# Patient Record
Sex: MALE | Race: WHITE | Employment: OTHER | URBAN - METROPOLITAN AREA
[De-identification: names, ages, dates, MRNs, and addresses within clinical notes are randomized per-mention and may not be internally consistent; named-entity substitution may affect disease eponyms.]

---

## 2017-01-01 ENCOUNTER — HOSPITAL ENCOUNTER (OUTPATIENT)
Dept: OTHER | Age: 67
Discharge: HOME | End: 2017-01-31
Attending: INTERNAL MEDICINE | Admitting: INTERNAL MEDICINE

## 2017-01-05 ENCOUNTER — ANTI-COAG VISIT (OUTPATIENT)
Dept: PHARMACY | Age: 67
End: 2017-01-05

## 2017-01-05 DIAGNOSIS — I48.20 CHRONIC ATRIAL FIBRILLATION (HCC): Primary | Chronic | ICD-10-CM

## 2017-01-05 DIAGNOSIS — I48.91 ATRIAL FIBRILLATION WITH RVR (HCC): Chronic | ICD-10-CM

## 2017-01-05 LAB
INR BLD: 1.5
PROTIME: 18 SECONDS

## 2017-01-12 ENCOUNTER — TELEPHONE (OUTPATIENT)
Dept: CARDIOLOGY CLINIC | Age: 67
End: 2017-01-12

## 2017-01-18 RX ORDER — PRAVASTATIN SODIUM 20 MG
TABLET ORAL
Qty: 30 TABLET | Refills: 5 | Status: SHIPPED | OUTPATIENT
Start: 2017-01-18 | End: 2017-07-22 | Stop reason: SDUPTHER

## 2017-01-23 ENCOUNTER — ANTI-COAG VISIT (OUTPATIENT)
Dept: PHARMACY | Age: 67
End: 2017-01-23

## 2017-01-23 DIAGNOSIS — I48.20 CHRONIC ATRIAL FIBRILLATION (HCC): ICD-10-CM

## 2017-01-23 LAB — INR BLD: 1.9

## 2017-02-13 ENCOUNTER — ANTI-COAG VISIT (OUTPATIENT)
Dept: PHARMACY | Age: 67
End: 2017-02-13

## 2017-02-13 DIAGNOSIS — I48.20 CHRONIC ATRIAL FIBRILLATION (HCC): ICD-10-CM

## 2017-02-13 LAB
INR BLD: 2.1
PROTIME: 25.8 SECONDS

## 2017-02-27 ENCOUNTER — TELEPHONE (OUTPATIENT)
Dept: CARDIOLOGY CLINIC | Age: 67
End: 2017-02-27

## 2017-03-02 RX ORDER — DILTIAZEM HYDROCHLORIDE 120 MG/1
CAPSULE, COATED, EXTENDED RELEASE ORAL
Qty: 90 CAPSULE | Refills: 3 | Status: SHIPPED | OUTPATIENT
Start: 2017-03-02 | End: 2017-06-13

## 2017-03-13 ENCOUNTER — ANTI-COAG VISIT (OUTPATIENT)
Dept: PHARMACY | Age: 67
End: 2017-03-13

## 2017-03-13 DIAGNOSIS — I48.20 CHRONIC ATRIAL FIBRILLATION (HCC): ICD-10-CM

## 2017-03-13 LAB
INR BLD: 2.2
PROTIME: 26.8 SECONDS

## 2017-04-01 ENCOUNTER — HOSPITAL ENCOUNTER (OUTPATIENT)
Dept: OTHER | Age: 67
Discharge: HOME | End: 2017-04-30
Attending: INTERNAL MEDICINE | Admitting: INTERNAL MEDICINE

## 2017-04-10 ENCOUNTER — ANTI-COAG VISIT (OUTPATIENT)
Dept: PHARMACY | Age: 67
End: 2017-04-10

## 2017-04-10 DIAGNOSIS — I48.20 CHRONIC ATRIAL FIBRILLATION (HCC): ICD-10-CM

## 2017-04-10 LAB
INR BLD: 2.3
PROTIME: 27.2 SECONDS

## 2017-05-01 ENCOUNTER — HOSPITAL ENCOUNTER (OUTPATIENT)
Dept: OTHER | Age: 67
Discharge: HOME | End: 2017-05-31
Attending: INTERNAL MEDICINE | Admitting: INTERNAL MEDICINE

## 2017-05-10 ENCOUNTER — ANTI-COAG VISIT (OUTPATIENT)
Dept: PHARMACY | Age: 67
End: 2017-05-10

## 2017-05-10 DIAGNOSIS — I48.20 CHRONIC ATRIAL FIBRILLATION (HCC): ICD-10-CM

## 2017-05-10 LAB
INR BLD: 3.2
PROTIME: 38.9 SECONDS

## 2017-05-15 ENCOUNTER — TELEPHONE (OUTPATIENT)
Dept: CARDIOLOGY CLINIC | Age: 67
End: 2017-05-15

## 2017-05-16 ENCOUNTER — TELEPHONE (OUTPATIENT)
Dept: PHARMACY | Age: 67
End: 2017-05-16

## 2017-05-17 RX ORDER — MECLIZINE HCL 12.5 MG/1
12.5 TABLET ORAL 3 TIMES DAILY PRN
Qty: 14 TABLET | Refills: 1 | OUTPATIENT
Start: 2017-05-17 | End: 2018-05-17

## 2017-06-09 ENCOUNTER — ANTI-COAG VISIT (OUTPATIENT)
Dept: PHARMACY | Age: 67
End: 2017-06-09

## 2017-06-09 DIAGNOSIS — I48.20 CHRONIC ATRIAL FIBRILLATION (HCC): ICD-10-CM

## 2017-06-09 LAB
INR BLD: 3.4
PROTIME: 40.6 SECONDS

## 2017-06-13 ENCOUNTER — TELEPHONE (OUTPATIENT)
Dept: CARDIOLOGY CLINIC | Age: 67
End: 2017-06-13

## 2017-06-13 ENCOUNTER — OFFICE VISIT (OUTPATIENT)
Dept: CARDIOLOGY CLINIC | Age: 67
End: 2017-06-13

## 2017-06-13 ENCOUNTER — NURSE ONLY (OUTPATIENT)
Dept: CARDIOLOGY CLINIC | Age: 67
End: 2017-06-13

## 2017-06-13 ENCOUNTER — HOSPITAL ENCOUNTER (OUTPATIENT)
Dept: OTHER | Age: 67
Discharge: HOME | End: 2017-06-13
Attending: NURSE PRACTITIONER | Admitting: NURSE PRACTITIONER

## 2017-06-13 VITALS
HEART RATE: 70 BPM | SYSTOLIC BLOOD PRESSURE: 80 MMHG | BODY MASS INDEX: 22.12 KG/M2 | DIASTOLIC BLOOD PRESSURE: 60 MMHG | OXYGEN SATURATION: 96 % | HEIGHT: 71 IN | WEIGHT: 158 LBS

## 2017-06-13 DIAGNOSIS — I48.91 ATRIAL FIBRILLATION WITH RVR (HCC): Chronic | ICD-10-CM

## 2017-06-13 DIAGNOSIS — I10 ESSENTIAL HYPERTENSION: Chronic | ICD-10-CM

## 2017-06-13 DIAGNOSIS — I48.91 ATRIAL FIBRILLATION, UNSPECIFIED TYPE (HCC): Primary | ICD-10-CM

## 2017-06-13 DIAGNOSIS — R06.02 SOB (SHORTNESS OF BREATH): ICD-10-CM

## 2017-06-13 DIAGNOSIS — I48.91 ATRIAL FIBRILLATION, UNSPECIFIED TYPE (HCC): ICD-10-CM

## 2017-06-13 LAB
A/G RATIO: 1.5 (ref 1.1–2.2)
ALBUMIN SERPL-MCNC: 4.5 G/DL (ref 3.4–5)
ALP BLD-CCNC: 45 U/L (ref 40–129)
ALT SERPL-CCNC: 17 U/L (ref 10–40)
ANION GAP SERPL CALCULATED.3IONS-SCNC: 16 MMOL/L (ref 3–16)
AST SERPL-CCNC: 20 U/L (ref 15–37)
BILIRUB SERPL-MCNC: 1.1 MG/DL (ref 0–1)
BUN BLDV-MCNC: 12 MG/DL (ref 7–20)
CALCIUM SERPL-MCNC: 9.2 MG/DL (ref 8.3–10.6)
CHLORIDE BLD-SCNC: 102 MMOL/L (ref 99–110)
CO2: 23 MMOL/L (ref 21–32)
CREAT SERPL-MCNC: 0.7 MG/DL (ref 0.8–1.3)
D DIMER: <200 NG/ML DDU (ref 0–229)
GFR AFRICAN AMERICAN: >60
GFR NON-AFRICAN AMERICAN: >60
GLOBULIN: 3 G/DL
GLUCOSE BLD-MCNC: 90 MG/DL (ref 70–99)
HCT VFR BLD CALC: 44.6 % (ref 40.5–52.5)
HEMOGLOBIN: 14.6 G/DL (ref 13.5–17.5)
MAGNESIUM: 2.3 MG/DL (ref 1.8–2.4)
MCH RBC QN AUTO: 31.1 PG (ref 26–34)
MCHC RBC AUTO-ENTMCNC: 32.7 G/DL (ref 31–36)
MCV RBC AUTO: 95.3 FL (ref 80–100)
PDW BLD-RTO: 13.4 % (ref 12.4–15.4)
PLATELET # BLD: 251 K/UL (ref 135–450)
PMV BLD AUTO: 9.3 FL (ref 5–10.5)
POTASSIUM SERPL-SCNC: 4.5 MMOL/L (ref 3.5–5.1)
RBC # BLD: 4.68 M/UL (ref 4.2–5.9)
SODIUM BLD-SCNC: 141 MMOL/L (ref 136–145)
TOTAL PROTEIN: 7.5 G/DL (ref 6.4–8.2)
TSH SERPL DL<=0.05 MIU/L-ACNC: 2.31 UIU/ML (ref 0.27–4.2)
VITAMIN D 25-HYDROXY: 35.5 NG/ML
WBC # BLD: 6.4 K/UL (ref 4–11)

## 2017-06-13 PROCEDURE — 93000 ELECTROCARDIOGRAM COMPLETE: CPT | Performed by: NURSE PRACTITIONER

## 2017-06-13 PROCEDURE — G8427 DOCREV CUR MEDS BY ELIG CLIN: HCPCS | Performed by: NURSE PRACTITIONER

## 2017-06-13 PROCEDURE — 4040F PNEUMOC VAC/ADMIN/RCVD: CPT | Performed by: NURSE PRACTITIONER

## 2017-06-13 PROCEDURE — 1123F ACP DISCUSS/DSCN MKR DOCD: CPT | Performed by: NURSE PRACTITIONER

## 2017-06-13 PROCEDURE — 99214 OFFICE O/P EST MOD 30 MIN: CPT | Performed by: NURSE PRACTITIONER

## 2017-06-13 PROCEDURE — 1036F TOBACCO NON-USER: CPT | Performed by: NURSE PRACTITIONER

## 2017-06-13 PROCEDURE — G8420 CALC BMI NORM PARAMETERS: HCPCS | Performed by: NURSE PRACTITIONER

## 2017-06-13 PROCEDURE — G8598 ASA/ANTIPLAT THER USED: HCPCS | Performed by: NURSE PRACTITIONER

## 2017-06-13 PROCEDURE — 3017F COLORECTAL CA SCREEN DOC REV: CPT | Performed by: NURSE PRACTITIONER

## 2017-06-19 ENCOUNTER — OFFICE VISIT (OUTPATIENT)
Dept: SLEEP MEDICINE | Age: 67
End: 2017-06-19

## 2017-06-19 VITALS
SYSTOLIC BLOOD PRESSURE: 110 MMHG | DIASTOLIC BLOOD PRESSURE: 60 MMHG | HEIGHT: 71 IN | BODY MASS INDEX: 22.26 KG/M2 | HEART RATE: 75 BPM | WEIGHT: 159 LBS | OXYGEN SATURATION: 98 %

## 2017-06-19 DIAGNOSIS — I48.91 ATRIAL FIBRILLATION WITH RVR (HCC): Chronic | ICD-10-CM

## 2017-06-19 DIAGNOSIS — I10 ESSENTIAL HYPERTENSION: Chronic | ICD-10-CM

## 2017-06-19 DIAGNOSIS — G47.33 OBSTRUCTIVE SLEEP APNEA SYNDROME: Primary | Chronic | ICD-10-CM

## 2017-06-19 DIAGNOSIS — F51.04 INSOMNIA, PSYCHOPHYSIOLOGICAL: Chronic | ICD-10-CM

## 2017-06-19 DIAGNOSIS — I25.9 CHRONIC ISCHEMIC HEART DISEASE: Chronic | ICD-10-CM

## 2017-06-19 PROCEDURE — 1123F ACP DISCUSS/DSCN MKR DOCD: CPT | Performed by: NURSE PRACTITIONER

## 2017-06-19 PROCEDURE — G8420 CALC BMI NORM PARAMETERS: HCPCS | Performed by: NURSE PRACTITIONER

## 2017-06-19 PROCEDURE — G8427 DOCREV CUR MEDS BY ELIG CLIN: HCPCS | Performed by: NURSE PRACTITIONER

## 2017-06-19 PROCEDURE — 1036F TOBACCO NON-USER: CPT | Performed by: NURSE PRACTITIONER

## 2017-06-19 PROCEDURE — G8598 ASA/ANTIPLAT THER USED: HCPCS | Performed by: NURSE PRACTITIONER

## 2017-06-19 PROCEDURE — 99214 OFFICE O/P EST MOD 30 MIN: CPT | Performed by: NURSE PRACTITIONER

## 2017-06-19 PROCEDURE — 4040F PNEUMOC VAC/ADMIN/RCVD: CPT | Performed by: NURSE PRACTITIONER

## 2017-06-19 PROCEDURE — 3017F COLORECTAL CA SCREEN DOC REV: CPT | Performed by: NURSE PRACTITIONER

## 2017-06-19 RX ORDER — MECLIZINE HYDROCHLORIDE 25 MG/1
25 TABLET ORAL 3 TIMES DAILY PRN
COMMUNITY
End: 2019-11-08

## 2017-06-19 ASSESSMENT — ENCOUNTER SYMPTOMS
ABDOMINAL PAIN: 0
SINUS PRESSURE: 0
ABDOMINAL DISTENTION: 0
COUGH: 0
APNEA: 0
SHORTNESS OF BREATH: 0
RHINORRHEA: 0

## 2017-06-19 ASSESSMENT — SLEEP AND FATIGUE QUESTIONNAIRES
HOW LIKELY ARE YOU TO NOD OFF OR FALL ASLEEP WHILE SITTING AND TALKING TO SOMEONE: 0
ESS TOTAL SCORE: 3
HOW LIKELY ARE YOU TO NOD OFF OR FALL ASLEEP WHILE LYING DOWN TO REST IN THE AFTERNOON WHEN CIRCUMSTANCES PERMIT: 1
HOW LIKELY ARE YOU TO NOD OFF OR FALL ASLEEP WHEN YOU ARE A PASSENGER IN A CAR FOR AN HOUR WITHOUT A BREAK: 0
HOW LIKELY ARE YOU TO NOD OFF OR FALL ASLEEP WHILE SITTING AND READING: 1
HOW LIKELY ARE YOU TO NOD OFF OR FALL ASLEEP IN A CAR, WHILE STOPPED FOR A FEW MINUTES IN TRAFFIC: 0
HOW LIKELY ARE YOU TO NOD OFF OR FALL ASLEEP WHILE SITTING INACTIVE IN A PUBLIC PLACE: 0
HOW LIKELY ARE YOU TO NOD OFF OR FALL ASLEEP WHILE SITTING QUIETLY AFTER LUNCH WITHOUT ALCOHOL: 0
HOW LIKELY ARE YOU TO NOD OFF OR FALL ASLEEP WHILE WATCHING TV: 1

## 2017-06-22 ENCOUNTER — HOSPITAL ENCOUNTER (OUTPATIENT)
Dept: NON INVASIVE DIAGNOSTICS | Age: 67
Discharge: HOME | End: 2017-06-22
Attending: NURSE PRACTITIONER | Admitting: NURSE PRACTITIONER

## 2017-06-22 DIAGNOSIS — I48.91 ATRIAL FIBRILLATION (HCC): ICD-10-CM

## 2017-06-30 ENCOUNTER — ANTI-COAG VISIT (OUTPATIENT)
Dept: PHARMACY | Age: 67
End: 2017-06-30

## 2017-06-30 DIAGNOSIS — I48.20 CHRONIC ATRIAL FIBRILLATION (HCC): ICD-10-CM

## 2017-06-30 LAB — INR BLD: 4.1

## 2017-07-07 ENCOUNTER — TELEPHONE (OUTPATIENT)
Dept: CARDIOLOGY CLINIC | Age: 67
End: 2017-07-07

## 2017-07-17 ENCOUNTER — ANTI-COAG VISIT (OUTPATIENT)
Dept: PHARMACY | Age: 67
End: 2017-07-17

## 2017-07-17 DIAGNOSIS — I48.20 CHRONIC ATRIAL FIBRILLATION (HCC): ICD-10-CM

## 2017-07-17 LAB
INR BLD: 2.2
PROTIME: 26.7 SECONDS

## 2017-07-24 RX ORDER — PRAVASTATIN SODIUM 20 MG
TABLET ORAL
Qty: 30 TABLET | Refills: 5 | Status: SHIPPED | OUTPATIENT
Start: 2017-07-24 | End: 2018-01-13 | Stop reason: SDUPTHER

## 2017-07-25 PROCEDURE — 93228 REMOTE 30 DAY ECG REV/REPORT: CPT | Performed by: INTERNAL MEDICINE

## 2017-07-27 ENCOUNTER — TELEPHONE (OUTPATIENT)
Dept: CARDIOLOGY CLINIC | Age: 67
End: 2017-07-27

## 2017-07-31 ENCOUNTER — OFFICE VISIT (OUTPATIENT)
Dept: CARDIOLOGY CLINIC | Age: 67
End: 2017-07-31

## 2017-07-31 VITALS
DIASTOLIC BLOOD PRESSURE: 70 MMHG | SYSTOLIC BLOOD PRESSURE: 90 MMHG | HEART RATE: 71 BPM | HEIGHT: 71 IN | BODY MASS INDEX: 21.84 KG/M2 | WEIGHT: 156 LBS

## 2017-07-31 DIAGNOSIS — G47.33 OBSTRUCTIVE APNEA: ICD-10-CM

## 2017-07-31 DIAGNOSIS — I10 ESSENTIAL HYPERTENSION: Chronic | ICD-10-CM

## 2017-07-31 DIAGNOSIS — I48.0 PAROXYSMAL ATRIAL FIBRILLATION (HCC): Primary | ICD-10-CM

## 2017-07-31 PROCEDURE — 3017F COLORECTAL CA SCREEN DOC REV: CPT | Performed by: NURSE PRACTITIONER

## 2017-07-31 PROCEDURE — 1036F TOBACCO NON-USER: CPT | Performed by: NURSE PRACTITIONER

## 2017-07-31 PROCEDURE — 99214 OFFICE O/P EST MOD 30 MIN: CPT | Performed by: NURSE PRACTITIONER

## 2017-07-31 PROCEDURE — G8598 ASA/ANTIPLAT THER USED: HCPCS | Performed by: NURSE PRACTITIONER

## 2017-07-31 PROCEDURE — G8420 CALC BMI NORM PARAMETERS: HCPCS | Performed by: NURSE PRACTITIONER

## 2017-07-31 PROCEDURE — 93000 ELECTROCARDIOGRAM COMPLETE: CPT | Performed by: NURSE PRACTITIONER

## 2017-07-31 PROCEDURE — 1123F ACP DISCUSS/DSCN MKR DOCD: CPT | Performed by: NURSE PRACTITIONER

## 2017-07-31 PROCEDURE — G8427 DOCREV CUR MEDS BY ELIG CLIN: HCPCS | Performed by: NURSE PRACTITIONER

## 2017-07-31 PROCEDURE — 4040F PNEUMOC VAC/ADMIN/RCVD: CPT | Performed by: NURSE PRACTITIONER

## 2017-08-14 ENCOUNTER — ANTI-COAG VISIT (OUTPATIENT)
Dept: PHARMACY | Age: 67
End: 2017-08-14

## 2017-08-14 DIAGNOSIS — I48.20 CHRONIC ATRIAL FIBRILLATION (HCC): ICD-10-CM

## 2017-08-14 LAB — INR BLD: 2.6

## 2017-09-13 ENCOUNTER — ANTI-COAG VISIT (OUTPATIENT)
Dept: PHARMACY | Age: 67
End: 2017-09-13

## 2017-09-13 LAB
INR BLD: 2.3
PROTIME: 27.9 SECONDS

## 2017-09-19 ENCOUNTER — TELEPHONE (OUTPATIENT)
Dept: CARDIOLOGY CLINIC | Age: 67
End: 2017-09-19

## 2017-09-25 ENCOUNTER — OFFICE VISIT (OUTPATIENT)
Dept: CARDIOLOGY CLINIC | Age: 67
End: 2017-09-25

## 2017-09-25 VITALS
HEIGHT: 71 IN | BODY MASS INDEX: 21.85 KG/M2 | SYSTOLIC BLOOD PRESSURE: 110 MMHG | HEART RATE: 78 BPM | OXYGEN SATURATION: 95 % | WEIGHT: 156.12 LBS | DIASTOLIC BLOOD PRESSURE: 72 MMHG

## 2017-09-25 DIAGNOSIS — I35.1 AORTIC VALVE REGURGITATION, UNSPECIFIED ETIOLOGY: Chronic | ICD-10-CM

## 2017-09-25 DIAGNOSIS — G47.33 OBSTRUCTIVE SLEEP APNEA SYNDROME: Chronic | ICD-10-CM

## 2017-09-25 DIAGNOSIS — I48.91 ATRIAL FIBRILLATION, UNSPECIFIED TYPE (HCC): Primary | Chronic | ICD-10-CM

## 2017-09-25 DIAGNOSIS — G45.9 TRANSIENT CEREBRAL ISCHEMIA, UNSPECIFIED TYPE: Chronic | ICD-10-CM

## 2017-09-25 PROCEDURE — G8427 DOCREV CUR MEDS BY ELIG CLIN: HCPCS | Performed by: INTERNAL MEDICINE

## 2017-09-25 PROCEDURE — 3017F COLORECTAL CA SCREEN DOC REV: CPT | Performed by: INTERNAL MEDICINE

## 2017-09-25 PROCEDURE — 4040F PNEUMOC VAC/ADMIN/RCVD: CPT | Performed by: INTERNAL MEDICINE

## 2017-09-25 PROCEDURE — 93000 ELECTROCARDIOGRAM COMPLETE: CPT | Performed by: INTERNAL MEDICINE

## 2017-09-25 PROCEDURE — 99214 OFFICE O/P EST MOD 30 MIN: CPT | Performed by: INTERNAL MEDICINE

## 2017-09-25 PROCEDURE — G8420 CALC BMI NORM PARAMETERS: HCPCS | Performed by: INTERNAL MEDICINE

## 2017-09-25 PROCEDURE — 1036F TOBACCO NON-USER: CPT | Performed by: INTERNAL MEDICINE

## 2017-09-25 PROCEDURE — 1123F ACP DISCUSS/DSCN MKR DOCD: CPT | Performed by: INTERNAL MEDICINE

## 2017-09-25 PROCEDURE — G8598 ASA/ANTIPLAT THER USED: HCPCS | Performed by: INTERNAL MEDICINE

## 2017-10-09 ENCOUNTER — ANTI-COAG VISIT (OUTPATIENT)
Dept: PHARMACY | Age: 67
End: 2017-10-09

## 2017-10-09 DIAGNOSIS — I48.91 ATRIAL FIBRILLATION, UNSPECIFIED TYPE (HCC): ICD-10-CM

## 2017-10-09 LAB
INR BLD: 2.8
PROTIME: 33.3 SECONDS

## 2017-10-16 ENCOUNTER — TELEPHONE (OUTPATIENT)
Dept: ENT CLINIC | Age: 67
End: 2017-10-16

## 2017-10-18 ENCOUNTER — HOSPITAL ENCOUNTER (OUTPATIENT)
Dept: OTHER | Age: 67
Discharge: HOME | End: 2017-10-18
Attending: INTERNAL MEDICINE | Admitting: INTERNAL MEDICINE

## 2017-10-20 LAB — DHEAS (DHEA SULFATE): 42.7 UG/DL (ref 42–290)

## 2017-10-23 ENCOUNTER — TELEPHONE (OUTPATIENT)
Dept: CARDIOLOGY CLINIC | Age: 67
End: 2017-10-23

## 2017-10-23 ENCOUNTER — TELEPHONE (OUTPATIENT)
Dept: PHARMACY | Age: 67
End: 2017-10-23

## 2017-11-01 ENCOUNTER — TELEPHONE (OUTPATIENT)
Dept: CARDIOLOGY CLINIC | Age: 67
End: 2017-11-01

## 2017-11-01 ENCOUNTER — HOSPITAL ENCOUNTER (OUTPATIENT)
Dept: OTHER | Age: 67
Discharge: HOME | End: 2017-11-30
Attending: INTERNAL MEDICINE | Admitting: INTERNAL MEDICINE

## 2017-11-01 NOTE — TELEPHONE ENCOUNTER
- Follow up 1 year.     - The patient is counseled to follow a low salt diet to assure blood pressure remains controlled for cardiovascular risk factor modification.   - The patient is counseled to avoid excess caffeine, and energy drinks as this may exacerbated ectopy and arrhythmia.    - The patient is counseled to get regular exercise 3-5 times per week to control cardiovascular risk factors.      Meli Henao MD, MPH  [facility] [facility]   Office: [phone]     Please advise thanks

## 2017-11-01 NOTE — TELEPHONE ENCOUNTER
Called to adv that he recently found out that he has cancer and he his on treatment. Pt is having a feeding tube put in on 11/8/17. Pt adv that he had appt sched for last week and they cancel and resched for next week. Pt adv he has not been on blood thinners since then, he want to know if he needs to go back on until three day prior or ok to stay off.  Please call to adv thank you

## 2017-11-03 ENCOUNTER — OFFICE VISIT (OUTPATIENT)
Dept: ENT CLINIC | Age: 67
End: 2017-11-03

## 2017-11-03 VITALS
SYSTOLIC BLOOD PRESSURE: 93 MMHG | WEIGHT: 157.2 LBS | HEART RATE: 71 BPM | TEMPERATURE: 98 F | DIASTOLIC BLOOD PRESSURE: 59 MMHG | BODY MASS INDEX: 21.92 KG/M2

## 2017-11-03 PROCEDURE — G8427 DOCREV CUR MEDS BY ELIG CLIN: HCPCS | Performed by: OTOLARYNGOLOGY

## 2017-11-03 PROCEDURE — G8598 ASA/ANTIPLAT THER USED: HCPCS | Performed by: OTOLARYNGOLOGY

## 2017-11-03 PROCEDURE — G8420 CALC BMI NORM PARAMETERS: HCPCS | Performed by: OTOLARYNGOLOGY

## 2017-11-03 PROCEDURE — 3017F COLORECTAL CA SCREEN DOC REV: CPT | Performed by: OTOLARYNGOLOGY

## 2017-11-03 PROCEDURE — 1036F TOBACCO NON-USER: CPT | Performed by: OTOLARYNGOLOGY

## 2017-11-03 PROCEDURE — 99203 OFFICE O/P NEW LOW 30 MIN: CPT | Performed by: OTOLARYNGOLOGY

## 2017-11-03 PROCEDURE — 1123F ACP DISCUSS/DSCN MKR DOCD: CPT | Performed by: OTOLARYNGOLOGY

## 2017-11-03 PROCEDURE — 4040F PNEUMOC VAC/ADMIN/RCVD: CPT | Performed by: OTOLARYNGOLOGY

## 2017-11-03 PROCEDURE — G8484 FLU IMMUNIZE NO ADMIN: HCPCS | Performed by: OTOLARYNGOLOGY

## 2017-11-03 RX ORDER — DIAZEPAM 5 MG/1
5 TABLET ORAL EVERY 6 HOURS PRN
COMMUNITY
End: 2020-02-18

## 2017-11-04 NOTE — PROGRESS NOTES
cholesteatoma. The umbo and light reflex appear normal.        EYES:   The conjunctivae and lids appear normal. There is full extraocular movement                 JAWS/DENTITION:  There is full ROM of the  TM joints without crepitus either audible or palpable. SALIVARY GLANDS:  The parotid and submandibular glands are normal in appearance. There are no palpable stones or masses. Clear secretions emanate from both Cynthia's and Gordon's ducts. There is no periglandular adenopathy. NASAL:  The external nose including the dorsum, columella, alae, and nasion is unremarkable. The turbinates appear normal. The middle and inferior meatuses appeared clear. No polyps, ulceration, or mass are visualized either naris. The nasal septum is minimally deflected to the right, and not obstructive    NASOPHARYNX:  The mirror exam of the nasopharynx shows no mucosal disease or obstruction. ORAL/PHARYNGEAL:   No abnormal dryness or discrete mucosal lesions are seen at the lips, oral cavity, or oropharynx. There is full tongue mobility, and the fungiform and vallate papillae appear normal. The floor of the mouth is unremarkable. . The palatoglossal and palatopharyngeal folds, uvula, and soft palate do not obstruct the oropharynx. HYPOPHARYNX/LARYNX:  Indirect laryngeal mirror exam shows a normal base of tongue, vallecula, and epiglottis. The aryepiglottic folds appeared normal. No pooling of saliva in the piriform sinuses. The post cricoid space is clear. Normal appearing plica ventricularis and arytenoids. Both cords are mobile on adduction and abduction. NECK:  The neck is supple with full range of motion. The bony and cartilaginous landmarks are normal to palpation. There is a firm mildly tender 3 x 3 cm upper jugular chain node at the angle of the jaw on the right. No other satellite adenopathy can be palpated The thyroid gland is normal to palpation, and the trachea is midline.     CHEST/PULMONARY: Effort normal, no

## 2017-11-06 ENCOUNTER — ANTI-COAG VISIT (OUTPATIENT)
Dept: PHARMACY | Age: 67
End: 2017-11-06

## 2017-11-06 DIAGNOSIS — I48.91 ATRIAL FIBRILLATION, UNSPECIFIED TYPE (HCC): ICD-10-CM

## 2017-11-06 LAB
INR BLD: 1
PROTIME: 12.2 SECONDS

## 2017-11-16 ENCOUNTER — ANTI-COAG VISIT (OUTPATIENT)
Dept: PHARMACY | Age: 67
End: 2017-11-16

## 2017-11-16 DIAGNOSIS — I48.91 ATRIAL FIBRILLATION, UNSPECIFIED TYPE (HCC): ICD-10-CM

## 2017-11-16 LAB
INR BLD: 2.4
PROTIME: 28.7 SECONDS

## 2017-11-16 NOTE — PROGRESS NOTES
Mr. Diego Trejo is a 74 y.o. y/o male with history of a fib who presents today for anticoagulation monitoring and adjustment. Mitral valve replaced with porcine valve 1/2015  Patient was in the hospital on 6/6/15 for a TIA  Patient Reported Findings:  Yes     No  [x]   []       Patient verifies current dosing regimen as listed took 15 mg night after procedure and following night then returned to weekly dose  []   [x]       S/S bleeding/bruising/swelling/SOB  []   [x]       Blood in urine or stool  [x]   []       Procedures scheduled in the future at this time- Is having a GI tube placed for feeding tube on Wed 11/8 at [facility]. Has chemo and radiation for throat cancer, was told to expect to lose voice next week d/t radiation  []   [x]       Missed Dose  []   [x]       Extra Dose  []   [x]       Change in medications - states takes big green capsule to help with chemo rash   [x]   []       Change in health/diet/appetite---diagnosed with cancer since past appointment. 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 Has chemo every tues and radiation every morning   []   [x]       Change in alcohol use  []   [x]       Change in activity  []   [x]       Hospital admission  []   [x]       Emergency department visit   []   [x]       Other complaints -    Clinical Outcomes:  Yes     No  []   [x]       Major bleeding event  []   [x]       Thromboembolic event    Duration of warfarin Therapy: indefinitely  INR Range:  2.0-3.0 (2.5-3.0 preferred)     INR 2.4 today   Return to weekly dose of 10 mg Sun, Tues, Thurs and 12.5 mg all other days  Recheck INR 2 weeks, 11/30.      Referring cardiologist is Dr Tamanna Gonzalez  INR (no units)   Date Value   11/16/2017 2.4   11/06/2017 1   10/09/2017 2.8   09/13/2017 2.3

## 2017-12-01 ENCOUNTER — HOSPITAL ENCOUNTER (OUTPATIENT)
Dept: OTHER | Age: 67
Discharge: HOME | End: 2017-12-31
Attending: INTERNAL MEDICINE | Admitting: INTERNAL MEDICINE

## 2017-12-01 ENCOUNTER — OFFICE VISIT (OUTPATIENT)
Dept: ENT CLINIC | Age: 67
End: 2017-12-01

## 2017-12-01 VITALS
DIASTOLIC BLOOD PRESSURE: 54 MMHG | HEIGHT: 70 IN | WEIGHT: 147 LBS | HEART RATE: 80 BPM | BODY MASS INDEX: 21.05 KG/M2 | SYSTOLIC BLOOD PRESSURE: 100 MMHG

## 2017-12-01 DIAGNOSIS — K12.30 MUCOSITIS ORAL: Primary | ICD-10-CM

## 2017-12-01 PROCEDURE — 1123F ACP DISCUSS/DSCN MKR DOCD: CPT | Performed by: OTOLARYNGOLOGY

## 2017-12-01 PROCEDURE — 99213 OFFICE O/P EST LOW 20 MIN: CPT | Performed by: OTOLARYNGOLOGY

## 2017-12-01 PROCEDURE — G8598 ASA/ANTIPLAT THER USED: HCPCS | Performed by: OTOLARYNGOLOGY

## 2017-12-01 PROCEDURE — 1036F TOBACCO NON-USER: CPT | Performed by: OTOLARYNGOLOGY

## 2017-12-01 PROCEDURE — G8484 FLU IMMUNIZE NO ADMIN: HCPCS | Performed by: OTOLARYNGOLOGY

## 2017-12-01 PROCEDURE — 3017F COLORECTAL CA SCREEN DOC REV: CPT | Performed by: OTOLARYNGOLOGY

## 2017-12-01 PROCEDURE — G8420 CALC BMI NORM PARAMETERS: HCPCS | Performed by: OTOLARYNGOLOGY

## 2017-12-01 PROCEDURE — 4040F PNEUMOC VAC/ADMIN/RCVD: CPT | Performed by: OTOLARYNGOLOGY

## 2017-12-01 PROCEDURE — 96372 THER/PROPH/DIAG INJ SC/IM: CPT | Performed by: OTOLARYNGOLOGY

## 2017-12-01 PROCEDURE — G8427 DOCREV CUR MEDS BY ELIG CLIN: HCPCS | Performed by: OTOLARYNGOLOGY

## 2017-12-01 RX ORDER — METHYLPREDNISOLONE ACETATE 40 MG/ML
40 INJECTION, SUSPENSION INTRA-ARTICULAR; INTRALESIONAL; INTRAMUSCULAR; SOFT TISSUE ONCE
Status: COMPLETED | OUTPATIENT
Start: 2017-12-01 | End: 2017-12-01

## 2017-12-01 RX ADMIN — METHYLPREDNISOLONE ACETATE 40 MG: 40 INJECTION, SUSPENSION INTRA-ARTICULAR; INTRALESIONAL; INTRAMUSCULAR; SOFT TISSUE at 16:12

## 2017-12-01 NOTE — PROGRESS NOTES
The patient is a 15 treatments into his chemoradiation. He has noticed blood in his saliva. He has lost 20 pounds    He will now begin alimenting through his gastrostomy tube  He has been using Magic mouthwash and topical lidocaine for symptomatic relief. There have been no respiratory infections. He was admonished as to keeping his dentures out during the radiation given that they could heat up. He has not been able to shave due to his radiation dermatitis although has mostly lost his beard on the right    There is a very marked radiation mucositis with mucosal edema particularly dependent at the uvula and palatal folds  Indirect laryngoscopy shows the base of tongue is similarly inflamed but there is no obstruction in the hypopharynx    There is still a palpable. cervical node measuring 3 cm but it is mobile and nontender    He will continue his current regimen for his radiation mucositis  1 mL of Depo-Medrol IM will hopefully address some of the other mucosal edema issues    Return after radiations completed

## 2017-12-04 ENCOUNTER — TELEPHONE (OUTPATIENT)
Dept: SLEEP MEDICINE | Age: 67
End: 2017-12-04

## 2017-12-07 ENCOUNTER — TELEPHONE (OUTPATIENT)
Dept: PHARMACY | Age: 67
End: 2017-12-07

## 2017-12-11 NOTE — TELEPHONE ENCOUNTER
Pt's wife called to cancel CC on 12/14 d/t pt having a feeding tube and taking things specifically for that . Asked if pt is still taking warfarin she said yes, advised that he come into [facility] appt on 12/14 since he is still taking warfarin and was due for PT/INR check on 11/30 . They decided to keep appt .

## 2017-12-14 ENCOUNTER — ANTI-COAG VISIT (OUTPATIENT)
Dept: PHARMACY | Age: 67
End: 2017-12-14

## 2017-12-14 ENCOUNTER — TELEPHONE (OUTPATIENT)
Dept: CARDIOLOGY CLINIC | Age: 67
End: 2017-12-14

## 2017-12-14 DIAGNOSIS — I48.91 ATRIAL FIBRILLATION, UNSPECIFIED TYPE (HCC): ICD-10-CM

## 2017-12-14 PROBLEM — R04.2 HEMOPTYSIS: Status: ACTIVE | Noted: 2017-12-14

## 2017-12-14 PROBLEM — Z53.1 REFUSAL OF BLOOD TRANSFUSIONS AS PATIENT IS JEHOVAH'S WITNESS: Status: ACTIVE | Noted: 2017-12-14

## 2017-12-14 PROBLEM — C76.0 SQUAMOUS CELL CARCINOMA OF HEAD AND NECK (HCC): Status: ACTIVE | Noted: 2017-12-14

## 2017-12-14 PROBLEM — K59.00 CONSTIPATION: Status: ACTIVE | Noted: 2017-12-14

## 2017-12-14 PROBLEM — D68.9 COAGULOPATHY (HCC): Status: ACTIVE | Noted: 2017-12-14

## 2017-12-14 PROBLEM — K94.21 BLEEDING FROM GASTROSTOMY TUBE SITE (HCC): Status: ACTIVE | Noted: 2017-12-14

## 2017-12-14 PROBLEM — K92.1 HEMATOCHEZIA: Status: ACTIVE | Noted: 2017-12-14

## 2017-12-14 PROBLEM — E43 SEVERE PROTEIN-CALORIE MALNUTRITION (HCC): Status: ACTIVE | Noted: 2017-12-14

## 2017-12-14 PROBLEM — R13.10 DYSPHAGIA: Status: ACTIVE | Noted: 2017-12-14

## 2017-12-14 PROBLEM — G89.3 CHRONIC PAIN DUE TO NEOPLASM: Status: ACTIVE | Noted: 2017-12-14

## 2017-12-14 PROBLEM — C44.42 SQUAMOUS CELL CARCINOMA OF HEAD AND NECK: Status: ACTIVE | Noted: 2017-12-14

## 2017-12-14 LAB
INR BLD: 10.15 (ref 0.85–1.15)
PROTHROMBIN TIME: 114.7 SEC (ref 9.6–13)

## 2017-12-14 NOTE — PROGRESS NOTES
Mr. Shirley Banks is a 74 y.o. y/o male with history of a fib who presents today for anticoagulation monitoring and adjustment. Mitral valve replaced with porcine valve 1/2015  Patient was in the hospital on 6/6/15 for a TIA  Patient Reported Findings:  Yes     No  [x]   []       Patient verifies current dosing regimen as listed   [x]   []       S/S bleeding/bruising/swelling/SOB States that his skin is very thin from the radiation. Fingers all have little cuts that bleed. Encouraged patient to please call clinic next time there is bleeding as we need to check INR to ensure that not supratherapeutic. Patient has large chemo rash on face and neck  Was in ER last night with G-tube issues. Skin is damaged around insertion of tube. [x]   []       Blood in urine or stool- states that he is extremely constipated, but when passed stool there was dark red blood in it. Has not gone to bathroom in multiple days. [x]   []       Procedures scheduled in the future at this time- Had GI tube placed for feeding tube on 11/8 at [facility]. Has chemo and radiation for throat cancer  []   [x]       Missed Dose  []   [x]       Extra Dose  []   [x]       Change in medications - states takes big green capsule to help with chemo rash   [x]   []       Change in health/diet/appetite---diagnosed with cancer since past appointment. 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 Has chemo every tues and radiation every morning   Documented from last MD note that lost ~20 lbs d/t not eating. Likely inc INR d/t interaction with chemo/radiation, not eating, and feeling ill.    []   [x]       Change in alcohol use  []   [x]       Change in activity  []   [x]       Hospital admission  [x]   []       Emergency department visit - Was in ER last night with G-tube issues. Skin is damaged around insertion of tube. No INR was completed.   [x]   []       Other complaints - Patient hasn't been in to the clinic for ~1 month, as patient did not come when scheduled 2 weeks ago. Was unable to reach and then when did reach patient stated was scheduled for 12/14 no sooner, even though missed appt. Clinical Outcomes:  Yes     No  []   [x]       Major bleeding event  []   [x]       Thromboembolic event    Duration of warfarin Therapy: indefinitely  INR Range:  2.0-3.0 (2.5-3.0 preferred)     INR 10.1 today   Patient accompanied by ex-wife  INR >8, had to get lab draw. Patient's ex-wife states that oncologist was not concerned about bleeding in mouth. Patient states that coughs up blood sometimes. Patient's ex-wife states that the oncologist check him every week and was not concerned. Concern currently if for internal bleeding since symptoms and largely supratherapeutic. Patient does not want to go to the ER. Explained that I don't believe bleeding in mouth is solely d/t radiation but I am not qualified to state if is or isn't. Advised patient to call oncologist now and speak with them about clinic's concern. After speaking with oncologist, they agree with clinic. Sent patient to the ER to be assessed  Advised patient and ex-wife to please call clinic when leaves to get appt ASAP back into the clinic.     Referring cardiologist is Dr Jose Ashley  INR (no units)   Date Value   11/16/2017 2.4   11/06/2017 1   10/09/2017 2.8   09/13/2017 2.3

## 2017-12-14 NOTE — TELEPHONE ENCOUNTER
Received result that pt INR 10.15 at [address] [place] [address]. Per RN documentation at clinic pt was advised to go to ER. Pt is in ER being admitted.  Will notify RMM of results

## 2017-12-18 ENCOUNTER — TELEPHONE (OUTPATIENT)
Dept: CARDIOLOGY CLINIC | Age: 67
End: 2017-12-18

## 2017-12-18 NOTE — TELEPHONE ENCOUNTER
LMOM for pt contact to get more information. What are the concerns? Pt had an INR 10.14 was suggested to hold for a while. Unknown concerns.

## 2017-12-18 NOTE — TELEPHONE ENCOUNTER
Pt was in [facility] and they took pt off his Coumadin and ASA 81 mg. Pt is a little concerned and would like to talk to RMM or his nurse about this.  Pls call to advise Thank you

## 2017-12-18 NOTE — TELEPHONE ENCOUNTER
Explained to patient that I spoke with Ekaterina Barry and it is okay for pt to be off of anticoagulation for the recommended period of time per his discharge instructions and that we will schedule him for a follow up appointment in the next four weeks. Pt denies any further questions at this time.

## 2017-12-18 NOTE — TELEPHONE ENCOUNTER
I spoke with pt ex and she stated that he was taken off of the coumadin and  told to make an appointment with [facility] in one month. His last INR was 1.4. Pt was having radiation treatments for cancer at the base of his tongue, so he is on a feeding tube. He had gone for a radiation treatment and when he got home he started bleeding. He was advised to got to ER. INR was 10.14 was told to hold for 4 weeks f/u with [facility]. They have concerns about holding for that long.  Last INR 1.32

## 2017-12-21 ENCOUNTER — TELEPHONE (OUTPATIENT)
Dept: CARDIOLOGY CLINIC | Age: 67
End: 2017-12-21

## 2017-12-21 NOTE — TELEPHONE ENCOUNTER
Wife of pt called and recently came home and was totally disoriented. Lasted about 15 min. Pt is okay now. Please call to adv.   Thank you

## 2017-12-21 NOTE — TELEPHONE ENCOUNTER
Plan:   -Recent diagnosis of metastatic squamous cell carcinoma              Getting radiation Tx        -supratherapeutic INR and GI bleed: off coumadin. Got Vit K  - Atrial fibrillation:               - S/p MAZE and also DELGADO ligation               - On coumadin due to TIA after he had DELGADO ligation              -had significant bleeding On coumadin               -  Remains in sinus rhythm. He is Jehova's witness     Based on his risk factors as below, his risk of major bleeding is mostly higher than risk of stroke while on anticoagulation. Given that this happened after radiation started, I recommend to hold coumadin for 4 weeks(duration of RT and sometime to heal) and resume it after that.     Will need f/u appointment with Dr. Juan Carlos Hurd in 4 weeks.      (the scores are for patients without mechanical valve or valvular atrial fibrillation)[id number] score = 4; annual stroke/embolism risk on no therapy approx 6.5%. HAS-BLED score = 3; annual major bleeding risk on OAC approx 5.6%.      Annual risk of stroke/embolism on ASA 80-325mg daily: 5.1%, warfarin INR2-3: 2.1%, dabigatran 110mg bid: 2.1%, dabigatran 150mg bid: 1.4%, rivaroxaban 20mg daily: 2.1%, apixaban 5mg bid: 1.7%, edoxaban 30mg daily: 2.1%, edoxaban 60mg daily: 2.1%.      Annual risk of major bleeding on ASA 80-325mg daily: 1.1%, warfarin INR2-3: 5.6% , dabigatran 110mg bid: 4.5% , dabigatran 150mg bid: 5.6% , rivaroxaban 20mg daily: 5.6% , apixaban 5mg bid: 3.9% , edoxaban 30mg daily: 2.6% , edoxaban 60mg daily: 4.5% .    - Aortic insufficiency:               - S/p porcine AVR               - 5/2017 stress echo was normal.                  -  TIA               - He had TIA despite having DELGADO ligation and also was on ASA.                - Remains on coumadin, monitored by [facility] anticoagulation clinic              - Quit smoking and ETOH              - he has daily bleeding and this time with significant bleed.     - CHRISTOPHER              - on CPAP      - HTN              -BP is well controlled.  Continue current meds.

## 2018-01-01 ENCOUNTER — HOSPITAL ENCOUNTER (OUTPATIENT)
Dept: OTHER | Age: 68
Discharge: HOME | End: 2018-01-31
Attending: INTERNAL MEDICINE | Admitting: INTERNAL MEDICINE

## 2018-01-04 ENCOUNTER — HOSPITAL ENCOUNTER (OUTPATIENT)
Dept: SPEECH THERAPY | Age: 68
Discharge: HOME | End: 2018-01-31
Attending: PHYSICIAN ASSISTANT | Admitting: PHYSICIAN ASSISTANT

## 2018-01-04 ASSESSMENT — PAIN - FUNCTIONAL ASSESSMENT: PAIN_FUNCTIONAL_ASSESSMENT: 0-10

## 2018-01-04 ASSESSMENT — PAIN DESCRIPTION - LOCATION: LOCATION: THROAT

## 2018-01-04 ASSESSMENT — PAIN SCALES - GENERAL: PAINLEVEL_OUTOF10: 8

## 2018-01-04 NOTE — PROGRESS NOTES
nuts), or require additional time (due to dysphasia)    []  Level 7 The individual's ability to eat independently is not limited by swallow function. Swallowing would be safe and efficient for all consistencies. Compensatory strategies are effectively used when needed       Diet levels/restrictions are defined on next page. Please use levels as a guide in scoring this FCM                                        Swallowing: Dietary Levels/Restrictions  · Maximum Restrictions: Diet is two or more levels below a regular diet status and liquid consistency  · Moderate Restrictions: Diet is two or more levels below a regular diet status in either solid or liquid consistency (but not both), OR diet is one level below in both solid and liquid consistency   · Minimum Restrictions: Diet is one level below a regular diet status in solid or liquid consistency    Solids  · Regular: No restrictions  · Reduced One Level: Meats are cooked until soft, with not tough or stringy foods. Might include meats like meat loaf, baked fish, and soft chicken. Vegetables are cooked soft  · Reduced Two Levels: Meats are chopped or ground. Vegetables are one consistency (e.g., souffle, baked potato) or are mashed with a fork  · Reduced Three Levels: Meats and vegetables are pureed    Liquids  · Regular: Thin liquids;  No restrictions  · Reduced One Level: Nectar, syrup; mildly thick    · Reduced Two Levels: Honey; moderately thick  · Reduced Three Levels: Pudding; extra thick

## 2018-01-08 ENCOUNTER — OFFICE VISIT (OUTPATIENT)
Dept: CARDIOLOGY CLINIC | Age: 68
End: 2018-01-08

## 2018-01-08 VITALS
WEIGHT: 145.8 LBS | SYSTOLIC BLOOD PRESSURE: 96 MMHG | HEIGHT: 71 IN | DIASTOLIC BLOOD PRESSURE: 60 MMHG | HEART RATE: 70 BPM | BODY MASS INDEX: 20.41 KG/M2

## 2018-01-08 DIAGNOSIS — I48.0 PAROXYSMAL ATRIAL FIBRILLATION (HCC): Primary | Chronic | ICD-10-CM

## 2018-01-08 DIAGNOSIS — C76.0 SQUAMOUS CELL CARCINOMA OF HEAD AND NECK (HCC): ICD-10-CM

## 2018-01-08 DIAGNOSIS — G47.33 OSA (OBSTRUCTIVE SLEEP APNEA): ICD-10-CM

## 2018-01-08 DIAGNOSIS — Z95.2 S/P AVR: ICD-10-CM

## 2018-01-08 PROCEDURE — G8427 DOCREV CUR MEDS BY ELIG CLIN: HCPCS | Performed by: INTERNAL MEDICINE

## 2018-01-08 PROCEDURE — G8484 FLU IMMUNIZE NO ADMIN: HCPCS | Performed by: INTERNAL MEDICINE

## 2018-01-08 PROCEDURE — 93000 ELECTROCARDIOGRAM COMPLETE: CPT | Performed by: INTERNAL MEDICINE

## 2018-01-08 PROCEDURE — 4040F PNEUMOC VAC/ADMIN/RCVD: CPT | Performed by: INTERNAL MEDICINE

## 2018-01-08 PROCEDURE — 1111F DSCHRG MED/CURRENT MED MERGE: CPT | Performed by: INTERNAL MEDICINE

## 2018-01-08 PROCEDURE — 1036F TOBACCO NON-USER: CPT | Performed by: INTERNAL MEDICINE

## 2018-01-08 PROCEDURE — 99214 OFFICE O/P EST MOD 30 MIN: CPT | Performed by: INTERNAL MEDICINE

## 2018-01-08 PROCEDURE — G8420 CALC BMI NORM PARAMETERS: HCPCS | Performed by: INTERNAL MEDICINE

## 2018-01-08 PROCEDURE — G8599 NO ASA/ANTIPLAT THER USE RNG: HCPCS | Performed by: INTERNAL MEDICINE

## 2018-01-08 PROCEDURE — 1123F ACP DISCUSS/DSCN MKR DOCD: CPT | Performed by: INTERNAL MEDICINE

## 2018-01-08 PROCEDURE — 3017F COLORECTAL CA SCREEN DOC REV: CPT | Performed by: INTERNAL MEDICINE

## 2018-01-08 NOTE — PROGRESS NOTES
REPLACEMENT  1/28/15    Dr. Lita Garcia 25mm Mosaic Ultra porcine valve; right and left modified maze procedure with ligation of DELGADO with Atriclip    CARDIAC CATHETERIZATION      HERNIA REPAIR      KNEE SURGERY      Arthroscopy    LUNG SURGERY      collapsed lung due to broken ribs    MANDIBLE FRACTURE SURGERY      NECK SURGERY      Fusion    TONSILLECTOMY AND ADENOIDECTOMY         Allergies   Allergen Reactions    Naproxen Swelling     Lips    Vicodin [Hydrocodone-Acetaminophen] Itching       Medication:  Current Outpatient Prescriptions   Medication Sig Dispense Refill    polyethylene glycol (MIRALAX) powder 17 g by Per G Tube route 2 times daily 1020 g 0    Magic Mouthwash (MIRACLE MOUTHWASH) Swish and spit 5 mLs 4 times daily as needed for Irritation      doxycycline hyclate (VIBRA-TABS) 100 MG tablet Take 100 mg by mouth      diazepam (VALIUM) 5 MG tablet Take 5 mg by mouth every 6 hours as needed for Anxiety      pravastatin (PRAVACHOL) 20 MG tablet TAKE 1 TABLET BY MOUTH EVERY DAY 30 tablet 5    meclizine (ANTIVERT) 25 MG tablet Take 25 mg by mouth 3 times daily as needed      diltiazem (CARDIZEM CD) 180 MG ER capsule Take 1 capsule by mouth every evening (Patient taking differently: Take 120 mg by mouth every evening ) 90 capsule 1    oxyCODONE-acetaminophen (PERCOCET) 5-325 MG per tablet Take 1 tablet by mouth every 4 hours as needed 20 tablet 0    docusate sodium (COLACE) 100 MG capsule Take 100 mg by mouth 2 times daily.  clindamycin (CLINDAGEL) 1 % gel Apply topically      cyclobenzaprine (FLEXERIL) 10 MG tablet   0     No current facility-administered medications for this visit. Social History:  Reviewed. reports that he quit smoking about 2 years ago. His smoking use included Cigarettes. He started smoking about 43 years ago. He has a 40.00 pack-year smoking history. He has never used smokeless tobacco. He reports that he drinks alcohol. He reports that he does not use drugs. 1/8/2018   NSR 71bpm QTc 391    Stress Echo: 5/2017   Echo (rest): Normal (LVEF >50%)   Echo (stress): Hyperkinetic (LVEF >70%)     Echo   Baseline resting echocardiogram shows normal global LV systolic function   with an ejection fraction of 60% and uniform myocardial segmental wall   motion. Following stress there was uniform augmentation of all myocardial   segments with appropriate hyperdynamic LV systolic response to stress. Difficult post exercise imaging. ECG   Normal (Negative) response to exercise. Symptoms   No symptoms with exercise. Echo Exercise Stress 6/22/17  Conclusions      Summary   Normal stress echocardiogram study. [facility]- 6/2017   Brief AT, doubt afib    ECHO: 6/8/2015  Summary   -Normal left ventricle size, wall thickness and systolic function with an   estimated ejection fraction of 60%. -No regional wall motion abnormalities   are seen. -Moderate mitral regurgitation.   -A bubble study was performed and fails to show evidence of shunting.   -Normally functioning porcine bioprosthetic valve in aortic position. Maximum gradient of 22mmhg and a mean gradient of 14mmhg. No aortic   insufficiency. -Moderate tricuspid regurgitation with RVSP estimated at 37 mmHg.   Assessment:   Patient Active Problem List    Diagnosis Date Noted    Supratherapeutic INR      Priority: High    S/P AVR      Priority: High    Bleeding     Coagulopathy ([address] 12/14/2017    Squamous cell carcinoma of head and neck ([address] 12/14/2017    Dysphagia 12/14/2017    Hematochezia 12/14/2017    Hemoptysis 12/14/2017    Severe protein-calorie malnutrition ([address] 12/14/2017    Chronic pain due to neoplasm 12/14/2017    Constipation 12/14/2017    Refusal of blood transfusions as patient is [employer] 12/14/2017    Bleeding from gastrostomy tube site ([address] 12/14/2017    Mucositis oral 12/01/2017    SOB (shortness of breath) 06/13/2017    Obstructive sleep apnea syndrome     Obstructive apnea

## 2018-01-15 NOTE — TELEPHONE ENCOUNTER
Last ov 1/8/17  Pending appt 7/10/18  Last refill 7/24/17 #[id number]      Assessment:         Patient Active Problem List     Diagnosis Date Noted    Supratherapeutic INR         Priority: High    S/P AVR         Priority: High    Bleeding      Coagulopathy ([address] 12/14/2017    Squamous cell carcinoma of head and neck [facility] 12/14/2017    Dysphagia 12/14/2017    Hematochezia 12/14/2017    Hemoptysis 12/14/2017    Severe protein-calorie malnutrition ([address] 12/14/2017    Chronic pain due to neoplasm 12/14/2017    Constipation 12/14/2017    Refusal of blood transfusions as patient is [employer] 12/14/2017    Bleeding from gastrostomy tube site ([facility] 12/14/2017    Mucositis oral 12/01/2017    SOB (shortness of breath) 06/13/2017    Obstructive sleep apnea syndrome      Obstructive apnea      Transient ischemic attack (TIA)      Chronic ischemic heart disease      Essential hypertension      Slurred speech 06/06/2015    Aortic insufficiency      A-fib ([facility]) 01/24/2015    Chest pain      Injury of ankle, left 12/09/2013    Medial malleolar fracture 12/09/2013         Plan:  - Recent diagnosis of metastatic squamous cell carcinoma              Patient's has had 3 episodes of what appears to be altered mental status, upper extremity numbness, memory loss according to his wife. Refer to neurology for follow up of AMS and BUE numbness              Possibly needs MRI brain              Pt states he finished his last round of chemotherapy and radiation last week. follows with      - Paroxysmal Atrial fibrillation:               S/p MAZE and also DELGADO ligation               coumadin discontinued in hospital.               Patient is status post left atrial appendage ligation. Therefore the risk of stroke is lower. Discussed in detail with him and his wife. He remains off anticoagulation therapy at this time.                He has history of questionable TIA however his

## 2018-01-16 RX ORDER — PRAVASTATIN SODIUM 20 MG
TABLET ORAL
Qty: 30 TABLET | Refills: 5 | Status: SHIPPED | OUTPATIENT
Start: 2018-01-16 | End: 2018-01-17 | Stop reason: CLARIF

## 2018-01-17 ENCOUNTER — OFFICE VISIT (OUTPATIENT)
Dept: NEUROLOGY | Age: 68
End: 2018-01-17

## 2018-01-17 VITALS
DIASTOLIC BLOOD PRESSURE: 63 MMHG | SYSTOLIC BLOOD PRESSURE: 86 MMHG | HEIGHT: 71 IN | WEIGHT: 143 LBS | BODY MASS INDEX: 20.02 KG/M2 | HEART RATE: 72 BPM

## 2018-01-17 DIAGNOSIS — M79.621 PAIN IN BOTH UPPER ARMS: ICD-10-CM

## 2018-01-17 DIAGNOSIS — M79.604 BILATERAL LEG PAIN: ICD-10-CM

## 2018-01-17 DIAGNOSIS — M79.622 PAIN IN BOTH UPPER ARMS: ICD-10-CM

## 2018-01-17 DIAGNOSIS — C76.0 SQUAMOUS CELL CARCINOMA OF HEAD AND NECK (HCC): ICD-10-CM

## 2018-01-17 DIAGNOSIS — I48.0 PAROXYSMAL ATRIAL FIBRILLATION (HCC): Chronic | ICD-10-CM

## 2018-01-17 DIAGNOSIS — R41.3 TRANSIENT AMNESIA: Primary | ICD-10-CM

## 2018-01-17 DIAGNOSIS — M79.605 BILATERAL LEG PAIN: ICD-10-CM

## 2018-01-17 PROCEDURE — 3017F COLORECTAL CA SCREEN DOC REV: CPT | Performed by: PSYCHIATRY & NEUROLOGY

## 2018-01-17 PROCEDURE — G8599 NO ASA/ANTIPLAT THER USE RNG: HCPCS | Performed by: PSYCHIATRY & NEUROLOGY

## 2018-01-17 PROCEDURE — G8420 CALC BMI NORM PARAMETERS: HCPCS | Performed by: PSYCHIATRY & NEUROLOGY

## 2018-01-17 PROCEDURE — 99215 OFFICE O/P EST HI 40 MIN: CPT | Performed by: PSYCHIATRY & NEUROLOGY

## 2018-01-17 PROCEDURE — 1036F TOBACCO NON-USER: CPT | Performed by: PSYCHIATRY & NEUROLOGY

## 2018-01-17 PROCEDURE — 4040F PNEUMOC VAC/ADMIN/RCVD: CPT | Performed by: PSYCHIATRY & NEUROLOGY

## 2018-01-17 PROCEDURE — 1123F ACP DISCUSS/DSCN MKR DOCD: CPT | Performed by: PSYCHIATRY & NEUROLOGY

## 2018-01-17 PROCEDURE — G8427 DOCREV CUR MEDS BY ELIG CLIN: HCPCS | Performed by: PSYCHIATRY & NEUROLOGY

## 2018-01-17 PROCEDURE — G8484 FLU IMMUNIZE NO ADMIN: HCPCS | Performed by: PSYCHIATRY & NEUROLOGY

## 2018-01-17 NOTE — PROGRESS NOTES
[x]  Exertional chest pressure/discomfort           [x] Palpitations    []  Syncope     [] Denies all of the above    Gastrointestinal:   [x]  Abdominal pain   []  Constipation    []  Diarrhea    []   Dysphagia                      [] Denies all of the above    Genitourinary:      [x]  Frequency   []  Hematuria     [x]  Urinary incontinence           [] Denies all of the above     Hematologic/lymphatic:  []  Bleeding    []  Easy bruising   []  Anemia  [x] Denies all of the above     Musculoskeletal:   [] Back pain       [x]  Myalgias    [x]  Neck pain           [] Denies all of the above    Neurological: As noted in HPI    Behavioral/Psych:   [x] Anxiety    []  Depression     []  Mood swings     [] Denies all of the above     Endocrine:   []  Temperature intolerance     [] Fatigue      [x] Denies all of the above     Allergic/Immunologic:   [] Hay fever    [x] Denies all of the above     Past Medical History:   Diagnosis Date    Aortic valve insufficiency     Arthritis     Atrial fibrillation ([facility]     Cancer ([facility] 09/2017    Obstructive apnea      Family History   Problem Relation Age of Onset    Heart Disease Other     High Blood Pressure Neg Hx     High Cholesterol Neg Hx      Social History     Social History    Marital status:      Spouse name: N/A    Number of children: 1    Years of education: N/A     Occupational History    [employer]      Social History Main Topics    Smoking status: Former Smoker     Packs/day: 1.00     Years: 40.00     Types: Cigarettes     Start date: 1/1/1975     Quit date: 1/22/2015    Smokeless tobacco: Never Used      Comment: Maintain cessation    Alcohol use No      Comment: NA beer    Drug use: No    Sexual activity: Not Asked     Other Topics Concern    None     Social History Narrative    None       PHYSICAL EXAMINATION:  BP 86/63   Pulse 72   Ht 5' 10.5\" (1.791 m)   Wt 143 lb (64.9 kg)   BMI 20.23 kg/m²   Appearance: Well appearing, well

## 2018-01-17 NOTE — LETTER
[facility] Neurology  [address] [address]  Phone: [phone]  Fax: [phone]    Brooklyn Live MD        January 17, 2018       Patient: Luisito Varner   MR Number: [id number]   YOB: 1950   Date of Visit: 1/17/2018       Dear Dr. Jayesh Farooq: Thank you for the request for consultation for Gabo García to me for the evaluation of Transient amnesia and limb pain. Below are the relevant portions of my assessment and plan of care. NEUROLOGY CONSULTATION     Chief Complaint   Patient presents with    Neurologic Problem     Patient is here today to establish care. Patient has head and neck cancer. Patient is having memory impairment, and pain in his extremities. Patient says that this all started after his radiation treatments. Patient is here today with his daughter and ex wife. HISTORY OF PRESENT ILLNESS :    Gabo García is a 74 y.o. male who is referred by Dr. Jayesh Farooq   History was obtained from the patient and his family. Patient states that she had 3 episodes in which he temporarily loses his memory  The first episode happened about 3 weeks ago  The second episode happened a week later the third episode after a few days  The symptoms lasted about 15-30 minutes. During that time patient would not over what happened  He seemed slightly confused  He is fine after about 30 minutes  Patient also complains of bilateral arm and leg pain  He has had this for about 6 months but seems to be worse recently  The patient comes on abruptly without any warning and last for about 45 minutes  No clear aggravating or relieving factors to symptoms  Patient has had squamous cell cancer office tongue and is receiving radiation therapy  Patient has known atrial fibrillation. He was on Coumadin but his INR was very high in December and it was stopped. Patient is on low-dose aspirin now.

## 2018-01-22 ENCOUNTER — TELEPHONE (OUTPATIENT)
Dept: CARDIOLOGY CLINIC | Age: 68
End: 2018-01-22

## 2018-01-22 NOTE — TELEPHONE ENCOUNTER
Please call [facility] and let them know that RMM took him off his pravastatin . Wants to know how he is to take his diltiazem ? The pharmacy keeps sending it to him like he is taking it twice a day but he only takes it once at night .  Please call [facility] on [address] in [place]

## 2018-01-22 NOTE — TELEPHONE ENCOUNTER
1/8/18 OV:    Assessment:         Patient Active Problem List     Diagnosis Date Noted    Supratherapeutic INR         Priority: High    S/P AVR         Priority: High    Bleeding      Coagulopathy ([facility] 12/14/2017    Squamous cell carcinoma of head and neck ([facility] 12/14/2017    Dysphagia 12/14/2017    Hematochezia 12/14/2017    Hemoptysis 12/14/2017    Severe protein-calorie malnutrition ([address] 12/14/2017    Chronic pain due to neoplasm 12/14/2017    Constipation 12/14/2017    Refusal of blood transfusions as patient is Taoism 12/14/2017    Bleeding from gastrostomy tube site ([facility] 12/14/2017    Mucositis oral 12/01/2017    SOB (shortness of breath) 06/13/2017    Obstructive sleep apnea syndrome      Obstructive apnea      Transient ischemic attack (TIA)      Chronic ischemic heart disease      Essential hypertension      Slurred speech 06/06/2015    Aortic insufficiency      A-fib (HCC) 01/24/2015    Chest pain      Injury of ankle, left 12/09/2013    Medial malleolar fracture 12/09/2013         Plan:  - Recent diagnosis of metastatic squamous cell carcinoma              Patient's has had 3 episodes of what appears to be altered mental status, upper extremity numbness, memory loss according to his wife. Refer to neurology for follow up of AMS and BUE numbness              Possibly needs MRI brain              Pt states he finished his last round of chemotherapy and radiation last week.               follows with

## 2018-01-24 DIAGNOSIS — G45.9 TRANSIENT CEREBRAL ISCHEMIA, UNSPECIFIED TYPE: Primary | Chronic | ICD-10-CM

## 2018-01-26 ENCOUNTER — HOSPITAL ENCOUNTER (OUTPATIENT)
Dept: MRI IMAGING | Age: 68
Discharge: HOME | End: 2018-01-26
Attending: FAMILY MEDICINE | Admitting: PSYCHIATRY & NEUROLOGY

## 2018-01-26 DIAGNOSIS — C76.0 SQUAMOUS CELL CARCINOMA OF HEAD AND NECK (HCC): ICD-10-CM

## 2018-01-26 DIAGNOSIS — R41.3 OTHER AMNESIA: ICD-10-CM

## 2018-01-26 DIAGNOSIS — R41.3 TRANSIENT AMNESIA: ICD-10-CM

## 2018-01-26 LAB
ANION GAP SERPL CALCULATED.3IONS-SCNC: 8 MMOL/L (ref 3–16)
BUN BLDV-MCNC: 8 MG/DL (ref 7–20)
CALCIUM SERPL-MCNC: 9.3 MG/DL (ref 8.3–10.6)
CHLORIDE BLD-SCNC: 100 MMOL/L (ref 99–110)
CO2: 29 MMOL/L (ref 21–32)
CREAT SERPL-MCNC: <0.5 MG/DL (ref 0.8–1.3)
GFR AFRICAN AMERICAN: >60
GFR NON-AFRICAN AMERICAN: >60
GLUCOSE BLD-MCNC: 105 MG/DL (ref 70–99)
POTASSIUM SERPL-SCNC: 3.9 MMOL/L (ref 3.5–5.1)
SODIUM BLD-SCNC: 137 MMOL/L (ref 136–145)

## 2018-01-26 RX ORDER — SODIUM CHLORIDE 0.9 % (FLUSH) 0.9 %
10 SYRINGE (ML) INJECTION ONCE
Status: COMPLETED | OUTPATIENT
Start: 2018-01-26 | End: 2018-01-26

## 2018-01-26 RX ADMIN — Medication 10 ML: at 10:44

## 2018-01-31 ENCOUNTER — PROCEDURE VISIT (OUTPATIENT)
Dept: NEUROLOGY | Age: 68
End: 2018-01-31

## 2018-01-31 ENCOUNTER — OFFICE VISIT (OUTPATIENT)
Dept: NEUROLOGY | Age: 68
End: 2018-01-31

## 2018-01-31 VITALS
BODY MASS INDEX: 20.72 KG/M2 | DIASTOLIC BLOOD PRESSURE: 66 MMHG | SYSTOLIC BLOOD PRESSURE: 92 MMHG | HEART RATE: 81 BPM | HEIGHT: 71 IN | WEIGHT: 148 LBS

## 2018-01-31 DIAGNOSIS — G45.4 TRANSIENT GLOBAL AMNESIA: Primary | ICD-10-CM

## 2018-01-31 DIAGNOSIS — G56.03 BILATERAL CARPAL TUNNEL SYNDROME: ICD-10-CM

## 2018-01-31 DIAGNOSIS — G56.23 ULNAR NEUROPATHY OF BOTH UPPER EXTREMITIES: Primary | ICD-10-CM

## 2018-01-31 PROCEDURE — G8599 NO ASA/ANTIPLAT THER USE RNG: HCPCS | Performed by: PSYCHIATRY & NEUROLOGY

## 2018-01-31 PROCEDURE — 99213 OFFICE O/P EST LOW 20 MIN: CPT | Performed by: PSYCHIATRY & NEUROLOGY

## 2018-01-31 PROCEDURE — 95886 MUSC TEST DONE W/N TEST COMP: CPT | Performed by: PSYCHIATRY & NEUROLOGY

## 2018-01-31 PROCEDURE — G8427 DOCREV CUR MEDS BY ELIG CLIN: HCPCS | Performed by: PSYCHIATRY & NEUROLOGY

## 2018-01-31 PROCEDURE — 4040F PNEUMOC VAC/ADMIN/RCVD: CPT | Performed by: PSYCHIATRY & NEUROLOGY

## 2018-01-31 PROCEDURE — G8420 CALC BMI NORM PARAMETERS: HCPCS | Performed by: PSYCHIATRY & NEUROLOGY

## 2018-01-31 PROCEDURE — 95911 NRV CNDJ TEST 9-10 STUDIES: CPT | Performed by: PSYCHIATRY & NEUROLOGY

## 2018-01-31 PROCEDURE — 3017F COLORECTAL CA SCREEN DOC REV: CPT | Performed by: PSYCHIATRY & NEUROLOGY

## 2018-01-31 PROCEDURE — 1036F TOBACCO NON-USER: CPT | Performed by: PSYCHIATRY & NEUROLOGY

## 2018-01-31 PROCEDURE — 1123F ACP DISCUSS/DSCN MKR DOCD: CPT | Performed by: PSYCHIATRY & NEUROLOGY

## 2018-01-31 PROCEDURE — G8484 FLU IMMUNIZE NO ADMIN: HCPCS | Performed by: PSYCHIATRY & NEUROLOGY

## 2018-01-31 NOTE — PROGRESS NOTES
Eric Tavarez   Neurology followup    Subjective:   CC/HP  History was obtained from the patient. Patient is here for a follow-up visit and EMG studies. Patient has not had any further episodes of transient global amnesia. Details of his history are well documented in my initial office note. Patient still has occasional pain and numbness in his arms and legs. REVIEW OF SYSTEMS    Constitutional:  []   Chills   []  Fatigue   []  Fevers   [x]  Malaise   []  Weight loss     [] Denies all of the above    Respiratory:   []  Cough    []  Shortness of breath         [x] Denies all of the above     Cardiovascular:   []  Chest pain    []  Exertional chest pressure/discomfort           [] Palpitations    []  Syncope     [x] Denies all of the above        Past Medical History:   Diagnosis Date    Aortic valve insufficiency     Arthritis     [facility]rial fibrillation ([facility]     Cancer ([facility] 09/2017    Obstructive apnea      Family History   Problem Relation Age of Onset    Heart Disease Other     High Blood Pressure Neg Hx     High Cholesterol Neg Hx      Social History     Social History    Marital status:      Spouse name: N/A    Number of children: 1    Years of education: N/A     Occupational History    [employer]      Social History Main Topics    Smoking status: Former Smoker     Packs/day: 1.00     Years: 40.00     Types: Cigarettes     Start date: 1/1/1975     Quit date: 1/22/2015    Smokeless tobacco: Never Used      Comment: Maintain cessation    Alcohol use No      Comment: NA beer    Drug use: No    Sexual activity: Not Asked     Other Topics Concern    None     Social History Narrative    None        Objective:  Exam:  BP 92/66   Pulse 81   Ht 5' 10.5\" (1.791 m)   Wt 148 lb (67.1 kg)   BMI 20.94 kg/m²   This is a well-nourished patient in no acute distress  Patient is awake, alert and oriented x3. Speech is normal.  Pupils are equal round reacting to light.  Extraocular movements

## 2018-02-01 ENCOUNTER — HOSPITAL ENCOUNTER (OUTPATIENT)
Dept: OTHER | Age: 68
Discharge: HOME | End: 2018-02-28
Attending: PHYSICIAN ASSISTANT | Admitting: PHYSICIAN ASSISTANT

## 2018-02-12 ENCOUNTER — HOSPITAL ENCOUNTER (OUTPATIENT)
Dept: OTHER | Age: 68
Discharge: HOME | End: 2018-02-28
Attending: INTERNAL MEDICINE | Admitting: INTERNAL MEDICINE

## 2018-02-12 RX ORDER — DILTIAZEM HYDROCHLORIDE 120 MG/1
CAPSULE, COATED, EXTENDED RELEASE ORAL
Qty: 90 CAPSULE | Refills: 3 | Status: SHIPPED | OUTPATIENT
Start: 2018-02-12 | End: 2018-03-07

## 2018-02-12 NOTE — TELEPHONE ENCOUNTER
Last ov 1/8/18  Pending appt 7/10/18  Last refill 4/20/16 #90x1    Assessment:         Patient Active Problem List     Diagnosis Date Noted    Supratherapeutic INR         Priority: High    S/P AVR         Priority: High    Bleeding      Coagulopathy ([address] 12/14/2017    Squamous cell carcinoma of head and neck ([facility] 12/14/2017    Dysphagia 12/14/2017    Hematochezia 12/14/2017    Hemoptysis 12/14/2017    Severe protein-calorie malnutrition ([address] 12/14/2017    Chronic pain due to neoplasm 12/14/2017    Constipation 12/14/2017    Refusal of blood transfusions as patient is Evangelical 12/14/2017    Bleeding from gastrostomy tube site ([facility] 12/14/2017    Mucositis oral 12/01/2017    SOB (shortness of breath) 06/13/2017    Obstructive sleep apnea syndrome      Obstructive apnea      Transient ischemic attack (TIA)      Chronic ischemic heart disease      Essential hypertension      Slurred speech 06/06/2015    Aortic insufficiency      A-fib (HCC) 01/24/2015    Chest pain      Injury of ankle, left 12/09/2013    Medial malleolar fracture 12/09/2013         Plan:  - Recent diagnosis of metastatic squamous cell carcinoma              Patient's has had 3 episodes of what appears to be altered mental status, upper extremity numbness, memory loss according to his wife. Refer to neurology for follow up of AMS and BUE numbness              Possibly needs MRI brain              Pt states he finished his last round of chemotherapy and radiation last week. follows with      - Paroxysmal Atrial fibrillation:               S/p MAZE and also DELGADO ligation               coumadin discontinued in hospital.               Patient is status post left atrial appendage ligation. Therefore the risk of stroke is lower. Discussed in detail with him and his wife. He remains off anticoagulation therapy at this time.                He has history of questionable TIA however his last brain MRI was normal.  It is not clear if he has had any neurological event. We'll refer to neurology. Remains in sinus rhythm. resume asa 81mg daily                   - Aortic insufficiency:               - S/p porcine AVR               Stable.      - 5/2017 stress echo was normal.                  -  TIA?                - He had TIA despite having DELGADO ligation and also was on ASA. - Remains off coumadin due to GI bleeding. Needs full neurological workup by neurology. If he has history of stroke or documented TIA then he will be a candidate for anticoagulation when his acute issues resolve and able to tolerate anticoagulation.                   He has lost significant weight and is now having PEG tube for feeding     -  CHRISTOPHER              - on CPAP now, using 2 hours per night    - Follow up 6 year.     Xena Murguia MD, MPH  [facility]   Office: [phone]

## 2018-03-01 ENCOUNTER — HOSPITAL ENCOUNTER (OUTPATIENT)
Dept: OTHER | Age: 68
Discharge: HOME | End: 2018-03-31
Attending: INTERNAL MEDICINE | Admitting: INTERNAL MEDICINE

## 2018-03-06 ENCOUNTER — TELEPHONE (OUTPATIENT)
Dept: ENT CLINIC | Age: 68
End: 2018-03-06

## 2018-03-06 NOTE — TELEPHONE ENCOUNTER
Called and spoke to patient. He has no fever or swollen glands. He is going to Oncologist now and will call back later with update.

## 2018-03-07 ENCOUNTER — TELEPHONE (OUTPATIENT)
Dept: ENT CLINIC | Age: 68
End: 2018-03-07

## 2018-03-07 ENCOUNTER — OFFICE VISIT (OUTPATIENT)
Dept: ENT CLINIC | Age: 68
End: 2018-03-07

## 2018-03-07 VITALS — SYSTOLIC BLOOD PRESSURE: 90 MMHG | HEART RATE: 84 BPM | DIASTOLIC BLOOD PRESSURE: 54 MMHG

## 2018-03-07 DIAGNOSIS — C76.0 SQUAMOUS CELL CARCINOMA OF HEAD AND NECK (HCC): Primary | ICD-10-CM

## 2018-03-07 DIAGNOSIS — B37.0 CANDIDIASIS OF MOUTH: ICD-10-CM

## 2018-03-07 PROCEDURE — 99213 OFFICE O/P EST LOW 20 MIN: CPT | Performed by: OTOLARYNGOLOGY

## 2018-03-07 PROCEDURE — 4040F PNEUMOC VAC/ADMIN/RCVD: CPT | Performed by: OTOLARYNGOLOGY

## 2018-03-07 PROCEDURE — G8420 CALC BMI NORM PARAMETERS: HCPCS | Performed by: OTOLARYNGOLOGY

## 2018-03-07 PROCEDURE — G8599 NO ASA/ANTIPLAT THER USE RNG: HCPCS | Performed by: OTOLARYNGOLOGY

## 2018-03-07 PROCEDURE — 1036F TOBACCO NON-USER: CPT | Performed by: OTOLARYNGOLOGY

## 2018-03-07 PROCEDURE — 1123F ACP DISCUSS/DSCN MKR DOCD: CPT | Performed by: OTOLARYNGOLOGY

## 2018-03-07 PROCEDURE — G8484 FLU IMMUNIZE NO ADMIN: HCPCS | Performed by: OTOLARYNGOLOGY

## 2018-03-07 PROCEDURE — G8427 DOCREV CUR MEDS BY ELIG CLIN: HCPCS | Performed by: OTOLARYNGOLOGY

## 2018-03-07 PROCEDURE — 3017F COLORECTAL CA SCREEN DOC REV: CPT | Performed by: OTOLARYNGOLOGY

## 2018-03-07 RX ORDER — FLUCONAZOLE 100 MG/1
100 TABLET ORAL DAILY
Qty: 4 TABLET | Refills: 0 | Status: SHIPPED | OUTPATIENT
Start: 2018-03-07 | End: 2018-03-11

## 2018-03-07 NOTE — TELEPHONE ENCOUNTER
[address] called to [address] ([phone]), spoke to [facility], pharmacist.    Patient aware of above RX.

## 2018-03-14 ENCOUNTER — OFFICE VISIT (OUTPATIENT)
Dept: ENT CLINIC | Age: 68
End: 2018-03-14

## 2018-03-14 VITALS — SYSTOLIC BLOOD PRESSURE: 120 MMHG | DIASTOLIC BLOOD PRESSURE: 60 MMHG | HEART RATE: 80 BPM

## 2018-03-14 DIAGNOSIS — C76.0 SQUAMOUS CELL CARCINOMA OF HEAD AND NECK (HCC): Primary | ICD-10-CM

## 2018-03-14 PROCEDURE — G8427 DOCREV CUR MEDS BY ELIG CLIN: HCPCS | Performed by: OTOLARYNGOLOGY

## 2018-03-14 PROCEDURE — 99212 OFFICE O/P EST SF 10 MIN: CPT | Performed by: OTOLARYNGOLOGY

## 2018-03-14 PROCEDURE — 1036F TOBACCO NON-USER: CPT | Performed by: OTOLARYNGOLOGY

## 2018-03-14 PROCEDURE — G8599 NO ASA/ANTIPLAT THER USE RNG: HCPCS | Performed by: OTOLARYNGOLOGY

## 2018-03-14 PROCEDURE — 3017F COLORECTAL CA SCREEN DOC REV: CPT | Performed by: OTOLARYNGOLOGY

## 2018-03-14 PROCEDURE — G8420 CALC BMI NORM PARAMETERS: HCPCS | Performed by: OTOLARYNGOLOGY

## 2018-03-14 PROCEDURE — 4040F PNEUMOC VAC/ADMIN/RCVD: CPT | Performed by: OTOLARYNGOLOGY

## 2018-03-14 PROCEDURE — 1123F ACP DISCUSS/DSCN MKR DOCD: CPT | Performed by: OTOLARYNGOLOGY

## 2018-03-14 PROCEDURE — G8484 FLU IMMUNIZE NO ADMIN: HCPCS | Performed by: OTOLARYNGOLOGY

## 2018-03-14 NOTE — PROGRESS NOTES
The patient is following up for a suspected candidiasis of the oral cavity and oropharynx. He reports that his sore throat is almost completely gone. There continues to be mild black discoloration in the midportion the tongue, but he also notices that this is less as well    He is due for his postirradiation PET scan on April 5    Exam today shows that the white debris and erythema associated with thrush is no longer present. Radiation mucositis continues to create dryness issues intraorally  No suspicious mass or adenopathy is palpable neck    The patient will continue as above.     He will check back following his PET scan

## 2018-03-15 ENCOUNTER — ANTI-COAG VISIT (OUTPATIENT)
Dept: PHARMACY | Age: 68
End: 2018-03-15

## 2018-03-15 DIAGNOSIS — Z79.899 DRUG THERAPY DISCONTINUED: Primary | ICD-10-CM

## 2018-04-01 ENCOUNTER — HOSPITAL ENCOUNTER (OUTPATIENT)
Dept: OTHER | Age: 68
Discharge: HOME | End: 2018-04-30
Attending: INTERNAL MEDICINE | Admitting: INTERNAL MEDICINE

## 2018-04-06 ENCOUNTER — OFFICE VISIT (OUTPATIENT)
Dept: ENT CLINIC | Age: 68
End: 2018-04-06

## 2018-04-06 VITALS — SYSTOLIC BLOOD PRESSURE: 90 MMHG | HEART RATE: 92 BPM | DIASTOLIC BLOOD PRESSURE: 52 MMHG

## 2018-04-06 DIAGNOSIS — C76.0 SQUAMOUS CELL CARCINOMA OF HEAD AND NECK (HCC): Primary | ICD-10-CM

## 2018-04-06 PROCEDURE — G8420 CALC BMI NORM PARAMETERS: HCPCS | Performed by: OTOLARYNGOLOGY

## 2018-04-06 PROCEDURE — 1123F ACP DISCUSS/DSCN MKR DOCD: CPT | Performed by: OTOLARYNGOLOGY

## 2018-04-06 PROCEDURE — 1036F TOBACCO NON-USER: CPT | Performed by: OTOLARYNGOLOGY

## 2018-04-06 PROCEDURE — G8599 NO ASA/ANTIPLAT THER USE RNG: HCPCS | Performed by: OTOLARYNGOLOGY

## 2018-04-06 PROCEDURE — 3017F COLORECTAL CA SCREEN DOC REV: CPT | Performed by: OTOLARYNGOLOGY

## 2018-04-06 PROCEDURE — G8427 DOCREV CUR MEDS BY ELIG CLIN: HCPCS | Performed by: OTOLARYNGOLOGY

## 2018-04-06 PROCEDURE — 4040F PNEUMOC VAC/ADMIN/RCVD: CPT | Performed by: OTOLARYNGOLOGY

## 2018-04-06 PROCEDURE — 99213 OFFICE O/P EST LOW 20 MIN: CPT | Performed by: OTOLARYNGOLOGY

## 2018-04-09 ENCOUNTER — TELEPHONE (OUTPATIENT)
Dept: ENT CLINIC | Age: 68
End: 2018-04-09

## 2018-04-10 ENCOUNTER — HOSPITAL ENCOUNTER (OUTPATIENT)
Dept: OTHER | Age: 68
Discharge: HOME | End: 2018-04-10
Attending: INTERNAL MEDICINE | Admitting: INTERNAL MEDICINE

## 2018-04-26 ENCOUNTER — HOSPITAL ENCOUNTER (OUTPATIENT)
Dept: CT IMAGING | Age: 68
Discharge: HOME | End: 2018-04-26
Attending: INTERNAL MEDICINE | Admitting: INTERNAL MEDICINE

## 2018-04-26 DIAGNOSIS — K94.22 INFECTION OF PEG SITE (HCC): ICD-10-CM

## 2018-04-26 DIAGNOSIS — K94.23 GASTROSTOMY MALFUNCTION (HCC): ICD-10-CM

## 2018-04-26 LAB
BUN BLDV-MCNC: 8 MG/DL (ref 7–20)
CREAT SERPL-MCNC: 0.5 MG/DL (ref 0.8–1.3)
GFR AFRICAN AMERICAN: >60
GFR NON-AFRICAN AMERICAN: >60

## 2018-05-04 ENCOUNTER — HOSPITAL ENCOUNTER (OUTPATIENT)
Dept: ENDOSCOPY | Age: 68
Discharge: HOME | End: 2018-05-04
Attending: INTERNAL MEDICINE | Admitting: INTERNAL MEDICINE

## 2018-05-04 ASSESSMENT — ENCOUNTER SYMPTOMS: SHORTNESS OF BREATH: 1

## 2018-07-01 ENCOUNTER — HOSPITAL ENCOUNTER (OUTPATIENT)
Dept: OTHER | Age: 68
Discharge: HOME | End: 2018-07-31
Attending: INTERNAL MEDICINE | Admitting: INTERNAL MEDICINE

## 2018-07-10 ENCOUNTER — OFFICE VISIT (OUTPATIENT)
Dept: CARDIOLOGY CLINIC | Age: 68
End: 2018-07-10

## 2018-07-10 VITALS
HEIGHT: 71 IN | WEIGHT: 134 LBS | HEART RATE: 66 BPM | BODY MASS INDEX: 18.76 KG/M2 | SYSTOLIC BLOOD PRESSURE: 116 MMHG | DIASTOLIC BLOOD PRESSURE: 56 MMHG

## 2018-07-10 DIAGNOSIS — G45.9 TRANSIENT CEREBRAL ISCHEMIA, UNSPECIFIED TYPE: Chronic | ICD-10-CM

## 2018-07-10 DIAGNOSIS — C76.0 SQUAMOUS CELL CARCINOMA OF HEAD AND NECK (HCC): Primary | ICD-10-CM

## 2018-07-10 DIAGNOSIS — G47.33 OBSTRUCTIVE SLEEP APNEA SYNDROME: Chronic | ICD-10-CM

## 2018-07-10 DIAGNOSIS — I35.1 AORTIC VALVE INSUFFICIENCY, ETIOLOGY OF CARDIAC VALVE DISEASE UNSPECIFIED: Chronic | ICD-10-CM

## 2018-07-10 DIAGNOSIS — I48.0 PAF (PAROXYSMAL ATRIAL FIBRILLATION) (HCC): ICD-10-CM

## 2018-07-10 PROCEDURE — 4040F PNEUMOC VAC/ADMIN/RCVD: CPT | Performed by: INTERNAL MEDICINE

## 2018-07-10 PROCEDURE — 1123F ACP DISCUSS/DSCN MKR DOCD: CPT | Performed by: INTERNAL MEDICINE

## 2018-07-10 PROCEDURE — G8420 CALC BMI NORM PARAMETERS: HCPCS | Performed by: INTERNAL MEDICINE

## 2018-07-10 PROCEDURE — 1036F TOBACCO NON-USER: CPT | Performed by: INTERNAL MEDICINE

## 2018-07-10 PROCEDURE — 93000 ELECTROCARDIOGRAM COMPLETE: CPT | Performed by: INTERNAL MEDICINE

## 2018-07-10 PROCEDURE — 3017F COLORECTAL CA SCREEN DOC REV: CPT | Performed by: INTERNAL MEDICINE

## 2018-07-10 PROCEDURE — 99214 OFFICE O/P EST MOD 30 MIN: CPT | Performed by: INTERNAL MEDICINE

## 2018-07-10 PROCEDURE — G8598 ASA/ANTIPLAT THER USED: HCPCS | Performed by: INTERNAL MEDICINE

## 2018-07-10 PROCEDURE — G8427 DOCREV CUR MEDS BY ELIG CLIN: HCPCS | Performed by: INTERNAL MEDICINE

## 2018-07-10 NOTE — PATIENT INSTRUCTIONS
· You have symptoms of a stroke. These may include:  ¨ Sudden numbness, tingling, weakness, or loss of movement in your face, arm, or leg, especially on only one side of your body. ¨ Sudden vision changes. ¨ Sudden trouble speaking. ¨ Sudden confusion or trouble understanding simple statements. ¨ Sudden problems with walking or balance. ¨ A sudden, severe headache that is different from past headaches.     · You passed out (lost consciousness).    Call your doctor now or seek immediate medical care if:    · You have new or increased shortness of breath.     · You feel dizzy or lightheaded, or you feel like you may faint.     · Your heart rate becomes irregular.     · You can feel your heart flutter in your chest or skip heartbeats. Tell your doctor if these symptoms are new or worse.    Watch closely for changes in your health, and be sure to contact your doctor if you have any problems. Where can you learn more? Go to [email or web address]. org and sign in to your [employer] account. Enter U020 in the [employer] box to learn more about \"Atrial Fibrillation: Care Instructions. \"     If you do not have an account, please click on the \"Sign Up Now\" link. Current as of: December 6, 2017  Content Version: 11.6  © 8202-1320 [employer]. Care instructions adapted under license by [facility] ([facility]). If you have questions about a medical condition or this instruction, always ask your healthcare professional. [place] [address] any warranty or liability for your use of this information.

## 2018-07-10 NOTE — PROGRESS NOTES
or use drugs. Family History:  Reviewed. family history includes Heart Disease in an other family member. Review of System:    · General ROS: negative for - chills, fever   · Psychological ROS: negative for - anxiety or depression  · Ophthalmic ROS: negative for - eye pain or loss of vision  · ENT ROS: negative for - headaches, sore throat   · Allergy and Immunology ROS: negative for - hives  · Hematological and Lymphatic ROS: negative for - bleeding problems      · Endocrine ROS: negative for - skin changes, temperature intolerance or unexpected weight changes  · Respiratory ROS: negative for - cough, sputum, wheezing  · Cardiovascular ROS: Per HPI. · Gastrointestinal ROS: negative for - abdominal pain, diarrhea, nausea/vomiting, bleeding   · Genito-Urinary ROS: negative for - dysuria or incontinence  · Musculoskeletal ROS: negative for - joint swelling   · Neurological ROS: negative for - confusion, numbness/tingling, seizures, weakness  · Dermatological ROS: POSITIVE For SCAR on NECK and FACE    Physical Examination:  Vitals:    07/10/18 0725   BP: (!) 116/56   Pulse: 66       · Constitutional: Oriented. No distress. · Head: Normocephalic and atraumatic. · Mouth/Throat: Oropharynx is clear and moist.   · Eyes: Conjunctivae normal. EOM are normal.   · Neck: Normal range of motion. Neck supple. · Cardiovascular: Normal rate and rhythm, S1&S2 without murmur, gallop, or rub  · Scar of sternotomy clean   · Scar of radiation therapy on the neck. · Pulmonary/Chest: Bilateral respiratory sounds. No rhonchi. · Abdominal: Soft. No tenderness. · Musculoskeletal: No tenderness. No edema    · Lymphadenopathy: Has no cervical adenopathy. · Neurological: No Gross deficit   · Skin: Scar as noted above.    · Psychiatric: Has a normal behavior       Labs:  INR 1.32(12/16/17)  TSH 2.31 (6/2017)  AST 15 (12/5/2017)  ALT 17   (10/29/2015)  Cr 0.5 (12/15/17)    ECG  7/10/2018   NSR 71bpm QTc 391    Stress Echo: 5/2017   Echo (rest): Normal (LVEF >50%)   Echo (stress): Hyperkinetic (LVEF >70%)     Echo   Baseline resting echocardiogram shows normal global LV systolic function   with an ejection fraction of 60% and uniform myocardial segmental wall   motion. Following stress there was uniform augmentation of all myocardial   segments with appropriate hyperdynamic LV systolic response to stress. Difficult post exercise imaging. ECG   Normal (Negative) response to exercise. Symptoms   No symptoms with exercise. Echo Exercise Stress 6/22/17  Conclusions      Summary   Normal stress echocardiogram study. MCOT- 6/2017   Brief AT, doubt afib    ECHO: 6/8/2015  Summary   -Normal left ventricle size, wall thickness and systolic function with an   estimated ejection fraction of 60%. -No regional wall motion abnormalities   are seen. -Moderate mitral regurgitation.   -A bubble study was performed and fails to show evidence of shunting.   -Normally functioning porcine bioprosthetic valve in aortic position. Maximum gradient of 22mmhg and a mean gradient of 14mmhg. No aortic   insufficiency. -Moderate tricuspid regurgitation with RVSP estimated at 37 mmHg.   Assessment:   Patient Active Problem List    Diagnosis Date Noted    Supratherapeutic INR      Priority: High    S/P AVR      Priority: High    Bleeding     Coagulopathy ([address] 12/14/2017    Squamous cell carcinoma of head and neck ([address] 12/14/2017    Dysphagia 12/14/2017    Hematochezia 12/14/2017    Hemoptysis 12/14/2017    Severe protein-calorie malnutrition ([address] 12/14/2017    Chronic pain due to neoplasm 12/14/2017    Constipation 12/14/2017    Refusal of blood transfusions as patient is Orthodoxy 12/14/2017    Bleeding from gastrostomy tube site ([address] 12/14/2017    Mucositis oral 12/01/2017    SOB (shortness of breath) 06/13/2017    Obstructive sleep apnea syndrome     Obstructive apnea     Transient ischemic attack (TIA)     Chronic ischemic heart disease     Essential hypertension     Slurred speech 06/06/2015    Aortic insufficiency     Chest pain     Injury of ankle, left 12/09/2013    Medial malleolar fracture 12/09/2013        Plan:  - Paroxysmal Atrial fibrillation:    Remains in sinus rhythm. He has occasional brief palpitations. No documented A. fib since last visit. S/p MAZE and also DELGADO ligation    Coumadin discontinued in hospital. Patient is status post left atrial appendage. The risk of stroke versus bleeding risk with anticoagulation has been extensively discussed in the past and he would like to continue with aspirin. Remains off coumadin due to GI bleeding. MRI with no evidence of prior stroke or abnormality. He's been seen by neurology. MRI 1/26/18 normal              Remains in sinus rhythm. On asa 81mg daily      Diltiazem 180 mg daily    - 5/2017 stress echo was normal.      - Aortic insufficiency:    S/p porcine AVR    Stable. - Squamous cell carcinoma   Status post radiation and chemotherapy appears to be stable      - no evidence of TIA since last OV    -  CHRISTOPHER   - on CPAP now, using 2 hours per night    - Follow up 6 months with Sarah Steven CNP, follow with EP as needed    Scribe's attestation: This note was scribed in the presence of Caterina Dooley M.D. by Maeve Hammond RN.      Physician Attestation: I, Dr. Caterina Dooley, confirm that the scribe's documentation has been prepared under my direction and personally reviewed by me in its entirety. I also confirm that the note above accurately reflects all work, treatment, procedures, and medical decision making performed by me. NOTE: This report was transcribed using voice recognition software. Every effort was made to ensure accuracy, however, inadvertent computerized transcription errors may be present.       Caterina Dooley MD, MPH  [place] [address]   Office: [phone]

## 2018-07-23 ENCOUNTER — OFFICE VISIT (OUTPATIENT)
Dept: ENT CLINIC | Age: 68
End: 2018-07-23

## 2018-07-23 VITALS — SYSTOLIC BLOOD PRESSURE: 120 MMHG | OXYGEN SATURATION: 96 % | HEART RATE: 72 BPM | DIASTOLIC BLOOD PRESSURE: 76 MMHG

## 2018-07-23 DIAGNOSIS — C76.0 SQUAMOUS CELL CARCINOMA OF HEAD AND NECK (HCC): Primary | ICD-10-CM

## 2018-07-23 DIAGNOSIS — R13.12 OROPHARYNGEAL DYSPHAGIA: ICD-10-CM

## 2018-07-23 PROCEDURE — 31575 DIAGNOSTIC LARYNGOSCOPY: CPT | Performed by: OTOLARYNGOLOGY

## 2018-07-23 NOTE — PROGRESS NOTES
The patient is now 3 months status post completion of full course radiation for his right base of tongue squamous cell carcinoma. Following his last visit in April, he subsequently underwent PET scanning. This was negative for the previous findings in the right base of tongue and cervical nodes. He has also undergone esophagoscopy and removal of his G-tube. At that time his esophagus was normal.  Biopsies of the stomach were unremarkable other than H. pylori. He has been managing to take food orally without the assistance of his G-tube since that time. He does remain somewhat dry and secretions discuss. He has been eating a calorie dense diet and going to the gym. He is still having trouble gaining weight. Denies difficulty chewing or swallowing. No sores have been seen in the mouth. There has been no hoarseness or change in voice. There is no stridor or difficulty breathing. There is no past history of anterior neck or throat trauma. At the request of his radiation therapist, he is here for endoscopy. Patient was placed in a sitting position. The right naris was anesthetized and vasoconstricted. Flexible laryngoscope was introduced via the right naris. The nasopharynx was unremarkable with respect to the torus tubarius and posterior choana I. The oropharynx with particular attention of the base of the tongue showed very mild radiation mucositis but no evidence of tumor or discrete mucosal disease. The vallecula extending to the lingual surface of the epiglottis as well as the laryngeal surface epiglottis again showed very mild erythema and edema but no lesions. These findings were seen to a lesser extent and of the supraglottis including aryepiglottic folds. The arytenoids, vocal processes, and both vocal cords were unremarkable. Secretions were noted be slightly viscous but could be mobilized easily by swallowing. The neck was palpated and no abnormal nodes were noted.     The patient was reassured that from both a radiographic as well as clinical aspect, there was no current evidence of neoplastic disease. He will continue to remain well-hydrated and increase his caloric intake. He will be seen back in 6 months.

## 2018-08-01 ENCOUNTER — HOSPITAL ENCOUNTER (OUTPATIENT)
Dept: OTHER | Age: 68
Discharge: HOME | End: 2018-08-31
Attending: INTERNAL MEDICINE | Admitting: INTERNAL MEDICINE

## 2018-09-01 ENCOUNTER — HOSPITAL ENCOUNTER (OUTPATIENT)
Dept: OTHER | Age: 68
Discharge: HOME | End: 2018-09-01
Attending: INTERNAL MEDICINE | Admitting: INTERNAL MEDICINE

## 2018-09-04 ENCOUNTER — TELEPHONE (OUTPATIENT)
Dept: CARDIOLOGY CLINIC | Age: 68
End: 2018-09-04

## 2018-09-04 NOTE — TELEPHONE ENCOUNTER
Assessment:         Patient Active Problem List     Diagnosis Date Noted    Supratherapeutic INR         Priority: High    S/P AVR         Priority: High    Bleeding      Coagulopathy ([facility] 12/14/2017    Squamous cell carcinoma of head and neck ([facility] 12/14/2017    Dysphagia 12/14/2017    Hematochezia 12/14/2017    Hemoptysis 12/14/2017    Severe protein-calorie malnutrition ([facility] 12/14/2017    Chronic pain due to neoplasm 12/14/2017    Constipation 12/14/2017    Refusal of blood transfusions as patient is Taoism 12/14/2017    Bleeding from gastrostomy tube site ([facility] 12/14/2017    Mucositis oral 12/01/2017    SOB (shortness of breath) 06/13/2017    Obstructive sleep apnea syndrome      Obstructive apnea      Transient ischemic attack (TIA)      Chronic ischemic heart disease      Essential hypertension      Slurred speech 06/06/2015    Aortic insufficiency      Chest pain      Injury of ankle, left 12/09/2013    Medial malleolar fracture 12/09/2013         Plan:  - Paroxysmal Atrial fibrillation:               Remains in sinus rhythm. He has occasional brief palpitations. No documented A. fib since last visit. S/p MAZE and also DELGADO ligation               Coumadin discontinued in hospital. Patient is status post left atrial appendage. The risk of stroke versus bleeding risk with anticoagulation has been extensively discussed in the past and he would like to continue with aspirin. Remains off coumadin due to GI bleeding. MRI with no evidence of prior stroke or abnormality. He's been seen by neurology. MRI 1/26/18 normal              Remains in sinus rhythm.                On asa 81mg daily                            Diltiazem 180 mg daily     - 5/2017 stress echo was normal.       - Aortic insufficiency:               S/p porcine AVR               Stable.      - Squamous cell carcinoma              Status post

## 2018-09-06 ENCOUNTER — TELEPHONE (OUTPATIENT)
Dept: CARDIOLOGY CLINIC | Age: 68
End: 2018-09-06

## 2018-09-06 ENCOUNTER — OFFICE VISIT (OUTPATIENT)
Dept: CARDIOLOGY CLINIC | Age: 68
End: 2018-09-06

## 2018-09-06 VITALS
HEIGHT: 71 IN | SYSTOLIC BLOOD PRESSURE: 94 MMHG | DIASTOLIC BLOOD PRESSURE: 62 MMHG | BODY MASS INDEX: 18.84 KG/M2 | WEIGHT: 134.6 LBS | HEART RATE: 68 BPM

## 2018-09-06 DIAGNOSIS — I35.1 AORTIC VALVE INSUFFICIENCY, ETIOLOGY OF CARDIAC VALVE DISEASE UNSPECIFIED: ICD-10-CM

## 2018-09-06 DIAGNOSIS — R42 DIZZINESS: Primary | ICD-10-CM

## 2018-09-06 DIAGNOSIS — I48.0 PAF (PAROXYSMAL ATRIAL FIBRILLATION) (HCC): ICD-10-CM

## 2018-09-06 PROCEDURE — 1036F TOBACCO NON-USER: CPT | Performed by: NURSE PRACTITIONER

## 2018-09-06 PROCEDURE — 3017F COLORECTAL CA SCREEN DOC REV: CPT | Performed by: NURSE PRACTITIONER

## 2018-09-06 PROCEDURE — G8598 ASA/ANTIPLAT THER USED: HCPCS | Performed by: NURSE PRACTITIONER

## 2018-09-06 PROCEDURE — G8420 CALC BMI NORM PARAMETERS: HCPCS | Performed by: NURSE PRACTITIONER

## 2018-09-06 PROCEDURE — 1101F PT FALLS ASSESS-DOCD LE1/YR: CPT | Performed by: NURSE PRACTITIONER

## 2018-09-06 PROCEDURE — 4040F PNEUMOC VAC/ADMIN/RCVD: CPT | Performed by: NURSE PRACTITIONER

## 2018-09-06 PROCEDURE — 93000 ELECTROCARDIOGRAM COMPLETE: CPT | Performed by: NURSE PRACTITIONER

## 2018-09-06 PROCEDURE — G8427 DOCREV CUR MEDS BY ELIG CLIN: HCPCS | Performed by: NURSE PRACTITIONER

## 2018-09-06 PROCEDURE — 1123F ACP DISCUSS/DSCN MKR DOCD: CPT | Performed by: NURSE PRACTITIONER

## 2018-09-06 PROCEDURE — 99214 OFFICE O/P EST MOD 30 MIN: CPT | Performed by: NURSE PRACTITIONER

## 2018-09-06 RX ORDER — DILTIAZEM HYDROCHLORIDE 120 MG/1
120 CAPSULE, COATED, EXTENDED RELEASE ORAL DAILY
COMMUNITY
End: 2019-03-13 | Stop reason: SDUPTHER

## 2018-09-06 NOTE — PROGRESS NOTES
[address]     Outpatient Follow Up Note    Dickson Chacko is 79 y.o. male who presents today with a history of PAF '09, AI s/p AVR & DELGADO ligation with LENNY Darshan '15 and hyper-hypotension. His other hx includes: squamous cell CA tongue; transient global amnesia; HSFU dc 12/16 from supratheraputic INR of 10 and bleeding from mouth and PEG site    CHIEF COMPLAINT / HPI:  Follow Up secondary to light headedness. He gets a heaviness above his eyes. The next thing is that his head starts bobbing and he struggles standing up straight. He's very disoriented. This started about 2 weeks ago. Earlier this week while at [employer], he had a spell. His vision looked like a 3D picture. He had associated nausea. If he sat down, it felt worse. He had no associated sweating nor palpitations. He's had 3 episodes with the last one being two days ago    Subjective:   he denies significant chest pain. There is a little SOB further described as needing to take 3 gulps at times. He was told that he has beginning COPD ('13). He was given an inhaler but never needed to use. He is followed by pulmonology, Dr. Derrell Mcgovern. The patient denies orthopnea/PND. The patient does not have swelling. The patients weight is at 131.4# from 160# a year ago. His chemotherapy ended 3.5 months ago and radiation ended 3 months ago . The patient is experiencing palpitations only at night when he gets up to go to the BR. It takes about 1/2 hour for his heart to slow back down. These symptoms are new since the last OV. With regard to medication therapy the patient has been compliant with prescribed regimen. They have tolerated therapy to date.      Past Medical History:   Diagnosis Date    Aortic valve insufficiency     Arthritis     Atrial fibrillation (HCC)     [facility]    head and neck    Obstructive apnea does not use CPAP     Social History:    History   Smoking Status    Former Smoker    Packs/day: 1.00    Years: 40.00    based upon the patient's clinical course and testing results. All questions and concerns were addressed to the patient. Alternatives to my treatment were discussed. The patient is not currently smoking. The risks related to smoking were reviewed with the patient. Recommend maintaining a smoke-free lifestyle. Patient is not on a beta-blocker : neg MI. BP does not support  Patient is not on an ace-i/ARB : neg CHF. BP does not support  Patient is not  on a statin : neg CAD     Antiplatelet therapy has been recommended / prescribed for this patient. Education conducted on adverse reactions including bleeding was discussed. The patient verbalizes understanding not to stop medications without discussing with us. Discussed exercise: 30-60 minutes 7 days/week  Discussed diet. Thank you for allowing to us to participate in the care of Cor[place] .     ISIDRA Grey    Documentation of today's visit sent to PCP

## 2018-09-06 NOTE — TELEPHONE ENCOUNTER
Pt calling to let NPT know that the Diltiazem he is taking is 120 mg. Please call to advise.  Thank you

## 2018-09-06 NOTE — PATIENT INSTRUCTIONS
Call us back with the dose of diltiazem that you take    Do not take Super Enzymes    2 week heart monitor    appt in three weeks

## 2018-09-26 ENCOUNTER — TELEPHONE (OUTPATIENT)
Dept: CARDIOLOGY CLINIC | Age: 68
End: 2018-09-26

## 2018-09-27 ENCOUNTER — OFFICE VISIT (OUTPATIENT)
Dept: CARDIOLOGY CLINIC | Age: 68
End: 2018-09-27
Payer: MEDICARE

## 2018-09-27 VITALS
DIASTOLIC BLOOD PRESSURE: 62 MMHG | SYSTOLIC BLOOD PRESSURE: 98 MMHG | HEIGHT: 71 IN | WEIGHT: 136 LBS | HEART RATE: 68 BPM | BODY MASS INDEX: 19.04 KG/M2

## 2018-09-27 DIAGNOSIS — I35.1 NONRHEUMATIC AORTIC VALVE INSUFFICIENCY: ICD-10-CM

## 2018-09-27 DIAGNOSIS — I49.3 VENTRICULAR ECTOPY: ICD-10-CM

## 2018-09-27 DIAGNOSIS — E78.00 ELEVATED LDL CHOLESTEROL LEVEL: ICD-10-CM

## 2018-09-27 DIAGNOSIS — I48.0 PAF (PAROXYSMAL ATRIAL FIBRILLATION) (HCC): Primary | ICD-10-CM

## 2018-09-27 PROCEDURE — 3017F COLORECTAL CA SCREEN DOC REV: CPT | Performed by: NURSE PRACTITIONER

## 2018-09-27 PROCEDURE — 1101F PT FALLS ASSESS-DOCD LE1/YR: CPT | Performed by: NURSE PRACTITIONER

## 2018-09-27 PROCEDURE — G8598 ASA/ANTIPLAT THER USED: HCPCS | Performed by: NURSE PRACTITIONER

## 2018-09-27 PROCEDURE — 1036F TOBACCO NON-USER: CPT | Performed by: NURSE PRACTITIONER

## 2018-09-27 PROCEDURE — 1123F ACP DISCUSS/DSCN MKR DOCD: CPT | Performed by: NURSE PRACTITIONER

## 2018-09-27 PROCEDURE — G8420 CALC BMI NORM PARAMETERS: HCPCS | Performed by: NURSE PRACTITIONER

## 2018-09-27 PROCEDURE — G8427 DOCREV CUR MEDS BY ELIG CLIN: HCPCS | Performed by: NURSE PRACTITIONER

## 2018-09-27 PROCEDURE — 99214 OFFICE O/P EST MOD 30 MIN: CPT | Performed by: NURSE PRACTITIONER

## 2018-09-27 PROCEDURE — 4040F PNEUMOC VAC/ADMIN/RCVD: CPT | Performed by: NURSE PRACTITIONER

## 2018-09-27 RX ORDER — OMEPRAZOLE 40 MG/1
40 CAPSULE, DELAYED RELEASE ORAL DAILY
COMMUNITY
Start: 2018-09-22 | End: 2019-06-05 | Stop reason: ALTCHOICE

## 2018-09-27 NOTE — PROGRESS NOTES
[address]     Outpatient Follow Up Note    Fior Peralta is 79 y.o. male who presents today with a history of PAF '09, AI s/p AVR & DELGADO ligation with MAZE Darshan '15 and hyper-hypotension. His other hx includes: squamous cell CA tongue; transient global amnesia; HSFU dc 12/16 from supratheraputic INR of 10 and bleeding from mouth and PEG site    CHIEF COMPLAINT / HPI:  Follow Up secondary to light headedness. His MCOT showed no AF but episodes of PAT/VT. He has sleep apnea yet intolerant to wearing his CPAP. The patient is experiencing palpitations only at night when he gets up to go to the BR. It takes about 1/2 hour for his heart to slow back down. Subjective:   he denies significant chest pain. He has SOB during exercise, ie going up/down steps and at rehab. He does ok breathing doing normal activties. He continues to c/o dizziness and having black swirls around his vision. He sees his eye doc next week. He was told that he has beginning COPD ('13). He was given an inhaler but has never used. He no loner follows with pulmonology, Dr. Amari Mesa. The patient denies orthopnea/PND. The patient does not have swelling. The patients weight is at 133# from 160# a year ago which is his goal. His chemotherapy ended 45 months ago and radiation ended 4 months ago . These symptoms are unchange since the last OV. With regard to medication therapy the patient has been compliant with prescribed regimen. They have tolerated therapy to date.      Past Medical History:   Diagnosis Date    Aortic valve insufficiency     Arthritis     Atrial fibrillation (HCC)     [facility]    head and neck    Obstructive apnea does not use CPAP     Social History:    History   Smoking Status    Former Smoker    Packs/day: 1.00    Years: 40.00    Types: Cigarettes    Start date: 1/1/1975   Lu Mccann Quit date: 1/22/2015   Smokeless Tobacco    Never Used     Comment: Maintain cessation     Current Medications:  Current Outpatient Prescriptions   Medication Sig Dispense Refill    omeprazole (PRILOSEC) 40 MG delayed release capsule Take 40 mg by mouth daily      Cod Liver Oil OIL Take by mouth daily      diltiazem (DILTIAZEM CD) 120 MG extended release capsule Take 120 mg by mouth daily      aspirin 81 MG tablet Take 81 mg by mouth daily      diazepam (VALIUM) 5 MG tablet Take 5 mg by mouth every 6 hours as needed for Anxiety      meclizine (ANTIVERT) 25 MG tablet Take 25 mg by mouth 3 times daily as needed      cyclobenzaprine (FLEXERIL) 10 MG tablet Prn  0    oxyCODONE-acetaminophen (PERCOCET) 5-325 MG per tablet Take 1 tablet by mouth every 4 hours as needed 20 tablet 0    docusate sodium (COLACE) 100 MG capsule Take 100 mg by mouth 2 times daily. No current facility-administered medications for this visit. REVIEW OF SYSTEMS:    CONSTITUTIONAL: + major weight loss 30# during chemotherapy;  fatigue, weakness, night sweats or fever. HEENT: has an appt with ophth in the near future  He gets a heaviness above his eyes. The next thing is that his head starts bobbing and he struggles standing up straight. He's very disoriented. This started about 2 weeks ago. Earlier this week while at [employer], he had a spell. His vision looked like a 3D picture. He had associated nausea. If he sat down, it felt worse. He had no associated sweating nor palpitations. RESPIRATORY: No new SOB, PND, orthopnea or cough. CARDIOVASCULAR: See HPI  GI: No nausea, vomiting, diarrhea, constipation, abdominal pain or changes in bowel habits. : No urinary frequency, urgency, incontinence hematuria or dysuria. SKIN: No cyanosis or skin lesions. MUSCULOSKELETAL: cervical disc fusion; No new muscle or joint pain.   NEUROLOGICAL: No syncope or TIA-like symptoms; cervical disc disease, 24 vertebrae injury, 2 bone fusions  PSYCHIATRIC: No anxiety, pain, insomnia or depression    Objective:   PHYSICAL EXAM:        Vitals:

## 2018-09-29 ENCOUNTER — HOSPITAL ENCOUNTER (OUTPATIENT)
Age: 68
Discharge: HOME | End: 2018-09-29
Payer: MEDICARE

## 2018-09-29 LAB
CHOLESTEROL, TOTAL: 225 MG/DL (ref 0–199)
HDLC SERPL-MCNC: 69 MG/DL (ref 40–60)
LDL CHOLESTEROL CALCULATED: 140 MG/DL
MAGNESIUM: 2.3 MG/DL (ref 1.8–2.4)
TRIGL SERPL-MCNC: 80 MG/DL (ref 0–150)
VLDLC SERPL CALC-MCNC: 16 MG/DL

## 2018-09-29 PROCEDURE — 36415 COLL VENOUS BLD VENIPUNCTURE: CPT

## 2018-09-29 PROCEDURE — 83735 ASSAY OF MAGNESIUM: CPT

## 2018-09-29 PROCEDURE — 80061 LIPID PANEL: CPT

## 2018-10-12 ENCOUNTER — HOSPITAL ENCOUNTER (OUTPATIENT)
Age: 68
Discharge: HOME | End: 2018-10-12

## 2018-10-12 PROCEDURE — 9900000038 HC CARDIAC REHAB PHASE 3 - MONTHLY

## 2018-11-01 ENCOUNTER — OFFICE VISIT (OUTPATIENT)
Dept: CARDIOLOGY CLINIC | Age: 68
End: 2018-11-01
Payer: MEDICARE

## 2018-11-01 VITALS
SYSTOLIC BLOOD PRESSURE: 98 MMHG | BODY MASS INDEX: 19.8 KG/M2 | DIASTOLIC BLOOD PRESSURE: 70 MMHG | HEART RATE: 71 BPM | WEIGHT: 141.4 LBS | HEIGHT: 71 IN

## 2018-11-01 DIAGNOSIS — Z95.2 S/P AVR: ICD-10-CM

## 2018-11-01 DIAGNOSIS — G45.9 TRANSIENT CEREBRAL ISCHEMIA, UNSPECIFIED TYPE: ICD-10-CM

## 2018-11-01 DIAGNOSIS — I48.0 PAF (PAROXYSMAL ATRIAL FIBRILLATION) (HCC): Primary | ICD-10-CM

## 2018-11-01 DIAGNOSIS — I49.3 VENTRICULAR ECTOPY: ICD-10-CM

## 2018-11-01 DIAGNOSIS — G47.33 OSA (OBSTRUCTIVE SLEEP APNEA): ICD-10-CM

## 2018-11-01 PROCEDURE — 1101F PT FALLS ASSESS-DOCD LE1/YR: CPT | Performed by: INTERNAL MEDICINE

## 2018-11-01 PROCEDURE — G8598 ASA/ANTIPLAT THER USED: HCPCS | Performed by: INTERNAL MEDICINE

## 2018-11-01 PROCEDURE — 4040F PNEUMOC VAC/ADMIN/RCVD: CPT | Performed by: INTERNAL MEDICINE

## 2018-11-01 PROCEDURE — G8420 CALC BMI NORM PARAMETERS: HCPCS | Performed by: INTERNAL MEDICINE

## 2018-11-01 PROCEDURE — 93000 ELECTROCARDIOGRAM COMPLETE: CPT | Performed by: INTERNAL MEDICINE

## 2018-11-01 PROCEDURE — G8427 DOCREV CUR MEDS BY ELIG CLIN: HCPCS | Performed by: INTERNAL MEDICINE

## 2018-11-01 PROCEDURE — 1036F TOBACCO NON-USER: CPT | Performed by: INTERNAL MEDICINE

## 2018-11-01 PROCEDURE — 3017F COLORECTAL CA SCREEN DOC REV: CPT | Performed by: INTERNAL MEDICINE

## 2018-11-01 PROCEDURE — 99214 OFFICE O/P EST MOD 30 MIN: CPT | Performed by: INTERNAL MEDICINE

## 2018-11-01 PROCEDURE — 1123F ACP DISCUSS/DSCN MKR DOCD: CPT | Performed by: INTERNAL MEDICINE

## 2018-11-01 PROCEDURE — G8484 FLU IMMUNIZE NO ADMIN: HCPCS | Performed by: INTERNAL MEDICINE

## 2018-11-01 NOTE — PROGRESS NOTES
BP is borderline and cannot add BB or increase CCB therapy. Continue with current therapy. - Paroxysmal Atrial fibrillation:    Remains in sinus rhythm. He has occasional brief palpitations. No documented A. fib since last visit. S/p MAZE and also DELGADO ligation    Coumadin discontinued in hospital. Patient is status post left atrial appendage. Remains off coumadin due to GI bleeding. MRI with no evidence of prior stroke or abnormality. He's been seen by neurology. MRI 1/26/18 normal              Remains in sinus rhythm. On asa 81mg daily   Diltiazem 180 mg daily    - 5/2017 stress echo was normal.     2 week monitor (9/6/18-9/19/18) for complaints of palpitations showed brief SVT, brief WCT, but no AF. Symptomatic triggered events correlated with SR. He has completed chemo and radiation.    - Aortic insufficiency:    S/p porcine AVR    Stable. - Squamous cell carcinoma   Status post radiation and chemotherapy appears to be stable   - no evidence of TIA since last OV    -  CHRISTOPHER   - on CPAP now, using 2 hours per night    - Follow up 6 months with CNP, follow with EP as needed. Scribe's attestation: This note was scribed in the presence of Brenda Mackenzie M.D. by Augustina Bedoya RN.      Physician Attestation: I, Dr. Brenda Mackenzie, confirm that the scribe's documentation has been prepared under my direction and personally reviewed by me in its entirety. I also confirm that the note above accurately reflects all work, treatment, procedures, and medical decision making performed by me. NOTE: This report was transcribed using voice recognition software. Every effort was made to ensure accuracy, however, inadvertent computerized transcription errors may be present.       Brenda Mackenzie MD, MPH  [place] [address]   Office: [phone]

## 2018-11-16 ENCOUNTER — HOSPITAL ENCOUNTER (OUTPATIENT)
Age: 68
Discharge: HOME | End: 2018-11-16

## 2018-11-16 PROCEDURE — 9900000038 HC CARDIAC REHAB PHASE 3 - MONTHLY

## 2018-12-04 ENCOUNTER — OFFICE VISIT (OUTPATIENT)
Dept: ENT CLINIC | Age: 68
End: 2018-12-04
Payer: MEDICARE

## 2018-12-04 VITALS
BODY MASS INDEX: 19.67 KG/M2 | SYSTOLIC BLOOD PRESSURE: 100 MMHG | OXYGEN SATURATION: 96 % | HEART RATE: 76 BPM | DIASTOLIC BLOOD PRESSURE: 64 MMHG | WEIGHT: 141 LBS

## 2018-12-04 DIAGNOSIS — H90.3 SENSORINEURAL HEARING LOSS (SNHL) OF BOTH EARS: Primary | ICD-10-CM

## 2018-12-04 PROCEDURE — G8420 CALC BMI NORM PARAMETERS: HCPCS | Performed by: OTOLARYNGOLOGY

## 2018-12-04 PROCEDURE — G8427 DOCREV CUR MEDS BY ELIG CLIN: HCPCS | Performed by: OTOLARYNGOLOGY

## 2018-12-04 PROCEDURE — 1036F TOBACCO NON-USER: CPT | Performed by: OTOLARYNGOLOGY

## 2018-12-04 PROCEDURE — 4040F PNEUMOC VAC/ADMIN/RCVD: CPT | Performed by: OTOLARYNGOLOGY

## 2018-12-04 PROCEDURE — 1101F PT FALLS ASSESS-DOCD LE1/YR: CPT | Performed by: OTOLARYNGOLOGY

## 2018-12-04 PROCEDURE — 99212 OFFICE O/P EST SF 10 MIN: CPT | Performed by: OTOLARYNGOLOGY

## 2018-12-04 PROCEDURE — G8484 FLU IMMUNIZE NO ADMIN: HCPCS | Performed by: OTOLARYNGOLOGY

## 2018-12-04 PROCEDURE — 3017F COLORECTAL CA SCREEN DOC REV: CPT | Performed by: OTOLARYNGOLOGY

## 2018-12-04 PROCEDURE — G8598 ASA/ANTIPLAT THER USED: HCPCS | Performed by: OTOLARYNGOLOGY

## 2018-12-04 PROCEDURE — 1123F ACP DISCUSS/DSCN MKR DOCD: CPT | Performed by: OTOLARYNGOLOGY

## 2019-01-11 ENCOUNTER — OFFICE VISIT (OUTPATIENT)
Dept: CARDIOLOGY CLINIC | Age: 69
End: 2019-01-11
Payer: COMMERCIAL

## 2019-01-11 VITALS — WEIGHT: 146 LBS | HEIGHT: 71 IN | BODY MASS INDEX: 20.44 KG/M2

## 2019-01-11 DIAGNOSIS — I35.1 NONRHEUMATIC AORTIC VALVE INSUFFICIENCY: Primary | Chronic | ICD-10-CM

## 2019-01-11 DIAGNOSIS — Z95.2 S/P AVR: ICD-10-CM

## 2019-01-11 PROCEDURE — 99214 OFFICE O/P EST MOD 30 MIN: CPT | Performed by: NURSE PRACTITIONER

## 2019-01-28 ENCOUNTER — HOSPITAL ENCOUNTER (OUTPATIENT)
Dept: NON INVASIVE DIAGNOSTICS | Age: 69
Discharge: HOME | End: 2019-01-28
Payer: COMMERCIAL

## 2019-01-28 LAB
LEFT VENTRICULAR EJECTION FRACTION HIGH VALUE: 65 %
LEFT VENTRICULAR EJECTION FRACTION MODE: NORMAL
LV EF: 60 %
LV EF: 63 %
LVEF MODALITY: NORMAL

## 2019-01-28 PROCEDURE — 93306 TTE W/DOPPLER COMPLETE: CPT

## 2019-01-29 ENCOUNTER — CLINICAL DOCUMENTATION (OUTPATIENT)
Dept: AUDIOLOGY | Age: 69
End: 2019-01-29

## 2019-01-29 ENCOUNTER — OFFICE VISIT (OUTPATIENT)
Dept: ENT CLINIC | Age: 69
End: 2019-01-29
Payer: COMMERCIAL

## 2019-01-29 ENCOUNTER — OFFICE VISIT (OUTPATIENT)
Dept: AUDIOLOGY | Age: 69
End: 2019-01-29
Payer: COMMERCIAL

## 2019-01-29 ENCOUNTER — PROCEDURE VISIT (OUTPATIENT)
Dept: AUDIOLOGY | Age: 69
End: 2019-01-29
Payer: COMMERCIAL

## 2019-01-29 VITALS — SYSTOLIC BLOOD PRESSURE: 100 MMHG | DIASTOLIC BLOOD PRESSURE: 70 MMHG | OXYGEN SATURATION: 95 % | HEART RATE: 72 BPM

## 2019-01-29 DIAGNOSIS — H61.22 IMPACTED CERUMEN OF LEFT EAR: ICD-10-CM

## 2019-01-29 DIAGNOSIS — H90.3 SENSORINEURAL HEARING LOSS (SNHL) OF BOTH EARS: Primary | ICD-10-CM

## 2019-01-29 DIAGNOSIS — H60.8X2 CHRONIC ECZEMATOUS OTITIS EXTERNA OF LEFT EAR: ICD-10-CM

## 2019-01-29 DIAGNOSIS — H90.3 SENSORINEURAL HEARING LOSS OF BOTH EARS: Primary | ICD-10-CM

## 2019-01-29 PROCEDURE — 99212 OFFICE O/P EST SF 10 MIN: CPT | Performed by: OTOLARYNGOLOGY

## 2019-01-29 PROCEDURE — 92557 COMPREHENSIVE HEARING TEST: CPT | Performed by: AUDIOLOGIST

## 2019-01-29 PROCEDURE — 92568 ACOUSTIC REFL THRESHOLD TST: CPT | Performed by: AUDIOLOGIST

## 2019-01-29 PROCEDURE — 69210 REMOVE IMPACTED EAR WAX UNI: CPT | Performed by: OTOLARYNGOLOGY

## 2019-01-29 RX ORDER — DIAPER,BRIEF,INFANT-TODD,DISP
EACH MISCELLANEOUS
Qty: 1 TUBE | Refills: 1 | Status: SHIPPED | OUTPATIENT
Start: 2019-01-29 | End: 2019-02-05

## 2019-02-12 ENCOUNTER — OFFICE VISIT (OUTPATIENT)
Dept: FAMILY MEDICINE CLINIC | Age: 69
End: 2019-02-12
Payer: COMMERCIAL

## 2019-02-12 VITALS
OXYGEN SATURATION: 98 % | SYSTOLIC BLOOD PRESSURE: 118 MMHG | WEIGHT: 144 LBS | HEART RATE: 80 BPM | BODY MASS INDEX: 20.08 KG/M2 | DIASTOLIC BLOOD PRESSURE: 78 MMHG

## 2019-02-12 DIAGNOSIS — Z95.2 H/O AORTIC VALVE REPLACEMENT: ICD-10-CM

## 2019-02-12 DIAGNOSIS — G89.4 CHRONIC PAIN SYNDROME: Primary | ICD-10-CM

## 2019-02-12 DIAGNOSIS — I48.0 PAF (PAROXYSMAL ATRIAL FIBRILLATION) (HCC): ICD-10-CM

## 2019-02-12 DIAGNOSIS — J41.0 SIMPLE CHRONIC BRONCHITIS (HCC): ICD-10-CM

## 2019-02-12 DIAGNOSIS — C76.0 SQUAMOUS CELL CARCINOMA OF HEAD AND NECK (HCC): ICD-10-CM

## 2019-02-12 DIAGNOSIS — E43 SEVERE PROTEIN-CALORIE MALNUTRITION (HCC): ICD-10-CM

## 2019-02-12 DIAGNOSIS — I10 ESSENTIAL HYPERTENSION: Chronic | ICD-10-CM

## 2019-02-12 PROBLEM — I95.9 HYPOTENSION: Status: ACTIVE | Noted: 2019-02-12

## 2019-02-12 PROBLEM — R06.83 SNORING: Status: ACTIVE | Noted: 2018-05-23

## 2019-02-12 PROBLEM — M19.90 ARTHRITIS: Status: ACTIVE | Noted: 2019-02-12

## 2019-02-12 PROBLEM — J34.2 DEVIATED NASAL SEPTUM: Status: ACTIVE | Noted: 2017-09-11

## 2019-02-12 PROBLEM — I51.9 CARDIAC DISEASE: Status: ACTIVE | Noted: 2019-02-12

## 2019-02-12 PROBLEM — R35.1 NOCTURIA: Status: ACTIVE | Noted: 2018-05-23

## 2019-02-12 PROBLEM — M54.50 CHRONIC BILATERAL LOW BACK PAIN WITHOUT SCIATICA: Status: ACTIVE | Noted: 2017-05-22

## 2019-02-12 PROBLEM — Z87.891 HISTORY OF TOBACCO ABUSE: Status: ACTIVE | Noted: 2018-04-27

## 2019-02-12 PROBLEM — J44.9 CHRONIC OBSTRUCTIVE PULMONARY DISEASE (HCC): Status: ACTIVE | Noted: 2019-02-12

## 2019-02-12 PROBLEM — G89.29 CHRONIC BILATERAL LOW BACK PAIN WITHOUT SCIATICA: Status: ACTIVE | Noted: 2017-05-22

## 2019-02-12 PROBLEM — R53.81 MALAISE AND FATIGUE: Status: ACTIVE | Noted: 2018-05-23

## 2019-02-12 PROBLEM — R53.83 MALAISE AND FATIGUE: Status: ACTIVE | Noted: 2018-05-23

## 2019-02-12 LAB
AMPHETAMINE SCREEN, URINE: ABNORMAL
BARBITURATE SCREEN URINE: ABNORMAL
BENZODIAZEPINE SCREEN, URINE: ABNORMAL
CANNABINOID SCREEN URINE: ABNORMAL
COCAINE METABOLITE SCREEN URINE: ABNORMAL
Lab: ABNORMAL
METHADONE SCREEN, URINE: ABNORMAL
OPIATE SCREEN URINE: ABNORMAL
OXYCODONE URINE: POSITIVE
PH UA: 5
PHENCYCLIDINE SCREEN URINE: ABNORMAL
PROPOXYPHENE SCREEN: ABNORMAL

## 2019-02-12 PROCEDURE — 99203 OFFICE O/P NEW LOW 30 MIN: CPT | Performed by: FAMILY MEDICINE

## 2019-02-12 ASSESSMENT — PATIENT HEALTH QUESTIONNAIRE - PHQ9
SUM OF ALL RESPONSES TO PHQ9 QUESTIONS 1 & 2: 0
2. FEELING DOWN, DEPRESSED OR HOPELESS: 0
1. LITTLE INTEREST OR PLEASURE IN DOING THINGS: 0
SUM OF ALL RESPONSES TO PHQ QUESTIONS 1-9: 0
SUM OF ALL RESPONSES TO PHQ QUESTIONS 1-9: 0

## 2019-02-15 ENCOUNTER — TELEPHONE (OUTPATIENT)
Dept: CARDIOLOGY CLINIC | Age: 69
End: 2019-02-15

## 2019-02-18 ENCOUNTER — NURSE TRIAGE (OUTPATIENT)
Dept: OTHER | Facility: CLINIC | Age: 69
End: 2019-02-18

## 2019-02-19 ENCOUNTER — HOSPITAL ENCOUNTER (OUTPATIENT)
Dept: CT IMAGING | Age: 69
Discharge: HOME | End: 2019-02-19
Payer: COMMERCIAL

## 2019-02-19 ENCOUNTER — OFFICE VISIT (OUTPATIENT)
Dept: FAMILY MEDICINE CLINIC | Age: 69
End: 2019-02-19
Payer: COMMERCIAL

## 2019-02-19 ENCOUNTER — TELEPHONE (OUTPATIENT)
Dept: CARDIOLOGY CLINIC | Age: 69
End: 2019-02-19

## 2019-02-19 VITALS
OXYGEN SATURATION: 96 % | BODY MASS INDEX: 21.84 KG/M2 | HEART RATE: 74 BPM | WEIGHT: 156.6 LBS | DIASTOLIC BLOOD PRESSURE: 72 MMHG | SYSTOLIC BLOOD PRESSURE: 108 MMHG

## 2019-02-19 DIAGNOSIS — R59.0 LEFT CERVICAL LYMPHADENOPATHY: ICD-10-CM

## 2019-02-19 DIAGNOSIS — R06.02 SOB (SHORTNESS OF BREATH): ICD-10-CM

## 2019-02-19 DIAGNOSIS — C76.0 SQUAMOUS CELL CARCINOMA OF HEAD AND NECK (HCC): ICD-10-CM

## 2019-02-19 DIAGNOSIS — I10 ESSENTIAL HYPERTENSION: Chronic | ICD-10-CM

## 2019-02-19 DIAGNOSIS — R06.02 SOB (SHORTNESS OF BREATH): Primary | ICD-10-CM

## 2019-02-19 LAB
BUN BLDV-MCNC: 12 MG/DL (ref 7–20)
BUN BLDV-MCNC: 12 MG/DL (ref 7–20)
CREAT SERPL-MCNC: 0.6 MG/DL (ref 0.8–1.3)
CREAT SERPL-MCNC: 0.6 MG/DL (ref 0.8–1.3)
GFR AFRICAN AMERICAN: >60
GFR AFRICAN AMERICAN: >60
GFR NON-AFRICAN AMERICAN: >60
GFR NON-AFRICAN AMERICAN: >60
INFLUENZA A ANTIBODY: NORMAL
INFLUENZA B ANTIBODY: NORMAL

## 2019-02-19 PROCEDURE — 93000 ELECTROCARDIOGRAM COMPLETE: CPT | Performed by: FAMILY MEDICINE

## 2019-02-19 PROCEDURE — 84520 ASSAY OF UREA NITROGEN: CPT

## 2019-02-19 PROCEDURE — 99214 OFFICE O/P EST MOD 30 MIN: CPT | Performed by: FAMILY MEDICINE

## 2019-02-19 PROCEDURE — 36415 COLL VENOUS BLD VENIPUNCTURE: CPT

## 2019-02-19 PROCEDURE — 82565 ASSAY OF CREATININE: CPT

## 2019-02-19 PROCEDURE — 70491 CT SOFT TISSUE NECK W/DYE: CPT

## 2019-02-19 PROCEDURE — 6360000004 HC RX CONTRAST MEDICATION: Performed by: INTERNAL MEDICINE

## 2019-02-19 PROCEDURE — 71260 CT THORAX DX C+: CPT

## 2019-02-19 PROCEDURE — 87804 INFLUENZA ASSAY W/OPTIC: CPT | Performed by: FAMILY MEDICINE

## 2019-02-19 RX ADMIN — IOPAMIDOL 75 ML: 755 INJECTION, SOLUTION INTRAVENOUS at 11:47

## 2019-02-20 ENCOUNTER — TELEPHONE (OUTPATIENT)
Dept: CARDIOLOGY CLINIC | Age: 69
End: 2019-02-20

## 2019-02-20 ENCOUNTER — TELEPHONE (OUTPATIENT)
Dept: FAMILY MEDICINE CLINIC | Age: 69
End: 2019-02-20

## 2019-02-22 ENCOUNTER — TELEPHONE (OUTPATIENT)
Dept: CARDIOLOGY CLINIC | Age: 69
End: 2019-02-22

## 2019-02-26 ENCOUNTER — OFFICE VISIT (OUTPATIENT)
Dept: FAMILY MEDICINE CLINIC | Age: 69
End: 2019-02-26
Payer: COMMERCIAL

## 2019-02-26 VITALS
BODY MASS INDEX: 20.31 KG/M2 | WEIGHT: 145.6 LBS | DIASTOLIC BLOOD PRESSURE: 76 MMHG | SYSTOLIC BLOOD PRESSURE: 118 MMHG | OXYGEN SATURATION: 93 % | HEART RATE: 81 BPM

## 2019-02-26 DIAGNOSIS — R41.3 MEMORY LOSS: ICD-10-CM

## 2019-02-26 DIAGNOSIS — R41.3 MEMORY LOSS: Primary | ICD-10-CM

## 2019-02-26 LAB
T4 FREE: 1.8 NG/DL (ref 0.9–1.8)
TSH REFLEX: 7.76 UIU/ML (ref 0.27–4.2)
VITAMIN B-12: 592 PG/ML (ref 211–911)

## 2019-02-26 PROCEDURE — 99213 OFFICE O/P EST LOW 20 MIN: CPT | Performed by: FAMILY MEDICINE

## 2019-02-28 DIAGNOSIS — I10 ESSENTIAL HYPERTENSION: Chronic | ICD-10-CM

## 2019-02-28 DIAGNOSIS — R79.89 ABNORMAL TSH: Primary | ICD-10-CM

## 2019-03-13 RX ORDER — DILTIAZEM HYDROCHLORIDE 120 MG/1
CAPSULE, COATED, EXTENDED RELEASE ORAL
Qty: 90 CAPSULE | Refills: 3 | Status: SHIPPED | OUTPATIENT
Start: 2019-03-13 | End: 2019-06-18 | Stop reason: ALTCHOICE

## 2019-04-15 ENCOUNTER — TELEPHONE (OUTPATIENT)
Dept: CARDIOLOGY CLINIC | Age: 69
End: 2019-04-15

## 2019-04-15 NOTE — TELEPHONE ENCOUNTER
Called patient he states he does not want to go to ER and wants to come in to see Dr. Tierra Penn this week. Scheduled him with Dr. Tierra Penn tomorrow 4/16/19 at 1:45. Advised patient to go to ER if symptoms persist or worsen. Patient verbalized understanding.

## 2019-04-16 ENCOUNTER — OFFICE VISIT (OUTPATIENT)
Dept: CARDIOLOGY CLINIC | Age: 69
End: 2019-04-16
Payer: COMMERCIAL

## 2019-04-16 VITALS
WEIGHT: 154 LBS | SYSTOLIC BLOOD PRESSURE: 99 MMHG | RESPIRATION RATE: 14 BRPM | HEIGHT: 71 IN | BODY MASS INDEX: 21.56 KG/M2 | DIASTOLIC BLOOD PRESSURE: 67 MMHG | HEART RATE: 70 BPM

## 2019-04-16 DIAGNOSIS — I48.0 PAF (PAROXYSMAL ATRIAL FIBRILLATION) (HCC): Primary | ICD-10-CM

## 2019-04-16 PROCEDURE — 93000 ELECTROCARDIOGRAM COMPLETE: CPT | Performed by: INTERNAL MEDICINE

## 2019-04-16 PROCEDURE — 99214 OFFICE O/P EST MOD 30 MIN: CPT | Performed by: INTERNAL MEDICINE

## 2019-04-16 RX ORDER — LEVOTHYROXINE SODIUM 0.03 MG/1
TABLET ORAL
Refills: 3 | COMMUNITY
Start: 2019-03-26 | End: 2019-06-28 | Stop reason: SDUPTHER

## 2019-04-16 RX ORDER — M-VIT,TX,IRON,MINS/CALC/FOLIC 27MG-0.4MG
1 TABLET ORAL DAILY
COMMUNITY
End: 2020-06-15 | Stop reason: ALTCHOICE

## 2019-04-16 NOTE — PROGRESS NOTES
[facility]   Electrophysiology   Date: 4/16/2019  I had the privilege of visiting Rajwinder Saeed in the office. CC: Palpitation   HPI: Rajwinder Saeed is a 79 y.o. male PMH significant for paroxysmal AF in 2009 AI s/p AVR with 25 mm Mosaic Ultra porcine valve, right and left modified maze procedure with ligation of DELGADO with Atriclip on 1/28/15 (Dr. Kezia Steve). Cardiac cath with non-obstructive CAD. Diagnosed with CHRISTOPHER, and treated. He quit smoking and drinking prior to his surgery. He was diagnosed with SCC of the base of the tongue underwent radiation and chemotherapy. He was on warfarin, but was stopped due to bleeding from mouth and PEG site. He has had DELGADO ligation with MAZE. Pt wore a 2 week monitor (9/6/18-9/19/18) for complaints of palpitations which showed brief SVT, brief WCT, but no AF. Symptomatic triggered events correlated with SR. Interval History:  Patient has had recurrent episodes of palpitation. He states that he was resting 2 days ago when suddenly felt his heart was racing which lasted for a few minutes and he had chest pressure with this palpitation. He he states that he is active and denies having any exertional chest pressure shortness of breath. He is frustrated with his episodes of palpitation and pass heart rate.     Past Medical History:   Diagnosis Date    Aortic valve insufficiency     Arthritis     Atrial fibrillation ([address]     [facility] ([facility] 09/2017    head and neck    Hearing loss     Heart disease     Obstructive apnea does not use CPAP    Thyroid disease         Past Surgical History:   Procedure Laterality Date    ANKLE SURGERY      AORTIC VALVE REPLACEMENT  1/28/15    Dr. Kezia Steve 25mm Mosaic Ultra porcine valve; right and left modified maze procedure with ligation of DELGADO with Atriclip    CARDIAC CATHETERIZATION      HERNIA REPAIR      KNEE SURGERY      Arthroscopy    LUNG SURGERY      collapsed lung due to broken ribs    MANDIBLE FRACTURE SURGERY      NECK SURGERY      Fusion    TONSILLECTOMY AND ADENOIDECTOMY         Allergies   Allergen Reactions    Naproxen Swelling     Lips    Pt does not recognize this being an intolerance    Hydrocodone-Acetaminophen Itching       Medication:  Current Outpatient Medications   Medication Sig Dispense Refill    Multiple Vitamins-Minerals (THERAPEUTIC MULTIVITAMIN-MINERALS) tablet Take 1 tablet by mouth daily      levothyroxine (SYNTHROID) 25 MCG tablet TAKE 1 TABLET BY MOUTH EVERY DAY  3    diltiazem (CARDIZEM CD) 120 MG extended release capsule TAKE 1 CAPSULE BY MOUTH DAILY 90 capsule 3    Cod Liver Oil OIL Take by mouth daily      aspirin 81 MG tablet Take 81 mg by mouth daily      diazepam (VALIUM) 5 MG tablet Take 5 mg by mouth every 6 hours as needed for Anxiety      cyclobenzaprine (FLEXERIL) 10 MG tablet Prn  0    oxyCODONE-acetaminophen (PERCOCET) 5-325 MG per tablet Take 1 tablet by mouth every 4 hours as needed 20 tablet 0    docusate sodium (COLACE) 100 MG capsule Take 100 mg by mouth 2 times daily.  omeprazole (PRILOSEC) 40 MG delayed release capsule Take 40 mg by mouth daily      meclizine (ANTIVERT) 25 MG tablet Take 25 mg by mouth 3 times daily as needed       No current facility-administered medications for this visit. Social History:  Reviewed. reports that he quit smoking about 4 years ago. His smoking use included cigarettes. He started smoking about 44 years ago. He has a 40.00 pack-year smoking history. He has never used smokeless tobacco. He reports that he does not drink alcohol or use drugs. Family History:  Reviewed. family history includes Heart Disease in an other family member.      Review of System:    · General ROS: negative for - chills, fever   · Psychological ROS: negative for - anxiety or depression  · Ophthalmic ROS: negative for - eye pain or loss of vision  · ENT ROS: negative for - headaches, sore throat   · Allergy and Immunology ROS: negative for - hives  · Hematological and Lymphatic ROS: negative for - bleeding problems      · Endocrine ROS: negative for - skin changes, temperature intolerance or unexpected weight changes  · Respiratory ROS: negative for - cough, sputum, wheezing  · Cardiovascular ROS: Per HPI. · Gastrointestinal ROS: negative for - abdominal pain, diarrhea, nausea/vomiting, bleeding   · Genito-Urinary ROS: negative for - dysuria or incontinence  · Musculoskeletal ROS: negative for - joint swelling   · Neurological ROS: negative for - confusion, numbness/tingling, seizures, weakness  · Dermatological ROS: POSITIVE For SCAR on NECK and FACE    Physical Examination:  Vitals:    04/16/19 0833   BP: 99/67   Pulse: 70   Resp: 14     · Constitutional: Oriented. No distress. · Head: Normocephalic and atraumatic. · Mouth/Throat: Oropharynx is clear and moist.   · Eyes: Conjunctivae normal. EOM are normal.   · Neck: Neck supple. No JVD present. · Cardiovascular: Normal rate, regular rhythm, S1&S2. · Pulmonary/Chest: Bilateral respiratory sounds. No rhonchi. · Abdominal: Soft. No tenderness. · Musculoskeletal: No tenderness. No edema    · Lymphadenopathy: Has no cervical adenopathy. · Neurological: Alert and oriented. Follows command, No Gross deficit   · Skin: Skin is warm, No rash noted. · Psychiatric: Has a normal behavior       Labs:  INR 1.32(12/16/17)  TSH 2.31 (6/2017)  AST 15 (12/5/2017)  ALT 17   (10/29/2015)  Cr 0.5 (12/15/17)    ECG  4/16/2019 Sinus rhythm     Stress Echo: 5/2017   Echo (rest): Normal (LVEF >50%)   Echo (stress): Hyperkinetic (LVEF >70%)     Echo   Baseline resting echocardiogram shows normal global LV systolic function   with an ejection fraction of 60% and uniform myocardial segmental wall   motion. Following stress there was uniform augmentation of all myocardial   segments with appropriate hyperdynamic LV systolic response to stress. Difficult post exercise imaging.      ECG   Normal ectopy and arrhythmia. - The patient is counseled to get regular exercise 3-5 times per week to control cardiovascular risk factors. - The patient is counseled to avoid tobacco use. NOTE: This report was transcribed using voice recognition software. Every effort was made to ensure accuracy, however, inadvertent computerized transcription errors may be present. Honorio Lyon MD, MPH  [facility]   Office: [phone]      Physician Attestation: I, Dr. Honorio Lyon, confirm that the note above accurately reflects all work, treatment, procedures, and medical decision making performed by me.

## 2019-04-16 NOTE — LETTER
[address] Cardiology - Andressa Sero  [facility] [place] [address] [address] [address]  Phone: [phone]  Fax: [phone]    France Gan MD        April 16, 2019     Laurie SilvaMeg [address] [facility]    Patient: Queta Cummins  MR Number: [id number]  YOB: 1950  Date of Visit: 4/16/2019    Dear Dr. Laurie Silva:    Below are the relevant portions of my assessment and plan of care. [facility]   Electrophysiology   Date: 4/16/2019  I had the privilege of visiting Queta Cummins in the office. CC: Palpitation   HPI: Queta Cummins is a 79 y.o. male PMH significant for paroxysmal AF in 2009 AI s/p AVR with 25 mm Mosaic Ultra porcine valve, right and left modified maze procedure with ligation of DELGADO with Atriclip on 1/28/15 (Dr. Torie Dubose). Cardiac cath with non-obstructive CAD. Diagnosed with CHRISTOPHER, and treated. He quit smoking and drinking prior to his surgery. He was diagnosed with SCC of the base of the tongue underwent radiation and chemotherapy. He was on warfarin, but was stopped due to bleeding from mouth and PEG site. He has had DELGADO ligation with MAZE. Pt wore a 2 week monitor (9/6/18-9/19/18) for complaints of palpitations which showed brief SVT, brief WCT, but no AF. Symptomatic triggered events correlated with SR. Interval History:  Patient has had recurrent episodes of palpitation. He states that he was resting 2 days ago when suddenly felt his heart was racing which lasted for a few minutes and he had chest pressure with this palpitation. He he states that he is active and denies having any exertional chest pressure shortness of breath. He is frustrated with his episodes of palpitation and pass heart rate.     Past Medical History:   Diagnosis Date    Aortic valve insufficiency     Arthritis     Atrial fibrillation ([facility] [address]     Cancer ([facility] [address] 09/2017    head and neck    Hearing loss  Heart disease     Obstructive apnea does not use CPAP    Thyroid disease         Past Surgical History:   Procedure Laterality Date    ANKLE SURGERY      AORTIC VALVE REPLACEMENT  1/28/15    Dr. Hollis Nurse 25mm Mosaic Ultra porcine valve; right and left modified maze procedure with ligation of DELGADO with Atriclip    CARDIAC CATHETERIZATION      HERNIA REPAIR      KNEE SURGERY      Arthroscopy    LUNG SURGERY      collapsed lung due to broken ribs    MANDIBLE FRACTURE SURGERY      NECK SURGERY      Fusion    TONSILLECTOMY AND ADENOIDECTOMY         Allergies   Allergen Reactions    Naproxen Swelling     Lips    Pt does not recognize this being an intolerance    Hydrocodone-Acetaminophen Itching       Medication:  Current Outpatient Medications   Medication Sig Dispense Refill    Multiple Vitamins-Minerals (THERAPEUTIC MULTIVITAMIN-MINERALS) tablet Take 1 tablet by mouth daily      levothyroxine (SYNTHROID) 25 MCG tablet TAKE 1 TABLET BY MOUTH EVERY DAY  3    diltiazem (CARDIZEM CD) 120 MG extended release capsule TAKE 1 CAPSULE BY MOUTH DAILY 90 capsule 3    Cod Liver Oil OIL Take by mouth daily      aspirin 81 MG tablet Take 81 mg by mouth daily      diazepam (VALIUM) 5 MG tablet Take 5 mg by mouth every 6 hours as needed for Anxiety      cyclobenzaprine (FLEXERIL) 10 MG tablet Prn  0    oxyCODONE-acetaminophen (PERCOCET) 5-325 MG per tablet Take 1 tablet by mouth every 4 hours as needed 20 tablet 0    docusate sodium (COLACE) 100 MG capsule Take 100 mg by mouth 2 times daily.  omeprazole (PRILOSEC) 40 MG delayed release capsule Take 40 mg by mouth daily      meclizine (ANTIVERT) 25 MG tablet Take 25 mg by mouth 3 times daily as needed       No current facility-administered medications for this visit. Social History:  Reviewed. reports that he quit smoking about 4 years ago. His smoking use included cigarettes. He started smoking about 44 years ago.  He has a 40.00 pack-year smoking history. He has never used smokeless tobacco. He reports that he does not drink alcohol or use drugs. Family History:  Reviewed. family history includes Heart Disease in an other family member. Review of System:    · General ROS: negative for - chills, fever   · Psychological ROS: negative for - anxiety or depression  · Ophthalmic ROS: negative for - eye pain or loss of vision  · ENT ROS: negative for - headaches, sore throat   · Allergy and Immunology ROS: negative for - hives  · Hematological and Lymphatic ROS: negative for - bleeding problems      · Endocrine ROS: negative for - skin changes, temperature intolerance or unexpected weight changes  · Respiratory ROS: negative for - cough, sputum, wheezing  · Cardiovascular ROS: Per HPI. · Gastrointestinal ROS: negative for - abdominal pain, diarrhea, nausea/vomiting, bleeding   · Genito-Urinary ROS: negative for - dysuria or incontinence  · Musculoskeletal ROS: negative for - joint swelling   · Neurological ROS: negative for - confusion, numbness/tingling, seizures, weakness  · Dermatological ROS: POSITIVE For SCAR on NECK and FACE    Physical Examination:  Vitals:    04/16/19 0833   BP: 99/67   Pulse: 70   Resp: 14     · Constitutional: Oriented. No distress. · Head: Normocephalic and atraumatic. · Mouth/Throat: Oropharynx is clear and moist.   · Eyes: Conjunctivae normal. EOM are normal.   · Neck: Neck supple. No JVD present. · Cardiovascular: Normal rate, regular rhythm, S1&S2. · Pulmonary/Chest: Bilateral respiratory sounds. No rhonchi. · Abdominal: Soft. No tenderness. · Musculoskeletal: No tenderness. No edema    · Lymphadenopathy: Has no cervical adenopathy. · Neurological: Alert and oriented. Follows command, No Gross deficit   · Skin: Skin is warm, No rash noted.    · Psychiatric: Has a normal behavior       Labs:  INR 1.32(12/16/17)  TSH 2.31 (6/2017)  AST 15 (12/5/2017)  ALT 17   (10/29/2015) Cr 0.5 (12/15/17)    ECG  4/16/2019 Sinus rhythm     Stress Echo: 5/2017   Echo (rest): Normal (LVEF >50%)   Echo (stress): Hyperkinetic (LVEF >70%)     Echo   Baseline resting echocardiogram shows normal global LV systolic function   with an ejection fraction of 60% and uniform myocardial segmental wall   motion. Following stress there was uniform augmentation of all myocardial   segments with appropriate hyperdynamic LV systolic response to stress. Difficult post exercise imaging. ECG   Normal (Negative) response to exercise. Symptoms   No symptoms with exercise. Echo Exercise Stress 6/22/17  Conclusions      Summary   Normal stress echocardiogram study. MCOT- 6/2017   Brief AT, doubt afib    ECHO: 6/8/2015  Summary   -Normal left ventricle size, wall thickness and systolic function with an   estimated ejection fraction of 60%. -No regional wall motion abnormalities   are seen. -Moderate mitral regurgitation.   -A bubble study was performed and fails to show evidence of shunting.   -Normally functioning porcine bioprosthetic valve in aortic position. Maximum gradient of 22mmhg and a mean gradient of 14mmhg. No aortic   insufficiency. -Moderate tricuspid regurgitation with RVSP estimated at 37 mmHg. Echo: 1/28/2019:   Normal left ventricle size, wall thickness, and systolic function with an   estimated ejection fraction of 60-65%.   No regional wall motion abnormalities are seen.   Normal diastolic function.   The bioprosthetic artificial aortic valve appears well seated with a maximum   velocity of 2.27 m/s, a maximum pressure gradient of 21 mmHg, and a mean   gradient of 12 mmHg.   There is at least moderate severity, centrally-directed mitral regurgitation   noted.   There is moderate tricuspid regurgitation with a PASP estimation of 35-40   mmHg.   The right atrium is mildly dilated.     Assessment:   Patient Active Problem List    Diagnosis Date Noted    S/P AVR      Priority: High  Chronic obstructive pulmonary disease ([facility] [address] 02/12/2019    Sensorineural hearing loss (SNHL) of both ears 01/29/2019    History of tobacco abuse 04/27/2018    Coagulopathy ([address] 12/14/2017    Squamous 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) 12/14/2017    Severe protein-calorie malnutrition ([facility] [address] 12/14/2017    Chronic pain due to neoplasm 12/14/2017    Refusal of blood transfusions as patient is [employer] 12/14/2017    Chronic bilateral low back pain without sciatica 05/22/2017    Osteoarthritis of cervical spine 03/21/2016    Primary osteoarthritis of both knees 03/21/2016    Obstructive sleep apnea syndrome     Transient ischemic attack (TIA)     Chronic ischemic heart disease     Essential hypertension     H/O aortic valve repair 02/27/2015    Mixed hyperlipidemia 02/27/2015        Plan:      - Palpitations:    Patient continues having episodes of palpitation, intermittent, lasting for a few minutes and is associated with symptoms including chest pressure. He has had outpatient Holter monitoring in the past multiple times which has been nondiagnostic. Diagnostic options including Event recorder, Loop recorder implantation, and/or EP study were discussed with patient. Risks, benefits and alternative of each treatment options were explained. All questions answered. Opted for ILR.     - Paroxysmal Atrial fibrillation:    S/p MAZE and also DELGADO ligation    Coumadin discontinued due to GI bleeding. Patient is status post left atrial appendage. MRI with no evidence of prior stroke or abnormality. He's been seen by neurology. MRI 1/26/18 normal              Remains in sinus rhythm. On asa 81mg daily   Diltiazem 120 mg daily    - 5/2017 stress echo was normal.     2 week monitor (9/6/18-9/19/18) for complaints of palpitations showed brief SVT, brief WCT, but no AF. Symptomatic triggered events correlated with SR.      - Aortic insufficiency:    S/p porcine AVR    Stable. - Moderate MR:    Reported by echocardiography. Continue follow up. Plan for repeat echo and possible CHIKIS if he has MR.     - Squamous cell carcinoma   Status post radiation and chemotherapy appears to be stable    -  CHRISTOPHER   - Intermittently uses CPAP. - The patient is counseled to follow a low salt diet to assure blood pressure remains controlled for cardiovascular risk factor modification.   - The patient is counseled to avoid excess caffeine, and energy drinks as this may exacerbated ectopy and arrhythmia. - The patient is counseled to get regular exercise 3-5 times per week to control cardiovascular risk factors. - The patient is counseled to avoid tobacco use. NOTE: This report was transcribed using voice recognition software. Every effort was made to ensure accuracy, however, inadvertent computerized transcription errors may be present. France Gan MD, MPH  [facility]   Office: [phone]      Physician Attestation: I, Dr. France Gan, confirm that the note above accurately reflects all work, treatment, procedures, and medical decision making performed by me. If you have questions, please do not hesitate to call me. I look forward to following [facility] along with you.     Sincerely,        France Gan MD

## 2019-04-22 ENCOUNTER — HOSPITAL ENCOUNTER (OUTPATIENT)
Dept: CARDIAC CATH/INVASIVE PROCEDURES | Age: 69
Discharge: HOME | End: 2019-04-22
Attending: INTERNAL MEDICINE | Admitting: INTERNAL MEDICINE
Payer: COMMERCIAL

## 2019-04-22 PROCEDURE — 33285 INSJ SUBQ CAR RHYTHM MNTR: CPT

## 2019-04-22 PROCEDURE — C1764 EVENT RECORDER, CARDIAC: HCPCS

## 2019-04-22 PROCEDURE — 33285 INSJ SUBQ CAR RHYTHM MNTR: CPT | Performed by: INTERNAL MEDICINE

## 2019-04-22 NOTE — H&P
H&P Update    I have reviewed the history and physical and examined the patient and find no relevant changes. I have reviewed with the patient and/or family the risks, benefits, and alternatives to the procedure. Pre-sedation Assessment  Patient:  Yadira Arguelles   :   1950    Intended Procedure: Loop recorder implantation     Hafsa Wing nurses notes reviewed and agreed. Medications reviewed  Allergies: Allergies:    Allergies   Allergen Reactions    Naproxen Swelling     Lips    Pt does not recognize this being an intolerance    Hydrocodone-Acetaminophen Itching   :       Pre-Procedure Assessment/Plan:  Level of Sedation Plan: Mild/local sedation  Post Procedure plan: Return to same level of care    Sav Morin MD, MPH  [place] [address]   Office: [phone]

## 2019-04-29 ENCOUNTER — NURSE ONLY (OUTPATIENT)
Dept: CARDIOLOGY CLINIC | Age: 69
End: 2019-04-29

## 2019-04-29 DIAGNOSIS — Z45.09 ENCOUNTER FOR ELECTRONIC ANALYSIS OF REVEAL EVENT RECORDER: Primary | ICD-10-CM

## 2019-05-11 ENCOUNTER — HOSPITAL ENCOUNTER (OUTPATIENT)
Dept: MRI IMAGING | Age: 69
Discharge: HOME | End: 2019-05-11
Payer: COMMERCIAL

## 2019-05-11 DIAGNOSIS — C01 CANCER OF BASE OF TONGUE (HCC): ICD-10-CM

## 2019-05-11 LAB
BUN BLDV-MCNC: 13 MG/DL (ref 7–20)
CREAT SERPL-MCNC: 0.6 MG/DL (ref 0.8–1.3)
GFR AFRICAN AMERICAN: >60
GFR NON-AFRICAN AMERICAN: >60

## 2019-05-11 PROCEDURE — 82565 ASSAY OF CREATININE: CPT

## 2019-05-11 PROCEDURE — 6360000004 HC RX CONTRAST MEDICATION: Performed by: RADIOLOGY

## 2019-05-11 PROCEDURE — 70553 MRI BRAIN STEM W/O & W/DYE: CPT

## 2019-05-11 PROCEDURE — 2580000003 HC RX 258: Performed by: RADIOLOGY

## 2019-05-11 PROCEDURE — A9579 GAD-BASE MR CONTRAST NOS,1ML: HCPCS | Performed by: RADIOLOGY

## 2019-05-11 PROCEDURE — 84520 ASSAY OF UREA NITROGEN: CPT

## 2019-05-11 PROCEDURE — 36415 COLL VENOUS BLD VENIPUNCTURE: CPT

## 2019-05-11 RX ORDER — 0.9 % SODIUM CHLORIDE 0.9 %
10 VIAL (ML) INJECTION
Status: COMPLETED | OUTPATIENT
Start: 2019-05-11 | End: 2019-05-11

## 2019-05-11 RX ADMIN — Medication 10 ML: at 09:26

## 2019-05-11 RX ADMIN — GADOTERIDOL 14 ML: 279.3 INJECTION, SOLUTION INTRAVENOUS at 09:26

## 2019-06-03 ENCOUNTER — TELEPHONE (OUTPATIENT)
Dept: CARDIOLOGY CLINIC | Age: 69
End: 2019-06-03

## 2019-06-03 ENCOUNTER — OFFICE VISIT (OUTPATIENT)
Dept: FAMILY MEDICINE CLINIC | Age: 69
End: 2019-06-03
Payer: COMMERCIAL

## 2019-06-03 VITALS
SYSTOLIC BLOOD PRESSURE: 92 MMHG | WEIGHT: 149.8 LBS | OXYGEN SATURATION: 98 % | DIASTOLIC BLOOD PRESSURE: 62 MMHG | BODY MASS INDEX: 20.89 KG/M2 | HEART RATE: 65 BPM

## 2019-06-03 DIAGNOSIS — R79.89 ABNORMAL TSH: ICD-10-CM

## 2019-06-03 DIAGNOSIS — R41.3 MEMORY LOSS: Primary | ICD-10-CM

## 2019-06-03 DIAGNOSIS — I10 ESSENTIAL HYPERTENSION: Chronic | ICD-10-CM

## 2019-06-03 DIAGNOSIS — R42 DIZZINESS: ICD-10-CM

## 2019-06-03 LAB
BUN BLDV-MCNC: 12 MG/DL (ref 7–20)
CREAT SERPL-MCNC: 0.7 MG/DL (ref 0.8–1.3)
GFR AFRICAN AMERICAN: >60
GFR NON-AFRICAN AMERICAN: >60
T4 FREE: 2.1 NG/DL (ref 0.9–1.8)
T4 TOTAL: 9 UG/DL (ref 4.5–10.9)
TSH REFLEX: 6.42 UIU/ML (ref 0.27–4.2)

## 2019-06-03 PROCEDURE — 99214 OFFICE O/P EST MOD 30 MIN: CPT | Performed by: FAMILY MEDICINE

## 2019-06-03 NOTE — TELEPHONE ENCOUNTER
Attempted to call patient to schedule to see an NP this week since RMM OOT. 's office wants him to be seen sooner that 6-18.  Please call patient and schedule an appointment with available NP

## 2019-06-03 NOTE — PROGRESS NOTES
Chief Complaint: Results (would like to discuss MRI results )       HPI:  Genaro Kumar is a 81 y.o. male with episodes of dizziness which is happening intermittently. The last episode was on  1 June. He did not use any consciousness but he felt very dizzy and experienced palpitation. He's been having these episodes in the recent past.  For which he was placed on Loop recorder and he has the  Echo cardiogram set up. On further questioning he mentions he does not drink enough water and in general his BP runs low. His recent MRI normal of the brain  He denies any chest pain    Has h/o hypothyroidism and hence we started on synthroid and he is due for recheck for the thyroid levels    Still ongoing memory issues where he forgets day to day routine   His MMSE Scores in the past were positive  ROS:  Constitutional: Negative   Respiratory: Negative for cough, chest tightness, shortness of breath and wheezing. Cardiovascular: as mentioned above  Gastrointestinal: Negative for abdominal pain, blood in stool, constipation, diarrhea, nausea and vomiting. Neurological: as mentioned above  Patient's problem list, medications, allergies, past medical, surgical, social and family histories were reviewed and updated as appropriate.      Current Outpatient Medications   Medication Sig Dispense Refill    Multiple Vitamins-Minerals (THERAPEUTIC MULTIVITAMIN-MINERALS) tablet Take 1 tablet by mouth daily      levothyroxine (SYNTHROID) 25 MCG tablet TAKE 1 TABLET BY MOUTH EVERY DAY  3    diltiazem (CARDIZEM CD) 120 MG extended release capsule TAKE 1 CAPSULE BY MOUTH DAILY 90 capsule 3    Cod Liver Oil OIL Take by mouth daily      aspirin 81 MG tablet Take 81 mg by mouth daily      meclizine (ANTIVERT) 25 MG tablet Take 25 mg by mouth 3 times daily as needed      oxyCODONE-acetaminophen (PERCOCET) 5-325 MG per tablet Take 1 tablet by mouth every 4 hours as needed 20 tablet 0    docusate sodium (COLACE) 100 MG capsule Take 100 mg by mouth 2 times daily.  omeprazole (PRILOSEC) 40 MG delayed release capsule Take 40 mg by mouth daily      diazepam (VALIUM) 5 MG tablet Take 5 mg by mouth every 6 hours as needed for Anxiety      cyclobenzaprine (FLEXERIL) 10 MG tablet Prn  0     No current facility-administered medications for this visit. Social History     Tobacco Use    Smoking status: Former Smoker     Packs/day: 1.00     Years: 40.00     Pack years: 40.00     Types: Cigarettes     Start date: 1975     Last attempt to quit: 2015     Years since quittin.3    Smokeless tobacco: Never Used    Tobacco comment: Maintain cessation   Substance Use Topics    Alcohol use: No     Alcohol/week: 0.0 oz     Comment: KATELYN beer        Objective:     Vitals:    19 0908   BP: 92/62   Pulse: 65   SpO2: 98%   Weight: 149 lb 12.8 oz (67.9 kg)     Body mass index is 20.89 kg/m². Wt Readings from Last 3 Encounters:   19 149 lb 12.8 oz (67.9 kg)   19 154 lb (69.9 kg)   19 145 lb 9.6 oz (66 kg)     BP Readings from Last 3 Encounters:   19 92/62   19 99/67   19 118/76       Physical exam:  Constitutional: he is oriented to person, place, and time. he appears well-developed and well-nourished. No distress. Cardiovascular: Normal rate, regular rhythm, normal heart sounds and intact distal pulses. No murmur heard. Pulmonary/Chest: Effort normal and breath sounds normal. No stridor. No respiratory distress. he has no wheezes. he has no rales. heexhibits no tenderness. Abdominal: Soft. Bowel sounds are normal. he exhibits no distension and no mass. There is no tenderness. There is no rebound and no guarding. Neurological:he is alert and oriented to person, place, and time. he has gross neurological exam normal with normal strength and normal gait        Assessment/Plan:   1.  Memory loss  MMSE positive in the past  MRI brain no acute changes but has small vessel disease  We would refer to  neurologist   - External Referral To Neurology    2.  Dizziness  Multiple cause   Orthostatic as his BP runs low vs cardiac  We requested appointment to trace recording from his Loop recorder     3 Hypothyroidism  Advise to get the tsh checked    Trista Sutton  6/3/2019 10:45 AM

## 2019-06-05 ENCOUNTER — OFFICE VISIT (OUTPATIENT)
Dept: CARDIOLOGY CLINIC | Age: 69
End: 2019-06-05
Payer: COMMERCIAL

## 2019-06-05 ENCOUNTER — NURSE ONLY (OUTPATIENT)
Dept: CARDIOLOGY CLINIC | Age: 69
End: 2019-06-05
Payer: COMMERCIAL

## 2019-06-05 VITALS
BODY MASS INDEX: 20.13 KG/M2 | OXYGEN SATURATION: 97 % | HEART RATE: 67 BPM | DIASTOLIC BLOOD PRESSURE: 60 MMHG | WEIGHT: 143.8 LBS | SYSTOLIC BLOOD PRESSURE: 90 MMHG | HEIGHT: 71 IN

## 2019-06-05 DIAGNOSIS — Z45.09 ENCOUNTER FOR ELECTRONIC ANALYSIS OF REVEAL EVENT RECORDER: ICD-10-CM

## 2019-06-05 DIAGNOSIS — I48.0 PAF (PAROXYSMAL ATRIAL FIBRILLATION) (HCC): ICD-10-CM

## 2019-06-05 DIAGNOSIS — I35.1 NONRHEUMATIC AORTIC VALVE INSUFFICIENCY: Primary | ICD-10-CM

## 2019-06-05 DIAGNOSIS — R42 LIGHT HEADED: ICD-10-CM

## 2019-06-05 DIAGNOSIS — I10 ESSENTIAL HYPERTENSION: ICD-10-CM

## 2019-06-05 DIAGNOSIS — I49.9 CARDIAC ARRHYTHMIA, UNSPECIFIED CARDIAC ARRHYTHMIA TYPE: ICD-10-CM

## 2019-06-05 PROCEDURE — 93291 INTERROG DEV EVAL SCRMS IP: CPT | Performed by: INTERNAL MEDICINE

## 2019-06-05 PROCEDURE — 99214 OFFICE O/P EST MOD 30 MIN: CPT | Performed by: NURSE PRACTITIONER

## 2019-06-05 NOTE — PATIENT INSTRUCTIONS
Hold diltiazem 120 mg and take short acting diltiazem 30 mg three times a day (try to keep close to every 8 hrs as possible)  ~call me next week and let me know if its made any difference, if not, you'll go back on the long acting dose    Keep appt with Dr. Milagro Maddox Jun 18th

## 2019-06-05 NOTE — PROGRESS NOTES
[address]     Outpatient Follow Up Note    Pako Gonzalez is 81 y.o. male who presents today with a history of PAF s/p MAZE & DELGADO ligation; aortic insuff s/p AVR tissue Darshan '15, mod MR. His other hx includes:  squamous cell CA tongue; transient global amnesia; HSFU dc 12/16 from supratheraputic INR of 10 and bleeding from mouth and PEG site     CHIEF COMPLAINT / HPI:  Follow Up secondary to palpitations-heart racing s/p ILR. A couple of days ago, he was at the bank ready to leave and turned to talk to the teller. He suddenly developed a HA, a feeling of a downward pressure in his head and became light headed; needing to support self on the wall. He left the bank holding onto the wall as he was drifting to the left    Subjective:   He has palpations yet none since having his Loop placed. Yesterday, he was spray painting a wood fence and developed LUE arm squeeze with some chest discomfort. It lasted ~ 2 minutes. He sat down for a short time then it went away. He's been drinking water but stays dry from his radiation    There is SOB sitting described as needing to take deep breaths. He is a little SOB going up his steps but tolerates walking on flat surfaces. The patient denies orthopnea/PND. The patient does not have swelling. The patients weight is unchanged at 143.8#. These symptoms show no change since the last OV. With regard to medication therapy the patient has been compliant with prescribed regimen. They have tolerated therapy to date.      Past Medical History:   Diagnosis Date    Aortic valve insufficiency     Arthritis     Atrial fibrillation ([facility] [address]     Cancer ([facility] 09/2017    head and neck    Hearing loss     Heart disease     Obstructive apnea does not use CPAP    Thyroid disease      Social History:    Social History     Tobacco Use   Smoking Status Former Smoker    Packs/day: 1.00    Years: 40.00    Pack years: 40.00    Types: Cigarettes    Start date: 1/1/1975    Last attempt to quit: 2015    Years since quittin.3   Smokeless Tobacco Never Used   Tobacco Comment    Maintain cessation     Current Medications:  Current Outpatient Medications   Medication Sig Dispense Refill    Multiple Vitamins-Minerals (THERAPEUTIC MULTIVITAMIN-MINERALS) tablet Take 1 tablet by mouth daily      levothyroxine (SYNTHROID) 25 MCG tablet TAKE 1 TABLET BY MOUTH EVERY DAY  3    diltiazem (CARDIZEM CD) 120 MG extended release capsule TAKE 1 CAPSULE BY MOUTH DAILY 90 capsule 3    omeprazole (PRILOSEC) 40 MG delayed release capsule Take 40 mg by mouth daily      Cod Liver Oil OIL Take by mouth daily      aspirin 81 MG tablet Take 81 mg by mouth daily      diazepam (VALIUM) 5 MG tablet Take 5 mg by mouth every 6 hours as needed for Anxiety      meclizine (ANTIVERT) 25 MG tablet Take 25 mg by mouth 3 times daily as needed      cyclobenzaprine (FLEXERIL) 10 MG tablet Prn  0    oxyCODONE-acetaminophen (PERCOCET) 5-325 MG per tablet Take 1 tablet by mouth every 4 hours as needed 20 tablet 0    docusate sodium (COLACE) 100 MG capsule Take 100 mg by mouth 2 times daily. No current facility-administered medications for this visit. REVIEW OF SYSTEMS:    CONSTITUTIONAL: No major weight gain or loss, fatigue, weakness, night sweats or fever. HEENT: No new vision difficulties or ringing in the ears. RESPIRATORY: No new SOB, PND, orthopnea or cough. CARDIOVASCULAR: See HPI  GI: No nausea, vomiting, diarrhea, constipation, abdominal pain or changes in bowel habits. : No urinary frequency, urgency, incontinence hematuria or dysuria. SKIN: No cyanosis or skin lesions. MUSCULOSKELETAL: No new muscle or joint pain. NEUROLOGICAL: No syncope or TIA-like symptoms.   PSYCHIATRIC: No anxiety, pain, insomnia or depression    Objective:   PHYSICAL EXAM:    Vitals:    19 0937 19 1008 19 1012   BP: 96/64 90/60 90/60   Site: Left Upper Arm Right Upper Arm    Position: Sitting Sitting Standing   Cuff Size: Medium Adult Medium Adult    Pulse: 67     SpO2: 97%     Weight: 143 lb 12.8 oz (65.2 kg)     Height: 5' 11\" (1.803 m)           VITALS:  BP 96/64 (Site: Left Upper Arm, Position: Sitting, Cuff Size: Medium Adult)   Pulse 67   Ht 5' 11\" (1.803 m)   Wt 143 lb 12.8 oz (65.2 kg)   SpO2 97%   BMI 20.06 kg/m²   CONSTITUTIONAL: Cooperative, no apparent distress, and appears well nourished / developed  NEUROLOGIC:  Awake and orientated to person, place and time. PSYCH: Calm affect. SKIN: Warm and dry. HEENT: Sclera non-icteric, normocephalic, neck supple, no elevation of JVP, normal carotid pulses with no bruits and thyroid normal size. LUNGS:  No increased work of breathing and clear to auscultation, no crackles or wheezing  CARDIOVASCULAR:  Regular rate 72 and rhythm with no murmurs, gallops, rubs, or abnormal heart sounds, normal PMI. The apical impulses not displaced  JVP less than 8 cm H2O  Heart tones are crisp and normal  Cervical veins are not engorged  The carotid upstroke is normal in amplitude and contour without delay or bruit  JVP is not elevated  ABDOMEN:  Normal bowel sounds, non-distended and non-tender to palpation  EXT: No edema, no calf tenderness. Pulses are present bilaterally.     DATA:    Lab Results   Component Value Date    ALT 15 12/14/2017    AST 18 12/14/2017    ALKPHOS 55 12/14/2017    BILITOT 0.7 12/14/2017     Lab Results   Component Value Date    CREATININE 0.7 (L) 06/03/2019    BUN 12 06/03/2019     01/26/2018    K 3.9 01/26/2018     01/26/2018    CO2 29 01/26/2018     Lab Results   Component Value Date    TSH 2.31 06/13/2017    C1CMJHE 9.0 06/03/2019     Lab Results   Component Value Date    WBC 5.1 12/15/2017    HGB 13.9 12/15/2017    HCT 40.0 (L) 12/15/2017    MCV 91.1 12/15/2017     12/15/2017     No components found for: CHLPL  Lab Results   Component Value Date    TRIG 80 09/29/2018    TRIG 81 10/29/2015    TRIG 103 ~continues to have episodes associated with HA and pressure  ~neg orthostatic BP today   ~device interrogation unremarkable for arrhythmia ; tachycardic at 154 for 9 seconds on 6/2  ~has been referred for a neuro evaluation by PCP         I had the opportunity to review the clinical symptoms and presentation of Matteo Torres. Plan:     1. EKG: sinus rhythm 62  2. Trial of diltiazem 30 mg [id number] (may allow for a higher BP yet control PAF)   ~he will call after one week; if no better, he will resume long acting dilt at 120 mg daily  3. F/U as scheduled : echo (moderately severe MR) and OV 6/18    Overall the patient is stable from CV standpoint    I have addresed the patient's cardiac risk factors and adjusted pharmacologic treatment as needed. In addition, I have reinforced the need for patient directed risk factor modification. Further evaluation will be based upon the patient's clinical course and testing results. All questions and concerns were addressed to the patient. Alternatives to my treatment were discussed. The patient is not currently smoking. The risks related to smoking were reviewed with the patient. Recommend maintaining a smoke-free lifestyle. Patient is not on a beta-blocker : neg MI  Patient is not on an ace-i/ARB : neg CHF  Patient is not on a statin : neg CAD     Antiplatelet therapy has been recommended / prescribed for this patient. Education conducted on adverse reactions including bleeding was discussed. The patient verbalizes understanding not to stop medications without discussing with us. Discussed exercise: 30-60 minutes 7 days/week  Discussed  diet. Thank you for allowing to us to participate in the care of Matteo Torres.     ISIDRA Roberson    Documentation of today's visit sent to PCP

## 2019-06-10 ENCOUNTER — TELEPHONE (OUTPATIENT)
Dept: FAMILY MEDICINE CLINIC | Age: 69
End: 2019-06-10

## 2019-06-10 DIAGNOSIS — G89.3 CHRONIC PAIN DUE TO NEOPLASM: ICD-10-CM

## 2019-06-10 DIAGNOSIS — C76.0 SQUAMOUS CELL CARCINOMA OF HEAD AND NECK (HCC): Primary | ICD-10-CM

## 2019-06-10 NOTE — TELEPHONE ENCOUNTER
Patient requesting a medication refill.   Medication oxyCODONE-acetaminophen (PERCOCET) 5-325 MG per tablet  Pharmacy [facility] [address]  Last office visit: 2/26/19  Next office visit: Visit date not found

## 2019-06-10 NOTE — TELEPHONE ENCOUNTER
LOV: 06/03/19   In care everywhere he has been getting the from Dr. Sara Juárez.  Did not see any notes in last OV about medication

## 2019-06-11 RX ORDER — OXYCODONE HYDROCHLORIDE AND ACETAMINOPHEN 5; 325 MG/1; MG/1
1 TABLET ORAL EVERY 8 HOURS PRN
Qty: 60 TABLET | Refills: 0 | Status: SHIPPED | OUTPATIENT
Start: 2019-06-11 | End: 2019-07-08 | Stop reason: SDUPTHER

## 2019-06-12 ENCOUNTER — TELEPHONE (OUTPATIENT)
Dept: FAMILY MEDICINE CLINIC | Age: 69
End: 2019-06-12

## 2019-06-12 NOTE — TELEPHONE ENCOUNTER
Neurology referral from 06/03/19 states Dr. Betsy Betancourt. Would you like pt to see him or is anyone ok?

## 2019-06-12 NOTE — TELEPHONE ENCOUNTER
Patient called upset and said that Dr. Sofie Paz told him that because he may have alzheimer's that she would call and schedule an appointment with a neurologist and send a referral for him. Advised we are just showing a referral that we can send to where he'd like. He wants to know if there is anywhere he would like to go? Please advise how you would like to handle.

## 2019-06-14 NOTE — TELEPHONE ENCOUNTER
He wanted to get evaluated for alzheimers disease  He can go to any one he likes.    Either in  or [facility] neurologist  Give him both information  And also he can see any one after calling his insurance

## 2019-06-14 NOTE — PROGRESS NOTES
Allergies   Allergen Reactions    Naproxen Swelling     Lips    Pt does not recognize this being an intolerance    Hydrocodone-Acetaminophen Itching       Medication:  Current Outpatient Medications   Medication Sig Dispense Refill    diltiazem (CARDIZEM) 30 MG tablet Take 1 tablet by mouth 2 times daily 90 tablet 1    oxyCODONE-acetaminophen (PERCOCET) 5-325 MG per tablet Take 1 tablet by mouth every 8 hours as needed for Pain for up to 30 days. 60 tablet 0    Multiple Vitamins-Minerals (THERAPEUTIC MULTIVITAMIN-MINERALS) tablet Take 1 tablet by mouth daily      levothyroxine (SYNTHROID) 25 MCG tablet TAKE 1 TABLET BY MOUTH EVERY DAY  3    Cod Liver Oil OIL Take by mouth daily      aspirin 81 MG tablet Take 81 mg by mouth daily      diazepam (VALIUM) 5 MG tablet Take 5 mg by mouth every 6 hours as needed for Anxiety      meclizine (ANTIVERT) 25 MG tablet Take 25 mg by mouth 3 times daily as needed      cyclobenzaprine (FLEXERIL) 10 MG tablet Prn  0    docusate sodium (COLACE) 100 MG capsule Take 100 mg by mouth 2 times daily. No current facility-administered medications for this visit. Social History:  Reviewed. reports that he quit smoking about 4 years ago. His smoking use included cigarettes. He started smoking about 44 years ago. He has a 40.00 pack-year smoking history. He has never used smokeless tobacco. He reports that he does not drink alcohol or use drugs. Family History:  Reviewed. family history includes Heart Disease in an other family member.      Review of System:    · General ROS: negative for - chills, fever   · Psychological ROS: negative for - anxiety or depression  · Ophthalmic ROS: negative for - eye pain or loss of vision  · ENT ROS: negative for - headaches, sore throat   · Allergy and Immunology ROS: negative for - hives  · Hematological and Lymphatic ROS: negative for - bleeding problems      · Endocrine ROS: negative for - skin changes, temperature intolerance or unexpected weight changes  · Respiratory ROS: negative for - cough, sputum, wheezing  · Cardiovascular ROS: Per HPI. · Gastrointestinal ROS: negative for - abdominal pain, diarrhea, nausea/vomiting, bleeding   · Genito-Urinary ROS: negative for - dysuria or incontinence  · Musculoskeletal ROS: negative for - joint swelling   · Neurological ROS: negative for - confusion, numbness/tingling, seizures, weakness  · Dermatological ROS: POSITIVE For SCAR on NECK and FACE    Physical Examination:  Vitals:    06/18/19 0812   BP: 98/66   Pulse: 61   Resp: 16   · Constitutional: Oriented. No distress. · Head: Normocephalic and atraumatic. · Mouth/Throat: Oropharynx is clear and moist.   · Eyes: Conjunctivae normal. EOM are normal.   · Neck: Neck supple. No JVD present. · Cardiovascular: Normal rate, regular rhythm, S1&S2. · Pulmonary/Chest: Bilateral respiratory sounds. No rhonchi. · Abdominal: Soft. No tenderness. · Musculoskeletal: No tenderness. No edema    · Lymphadenopathy: Has no cervical adenopathy. · Neurological: Alert and oriented. Follows command, No Gross deficit   · Skin: Skin is warm, No rash noted.    · Psychiatric: Has a normal behavior     Labs:   Lab Results   Component Value Date    TSHREFLEX 6.42 06/03/2019    TSH 2.31 06/13/2017    CREATININE 0.7 06/03/2019    CREATININE 0.6 05/11/2019    AST 18 12/14/2017    ALT 15 12/14/2017       ECG  6/18/2019 Sinus rhythm  QTcH 400     MCOT: 9/6 - 9/19/18:                    1st degree HB, HT 68                                        PVC burden <1%          PAC burden 1%                                  VT 11 bts at 118                                                            MCOT- 6/2017    Brief AT, doubt afib      Echo: 1/28/2019:   Normal left ventricle size, wall thickness, and systolic function with an   estimated ejection fraction of 60-65%.   No regional wall motion abnormalities are seen.   Normal diastolic function.   The bioprosthetic artificial aortic valve appears well seated with a maximum   velocity of 2.27 m/s, a maximum pressure gradient of 21 mmHg, and a mean   gradient of 12 mmHg.   There is at least moderate severity, centrally-directed mitral regurgitation   noted.   There is moderate tricuspid regurgitation with a PASP estimation of 35-40   mmHg.   The right atrium is mildly dilated. Stress Echo: 6/22/2017  Summary  Normal stress Echo    Echo: 6/8/2015  Summary   -Normal left ventricle size, wall thickness and systolic function with an   estimated ejection fraction of 60%. -No regional wall motion abnormalities   are seen.   -Moderate mitral regurgitation.   -A bubble study was performed and fails to show evidence of shunting.   -Normally functioning porcine bioprosthetic valve in aortic position.   Maximum gradient of 22mmhg and a mean gradient of 14mmhg.  No aortic   insufficiency.   -Moderate tricuspid regurgitation with RVSP estimated at 37 mmHg.       Assessment:   Patient Active Problem List    Diagnosis Date Noted    S/P AVR      Priority: High    Encounter for electronic analysis of reveal event recorder 04/29/2019    Palpitation     Cardiac arrhythmia     Chronic obstructive pulmonary disease ([address] 02/12/2019    Sensorineural hearing loss (SNHL) of both ears 01/29/2019    History of tobacco abuse 04/27/2018    Coagulopathy ([address] 12/14/2017    Squamous cell carcinoma of head and neck ([address] 12/14/2017    Severe protein-calorie malnutrition ([address] 12/14/2017    Chronic pain due to neoplasm 12/14/2017    Refusal of blood transfusions as patient is [employer] 12/14/2017    Chronic bilateral low back pain without sciatica 05/22/2017    Osteoarthritis of cervical spine 03/21/2016    Primary osteoarthritis of both knees 03/21/2016    Obstructive sleep apnea syndrome     Transient ischemic attack (TIA)     Chronic ischemic heart disease     Essential hypertension     H/O aortic valve repair 02/27/2015    Mixed hyperlipidemia 02/27/2015       1. Palpitations:    Etiology unknown. Status post loop recorder. Interrogation of loop recorder today showed no evidence of arrhythmia. Differential diagnosis include paroxysmal atrial fibrillation, or other arrhythmia   Continue with ILR monitoring. He was bradycardic. Discontinue long-acting Cardizem. Reduce short acting diltiazem to 30 mg twice daily. May consider discontinuation of Cardizem if he has episodes of bradycardia    2. Paroxysmal Atrial fibrillation:    S/p MAZE and also DELGADO ligation    Coumadin discontinued due to GI bleeding. Remains in sinus rhythm. 3.  Aortic insufficiency:    S/p porcine AVR     Stable. 4.  Moderate MR:    Reported by echocardiography. Echo pending . 5. Squamous cell carcinoma   Status post radiation and chemotherapy appears to be stable    6. CHRISTOPHER   - CPAP. Scribe attestation: This note was scribed in the presence of Sabrina Au MD by Shasha Singer RN    Physician Attestation: I, Dr. Sabrina Au, confirm that the scribe's documentation has been prepared under my direction and personally reviewed by me in its entirety. I also confirm that the note above accurately reflects all work, treatment, procedures, and medical decision making performed by me. NOTE: This report was transcribed using voice recognition software. Every effort was made to ensure accuracy, however, inadvertent computerized transcription errors may be present.       Sabrina Au MD, MPH  [facility]   Office: [phone]

## 2019-06-18 ENCOUNTER — HOSPITAL ENCOUNTER (OUTPATIENT)
Dept: NON INVASIVE DIAGNOSTICS | Age: 69
Discharge: HOME | End: 2019-06-18
Payer: COMMERCIAL

## 2019-06-18 ENCOUNTER — PROCEDURE VISIT (OUTPATIENT)
Dept: CARDIOLOGY CLINIC | Age: 69
End: 2019-06-18
Payer: COMMERCIAL

## 2019-06-18 ENCOUNTER — OFFICE VISIT (OUTPATIENT)
Dept: CARDIOLOGY CLINIC | Age: 69
End: 2019-06-18
Payer: COMMERCIAL

## 2019-06-18 VITALS
SYSTOLIC BLOOD PRESSURE: 98 MMHG | DIASTOLIC BLOOD PRESSURE: 66 MMHG | BODY MASS INDEX: 20.3 KG/M2 | WEIGHT: 145 LBS | HEIGHT: 71 IN | HEART RATE: 61 BPM | RESPIRATION RATE: 16 BRPM

## 2019-06-18 DIAGNOSIS — Z45.09 ENCOUNTER FOR ELECTRONIC ANALYSIS OF REVEAL EVENT RECORDER: ICD-10-CM

## 2019-06-18 DIAGNOSIS — R00.2 PALPITATION: ICD-10-CM

## 2019-06-18 DIAGNOSIS — I48.0 PAF (PAROXYSMAL ATRIAL FIBRILLATION) (HCC): Primary | ICD-10-CM

## 2019-06-18 LAB
LV EF: 63 %
LVEF MODALITY: NORMAL

## 2019-06-18 PROCEDURE — 99214 OFFICE O/P EST MOD 30 MIN: CPT | Performed by: INTERNAL MEDICINE

## 2019-06-18 PROCEDURE — 93291 INTERROG DEV EVAL SCRMS IP: CPT | Performed by: INTERNAL MEDICINE

## 2019-06-18 PROCEDURE — 93000 ELECTROCARDIOGRAM COMPLETE: CPT | Performed by: INTERNAL MEDICINE

## 2019-06-18 PROCEDURE — 93306 TTE W/DOPPLER COMPLETE: CPT

## 2019-06-28 ENCOUNTER — OFFICE VISIT (OUTPATIENT)
Dept: FAMILY MEDICINE CLINIC | Age: 69
End: 2019-06-28
Payer: COMMERCIAL

## 2019-06-28 VITALS
OXYGEN SATURATION: 94 % | HEIGHT: 71 IN | BODY MASS INDEX: 20.47 KG/M2 | SYSTOLIC BLOOD PRESSURE: 92 MMHG | WEIGHT: 146.2 LBS | TEMPERATURE: 98.2 F | DIASTOLIC BLOOD PRESSURE: 66 MMHG | HEART RATE: 73 BPM

## 2019-06-28 DIAGNOSIS — Z00.00 WELL ADULT EXAM: Primary | ICD-10-CM

## 2019-06-28 DIAGNOSIS — E03.9 ACQUIRED HYPOTHYROIDISM: ICD-10-CM

## 2019-06-28 DIAGNOSIS — I10 ESSENTIAL HYPERTENSION: ICD-10-CM

## 2019-06-28 PROCEDURE — G0439 PPPS, SUBSEQ VISIT: HCPCS | Performed by: FAMILY MEDICINE

## 2019-06-28 RX ORDER — LEVOTHYROXINE SODIUM 0.05 MG/1
50 TABLET ORAL DAILY
Qty: 30 TABLET | Refills: 0 | Status: SHIPPED | OUTPATIENT
Start: 2019-06-28 | End: 2019-07-29 | Stop reason: SDUPTHER

## 2019-06-28 RX ORDER — NORTRIPTYLINE HYDROCHLORIDE 10 MG/1
CAPSULE ORAL
COMMUNITY
Start: 2019-06-26 | End: 2019-11-08

## 2019-06-28 NOTE — PROGRESS NOTES
Medicare Annual Wellness Visit  Name: Lindsey Reeves Date: 2019   MRN: [id number] Sex: Male   Age: 81 y.o. 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 and had the surgery and chemo and radiotherapy and in AF which is rate controlled and not on blood thinners due to bleeding risk  And he was dizzy and feels better since reducing the dose of diltiazem to 30 mg bid    He has hypothyroidism and currently taking synthroid 25 mcg daily    Has problems with headache and depression and seen cardiologist and was prescribed pamelor 10 mg daily  And since then feeling very tired and sleepy and he needs to push himself to get things done    Screenings for behavioral, psychosocial and functional/safety risks, and cognitive dysfunction were assessed and he is getting evaluation for his memory concerns with neurologist    These results, as well as other patient data from the [address] form, are documented in Flowsheets linked to this Encounter. Allergies   Allergen Reactions    Naproxen Swelling     Lips    Pt does not recognize this being an intolerance    Hydrocodone-Acetaminophen Itching       Prior to Visit Medications    Medication Sig Taking? Authorizing Provider   nortriptyline (PAMELOR) 10 MG capsule  Yes Historical Provider, MD   levothyroxine (SYNTHROID) 50 MCG tablet Take 1 tablet by mouth Daily Yes Elizabeth Daly MD   diltiazem (CARDIZEM) 30 MG tablet Take 1 tablet by mouth 2 times daily Yes Ginger Mathur MD   oxyCODONE-acetaminophen (PERCOCET) 5-325 MG per tablet Take 1 tablet by mouth every 8 hours as needed for Pain for up to 30 days.  Yes Elizabeth Daly MD   Multiple Vitamins-Minerals (THERAPEUTIC MULTIVITAMIN-MINERALS) tablet Take 1 tablet by mouth daily Yes Historical Provider, MD   [facility] Liver Oil OIL Take by mouth daily Yes Historical Provider, MD   aspirin 81 MG tablet Take 81 mg by mouth daily Yes Historical Provider, MD   diazepam (VALIUM) 5 MG tablet Take 5 mg by mouth every 6 hours as needed for Anxiety Yes Historical Provider, MD   meclizine (ANTIVERT) 25 MG tablet Take 25 mg by mouth 3 times daily as needed Yes Historical Provider, MD   cyclobenzaprine (FLEXERIL) 10 MG tablet Prn Yes Historical Provider, MD   docusate sodium (COLACE) 100 MG capsule Take 100 mg by mouth 2 times daily.  Yes Historical Provider, MD       Past Medical History:   Diagnosis Date    Aortic valve insufficiency     Arthritis     Atrial fibrillation ([facility] [address] [facility]     Cancer ([facility] [address] 09/2017    head and neck    Hearing loss     Heart disease     Obstructive apnea does not use CPAP    Thyroid disease      Past Surgical History:   Procedure Laterality Date    ANKLE SURGERY      AORTIC VALVE REPLACEMENT  1/28/15    Dr. Mast Reas 25mm Mosaic Ultra porcine valve; right and left modified maze procedure with ligation of DELGADO with Atriclip    CARDIAC CATHETERIZATION      HERNIA REPAIR      KNEE SURGERY      Arthroscopy    LUNG SURGERY      collapsed lung due to broken ribs    MANDIBLE FRACTURE SURGERY      NECK SURGERY      Fusion    TONSILLECTOMY AND ADENOIDECTOMY         Family History   Problem Relation Age of Onset    Heart Disease Other     High Blood Pressure Neg Hx     High Cholesterol Neg Hx        CareTeam (Including outside providers/suppliers regularly involved in providing care):   Patient Care Team:  Lloyd Bocanegra MD as PCP - General (Family Medicine)  Lloyd Bocanegra MD as PCP - [facility] Empaneled Provider  Ilan Andrade MD as Consulting Physician (Electrophysiology)  Nicol Green MD as Consulting Physician (Sleep Medicine)  Ilan Andrade MD as Consulting Physician (Electrophysiology)  ISIDRA Maxwell - CNP as Nurse Practitioner (Sleep Medicine)  Ruma Salomon MD as Consulting Physician (Otolaryngology)  Viola Reyes Jacobo Green MD as Consulting Physician (Otolaryngology)    Wt Readings from Last 3 Encounters:   06/28/19 146 lb 3.2 oz (66.3 kg)   06/18/19 145 lb (65.8 kg)   06/05/19 143 lb 12.8 oz (65.2 kg)     Vitals:    06/28/19 1101   BP: 92/66   Site: Left Upper Arm   Position: Sitting   Cuff Size: Medium Adult   Pulse: 73   Temp: 98.2 °F (36.8 °C)   TempSrc: Oral   SpO2: 94%   Weight: 146 lb 3.2 oz (66.3 kg)   Height: 5' 11\" (1.803 m)     Body mass index is 20.39 kg/m². Based upon direct observation of the patient, evaluation of cognition reveals remote memory intact, recent memory impaired. General Appearance: alert and oriented to person, place and time, well developed and well- nourished, in no acute distress  Skin: warm and dry, no rash or erythema  Head: normocephalic and atraumatic  Eyes: pupils equal, round, and reactive to light, extraocular eye movements intact, conjunctivae normal  ENT: tympanic membrane, external ear and ear canal normal bilaterally, nose without deformity, nasal mucosa and turbinates normal without polyps  Neck: supple and non-tender without mass, no thyromegaly or thyroid nodules, no cervical lymphadenopathy  Pulmonary/Chest: clear to auscultation bilaterally- no wheezes, rales or rhonchi, normal air movement, no respiratory distress  Cardiovascular: normal rate, regular rhythm, normal S1 and S2, no murmurs, rubs, clicks, or gallops, distal pulses intact, no carotid bruits  Abdomen: soft, non-tender, non-distended, normal bowel sounds, no masses or organomegaly  Extremities: no cyanosis, clubbing or edema  Musculoskeletal: normal range of motion, no joint swelling, deformity or tenderness  Neurologic: reflexes normal and symmetric, no cranial nerve deficit, gait, coordination and speech normal    Patient's complete Health Risk Assessment and screening values have been reviewed and are found in Flowsheets.  The following problems were reviewed today and where indicated follow up appointments were made and/or referrals ordered. Positive Risk Factor Screenings with Interventions:     No Positive Risk Factors identified today.     Personalized Preventive Plan   Current Health Maintenance Status  Immunization History   Administered Date(s) Administered    Influenza Virus Vaccine 01/25/2015, 10/30/2015    Influenza, High Dose (Fluzone 65 yrs and older) 08/27/2018, 11/01/2018    Pneumococcal Polysaccharide ([id number]) 06/07/2015    Tdap (Boostrix, Adacel) 09/07/2018        Health Maintenance   Topic Date Due    Hepatitis C screen  1950    Shingles Vaccine (1 of 2) 04/26/2002    Annual Wellness Visit (AWV)  04/26/2015    Pneumococcal 65+ years Vaccine (1 of 2 - PCV13) 04/26/2017    Potassium monitoring  01/26/2019    Low dose CT lung screening  04/07/2019    Creatinine monitoring  06/03/2020    Lipid screen  09/29/2023    Colon cancer screen colonoscopy  07/13/2027    DTaP/Tdap/Td vaccine (2 - Td) 09/07/2028    Flu vaccine  Completed    AAA screen  Completed     Recommendations for Preventive Services Due: for pneumonia 15  Shot      Well adult exam  Normal   Getting evaluated for memory concerns  And has hearing aid   And has dentures  And been good in doing his ADLs    Squamous cell carcinoma of tongue  Follows up with oncologist  And still in surveillance and finished his chemo and radiotherapy    AF   Stable     Hypothyroidism  Increase the  Dose to 50 mcg and we will repeat the tsh in 3 months

## 2019-07-08 DIAGNOSIS — G89.3 CHRONIC PAIN DUE TO NEOPLASM: ICD-10-CM

## 2019-07-08 DIAGNOSIS — C76.0 SQUAMOUS CELL CARCINOMA OF HEAD AND NECK (HCC): ICD-10-CM

## 2019-07-08 RX ORDER — OXYCODONE HYDROCHLORIDE AND ACETAMINOPHEN 5; 325 MG/1; MG/1
1 TABLET ORAL EVERY 6 HOURS PRN
Qty: 90 TABLET | Refills: 0 | Status: SHIPPED | OUTPATIENT
Start: 2019-07-08 | End: 2019-08-07

## 2019-07-15 ENCOUNTER — OFFICE VISIT (OUTPATIENT)
Dept: ENT CLINIC | Age: 69
End: 2019-07-15
Payer: COMMERCIAL

## 2019-07-15 VITALS — DIASTOLIC BLOOD PRESSURE: 66 MMHG | OXYGEN SATURATION: 98 % | SYSTOLIC BLOOD PRESSURE: 98 MMHG | HEART RATE: 94 BPM

## 2019-07-15 DIAGNOSIS — R49.0 HOARSENESS: Primary | ICD-10-CM

## 2019-07-15 PROCEDURE — 96372 THER/PROPH/DIAG INJ SC/IM: CPT | Performed by: OTOLARYNGOLOGY

## 2019-07-15 PROCEDURE — 31575 DIAGNOSTIC LARYNGOSCOPY: CPT | Performed by: OTOLARYNGOLOGY

## 2019-07-15 RX ORDER — METHYLPREDNISOLONE ACETATE 40 MG/ML
40 INJECTION, SUSPENSION INTRA-ARTICULAR; INTRALESIONAL; INTRAMUSCULAR; SOFT TISSUE ONCE
Status: COMPLETED | OUTPATIENT
Start: 2019-07-15 | End: 2019-07-15

## 2019-07-15 RX ADMIN — METHYLPREDNISOLONE ACETATE 40 MG: 40 INJECTION, SUSPENSION INTRA-ARTICULAR; INTRALESIONAL; INTRAMUSCULAR; SOFT TISSUE at 13:27

## 2019-07-29 ENCOUNTER — TELEPHONE (OUTPATIENT)
Dept: FAMILY MEDICINE CLINIC | Age: 69
End: 2019-07-29

## 2019-07-29 ENCOUNTER — TELEPHONE (OUTPATIENT)
Dept: CARDIOLOGY CLINIC | Age: 69
End: 2019-07-29

## 2019-07-29 DIAGNOSIS — E03.9 ACQUIRED HYPOTHYROIDISM: ICD-10-CM

## 2019-07-29 RX ORDER — LEVOTHYROXINE SODIUM 0.05 MG/1
50 TABLET ORAL DAILY
Qty: 30 TABLET | Refills: 3 | Status: SHIPPED | OUTPATIENT
Start: 2019-07-29 | End: 2019-07-31 | Stop reason: SDUPTHER

## 2019-07-29 NOTE — TELEPHONE ENCOUNTER
Per patient, he was told by his cardiologist office to consult with PCP regarding symptoms. Patient is experiencing numbness in L arm, dizziness, heart feels like its accelerating. This has been going on for 3 days straight, 3-4 times a day while resting. Patient is scheduled for tomorrow Tuesday 7/30 at 1:30pm with Dr. Siomara Bullard. Patient wanted me to send a message back as well. Please advise.

## 2019-07-30 ENCOUNTER — NURSE ONLY (OUTPATIENT)
Dept: CARDIOLOGY CLINIC | Age: 69
End: 2019-07-30
Payer: COMMERCIAL

## 2019-07-30 ENCOUNTER — OFFICE VISIT (OUTPATIENT)
Dept: FAMILY MEDICINE CLINIC | Age: 69
End: 2019-07-30
Payer: COMMERCIAL

## 2019-07-30 VITALS
DIASTOLIC BLOOD PRESSURE: 60 MMHG | OXYGEN SATURATION: 96 % | SYSTOLIC BLOOD PRESSURE: 90 MMHG | BODY MASS INDEX: 20.39 KG/M2 | WEIGHT: 146.2 LBS | HEART RATE: 78 BPM

## 2019-07-30 DIAGNOSIS — R00.2 PALPITATION: ICD-10-CM

## 2019-07-30 DIAGNOSIS — R00.2 PALPITATIONS: Primary | ICD-10-CM

## 2019-07-30 DIAGNOSIS — E03.9 ACQUIRED HYPOTHYROIDISM: ICD-10-CM

## 2019-07-30 DIAGNOSIS — Z45.09 ENCOUNTER FOR ELECTRONIC ANALYSIS OF REVEAL EVENT RECORDER: ICD-10-CM

## 2019-07-30 LAB
T4 FREE: 1.9 NG/DL (ref 0.9–1.8)
TSH REFLEX: 6.58 UIU/ML (ref 0.27–4.2)

## 2019-07-30 PROCEDURE — 93298 REM INTERROG DEV EVAL SCRMS: CPT | Performed by: INTERNAL MEDICINE

## 2019-07-30 PROCEDURE — 99213 OFFICE O/P EST LOW 20 MIN: CPT | Performed by: FAMILY MEDICINE

## 2019-07-30 PROCEDURE — 93299 PR REM INTERROG ICPMS/SCRMS <30 D TECH REVIEW: CPT | Performed by: INTERNAL MEDICINE

## 2019-07-30 NOTE — PROGRESS NOTES
 aspirin 81 MG tablet Take 81 mg by mouth daily      diazepam (VALIUM) 5 MG tablet Take 5 mg by mouth every 6 hours as needed for Anxiety      meclizine (ANTIVERT) 25 MG tablet Take 25 mg by mouth 3 times daily as needed      cyclobenzaprine (FLEXERIL) 10 MG tablet Prn  0    docusate sodium (COLACE) 100 MG capsule Take 100 mg by mouth 2 times daily. No current facility-administered medications for this visit. Social History     Tobacco Use    Smoking status: Former Smoker     Packs/day: 1.00     Years: 40.00     Pack years: 40.00     Types: Cigarettes     Start date: 1975     Last attempt to quit: 2015     Years since quittin.5    Smokeless tobacco: Never Used    Tobacco comment: Maintain cessation   Substance Use Topics    Alcohol use: No     Alcohol/week: 0.0 standard drinks     Comment: KATELYN reyes        Objective:     Vitals:    19 1329   BP: 90/60   Pulse: 78   SpO2: 96%   Weight: 146 lb 3.2 oz (66.3 kg)     Body mass index is 20.39 kg/m². Wt Readings from Last 3 Encounters:   19 146 lb 3.2 oz (66.3 kg)   19 146 lb 3.2 oz (66.3 kg)   19 145 lb (65.8 kg)     BP Readings from Last 3 Encounters:   19 90/60   07/15/19 98/66   19 92/66       Physical exam:  Constitutional: he is oriented to person, place, and time. he appears well-developed and well-nourished. No distress. Cardiovascular: Normal rate, regular rhythm, normal heart sounds and intact distal pulses. No murmur heard. Pulmonary/Chest: Effort normal and breath sounds normal. No stridor. No respiratory distress. he has no wheezes. he has no rales. heexhibits no tenderness. Abdominal: Soft. Bowel sounds are normal. he exhibits no distension and no mass. There is no tenderness. There is no rebound and no guarding. Psychiatric: he has a normal mood and affect. his   behavior is normal.      Assessment/Plan:   1.  Palpitations  Unsure of the cause   Could be due to anxiety  We

## 2019-07-30 NOTE — LETTER
[address] [phone]  [address] [facility] [phone]    Pacemaker/Defibrillator Clinic          08/01/19        Macarena Zaragoza  [address] [address]        Dear Tru Renner    This letter is to inform you that we received the transmission from your monitor at home that checks your pacemaker and/or defibrillator, or implanted heart monitor. Your report shows no abnormal rhythms. The next date your monitor will automatically transmit will be 9/10/19. Your device and monitor are wireless and most transmit cellularly, but please periodically check your monitor is still plugged in to the electrical outlet. If you still use the telephone land line to send please ensure the connection to the phone tomy is secure. This will help to ensure successful automatic transmissions in the future. Also, the monitor needs to be close to you while sleeping at night. Please be aware that the remote device transmission sites are periodically monitored only during regular business hours during which simultaneous in-office device clinics are being run. If your transmission requires attention, we will contact you as soon as possible. Thank you.             [facility]

## 2019-07-31 DIAGNOSIS — E03.9 ACQUIRED HYPOTHYROIDISM: ICD-10-CM

## 2019-07-31 RX ORDER — LEVOTHYROXINE SODIUM 0.07 MG/1
75 TABLET ORAL DAILY
Qty: 30 TABLET | Refills: 2 | Status: SHIPPED | OUTPATIENT
Start: 2019-07-31 | End: 2019-08-22 | Stop reason: SDUPTHER

## 2019-08-06 ENCOUNTER — OFFICE VISIT (OUTPATIENT)
Dept: ENT CLINIC | Age: 69
End: 2019-08-06
Payer: COMMERCIAL

## 2019-08-06 VITALS — DIASTOLIC BLOOD PRESSURE: 62 MMHG | HEART RATE: 74 BPM | OXYGEN SATURATION: 96 % | SYSTOLIC BLOOD PRESSURE: 106 MMHG

## 2019-08-06 DIAGNOSIS — R49.0 HOARSENESS: Primary | ICD-10-CM

## 2019-08-06 PROCEDURE — 99212 OFFICE O/P EST SF 10 MIN: CPT | Performed by: OTOLARYNGOLOGY

## 2019-08-06 RX ORDER — DIAZEPAM 5 MG/1
5 TABLET ORAL EVERY 6 HOURS PRN
Qty: 60 TABLET | Refills: 0 | OUTPATIENT
Start: 2019-08-06 | End: 2019-09-05

## 2019-08-07 ENCOUNTER — TELEPHONE (OUTPATIENT)
Dept: ENT CLINIC | Age: 69
End: 2019-08-07

## 2019-08-08 RX ORDER — HYDROXYZINE HYDROCHLORIDE 25 MG/1
25 TABLET, FILM COATED ORAL NIGHTLY
Qty: 30 TABLET | Refills: 0 | Status: SHIPPED | OUTPATIENT
Start: 2019-08-08 | End: 2019-09-02 | Stop reason: SDUPTHER

## 2019-08-19 ENCOUNTER — TELEPHONE (OUTPATIENT)
Dept: FAMILY MEDICINE CLINIC | Age: 69
End: 2019-08-19

## 2019-08-19 DIAGNOSIS — G89.3 CHRONIC PAIN DUE TO NEOPLASM: Primary | ICD-10-CM

## 2019-08-19 RX ORDER — OXYCODONE HYDROCHLORIDE AND ACETAMINOPHEN 5; 325 MG/1; MG/1
1 TABLET ORAL EVERY 8 HOURS PRN
Qty: 60 TABLET | Refills: 0 | Status: SHIPPED | OUTPATIENT
Start: 2019-08-19 | End: 2019-09-23 | Stop reason: SDUPTHER

## 2019-08-22 DIAGNOSIS — E03.9 ACQUIRED HYPOTHYROIDISM: ICD-10-CM

## 2019-08-22 RX ORDER — LEVOTHYROXINE SODIUM 0.07 MG/1
TABLET ORAL
Qty: 30 TABLET | Refills: 10 | Status: SHIPPED | OUTPATIENT
Start: 2019-08-22 | End: 2019-10-07 | Stop reason: SDUPTHER

## 2019-09-03 RX ORDER — HYDROXYZINE HYDROCHLORIDE 25 MG/1
TABLET, FILM COATED ORAL
Qty: 30 TABLET | Refills: 3 | Status: SHIPPED | OUTPATIENT
Start: 2019-09-03 | End: 2019-11-08

## 2019-09-10 ENCOUNTER — NURSE ONLY (OUTPATIENT)
Dept: CARDIOLOGY CLINIC | Age: 69
End: 2019-09-10
Payer: COMMERCIAL

## 2019-09-10 DIAGNOSIS — R00.2 PALPITATION: ICD-10-CM

## 2019-09-10 DIAGNOSIS — Z45.09 ENCOUNTER FOR ELECTRONIC ANALYSIS OF REVEAL EVENT RECORDER: ICD-10-CM

## 2019-09-10 PROCEDURE — 93298 REM INTERROG DEV EVAL SCRMS: CPT | Performed by: INTERNAL MEDICINE

## 2019-09-10 PROCEDURE — 93299 PR REM INTERROG ICPMS/SCRMS <30 D TECH REVIEW: CPT | Performed by: INTERNAL MEDICINE

## 2019-09-20 ENCOUNTER — TELEPHONE (OUTPATIENT)
Dept: FAMILY MEDICINE CLINIC | Age: 69
End: 2019-09-20

## 2019-09-20 DIAGNOSIS — G89.3 CHRONIC PAIN DUE TO NEOPLASM: ICD-10-CM

## 2019-09-23 RX ORDER — OXYCODONE HYDROCHLORIDE AND ACETAMINOPHEN 5; 325 MG/1; MG/1
1 TABLET ORAL EVERY 8 HOURS PRN
Qty: 60 TABLET | Refills: 0 | Status: SHIPPED | OUTPATIENT
Start: 2019-09-23 | End: 2019-10-24 | Stop reason: SDUPTHER

## 2019-10-07 ENCOUNTER — OFFICE VISIT (OUTPATIENT)
Dept: FAMILY MEDICINE CLINIC | Age: 69
End: 2019-10-07
Payer: COMMERCIAL

## 2019-10-07 VITALS
HEART RATE: 68 BPM | WEIGHT: 147.8 LBS | TEMPERATURE: 98.2 F | BODY MASS INDEX: 20.61 KG/M2 | SYSTOLIC BLOOD PRESSURE: 108 MMHG | DIASTOLIC BLOOD PRESSURE: 66 MMHG | OXYGEN SATURATION: 99 %

## 2019-10-07 DIAGNOSIS — E03.9 ACQUIRED HYPOTHYROIDISM: Primary | ICD-10-CM

## 2019-10-07 DIAGNOSIS — E03.9 ACQUIRED HYPOTHYROIDISM: ICD-10-CM

## 2019-10-07 DIAGNOSIS — G89.3 CHRONIC PAIN DUE TO NEOPLASM: ICD-10-CM

## 2019-10-07 PROCEDURE — 99213 OFFICE O/P EST LOW 20 MIN: CPT | Performed by: FAMILY MEDICINE

## 2019-10-07 RX ORDER — LEVOTHYROXINE SODIUM 0.1 MG/1
100 TABLET ORAL DAILY
Qty: 30 TABLET | Refills: 0 | Status: SHIPPED | OUTPATIENT
Start: 2019-10-07 | End: 2019-10-30 | Stop reason: SDUPTHER

## 2019-10-24 DIAGNOSIS — G89.3 CHRONIC PAIN DUE TO NEOPLASM: ICD-10-CM

## 2019-10-25 RX ORDER — OXYCODONE HYDROCHLORIDE AND ACETAMINOPHEN 5; 325 MG/1; MG/1
1 TABLET ORAL EVERY 8 HOURS PRN
Qty: 60 TABLET | Refills: 0 | Status: SHIPPED | OUTPATIENT
Start: 2019-10-25 | End: 2019-11-25 | Stop reason: SDUPTHER

## 2019-10-29 ENCOUNTER — NURSE ONLY (OUTPATIENT)
Dept: CARDIOLOGY CLINIC | Age: 69
End: 2019-10-29
Payer: COMMERCIAL

## 2019-10-29 DIAGNOSIS — Z45.09 ENCOUNTER FOR ELECTRONIC ANALYSIS OF REVEAL EVENT RECORDER: ICD-10-CM

## 2019-10-29 DIAGNOSIS — I49.9 CARDIAC ARRHYTHMIA, UNSPECIFIED CARDIAC ARRHYTHMIA TYPE: ICD-10-CM

## 2019-10-29 PROCEDURE — 93298 REM INTERROG DEV EVAL SCRMS: CPT | Performed by: INTERNAL MEDICINE

## 2019-10-29 PROCEDURE — 93299 PR REM INTERROG ICPMS/SCRMS <30 D TECH REVIEW: CPT | Performed by: INTERNAL MEDICINE

## 2019-10-30 DIAGNOSIS — E03.9 ACQUIRED HYPOTHYROIDISM: ICD-10-CM

## 2019-10-30 RX ORDER — LEVOTHYROXINE SODIUM 0.1 MG/1
TABLET ORAL
Qty: 30 TABLET | Refills: 0 | Status: SHIPPED | OUTPATIENT
Start: 2019-10-30 | End: 2019-11-28 | Stop reason: SDUPTHER

## 2019-11-04 ENCOUNTER — TELEPHONE (OUTPATIENT)
Dept: CARDIOLOGY CLINIC | Age: 69
End: 2019-11-04

## 2019-11-04 ENCOUNTER — OFFICE VISIT (OUTPATIENT)
Dept: FAMILY MEDICINE CLINIC | Age: 69
End: 2019-11-04
Payer: COMMERCIAL

## 2019-11-04 VITALS
SYSTOLIC BLOOD PRESSURE: 108 MMHG | TEMPERATURE: 98.6 F | WEIGHT: 149 LBS | DIASTOLIC BLOOD PRESSURE: 68 MMHG | OXYGEN SATURATION: 97 % | HEART RATE: 78 BPM | BODY MASS INDEX: 20.78 KG/M2

## 2019-11-04 DIAGNOSIS — E03.9 ACQUIRED HYPOTHYROIDISM: ICD-10-CM

## 2019-11-04 DIAGNOSIS — R07.9 CHEST PAIN, UNSPECIFIED TYPE: Primary | ICD-10-CM

## 2019-11-04 DIAGNOSIS — R00.2 PALPITATIONS: ICD-10-CM

## 2019-11-04 LAB
TSH REFLEX: 3.49 UIU/ML (ref 0.27–4.2)
VITAMIN D 25-HYDROXY: 35.5 NG/ML

## 2019-11-04 PROCEDURE — 99214 OFFICE O/P EST MOD 30 MIN: CPT | Performed by: FAMILY MEDICINE

## 2019-11-04 PROCEDURE — 93000 ELECTROCARDIOGRAM COMPLETE: CPT | Performed by: FAMILY MEDICINE

## 2019-11-08 ENCOUNTER — OFFICE VISIT (OUTPATIENT)
Dept: FAMILY MEDICINE CLINIC | Age: 69
End: 2019-11-08
Payer: COMMERCIAL

## 2019-11-08 ENCOUNTER — TELEPHONE (OUTPATIENT)
Dept: FAMILY MEDICINE CLINIC | Age: 69
End: 2019-11-08

## 2019-11-08 VITALS
WEIGHT: 150.4 LBS | TEMPERATURE: 98.4 F | HEART RATE: 76 BPM | BODY MASS INDEX: 20.98 KG/M2 | DIASTOLIC BLOOD PRESSURE: 72 MMHG | SYSTOLIC BLOOD PRESSURE: 114 MMHG | OXYGEN SATURATION: 98 %

## 2019-11-08 DIAGNOSIS — F41.9 ANXIETY: Primary | ICD-10-CM

## 2019-11-08 DIAGNOSIS — F51.01 PRIMARY INSOMNIA: ICD-10-CM

## 2019-11-08 PROCEDURE — 99213 OFFICE O/P EST LOW 20 MIN: CPT | Performed by: FAMILY MEDICINE

## 2019-11-08 RX ORDER — ZOLPIDEM TARTRATE 5 MG/1
5 TABLET ORAL NIGHTLY PRN
Qty: 30 TABLET | Refills: 0 | Status: SHIPPED | OUTPATIENT
Start: 2019-11-08 | End: 2019-12-30 | Stop reason: SDUPTHER

## 2019-11-08 RX ORDER — MIRTAZAPINE 7.5 MG/1
7.5 TABLET, FILM COATED ORAL NIGHTLY
Qty: 90 TABLET | Refills: 1 | Status: SHIPPED | OUTPATIENT
Start: 2019-11-08 | End: 2020-02-18 | Stop reason: SINTOL

## 2019-11-18 ENCOUNTER — TELEPHONE (OUTPATIENT)
Dept: FAMILY MEDICINE CLINIC | Age: 69
End: 2019-11-18

## 2019-11-25 DIAGNOSIS — G89.3 CHRONIC PAIN DUE TO NEOPLASM: ICD-10-CM

## 2019-11-25 RX ORDER — OXYCODONE HYDROCHLORIDE AND ACETAMINOPHEN 5; 325 MG/1; MG/1
1 TABLET ORAL EVERY 8 HOURS PRN
Qty: 60 TABLET | Refills: 0 | Status: SHIPPED | OUTPATIENT
Start: 2019-11-25 | End: 2019-12-30 | Stop reason: SDUPTHER

## 2019-11-28 DIAGNOSIS — E03.9 ACQUIRED HYPOTHYROIDISM: ICD-10-CM

## 2019-11-29 RX ORDER — LEVOTHYROXINE SODIUM 0.1 MG/1
TABLET ORAL
Qty: 30 TABLET | Refills: 5 | Status: SHIPPED | OUTPATIENT
Start: 2019-11-29 | End: 2020-05-26 | Stop reason: SDUPTHER

## 2019-12-03 ENCOUNTER — NURSE ONLY (OUTPATIENT)
Dept: CARDIOLOGY CLINIC | Age: 69
End: 2019-12-03
Payer: COMMERCIAL

## 2019-12-03 ENCOUNTER — OFFICE VISIT (OUTPATIENT)
Dept: CARDIOLOGY CLINIC | Age: 69
End: 2019-12-03
Payer: COMMERCIAL

## 2019-12-03 VITALS
HEART RATE: 72 BPM | HEIGHT: 71 IN | SYSTOLIC BLOOD PRESSURE: 117 MMHG | RESPIRATION RATE: 18 BRPM | BODY MASS INDEX: 21.84 KG/M2 | WEIGHT: 156 LBS | DIASTOLIC BLOOD PRESSURE: 61 MMHG

## 2019-12-03 DIAGNOSIS — R00.2 PALPITATION: ICD-10-CM

## 2019-12-03 DIAGNOSIS — I48.0 PAF (PAROXYSMAL ATRIAL FIBRILLATION) (HCC): Primary | ICD-10-CM

## 2019-12-03 DIAGNOSIS — Z95.2 S/P AVR: ICD-10-CM

## 2019-12-03 DIAGNOSIS — C76.0 SQUAMOUS CELL CARCINOMA OF HEAD AND NECK (HCC): ICD-10-CM

## 2019-12-03 DIAGNOSIS — Z45.09 ENCOUNTER FOR ELECTRONIC ANALYSIS OF REVEAL EVENT RECORDER: ICD-10-CM

## 2019-12-03 PROCEDURE — 93000 ELECTROCARDIOGRAM COMPLETE: CPT | Performed by: INTERNAL MEDICINE

## 2019-12-03 PROCEDURE — 99214 OFFICE O/P EST MOD 30 MIN: CPT | Performed by: INTERNAL MEDICINE

## 2019-12-03 PROCEDURE — 93291 INTERROG DEV EVAL SCRMS IP: CPT | Performed by: INTERNAL MEDICINE

## 2019-12-30 DIAGNOSIS — F41.9 ANXIETY: ICD-10-CM

## 2019-12-30 DIAGNOSIS — G89.3 CHRONIC PAIN DUE TO NEOPLASM: ICD-10-CM

## 2019-12-30 RX ORDER — ZOLPIDEM TARTRATE 5 MG/1
5 TABLET ORAL NIGHTLY PRN
Qty: 30 TABLET | Refills: 0 | Status: SHIPPED | OUTPATIENT
Start: 2019-12-30 | End: 2020-01-29

## 2019-12-30 RX ORDER — OXYCODONE HYDROCHLORIDE AND ACETAMINOPHEN 5; 325 MG/1; MG/1
1 TABLET ORAL EVERY 8 HOURS PRN
Qty: 60 TABLET | Refills: 0 | Status: SHIPPED | OUTPATIENT
Start: 2019-12-30 | End: 2020-02-18 | Stop reason: SDUPTHER

## 2020-01-06 NOTE — PROGRESS NOTES
[facility] remote Linq report shows symptom recordings to be NSR. Pt had 1 sinus tach recording w no symptoms. We will continue to monitor remotely.

## 2020-01-07 ENCOUNTER — NURSE ONLY (OUTPATIENT)
Dept: CARDIOLOGY CLINIC | Age: 70
End: 2020-01-07
Payer: COMMERCIAL

## 2020-01-07 PROCEDURE — 93298 REM INTERROG DEV EVAL SCRMS: CPT | Performed by: INTERNAL MEDICINE

## 2020-02-09 PROCEDURE — 93298 REM INTERROG DEV EVAL SCRMS: CPT | Performed by: INTERNAL MEDICINE

## 2020-02-09 PROCEDURE — G2066 INTER DEVC REMOTE 30D: HCPCS | Performed by: INTERNAL MEDICINE

## 2020-02-10 ENCOUNTER — NURSE ONLY (OUTPATIENT)
Dept: CARDIOLOGY CLINIC | Age: 70
End: 2020-02-10
Payer: COMMERCIAL

## 2020-02-17 NOTE — TELEPHONE ENCOUNTER
Patient requesting a medication refill.   Medication:oxyCODONE-acetaminophen (PERCOCET) 5-325 MG per tablet  Pharmacy: [address] [place] [address] 1 Shantanu Moraes  Last office visit: 11/08/19  Next office visit: Visit date not found

## 2020-02-18 ENCOUNTER — OFFICE VISIT (OUTPATIENT)
Dept: FAMILY MEDICINE CLINIC | Age: 70
End: 2020-02-18
Payer: COMMERCIAL

## 2020-02-18 VITALS
DIASTOLIC BLOOD PRESSURE: 54 MMHG | BODY MASS INDEX: 21.84 KG/M2 | HEART RATE: 85 BPM | SYSTOLIC BLOOD PRESSURE: 102 MMHG | WEIGHT: 156.6 LBS | OXYGEN SATURATION: 96 %

## 2020-02-18 PROBLEM — E43 SEVERE PROTEIN-CALORIE MALNUTRITION (HCC): Status: RESOLVED | Noted: 2017-12-14 | Resolved: 2020-02-18

## 2020-02-18 PROBLEM — D68.9 COAGULOPATHY (HCC): Status: RESOLVED | Noted: 2017-12-14 | Resolved: 2020-02-18

## 2020-02-18 PROCEDURE — 99214 OFFICE O/P EST MOD 30 MIN: CPT | Performed by: FAMILY MEDICINE

## 2020-02-18 RX ORDER — OXYCODONE HYDROCHLORIDE AND ACETAMINOPHEN 5; 325 MG/1; MG/1
1 TABLET ORAL EVERY 8 HOURS PRN
Qty: 60 TABLET | Refills: 0 | Status: SHIPPED | OUTPATIENT
Start: 2020-02-18 | End: 2020-03-18 | Stop reason: SDUPTHER

## 2020-02-18 RX ORDER — ZOLPIDEM TARTRATE 5 MG/1
5 TABLET ORAL NIGHTLY PRN
Qty: 30 TABLET | Refills: 0 | Status: SHIPPED | OUTPATIENT
Start: 2020-02-18 | End: 2020-03-03

## 2020-02-18 ASSESSMENT — PATIENT HEALTH QUESTIONNAIRE - PHQ9
SUM OF ALL RESPONSES TO PHQ9 QUESTIONS 1 & 2: 0
SUM OF ALL RESPONSES TO PHQ QUESTIONS 1-9: 0
2. FEELING DOWN, DEPRESSED OR HOPELESS: 0
SUM OF ALL RESPONSES TO PHQ QUESTIONS 1-9: 0
1. LITTLE INTEREST OR PLEASURE IN DOING THINGS: 0

## 2020-02-18 NOTE — PROGRESS NOTES
Chief Complaint: Medication Refill       HPI:  Chyna Tabares is a 81 y.o. male here follow-up of chronic medical problems. Has chronic pain attributed mostly to his lower back due to disc degeneration's and cervical neck pain and has failed epidural injections in the past.  And following that he had squamous cell carcinoma of his base of the tongue needing radiotherapy and chemo. He is in remission  Will continues to have pain mostly in his cervical neck and also lower back. Usually takes Percocet 1 in the morning trying to get out of his bed and other one as needed  Denies any side effects. He has paroxysmal atrial fibrillation  Currently rate controlled with Cardizem 30 mg daily  Not a candidate for long-term anticoagulation. Has a pacemaker. He has insomnia and has been taking Ambien that helps his sleep  If he has any taken the medication that worsens his anxiety which was since his A. fib    ROS:  Constitutional: Negative   Respiratory: Negative for cough, chest tightness, shortness of breath and wheezing. Cardiovascular: Mentioned above  Gastrointestinal: Negative for abdominal pain, blood in stool, constipation, diarrhea, nausea and vomiting. Genitourinary: Negative for difficulty urinating, flank pain, frequency, hematuria and urgency. Musculoskeletal: As mentioned above   Skin: Negative   Psychiatric/Behavioral: As mentioned above    Patient's problem list, medications, allergies, past medical, surgical, social and family histories were reviewed and updated as appropriate. Current Outpatient Medications   Medication Sig Dispense Refill    oxyCODONE-acetaminophen (PERCOCET) 5-325 MG per tablet Take 1 tablet by mouth every 8 hours as needed for Pain for up to 30 days. Intended supply: 3 days. Take lowest dose possible to manage pain 60 tablet 0    zolpidem (AMBIEN) 5 MG tablet Take 1 tablet by mouth nightly as needed for Sleep for up to 14 days.  30 tablet 0    levothyroxine (SYNTHROID) 100 MCG tablet TAKE 1 TABLET BY MOUTH EVERY DAY 30 tablet 5    diltiazem (CARDIZEM) 30 MG tablet TAKE 1 TABLET BY MOUTH TWICE A DAY 90 tablet 3    Multiple Vitamins-Minerals (THERAPEUTIC MULTIVITAMIN-MINERALS) tablet Take 1 tablet by mouth daily      Cod Liver Oil OIL Take by mouth daily      aspirin 81 MG tablet Take 81 mg by mouth daily      cyclobenzaprine (FLEXERIL) 10 MG tablet 2 times daily as needed Prn  0    docusate sodium (COLACE) 100 MG capsule Take 100 mg by mouth 2 times daily. No current facility-administered medications for this visit. Social History     Tobacco Use    Smoking status: Former Smoker     Packs/day: 1.00     Years: 40.00     Pack years: 40.00     Types: Cigarettes     Start date: 1975     Last attempt to quit: 2015     Years since quittin.0    Smokeless tobacco: Never Used    Tobacco comment: Maintain cessation   Substance Use Topics    Alcohol use: No     Alcohol/week: 0.0 standard drinks     Comment: NA beer        Objective:     Vitals:    20 0748   BP: (!) 102/54   Pulse: 85   SpO2: 96%   Weight: 156 lb 9.6 oz (71 kg)     Body mass index is 21.84 kg/m². Wt Readings from Last 3 Encounters:   20 156 lb 9.6 oz (71 kg)   19 156 lb (70.8 kg)   19 150 lb 6.4 oz (68.2 kg)     BP Readings from Last 3 Encounters:   20 (!) 102/54   19 117/61   19 114/72       Physical exam:  Constitutional: he is oriented to person, place, and time. he appears well-developed and well-nourished. No distress. Neck: Normal range of motion. No JVD present. No tracheal deviation present. No thyromegaly present. Cardiovascular: Normal rate, regular rhythm, normal heart sounds and intact distal pulses. No murmur heard. Pulmonary/Chest: Effort normal and breath sounds normal. No stridor. No respiratory distress. he has no wheezes. he has no rales. heexhibits no tenderness. Abdominal: Soft.  Bowel sounds are normal. he

## 2020-02-19 ENCOUNTER — TELEPHONE (OUTPATIENT)
Dept: CARDIOLOGY CLINIC | Age: 70
End: 2020-02-19

## 2020-02-19 NOTE — TELEPHONE ENCOUNTER
Cheryl Robins returning call, he was in the shower and couldn't get to the phone. Please call him to advise. Thank you.

## 2020-02-19 NOTE — TELEPHONE ENCOUNTER
Transmissions received. Last episode recorded is from 2/12/2020. A Tachy event lasting 10 seconds with a Median V rate of 182 bpm. Nothing recorder yesterday. Please see report under cardiology tab for more details. Please advise on other symptoms.

## 2020-02-22 LAB

## 2020-02-27 ENCOUNTER — OFFICE VISIT (OUTPATIENT)
Dept: FAMILY MEDICINE CLINIC | Age: 70
End: 2020-02-27
Payer: COMMERCIAL

## 2020-02-27 ENCOUNTER — HOSPITAL ENCOUNTER (OUTPATIENT)
Dept: GENERAL RADIOLOGY | Age: 70
Discharge: HOME | End: 2020-02-27
Payer: COMMERCIAL

## 2020-02-27 VITALS
OXYGEN SATURATION: 96 % | WEIGHT: 156.53 LBS | HEART RATE: 78 BPM | DIASTOLIC BLOOD PRESSURE: 72 MMHG | HEIGHT: 71 IN | SYSTOLIC BLOOD PRESSURE: 100 MMHG | BODY MASS INDEX: 21.91 KG/M2

## 2020-02-27 PROCEDURE — 73000 X-RAY EXAM OF COLLAR BONE: CPT

## 2020-02-27 PROCEDURE — 99213 OFFICE O/P EST LOW 20 MIN: CPT | Performed by: FAMILY MEDICINE

## 2020-03-03 ENCOUNTER — OFFICE VISIT (OUTPATIENT)
Dept: ORTHOPEDIC SURGERY | Age: 70
End: 2020-03-03
Payer: COMMERCIAL

## 2020-03-03 VITALS
SYSTOLIC BLOOD PRESSURE: 100 MMHG | DIASTOLIC BLOOD PRESSURE: 64 MMHG | HEIGHT: 71 IN | RESPIRATION RATE: 16 BRPM | WEIGHT: 156.2 LBS | HEART RATE: 76 BPM | BODY MASS INDEX: 21.87 KG/M2

## 2020-03-03 PROCEDURE — 99204 OFFICE O/P NEW MOD 45 MIN: CPT | Performed by: ORTHOPAEDIC SURGERY

## 2020-03-03 RX ORDER — METHYLPREDNISOLONE 4 MG/1
TABLET ORAL
Qty: 1 KIT | Refills: 0 | Status: SHIPPED | OUTPATIENT
Start: 2020-03-03 | End: 2020-03-09

## 2020-03-03 NOTE — PROGRESS NOTES
carcinoma of   head and neck ([facility] [address] C76.0 (ICD-10-CM); Left cervical lymphadenopathy R59.0   (ICD-10-CM)   Acuity: Unknown   Type of Exam: Unknown       FINDINGS:   PHARYNX/LARYNX:  The palatine tonsils are normal in appearance.  The tongue   is normal in appearance.  The valleculae, epiglottis, aryepiglottic folds and   pyriform sinuses appear unremarkable.  The true and false vocal cords are   normal in appearance.  No mass or abscess is seen.       SALIVARY GLANDS/THYROID:  The parotid and submandibular glands appear   unremarkable.  The thyroid gland appears unremarkable.       LYMPH NODES:  No cervical or supraclavicular lymphadenopathy is seen.       SOFT TISSUES:  No appreciable soft tissue swelling or mass is seen.       BRAIN/ORBITS/SINUSES:  The visualized portion of the intracranial contents   appear unremarkable.  The visualized portion of the orbits, paranasal sinuses   and mastoid air cells demonstrate no acute abnormality.       LUNG APICES/SUPERIOR MEDIASTINUM:  No focal consolidation is seen within the   visualized lung apices.  No superior mediastinal lymphadenopathy or mass. The visualized portion of the trachea appears unremarkable.       BONES:  No aggressive appearing lytic or blastic bony lesion.           Impression   No evidence of a mass lesion or cervical adenopathy.           Assessment & Plan:  81 y.o. male who presents with    Diagnosis Orders   1. Arthrosis of left acromioclavicular joint  XR SHOULDER LEFT (MIN 2 VIEWS)   2. Cervical radiculopathy      with occipital headaches       No orders of the defined types were placed in this encounter.       Symptoms are predominantly cervical in nature  Severe DDD in the C-spine  Likely aggravation of left upper cervical nerve roots causing current symptoms    Does have pain [facility] joint and left shoulder, but this is secondary at this time    Medrol dose pack sent to pharmacy for relief  Ice, activity modification  HEP given for his left shoulder    Spine referral given    Zafar Maldonado

## 2020-03-06 ENCOUNTER — OFFICE VISIT (OUTPATIENT)
Dept: ORTHOPEDIC SURGERY | Age: 70
End: 2020-03-06
Payer: COMMERCIAL

## 2020-03-06 ENCOUNTER — TELEPHONE (OUTPATIENT)
Dept: ORTHOPEDIC SURGERY | Age: 70
End: 2020-03-06

## 2020-03-06 VITALS
BODY MASS INDEX: 21.74 KG/M2 | HEIGHT: 71 IN | TEMPERATURE: 97.2 F | WEIGHT: 155.3 LBS | HEART RATE: 72 BPM | SYSTOLIC BLOOD PRESSURE: 99 MMHG | DIASTOLIC BLOOD PRESSURE: 68 MMHG

## 2020-03-06 PROCEDURE — 99215 OFFICE O/P EST HI 40 MIN: CPT | Performed by: PHYSICIAN ASSISTANT

## 2020-03-06 NOTE — TELEPHONE ENCOUNTER
Pt said he was to let Johnnie Espinoza know that his MRI is set for March the 11th at 11:15 and [facility].

## 2020-03-09 PROBLEM — M54.12 CERVICAL RADICULOPATHY: Status: ACTIVE | Noted: 2020-03-09

## 2020-03-09 PROBLEM — M48.02 CERVICAL STENOSIS OF SPINE: Status: ACTIVE | Noted: 2020-03-09

## 2020-03-09 PROBLEM — M54.2 NECK PAIN: Status: ACTIVE | Noted: 2020-03-09

## 2020-03-09 NOTE — PROGRESS NOTES
VALVE REPLACEMENT  1/28/15    Dr. Kelli Cruz 25mm Mosaic Ultra porcine valve; right and left modified maze procedure with ligation of [place] with [id number]      Arthroscopy    LUNG SURGERY      collapsed lung due to broken ribs    MANDIBLE FRACTURE SURGERY      NECK SURGERY      Fusion    TONSILLECTOMY AND ADENOIDECTOMY         Social History     Occupational History    Occupation: [employer]   Tobacco Use    Smoking status: Former Smoker     Packs/day: 1.00     Years: 40.00     Pack years: 40.00     Types: Cigarettes     Start date: 1975     Last attempt to quit: 2015     Years since quittin.1    Smokeless tobacco: Never Used    Tobacco comment: Maintain cessation   Substance and Sexual Activity    Alcohol use: No     Alcohol/week: 0.0 standard drinks     Comment: NA beer    Drug use: No    Sexual activity: Not on file       Current Outpatient Medications   Medication Sig Dispense Refill    methylPREDNISolone (MEDROL, SAMI,) 4 MG tablet Pain, radicular 1 kit 0    oxyCODONE-acetaminophen (PERCOCET) 5-325 MG per tablet Take 1 tablet by mouth every 8 hours as needed for Pain for up to 30 days. Intended supply: 3 days. Take lowest dose possible to manage pain 60 tablet 0    levothyroxine (SYNTHROID) 100 MCG tablet TAKE 1 TABLET BY MOUTH EVERY DAY 30 tablet 5    diltiazem (CARDIZEM) 30 MG tablet TAKE 1 TABLET BY MOUTH TWICE A DAY 90 tablet 3    Multiple Vitamins-Minerals (THERAPEUTIC MULTIVITAMIN-MINERALS) tablet Take 1 tablet by mouth daily      Cod Liver Oil OIL Take by mouth daily      aspirin 81 MG tablet Take 81 mg by mouth daily      cyclobenzaprine (FLEXERIL) 10 MG tablet 2 times daily as needed Prn  0    docusate sodium (COLACE) 100 MG capsule Take 100 mg by mouth 2 times daily. No current facility-administered medications for this visit.           Objective:     He is alert, oriented x 3, pleasant, well nourished,

## 2020-03-10 RX ORDER — DIAZEPAM 2 MG/1
2 TABLET ORAL EVERY 8 HOURS PRN
Qty: 5 TABLET | Refills: 0 | Status: SHIPPED | OUTPATIENT
Start: 2020-03-10 | End: 2020-03-11

## 2020-03-10 NOTE — TELEPHONE ENCOUNTER
Pt called due to MRI scheduled for tomorrow by his ortho , ortho doc did not send over script for 2 valium. Pt's MRI is scheduled for tomorrow morning. Pharm address on file & pt would like a call back to let him know script has been sent.

## 2020-03-11 ENCOUNTER — HOSPITAL ENCOUNTER (OUTPATIENT)
Age: 70
Discharge: HOME | End: 2020-03-11
Payer: COMMERCIAL

## 2020-03-11 ENCOUNTER — HOSPITAL ENCOUNTER (OUTPATIENT)
Dept: MRI IMAGING | Age: 70
Discharge: HOME | End: 2020-03-11
Payer: COMMERCIAL

## 2020-03-11 LAB — BUN BLDV-MCNC: 11 MG/DL (ref 7–20)

## 2020-03-11 PROCEDURE — 72156 MRI NECK SPINE W/O & W/DYE: CPT

## 2020-03-11 PROCEDURE — 84520 ASSAY OF UREA NITROGEN: CPT

## 2020-03-11 PROCEDURE — 2580000003 HC RX 258: Performed by: PHYSICIAN ASSISTANT

## 2020-03-11 PROCEDURE — A9577 INJ MULTIHANCE: HCPCS | Performed by: PHYSICIAN ASSISTANT

## 2020-03-11 PROCEDURE — 36415 COLL VENOUS BLD VENIPUNCTURE: CPT

## 2020-03-11 PROCEDURE — 6360000004 HC RX CONTRAST MEDICATION: Performed by: PHYSICIAN ASSISTANT

## 2020-03-11 RX ORDER — SODIUM CHLORIDE 0.9 % (FLUSH) 0.9 %
10 SYRINGE (ML) INJECTION ONCE
Status: COMPLETED | OUTPATIENT
Start: 2020-03-11 | End: 2020-03-11

## 2020-03-11 RX ADMIN — Medication 10 ML: at 13:18

## 2020-03-11 RX ADMIN — GADOBENATE DIMEGLUMINE 14 ML: 529 INJECTION, SOLUTION INTRAVENOUS at 13:18

## 2020-03-13 ENCOUNTER — TELEPHONE (OUTPATIENT)
Dept: FAMILY MEDICINE CLINIC | Age: 70
End: 2020-03-13

## 2020-03-13 ENCOUNTER — TELEPHONE (OUTPATIENT)
Dept: ORTHOPEDIC SURGERY | Age: 70
End: 2020-03-13

## 2020-03-16 NOTE — TELEPHONE ENCOUNTER
Impression   1. Moderate spinal canal stenosis at C3-C4 with minimal stenosis at C2-C3 and   C6-C7.   2. Extensive multilevel neural foraminal narrowing as above. 3. Loss of disc space height with endplate irregularity at C3-C4 through   C6-C7. 4. Multilevel Modic type 1 degenerative endplate changes of the cervical   spine.         Will call with results    Called to discuss results. No Answer. LMOM. He should call back and be transferred to clinic to discuss. At this time, I do not believe spinal surgery is indicated. He may benefit other therapeutic options such as epidural steroid injection, facet injection or other interventional procedures. For this reason, I am going to refer to Dr. Rolando Dumont for Interventional Physiatry Consultation for an evaluation and treatment.

## 2020-03-18 ENCOUNTER — TELEPHONE (OUTPATIENT)
Dept: FAMILY MEDICINE CLINIC | Age: 70
End: 2020-03-18

## 2020-03-18 RX ORDER — OXYCODONE HYDROCHLORIDE AND ACETAMINOPHEN 5; 325 MG/1; MG/1
1 TABLET ORAL EVERY 8 HOURS PRN
Qty: 60 TABLET | Refills: 0 | Status: SHIPPED | OUTPATIENT
Start: 2020-03-18 | End: 2020-04-17 | Stop reason: SDUPTHER

## 2020-03-18 NOTE — TELEPHONE ENCOUNTER
Medication:   Requested Prescriptions     Pending Prescriptions Disp Refills    oxyCODONE-acetaminophen (PERCOCET) 5-325 MG per tablet 60 tablet 0     Sig: Take 1 tablet by mouth every 8 hours as needed for Pain for up to 30 days. Intended supply: 3 days. Take lowest dose possible to manage pain        Last Filled:  02.18.2020 #60     Patient Phone Number: [phone] (home)     Last appt: 2/27/2020   Next appt: Visit date not found    Last OARRS:   RX Monitoring 12/30/2019   Periodic Controlled Substance Monitoring No signs of potential drug abuse or diversion identified.

## 2020-03-18 NOTE — TELEPHONE ENCOUNTER
Patient requesting a medication refill.   Medication: oxyCODONE-acetaminophen (PERCOCET) 5-325 MG per tablet   Pharmacy: [address] [place] [address] 1 Shantanu Moraes  Last office visit: 2/18/20  Next office visit: Visit date not found

## 2020-03-20 ENCOUNTER — OFFICE VISIT (OUTPATIENT)
Dept: ORTHOPEDIC SURGERY | Age: 70
End: 2020-03-20
Payer: COMMERCIAL

## 2020-03-20 VITALS
WEIGHT: 155.2 LBS | SYSTOLIC BLOOD PRESSURE: 128 MMHG | HEIGHT: 71 IN | DIASTOLIC BLOOD PRESSURE: 84 MMHG | HEART RATE: 76 BPM | BODY MASS INDEX: 21.73 KG/M2

## 2020-03-20 PROCEDURE — 64405 NJX AA&/STRD GR OCPL NRV: CPT | Performed by: PHYSICAL MEDICINE & REHABILITATION

## 2020-03-20 PROCEDURE — 99204 OFFICE O/P NEW MOD 45 MIN: CPT | Performed by: PHYSICAL MEDICINE & REHABILITATION

## 2020-03-20 RX ORDER — TRIAMCINOLONE ACETONIDE 40 MG/ML
80 INJECTION, SUSPENSION INTRA-ARTICULAR; INTRAMUSCULAR ONCE
Status: COMPLETED | OUTPATIENT
Start: 2020-03-20 | End: 2020-03-20

## 2020-03-20 RX ADMIN — TRIAMCINOLONE ACETONIDE 80 MG: 40 INJECTION, SUSPENSION INTRA-ARTICULAR; INTRAMUSCULAR at 10:20

## 2020-03-20 NOTE — PROGRESS NOTES
3/20/20 10:04 AM         NDC: 5481-5129-90   -   LIDOCAINE    Lot Number: [id number]. [id number]       Comments: B/L OCCIPITAL BLOCKS

## 2020-03-20 NOTE — PROGRESS NOTES
and affect are appropriate   · Vascular: Examination reveals no swelling and palpation reveals no tenderness in upper or lower extremities. Good capillary refill. · The lymphatic examination of the neck, axillae and groin reveals all areas to be without enlargement or induration   Sensation is intact without deficit in the upper and lower extremities to light touch and pinprick  · Coordination of the upper and lower extremities are normal.    CERVICAL EXAMINATION:  · Inspection: Local inspection shows no step-off or bruising. Cervical alignment is normal. No instability is noted. · Palpation and Percussion: No evidence of tenderness at the midline. Paraspinal tenderness is present. There is no paraspinal spasm. · Range of Motion:  limited by 25% in all planes due to pain   · Strength: 5/5 bilateral upper extremities  · Special Tests:   Spurling's and Gastelum's are negative bilaterally. · Skin:There are no rashes, ulcerations or lesions. · Reflexes: Bilaterally triceps, biceps and brachioradialis are 2+. Clonus absent bilaterally at the feet. No pathological reflexes are noted. · Gait & station:  normal, patient ambulates without assistance and no ataxia  · Additional Examinations:  · RIGHT UPPER EXTREMITY:  Inspection/examination of the right upper extremity does not show any tenderness, deformity or injury. Range of motion is normal and pain-free. There is no gross instability. There are no rashes, ulcerations or lesions. Strength and tone are normal. No atrophy or abnormal movements are noted. · LEFT UPPER EXTREMITY: Inspection/examination of the left upper extremity does not show any tenderness, deformity or injury. Range of motion is normal and pain-free. There is no gross instability. There are no rashes, ulcerations or lesions. Strength and tone are normal. No atrophy or abnormal movements are noted.   · Additional Examinations:  · RIGHT LOWER EXTREMITY: Inspection/examination of the right lower extremity does not show any tenderness, deformity or injury. Range of motion is unremarkable. There is no gross instability. There are no rashes, ulcerations or lesions. Strength and tone are normal. No atrophy or abnormal movements are noted. · LEFT LOWER EXTREMITY:  Inspection/examination of the left lower extremity does not show any tenderness, deformity or injury. Range of motion is unremarkable. There is no gross instability. There are no rashes, ulcerations or lesions. Strength and tone are normal. No atrophy or abnormal movements are noted. Diagnostic Testing:    Xrays:   I personally reviewed images of the AP and lateral of the cervical spine from 3/6/2020 showing C3-6 possible fusion and multilevel cervical DDD C2/3 and C6/7  MRI or CT:  MR Cervical 3/11/2020 shows -  which I personally reviewed shows  1. Moderate spinal canal stenosis at C3-C4 with minimal stenosis at C2-C3 and   C6-C7.   2. Extensive multilevel neural foraminal narrowing as above. 3. Loss of disc space height with endplate irregularity at C3-C4 through   C6-C7. 4. Multilevel Modic type 1 degenerative endplate changes of the cervical   spine. EMG:  None  Results for orders placed or performed during the hospital encounter of 03/11/20   BUN   Result Value Ref Range    BUN 11 7 - 20 mg/dL       Impression (Medical Decision Making):       1. Bilateral occipital neuralgia    2. Cervical radiculopathy    3. Cervical stenosis of spine    4. Cervical spondylosis without myelopathy        Plan (Medical Decision Making):    I discussed the diagnosis and the treatment options with Rashad Connollymer today. In Summary:  The various treatment options were outlined and discussed with Rashad Connollymer including:  Conservative care options: physical therapy, ice, medications, bracing, and activity modification. The indications for therapeutic injections. The indications for additional imaging/laboratory studies.   The indications for Dr. Promise Morgan.   03/20/20 10:03 AM Maddison Sharif ATC    The physical examination was performed between the patient and Dr. Promise Morgan. All counseling during the appointment was performed between the patient and provider. I, Dr. Dorinda Brady. Hector, personally performed the services described in this documentation as scribed by MARGIE Dumont in my presence and it is both accurate and complete. Rogers Lemus. Juan Samuel MD, JANELL, [facility]  Board Certified in [address] [place] [address] Certified and Fellowship Trained in [facility] ([facility])     This dictation was performed with a verbal recognition program [facility]) and it was checked for errors. It is possible that there are still dictated errors within this office note. If so, please bring any errors to my attention for an addendum. All efforts were made to ensure that this office note is accurate.

## 2020-03-23 ENCOUNTER — NURSE ONLY (OUTPATIENT)
Dept: CARDIOLOGY CLINIC | Age: 70
End: 2020-03-23
Payer: COMMERCIAL

## 2020-03-23 PROCEDURE — 93298 REM INTERROG DEV EVAL SCRMS: CPT | Performed by: INTERNAL MEDICINE

## 2020-03-23 PROCEDURE — G2066 INTER DEVC REMOTE 30D: HCPCS | Performed by: INTERNAL MEDICINE

## 2020-03-23 NOTE — LETTER
[address] [facility] [phone]  [address] [phone]    Pacemaker/Defibrillator Clinic          03/23/20        Cheryle Zaragoza  [address] [address]        Dear Jaguar Ferrer    This letter is to inform you that we received the transmission from your monitor at home that checks your implanted heart device. The next date your monitor will automatically transmit will be 4-27-20. If your report needs attention we will notify you. Your device and monitor are wireless and most transmit cellularly, but please periodically check your monitor is still plugged in to the electrical outlet. If you still use the telephone land line to send please ensure the connection to the phone tomy is secure. This will help to ensure successful automatic transmissions in the future. Also, the monitor needs to be close to you while sleeping at night. Please be aware that the remote device transmission sites are periodically monitored only during regular business hours during which simultaneous in-office device clinics are being run. If your transmission requires attention, we will contact you as soon as possible. Thank you.             Jenny [address]

## 2020-04-03 ENCOUNTER — OFFICE VISIT (OUTPATIENT)
Dept: ORTHOPEDIC SURGERY | Age: 70
End: 2020-04-03
Payer: COMMERCIAL

## 2020-04-03 VITALS
HEART RATE: 76 BPM | DIASTOLIC BLOOD PRESSURE: 84 MMHG | SYSTOLIC BLOOD PRESSURE: 128 MMHG | WEIGHT: 155.2 LBS | BODY MASS INDEX: 21.73 KG/M2 | HEIGHT: 71 IN

## 2020-04-03 PROCEDURE — 99213 OFFICE O/P EST LOW 20 MIN: CPT | Performed by: PHYSICAL MEDICINE & REHABILITATION

## 2020-04-03 NOTE — PROGRESS NOTES
extremities. · Reflexes: Bilaterally triceps, biceps and brachioradialis are 2+. Clonus absent bilaterally at the feet. No pathological reflexes are noted. · Gait & station: normal, patient ambulates without assistance and no ataxia  · Additional Examinations:  · RIGHT UPPER EXTREMITY:  Inspection/examination of the right upper extremity does not show any tenderness, deformity or injury. Range of motion is unremarkable and pain-free. There is no gross instability. There are no rashes, ulcerations or lesions. Strength and tone are normal. No atrophy or abnormal movements are noted. · LEFT UPPER EXTREMITY: Inspection/examination of the left upper extremity does not show any tenderness, deformity or injury. Range of motion is unremarkable and pain-free. There is no gross instability. There are no rashes, ulcerations or lesions. Strength and tone are normal. No atrophy or abnormal movements are noted. · Additional Examinations:  · RIGHT LOWER EXTREMITY: Inspection/examination of the right lower extremity does not show any tenderness, deformity or injury. Range of motion is normal and pain-free. There is no gross instability. There are no rashes, ulcerations or lesions. Strength and tone are normal. No atrophy or abnormal movements are noted. · LEFT LOWER EXTREMITY:  Inspection/examination of the left lower extremity does not show any tenderness, deformity or injury. Range of motion is normal and pain-free. There is no gross instability. There are no rashes, ulcerations or lesions. Strength and tone are normal. No atrophy or abnormal movements are noted. Diagnostic Testing:    No new diagnostics  Results for orders placed or performed during the hospital encounter of 03/11/20   BUN   Result Value Ref Range    BUN 11 7 - 20 mg/dL     Impression:       1. Bilateral occipital neuralgia    2.  Cervical radiculopathy    3. Cervical spondylosis without myelopathy        Plan:  Clinical Course: Above diagnoses are improving     I discussed the diagnosis and the treatment options with Otilio Perry today. In Summary:  The various treatment options were outlined and discussed with Otilio Perry including:  Conservative care options: physical therapy, ice, medications, bracing, and activity modification. The indications for therapeutic injections. The indications for additional imaging/laboratory studies. The indications for (possible future) interventions. After considering the various options discussed, Otilio Perry elected to pursue a course of treatment that includes the followin. Medications:  No further recommendations for new medications. 2. PT:  Encouraged to continue with Home exercise program.    3. Further studies: No further studies. 4. Interventional:  50% relief after occipital nerve blocks    5. Follow up:  4-6 weeks      Otilio Perry was instructed to call the office if his symptoms worsen or if new symptoms appear prior to the next scheduled visit. He is specifically instructed to contact the office between now & his scheduled appointment if he has concerns related to his condition or if he needs assistance in scheduling the above tests. He is welcome to call for an appointment sooner if he has any additional concerns or questions. IYvrose ATC, am scribing for and in the presence of Dr. Aubrey Young. 20 9:30 AM Yvrose Camara. The physical examination was performed between the patient and Dr. Aubrey Young. All counseling during the appointment was performed between the patient and the provider. Dr. Aubrey WHITAKER, personally performed the services described in this documentation as scribed by Yvrose Powell ATC in my presence and it is both accurate and complete. Sandra Bennett.  Devika Gregory MD, JANELL, [facility]  Board Certified in [address] [place] [address]  Certified and Fellowship Trained in Pain

## 2020-04-17 ENCOUNTER — TELEPHONE (OUTPATIENT)
Dept: FAMILY MEDICINE CLINIC | Age: 70
End: 2020-04-17

## 2020-04-17 RX ORDER — OXYCODONE HYDROCHLORIDE AND ACETAMINOPHEN 5; 325 MG/1; MG/1
1 TABLET ORAL EVERY 8 HOURS PRN
Qty: 60 TABLET | Refills: 0 | Status: SHIPPED | OUTPATIENT
Start: 2020-04-17 | End: 2020-05-15 | Stop reason: SDUPTHER

## 2020-04-17 NOTE — TELEPHONE ENCOUNTER
Medication:   Requested Prescriptions     Pending Prescriptions Disp Refills    oxyCODONE-acetaminophen (PERCOCET) 5-325 MG per tablet 60 tablet 0     Sig: Take 1 tablet by mouth every 8 hours as needed for Pain for up to 30 days. Intended supply: 3 days. Take lowest dose possible to manage pain        Last Filled:  03.18.2020 #60     Patient Phone Number: [phone] (home)     Last appt: 2/27/2020   Next appt: Visit date not found    Last OARRS:   RX Monitoring 12/30/2019   Periodic Controlled Substance Monitoring No signs of potential drug abuse or diversion identified.

## 2020-04-27 ENCOUNTER — NURSE ONLY (OUTPATIENT)
Dept: CARDIOLOGY CLINIC | Age: 70
End: 2020-04-27
Payer: COMMERCIAL

## 2020-04-27 PROCEDURE — 93298 REM INTERROG DEV EVAL SCRMS: CPT | Performed by: INTERNAL MEDICINE

## 2020-04-27 PROCEDURE — G2066 INTER DEVC REMOTE 30D: HCPCS | Performed by: INTERNAL MEDICINE

## 2020-04-27 NOTE — LETTER
[address] [facility] [phone]  [address] [phone]    Pacemaker/Defibrillator Clinic          04/27/20        Alivia Zaragoza  [id number] [address]        Dear Wilver Parmar    This letter is to inform you that we received the transmission from your monitor at home that checks your implanted heart device. The next date your monitor will automatically transmit will be 5-28-20. If your report needs attention we will notify you. Your device and monitor are wireless and most transmit cellularly, but please periodically check your monitor is still plugged in to the electrical outlet. If you still use the telephone land line to send please ensure the connection to the phone tomy is secure. This will help to ensure successful automatic transmissions in the future. Also, the monitor needs to be close to you while sleeping at night. Please be aware that the remote device transmission sites are periodically monitored only during regular business hours during which simultaneous in-office device clinics are being run. If your transmission requires attention, we will contact you as soon as possible. Thank you.             Jenny 83

## 2020-05-11 PROBLEM — G89.29 CHRONIC BILATERAL LOW BACK PAIN WITHOUT SCIATICA: Status: RESOLVED | Noted: 2017-05-22 | Resolved: 2020-05-11

## 2020-05-11 PROBLEM — M54.12 CERVICAL RADICULOPATHY: Status: RESOLVED | Noted: 2020-03-09 | Resolved: 2020-05-11

## 2020-05-11 PROBLEM — M54.50 CHRONIC BILATERAL LOW BACK PAIN WITHOUT SCIATICA: Status: RESOLVED | Noted: 2017-05-22 | Resolved: 2020-05-11

## 2020-05-11 PROBLEM — M54.2 NECK PAIN: Status: RESOLVED | Noted: 2020-03-09 | Resolved: 2020-05-11

## 2020-05-11 NOTE — PROGRESS NOTES
[facility]   Electrophysiology  Arianshe Jak, APRN-CNP  Attending EP: Dr. Erika Cummins    Date: 6/8/2020  I had the privilege of visiting Ilan Almaguer in the office. Chief Complaint:   Chief Complaint   Patient presents with    6 Month Follow-Up     device check    Atrial Fibrillation     History of Present Illness: History obtained from patient and medical record. Ilan Almaguer is 78 y.o. male with a past medical history of atrial fibrillation, non-obstructive CAD, CHRISTOPHER, aortic valve insufficiency, and squamous cell cancer. In 2015, he underwent AVR with porcine valve, right/left modified maze procedure with DELGADO ligation with Atriclip (1/28/15, Dr. Bar Motta). He was on coumadin, but this was stopped due to bleeding from his mouth and PEG tube. In September of 2018, a 2 week monitor showed brief SVT and WCT, but no AF. S/p ILR implant (4/22/19). -Interval history: Today, Ilan Almaguer is being seen for follow up. He is in sinus rhythm on EKG today. His loop interrogation shows short runs of atrial tachycardia and atrial fibrillation. Pt had a prolonged episode of atrial fibrillation on June 4th, which the patient felt symptoms. He felt his heart racing and more fatigued than normal. The patient is very anxious about this and the risk of stroke/blood clots since he is no longer on a blood thinner. We discussed the DELGADO ligation he had in 2015 during his AVR, but he is extremely scared about having a stroke off blood thinners. Previously on coumadin, but stopped due to GI bleeding. We had a long discussion about his loop results and the prolonged episode of atrial fibrillation, plus the risk of stroke/thromboembolism since he is off anti-coagulation.  Pt would like to resume anti-coagulation, but is concerned about coumadin due to issues with supratherapeutic INRs and bleeding in the past. We discussed that his medical therapy regimen is limited due to his low blood pressure issues and his best bet 10 MG tablet 2 times daily as needed Prn Yes Historical Provider, MD   docusate sodium (COLACE) 100 MG capsule Take 100 mg by mouth 2 times daily. Yes Historical Provider, MD   Multiple Vitamins-Minerals (THERAPEUTIC MULTIVITAMIN-MINERALS) tablet Take 1 tablet by mouth daily  Historical Provider, MD      Past Medical History:  Past Medical History:   Diagnosis Date    Aortic valve insufficiency     Arthritis     At[facility] fibrillation [facility] [address]     Cancer ([facility] 09/2017    head and neck    Hearing loss     Heart disease     Medical history reviewed with no changes     Obstructive apnea does not use CPAP    Thyroid disease      Past Surgical History:    has a past surgical history that includes Lung surgery; knee surgery; Neck surgery; hernia repair; Aortic valve replacement (1/28/15); Cardiac catheterization; Tonsillectomy and adenoidectomy; Mandible fracture surgery; Ankle surgery; and back surgery. Social History:  Reviewed. reports that he quit smoking about 5 years ago. His smoking use included cigarettes. He started smoking about 45 years ago. He has a 40.00 pack-year smoking history. He has never used smokeless tobacco. He reports that he does not drink alcohol or use drugs. Family History:  Reviewed. family history includes Heart Disease in an other family member. Review of System:  · Constitutional: Negative for fever, night sweats, chills, weight changes, or weakness  · Skin: Negative for rash, dry skin, pruritus, bruising, bleeding, blood clots, or changes in skin pigment  · HEENT: Negative for vision changes, ringing in the ears, sore throat, dysphagia, or swollen lymph nodes  · Respiratory: Reviewed in HPI  · Cardiovascular: Reviewed in HPI  · Gastrointestinal: Negative for abdominal pain, N/V/D, constipation, or black/tarry stools  · Genito-Urinary: Negative for dysuria, incontinence, urgency, or hematuria  · Musculoskeletal: Positive for back/neck pain.  Negative for joint swelling, muscle was discussed. ~ Previously on coumadin, but stopped due to GI bleeeding.     - I discussed anticoagulation to decrease the risk of thromboembolic events including stroke. Benefits and alternatives were discussed with patient. Risk of bleeding was discussed. Patient verbalized understanding. Different forms of anticoagulants including coumadin, Pradaxa, Eliquis, and Xarelto were discussed.   ~ Note: Moderate MR on past echo; will reassess if worsening will need switched to coumadin    -Patient opted to start with Xarelto 20 mg QD. Instructed to monitor for bleeding closely. May need to consider DELGADO closure with Watchman if recurrent GI bleeding occurs    - Afib risk factors including age, HTN, obesity, inactivity and CHRISTOPHER were discussed with patient. Risk factor modification recommended   ~ TSH 3.49 (11/19)       - Treatment options including cardioversion, rate control strategy, antiarrhythmics, anticoagulation and possible ablation were discussed with patient. Risks, benefits and alternative of each treatment options were explained. All questions answered    ~ Pt will consider ablation if his atrial fibrillation worsens    2. Implantable Loop Recorder  - S/p ILR insertion on 4/19  -The CIED was interrogated and programmed and I supervised and reviewed all the data. All findings and changes are in device interrogation sheat and reflect my personal interpretation and changes and is scanned to Epic  - Device shows: Short runs of AT/AF  - Follow up with device clinic as scheduled    3. Non-sustained atrial tachycardia  - Detected on loop  - Stable  - Continue CCB    4. PVCs   - Stable    ~ None on EKG today   - Asymptomatic    5. Aortic Insufficiency    - S/p AVR porcine valve right/left modified maze procedure with DELGADO ligation with Atriclip (1/28/15, Dr. Harmeet Leyva)   - Stable    6.  Mitral Regurgitation   - Moderate per echo (6/19)   - Will need repeat echo soon to assess    ~ If worsening, will need to switch to Warfarin

## 2020-05-21 LAB
ALBUMIN SERPL-MCNC: 3.9 G/DL
ALP BLD-CCNC: 46 U/L
ALT SERPL-CCNC: 8 U/L
ANION GAP SERPL CALCULATED.3IONS-SCNC: 3 MMOL/L
AST SERPL-CCNC: 16 U/L
BASOPHILS ABSOLUTE: 0.02 /ΜL
BASOPHILS RELATIVE PERCENT: 0.4 %
BILIRUB SERPL-MCNC: 1.1 MG/DL (ref 0.1–1.4)
BUN BLDV-MCNC: 10 MG/DL
CALCIUM SERPL-MCNC: 9.1 MG/DL
CHLORIDE BLD-SCNC: 108 MMOL/L
CO2: 29 MMOL/L
CREAT SERPL-MCNC: 0.65 MG/DL
EOSINOPHILS ABSOLUTE: 0.08 /ΜL
EOSINOPHILS RELATIVE PERCENT: 1.6 %
GFR CALCULATED: >60
GLUCOSE BLD-MCNC: 91 MG/DL
HCT VFR BLD CALC: 41 % (ref 41–53)
HEMOGLOBIN: 13.9 G/DL (ref 13.5–17.5)
LYMPHOCYTES ABSOLUTE: 1.03 /ΜL
LYMPHOCYTES RELATIVE PERCENT: 20.3 %
MCH RBC QN AUTO: 32.7 PG
MCHC RBC AUTO-ENTMCNC: 33.9 G/DL
MCV RBC AUTO: 96.5 FL
MONOCYTES ABSOLUTE: 0.42 /ΜL
MONOCYTES RELATIVE PERCENT: 8.3 %
NEUTROPHILS ABSOLUTE: 3.52 /ΜL
NEUTROPHILS RELATIVE PERCENT: 69.4 %
PDW BLD-RTO: 13.2 %
PLATELET # BLD: 211 K/ΜL
PMV BLD AUTO: NORMAL FL
POTASSIUM SERPL-SCNC: 4.2 MMOL/L
RBC # BLD: 4.25 10^6/ΜL
SODIUM BLD-SCNC: 140 MMOL/L
T4 TOTAL: 2.12
TOTAL PROTEIN: 6.3
TSH SERPL DL<=0.05 MIU/L-ACNC: 2.73 UIU/ML
WBC # BLD: 5.1 10^3/ML

## 2020-05-26 RX ORDER — LEVOTHYROXINE SODIUM 0.1 MG/1
TABLET ORAL
Qty: 30 TABLET | Refills: 2 | Status: SHIPPED | OUTPATIENT
Start: 2020-05-26 | End: 2020-08-18

## 2020-05-26 NOTE — TELEPHONE ENCOUNTER
Medication:   Requested Prescriptions     Pending Prescriptions Disp Refills    levothyroxine (SYNTHROID) 100 MCG tablet 30 tablet 5     Sig: TAKE 1 TABLET BY MOUTH EVERY DAY       Last Filled:  11.29.2019 #30 w/ 5 refills    Patient Phone Number: [phone] (home)     Last appt: 02.27.2020  Next appt: Visit date not found    Last Thyroid:   Lab Results   Component Value Date    TSH 2.31 06/13/2017    T4FREE 1.9 07/30/2019    D5COCZN 9.0 06/03/2019

## 2020-05-26 NOTE — TELEPHONE ENCOUNTER
Medication and Quantity requested: levothyroxine (SYNTHROID) 100 MCG tablet-QTY.  30 tablets with 5 refills         Last Visit  2/27/2020    Pharmacy and phone number updated in EPIC:  yes  [facility]  Pharmacy Fax:  [phone]

## 2020-05-28 ENCOUNTER — NURSE ONLY (OUTPATIENT)
Dept: CARDIOLOGY CLINIC | Age: 70
End: 2020-05-28
Payer: COMMERCIAL

## 2020-05-28 PROCEDURE — 93298 REM INTERROG DEV EVAL SCRMS: CPT | Performed by: INTERNAL MEDICINE

## 2020-05-28 PROCEDURE — G2066 INTER DEVC REMOTE 30D: HCPCS | Performed by: INTERNAL MEDICINE

## 2020-05-29 ENCOUNTER — OFFICE VISIT (OUTPATIENT)
Dept: ORTHOPEDIC SURGERY | Age: 70
End: 2020-05-29
Payer: COMMERCIAL

## 2020-05-29 VITALS
WEIGHT: 155.2 LBS | HEIGHT: 71 IN | SYSTOLIC BLOOD PRESSURE: 128 MMHG | DIASTOLIC BLOOD PRESSURE: 84 MMHG | BODY MASS INDEX: 21.73 KG/M2

## 2020-05-29 PROCEDURE — 99213 OFFICE O/P EST LOW 20 MIN: CPT | Performed by: PHYSICIAN ASSISTANT

## 2020-05-29 PROCEDURE — 64405 NJX AA&/STRD GR OCPL NRV: CPT | Performed by: PHYSICIAN ASSISTANT

## 2020-05-29 RX ORDER — TRIAMCINOLONE ACETONIDE 40 MG/ML
40 INJECTION, SUSPENSION INTRA-ARTICULAR; INTRAMUSCULAR ONCE
Status: COMPLETED | OUTPATIENT
Start: 2020-05-29 | End: 2020-05-29

## 2020-05-29 RX ADMIN — TRIAMCINOLONE ACETONIDE 40 MG: 40 INJECTION, SUSPENSION INTRA-ARTICULAR; INTRAMUSCULAR at 09:49

## 2020-05-29 NOTE — PROGRESS NOTES
5/29/20 9:41 AM         NDC: 2311-9435-89   -   LIDOCAINE 1%    Lot Number: [id number]       Comments: LEFT GREATER OCCIPITAL NERVE BLOCK

## 2020-05-29 NOTE — PROGRESS NOTES
Follow up: Kirstin Delarosa  1950  [id number]         Chief Complaint   Patient presents with    Neck Pain     F/u CSP; LOV 4/3/20         HISTORY OF PRESENT ILLNESS:  Mr. Veronica Oh is a 78 y.o. male returns for a follow up visit for multiple medical problems. His current presenting problems are   1. Bilateral occipital neuralgia    2. Cervical radiculopathy    3. Cervical spondylosis without myelopathy    4. Cervical stenosis of spine    . As per information/history obtained from the PADT(patient assessment and documentation tool) - He complains of pain in the neck with radiation to the head He rates the pain 5/10 and describes it as stabbing. Pain is made worse by: sitting. He denies side effects from the current pain regimen. Patient reports that since the last follow up visit the physical functioning is worse, family/social relationships are worse, mood is worse and sleep patterns are worse, and that the overall functioning is worse. Patient denies neurological bowel or bladder. Mr. Veronica Oh presents today for follow up of his ongoing neck and radiating head pain. He also notes having some left sided jaw pain today. He was last seen in April for this same issue and underwent a bilateral occipital nerve block on 3/20/20. He states doing very well since this last injection, even reporting his left shoulder and arm pain has improved. However, he now states having increased pain in his neck and head over the past 1 week. He denies any new injuries or triggers to his neck since his last visit. He reports that sitting and lying down continue to be his biggest aggravating factors. He also reports some increased neck pain with having his left arm over his head at times. 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 The patient describes that he can sit for half an hour, stand for half an hour, and walk for 10 minutes without pain.   He describes Disp: , Rfl: 0    docusate sodium (COLACE) 100 MG capsule, Take 100 mg by mouth 2 times daily. , Disp: , Rfl:   Allergies:  Naproxen and Hydrocodone-acetaminophen  Social History:    reports that he quit smoking about 5 years ago. His smoking use included cigarettes. He started smoking about 45 years ago. He has a 40.00 pack-year smoking history. He has never used smokeless tobacco. He reports that he does not drink alcohol or use drugs. Family History:   Family History   Problem Relation Age of Onset    Heart Disease Other     High Blood Pressure Neg Hx     High Cholesterol Neg Hx        REVIEW OF SYSTEMS:   CONSTITUTIONAL: Denies unexplained weight loss, fevers, chills or fatigue  NEUROLOGICAL: Denies unsteady gait or progressive weakness  MUSCULOSKELETAL: Denies joint swelling or redness  GI: Denies nausea, vomiting, diarrhea   : Denies bowel or bladder issues       PHYSICAL EXAM:    Vitals: Blood pressure 128/84, height 5' 10.98\" (1.803 m), weight 155 lb 3.3 oz (70.4 kg). GENERAL EXAM:  · General Apparence: Patient is adequately groomed with no evidence of malnutrition. · Psychiatric: Orientation: The patient is oriented to time, place and person. The patient's mood and affect are appropriate   · Vascular: Examination reveals no swelling and palpation reveals no tenderness in upper or lower extremities. Good capillary refill. · The lymphatic examination of the neck, axillae and groin reveals all areas to be without enlargement or induration  · Sensation is intact without deficit in the upper and lower extremities to light touch and pinprick  · Coordination of the upper and lower extremities are normal.    CERVICAL EXAMINATION:  · Inspection: Local inspection shows no step-off or bruising. Cervical alignment is normal. No instability is noted. · Palpation and Percussion: No evidence of tenderness at the midline. Paraspinal tenderness is present left sided with left occipital pain.  There is no

## 2020-06-06 ENCOUNTER — HOSPITAL ENCOUNTER (EMERGENCY)
Age: 70
Discharge: HOME | End: 2020-06-07
Attending: EMERGENCY MEDICINE
Payer: COMMERCIAL

## 2020-06-06 PROCEDURE — 99283 EMERGENCY DEPT VISIT LOW MDM: CPT

## 2020-06-06 PROCEDURE — 96372 THER/PROPH/DIAG INJ SC/IM: CPT

## 2020-06-06 PROCEDURE — 6360000002 HC RX W HCPCS: Performed by: EMERGENCY MEDICINE

## 2020-06-06 RX ORDER — MORPHINE SULFATE 10 MG/ML
10 INJECTION, SOLUTION INTRAMUSCULAR; INTRAVENOUS ONCE
Status: COMPLETED | OUTPATIENT
Start: 2020-06-06 | End: 2020-06-06

## 2020-06-06 RX ADMIN — MORPHINE SULFATE 10 MG: 10 INJECTION INTRAVENOUS at 23:25

## 2020-06-06 ASSESSMENT — PAIN SCALES - GENERAL
PAINLEVEL_OUTOF10: 9
PAINLEVEL_OUTOF10: 9

## 2020-06-07 VITALS
BODY MASS INDEX: 21.7 KG/M2 | HEIGHT: 71 IN | HEART RATE: 66 BPM | RESPIRATION RATE: 16 BRPM | DIASTOLIC BLOOD PRESSURE: 67 MMHG | TEMPERATURE: 97.8 F | SYSTOLIC BLOOD PRESSURE: 97 MMHG | WEIGHT: 155 LBS | OXYGEN SATURATION: 97 %

## 2020-06-07 RX ORDER — METHYLPREDNISOLONE 4 MG/1
TABLET ORAL
Qty: 1 KIT | Refills: 0 | Status: SHIPPED | OUTPATIENT
Start: 2020-06-07 | End: 2020-06-13

## 2020-06-07 ASSESSMENT — PAIN SCALES - GENERAL: PAINLEVEL_OUTOF10: 3

## 2020-06-07 ASSESSMENT — PAIN DESCRIPTION - PAIN TYPE: TYPE: ACUTE PAIN

## 2020-06-07 ASSESSMENT — PAIN DESCRIPTION - LOCATION: LOCATION: BACK

## 2020-06-07 NOTE — ED NOTES
Nursing Discharge Notes:    -Patient discharged at this time in no acute distress after verbalizing understanding of discharge instructions and need for follow up with PCP and/or specialist if one was referred.  -A copy of the AVS was reviewed with pt and/or family.  -Pt received applicable scripts which were reviewed with pt and/or family by this RN. -Pt was given the opportunity to ask questions before signing for discharge. Patient Mobility at Discharge:    -Pt left ambulatory to lobby / discharge area. -Wheelchair offered and accepted. This RN took patient to ED exit for his ex-wife to transport home. Patient Education:    Learner - Patient and/or family / caregiver. Motivation and Readiness To Learn - Medium to High  Barriers To Learning - None  Learning Preference / Provided Instructions - Both written and verbal discharge instructions.      Rahul Kuhn RN  06/07/20 [id number]

## 2020-06-08 ENCOUNTER — OFFICE VISIT (OUTPATIENT)
Dept: ORTHOPEDIC SURGERY | Age: 70
End: 2020-06-08
Payer: COMMERCIAL

## 2020-06-08 ENCOUNTER — CARE COORDINATION (OUTPATIENT)
Dept: CARE COORDINATION | Age: 70
End: 2020-06-08

## 2020-06-08 ENCOUNTER — NURSE ONLY (OUTPATIENT)
Dept: CARDIOLOGY CLINIC | Age: 70
End: 2020-06-08
Payer: COMMERCIAL

## 2020-06-08 ENCOUNTER — OFFICE VISIT (OUTPATIENT)
Dept: CARDIOLOGY CLINIC | Age: 70
End: 2020-06-08
Payer: COMMERCIAL

## 2020-06-08 VITALS
DIASTOLIC BLOOD PRESSURE: 66 MMHG | HEIGHT: 71 IN | SYSTOLIC BLOOD PRESSURE: 103 MMHG | WEIGHT: 148 LBS | BODY MASS INDEX: 20.72 KG/M2

## 2020-06-08 VITALS — TEMPERATURE: 98.2 F | WEIGHT: 147.93 LBS | BODY MASS INDEX: 20.71 KG/M2 | RESPIRATION RATE: 14 BRPM | HEIGHT: 71 IN

## 2020-06-08 PROCEDURE — 93291 INTERROG DEV EVAL SCRMS IP: CPT | Performed by: INTERNAL MEDICINE

## 2020-06-08 PROCEDURE — 99214 OFFICE O/P EST MOD 30 MIN: CPT | Performed by: NURSE PRACTITIONER

## 2020-06-08 PROCEDURE — 93000 ELECTROCARDIOGRAM COMPLETE: CPT | Performed by: NURSE PRACTITIONER

## 2020-06-08 PROCEDURE — 99214 OFFICE O/P EST MOD 30 MIN: CPT | Performed by: PHYSICIAN ASSISTANT

## 2020-06-08 RX ORDER — METHOCARBAMOL 750 MG/1
750 TABLET, FILM COATED ORAL 3 TIMES DAILY
Qty: 42 TABLET | Refills: 0 | Status: SHIPPED | OUTPATIENT
Start: 2020-06-08 | End: 2020-06-26 | Stop reason: SDUPTHER

## 2020-06-08 RX ORDER — METHYLPREDNISOLONE 4 MG/1
TABLET ORAL
COMMUNITY
Start: 2020-06-07 | End: 2020-06-13

## 2020-06-08 NOTE — PROGRESS NOTES
Follow up: Claudette Kapur  1950  [id number]         Chief Complaint   Patient presents with    Lower Back Pain     OPNP LSP     Leg Pain     OPNP R LEG          HISTORY OF PRESENT ILLNESS:  Mr. Abbi Izquierdo is a 78 y.o. male returns for a follow up visit for multiple medical problems. His current presenting problems are   1. Lumbar radiculopathy    2. DDD (degenerative disc disease), lumbar    3. Spondylosis without myelopathy or radiculopathy, lumbar region    4. Greater trochanteric bursitis of right hip    5. Pain of lumbar spine    . As per information/history obtained from the PADT(patient assessment and documentation tool) - He complains of pain in the lower back with radiation to the hips Right and upper leg Right He rates the pain 8/10 and describes it as sharp, numbness, stabbing. Pain is made worse by: hands over head, walking, sitting, lying down. He denies side effects from the current pain regimen. Patient reports that since the last follow up visit the physical functioning is worse, family/social relationships are worse, mood is worse and sleep patterns are worse, and that the overall functioning is worse. Patient denies neurological bowel or bladder. Mr. Abbi Izquierdo presents today regarding new symptoms in his low back and right leg. He notes a new onset of pain that started last week without inciting event. The patient is able to ambulate without any assistive devices however he is having a very difficult time walking due to the extreme pain in the right leg radiating from the hip and down into the right leg. The patient reports that bending over in a hot shower helps to mildly alleviate the symptoms. He wishes to discuss medication today as a form of treatment. He was seen in the ED on 6/6/2020 where no imaging was obtained. He was given a Morphine injection and discharged with a Medrol Dosepak. He also has a current medication agreement with Dr. Nabila Hopper.   The patient is also tried

## 2020-06-09 ENCOUNTER — TELEPHONE (OUTPATIENT)
Dept: CARDIOLOGY CLINIC | Age: 70
End: 2020-06-09

## 2020-06-09 ENCOUNTER — TELEPHONE (OUTPATIENT)
Dept: FAMILY MEDICINE CLINIC | Age: 70
End: 2020-06-09

## 2020-06-09 NOTE — TELEPHONE ENCOUNTER
Patient was seen yesterday and thought he was getting 2 rx's called in but only xarelto was sent to pharmacy . Was he supposed to get a second rx? Also wants NPSR to know he had a bad bout of afib last night , it should show on his monitor . He is only getting about 2 hours sleep a night because the afib is keeping him awake .

## 2020-06-09 NOTE — CARE COORDINATION
Final outreach; unable to reach. Per chart review, he has been communicating with his PCP and cardiologist. No further outreach planned.

## 2020-06-10 ENCOUNTER — TELEPHONE (OUTPATIENT)
Dept: ORTHOPEDIC SURGERY | Age: 70
End: 2020-06-10

## 2020-06-10 ENCOUNTER — OFFICE VISIT (OUTPATIENT)
Dept: FAMILY MEDICINE CLINIC | Age: 70
End: 2020-06-10
Payer: COMMERCIAL

## 2020-06-10 VITALS
SYSTOLIC BLOOD PRESSURE: 118 MMHG | HEART RATE: 72 BPM | HEIGHT: 71 IN | BODY MASS INDEX: 20.44 KG/M2 | DIASTOLIC BLOOD PRESSURE: 76 MMHG | OXYGEN SATURATION: 97 % | TEMPERATURE: 97.2 F | WEIGHT: 146 LBS

## 2020-06-10 PROCEDURE — 99213 OFFICE O/P EST LOW 20 MIN: CPT | Performed by: FAMILY MEDICINE

## 2020-06-12 ENCOUNTER — TELEPHONE (OUTPATIENT)
Dept: ORTHOPEDIC SURGERY | Age: 70
End: 2020-06-12

## 2020-06-12 ENCOUNTER — HOSPITAL ENCOUNTER (OUTPATIENT)
Dept: MRI IMAGING | Age: 70
Discharge: HOME | DRG: 310 | End: 2020-06-12
Payer: COMMERCIAL

## 2020-06-12 PROCEDURE — 72148 MRI LUMBAR SPINE W/O DYE: CPT

## 2020-06-12 RX ORDER — OXYCODONE HYDROCHLORIDE AND ACETAMINOPHEN 5; 325 MG/1; MG/1
1 TABLET ORAL EVERY 8 HOURS PRN
Qty: 60 TABLET | Refills: 0 | Status: SHIPPED | OUTPATIENT
Start: 2020-06-15 | End: 2020-07-07 | Stop reason: SDUPTHER

## 2020-06-12 NOTE — TELEPHONE ENCOUNTER
Patient called this date stating his MRI was done and wanted results - explained to him that results would not be read to him over phone. Moved his appt up one week, but stated he needed an injection in hip for sciatic nerve pain, that he has AFIB and the pain has caused it to flair back up. Would like a phone call back.  [phone]

## 2020-06-15 ENCOUNTER — TELEPHONE (OUTPATIENT)
Dept: CARDIOLOGY CLINIC | Age: 70
End: 2020-06-15

## 2020-06-15 ENCOUNTER — HOSPITAL ENCOUNTER (INPATIENT)
Age: 70
LOS: 3 days | Discharge: HOME | DRG: 310 | End: 2020-06-18
Attending: EMERGENCY MEDICINE | Admitting: INTERNAL MEDICINE
Payer: COMMERCIAL

## 2020-06-15 ENCOUNTER — APPOINTMENT (OUTPATIENT)
Dept: GENERAL RADIOLOGY | Age: 70
DRG: 310 | End: 2020-06-15
Payer: COMMERCIAL

## 2020-06-15 PROBLEM — I48.91 ATRIAL FIBRILLATION WITH RVR (HCC): Status: ACTIVE | Noted: 2020-06-15

## 2020-06-15 LAB
ANION GAP SERPL CALCULATED.3IONS-SCNC: 10 MMOL/L (ref 3–16)
BASOPHILS ABSOLUTE: 0.1 K/UL (ref 0–0.2)
BASOPHILS RELATIVE PERCENT: 1.2 %
BUN BLDV-MCNC: 12 MG/DL (ref 7–20)
CALCIUM SERPL-MCNC: 9.3 MG/DL (ref 8.3–10.6)
CHLORIDE BLD-SCNC: 101 MMOL/L (ref 99–110)
CO2: 24 MMOL/L (ref 21–32)
CREAT SERPL-MCNC: <0.5 MG/DL (ref 0.8–1.3)
EOSINOPHILS ABSOLUTE: 0 K/UL (ref 0–0.6)
EOSINOPHILS RELATIVE PERCENT: 0.6 %
GFR AFRICAN AMERICAN: >60
GFR NON-AFRICAN AMERICAN: >60
GLUCOSE BLD-MCNC: 95 MG/DL (ref 70–99)
HCT VFR BLD CALC: 45.8 % (ref 40.5–52.5)
HEMOGLOBIN: 15.5 G/DL (ref 13.5–17.5)
INR BLD: 1.11 (ref 0.86–1.14)
LYMPHOCYTES ABSOLUTE: 0.8 K/UL (ref 1–5.1)
LYMPHOCYTES RELATIVE PERCENT: 11.5 %
MCH RBC QN AUTO: 33.1 PG (ref 26–34)
MCHC RBC AUTO-ENTMCNC: 33.9 G/DL (ref 31–36)
MCV RBC AUTO: 97.8 FL (ref 80–100)
MONOCYTES ABSOLUTE: 0.6 K/UL (ref 0–1.3)
MONOCYTES RELATIVE PERCENT: 7.7 %
NEUTROPHILS ABSOLUTE: 5.6 K/UL (ref 1.7–7.7)
NEUTROPHILS RELATIVE PERCENT: 79 %
PDW BLD-RTO: 13.6 % (ref 12.4–15.4)
PLATELET # BLD: 224 K/UL (ref 135–450)
PMV BLD AUTO: 8.8 FL (ref 5–10.5)
POTASSIUM SERPL-SCNC: 4 MMOL/L (ref 3.5–5.1)
PROTHROMBIN TIME: 12.9 SEC (ref 10–13.2)
RBC # BLD: 4.69 M/UL (ref 4.2–5.9)
REASON FOR REJECTION: NORMAL
REJECTED TEST: NORMAL
SODIUM BLD-SCNC: 135 MMOL/L (ref 136–145)
TROPONIN: <0.01 NG/ML
WBC # BLD: 7.2 K/UL (ref 4–11)

## 2020-06-15 PROCEDURE — 71046 X-RAY EXAM CHEST 2 VIEWS: CPT

## 2020-06-15 PROCEDURE — 1200000000 HC SEMI PRIVATE

## 2020-06-15 PROCEDURE — 85610 PROTHROMBIN TIME: CPT

## 2020-06-15 PROCEDURE — 2580000003 HC RX 258: Performed by: PHYSICIAN ASSISTANT

## 2020-06-15 PROCEDURE — 2500000003 HC RX 250 WO HCPCS: Performed by: EMERGENCY MEDICINE

## 2020-06-15 PROCEDURE — 96375 TX/PRO/DX INJ NEW DRUG ADDON: CPT

## 2020-06-15 PROCEDURE — 96374 THER/PROPH/DIAG INJ IV PUSH: CPT

## 2020-06-15 PROCEDURE — 2580000003 HC RX 258: Performed by: INTERNAL MEDICINE

## 2020-06-15 PROCEDURE — 80048 BASIC METABOLIC PNL TOTAL CA: CPT

## 2020-06-15 PROCEDURE — 85025 COMPLETE CBC W/AUTO DIFF WBC: CPT

## 2020-06-15 PROCEDURE — 99291 CRITICAL CARE FIRST HOUR: CPT

## 2020-06-15 PROCEDURE — 6360000002 HC RX W HCPCS: Performed by: PHYSICIAN ASSISTANT

## 2020-06-15 PROCEDURE — 6370000000 HC RX 637 (ALT 250 FOR IP): Performed by: INTERNAL MEDICINE

## 2020-06-15 PROCEDURE — 84484 ASSAY OF TROPONIN QUANT: CPT

## 2020-06-15 RX ORDER — METHOCARBAMOL 750 MG/1
750 TABLET, FILM COATED ORAL 3 TIMES DAILY
Status: DISCONTINUED | OUTPATIENT
Start: 2020-06-15 | End: 2020-06-18 | Stop reason: HOSPADM

## 2020-06-15 RX ORDER — PROMETHAZINE HYDROCHLORIDE 25 MG/1
12.5 TABLET ORAL EVERY 6 HOURS PRN
Status: DISCONTINUED | OUTPATIENT
Start: 2020-06-15 | End: 2020-06-18 | Stop reason: HOSPADM

## 2020-06-15 RX ORDER — SODIUM CHLORIDE 0.9 % (FLUSH) 0.9 %
10 SYRINGE (ML) INJECTION EVERY 12 HOURS SCHEDULED
Status: DISCONTINUED | OUTPATIENT
Start: 2020-06-15 | End: 2020-06-18 | Stop reason: HOSPADM

## 2020-06-15 RX ORDER — ASPIRIN 81 MG/1
81 TABLET ORAL DAILY
Status: ON HOLD | COMMUNITY
End: 2020-10-13 | Stop reason: HOSPADM

## 2020-06-15 RX ORDER — MORPHINE SULFATE 4 MG/ML
4 INJECTION, SOLUTION INTRAMUSCULAR; INTRAVENOUS ONCE
Status: COMPLETED | OUTPATIENT
Start: 2020-06-15 | End: 2020-06-15

## 2020-06-15 RX ORDER — ONDANSETRON 2 MG/ML
4 INJECTION INTRAMUSCULAR; INTRAVENOUS EVERY 30 MIN PRN
Status: DISCONTINUED | OUTPATIENT
Start: 2020-06-15 | End: 2020-06-15 | Stop reason: HOSPADM

## 2020-06-15 RX ORDER — ONDANSETRON 2 MG/ML
4 INJECTION INTRAMUSCULAR; INTRAVENOUS EVERY 6 HOURS PRN
Status: DISCONTINUED | OUTPATIENT
Start: 2020-06-15 | End: 2020-06-18 | Stop reason: HOSPADM

## 2020-06-15 RX ORDER — POLYETHYLENE GLYCOL 3350 17 G/17G
17 POWDER, FOR SOLUTION ORAL DAILY PRN
Status: DISCONTINUED | OUTPATIENT
Start: 2020-06-15 | End: 2020-06-18 | Stop reason: HOSPADM

## 2020-06-15 RX ORDER — OXYCODONE HYDROCHLORIDE AND ACETAMINOPHEN 5; 325 MG/1; MG/1
1 TABLET ORAL EVERY 8 HOURS PRN
Status: DISCONTINUED | OUTPATIENT
Start: 2020-06-15 | End: 2020-06-18 | Stop reason: HOSPADM

## 2020-06-15 RX ORDER — DILTIAZEM HYDROCHLORIDE 5 MG/ML
10 INJECTION INTRAVENOUS ONCE
Status: COMPLETED | OUTPATIENT
Start: 2020-06-15 | End: 2020-06-15

## 2020-06-15 RX ORDER — LEVOTHYROXINE SODIUM 0.1 MG/1
100 TABLET ORAL
Status: DISCONTINUED | OUTPATIENT
Start: 2020-06-16 | End: 2020-06-18 | Stop reason: HOSPADM

## 2020-06-15 RX ORDER — SODIUM CHLORIDE 0.9 % (FLUSH) 0.9 %
10 SYRINGE (ML) INJECTION PRN
Status: DISCONTINUED | OUTPATIENT
Start: 2020-06-15 | End: 2020-06-18 | Stop reason: HOSPADM

## 2020-06-15 RX ORDER — ACETAMINOPHEN 650 MG/1
650 SUPPOSITORY RECTAL EVERY 6 HOURS PRN
Status: DISCONTINUED | OUTPATIENT
Start: 2020-06-15 | End: 2020-06-18 | Stop reason: HOSPADM

## 2020-06-15 RX ORDER — DIGOXIN 125 MCG
125 TABLET ORAL DAILY
Status: DISCONTINUED | OUTPATIENT
Start: 2020-06-16 | End: 2020-06-18 | Stop reason: HOSPADM

## 2020-06-15 RX ORDER — ASPIRIN 81 MG/1
81 TABLET ORAL DAILY
Status: DISCONTINUED | OUTPATIENT
Start: 2020-06-16 | End: 2020-06-18 | Stop reason: HOSPADM

## 2020-06-15 RX ORDER — ACETAMINOPHEN 325 MG/1
650 TABLET ORAL EVERY 6 HOURS PRN
Status: DISCONTINUED | OUTPATIENT
Start: 2020-06-15 | End: 2020-06-18 | Stop reason: HOSPADM

## 2020-06-15 RX ORDER — LEVOTHYROXINE SODIUM 0.1 MG/1
1 TABLET ORAL DAILY
Status: CANCELLED | OUTPATIENT
Start: 2020-06-15

## 2020-06-15 RX ORDER — CYCLOBENZAPRINE HCL 10 MG
10 TABLET ORAL 3 TIMES DAILY PRN
Status: DISCONTINUED | OUTPATIENT
Start: 2020-06-15 | End: 2020-06-18 | Stop reason: HOSPADM

## 2020-06-15 RX ORDER — DOCUSATE SODIUM 100 MG/1
100 CAPSULE, LIQUID FILLED ORAL 2 TIMES DAILY
Status: DISCONTINUED | OUTPATIENT
Start: 2020-06-15 | End: 2020-06-18 | Stop reason: HOSPADM

## 2020-06-15 RX ORDER — 0.9 % SODIUM CHLORIDE 0.9 %
500 INTRAVENOUS SOLUTION INTRAVENOUS ONCE
Status: COMPLETED | OUTPATIENT
Start: 2020-06-15 | End: 2020-06-15

## 2020-06-15 RX ADMIN — ONDANSETRON 4 MG: 2 INJECTION INTRAMUSCULAR; INTRAVENOUS at 15:33

## 2020-06-15 RX ADMIN — DILTIAZEM HYDROCHLORIDE 10 MG: 5 INJECTION INTRAVENOUS at 16:36

## 2020-06-15 RX ADMIN — Medication 10 ML: at 22:06

## 2020-06-15 RX ADMIN — DOCUSATE SODIUM 100 MG: 100 CAPSULE ORAL at 22:04

## 2020-06-15 RX ADMIN — METHOCARBAMOL TABLETS 750 MG: 750 TABLET, COATED ORAL at 22:04

## 2020-06-15 RX ADMIN — SODIUM CHLORIDE 500 ML: 9 INJECTION, SOLUTION INTRAVENOUS at 15:31

## 2020-06-15 RX ADMIN — MORPHINE SULFATE 4 MG: 4 INJECTION INTRAVENOUS at 15:33

## 2020-06-15 RX ADMIN — RIVAROXABAN 20 MG: 20 TABLET, FILM COATED ORAL at 22:04

## 2020-06-15 RX ADMIN — OXYCODONE HYDROCHLORIDE AND ACETAMINOPHEN 1 TABLET: 5; 325 TABLET ORAL at 22:04

## 2020-06-15 ASSESSMENT — PAIN SCALES - GENERAL
PAINLEVEL_OUTOF10: 8
PAINLEVEL_OUTOF10: 6
PAINLEVEL_OUTOF10: 3
PAINLEVEL_OUTOF10: 8
PAINLEVEL_OUTOF10: 8

## 2020-06-15 ASSESSMENT — ENCOUNTER SYMPTOMS
EYE PAIN: 0
SORE THROAT: 0
RHINORRHEA: 0
ABDOMINAL PAIN: 0
NAUSEA: 0
CONSTIPATION: 0
VOMITING: 0
BACK PAIN: 1
SHORTNESS OF BREATH: 1
DIARRHEA: 0
COUGH: 0

## 2020-06-15 ASSESSMENT — PAIN DESCRIPTION - PAIN TYPE
TYPE: ACUTE PAIN
TYPE: CHRONIC PAIN

## 2020-06-15 ASSESSMENT — PAIN DESCRIPTION - DESCRIPTORS: DESCRIPTORS: ACHING;STABBING

## 2020-06-15 ASSESSMENT — PAIN DESCRIPTION - ONSET: ONSET: ON-GOING

## 2020-06-15 ASSESSMENT — PAIN DESCRIPTION - ORIENTATION: ORIENTATION: RIGHT

## 2020-06-15 ASSESSMENT — HEART SCORE: ECG: 1

## 2020-06-15 ASSESSMENT — PAIN DESCRIPTION - PROGRESSION: CLINICAL_PROGRESSION: NOT CHANGED

## 2020-06-15 ASSESSMENT — PAIN DESCRIPTION - FREQUENCY: FREQUENCY: CONTINUOUS

## 2020-06-15 ASSESSMENT — PAIN DESCRIPTION - LOCATION
LOCATION: LEG;HIP
LOCATION: CHEST

## 2020-06-15 ASSESSMENT — PAIN - FUNCTIONAL ASSESSMENT: PAIN_FUNCTIONAL_ASSESSMENT: ACTIVITIES ARE NOT PREVENTED

## 2020-06-15 NOTE — ED PROVIDER NOTES
I independently performed a history and physical on Magdy Rogers. All diagnostic, treatment, and disposition decisions were made by myself in conjunction with the advanced practice provider. I have participated in the medical decision making and directed the treatment plan and disposition of the patient. For further details of [facility] emergency department encounter, please see the advanced practice provider's documentation. CHIEF COMPLAINT  Chief Complaint   Patient presents with    Atrial Fibrillation     Pt reports afib - states he has in inplanted monitor, states he has been having runs of afib, states chest pain and SOB     Briefly, Magdy Rogers is a 78 y.o. male  who presents to the ED complaining of afib. Has some vague SOB and CP as well. Known atrial fibrillation. Reviewed MRI L-spine 6/12/20 without acute findings but does have moderate degenerative changes. On anticoagulation (recently restarted) and has a porcine valve replacement in the past.  He unrelated to this reports some R low back pain radiating into the leg c/w previous sciatica (hence the MRI recently). FOCUSED PHYSICAL EXAMINATION  BP 88/72   Pulse 85   Temp 97.8 °F (36.6 °C) (Oral)   Resp 16   SpO2 96%    Focused physical examination notable for no acute distress, well-appearing, well-nourished, normal speech and mentation without obvious facial droop, no obvious rash. No obvious cranial nerve deficits on my initial exam. Irreg irreg rhythm, CTAB, tachycardia. No leg swelling.     The 12 lead EKG was interpreted by me as follows:  Rate: tachycardia with a rate of 121  Rhythm: atrial fibrillation  Axis: normal  Intervals: narrow QRS  ST segments: no ST elevations or depressions  T waves: no abnormal inversions  Non-specific T wave changes: present  Prior EKG comparison: EKG dated 7/29/17 is significantly different due to change from NSR to rapid afib    MDM:  ED course was notable for rapid atrial fibrillation, will start on Cardizem bolus and drip. Patient had an MRI recently lowering concern for any acute changes in the sciatica type pain which is likely incidental.  Patient be admitted for further chest pain rule out type of evaluation given the discomfort in the chest as well as some shortness of breath. Initial troponin is negative. During the patient's ED course, the patient was given:  Medications   ondansetron (ZOFRAN) injection 4 mg (4 mg Intravenous Given 6/15/20 1533)   dilTIAZem injection 10 mg (10 mg Intravenous Given 6/15/20 1636)     Followed by   dilTIAZem 125 mg in dextrose 5 % 125 mL infusion (0 mg/hr Intravenous Held 6/15/20 1651)   0.9 % sodium chloride bolus (0 mLs Intravenous Stopped 6/15/20 1634)   morphine injection 4 mg (4 mg Intravenous Given 6/15/20 1533)        CLINICAL IMPRESSION  1. Rapid atrial fibrillation ([address]    2. Chronic right-sided low back pain with right-sided sciatica    3. Chest pain, unspecified type        DISPOSITION  Steph Cohn was admitted in fair condition. The plan is to admit to the hospital at this time under the hospitalist service. Hospitalist accepted the patient and will take over the patient's care. The total critical care time spent while evaluating and treating this patient was at least 31 minutes. This excludes time spent doing separately billable procedures. This includes time at the bedside, data interpretation, medication management, obtaining critical history from collateral sources if the patient is unable to provide it directly, and physician consultation. Specifics of interventions taken and potentially life-threatening diagnostic considerations are listed above in the medical decision making. This chart was created using Dragon dictation software. Efforts were made by me to ensure accuracy, however some errors may be present due to limitations of this technology.             Dedra Rodrigues MD  06/15/20 [id number]

## 2020-06-15 NOTE — ED NOTES
Pharmacy Medication History Note      List of current medications patient is taking is complete. Source of information: patient    Changes made to medication list:  Medications flagged for removal (include reason, ex. noncompliance):  N/A    Medications removed (include reason, ex. therapy complete or physician discontinued):  Dilitazem- dose adjustment    Medications added/doses adjusted:  Diltazem 30mg- QD    Other notes (ex. Recent course of antibiotics, Coumadin dosing):  Denies use of other OTC or herbal medications. Last dose times updated. Rahul Ambriz [facility]    No current facility-administered medications on file prior to encounter. Current Outpatient Medications on File Prior to Encounter   Medication Sig Dispense Refill    aspirin 81 MG EC tablet Take 81 mg by mouth daily      dilTIAZem (CARDIZEM) 30 MG tablet Take 30 mg by mouth daily      oxyCODONE-acetaminophen (PERCOCET) 5-325 MG per tablet Take 1 tablet by mouth every 8 hours as needed for Pain for up to 30 days. Intended supply: 3 days. Take lowest dose possible to manage pain 60 tablet 0    rivaroxaban (XARELTO) 20 MG TABS tablet Take 1 tablet by mouth Daily with supper 30 tablet 5    methocarbamol (ROBAXIN-750) 750 MG tablet Take 1 tablet by mouth 3 times daily for 14 days 42 tablet 0    levothyroxine (SYNTHROID) 100 MCG tablet TAKE 1 TABLET BY MOUTH EVERY DAY 30 tablet 2    Cod Liver Oil OIL Take 1 tablet by mouth daily       cyclobenzaprine (FLEXERIL) 10 MG tablet Take 10 mg by mouth 2 times daily as needed   0    docusate sodium (COLACE) 100 MG capsule Take 100 mg by mouth 2 times daily.       [DISCONTINUED] Aspirin Buf,CaCarb-MgCarb-MgO, 81 MG TABS Take by mouth      [DISCONTINUED] dilTIAZem (CARDIZEM) 30 MG tablet TAKE 1 TABLET BY MOUTH TWICE A  tablet 1    [DISCONTINUED] Multiple Vitamins-Minerals (THERAPEUTIC MULTIVITAMIN-MINERALS) tablet Take 1 tablet by mouth daily

## 2020-06-15 NOTE — TELEPHONE ENCOUNTER
Doyal Meckel is continuing to complain of chest pain with left arm numbness. I advised he go to the emergency room for evaluation.

## 2020-06-15 NOTE — LETTER
[facility]  EP Procedure Sheet    6/17/2020  Elijah Neighbor  1950    EP Procedures     Pacemaker implant (single/dual) XXX EP Study    ICD implant (single/dual)  Atrial flutter ablation (CHIKIS Y/N)    Biv implant ICD  Cryoablation    Biv implant PPM XXX Atrial fibrillation ablation (CHIKIS Yes)    Generator Change (PPM/ICD/BiV)  SVT ablation    Lead revision (RV/LA/RA) (<1 month)  VT ablation      Lead extraction +/- upgrade (BiV/PPM/ICD)  VT Ischemic/ non-ischemic    Loop implant/ removal  VT RVOT    Cardioversion  VT Left sided    CHIKIS  AVN ablation     Equipment     [employer]   ALCIDES Mapping System    [place]  [id number] [address] [employer]  CryoAblation    [employer]  Laser Lead Extraction    Special Equipment       EP Procedures Scheduling Request    # hours Requested     Specific Day    Anesthesia Yes   CT surgery backup    Location R[facility]     Pre-Procedure Labs / Imaging     PT/INR  Type & cross    CBC  Units PRBC    BMP/Mg  Units FFP    Venogram  CXR    Echo  Cardiac CTA for Pulmonary vein mapping     NP INITIALS: SR  Patient Instructions    Do not eat or drink after midnight the night prior to procedure  No cardiac CT needed per Dr. Inocente Nathan    Dx: PAF    You will be contacted by scheduling in 7-10 business days to schedule your procedure

## 2020-06-15 NOTE — ED NOTES
Bed: 08  Expected date:   Expected time:   Means of arrival:   Comments:  Roger Soto RN  06/15/20 [id number]

## 2020-06-15 NOTE — TELEPHONE ENCOUNTER
Pt states he had chest pain 3 times last night the last time was 1130 pm. States his lft arm has gone numb 2 x this morning in the last hr.  Current bp  115/91 hr 109. Last night was 104/84 hr 112.  Please call to advise

## 2020-06-15 NOTE — H&P
Hospital Medicine History & Physical      PCP: Padmini Rico MD    Date of Admission: 6/15/2020    Date of Service: Pt seen/examined on 6/15/2020 and Admitted to Inpatient with expected LOS greater than two midnights due to medical therapy. Chief Complaint:   Palpitations and chest pain      History Of Present Illness:       78 y.o. male   with  past medical history of atrial fibrillation s/p right/left modified maze procedure with DELGADO ligation with Atriclip, non-obstructive CAD, CHRISTOPHER, aortic valve insufficiency s/p  AVR with porcine valve and squamous cell neck cancer,  S/p ILR implant (4/22/19) started feeling palpitations for last few days. he called his cardiologist on his ILR interrogation showed proximal atrial fibrillation. His blood pressure being soft limited change in his medications. But was started on Xarelto given stroke risk. This morning he felt chest pressure on the left side and his left arm and hand went numb few times. He called his cardiologist and was directed to emergency room for evaluation. His last stress dose in 2017. He was found to be in A. fib with RVR and was given diltiazem IV. He also started having right-sided sciatica for last 2 weeks. Seen by orthopedics and was given muscle relaxant. He has been taking his Percocet for the pain control.         Past Medical History:          Diagnosis Date    Aortic valve insufficiency     Arthritis     Atrial fibrillation ([address]     Cancer ([facility] [address] 09/2017    head and neck    Hearing loss     Heart disease     Medical history reviewed with no changes     Obstructive apnea does not use CPAP    Thyroid disease        Past Surgical History:          Procedure Laterality Date    ANKLE SURGERY      AORTIC VALVE REPLACEMENT  1/28/15    Dr. Iqra Parry 25mm Mosaic Ultra porcine valve; right and left modified maze procedure with ligation of DELGADO with Atriclip    BACK SURGERY      CSP    CARDIAC CATHETERIZATION      HERNIA REPAIR      KNEE SURGERY      Arthroscopy    LUNG SURGERY      collapsed lung due to broken ribs    MANDIBLE FRACTURE SURGERY      NECK SURGERY      Fusion    TONSILLECTOMY AND ADENOIDECTOMY         Medications Prior to Admission:      Prior to Admission medications    Medication Sig Start Date End Date Taking? Authorizing Provider   aspirin 81 MG EC tablet Take 81 mg by mouth daily   Yes Historical Provider, MD   dilTIAZem (CARDIZEM) 30 MG tablet Take 30 mg by mouth daily   Yes Historical Provider, MD   oxyCODONE-acetaminophen (PERCOCET) 5-325 MG per tablet Take 1 tablet by mouth every 8 hours as needed for Pain for up to 30 days. Intended supply: 3 days. Take lowest dose possible to manage pain 6/15/20 7/15/20 Yes Gato Carrion MD   rivaroxaban (XARELTO) 20 MG TABS tablet Take 1 tablet by mouth Daily with supper 6/8/20  Yes ISIDRA Ruiz CNP   methocarbamol (ROBAXIN-750) 750 MG tablet Take 1 tablet by mouth 3 times daily for 14 days 6/8/20 6/22/20 Yes JEN Ring   levothyroxine (SYNTHROID) 100 MCG tablet TAKE 1 TABLET BY MOUTH EVERY DAY 5/26/20  Yes Gato Carrion MD   Cod Liver Oil OIL Take 1 tablet by mouth daily    Yes Historical Provider, MD   cyclobenzaprine (FLEXERIL) 10 MG tablet Take 10 mg by mouth 2 times daily as needed  1/23/16  Yes Historical Provider, MD   docusate sodium (COLACE) 100 MG capsule Take 100 mg by mouth 2 times daily. Yes Historical Provider, MD       Allergies:  Naproxen and Hydrocodone-acetaminophen    Social History:      The patient currently lives at home     TOBACCO:   reports that he quit smoking about 5 years ago. His smoking use included cigarettes. He started smoking about 45 years ago. He has a 40.00 pack-year smoking history. He has never used smokeless tobacco.  ETOH:   reports no history of alcohol use. E-Cigarettes Vaping or Juuling     Questions Responses    Vaping Use Never User    Start Date     Does device contain nicotine? Never    Quit Date     Vaping Type             Family History:       Reviewed in detail and negative for DM, CAD, Cancer, CVA. Positive as follows:        Problem Relation Age of Onset    Heart Disease Other     High Blood Pressure Neg Hx     High Cholesterol Neg Hx        REVIEW OF SYSTEMS:   Pertinent positives as noted in the HPI. All other systems reviewed and negative. PHYSICAL EXAM PERFORMED:    BP 88/72   Pulse 85   Temp 97.8 °F (36.6 °C) (Oral)   Resp 16   SpO2 96%     General appearance:  No apparent distress, appears stated age and cooperative. HEENT:  Normal cephalic, atraumatic without obvious deformity. Pupils equal, round, and reactive to light. Extra ocular muscles intact. Conjunctivae/corneas clear. Neck: Supple, with full range of motion. No jugular venous distention. Trachea midline. Respiratory:  Normal respiratory effort. Clear to auscultation, bilaterally without Rales/Wheezes/Rhonchi. Cardiovascular: Irregularly irregular, normal S1/S2 without murmurs, rubs or gallops. Loop recorder on the left side of the chest.  Abdomen: Soft, non-tender, non-distended with normal bowel sounds. Musculoskeletal:  No clubbing, cyanosis or edema bilaterally. Full range of motion without deformity. Skin: Skin color, texture, turgor normal.  No rashes or lesions. Neurologic:  Neurovascularly intact without any focal sensory/motor deficits. Cranial nerves: II-XII intact, grossly non-focal.  Psychiatric:  Alert and oriented, thought content appropriate, normal insight  Capillary Refill: Brisk,< 3 seconds   Peripheral Pulses: +2 palpable, equal bilaterally       Labs:     Recent Labs     06/15/20  1330   WBC 7.2   HGB 15.5   HCT 45.8        Recent Labs     06/15/20  1532   *   K 4.0      CO2 24   BUN 12   CREATININE <0.5*   CALCIUM 9.3     No results for input(s): AST, ALT, BILIDIR, BILITOT, ALKPHOS in the last 72 hours.   Recent Labs     06/15/20  1532   INR 1.11     Recent Labs 06/15/20  1532   TROPONINI <0.01       Urinalysis:      Lab Results   Component Value Date    NITRU Negative 01/27/2015    BLOODU Negative 01/27/2015    SPECGRAV 1.015 01/27/2015    GLUCOSEU Negative 01/27/2015       Radiology:        EKG:  I have reviewed the EKG with the following interpretation:  A fib with rvr per report. HR went as high as 114 acc to vitals     XR CHEST STANDARD (2 VW)   Final Result   No evidence of acute cardiopulmonary disease. ASSESSMENT:    Active Hospital Problems    Diagnosis Date Noted    Atrial fibrillation with RVR ([address] [I48.91] 06/15/2020         PLAN:    A. fib with RVR:  S/p MAZE and DELGADO ligation with Atriclip (2015). ILR in place - last interacted few days ago. Cardiology already consulted from the emergency room. Heart rate control improved after 1 dose of IV Cardizem. Blood pressure has been limiting factor. Start on digoxin p.o. Continue home dose Cardizem. Recent TSH within normal limits. Check echocardiogram given his moderate MR on prior echo. Continue Xarelto. Chest pressure: Resume secondary with RVR. Troponin negative. Telemetry. Trend tomorrow. May need repeat stress testing. Await cardiology recommendations. Hypothyroidism: Continue Synthroid. Last TSH with normal limits. Right-sided sciatica: Continue Robaxin. Continue Percocet. Consult PT OT     Cardiac insufficiency status post aVR     Moderate MR: Repeat echocardiogram.    Obstructive sleep apnea: Continue CPAP           DVT Prophylaxis: Xarelto  Diet: Cardiac diet: N.p.o. after midnight  Code Status: Full code    PT/OT Eval Status: Consult    Dispo -medical floor/telemetry       Osvaldo Torres MD    Thank you Vahe Sttaon MD for the opportunity to be involved in this patient's care. If you have any questions or concerns please feel free to contact me at [phone].

## 2020-06-15 NOTE — CARE COORDINATION
Discharge Planning Assessment    SW discharge planner met with patient to discuss reason for admission, current living situation, and potential needs at the time of discharge. Pt in ED d/t rapid a-fib    Demographics/Insurance verified:  Yes    Current type of dwelling:  Bi-level house - 14 steps - pt stated he crawls up the stairs and scoots down on his behind. Living arrangements:  Alone    Level of function/Support:  Pt reports he can't put much pressure on his right leg or he has a lot of pain. States he uses a crutch mostly when ambulating. States has a lot of support from dtr, ex-wife and friends from [employer]. PCP:  Dr. Mika Hollins    Last Visit to PCP:  Last week    DME:  [employer]     Active with any community resources/agencies/skilled home care:  None. Pt reports no non-skilled needs at this time. Medication compliance issues:  No    Financial issues that could impact healthcare:  No    Tentative discharge plan:  Pt may need HHC. Thinks he may have had [facility] in the past but unsure. No therapy orders yet. SW to follow. Discussed with patient and/or family that on the day of discharge home tentative time of discharge will be between 10 AM and noon. Transportation at the time of discharge:  Pt has not been driving for a week d/t pain in leg. Dtr or ex-wife can .     Electronically signed by VANDANA Montanez LSW on 6/15/2020 at 7:13 PM

## 2020-06-15 NOTE — ED PROVIDER NOTES
[address] [facility]        Pt Name: Mike Ramos  MRN: [id number]  [place] 1950  Date of evaluation: 6/15/2020  Provider: Naila Castillo PA-C  PCP: Marcin Witt MD     I have seen and evaluated this patient with my supervising physician Jaciel Brock MD.    [address] [place] [address]       Chief Complaint   Patient presents with    Atrial Fibrillation     Pt reports afib - states he has in inplanted monitor, states he has been having runs of afib, states chest pain and SOB       HISTORY OF PRESENT ILLNESS   (Location, Timing/Onset, Context/Setting, Quality, Duration, Modifying Factors, Severity, Associated Signs and Symptoms)  Note limiting factors. Mike Ramos is a 78 y.o. male who presents here to the emergency department, the patient states that he is here for evaluation of several things, he starts off with saying that he has had chest pain with a little bit of arm numbness that started today. He also states that he has a history of sciatica, and a disc problem in his back which is causing him to go into A. fib. He recently had an MRI with and he said he was hypertension was told by his cardiologist that his disc is causing the atrial fibrillation. He admits to some shortness of breath, dizziness, back pain, palpitations. Nothing seems to make him feel completely better. He states that the last time he had severe episodes of atrial fibrillation it caused damage to his valve and he is concerned because he does have a valve replacement. Rates his pain level is 8/10. Nursing Notes were all reviewed and agreed with or any disagreements were addressed in the HPI. REVIEW OF SYSTEMS    (2-9 systems for level 4, 10 or more for level 5)     Review of Systems   Constitutional: Negative for chills, diaphoresis and fever. HENT: Negative for congestion, ear pain, rhinorrhea and sore throat.     Eyes: Negative for pain and visual disturbance. Respiratory: Positive for shortness of breath. Negative for cough. Cardiovascular: Positive for chest pain and palpitations. Negative for leg swelling. Gastrointestinal: Negative for abdominal pain, constipation, diarrhea, nausea and vomiting. Genitourinary: Negative for decreased urine volume, dysuria, frequency and urgency. Musculoskeletal: Positive for back pain and neck pain. Skin: Negative for rash and wound. Neurological: Positive for dizziness. Negative for light-headedness. PAST MEDICAL HISTORY     Past Medical History:   Diagnosis Date    Aortic valve insufficiency     Arthritis     Atrial [facility] ([facility]     Cancer ([facility] 09/2017    head and neck    Hearing loss     Heart disease     Medical history reviewed with no changes     Obstructive apnea does not use CPAP    Thyroid disease          SURGICAL HISTORY     Past Surgical History:   Procedure Laterality Date    ANKLE SURGERY      AORTIC VALVE REPLACEMENT  1/28/15    Dr. Santos Weathers 25mm Mosaic Ultra porcine valve; right and left modified maze procedure with ligation of DELGADO with Atriclip    BACK SURGERY      CSP    CARDIAC CATHETERIZATION      HERNIA REPAIR      KNEE SURGERY      Arthroscopy    LUNG SURGERY      collapsed lung due to broken ribs    MANDIBLE FRACTURE SURGERY      NECK SURGERY      Fusion    TONSILLECTOMY AND ADENOIDECTOMY           CURRENTMEDICATIONS       Previous Medications    ASPIRIN BUF,CACARB-MGCARB-MGO, 81 MG TABS    Take by mouth    COD LIVER OIL OIL    Take by mouth daily    CYCLOBENZAPRINE (FLEXERIL) 10 MG TABLET    2 times daily as needed Prn    DILTIAZEM (CARDIZEM) 30 MG TABLET    TAKE 1 TABLET BY MOUTH TWICE A DAY    DOCUSATE SODIUM (COLACE) 100 MG CAPSULE    Take 100 mg by mouth 2 times daily.     LEVOTHYROXINE (SYNTHROID) 100 MCG TABLET    TAKE 1 TABLET BY MOUTH EVERY DAY    METHOCARBAMOL (ROBAXIN-750) 750 MG TABLET    Take 1 tablet by mouth 3 times daily for 14 days MULTIPLE VITAMINS-MINERALS (THERAPEUTIC MULTIVITAMIN-MINERALS) TABLET    Take 1 tablet by mouth daily    OXYCODONE-ACETAMINOPHEN (PERCOCET) 5-325 MG PER TABLET    Take 1 tablet by mouth every 8 hours as needed for Pain for up to 30 days. Intended supply: 3 days. Take lowest dose possible to manage pain    RIVAROXABAN (XARELTO) 20 MG TABS TABLET    Take 1 tablet by mouth Daily with supper         ALLERGIES     Naproxen and Hydrocodone-acetaminophen    FAMILYHISTORY       Family History   Problem Relation Age of Onset    Heart Disease Other     High Blood Pressure Neg Hx     High Cholesterol Neg Hx           SOCIAL HISTORY       Social History     Tobacco Use    Smoking status: Former Smoker     Packs/day: 1.00     Years: 40.00     Pack years: 40.00     Types: Cigarettes     Start date: 1975     Last attempt to quit: 2015     Years since quittin.4    Smokeless tobacco: Never Used    Tobacco comment: Maintain cessation   Substance Use Topics    Alcohol use: No     Alcohol/week: 0.0 standard drinks     Comment: NA beer    Drug use: No       SCREENINGS      Heart Score for chest pain patients  History: Moderately Suspicious  ECG: Non-Specifc repolarization disturbance/LBTB/PM  Patient Age: > 65 years  *Risk factors for Atherosclerotic disease: Coronary Artery Disease  Risk Factors: > 3 Risk factors or history of atherosclerotic disease*  Troponin: < 1X normal limit  Heart Score Total: 6      PHYSICAL EXAM    (up to 7 for level 4, 8 or more for level 5)     ED Triage Vitals [06/15/20 1309]   BP Temp Temp Source Pulse Resp SpO2 Height Weight   103/72 97.8 °F (36.6 °C) Oral 104 18 97 % -- --       Physical Exam  Vitals signs and nursing note reviewed. Constitutional:       Appearance: He is well-developed. He is not diaphoretic. HENT:      Head: Normocephalic and atraumatic.       Right Ear: External ear normal.      Left Ear: External ear normal.      Nose: Nose normal.   Eyes:      General: Right eye: No discharge. Left eye: No discharge. Neck:      Musculoskeletal: Normal range of motion and neck supple. Cardiovascular:      Rate and Rhythm: Tachycardia present. Rhythm irregular. Heart sounds: Normal heart sounds. No murmur. No friction rub. No gallop. Pulmonary:      Effort: Pulmonary effort is normal. No respiratory distress. Breath sounds: Normal breath sounds. No stridor. No wheezing or rales. Chest:      Chest wall: No tenderness. Musculoskeletal: Normal range of motion. Skin:     General: Skin is warm and dry. Coloration: Skin is not pale. Neurological:      Mental Status: He is alert and oriented to person, place, and time.    Psychiatric:         Behavior: Behavior normal.         DIAGNOSTIC RESULTS   LABS:    Labs Reviewed   CBC WITH AUTO DIFFERENTIAL - Abnormal; Notable for the following components:       Result Value    Lymphocytes Absolute 0.8 (*)     All other components within normal limits    Narrative:     Thor Goulddereje Chavezmarko [phone],  Rejected Test Name: pt,bmp,trop/Called OO:LIYA RN/ER, 06/15/2020 13:56, by  Harriett Pardo  Performed at:  [facility] [address], [place], [address] [employer]   Phone [phone]   BASIC METABOLIC PANEL - Abnormal; Notable for the following components:    Sodium 135 (*)     CREATININE <0.5 (*)     All other components within normal limits    Narrative:     Performed at:  [facility] [address], [place], [facility] [place][note 2[facility] [employer]   Phone [phone]   PROTIME-INR    Narrative:     Performed at:  [facility] [address], [place], [address] [employer]   Phone [phone]   TROPONIN    Narrative:     Performed at:  [facility] [address], [place], [facility] [place]note 2[facility] [employer]   Phone [phone]    Narrative:     Thor COLLINS tel. [phone],  Rejected Test Name: pt,bmp,trop/Called to:Bety RN/ER, 06/15/2020 13:56, by  Timmy Jansen  Performed at:  [facility] [address], [place], [address]   Phone [phone]       All other labs were within normal range or not returned as of this dictation. EKG: All EKG's are interpreted by the Emergency Department Physician in the absence of a cardiologist.  Please see their note for interpretation of EKG. RADIOLOGY:   Non-plain film images such as CT, Ultrasound and MRI are read by the radiologist. Plain radiographic images are visualized and preliminarily interpreted by the ED Provider with the below findings:        Interpretation per the Radiologist below, if available at the time of this note:    XR CHEST STANDARD (2 VW)   Final Result   No evidence of acute cardiopulmonary disease. Xr Chest Standard (2 Vw)    Result Date: 6/15/2020  EXAMINATION: TWO XRAY VIEWS OF THE CHEST 6/15/2020 1:22 pm COMPARISON: July 29, 2017 HISTORY: ORDERING SYSTEM PROVIDED HISTORY: CP TECHNOLOGIST PROVIDED HISTORY: Reason for exam:->CP FINDINGS: Normal cardiac size. No evidence of pneumonia, edema or other acute pulmonary process. No evidence of acute process of the cardiac or mediastinal structures. No evidence of pneumothorax or pleural effusion. No evidence of acute cardiopulmonary disease. Mri Lumbar Spine Wo Contrast    Result Date: 6/12/2020  EXAMINATION: MRI OF THE LUMBAR SPINE WITHOUT CONTRAST, 6/12/2020 8:12 am TECHNIQUE: Multiplanar multisequence MRI of the lumbar spine was performed without the administration of intravenous contrast. COMPARISON: None.  HISTORY: ORDERING SYSTEM PROVIDED HISTORY: Lumbar radiculopathy TECHNOLOGIST PROVIDED HISTORY: Reason for exam:->MRI LSP WO CONTRAST Reason for Exam: PT STS NO KNOWN INJURY TO LUMBAR SPINE Acuity: Acute Type of Exam: Initial Additional signs and symptoms: PT STS NO KNOWN INJURY TO LUMBAR SPINE Relevant Medical/Surgical History: PT STS NO KNOWN INJURY TO LUMBAR SPINE FINDINGS: BONES/ALIGNMENT: There is normal alignment of the spine. The vertebral body heights are maintained. The bone marrow signal appears unremarkable. No acute fracture is identified. There is a Schmorl's node involving the superior endplate of L4. Moderate diffuse degenerative disc disease is identified. SPINAL CORD: The conus terminates normally. SOFT TISSUES: No paraspinal mass identified. L1-L2: Annular disc bulge is slightly eccentric to the right. L2-L3: Shallow disc bulge flattens the thecal sac. Borderline central canal stenosis noted. Both foramina are mildly stenotic. L3-L4: Mild central canal stenosis identified due to shallow disc bulge, facet arthropathy and ligamentum flavum hypertrophy. Both foramina are mildly stenotic. L4-L5: Severe left foraminal stenosis and moderate right foraminal stenosis are identified due to encroachment of the disc bulge and facet disease. Shallow disc bulge flattens the thecal sac. L5-S1: Both foramina are severely stenotic due to encroachment by disc bulge and facet disease. Moderate diffuse degenerative disc disease without evidence of focal disc protrusion. Mild central canal stenosis at L3-L4. Severe bilateral foraminal stenosis at L4-5 and L5-S1.            PROCEDURES   Unless otherwise noted below, none     Procedures    CRITICAL CARE TIME   N/A    CONSULTS:  IP CONSULT TO CARDIOLOGY  IP CONSULT TO HOSPITALIST  Consultation with cardiology,  and essentially we will admit the patient for chest pain rule out,    EMERGENCY DEPARTMENT COURSE and DIFFERENTIAL DIAGNOSIS/MDM:   Vitals:    Vitals:    06/15/20 1539 06/15/20 1553 06/15/20 1622 06/15/20 1648   BP: 118/82  (!) 125/98 99/72   Pulse: 114  112 93   Resp: (!) 116 16 18 16   Temp:       TempSrc:       SpO2: 96%  97% 97%       Patient was given the following medications:  Medications   ondansetron (ZOFRAN) injection 4 mg (4 mg Intravenous Given 6/15/20 1533)   dilTIAZem injection 10 mg (10 mg

## 2020-06-15 NOTE — ED NOTES
BP cuff recycled. Pt stating that doctor that came in to see him said he would give PT his 1600 heart medications. I let PT know that a bed was assigned to him and we are waiting to give report and move him to his room. PT asking why doctor told him that. I explained to PT that he probably spoke to the hospitalist who will put in his orders and it is up to him which medications get continued during the PTs hospital admission and what medications do not. I told him I would try to get an answer for him. The diltiazem is the only heart medication that I see and it is timed for 2100 tonight.        Roro Allred RN  06/15/20 1954

## 2020-06-16 LAB
EKG ATRIAL RATE: 147 BPM
EKG DIAGNOSIS: NORMAL
EKG Q-T INTERVAL: 318 MS
EKG QRS DURATION: 72 MS
EKG QTC CALCULATION (BAZETT): 414 MS
EKG R AXIS: -15 DEGREES
EKG T AXIS: 1 DEGREES
EKG VENTRICULAR RATE: 102 BPM
LV EF: 63 %
LVEF MODALITY: NORMAL
TROPONIN: <0.01 NG/ML
TROPONIN: <0.01 NG/ML

## 2020-06-16 PROCEDURE — 97530 THERAPEUTIC ACTIVITIES: CPT

## 2020-06-16 PROCEDURE — 93010 ELECTROCARDIOGRAM REPORT: CPT | Performed by: INTERNAL MEDICINE

## 2020-06-16 PROCEDURE — 6360000002 HC RX W HCPCS: Performed by: INTERNAL MEDICINE

## 2020-06-16 PROCEDURE — 93306 TTE W/DOPPLER COMPLETE: CPT

## 2020-06-16 PROCEDURE — 2580000003 HC RX 258: Performed by: INTERNAL MEDICINE

## 2020-06-16 PROCEDURE — 99221 1ST HOSP IP/OBS SF/LOW 40: CPT | Performed by: NURSE PRACTITIONER

## 2020-06-16 PROCEDURE — 6370000000 HC RX 637 (ALT 250 FOR IP): Performed by: NURSE PRACTITIONER

## 2020-06-16 PROCEDURE — 97140 MANUAL THERAPY 1/> REGIONS: CPT

## 2020-06-16 PROCEDURE — 97162 PT EVAL MOD COMPLEX 30 MIN: CPT

## 2020-06-16 PROCEDURE — 1200000000 HC SEMI PRIVATE

## 2020-06-16 PROCEDURE — APPNB45 APP NON BILLABLE 31-45 MINUTES: Performed by: NURSE PRACTITIONER

## 2020-06-16 PROCEDURE — 6370000000 HC RX 637 (ALT 250 FOR IP): Performed by: INTERNAL MEDICINE

## 2020-06-16 PROCEDURE — 84484 ASSAY OF TROPONIN QUANT: CPT

## 2020-06-16 PROCEDURE — 97165 OT EVAL LOW COMPLEX 30 MIN: CPT

## 2020-06-16 PROCEDURE — 99223 1ST HOSP IP/OBS HIGH 75: CPT | Performed by: INTERNAL MEDICINE

## 2020-06-16 PROCEDURE — 36415 COLL VENOUS BLD VENIPUNCTURE: CPT

## 2020-06-16 PROCEDURE — 93005 ELECTROCARDIOGRAM TRACING: CPT | Performed by: INTERNAL MEDICINE

## 2020-06-16 PROCEDURE — 97116 GAIT TRAINING THERAPY: CPT

## 2020-06-16 RX ORDER — PREDNISONE 20 MG/1
40 TABLET ORAL ONCE
Status: COMPLETED | OUTPATIENT
Start: 2020-06-17 | End: 2020-06-17

## 2020-06-16 RX ORDER — PREDNISONE 20 MG/1
40 TABLET ORAL DAILY
Status: DISCONTINUED | OUTPATIENT
Start: 2020-06-16 | End: 2020-06-16

## 2020-06-16 RX ORDER — PREDNISONE 20 MG/1
20 TABLET ORAL DAILY
Status: DISCONTINUED | OUTPATIENT
Start: 2020-06-20 | End: 2020-06-18 | Stop reason: HOSPADM

## 2020-06-16 RX ORDER — WARFARIN SODIUM 5 MG/1
5 TABLET ORAL DAILY
Status: DISCONTINUED | OUTPATIENT
Start: 2020-06-16 | End: 2020-06-18 | Stop reason: HOSPADM

## 2020-06-16 RX ADMIN — DILTIAZEM HYDROCHLORIDE 30 MG: 30 TABLET, FILM COATED ORAL at 11:24

## 2020-06-16 RX ADMIN — PREDNISONE 40 MG: 20 TABLET ORAL at 17:14

## 2020-06-16 RX ADMIN — ENOXAPARIN SODIUM 70 MG: 80 INJECTION SUBCUTANEOUS at 21:40

## 2020-06-16 RX ADMIN — DOCUSATE SODIUM 100 MG: 100 CAPSULE ORAL at 21:38

## 2020-06-16 RX ADMIN — OXYCODONE HYDROCHLORIDE AND ACETAMINOPHEN 1 TABLET: 5; 325 TABLET ORAL at 21:38

## 2020-06-16 RX ADMIN — Medication 10 ML: at 11:25

## 2020-06-16 RX ADMIN — ASPIRIN 81 MG: 81 TABLET, COATED ORAL at 11:24

## 2020-06-16 RX ADMIN — DOCUSATE SODIUM 100 MG: 100 CAPSULE ORAL at 11:23

## 2020-06-16 RX ADMIN — METHOCARBAMOL TABLETS 750 MG: 750 TABLET, COATED ORAL at 21:38

## 2020-06-16 RX ADMIN — METHOCARBAMOL TABLETS 750 MG: 750 TABLET, COATED ORAL at 13:46

## 2020-06-16 RX ADMIN — OXYCODONE HYDROCHLORIDE AND ACETAMINOPHEN 1 TABLET: 5; 325 TABLET ORAL at 08:43

## 2020-06-16 RX ADMIN — Medication 10 ML: at 21:41

## 2020-06-16 RX ADMIN — DIGOXIN 125 MCG: 125 TABLET ORAL at 11:24

## 2020-06-16 RX ADMIN — LEVOTHYROXINE SODIUM 100 MCG: 0.1 TABLET ORAL at 06:20

## 2020-06-16 RX ADMIN — DILTIAZEM HYDROCHLORIDE 30 MG: 30 TABLET, FILM COATED ORAL at 23:40

## 2020-06-16 RX ADMIN — METHOCARBAMOL TABLETS 750 MG: 750 TABLET, COATED ORAL at 11:23

## 2020-06-16 RX ADMIN — WARFARIN SODIUM 5 MG: 5 TABLET ORAL at 17:14

## 2020-06-16 ASSESSMENT — PAIN DESCRIPTION - DESCRIPTORS
DESCRIPTORS: ACHING;CRAMPING
DESCRIPTORS: ACHING;STABBING
DESCRIPTORS: ACHING;CRAMPING
DESCRIPTORS: ACHING;STABBING
DESCRIPTORS: ACHING;CRAMPING
DESCRIPTORS: ACHING;CRAMPING
DESCRIPTORS: ACHING;STABBING
DESCRIPTORS: ACHING

## 2020-06-16 ASSESSMENT — PAIN DESCRIPTION - PROGRESSION
CLINICAL_PROGRESSION: NOT CHANGED

## 2020-06-16 ASSESSMENT — PAIN DESCRIPTION - LOCATION
LOCATION: HIP
LOCATION: HIP
LOCATION: HIP;LEG
LOCATION: LEG;HIP
LOCATION: HIP;LEG
LOCATION: HIP;LEG
LOCATION: LEG;HIP
LOCATION: LEG
LOCATION: HIP
LOCATION: LEG;HIP
LOCATION: HIP;LEG
LOCATION: LEG;HIP
LOCATION: HIP

## 2020-06-16 ASSESSMENT — PAIN SCALES - GENERAL
PAINLEVEL_OUTOF10: 3
PAINLEVEL_OUTOF10: 7
PAINLEVEL_OUTOF10: 3
PAINLEVEL_OUTOF10: 7
PAINLEVEL_OUTOF10: 3
PAINLEVEL_OUTOF10: 4
PAINLEVEL_OUTOF10: 3
PAINLEVEL_OUTOF10: 8
PAINLEVEL_OUTOF10: 2
PAINLEVEL_OUTOF10: 7
PAINLEVEL_OUTOF10: 5
PAINLEVEL_OUTOF10: 7
PAINLEVEL_OUTOF10: 5

## 2020-06-16 ASSESSMENT — PAIN DESCRIPTION - PAIN TYPE
TYPE: CHRONIC PAIN

## 2020-06-16 ASSESSMENT — PAIN DESCRIPTION - FREQUENCY
FREQUENCY: CONTINUOUS
FREQUENCY: CONTINUOUS
FREQUENCY: INTERMITTENT
FREQUENCY: CONTINUOUS
FREQUENCY: INTERMITTENT
FREQUENCY: CONTINUOUS
FREQUENCY: INTERMITTENT
FREQUENCY: INTERMITTENT
FREQUENCY: CONTINUOUS
FREQUENCY: CONTINUOUS

## 2020-06-16 ASSESSMENT — PAIN DESCRIPTION - ORIENTATION
ORIENTATION: RIGHT

## 2020-06-16 ASSESSMENT — PAIN DESCRIPTION - ONSET
ONSET: ON-GOING

## 2020-06-16 ASSESSMENT — PAIN - FUNCTIONAL ASSESSMENT
PAIN_FUNCTIONAL_ASSESSMENT: ACTIVITIES ARE NOT PREVENTED

## 2020-06-16 NOTE — PROGRESS NOTES
[facility]    6/16/2020 2:08 PM        Name: Andreia Marie . 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 Started on Xarelto as outpatient. Admitted as inpatient for CP and Atrial flutter with RVR. Followed by Cardiology. For Cardiac CTA to evaluate DELGADO elimination. Changed to Coumadin for pAVR. CC: Palpitations    Subjective:  . Patient with palpitations. No CP, HA or abdominal pain. No fevers. No SOB. No diarrhea.     Reviewed interval ancillary notes    Current Medications  warfarin (COUMADIN) tablet 5 mg, Daily  predniSONE (DELTASONE) tablet 40 mg, Daily  docusate sodium (COLACE) capsule 100 mg, BID  cyclobenzaprine (FLEXERIL) tablet 10 mg, TID PRN  methocarbamol (ROBAXIN) tablet 750 mg, TID  oxyCODONE-acetaminophen (PERCOCET) 5-325 MG per tablet 1 tablet, Q8H PRN  aspirin EC tablet 81 mg, Daily  dilTIAZem (CARDIZEM) tablet 30 mg, Daily  digoxin (LANOXIN) tablet 125 mcg, Daily  levothyroxine (SYNTHROID) tablet 100 mcg, QAM AC  sodium chloride flush 0.9 % injection 10 mL, 2 times per day  sodium chloride flush 0.9 % injection 10 mL, PRN  acetaminophen (TYLENOL) tablet 650 mg, Q6H PRN    Or  acetaminophen (TYLENOL) suppository 650 mg, Q6H PRN  polyethylene glycol (GLYCOLAX) packet 17 g, Daily PRN  promethazine (PHENERGAN) tablet 12.5 mg, Q6H PRN    Or  ondansetron (ZOFRAN) injection 4 mg, Q6H PRN  perflutren lipid microspheres (DEFINITY) injection 1.65 mg, ONCE PRN        Objective:  /72   Pulse 76   Temp 98.5 °F (36.9 °C) (Oral)   Resp 16   Ht 5' 11\" (1.803 m)   Wt 144 lb 12.8 oz (65.7 kg)   SpO2 97%   BMI 20.20 kg/m²     Intake/Output Summary (Last 24 hours) at 6/16/2020 1408  Last data filed at 6/15/2020 2206  Gross per 24 hour   Intake 10 ml   Output --   Net 10 ml      Wt Readings from Last 3 Encounters:   06/15/20 144 lb 12.8 oz (65.7 kg)   06/10/20 146 lb (66.2 kg)   06/08/20 147 lb 14.9 oz (67.1 kg)       General appearance:  Appears comfortable  Eyes: Sclera clear. Pupils equal.  ENT: Moist oral mucosa. Trachea midline, no adenopathy. Cardiovascular: Tachycardia. No murmur. No edema in lower extremities  Respiratory: Not using accessory muscles. Good inspiratory effort. Clear to auscultation bilaterally, no wheeze or crackles. GI: Abdomen soft, no tenderness, not distended, normal bowel sounds  Musculoskeletal: No cyanosis in digits, neck supple  Neurology: Grossly intact. No speech or motor deficits  Psych: Normal affect. Alert and oriented in time, place and person  Skin: Warm, dry, normal turgor  Extremity exam shows brisk capillary refill. Peripheral pulses are palpable in lower extremities     Labs and Tests:  CBC:   Recent Labs     06/15/20  1330   WBC 7.2   HGB 15.5        BMP:    Recent Labs     06/15/20  1532   *   K 4.0      CO2 24   BUN 12   CREATININE <0.5*   GLUCOSE 95     Hepatic: No results for input(s): AST, ALT, ALB, BILITOT, ALKPHOS in the last 72 hours. XR CHEST STANDARD (2 VW)   Final Result   No evidence of acute cardiopulmonary disease.          CTA CARDIAC W C STRC MORP W CONTRAST    (Results Pending)         Problem List  Principal Problem:    Atrial fibrillation with RVR ([address]  Active Problems:    S/P AVR    PAF (paroxysmal atrial fibrillation) (HCC)    Transient ischemic attack (TIA)    Chronic ischemic heart disease    Essential hypertension    Obstructive sleep apnea syndrome    Squamous 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)    Chronic pain due to neoplasm    Refusal of blood transfusions as patient

## 2020-06-16 NOTE — PROGRESS NOTES
Will consult pharmacy to help transition from Xarelto to coumadin therapy.      Catia Molina, APRN-CNP

## 2020-06-16 NOTE — ED NOTES
PT taken to floor by [facility] via stretcher.   Personal belongings sent with PT.      Brooke Dorman RN  06/15/20 2034

## 2020-06-16 NOTE — PROGRESS NOTES
Occupational Therapy   Occupational Therapy Initial Assessment/Discharge Summary  Date: 2020   Patient Name: Daren Darnell  MRN: [id number]     : 1950    Date of Service: 2020    Discharge Recommendations:  Daren Darnell scored a 24/24 on the AM-[facility] ADL Inpatient form. At this time, no further OT is recommended upon discharge due to pt independent with ADLs and functional mobility and would benefit from OP PT. Recommend patient returns to prior setting. OT Equipment Recommendations  Equipment Needed: No    Assessment   Assessment: Pt is at his baseline level of occupational function. No further OT needs. PT to work with PT for pain mgt. Decision Making: Low Complexity  History: Pt 77 yo, lives alone, 2-story home, I ADLs, IADLs, drives, grass cutting service, no falls. PMH: A-fib, knee scope, AVR, Head & neck Ca, chemo & radiation  Exam: ROM, MMT, 6 clicks. No performance deficits. Assistance / Modification: None  OT Education: OT Role  Patient Education: OT eval, d/c recommendations, availability of AE for LB dressing if painful, sitting for safety. Pt verbalized understanding. Barriers to Learning: None  REQUIRES OT FOLLOW UP: No  Activity Tolerance  Activity Tolerance: Patient limited by pain  Activity Tolerance: Pt able to dress independently. Limited comfortable positions in bed, in chair. Safety Devices  Safety Devices in place: Yes  Type of devices: Left in bed;Call light within reach;Nurse notified           Patient Diagnosis(es): The primary encounter diagnosis was Rapid atrial fibrillation ([address] Diagnoses of Chronic right-sided low back pain with right-sided sciatica and Chest pain, unspecified type were also pertinent to this visit. has a past medical history of Aortic valve insufficiency, Arthritis, Atrial fibrillation ([address] Cancer ([address] Hearing loss, Heart disease, Medical history reviewed with no changes, Obstructive apnea, and Thyroid disease.    has a past surgical history that includes Lung surgery; knee surgery; Neck surgery; hernia repair; Aortic valve replacement (1/28/15); Cardiac catheterization; Tonsillectomy and adenoidectomy; Mandible fracture surgery; Ankle surgery; and back surgery. Restrictions  Restrictions/Precautions  Restrictions/Precautions: Fall Risk(high fall risk)  Position Activity Restriction  Other position/activity restrictions: Briefly, Larry Cano is a 78 y.o. male  who presents to the ED complaining of afib. Has some vague SOB and CP as well. Known atrial fibrillation. Reviewed MRI L-spine 6/12/20 without acute findings but does have moderate degenerative changes. On anticoagulation (recently restarted) and has a porcine valve replacement in the past.  He unrelated to this reports some R low back pain radiating into the leg c/w previous sciatica (hence the MRI recently). Subjective   General  Chart Reviewed: Yes  Family / Caregiver Present: No  Diagnosis: A-fib with RVR  Subjective  Subjective: Pt up in the room, heading to the door to close it on arrival. Pt agreeable to OT eval. Pt c/o 8/10 pain R hip down to top of foot. Pt stated, \"My hip is on fire. There's no position I can get to be comfortable. \"  Patient Currently in Pain: Yes  Pain Assessment  Pain Assessment: 0-10  Pain Level: 8  Pain Type: Chronic pain  Pain Location: Hip  Pain Orientation: Right  Pain Radiating Towards: down LE to top of foot  Pre Treatment Pain Screening  Intervention List: Patient able to continue with treatment;Nurse/Physician notified  Comments / Details: Pt reports \"they won't give me anything for it. \"  Vital Signs  Patient Currently in Pain: Yes  Social/Functional History  Social/Functional History  Lives With: Alone  Home Layout: Two level, Bed/Bath upstairs(14 steps to 2nd level)  Home Access: Stairs to enter without rails(1 step onto porch)  Bathroom Shower/Tub: Walk-in shower  Bathroom Toilet: Standard  Home Equipment: [place], Crutches  ADL Assistance: Independent  Homemaking Assistance: Independent  Ambulation Assistance: Independent  Transfer Assistance: Independent  Active : Yes  Mode of Transportation: Truck(Limited driving d/t pain recently)  Occupation: Full time employment  Type of occupation: Previously broke horses, some racing, 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 Pt has lost contract d/t back pain/unable to perform work. Leisure & Hobbies: kayaking  Additional Comments: No falls past 6 mos. Objective   Vision: Impaired  Vision Exceptions: Wears glasses at all times  Hearing: Exceptions to [facility]  Hearing Exceptions: Hard of hearing/hearing concerns;Bilateral hearing aid(HA's at home)    Orientation  Overall Orientation Status: Within Normal Limits     Balance  Standing Balance: Independent  Standing Balance  Time: ~30 sec X 2, ~10 sec, ~20 sec  Activity: functional mobility to door and back to EOB, donning pants, functional mobility EOB to chair, back to EOB  Functional Mobility  Functional - Mobility Device: No device  Activity: Other  Assist Level: Independent  Functional Mobility Comments: Pt with flexed trunk, better posture second walk. ADL  UE Dressing: Independent(don t-shirt)  LE Dressing: Independent(don/doff socks, don pull-on pants)  Tone RUE  RUE Tone: Normotonic  Tone LUE  LUE Tone: Normotonic  Coordination  Movements Are Fluid And Coordinated: Yes     Bed mobility  Supine to Sit: Independent  Sit to Supine: Independent  Transfers  Stand Step Transfers: Independent  Sit to stand: Independent(from EOB multiple times)  Stand to sit: Independent  Vision - Basic Assessment  Prior Vision: Wears glasses all the time  Visual History: No significant visual history  Patient Visual Report: No visual complaint reported.   Cognition  Overall Cognitive Status: WNL  Perception  Overall Perceptual Status: WFL     Sensation  Overall Sensation Status: Impaired  Additional Comments: some decreased sensation to light touch in an L5 distribution distal to the knee; UEs WFL        LUE AROM (degrees)  LUE AROM : WFL  Left Hand AROM (degrees)  Left Hand AROM: WNL  RUE AROM (degrees)  RUE AROM : WFL  Right Hand AROM (degrees)  Right Hand AROM: WNL  LUE Strength  Gross LUE Strength: WFL(NT; WFL for bed mobility and transfers)  L Hand General: NT(WFL)  RUE Strength  Gross RUE Strength: WFL(NT; WFL for bed mobility and transfers)  R Hand General: NT(WFL)                   Plan   Plan  Plan Comment: Discharge OT    AM-PAC Score        AM[facility] Inpatient Daily Activity Raw Score: 24 (06/16/20 1702)  AM-PAC Inpatient ADL T-Scale Score : 57.54 (06/16/20 1702)  ADL Inpatient CMS 0-100% Score: 0 (06/16/20 1702)  ADL Inpatient CMS G-Code Modifier : CH (06/16/20 1702)    Goals: None          Therapy Time   Individual Concurrent Group Co-treatment   Time In 1532         Time Out 1637         Minutes 65               Timed Code Treatment Minutes:  10 min (time adjusted d/t pt working with PT and pt excessive conversation)    Total Treatment Minutes:  Aram 52, [address] 5422, OTR/L, [id number]

## 2020-06-16 NOTE — PROGRESS NOTES
Physical Therapy    Facility/Department: [address] NURSING  Initial Assessment    NAME: Raquel Hernandez  : 1950  MRN: [id number]    Date of Service: 2020    Discharge Recommendations:  Raquel Hernandez scored a 19/24 on the AM-PAC short mobility form. Current research shows that an AM-PAC score of 18 or greater is typically associated with a discharge to the patient's home setting. Based on the patient's AM-PAC score and their current functional mobility deficits, it is recommended that the patient have 2-3 sessions per week of Physical Therapy at d/c to increase the patient's independence. At this time, this patient demonstrates the endurance and safety to discharge home with OP services) and a follow up treatment frequency of 2-3x/wk. Please see assessment section for further patient specific details. If patient discharges prior to next session this note will serve as a discharge summary. Please see below for the latest assessment towards goals. Outpatient PT   PT Equipment Recommendations  Equipment Needed: No    Assessment   Body structures, Functions, Activity limitations: Decreased functional mobility ; Increased pain  Assessment: Patient signficantly limited by pain and with very limited positions of comfort. Patient with poor back mechanics (bending at back several times to retrieve objects from ground, twisting at lumbar spine, quickly lumbar flexion) Education provided   Treatment Diagnosis: decreased functional mobility, impaired gait, increased pain  Prognosis: Good  Decision Making: Medium Complexity  Clinical Presentation: evolving  PT Education: Goals; General Safety;Precautions;PT Role;Disease Specific Education  Patient Education: Provided education on proper posture/back mechanics (limited carryover noted throughout session), dc recommedations - verbalized understanding  Barriers to Learning: none  REQUIRES PT FOLLOW UP: Yes  Activity Tolerance  Activity Tolerance: Patient limited Yes          Orientation  Orientation  Overall Orientation Status: Within Functional Limits  Social/Functional History  Social/Functional History  Lives With: Alone  Home Layout: Two level, Bed/Bath upstairs(14 steps to 2nd level)  Home Access: Stairs to enter without rails(1 step onto porch)  Bathroom Shower/Tub: Walk-in shower  Bathroom Toilet: Standard  Home Equipment: Cane, Crutches  ADL Assistance: Independent  Homemaking Assistance: Independent  Ambulation Assistance: Independent  Transfer Assistance: Independent  Active : Yes  Mode of Transportation: Truck(Limited driving d/t pain recently)  Occupation: Full time employment  Type of occupation: Previously broke horses, some racing, 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 Pt has lost contract d/t back pain/unable to perform work. Leisure & Hobbies: kayaking  Additional Comments: No falls past 6 mos. Objective          AROM RLE (degrees)  RLE AROM: WFL  AROM LLE (degrees)  LLE AROM : WFL  Spine  Lumbar: decreased lordosis noted, patient with difficulty achieving neutral spine due to pain, rests in flexion  Strength RLE  Strength RLE: (unable to formally assess due to pain)  Strength LLE  Strength LLE: WFL     Sensation  Overall Sensation Status: Impaired  Additional Comments: some decreased sensation to light touch in an L5 distribution distal to the knee. Bed mobility  Rolling to Left: Independent  Supine to Sit: Independent  Sit to Supine: Independent  Transfers  Sit to Stand: Independent(from bed x 2, from chair)  Stand to sit: Independent  Ambulation 1  Surface: level tile  Device: No Device  Assistance: Modified Independent  Quality of Gait: initially with significant flexed posture with R hip and knee flexion.  2nd trial with upright posture with slight lateral weight shift to unweight RLE due to pain  Distance: 10' + 15' + 10'     Balance  Sitting - Static: Good  Sitting - Dynamic: Good  Standing - Static: Good (static standing with mod I, able to reach outside of JULEE without difficulty, retrieve objects from ground)  Standing - Dynamic: Good      Patient with significant R hip pain radiating to foot. Able to achieve quadruped but unable to achieve prone due to significant pain. Reports some relief with lumbar extension in stance. Reported some relief with single knee to chest with RLE, increased pain with double knee to chest, unable to perform hip ER. Manually performed single knee to chest on RLE with oscillations x 10 with improvement in pain. Attempted longitudinal manual traction of RLE in supine but patient reported increased R hip pain and unable to fully relax due to fear of pain. Only able to achieve L sidelying briefly with no report of reduction in pain. Significant increased time needed in between changes in position due to pain. When finding positions of comfort, patient with minimal pain relief and short lasting pain reduction. Significant time spent on proper back mechanics, importance of neutral spine specifically avoiding significant lumbar bending/twisting/lifting. Minimal carryover noted throughout session. Plan   Plan  Times per week: 5-7  Times per day: Daily  Current Treatment Recommendations: ROM, Balance Training, Functional Mobility Training, Endurance Training, Gait Training, Stair training, Manual Therapy - Soft Tissue Mobilization, Modalities, Home Exercise Program, Safety Education & Training  Safety Devices  Type of devices: All fall risk precautions in place, Call light within reach, Nurse notified, Patient at risk for falls, Left in bed  Restraints  Initially in place: No      AM-PAC Score  AM-PAC Inpatient Mobility Raw Score : 19 (06/16/20 1703)  AM-PAC Inpatient T-Scale Score : 45.44 (06/16/20 1703)  Mobility Inpatient CMS 0-100% Score: 41.77 (06/16/20 1703)  Mobility Inpatient CMS G-Code Modifier : CK (06/16/20 1703)          Goals  Short term goals  Time Frame for Short term goals:  To be met prior to discharge  Short term goal 1: Ambulate 100 feet without AD independently  Short term goal 2: Navigate up/down 1 flight of stairs with rail and mod I  Patient Goals   Patient goals : to have less pain       Therapy Time   Individual Concurrent Group Co-treatment   Time In 4083         Time Out 1637         Minutes 65         Timed Code Treatment Minutes: 38 Minutes(time removed for increased talking)       Yasmani Gonzalez, VINITA    Thanks, Yasmani Gonzalez PT, DPT [id number]

## 2020-06-16 NOTE — PLAN OF CARE
Problem: Pain:  Goal: Control of acute pain  Description: Control of acute pain  Outcome: Met This Shift  Note: Pt denies CP     Problem: Falls - Risk of:  Goal: Will remain free from falls  Description: Will remain free from falls  Outcome: Met This Shift  Note: Pt is wearing the fall bracelet and yellow nonskid socks. Bed is in lowest position, locked, side rails up 2/4, and call light is within reach. Pt informed of fall risks, verbalizes understanding, and agrees to ask for help to ambulate. Will monitor. Goal: Absence of physical injury  Description: Absence of physical injury  Outcome: Met This Shift     Problem: Cardiovascular  Goal: No DVT, peripheral vascular complications  Outcome: Met This Shift  Goal: Anticoagulate/Hct stable  Outcome: Met This Shift  Goal: Understanding of dietary restrictions  Outcome: Met This Shift  Note: Cardiac diet, NPO at MN. Problem: Respiratory  Goal: O2 Sat > 90%  Outcome: Met This Shift  Note: O2 > 90% on RA. Problem: Skin Integrity/Risk  Goal: No skin breakdown during hospitalization  Outcome: Met This Shift     Problem: Intellectual/Education/Knowledge Deficit  Goal: Teaching initiated upon admission  Outcome: Met This Shift     Problem: Pain:  Goal: Pain level will decrease  Description: Pain level will decrease  Outcome: Ongoing  Goal: Control of chronic pain  Description: Control of chronic pain  Outcome: Ongoing  Note: Pain in hip and leg, chronic.

## 2020-06-16 NOTE — CONSULTS
[facility]   Electrophysiology Consultation   Date: 6/16/2020  Reason for Consultation: Atrial fibrillation   Consult Requesting Physician: Rio Peñaloza MD     Chief Complaint   Patient presents with    Atrial Fibrillation     Pt reports afib - states he has in inplanted monitor, states he has been having runs of afib, states chest pain and SOB     HPI: Radha Duran is a 78 y.o. male with multiple comorbidities who was presented to hospital complaining of severe hip pain. That is his main issues. He also has noticed some palpitation and shortness of breath. He has extensive past medical history of paroxysmal atrial fibrillation, status post AVR with porcine valve, bilateral modified Maze procedure and ligation of left atrial appendage with atrial clip on 1/28/2015. He has nonobstructive coronary artery disease by cath he was a former smoker and has history of obstructive sleep apnea    He was later diagnosed with SCC of the throat and underwent radiation and chemotherapy. Since then he has had issues with swallowing. He complains of dysphagia. He has a loop recorder implanted for further evaluation of his arrhythmia. Was seen in office and had episodes of PAT. Recently started on Xarelto, since he refused Coumadin for his episodes of paroxysmal atrial fibrillation.       Past Medical History:   Diagnosis Date    Aortic valve insufficiency     Arthritis     [facility]     Cancer ([facility] 09/2017    head and neck    Hearing loss     Heart disease     Medical history reviewed with no changes     Obstructive apnea does not use CPAP    Thyroid disease         Past Surgical History:   Procedure Laterality Date    ANKLE SURGERY      AORTIC VALVE REPLACEMENT  1/28/15    Dr. Alyce Graham 25mm Mosaic Ultra porcine valve; right and left modified maze procedure with ligation of DELGADO with Atriclip    BACK SURGERY      CSP    CARDIAC CATHETERIZATION      HERNIA REPAIR      KNEE SURGERY Arthroscopy    LUNG SURGERY      collapsed lung due to broken ribs    MANDIBLE FRACTURE SURGERY      NECK SURGERY      Fusion    TONSILLECTOMY AND ADENOIDECTOMY         Allergies   Allergen Reactions    Naproxen Swelling     Lips    Pt does not recognize this being an intolerance    Hydrocodone-Acetaminophen Itching       Social History:  Reviewed. reports that he quit smoking about 5 years ago. His smoking use included cigarettes. He started smoking about 45 years ago. He has a 40.00 pack-year smoking history. He has never used smokeless tobacco. He reports that he does not drink alcohol or use drugs. Family History:  Reviewed. family history includes Heart Disease in an other family member. Review of System:  All other systems reviewed except for that noted above. Pertinent negatives and positives are:       · General: negative for fever, chills   · Ophthalmic ROS: negative for - eye pain or loss of vision  · ENT ROS: negative for - headaches, sore throat   · Respiratory: negative for - cough, sputum  · Cardiovascular: Reviewed in HPI + palpitation  · Gastrointestinal: negative for - abdominal pain, diarrhea, N/V  · Hematology: negative for - bleeding, blood clots, bruising or jaundice  · Genito-Urinary:  negative for - Dysuria or incontinence  · Musculoskeletal: negative for - Joint swelling, muscle pain + hip pain  · Neurological: negative for - confusion, dizziness, headaches   · Psychiatric: No anxiety, no depression.   · Dermatological: negative for - rash    Physical Examination:  Vitals:    20 1126   BP: 105/72   Pulse: 76   Resp: 16   Temp: 98.5 °F (36.9 °C)   SpO2: 97%      In: 10 [I.V.:10]  Out: -    Wt Readings from Last 3 Encounters:   06/15/20 144 lb 12.8 oz (65.7 kg)   06/10/20 146 lb (66.2 kg)   20 147 lb 14.9 oz (67.1 kg)     Temp  Av.7 °F (36.5 °C)  Min: 97.3 °F (36.3 °C)  Max: 98.5 °F (36.9 °C)  Pulse  Av.8  Min: 76  Max: 119  BP  Min: 88/72  Max: 125/98  SpO2  Av.5 %  Min: 95 %  Max: 98 %    Intake/Output Summary (Last 24 hours) at 2020 1223  Last data filed at 6/15/2020 2206  Gross per 24 hour   Intake 10 ml   Output --   Net 10 ml       · Telemetry: Atrial fibrillation  · Constitutional: Oriented. No distress. · Head: Normocephalic and atraumatic. · Mouth/Throat: Oropharynx is clear and moist.   · Eyes: Conjunctivae normal. EOM are normal.   · Neck: Neck supple. No rigidity. No JVD present. · Cardiovascular: Tachycardic rate, irregular rhythm, F9&M9.  + Faint systolic murmur  · Pulmonary/Chest: Bilateral respiratory sounds. No wheezes, No rhonchi. · Abdominal: Soft. Bowel sounds present. No distension, No tenderness. · Musculoskeletal: No tenderness. No edema    · Lymphadenopathy: Has no cervical adenopathy. · Neurological: Alert and oriented. Cranial nerve appears intact, No Gross deficit   · Skin: Skin is warm and dry. No rash noted. · Psychiatric: Has a normal behavior     Labs, diagnostic and imaging results reviewed. Reviewed.    Recent Labs     06/15/20  1532   *   K 4.0      CO2 24   BUN 12   CREATININE <0.5*     Recent Labs     06/15/20  1330   WBC 7.2   HGB 15.5   HCT 45.8   MCV 97.8        Lab Results   Component Value Date    CKTOTAL 76 2009    CKMB 0.83 2009    TROPONINI <0.01 2020     Lab Results   Component Value Date    BNP <15 2009     Lab Results   Component Value Date    PROTIME 12.9 06/15/2020    PROTIME 14.9 2017    PROTIME 15.3 12/15/2017    PROTIME 28.7 2017    PROTIME 12.2 2017    PROTIME 33.3 10/09/2017    PROTIME NA 2015    INR 1.11 06/15/2020    INR 1.32 2017    INR 1.35 12/15/2017     Lab Results   Component Value Date    CHOL 225 2018    HDL 69 2018    HDL 52 2009    TRIG 80 2018     Echo: Pending    Echo 19  Summary   -Normal left ventricle size, wall thickness, and systolic function with an   estimated ejection fraction of 60-65%.  -No regional wall motion abnormalities are seen.   -The bioprosthetic artificial aortic valve appears well seated with a   maximum velocity of 2.63m/s and a mean gradient of 16 mmHg. The aortic valve  Kenna Handsome is estimated at 0.89 cm^2. No significant regurgitation noted. Aortic   valve leaflets are thickened   -Thickened mitral valve without evidence of stenosis. There is at least   moderate mitral regurgitation noted.   -There is moderate-to-severe tricuspid regurgitation with a RVSP estimation   of 38 mmHg.   -Normal diastolic function. Avg. E/e'=7.25     MCOT: 9/6 - 9/19/18:                             1st degree HB, HT 68                                            PVC burden <1%             PAC burden 1%                                     VT 11 bts at 118                                                            MCOT- 6/2017              Brief AT, doubt afib        Echo: 1/28/2019:   Normal left ventricle size, wall thickness, and systolic function with an   estimated ejection fraction of 60-65%.   No regional wall motion abnormalities are seen.   Normal diastolic function.   The bioprosthetic artificial aortic valve appears well seated with a maximum   velocity of 2.27 m/s, a maximum pressure gradient of 21 mmHg, and a mean   gradient of 12 mmHg.   There is at least moderate severity, centrally-directed mitral regurgitation   noted.   There is moderate tricuspid regurgitation with a PASP estimation of 35-40   mmHg.   The right atrium is mildly dilated.     Stress Echo: 6/22/2017  Summary  Normal stress Echo     Echo: 6/8/2015  Summary   -Normal left ventricle size, wall thickness and systolic function with an   estimated ejection fraction of 60%.  -No regional wall motion abnormalities   are seen.   -Moderate mitral regurgitation.   -A bubble study was performed and fails to show evidence of shunting.   -Normally functioning porcine bioprosthetic valve in aortic position.   Maximum gradient of 22mmhg and a mean gradient of 14mmhg. No aortic   insufficiency.   -Moderate tricuspid regurgitation with RVSP estimated at 37 mmHg.     Scheduled Meds:   docusate sodium  100 mg Oral BID    rivaroxaban  20 mg Oral Dinner    methocarbamol  750 mg Oral TID    aspirin  81 mg Oral Daily    dilTIAZem  30 mg Oral Daily    digoxin  125 mcg Oral Daily    levothyroxine  100 mcg Oral QAM AC    sodium chloride flush  10 mL Intravenous 2 times per day     Continuous Infusions:  PRN Meds:.cyclobenzaprine, oxyCODONE-acetaminophen, sodium chloride flush, acetaminophen **OR** acetaminophen, polyethylene glycol, promethazine **OR** ondansetron, perflutren lipid microspheres     Patient Active Problem List    Diagnosis Date Noted    S/P AVR      Priority: High    Rapid atrial fibrillation ([facility]     [facility] 06/15/2020    Cervical stenosis of spine 03/09/2020    Metastatic squamous cell carcinoma ([facility] 02/18/2020    Encounter for electronic analysis of reveal event recorder 04/29/2019    Palpitation     Chronic obstructive pulmonary disease ([address] 02/12/2019    Sensorineural hearing loss (SNHL) of both ears 01/29/2019    History of tobacco abuse 04/27/2018    Squamous cell carcinoma of head and neck ([facility] 12/14/2017    Chronic pain due to neoplasm 12/14/2017    Refusal of blood transfusions as patient is [place] 12/14/2017    Osteoarthritis of cervical spine 03/21/2016    Primary osteoarthritis of both knees 03/21/2016    Obstructive sleep apnea syndrome     Transient ischemic attack (TIA)     Chronic ischemic heart disease     Essential hypertension     Mixed hyperlipidemia 02/27/2015    PAF (paroxysmal atrial fibrillation) ([facility] 01/24/2015      Active Hospital Problems    Diagnosis Date Noted    S/P AVR [Z95.2]      Priority: High    Rapid atrial fibrillation (HCC) [I48.91]     Atrial fibrillation with RVR ([address] [I48.91] 06/15/2020    Metastatic squamous cell carcinoma ([facility] [C79.9] 02/18/2020    Encounter for electronic analysis of reveal event recorder [Z45.09] 04/29/2019    Chronic obstructive pulmonary disease ([facility] [J44.9] 02/12/2019    Squamous 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) [C76.0] 12/14/2017    Refusal of blood transfusions as patient is [place] [Z53.1] 12/14/2017    Chronic pain due to neoplasm [G89.3] 12/14/2017    Obstructive sleep apnea syndrome [G47.33]     Transient ischemic attack (TIA) [G45.9]     Essential hypertension [I10]     Chronic ischemic heart disease [I25.9]     PAF (paroxysmal atrial fibrillation) ([facility] [I48.0] 01/24/2015       Assessment:     - Atrial fibrillation with rapid ventricular rates:    Patient has remained in atrial fibrillation. Heart rate is under better control. Interrogation of loop recorder revealed episodes of atrial fibrillation in progress since 6/15/2020      His blood pressure is borderline cannot add further antiarrhythmic therapy or rate control medications   Continue with digoxin   Continue with diltiazem     I discussed his options including ablation of atrial fibrillation. Since his blood pressure is borderline and he cannot tolerate further antiarrhythmic therapy ablation would be his best option. He has been started on Xarelto, however DOACs are not approved for patient with prosthetic heart valve. Discussed switching to Coumadin and he is agreeable. He has left atrial appendage atrial clip however this needs to be confirmed by CHIKIS or CT scan. Since he has dysphasia CHIKIS is not recommended at this time. Will get a cardiac CTA to assess for left atrial appendage closure and also preparing for atrial fibrillation ablation. If cardiac CTA shows elimination of left atrial appendage, will consider cardioversion tomorrow. - S/p Prosthetic AV    Echo pending.     - Hip pain:    This is his main complaint.   Patient complains of severe

## 2020-06-16 NOTE — CONSULTS
Clinical Pharmacy Note: Pharmacy to Dose Warfarin    Pharmacy consulted by ISIDRA Zaman CNP to dose warfarin. Mary Ruiz is a 78 y.o. male  is receiving warfarin for indication: Afib. INR Goal Range: 2-3  Prior to admission warfarin dosing regimen: Patient was on Xarelto prior to admission  INR today:   Lab Results   Component Value Date    INR 1.11 06/15/2020       Assessment/Plan:    Based on today's assessment, dose warfarin 5mg daily.  Will bridge with lovenox until INR is therapeutic.  Xarelto order has been discontinued.  Daily INR is ordered. Pharmacy will continue to monitor and make adjustments to regimen as necessary.      Thank you for the consult,     Bryan Diaz, PharmD, [phone] Pharmacist  [id number]

## 2020-06-16 NOTE — ACP (ADVANCE CARE PLANNING)
Advanced Care Planning Note. Purpose of Encounter: Advanced care planning in light of squamous cell cancer of head and neck  Parties In Attendance: Patient  Decisional Capacity: Yes  Subjective: Patient with palpitations  Objective: Cr < 0.5  Goals of Care Determination: Patient wants full support (CPR, vent, surgery, HD, trach, PEG)  Plan:  Cardio consult. Coumadin. Cardiac CTA  Code Status: Full code   Time spent on Advanced care Plannin minutes  Advanced Care Planning Documents: Completed advanced directives on chart, daughter is the POA.     Idalmis Koehler MD  2020 8:12 AM

## 2020-06-16 NOTE — PROGRESS NOTES
4 Eyes Skin Assessment     The patient is being assess for  Admission    I agree that 2 RN's have performed a thorough Head to Toe Skin Assessment on the patient. ALL assessment sites listed below have been assessed. Areas assessed by both nurses:   [x]   Head, Face, and Ears   [x]   Shoulders, Back, and Chest  [x]   Arms, Elbows, and Hands   [x]   Coccyx, Sacrum, and IschIum  [x]   Legs, Feet, and Heels        Does the Patient have Skin Breakdown?   No         Theo Prevention initiated:  No   Wound Care Orders initiated:  No      [facility] nurse consulted for Pressure Injury (Stage 3,4, Unstageable, DTI, NWPT, and Complex wounds), New and Established Ostomies:  No      Nurse 1 eSignature: Electronically signed by Aguila Melendez RN on 6/16/20 at 12:54 AM EDT    **SHARE this note so that the co-signing nurse is able to place an eSignature**    Nurse 2 eSignature: Electronically signed by Gi Torres RN on 6/16/20 at 12:56 AM EDT

## 2020-06-16 NOTE — PROGRESS NOTES
visit. Allergies: Allergies   Allergen Reactions    Naproxen Swelling     Lips    Pt does not recognize this being an intolerance    Hydrocodone-Acetaminophen Itching     Home Meds:  Prior to Visit Medications    Medication Sig Taking? Authorizing Provider   aspirin 81 MG EC tablet Take 81 mg by mouth daily Yes Historical Provider, MD   dilTIAZem (CARDIZEM) 30 MG tablet Take 30 mg by mouth daily Yes Historical Provider, MD   oxyCODONE-acetaminophen (PERCOCET) 5-325 MG per tablet Take 1 tablet by mouth every 8 hours as needed for Pain for up to 30 days. Intended supply: 3 days. Take lowest dose possible to manage pain Yes Carol Mtz MD   rivaroxaban (XARELTO) 20 MG TABS tablet Take 1 tablet by mouth Daily with supper Yes ISIDRA Zuniga - CNP   methocarbamol (ROBAXIN-750) 750 MG tablet Take 1 tablet by mouth 3 times daily for 14 days Yes JEN Up   levothyroxine (SYNTHROID) 100 MCG tablet TAKE 1 TABLET BY MOUTH EVERY DAY Yes Carol Mtz MD   Cod Liver Oil OIL Take 1 tablet by mouth daily  Yes Historical Provider, MD   cyclobenzaprine (FLEXERIL) 10 MG tablet Take 10 mg by mouth 2 times daily as needed  Yes Historical Provider, MD   docusate sodium (COLACE) 100 MG capsule Take 100 mg by mouth 2 times daily. Yes Historical Provider, MD      Past Medical History:  Past Medical History:   Diagnosis Date    Aortic valve insufficiency     Arthritis     [facility]     Cancer ([facility] 09/2017    head and neck    Hearing loss     Heart disease     Medical history reviewed with no changes     Obstructive apnea does not use CPAP    Thyroid disease       Past Surgical History:    has a past surgical history that includes Lung surgery; knee surgery; Neck surgery; hernia repair; Aortic valve replacement (1/28/15); Cardiac catheterization; Tonsillectomy and adenoidectomy; Mandible fracture surgery; Ankle surgery; and back surgery. Social History:  Reviewed.   reports that he quit smoking about 5 years ago. His smoking use included cigarettes. He started smoking about 45 years ago. He has a 40.00 pack-year smoking history. He has never used smokeless tobacco. He reports that he does not drink alcohol or use drugs. Family History:  Reviewed. family history includes Heart Disease in an other family member. Review of System:  · Constitutional: Negative for fever, night sweats, chills, weight changes, or weakness  · Skin: Negative for rash, dry skin, pruritus, bruising, bleeding, blood clots, or changes in skin pigment  · HEENT: Negative for vision changes, ringing in the ears, sore throat, dysphagia, or swollen lymph nodes  · Respiratory: Reviewed in HPI  · Cardiovascular: Reviewed in HPI  · Gastrointestinal: Negative for abdominal pain, N/V/D, constipation, or black/tarry stools  · Genito-Urinary: Negative for dysuria, incontinence, urgency, or hematuria  · Musculoskeletal: Positive for back/neck pain (chronic). Negative for joint swelling, muscle pain, or injuries  · Neurological/Psych: Negative for confusion, seizures, headaches, balance issues or TIA-like symptoms. No anxiety, depression, or insomnia    Physical Examination:  Vitals:    06/16/20 0830   BP: 103/70   Pulse: 83   Resp: 16   Temp: 97.3 °F (36.3 °C)   SpO2: 95%      In: 10 [I.V.:10]  Out: -    Wt Readings from Last 3 Encounters:   06/15/20 144 lb 12.8 oz (65.7 kg)   06/10/20 146 lb (66.2 kg)   06/08/20 147 lb 14.9 oz (67.1 kg)       Telemetry: Personally Reviewed  - AF  · Constitutional: Cooperative and in no apparent distress, and appears well nourished  · Skin: Warm and pink; no pallor, cyanosis, bruising, or clubbing  · HEENT: Symmetric and normocephalic. PERRL, EOM intact. Conjunctiva pink with clear sclera. Mucus membranes pink and moist. Teeth intact. Thyroid smooth without nodules or goiter. · Cardiovascular: Mildly tachycardic rate and irregular rhythm.  S1/S2 present without murmurs, rubs, or seen.   -The bioprosthetic artificial aortic valve appears well seated with a maximum velocity of 2.63m/s and a mean gradient of 16 mmHg. The aortic valve area is estimated at 0.89 cm^2. No significant regurgitation noted. Aortic valve leaflets are thickened   -Thickened mitral valve without evidence of stenosis. There is at least moderate mitral regurgitation noted.   -There is moderate-to-severe tricuspid regurgitation with a RVSP estimation of 38 mmHg.   -Normal diastolic function. Avg. E/e'=7.25     GXT: 10/15  Normal myocardial perfusion.   Normal LV function. [facility] 6/17 ([facility])  Normal stress echocardiogram study. Cath: 1/27/15:  Anatomy:   LM- normal  LAD-40% prox, Ca2+. FFR 0.89  Cx-normal  OM1- normal  RCA-normal  RPDA- normal  LVEF- 55%     Ao Root: 3+-4+ AI, mild calcification in the aortic      Impression:  1. Mild non-obstructive Ca2+  2.  Normal LV systolic function    MCOT: 9/18  , 1st degree HB, HT 68                                PVC burden <1%, PAC burden 1%                         One run of NSVT       Problem List:   Patient Active Problem List    Diagnosis Date Noted    S/P AVR      Priority: High    Atrial fibrillation with RVR ([address] 06/15/2020    Cervical stenosis of spine 03/09/2020    Metastatic squamous cell carcinoma ([address] 02/18/2020    Encounter for electronic analysis of reveal event recorder 04/29/2019    Palpitation     Chronic obstructive pulmonary disease ([address] 02/12/2019    Sensorineural hearing loss (SNHL) of both ears 01/29/2019    History of tobacco abuse 04/27/2018    Squamous cell carcinoma of head and neck ([address] 12/14/2017    Chronic pain due to neoplasm 12/14/2017    Refusal of blood transfusions as patient is [employer] 12/14/2017    Osteoarthritis of cervical spine 03/21/2016    Primary osteoarthritis of both knees 03/21/2016    Obstructive sleep apnea syndrome     Transient ischemic attack (TIA)     Chronic ischemic heart disease     Essential hypertension     Mixed hyperlipidemia 02/27/2015    PAF (paroxysmal atrial fibrillation) ([facility] 01/24/2015        Assessment and Plan:     1. Paroxysmal Atrial Fibrillation   - S/p MAZE and DELGADO ligation with Atriclip (2015)      - Currently in AF, rate mildly elevated   - Continue cardizem 30 mg BID   - Started on digoxin today by hospitalist service               ~ Limited anti-arrhythmic options given his low blood pressure      - [id number] score: 2 (Age, CAD) ; [id number] Vasc score and anticoagulation discussed. High risk for stroke and thromboembolism. Anticoagulation is recommended. Risk of bleeding was discussed. ~ Started on Xarelto 20 mg QD last week; tolerating well thus far    ~ Previously on coumadin, but stopped due to GI bleeeding.       - Afib risk factors including age, HTN, obesity, inactivity and CHRISTOPHER were discussed with patient. Risk factor modification recommended               ~ TSH 3.49 (11/19)                  - Treatment options including cardioversion, rate control strategy, antiarrhythmics, anticoagulation and possible ablation were discussed with patient. Risks, benefits and alternative of each treatment options were explained. All questions answered                          ~ Pt will consider ablation if his atrial fibrillation worsens     2. Implantable Loop Recorder   - S/p ILR insertion on 4/19   - Will check now     3. Chest pain, SOB   - Troponins negative   - Stress echo in 2017 normal   - Previous 40% LAD on LHC (2015)     - No chest pain or SOB currently   - Will discuss with Dr. Cierra Mendez on repeat GXT vs [facility]    4. Aortic Insufficiency               - S/p AVR porcine valve right/left modified maze procedure with DELGADO ligation with Atriclip (1/28/15, Dr. Judy Matt)              - Stable     5.  Mitral Regurgitation              - Moderate per echo (6/19)   - May be contributing to worsening AF              - Repeat echo pending                          ~ If worsening, will need to switch to Warfarin and need surgical consult    All pertinent information and plan of care discussed with the EP physician. Dr. Alberto Wheeler to round and see pt shortly. All questions and concerns were addressed to the patient/family. Alternatives to my treatment were discussed. I have discussed the above stated plan and the patient verbalized understanding and agreed with the plan. Discussed plan with patient and nurse. Thank you for allowing to us to participate in the care of Toya Bertrand.     Catia Molina, APRN-CNP  [facility], [place]   Office: [phone]

## 2020-06-16 NOTE — PROGRESS NOTES
[facility] Orthopedic Surgery  Consult Note    Patient: Christen Whitehead Date: 6/15/2020  Requesting Physician: ISIDRA Pack  Room: [address] [place], [place] [id number]    Chief complaint: Right leg pain. HPI: Maria Elena Machado is a 78 y.o. male who presented to [facility] yesterday with complaints of ongoing right leg and back pain as well as concerns for A-fib with some SOB. Denies any injuries. Is potentially undergoing cardioversion tomorrow with Dr. Rosa Pool. Describes pain in the low back that extends down the right leg in an L5 distribution with associated numbness in his calf. He reports some subjective weakness with his right leg that he acknowledges is secondary to the pain. He has been dealing with this for about two weeks now. He denies bowel or bladder dysfunction or saddle anesthesia. 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 Unfortunately he has not been able to continue this line of work given his ongoing symptoms. He tried a medrol dosepak from the ER on 6/6 without much relief. Has been taking Robaxin with limited effect. Was given some home exercises but had difficulty understanding them/following through. He does find a yoga stretch that seems to help a fair amount. He recently had an MRI of the lumbar spine ordered from Dr. Mandy Roberson office, but has not been able to follow-up with them as of yet to review the results. He underwent injections in his neck previously with excellent relief for 1-2 months. He is considering transitioning his care to another spine provider. Imaging review of the lumbar spine (MRI from 6/12/20) demonstrated: right > left foraminal narrowing L4-5 and L5-S1 due to broad-based disc bulges and facet hypertrophy. Patient uses no assistive devices to ambulate at baseline.       Medical History:  Past Medical History:   Diagnosis Date    Aortic valve insufficiency     Arthritis     Atrial fibrillation ([address]     [facility]    head and neck    Hearing loss     Heart disease     Medical history reviewed with no changes     Obstructive apnea does not use CPAP    Thyroid disease      Past Surgical History:   Procedure Laterality Date    ANKLE SURGERY      AORTIC VALVE REPLACEMENT  1/28/15    Dr. Jaimie Jordan 25mm Mosaic Ultra porcine valve; right and left modified maze procedure with ligation of DELGADO with Atriclip    BACK SURGERY      CSP    CARDIAC CATHETERIZATION      HERNIA REPAIR      KNEE SURGERY      Arthroscopy    LUNG SURGERY      collapsed lung due to broken ribs    MANDIBLE FRACTURE SURGERY      NECK SURGERY      Fusion    TONSILLECTOMY AND ADENOIDECTOMY         Social History:    reports that he quit smoking about 5 years ago. His smoking use included cigarettes. He started smoking about 45 years ago. He has a 40.00 pack-year smoking history. He has never used smokeless tobacco.    Family History:        Problem Relation Age of Onset    Heart Disease Other     High Blood Pressure Neg Hx     High Cholesterol Neg Hx        Medications:  ALL MEDICATIONS HAVE BEEN REVIEWED:  Scheduled:   warfarin  5 mg Oral Daily    predniSONE  40 mg Oral Daily    docusate sodium  100 mg Oral BID    methocarbamol  750 mg Oral TID    aspirin  81 mg Oral Daily    dilTIAZem  30 mg Oral Daily    digoxin  125 mcg Oral Daily    levothyroxine  100 mcg Oral QAM AC    sodium chloride flush  10 mL Intravenous 2 times per day     Continuous:  PRN:cyclobenzaprine, oxyCODONE-acetaminophen, sodium chloride flush, acetaminophen **OR** acetaminophen, polyethylene glycol, promethazine **OR** ondansetron, perflutren lipid microspheres    Allergies: Allergies   Allergen Reactions    Naproxen Swelling     Lips    Pt does not recognize this being an intolerance    Hydrocodone-Acetaminophen Itching       Review of Systems:  Constitutional: Negative for fever, chills, fatigue.    Skin:  Negative for pruritis, rash  Eyes: Negative for photophobia and visual disturbance. ENT:  Negative for rhinorrhea, epistaxis, sore throat  Respiratory:  Negative for cough and shortness of breath. Cardiovascular: Negative for chest pain. Gastrointestinal: Negative for nausea, vomiting, diarrhea. Genitourinary: Negative for dysuria and difficulty urinating. Neurological: Negative for confusion, dysarthria, tremors, seizures. Psychiatric:  Negative for depression or anxiety  Musculoskeletal:  Positive for right leg pain. Objective:  Vitals:    06/16/20 1126   BP: 105/72   Pulse: 76   Resp: 16   Temp: 98.5 °F (36.9 °C)   SpO2: 97%      Physical Examination:  GENERAL: No apparent distress, well-nourished  SKIN:  Warm and dry  EYES: Nonicteric. ENT: Mucous membranes moist  HEAD: Normocephalic, atraumatic  RESPIRATORY: Resp easy and unlabored  CARDIOVASCULAR: Regular rate and rhythm  GI: Abdomen soft, nontender  NEURO: Awake and alert. No speech defect  PSYCHIATRIC: Appropriate affect; not agitated  MUSCULOSKELETAL:  Spine/legs  Inspection:No lesions, ulcerations, rashes, erythema noted in spine or legs. Decreased ROM of lumbar spine and hip secondary to pain. Mild tenderness to palption about the lumbar spine. Motor: Intact DF/PF. He is hesitant to full participate in exam 2/2 pain, but after some encouragement has 5-/5 strength bilaterally in all muscle groups. Sensation: He does have some decreased sensation to light touch in an L5 distribution distal to the knee. Otherwise SILT throughout bilateral legs. Vascular:  2+ pedal pulse bilaterally.      Labs reviewed:  Recent Labs     06/15/20  1330   WBC 7.2   HGB 15.5   HCT 45.8        Recent Labs     06/15/20  1532   *   K 4.0      CO2 24   BUN 12   CREATININE <0.5*   GLUCOSE 95   CALCIUM 9.3     Recent Labs     06/15/20  1532   INR 1.11   PROTIME 12.9       Lab Results   Component Value Date    COLORU Yellow 01/27/2015    CLARITYU Clear 01/27/2015    PHUR 5.0 02/12/2019 GLUCOSEU Negative 01/27/2015    BLOODU Negative 01/27/2015    LEUKOCYTESUR Negative 01/27/2015    BILIRUBINUR Negative 01/27/2015    UROBILINOGEN 2.0 (A) 01/27/2015       Imaging:  XR CHEST STANDARD (2 VW)   Final Result   No evidence of acute cardiopulmonary disease. CTA CARDIAC [facility]    (Results Pending)     IMPRESSION:  Right sided L5-S1 radiculitis  Principal Problem:    Atrial fibrillation with RVR (HCC)  Active Problems:    S/P AVR    PAF (paroxysmal atrial fibrillation) (HCC)    Transient ischemic attack (TIA)    Chronic ischemic heart disease    Essential hypertension    Obstructive sleep apnea syndrome    Squamous 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)    Chronic pain due to neoplasm    Refusal of blood transfusions as patient is [employer]    Chronic obstructive pulmonary disease ([facility]    Encounter for electronic analysis of reveal event recorder    Metastatic squamous cell carcinoma (HCC)    Rapid atrial fibrillation ([facility]  Resolved Problems:    * No resolved hospital problems. *    RECOMMENDATIONS:  I reviewed his MRI of the lumbar spine with him personally. - Prednisone burst 40mg daily x2, 30mg x2, 20mg x2, 10mg x2 (discussed with Dr. Karina Finley and Marion Ards NP)  - Continue robaxin and percocet prn  - PT/OT to review desensitization techniques. - Activity modification  - If not improving, could consider gabapentin and ultimately ALCIDES (only available as an outpatient). Follow-up as an outpatient with Dr. Leonora Tyson as scheduled on 6/19 @ 8:45AM.  Ortho to sign off. Patient does not use tobacco products.     ISIDRA Faith-CNP  6/16/2020  1:51 PM

## 2020-06-16 NOTE — PROGRESS NOTES
Pt admitted to room 3325 from ED. VSS. Pt A&Ox4. POC updated with pt, all questions answered. Oriented pt to room and call light. Call light and bedside table within reach. Instructed to call out with any needs, v/u. Will monitor.

## 2020-06-16 NOTE — ED NOTES
PT attached to tele monitor.   Will transport to floor via [id number], RN  06/15/20 2015

## 2020-06-17 ENCOUNTER — APPOINTMENT (OUTPATIENT)
Dept: CT IMAGING | Age: 70
DRG: 310 | End: 2020-06-17
Payer: COMMERCIAL

## 2020-06-17 ENCOUNTER — TELEPHONE (OUTPATIENT)
Dept: PHARMACY | Age: 70
End: 2020-06-17

## 2020-06-17 LAB
ANION GAP SERPL CALCULATED.3IONS-SCNC: 9 MMOL/L (ref 3–16)
BASOPHILS ABSOLUTE: 0 K/UL (ref 0–0.2)
BASOPHILS RELATIVE PERCENT: 0.6 %
BUN BLDV-MCNC: 13 MG/DL (ref 7–20)
CALCIUM SERPL-MCNC: 9.3 MG/DL (ref 8.3–10.6)
CHLORIDE BLD-SCNC: 102 MMOL/L (ref 99–110)
CO2: 25 MMOL/L (ref 21–32)
CREAT SERPL-MCNC: 0.5 MG/DL (ref 0.8–1.3)
EOSINOPHILS ABSOLUTE: 0 K/UL (ref 0–0.6)
EOSINOPHILS RELATIVE PERCENT: 0 %
GFR AFRICAN AMERICAN: >60
GFR NON-AFRICAN AMERICAN: >60
GLUCOSE BLD-MCNC: 142 MG/DL (ref 70–99)
HCT VFR BLD CALC: 44.5 % (ref 40.5–52.5)
HEMOGLOBIN: 15.2 G/DL (ref 13.5–17.5)
INR BLD: 1.14 (ref 0.86–1.14)
LYMPHOCYTES ABSOLUTE: 0.8 K/UL (ref 1–5.1)
LYMPHOCYTES RELATIVE PERCENT: 13.1 %
MAGNESIUM: 2.3 MG/DL (ref 1.8–2.4)
MCH RBC QN AUTO: 33.4 PG (ref 26–34)
MCHC RBC AUTO-ENTMCNC: 34.1 G/DL (ref 31–36)
MCV RBC AUTO: 98.1 FL (ref 80–100)
MONOCYTES ABSOLUTE: 0.4 K/UL (ref 0–1.3)
MONOCYTES RELATIVE PERCENT: 5.7 %
NEUTROPHILS ABSOLUTE: 5 K/UL (ref 1.7–7.7)
NEUTROPHILS RELATIVE PERCENT: 80.6 %
PDW BLD-RTO: 13.6 % (ref 12.4–15.4)
PLATELET # BLD: 189 K/UL (ref 135–450)
PMV BLD AUTO: 7.4 FL (ref 5–10.5)
POTASSIUM SERPL-SCNC: 4.2 MMOL/L (ref 3.5–5.1)
PROTHROMBIN TIME: 13.2 SEC (ref 10–13.2)
RBC # BLD: 4.54 M/UL (ref 4.2–5.9)
SODIUM BLD-SCNC: 136 MMOL/L (ref 136–145)
WBC # BLD: 6.2 K/UL (ref 4–11)

## 2020-06-17 PROCEDURE — 6370000000 HC RX 637 (ALT 250 FOR IP): Performed by: INTERNAL MEDICINE

## 2020-06-17 PROCEDURE — 99233 SBSQ HOSP IP/OBS HIGH 50: CPT | Performed by: INTERNAL MEDICINE

## 2020-06-17 PROCEDURE — 6370000000 HC RX 637 (ALT 250 FOR IP): Performed by: NURSE PRACTITIONER

## 2020-06-17 PROCEDURE — 2500000003 HC RX 250 WO HCPCS

## 2020-06-17 PROCEDURE — 75574 CT ANGIO HRT W/3D IMAGE: CPT

## 2020-06-17 PROCEDURE — 2580000003 HC RX 258: Performed by: INTERNAL MEDICINE

## 2020-06-17 PROCEDURE — 80048 BASIC METABOLIC PNL TOTAL CA: CPT

## 2020-06-17 PROCEDURE — 85025 COMPLETE CBC W/AUTO DIFF WBC: CPT

## 2020-06-17 PROCEDURE — 99152 MOD SED SAME PHYS/QHP 5/>YRS: CPT | Performed by: INTERNAL MEDICINE

## 2020-06-17 PROCEDURE — 36415 COLL VENOUS BLD VENIPUNCTURE: CPT

## 2020-06-17 PROCEDURE — 6360000002 HC RX W HCPCS: Performed by: INTERNAL MEDICINE

## 2020-06-17 PROCEDURE — 1200000000 HC SEMI PRIVATE

## 2020-06-17 PROCEDURE — 92960 CARDIOVERSION ELECTRIC EXT: CPT

## 2020-06-17 PROCEDURE — 85610 PROTHROMBIN TIME: CPT

## 2020-06-17 PROCEDURE — 83735 ASSAY OF MAGNESIUM: CPT

## 2020-06-17 PROCEDURE — 6360000004 HC RX CONTRAST MEDICATION: Performed by: INTERNAL MEDICINE

## 2020-06-17 PROCEDURE — 93005 ELECTROCARDIOGRAM TRACING: CPT | Performed by: INTERNAL MEDICINE

## 2020-06-17 PROCEDURE — 7100000010 HC PHASE II RECOVERY - FIRST 15 MIN

## 2020-06-17 PROCEDURE — 92960 CARDIOVERSION ELECTRIC EXT: CPT | Performed by: INTERNAL MEDICINE

## 2020-06-17 PROCEDURE — 5A2204Z RESTORATION OF CARDIAC RHYTHM, SINGLE: ICD-10-PCS | Performed by: INTERNAL MEDICINE

## 2020-06-17 RX ORDER — DILTIAZEM HYDROCHLORIDE 60 MG/1
60 CAPSULE, EXTENDED RELEASE ORAL 2 TIMES DAILY
Status: DISCONTINUED | OUTPATIENT
Start: 2020-06-17 | End: 2020-06-18 | Stop reason: HOSPADM

## 2020-06-17 RX ORDER — LORAZEPAM 2 MG/ML
1 INJECTION INTRAMUSCULAR ONCE
Status: COMPLETED | OUTPATIENT
Start: 2020-06-17 | End: 2020-06-17

## 2020-06-17 RX ORDER — HYDROMORPHONE HYDROCHLORIDE 1 MG/ML
1 INJECTION, SOLUTION INTRAMUSCULAR; INTRAVENOUS; SUBCUTANEOUS ONCE
Status: COMPLETED | OUTPATIENT
Start: 2020-06-17 | End: 2020-06-17

## 2020-06-17 RX ADMIN — Medication 10 ML: at 22:25

## 2020-06-17 RX ADMIN — PREDNISONE 40 MG: 20 TABLET ORAL at 08:17

## 2020-06-17 RX ADMIN — METHOCARBAMOL TABLETS 750 MG: 750 TABLET, COATED ORAL at 14:02

## 2020-06-17 RX ADMIN — DOCUSATE SODIUM 100 MG: 100 CAPSULE ORAL at 22:22

## 2020-06-17 RX ADMIN — HYDROMORPHONE HYDROCHLORIDE 1 MG: 1 INJECTION, SOLUTION INTRAMUSCULAR; INTRAVENOUS; SUBCUTANEOUS at 11:42

## 2020-06-17 RX ADMIN — LORAZEPAM 1 MG: 2 INJECTION INTRAMUSCULAR; INTRAVENOUS at 10:48

## 2020-06-17 RX ADMIN — METHOCARBAMOL TABLETS 750 MG: 750 TABLET, COATED ORAL at 22:22

## 2020-06-17 RX ADMIN — OXYCODONE HYDROCHLORIDE AND ACETAMINOPHEN 1 TABLET: 5; 325 TABLET ORAL at 08:16

## 2020-06-17 RX ADMIN — METHOCARBAMOL TABLETS 750 MG: 750 TABLET, COATED ORAL at 08:17

## 2020-06-17 RX ADMIN — LEVOTHYROXINE SODIUM 100 MCG: 0.1 TABLET ORAL at 05:28

## 2020-06-17 RX ADMIN — IOPAMIDOL 100 ML: 755 INJECTION, SOLUTION INTRAVENOUS at 11:42

## 2020-06-17 RX ADMIN — ENOXAPARIN SODIUM 70 MG: 80 INJECTION SUBCUTANEOUS at 08:52

## 2020-06-17 RX ADMIN — DILTIAZEM HYDROCHLORIDE 30 MG: 30 TABLET, FILM COATED ORAL at 00:29

## 2020-06-17 RX ADMIN — DOCUSATE SODIUM 100 MG: 100 CAPSULE ORAL at 08:17

## 2020-06-17 RX ADMIN — DIGOXIN 125 MCG: 125 TABLET ORAL at 08:17

## 2020-06-17 RX ADMIN — Medication 10 ML: at 08:18

## 2020-06-17 RX ADMIN — DILTIAZEM HYDROCHLORIDE 30 MG: 30 TABLET, FILM COATED ORAL at 14:02

## 2020-06-17 RX ADMIN — ASPIRIN 81 MG: 81 TABLET, COATED ORAL at 08:16

## 2020-06-17 RX ADMIN — WARFARIN SODIUM 5 MG: 5 TABLET ORAL at 17:54

## 2020-06-17 RX ADMIN — OXYCODONE HYDROCHLORIDE AND ACETAMINOPHEN 1 TABLET: 5; 325 TABLET ORAL at 19:53

## 2020-06-17 RX ADMIN — DILTIAZEM HYDROCHLORIDE 60 MG: 60 CAPSULE, EXTENDED RELEASE ORAL at 22:22

## 2020-06-17 RX ADMIN — ENOXAPARIN SODIUM 70 MG: 80 INJECTION SUBCUTANEOUS at 22:22

## 2020-06-17 ASSESSMENT — PAIN DESCRIPTION - ONSET
ONSET: ON-GOING

## 2020-06-17 ASSESSMENT — PAIN DESCRIPTION - LOCATION
LOCATION: HIP
LOCATION: HIP;LEG

## 2020-06-17 ASSESSMENT — PAIN DESCRIPTION - FREQUENCY
FREQUENCY: INTERMITTENT

## 2020-06-17 ASSESSMENT — PAIN SCALES - GENERAL
PAINLEVEL_OUTOF10: 3
PAINLEVEL_OUTOF10: 10
PAINLEVEL_OUTOF10: 3
PAINLEVEL_OUTOF10: 6
PAINLEVEL_OUTOF10: 3
PAINLEVEL_OUTOF10: 3
PAINLEVEL_OUTOF10: 7
PAINLEVEL_OUTOF10: 3
PAINLEVEL_OUTOF10: 3
PAINLEVEL_OUTOF10: 7

## 2020-06-17 ASSESSMENT — PAIN DESCRIPTION - DESCRIPTORS
DESCRIPTORS: ACHING;CRAMPING

## 2020-06-17 ASSESSMENT — PAIN DESCRIPTION - PAIN TYPE
TYPE: CHRONIC PAIN

## 2020-06-17 ASSESSMENT — PAIN DESCRIPTION - PROGRESSION
CLINICAL_PROGRESSION: NOT CHANGED

## 2020-06-17 ASSESSMENT — PAIN DESCRIPTION - ORIENTATION
ORIENTATION: RIGHT

## 2020-06-17 ASSESSMENT — PAIN - FUNCTIONAL ASSESSMENT
PAIN_FUNCTIONAL_ASSESSMENT: ACTIVITIES ARE NOT PREVENTED

## 2020-06-17 NOTE — PROGRESS NOTES
Called down to CT, patient unable to lay flat due to sciatica. Attempted patient's biofreeze, no effect.   Order placed per Dr. Barber Reid for dilaudid, given in CT

## 2020-06-17 NOTE — PROCEDURES
[facility]     Electrophysiology Procedure Note       Date of Procedure: 6/17/2020  Patient's Name: Raquel Hernandez  YOB: 1950   Medical Record Number: [id number]  Procedure Performed by: Darshan Puckett MD    Procedures performed:  IV sedation. External Electrical cardioversion     Indication of the procedure: Persistent atrial fibrillation     Details of procedure: The patient was brought to the cath lab area in a fasting and non-sedated state. The risks, benefits and alternatives of the procedure were discussed with the patient. The patient opted to proceed with the procedure. Written informed consent was signed and placed in the chart. A timeout protocol was completed to identify the patient and the procedure being performed. Patient is on chronic anticoagulation therapy. Then we used brevital for sedation and electrical DC cardioversion was perfomred using 200J, synchronized shock. Patient was converted to sinus rhythm. The patient tolerated the procedure well and there were no complications. Conclusion:   Successful external DC cardioversion of atrial fibrillation.

## 2020-06-17 NOTE — PROGRESS NOTES
H&P Update    I have reviewed the history and physical and examined the patient and updated with relevant changes. Consent: I have discussed with the patient and/or the patient representative the indication, alternatives, and the possible risks and/or complications of the planned procedure and the anesthesia methods. The patient and/or patient representative appear to understand and agree to proceed. Vitals:    06/17/20 1222   BP: 114/87   Pulse: 113   Resp: 14   Temp: 97.8 °F (36.6 °C)   SpO2: 98%     Prior to Admission medications    Medication Sig Start Date End Date Taking? Authorizing Provider   aspirin 81 MG EC tablet Take 81 mg by mouth daily   Yes Historical Provider, MD   dilTIAZem (CARDIZEM) 30 MG tablet Take 30 mg by mouth daily   Yes Historical Provider, MD   oxyCODONE-acetaminophen (PERCOCET) 5-325 MG per tablet Take 1 tablet by mouth every 8 hours as needed for Pain for up to 30 days. Intended supply: 3 days. Take lowest dose possible to manage pain 6/15/20 7/15/20 Yes Fletcher Reddy MD   rivaroxaban (XARELTO) 20 MG TABS tablet Take 1 tablet by mouth Daily with supper 6/8/20  Yes ISIDRA Dorman - CNP   methocarbamol (ROBAXIN-750) 750 MG tablet Take 1 tablet by mouth 3 times daily for 14 days 6/8/20 6/22/20 Yes JEN Miller   levothyroxine (SYNTHROID) 100 MCG tablet TAKE 1 TABLET BY MOUTH EVERY DAY 5/26/20  Yes Fletcher Reddy MD   Cod Liver Oil OIL Take 1 tablet by mouth daily    Yes Historical Provider, MD   cyclobenzaprine (FLEXERIL) 10 MG tablet Take 10 mg by mouth 2 times daily as needed  1/23/16  Yes Historical Provider, MD   docusate sodium (COLACE) 100 MG capsule Take 100 mg by mouth 2 times daily.    Yes Historical Provider, MD     Past Medical History:   Diagnosis Date    Aortic valve insufficiency     Arthritis     [facility]     [facility]2017    head and neck    Hearing loss     Heart disease     Medical history reviewed with no changes     Obstructive apnea does not use CPAP    Thyroid disease      Past Surgical History:   Procedure Laterality Date    ANKLE SURGERY      AORTIC VALVE REPLACEMENT  1/28/15    Dr. Verner Doss 25mm Mosaic Ultra porcine valve; right and left modified maze procedure with ligation of DELGADO with Atriclip    BACK SURGERY      CSP    CARDIAC CATHETERIZATION      HERNIA REPAIR      KNEE SURGERY      Arthroscopy    LUNG SURGERY      collapsed lung due to broken ribs    MANDIBLE FRACTURE SURGERY      NECK SURGERY      Fusion    TONSILLECTOMY AND ADENOIDECTOMY       Allergies   Allergen Reactions    Naproxen Swelling     Lips    Pt does not recognize this being an intolerance    Hydrocodone-Acetaminophen Itching       Pre-Sedation Documentation and Exam:   I have personally completed a history, physical exam & review of systems for this patient (see notes).     Mallampati Airway Assessment:  Class I     Prior History of Anesthesia Complications:   None    ASA Classification:  Class 3 - A patient with severe systemic disease that limits activity but is not incapacitating    Sedation/ Anesthesia Plan:   Intravenous sedation    Medications Planned:   Brevital intravenously     Patient is an appropriate candidate for plan of sedation:   Yes    Electronically signed by Ruben Negron MD on 6/17/2020 at 4:00 PM

## 2020-06-17 NOTE — PROGRESS NOTES
Patient with atrial fibrillation, rate currently in the 130s. Patient is on Cardizem 30 mg daily; last dose was at 11:24 AM today. Changed dose to Cardizem 30 mg every 6, first dose now.

## 2020-06-17 NOTE — PROGRESS NOTES
[address]   Electrophysiology Progress Note     Admit Date: 6/15/2020     Reason for follow up: Atrial fibrillation     HPI and Interval History: 78 y.o. male presented with palpitation and SOB. Found to be in atrial fibrillation with RVR. Patient seen and examined. Clinical notes reviewed. Telemetry reviewed. No new complaint today. No major events overnight. Has hip pain. Review of System:  All other systems reviewed except for that noted above. Pertinent negatives and positives are:     · General: negative for fever, chills   · Ophthalmic ROS: negative for - eye pain or loss of vision  · ENT ROS: negative for - headaches, sore throat   · Respiratory: negative for - cough, sputum  · Cardiovascular: Reviewed in HPI  · Gastrointestinal: negative for - abdominal pain, diarrhea, N/V  · Hematology: negative for - bleeding, blood clots, bruising or jaundice  · Genito-Urinary:  negative for - Dysuria or incontinence  · Musculoskeletal: negative for - Joint swelling, muscle pain + hip pain   · Neurological: negative for - confusion, dizziness, headaches   · Psychiatric: No anxiety, no depression. · Dermatological: negative for - rash      Physical Examination:  Vitals:    20 1222   BP: 114/87   Pulse: 113   Resp: 14   Temp: 97.8 °F (36.6 °C)   SpO2: 98%      No intake/output data recorded. Wt Readings from Last 3 Encounters:   06/15/20 144 lb 12.8 oz (65.7 kg)   06/10/20 146 lb (66.2 kg)   20 147 lb 14.9 oz (67.1 kg)     Temp  Av.6 °F (36.4 °C)  Min: 97.2 °F (36.2 °C)  Max: 98.3 °F (36.8 °C)  Pulse  Av.4  Min: 90  Max: 125  BP  Min: 87/59  Max: 115/79  SpO2  Av %  Min: 96 %  Max: 98 %  No intake or output data in the 24 hours ending 20 1559    · Telemetry: Atrial fibrillation   · Constitutional: Oriented. No distress. · Head: Normocephalic and atraumatic.    · Mouth/Throat: Oropharynx is clear and moist.   · Eyes: Conjunctivae normal. EOM are normal. · Neck: Neck supple. No rigidity. No JVD present. · Cardiovascular: Tachycardic rate, Irregular rhythm, S1&S2. · Pulmonary/Chest: Bilateral respiratory sounds. No wheezes, No rhonchi. · Abdominal: Soft. Bowel sounds present. No distension, No tenderness. · Musculoskeletal: No tenderness. No edema    · Lymphadenopathy: Has no cervical adenopathy. · Neurological: Alert and oriented. Cranial nerve appears intact, No Gross deficit   · Skin: Skin is warm and dry. No rash noted. · Psychiatric: Has a normal behavior     Labs, diagnostic and imaging results reviewed. Reviewed. Recent Labs     06/15/20  1532 06/17/20  0536   * 136   K 4.0 4.2    102   CO2 24 25   BUN 12 13   CREATININE <0.5* 0.5*     Recent Labs     06/15/20  1330 06/17/20  0536   WBC 7.2 6.2   HGB 15.5 15.2   HCT 45.8 44.5   MCV 97.8 98.1    189     Lab Results   Component Value Date    CKTOTAL 76 12/27/2009    CKMB 0.83 12/27/2009    TROPONINI <0.01 06/16/2020     Estimated Creatinine Clearance: 131 mL/min (A) (based on SCr of 0.5 mg/dL (L)).    Lab Results   Component Value Date    BNP <15 12/26/2009     Lab Results   Component Value Date    PROTIME 13.2 06/17/2020    PROTIME 12.9 06/15/2020    PROTIME 14.9 12/16/2017    PROTIME 28.7 11/16/2017    PROTIME 12.2 11/06/2017    PROTIME 33.3 10/09/2017    PROTIME NA 02/12/2015    INR 1.14 06/17/2020    INR 1.11 06/15/2020    INR 1.32 12/16/2017     Lab Results   Component Value Date    CHOL 225 09/29/2018    HDL 69 09/29/2018    HDL 52 12/27/2009    TRIG 80 09/29/2018       Scheduled Meds:   dilTIAZem  30 mg Oral 4 times per day    warfarin  5 mg Oral Daily    enoxaparin  1 mg/kg Subcutaneous BID    [START ON 6/18/2020] predniSONE  30 mg Oral Daily    [START ON 6/20/2020] predniSONE  20 mg Oral Daily    docusate sodium  100 mg Oral BID    methocarbamol  750 mg Oral TID    aspirin  81 mg Oral Daily    digoxin  125 mcg Oral Daily    levothyroxine  100 mcg Oral QAM AC    sodium chloride flush  10 mL Intravenous 2 times per day     Continuous Infusions:  PRN Meds:cyclobenzaprine, oxyCODONE-acetaminophen, sodium chloride flush, acetaminophen **OR** acetaminophen, polyethylene glycol, promethazine **OR** ondansetron, perflutren lipid microspheres     Patient Active Problem List    Diagnosis Date Noted    S/P AVR      Priority: High    Rapid [facility] fibrillation [facility]     Atrial fibrillation with RVR ([facility] 06/15/2020    Cervical stenosis of spine 03/09/2020    Metastatic squamous cell carcinoma ([facility] 02/18/2020    Encounter for electronic analysis of reveal event recorder 04/29/2019    Palpitation     Chronic obstructive pulmonary disease ([facility] 02/12/2019    Sensorineural hearing loss (SNHL) of both ears 01/29/2019    History of tobacco abuse 04/27/2018    Squamous cell carcinoma of head and neck ([facility] 12/14/2017    Chronic pain due to neoplasm 12/14/2017    Refusal of blood transfusions as patient is Mosque 12/14/2017    Osteoarthritis of cervical spine 03/21/2016    Primary osteoarthritis of both knees 03/21/2016    Obstructive sleep apnea syndrome     Transient ischemic attack (TIA)     Chronic ischemic heart disease     Essential hypertension     Mixed hyperlipidemia 02/27/2015    PAF (paroxysmal atrial fibrillation) ([facility] 01/24/2015      Active Hospital Problems    Diagnosis Date Noted    S/P AVR [Z95.2]      Priority: High    Rapid atrial fibrillation (HCC) [I48.91]     Atrial fibrillation with RVR ([facility] [I48.91] 06/15/2020    Metastatic squamous cell carcinoma [facility] [C79.9] 02/18/2020    Encounter for electronic analysis of reveal event recorder [Z45.09] 04/29/2019    Chronic obstructive pulmonary disease ([facility] [J44.9] 02/12/2019    Squamous cell carcinoma of head and neck ([facility] [C76.0] 12/14/2017    Refusal of blood transfusions as patient is Mosque [Z53.1] 12/14/2017    Chronic pain due to neoplasm [G89.3] 12/14/2017   

## 2020-06-17 NOTE — PROGRESS NOTES
Pharmacy to Dose Warfarin    Pharmacy consulted to dose warfarin for Afib. INR Goal: 2-3    INR today: 1.14    Assessment/Plan:  - Patient remains subtherapeutic after 1 dose of warfarin last night  - Continue with warfarin 5 mg daily + lovenox bridge  - Appointment will be made with [facility] Coumadin clinic   - Daily INR ordered    Pharmacy will continue to follow.     Maria Luisa Pardo, PharmD, [place]  Clinical Pharmacist  [id number]

## 2020-06-17 NOTE — DISCHARGE INSTR - COC
Continuity of Care Form    Patient Name: Daren Darnell   :  1950  MRN:  [id number]    Admit date:  6/15/2020  Discharge date:  ***    Code Status Order: Full Code   Advance Directives:   [facility] Documentation     Date/Time Healthcare Directive Type of Healthcare Directive Copy in [facility] [address] Agent's Name Healthcare Agent's Phone Number    06/15/20 2127  --  --  --  Healthcare power of   Carmen Pisano  [phone]    06/15/20 2125  Yes, patient has an advance directive for healthcare treatment  Health care treatment directive;Durable power of  for health care  Yes, copy in chart  Adult Children  Abbi Espinoza  [phone]          Admitting Physician:  Eliceo Cerrato MD  PCP: Bryant Rivera MD    Discharging Nurse: [facility] Unit/Room#: 2JD-0219/4240-17  Discharging Unit Phone Number: ***    Emergency Contact:   Extended Emergency Contact Information  Primary Emergency Contact: Bryson [address] [place] [address] Phone: [phone]  Relation: Child  Secondary Emergency Contact: Benjie Hodges [address]  Home Phone: [phone]  Relation: Other    Past Surgical History:  Past Surgical History:   Procedure Laterality Date    ANKLE SURGERY      AORTIC VALVE REPLACEMENT  1/28/15    Dr. Fiorella King 25mm Mosaic Ultra porcine valve; right and left modified maze procedure with ligation of DELGADO with Atriclip    BACK SURGERY      CSP    CARDIAC CATHETERIZATION      HERNIA REPAIR      KNEE SURGERY      Arthroscopy    LUNG SURGERY      collapsed lung due to broken ribs    MANDIBLE FRACTURE SURGERY      NECK SURGERY      Fusion    TONSILLECTOMY AND ADENOIDECTOMY         Immunization History:   Immunization History   Administered Date(s) Administered    Influenza Virus Vaccine 2015, 10/30/2015    Influenza, High Dose (Fluzone 65 yrs and older) 2018, 2018    Pneumococcal Conjugate Mobility/ADLs:  Walking   {CHP DME IQTM:435875397}  Transfer  {CHP DME HTFB:739717410}  Bathing  {CHP DME MXR}  Dressing  {CHP DME UWZL:191873552}  Toileting  {CHP DME ZPLJ:212373222}  Feeding  {CHP DME XDGP:096486322}  Med Admin  {CHP DME HZBK:407673580}  Med Delivery   { DARRYL MED Delivery:351764242}    Wound Care Documentation and Therapy:        Elimination:  Continence:   · Bowel: {YES / ZZ:40584}  · Bladder: {YES / MJ:71462}  Urinary Catheter: {Urinary Catheter:861773226}   Colostomy/Ileostomy/Ileal Conduit: {YES / PZ:49837}       Date of Last BM: ***  No intake or output data in the 24 hours ending 20 1551  No intake/output data recorded.     Safety Concerns:     [address] Safety Concerns:114332416}    Impairments/Disabilities:      [address] Impairments/Disabilities:167664520}    Nutrition Therapy:  Current Nutrition Therapy:   [address] Diet List:377529431}    Routes of Feeding: {[facility] DME Other Feedings:519280100}  Liquids: {Slp liquid thickness:96507}  Daily Fluid Restriction: {CHP DME Yes amt example:976924844}  Last Modified Barium Swallow with Video (Video Swallowing Test): {Done Not Done SWJR:914509736}    Treatments at the Time of Hospital Discharge:   Respiratory Treatments: ***  Oxygen Therapy:  {Therapy; copd oxygen:04596}  Ventilator:    { CC Vent VKBT:675808770}    Rehab Therapies: {THERAPEUTIC INTERVENTION:9336898739}  Weight Bearing Status/Restrictions: [address] [employer] Weight Bearin}  Other Medical Equipment (for information only, NOT a DME order):  {EQUIPMENT:465832075}  Other Treatments: ***    Patient's personal belongings (please select all that are sent with patient):  {[facility] DME Belongings:603798760}    RN SIGNATURE:  {Esignature:189769321}    CASE MANAGEMENT/SOCIAL WORK SECTION    Inpatient Status Date: ***    Readmission Risk Assessment Score:  Readmission Risk              Risk of Unplanned Readmission:        16           Discharging to Facility/ Agency   · Name: · Address:  · Phone:  · Fax:    Dialysis Facility (if applicable)   · Name:  · Address:  · Dialysis Schedule:  · Phone:  · Fax:    / signature: {Esignature:409046351}    PHYSICIAN SECTION    Prognosis: {Prognosis:4452735633}    Condition at Discharge: [address] Heath Patient Condition:958514536}    Rehab Potential (if transferring to Rehab): {Prognosis:9986661600}    Recommended Labs or Other Treatments After Discharge: ***    Physician Certification: I certify the above information and transfer of Snow Ng  is necessary for the continuing treatment of the diagnosis listed and that he requires {Admit to Appropriate Level of Care:90191} for {GREATER/LESS:534565951} 30 days.      Update Admission H&P: {CHP DME Changes in DIYAT:108124498}    PHYSICIAN SIGNATURE:  {Esignature:834124449}

## 2020-06-17 NOTE — PROGRESS NOTES
Physical Therapy  Sandie Zaragoza  Attempted PT treatment this date. Pt currently off floor for CT scan. Will re-attempt treatment session as schedule allows.    [address], [place] [address]

## 2020-06-17 NOTE — PROGRESS NOTES
[facility]    6/17/2020 10:30 AM        Name: Tea Williamson . 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 Started on Xarelto as outpatient. Admitted as inpatient for CP and Atrial flutter with RVR. Followed by Cardiology. For Cardiac CTA to evaluate DELGADO elimination. Changed to Coumadin for pAVR. Recommended for outpatient PT.      CC: Palpitations    Subjective:  . Patient denies palpitations. No CP, HA or abdominal pain. No fevers. No SOB. No diarrhea. Has severe claustrophobia.     Reviewed interval ancillary notes    Current Medications  dilTIAZem (CARDIZEM) tablet 30 mg, 4 times per day  LORazepam (ATIVAN) injection 1 mg, Once  warfarin (COUMADIN) tablet 5 mg, Daily  enoxaparin (LOVENOX) injection 70 mg, BID  [START ON 6/18/2020] predniSONE (DELTASONE) tablet 30 mg, Daily  [START ON 6/20/2020] predniSONE (DELTASONE) tablet 20 mg, Daily  docusate sodium (COLACE) capsule 100 mg, BID  cyclobenzaprine (FLEXERIL) tablet 10 mg, TID PRN  methocarbamol (ROBAXIN) tablet 750 mg, TID  oxyCODONE-acetaminophen (PERCOCET) 5-325 MG per tablet 1 tablet, Q8H PRN  aspirin EC tablet 81 mg, Daily  digoxin (LANOXIN) tablet 125 mcg, Daily  levothyroxine (SYNTHROID) tablet 100 mcg, QAM AC  sodium chloride flush 0.9 % injection 10 mL, 2 times per day  sodium chloride flush 0.9 % injection 10 mL, PRN  acetaminophen (TYLENOL) tablet 650 mg, Q6H PRN    Or  acetaminophen (TYLENOL) suppository 650 mg, Q6H PRN  polyethylene glycol (GLYCOLAX) packet 17 g, Daily PRN  promethazine (PHENERGAN) tablet 12.5 mg, Q6H PRN    Or  ondansetron (ZOFRAN) injection 4 mg, Q6H PRN  perflutren lipid microspheres (DEFINITY) injection 1.65 mg, ONCE PRN        Objective:  BP 97/73   Pulse 90   Temp 97.4 °F (36.3 °C) (Oral)   Resp 16   Ht 5' 11\" (1.803 m)   Wt 144 lb 12.8 oz (65.7 kg)   SpO2 98%   BMI 20.20 kg/m²   No intake or output data in the 24 hours ending 06/17/20 1030   Wt Readings from Last 3 Encounters:   06/15/20 144 lb 12.8 oz (65.7 kg)   06/10/20 146 lb (66.2 kg)   06/08/20 147 lb 14.9 oz (67.1 kg)       General appearance:  Appears comfortable  Eyes: Sclera clear. Pupils equal.  ENT: Moist oral mucosa. Trachea midline, no adenopathy. Cardiovascular: RRR. No murmur. No edema in lower extremities  Respiratory: Not using accessory muscles. Good inspiratory effort. Clear to auscultation bilaterally, no wheeze or crackles. GI: Abdomen soft, no tenderness, not distended, normal bowel sounds  Musculoskeletal: No cyanosis in digits, neck supple  Neurology: Grossly intact. No speech or motor deficits  Psych: Normal affect. Alert and oriented in time, place and person  Skin: Warm, dry, normal turgor  Extremity exam shows brisk capillary refill. Peripheral pulses are palpable in lower extremities     Labs and Tests:  CBC:   Recent Labs     06/15/20  1330 06/17/20  0536   WBC 7.2 6.2   HGB 15.5 15.2    189     BMP:    Recent Labs     06/15/20  1532 06/17/20  0536   * 136   K 4.0 4.2    102   CO2 24 25   BUN 12 13   CREATININE <0.5* 0.5*   GLUCOSE 95 142*     Hepatic: No results for input(s): AST, ALT, ALB, BILITOT, ALKPHOS in the last 72 hours. XR CHEST STANDARD (2 VW)   Final Result   No evidence of acute cardiopulmonary disease.          CTA CARDIAC W C STRC MORP W CONTRAST    (Results Pending)         Problem List  Principal Problem:    Atrial fibrillation with RVR ([address]  Active Problems:    S/P AVR    PAF (paroxysmal atrial fibrillation) ([facility])    Transient ischemic attack (TIA)

## 2020-06-17 NOTE — PROGRESS NOTES
Clinical Pharmacy Note    Patient scheduled for [facility] Anticoagulation clinic appointment on Monday, 6/22/20 at 0830 to have his INR checked. Patient should arrive 30 minutes early and call the clinic [phone] and they will take him to registration then see him.     Saad Pelayo, PharmD, [address]  Clinical Pharmacist  [id number]

## 2020-06-17 NOTE — PLAN OF CARE
Problem: Respiratory  Goal: O2 Sat > 90%  Outcome: Met This Shift  Note: >90% on RA     Problem: Falls - Risk of:  Goal: Will remain free from falls  Description: Will remain free from falls  Note: Pt is wearing nonskid socks. Bed is in lowest position, locked, side rails up 2/4, and call light is within reach. Pt informed of fall risks, verbalizes understanding. Will monitor.

## 2020-06-17 NOTE — PROGRESS NOTES
Shift assessment completed. Pt is a/o X4. VSS. POC discussed and all questions answered. Pt has belongings and call light in reach. Denies further needs, will continue to monitor.

## 2020-06-17 NOTE — PROGRESS NOTES
S/p cardioversion. Patient would like to proceed with ablation of atrial fibrillation. Will arrange as outpatient procedure. He can be discharged on coumadin with follow up with coumadin clinic. No bridging is required. His DELGADO has been ligated and plan for short term anticoagulation prior and after ablation. Long term he can stop anticoagulation for atrial fibrillation.      Vishnu Alexis MD, MPH  [facility]   Office: [phone]

## 2020-06-17 NOTE — PROGRESS NOTES
Shift assessment completed, VSS, patient denies pain. BP 97/73, HR 90. Awaiting to hear plan for ablation vs cardioversion before administering Lovenox. Pt updated on POC, denies questions. Pt. Medium fall risk, bedside table and call light within reach. Will continue to monitor.     Henrietta Loges

## 2020-06-18 VITALS
HEIGHT: 71 IN | BODY MASS INDEX: 19.69 KG/M2 | DIASTOLIC BLOOD PRESSURE: 68 MMHG | WEIGHT: 140.6 LBS | RESPIRATION RATE: 16 BRPM | HEART RATE: 63 BPM | OXYGEN SATURATION: 95 % | SYSTOLIC BLOOD PRESSURE: 112 MMHG | TEMPERATURE: 98.6 F

## 2020-06-18 LAB
ANION GAP SERPL CALCULATED.3IONS-SCNC: 7 MMOL/L (ref 3–16)
BASOPHILS ABSOLUTE: 0 K/UL (ref 0–0.2)
BASOPHILS RELATIVE PERCENT: 0.7 %
BUN BLDV-MCNC: 13 MG/DL (ref 7–20)
CALCIUM SERPL-MCNC: 8.8 MG/DL (ref 8.3–10.6)
CHLORIDE BLD-SCNC: 105 MMOL/L (ref 99–110)
CO2: 26 MMOL/L (ref 21–32)
CREAT SERPL-MCNC: <0.5 MG/DL (ref 0.8–1.3)
EKG ATRIAL RATE: 75 BPM
EKG DIAGNOSIS: NORMAL
EKG P AXIS: 65 DEGREES
EKG P-R INTERVAL: 190 MS
EKG Q-T INTERVAL: 372 MS
EKG QRS DURATION: 74 MS
EKG QTC CALCULATION (BAZETT): 415 MS
EKG R AXIS: -6 DEGREES
EKG T AXIS: 34 DEGREES
EKG VENTRICULAR RATE: 75 BPM
EOSINOPHILS ABSOLUTE: 0 K/UL (ref 0–0.6)
EOSINOPHILS RELATIVE PERCENT: 0.6 %
GFR AFRICAN AMERICAN: >60
GFR NON-AFRICAN AMERICAN: >60
GLUCOSE BLD-MCNC: 107 MG/DL (ref 70–99)
HCT VFR BLD CALC: 42.3 % (ref 40.5–52.5)
HEMOGLOBIN: 14.2 G/DL (ref 13.5–17.5)
INR BLD: 1.2 (ref 0.86–1.14)
LYMPHOCYTES ABSOLUTE: 1.3 K/UL (ref 1–5.1)
LYMPHOCYTES RELATIVE PERCENT: 25.9 %
MAGNESIUM: 2.2 MG/DL (ref 1.8–2.4)
MCH RBC QN AUTO: 32.8 PG (ref 26–34)
MCHC RBC AUTO-ENTMCNC: 33.6 G/DL (ref 31–36)
MCV RBC AUTO: 97.4 FL (ref 80–100)
MONOCYTES ABSOLUTE: 0.4 K/UL (ref 0–1.3)
MONOCYTES RELATIVE PERCENT: 8.5 %
NEUTROPHILS ABSOLUTE: 3.3 K/UL (ref 1.7–7.7)
NEUTROPHILS RELATIVE PERCENT: 64.3 %
PDW BLD-RTO: 13.2 % (ref 12.4–15.4)
PLATELET # BLD: 182 K/UL (ref 135–450)
PMV BLD AUTO: 7.2 FL (ref 5–10.5)
POTASSIUM SERPL-SCNC: 3.7 MMOL/L (ref 3.5–5.1)
PROTHROMBIN TIME: 14 SEC (ref 10–13.2)
RBC # BLD: 4.34 M/UL (ref 4.2–5.9)
SODIUM BLD-SCNC: 138 MMOL/L (ref 136–145)
WBC # BLD: 5.1 K/UL (ref 4–11)

## 2020-06-18 PROCEDURE — 85025 COMPLETE CBC W/AUTO DIFF WBC: CPT

## 2020-06-18 PROCEDURE — 85610 PROTHROMBIN TIME: CPT

## 2020-06-18 PROCEDURE — 36415 COLL VENOUS BLD VENIPUNCTURE: CPT

## 2020-06-18 PROCEDURE — 6370000000 HC RX 637 (ALT 250 FOR IP): Performed by: INTERNAL MEDICINE

## 2020-06-18 PROCEDURE — 80048 BASIC METABOLIC PNL TOTAL CA: CPT

## 2020-06-18 PROCEDURE — 6370000000 HC RX 637 (ALT 250 FOR IP): Performed by: NURSE PRACTITIONER

## 2020-06-18 PROCEDURE — 2580000003 HC RX 258: Performed by: INTERNAL MEDICINE

## 2020-06-18 PROCEDURE — 94760 N-INVAS EAR/PLS OXIMETRY 1: CPT

## 2020-06-18 PROCEDURE — 99233 SBSQ HOSP IP/OBS HIGH 50: CPT | Performed by: NURSE PRACTITIONER

## 2020-06-18 PROCEDURE — 93010 ELECTROCARDIOGRAM REPORT: CPT | Performed by: INTERNAL MEDICINE

## 2020-06-18 PROCEDURE — 6360000002 HC RX W HCPCS: Performed by: INTERNAL MEDICINE

## 2020-06-18 PROCEDURE — 83735 ASSAY OF MAGNESIUM: CPT

## 2020-06-18 RX ORDER — DIGOXIN 125 MCG
125 TABLET ORAL DAILY
Qty: 30 TABLET | Refills: 3 | Status: ON HOLD | OUTPATIENT
Start: 2020-06-19 | End: 2020-07-15 | Stop reason: SDUPTHER

## 2020-06-18 RX ORDER — WARFARIN SODIUM 5 MG/1
5 TABLET ORAL DAILY
Qty: 30 TABLET | Refills: 3 | Status: SHIPPED | OUTPATIENT
Start: 2020-06-18 | End: 2020-09-28

## 2020-06-18 RX ORDER — DILTIAZEM HYDROCHLORIDE 60 MG/1
60 CAPSULE, EXTENDED RELEASE ORAL 2 TIMES DAILY
Qty: 60 CAPSULE | Refills: 5 | Status: SHIPPED | OUTPATIENT
Start: 2020-06-18 | End: 2020-07-20 | Stop reason: SDUPTHER

## 2020-06-18 RX ORDER — PREDNISONE 10 MG/1
TABLET ORAL
Qty: 3 TABLET | Refills: 0 | Status: ON HOLD | OUTPATIENT
Start: 2020-06-19 | End: 2020-06-22 | Stop reason: ALTCHOICE

## 2020-06-18 RX ADMIN — METHOCARBAMOL TABLETS 750 MG: 750 TABLET, COATED ORAL at 07:58

## 2020-06-18 RX ADMIN — LEVOTHYROXINE SODIUM 100 MCG: 0.1 TABLET ORAL at 06:09

## 2020-06-18 RX ADMIN — POLYETHYLENE GLYCOL 3350 17 G: 17 POWDER, FOR SOLUTION ORAL at 06:18

## 2020-06-18 RX ADMIN — DIGOXIN 125 MCG: 125 TABLET ORAL at 07:57

## 2020-06-18 RX ADMIN — ASPIRIN 81 MG: 81 TABLET, COATED ORAL at 07:58

## 2020-06-18 RX ADMIN — Medication 10 ML: at 07:59

## 2020-06-18 RX ADMIN — OXYCODONE HYDROCHLORIDE AND ACETAMINOPHEN 1 TABLET: 5; 325 TABLET ORAL at 07:58

## 2020-06-18 RX ADMIN — PREDNISONE 30 MG: 10 TABLET ORAL at 07:57

## 2020-06-18 RX ADMIN — ENOXAPARIN SODIUM 70 MG: 80 INJECTION SUBCUTANEOUS at 07:58

## 2020-06-18 RX ADMIN — DOCUSATE SODIUM 100 MG: 100 CAPSULE ORAL at 07:58

## 2020-06-18 RX ADMIN — DILTIAZEM HYDROCHLORIDE 60 MG: 60 CAPSULE, EXTENDED RELEASE ORAL at 07:57

## 2020-06-18 ASSESSMENT — PAIN SCALES - GENERAL
PAINLEVEL_OUTOF10: 7
PAINLEVEL_OUTOF10: 5

## 2020-06-18 ASSESSMENT — PAIN DESCRIPTION - PAIN TYPE: TYPE: CHRONIC PAIN

## 2020-06-18 NOTE — PLAN OF CARE
Problem: Pain:  Goal: Pain level will decrease  Description: Pain level will decrease  Outcome: Ongoing  Goal: Control of chronic pain  Description: Control of chronic pain  Outcome: Ongoing     Problem: Falls - Risk of:  Goal: Will remain free from falls  Description: Will remain free from falls  Outcome: Ongoing     Problem: Cardiovascular  Goal: No DVT, peripheral vascular complications  Outcome: Ongoing  Goal: Hemodynamic stability  Outcome: Ongoing  Goal: Anticoagulate/Hct stable  Outcome: Ongoing     Problem: Respiratory  Goal: No pulmonary complications  Outcome: Ongoing  Goal: O2 Sat > 90%  Outcome: Ongoing     Problem: GI  Goal: Bowel movement at least every other day  Outcome: Ongoing

## 2020-06-18 NOTE — DISCHARGE SUMMARY
Hospital Medicine Discharge Summary    Patient: Daren Darnell     Gender: male  : 1950   Age: 78 y.o. MRN: [id number]    Admitting Physician: Eliceo Cerrato MD  Discharge Physician: Richie Saucedo MD     Code Status: Full Code     Admit Date: 6/15/2020   Discharge Date:   20    Disposition:  Home    Discharge Diagnoses: Active Hospital Problems    Diagnosis Date Noted    S/P AVR [Z95.2]      Priority: High    Rapid atrial fibrillation (HCC) [I48.91]     Atrial fibrillation with RVR (HCC) [I48.91] 06/15/2020    Metastatic squamous cell carcinoma ([facility] [C79.9] 2020    Encounter for electronic analysis of reveal event recorder [Z45.09] 2019    Chronic obstructive pulmonary disease ([facility] [J44.9] 2019    Squamous 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) [C76.0] 2017    Refusal of blood transfusions as patient is Caodaism [Z53.1] 2017    Chronic pain due to neoplasm [G89.3] 2017    Obstructive sleep apnea syndrome [G47.33]     Transient ischemic attack (TIA) [G45.9]     Essential hypertension [I10]     Chronic ischemic heart disease [I25.9]     PAF (paroxysmal atrial fibrillation) ([facility] [I48.0] 2015       Follow-up appointments:  one week    Outpatient to do list: F/U with PCP, EP, Pain Mgmt, Oncology    Condition at Discharge:  Stable    Hospital Course:   79 yo M with history of Caodaism, CHRISTOPHER, squamous cell cancer of head and neck, COPD, chronic pain syndrome, HTN, Afib s/p R/L modified MAZE procedure with DELGADO ligation with Atriclip, s/p porcine AVR, h/o loop recorder came to ER with complaints of palpitations. Started on Xarelto as outpatient. Admitted as inpatient for CP and Atrial flutter with RVR. Followed by Cardiology. For Cardiac CTA to evaluate DELGADO elimination. Changed to Coumadin for pAVR. Recommended for outpatient PT. Underwent external electrical cardioversion on .   Will finish course of steroid taper for radiculopathy. Will f/u with Coumadin Clinic for Afib. Will be on Coreg and Digoxin upon DC. F/U with PCP, Cardio, Onc and Pain Mgmt. Discharge Medications:   Current Discharge Medication List      START taking these medications    Details   predniSONE (DELTASONE) 10 MG tablet Day 1:  3 tabs PO daily  Days 2-3:  2 tabs PO daily  Day 4:  OFF  Qty: 3 tablet, Refills: 0      warfarin (COUMADIN) 5 MG tablet Take 1 tablet by mouth daily  Qty: 30 tablet, Refills: 3      dilTIAZem (CARDIZEM 12 HR) 60 MG extended release capsule Take 1 capsule by mouth 2 times daily  Qty: 60 capsule, Refills: 5      digoxin (LANOXIN) 125 MCG tablet Take 1 tablet by mouth daily  Qty: 30 tablet, Refills: 3           Current Discharge Medication List        Current Discharge Medication List      CONTINUE these medications which have NOT CHANGED    Details   aspirin 81 MG EC tablet Take 81 mg by mouth daily      oxyCODONE-acetaminophen (PERCOCET) 5-325 MG per tablet Take 1 tablet by mouth every 8 hours as needed for Pain for up to 30 days. Intended supply: 3 days.  Take lowest dose possible to manage pain  Qty: 60 tablet, Refills: 0    Comments: Reduce doses taken as pain becomes manageable  Associated Diagnoses: Chronic pain due to neoplasm      methocarbamol (ROBAXIN-750) 750 MG tablet Take 1 tablet by mouth 3 times daily for 14 days  Qty: 42 tablet, Refills: 0    Associated Diagnoses: Lumbar radiculopathy; DDD (degenerative disc disease), lumbar; Spondylosis without myelopathy or radiculopathy, lumbar region; Greater trochanteric bursitis of right hip      levothyroxine (SYNTHROID) 100 MCG tablet TAKE 1 TABLET BY MOUTH EVERY DAY  Qty: 30 tablet, Refills: 2    Associated Diagnoses: Acquired hypothyroidism      Cod Liver Oil OIL Take 1 tablet by mouth daily       cyclobenzaprine (FLEXERIL) 10 MG tablet Take 10 mg by mouth 2 times daily as needed   Refills: 0      docusate sodium (COLACE) 100 MG capsule Take 100 mg by mouth 2 Date: 6/15/2020  EXAMINATION: TWO XRAY VIEWS OF THE CHEST 6/15/2020 1:22 pm COMPARISON: July 29, 2017 HISTORY: ORDERING SYSTEM PROVIDED HISTORY: CP TECHNOLOGIST PROVIDED HISTORY: Reason for exam:->CP FINDINGS: Normal cardiac size. No evidence of pneumonia, edema or other acute pulmonary process. No evidence of acute process of the cardiac or mediastinal structures. No evidence of pneumothorax or pleural effusion. No evidence of acute cardiopulmonary disease. Xr Lumbar Spine (2-3 Views)    Result Date: 6/8/2020  Radiology exam is complete. No Radiologist dictation. Please follow up with ordering provider. Mri Lumbar Spine Wo Contrast    Result Date: 6/12/2020  EXAMINATION: MRI OF THE LUMBAR SPINE WITHOUT CONTRAST, 6/12/2020 8:12 am TECHNIQUE: Multiplanar multisequence MRI of the lumbar spine was performed without the administration of intravenous contrast. COMPARISON: None. HISTORY: ORDERING SYSTEM PROVIDED HISTORY: Lumbar radiculopathy TECHNOLOGIST PROVIDED HISTORY: Reason for exam:->MRI LSP WO CONTRAST Reason for Exam: PT STS NO KNOWN INJURY TO LUMBAR SPINE Acuity: Acute Type of Exam: Initial Additional signs and symptoms: PT STS NO KNOWN INJURY TO LUMBAR SPINE Relevant Medical/Surgical History: PT STS NO KNOWN INJURY TO LUMBAR SPINE FINDINGS: BONES/ALIGNMENT: There is normal alignment of the spine. The vertebral body heights are maintained. The bone marrow signal appears unremarkable. No acute fracture is identified. There is a Schmorl's node involving the superior endplate of L4. Moderate diffuse degenerative disc disease is identified. SPINAL CORD: The conus terminates normally. SOFT TISSUES: No paraspinal mass identified. L1-L2: Annular disc bulge is slightly eccentric to the right. L2-L3: Shallow disc bulge flattens the thecal sac. Borderline central canal stenosis noted. Both foramina are mildly stenotic.  L3-L4: Mild central canal stenosis identified due to shallow disc bulge, facet arthropathy and ligamentum flavum hypertrophy. Both foramina are mildly stenotic. L4-L5: Severe left foraminal stenosis and moderate right foraminal stenosis are identified due to encroachment of the disc bulge and facet disease. Shallow disc bulge flattens the thecal sac. L5-S1: Both foramina are severely stenotic due to encroachment by disc bulge and facet disease. Moderate diffuse degenerative disc disease without evidence of focal disc protrusion. Mild central canal stenosis at L3-L4. Severe bilateral foraminal stenosis at L4-5 and L5-S1. Cta Cardiac W C Strc Morp W Contrast    Result Date: 6/17/2020  EXAMINATION: CTA OF THE CORONARY ARTERIES 6/17/2020 10:54 am TECHNIQUE: Coronary CT angiogram was performed after the bolus administration of intravenous contrast with retrospective cardiac gating. Multiplanar reformatted images were created on a separate workstation by the radiologist. Dose modulation, iterative reconstruction, and/or weight based adjustment of the mA/kV was utilized to reduce the radiation dose to as low as reasonably achievable. COMPARISON: None. HISTORY: ORDERING SYSTEM PROVIDED HISTORY: Cardiac CTA for evluation of left atrial appendage, prior to consideration to atrial fibrillation ablation TECHNOLOGIST PROVIDED HISTORY: Reason for exam:->Cardiac CTA for evluation of left atrial appendage, prior to consideration to atrial fibrillation ablation Reason for Exam: Cardiac CTA for evluation of left atrial appendage, prior to consideration to atrial fibrillation ablation Acuity: Unknown Type of Exam: Unknown FINDINGS: There is evidence of prior sternotomy. Coronary artery calcification is seen. Aortic valve replacement changes are seen. Seen. Caliber of ascending aorta is mildly enlarged measuring 4.6 cm. There is a left atrial appendage clip. No thrombus is seen in this region.  Orifice of right superior pulmonary vein measures 28.7 x 20.4 mm Orifice of right inferior pulmonary vein measures 27.3 x 19.1 mm. Orifice of left superior pulmonary vein measures 19.3 x 16.3 mm Orifice of left inferior pulmonary vein measures 22.4 x 17.8 mm Respiratory motion artifact limits evaluation of fine pulmonary parenchymal change. There is mild underlying bronchiectasis. De pendant opacity is seen at the lung bases, likely atelectasis. No pneumonia. No edema. No pleural effusion. Scarring is seen in the apices. No focal lung consolidation noted Degenerative changes are seen in the spine. Adrenal glands appear normal     No stenosis at the pulmonary vein origins. Clip is seen in the left atrial appendage,. No thrombus seen in this region. Mildly dilated aorta. .  Post valve replacement changes are noted       The patient was seen and examined on day of discharge and this discharge summary is in conjunction with any daily progress note from day of discharge. Time Spent on discharge is 45 minutes  in the examination, evaluation, counseling and review of medications and discharge plan. Note that more than 30 minutes was spent in preparing discharge papers, discussing discharge with patient, medication review, etc.       Signed:    Adiel Solis MD   6/18/2020      Thank you Shannon Barreto MD for the opportunity to be involved in this patient's care.  If you have any questions or concerns please feel free to contact me at [address] [place] [address]

## 2020-06-18 NOTE — PROGRESS NOTES
[facility], [place], operated by [facility]   Electrophysiology Nurse Practitioner  Pre-Consult Rounding    Date: 2020  Date of admission: 6/15/2020  1:42 PM  Reason for Admission: Atrial fibrillation with RVR ([facility] [I48.91]  Atrial fibrillation with RVR ([facility] [I48.91]    Consult Requesting Physician: Arvie Apgar, MD    -Reason for Consultation: Atrial fibrillation    Chief Complaint   Patient presents with    Atrial Fibrillation     Pt reports afib - states he has in inplanted monitor, states he has been having runs of afib, states chest pain and SOB       HISTORY OF PRESENT ILLNESS: History obtained from patient and medical record. Jolly Jorge is a 78 y.o. male with a past medical history of atrial fibrillation, non-obstructive CAD, CHRISTOPHER, aortic valve insufficiency, and squamous cell cancer. In , he underwent AVR with porcine valve, right/left modified maze procedure with DELGADO ligation with Atriclip (1/28/15,  [facility]). He was on coumadin, but this was stopped due to bleeding from his mouth and PEG tube. In 2018, a 2 week monitor showed brief SVT and WCT, but no AF. S/p ILR implant (19). He was seen in our office in early 2020 and his ILR showed a prolonged episode of probable atrial fibrillation. The patient was started on Xarelto. Pt presented to ER with chest pain and SOB. He described the pain as left sided chest pressure that radiated to his left arm with hand numbness. His troponins were negative. His last ischemic testing was in  and the stress echo was normal.    Interval Hx: Today, he is being seen for AF. He feels better today. He remains in sinus rhythm s/p cardioversion yesterday. Plan for discharge home. Patient seen and examined. Clinical notes reviewed. Telemetry reviewed. No new complaints today. No major events overnight. Denies having chest pain, palpitations, shortness of breath, orthopnea, cough, or dizziness at the time of this visit. Allergies:   Allergies Mandible fracture surgery; Ankle surgery; and back surgery. Social History:  Reviewed. reports that he quit smoking about 5 years ago. His smoking use included cigarettes. He started smoking about 45 years ago. He has a 40.00 pack-year smoking history. He has never used smokeless tobacco. He reports that he does not drink alcohol or use drugs. Family History:  Reviewed. family history includes Heart Disease in an other family member. Review of System:  · Constitutional: Negative for fever, night sweats, chills, weight changes, or weakness  · Skin: Negative for rash, dry skin, pruritus, bruising, bleeding, blood clots, or changes in skin pigment  · HEENT: Negative for vision changes, ringing in the ears, sore throat, dysphagia, or swollen lymph nodes  · Respiratory: Reviewed in HPI  · Cardiovascular: Reviewed in HPI  · Gastrointestinal: Negative for abdominal pain, N/V/D, constipation, or black/tarry stools  · Genito-Urinary: Negative for dysuria, incontinence, urgency, or hematuria  · Musculoskeletal: Positive for back/neck pain (chronic). Negative for joint swelling, muscle pain, or injuries  · Neurological/Psych: Negative for confusion, seizures, headaches, balance issues or TIA-like symptoms. No anxiety, depression, or insomnia    Physical Examination:  Vitals:    06/18/20 0812   BP:    Pulse:    Resp:    Temp:    SpO2: 95%      No intake/output data recorded. Wt Readings from Last 3 Encounters:   06/18/20 140 lb 9.6 oz (63.8 kg)   06/10/20 146 lb (66.2 kg)   06/08/20 147 lb 14.9 oz (67.1 kg)       Telemetry: Personally Reviewed  - AF  · Constitutional: Cooperative and in no apparent distress, and appears well nourished  · Skin: Warm and pink; no pallor, cyanosis, bruising, or clubbing  · HEENT: Symmetric and normocephalic. PERRL, EOM intact. Conjunctiva pink with clear sclera. Mucus membranes pink and moist. Teeth intact. Thyroid smooth without nodules or goiter.    · Cardiovascular: Mildly spine 03/09/2020    Metastatic squamous cell carcinoma ([facility] 02/18/2020    Encounter for electronic analysis of reveal event recorder 04/29/2019    Palpitation     Chronic obstructive pulmonary disease ([facility] 02/12/2019    Sensorineural hearing loss (SNHL) of both ears 01/29/2019    History of tobacco abuse 04/27/2018    Squamous cell carcinoma of head and neck ([facility] 12/14/2017    Chronic pain due to neoplasm 12/14/2017    Refusal of blood transfusions as patient is Protestant 12/14/2017    Osteoarthritis of cervical spine 03/21/2016    Primary osteoarthritis of both knees 03/21/2016    Obstructive sleep apnea syndrome     Transient ischemic attack (TIA)     Chronic ischemic heart disease     Essential hypertension     Mixed hyperlipidemia 02/27/2015    PAF (paroxysmal atrial fibrillation) ([facility] 01/24/2015        Assessment and Plan:     1. Paroxysmal Atrial Fibrillation   - S/p MAZE and DELGADO ligation with Atriclip (2015)   - S/p DCCV (6/17/20)      - Currently in NSR   - Continue cardizem 12H ER 60 mg BID, digoxin 125 mcg QD               ~ Limited anti-arrhythmic options given his low blood pressure; may consider amiodarone if recurrent      - [id number] score: 2 (Age, CAD) ; [id number] Vasc score and anticoagulation discussed. High risk for stroke and thromboembolism. Anticoagulation is recommended. Risk of bleeding was discussed.    ~ On coumadin; to be followed by [facility] anti-coagulation clinic. No bridging required due to DELGADO ligation. Will only require short term [address] prior to and following ablation, but will be able to stop long term      - Afib risk factors including age, HTN, obesity, inactivity and CHRISTOPHER were discussed with patient. Risk factor modification recommended               ~ TSH 3.49 (11/19)                  - Treatment options including cardioversion, rate control strategy, antiarrhythmics, anticoagulation and possible ablation were discussed with patient.  Risks, benefits and Thank you for allowing to us to participate in the care of [place].     ISIDRA Davis-HOUSTON  [address]   Office: [phone]

## 2020-06-18 NOTE — PROGRESS NOTES
Shift assessment complete. VSS. Pt c/o sciatica pain. Pt remains in SR. Call light within reach, non skid socks on, up as tolerated. Will continue to monitor.

## 2020-06-18 NOTE — PROGRESS NOTES
Physical Therapy  To pt room for PT treatment. Pt eating and states he is going home in 30 min. PT talked with pt to see if he needed anything and he said no.   Anel Herman, [facility], DPT [id number]

## 2020-06-18 NOTE — CARE COORDINATION
Patient discharged 6/18/2020 to home. All discharge needs met per case management.     HUMZA PollardN, CCM, RN  [facility]  [phone]

## 2020-06-19 ENCOUNTER — CARE COORDINATION (OUTPATIENT)
Dept: CASE MANAGEMENT | Age: 70
End: 2020-06-19

## 2020-06-19 ENCOUNTER — TELEPHONE (OUTPATIENT)
Dept: ORTHOPEDIC SURGERY | Age: 70
End: 2020-06-19

## 2020-06-19 ENCOUNTER — OFFICE VISIT (OUTPATIENT)
Dept: ORTHOPEDIC SURGERY | Age: 70
End: 2020-06-19
Payer: COMMERCIAL

## 2020-06-19 ENCOUNTER — OFFICE VISIT (OUTPATIENT)
Dept: PRIMARY CARE CLINIC | Age: 70
End: 2020-06-19
Payer: COMMERCIAL

## 2020-06-19 VITALS
SYSTOLIC BLOOD PRESSURE: 139 MMHG | DIASTOLIC BLOOD PRESSURE: 84 MMHG | TEMPERATURE: 98 F | HEART RATE: 67 BPM | BODY MASS INDEX: 19.69 KG/M2 | WEIGHT: 140.65 LBS | HEIGHT: 71 IN

## 2020-06-19 LAB — SARS-COV-2, PCR: NOT DETECTED

## 2020-06-19 PROCEDURE — 99211 OFF/OP EST MAY X REQ PHY/QHP: CPT | Performed by: NURSE PRACTITIONER

## 2020-06-19 PROCEDURE — 1111F DSCHRG MED/CURRENT MED MERGE: CPT | Performed by: FAMILY MEDICINE

## 2020-06-19 PROCEDURE — 99214 OFFICE O/P EST MOD 30 MIN: CPT | Performed by: PHYSICAL MEDICINE & REHABILITATION

## 2020-06-19 NOTE — PATIENT INSTRUCTIONS
Steps to help prevent the spread of COVID-19 if you are sick  SOURCE - https://maddox-tripathi.info/. html     Stay home except to get medical care   ; Stay home: People who are mildly ill with COVID-19 are able to isolate at home during their illness. You should restrict activities outside your home, except for getting medical care.   ; Avoid public areas: Do not go to work, school, or public areas.   ; Avoid public transportation: Avoid using public transportation, ride-sharing, or taxis.  ; Separate yourself from other people and animals in your home   ; Stay away from others: As much as possible, you should stay in a specific room and away from other people in your home. Also, you should use a separate bathroom, if available.   ; Limit contact with pets & animals: You should restrict contact with pets and other animals while you are sick with COVID-19, just like you would around other people. Although there have not been reports of pets or other animals becoming sick with COVID-19, it is still recommended that people sick with COVID-19 limit contact with animals until more information is known about the virus. ; When possible, have another member of your household care for your animals while you are sick. If you are sick with COVID-19, avoid contact with your pet, including petting, snuggling, being kissed or licked, and sharing food. If you must care for your pet or be around animals while you are sick, wash your hands before and after you interact with pets and wear a facemask. See COVID-19 and Animals for more information. Other considerations   The ill person should eat/be fed in their room if possible. Non-disposable  items used should be handled with gloves and washed with hot water or in a . Clean hands after handling used  items.  If possible, dedicate a lined trash can for the ill person.  Use gloves when removing garbage bags, handling, and disposing of trash. Wash hands after handling or disposing of trash.  Consider consulting with your local health department about trash disposal guidance if available. Information for Household Members and Caregivers of Someone who is Sick   Call ahead before visiting your doctor   Call ahead: If you have a medical appointment, call the healthcare provider and tell them that you have or may have COVID-19. This will help the healthcare provider's office take steps to keep other people from getting infected or exposed. Wear a facemask if you are sick   ; If you are sick: You should wear a facemask when you are around other people (e.g., sharing a room or vehicle) or pets and before you enter a healthcare provider's office. ; If you are caring for others: If the person who is sick is not able to wear a facemask (for example, because it causes trouble breathing), then people who live with the person who is sick should not stay in the same room with them, or they should wear a facemask if they enter a room with the person who is sick. Cover your coughs and sneezes   ; Cover: Cover your mouth and nose with a tissue when you cough or sneeze.   ; Dispose: Throw used tissues in a lined trash can.   ; Wash hands: Immediately wash your hands with soap and water for at least 20 seconds or, if soap and water are not available, clean your hands with an alcohol-based hand  that contains at least 60% alcohol. Clean your hands often   ;  Wash hands: Wash your hands often with soap and water for at least 20 seconds, especially after blowing your nose, coughing, or sneezing; going to the bathroom; and before eating or preparing food.   ; Hand : If soap and water are not readily available, use an alcohol-based hand  with at least 60% alcohol, covering all surfaces of your hands and rubbing them together until they feel dry.   ; Soap and water: Soap and water are the best option if hands are visibly dirty.   ; Avoid touching: Avoid touching your eyes, nose, and mouth with unwashed hands. Handwashing Tips   ; Wet your hands with clean, running water (warm or cold), turn off the tap, and apply soap.  ; Lather your hands by rubbing them together with the soap. Lather the backs of your hands, between your fingers, and under your nails. ; Scrub your hands for at least 20 seconds. Need a timer? Hum the [place] from beginning to end twice.  ; Rinse your hands well under clean, running water.  ; Dry your hands using a clean towel or air dry them. Avoid sharing personal household items   ; Do not share: You should not share dishes, drinking glasses, cups, eating utensils, towels, or bedding with other people or pets in your home.   ; Wash thoroughly after use: After using these items, they should be washed thoroughly with soap and water. Clean all high-touch surfaces everyday   ; Clean and disinfect: Practice routine cleaning of high touch surfaces.  ; High touch surfaces include counters, tabletops, doorknobs, bathroom fixtures, toilets, phones, keyboards, tablets, and bedside tables.  ; Disinfect areas with bodily fluids: Also, clean any surfaces that may have blood, stool, or body fluids on them.   ; Household : Use a household cleaning spray or wipe, according to the label instructions. Labels contain instructions for safe and effective use of the cleaning product including precautions you should take when applying the product, such as wearing gloves and making sure you have good ventilation during use of the product.     Monitor your symptoms   Seek medical attention: Seek prompt medical attention if your illness is worsening     (e.g., difficulty breathing).   ; Call your doctor: Before seeking care, call your healthcare provider and tell them that you have, or are being evaluated for, COVID-19.   ; Wear a facemask when sick: Put on a facemask before you enter the

## 2020-06-19 NOTE — TELEPHONE ENCOUNTER
L/m on vm per ARIADNA. Patient is to keep his procedure appt at 11:30 on 6/22/20 with Dr. Savannah Jarquin. He is to keep his coumadin clinic appt at 8:30 on 6/22/20.

## 2020-06-19 NOTE — LETTER
Please schedule the following with:     Date:   1. 20      2. 20     Account: [de-identified]  Patient: Ling Payton    : 1950  Address:  [address] [id number]    Phone (H):  [phone] (home)      ----------------------------------------------------------------------------------------------  Diagnosis:     ICD-10-CM    1. Lumbar radiculopathy M54.16    2. DDD (degenerative disc disease), lumbar M51.36    3. Spondylosis without myelopathy or radiculopathy, lumbar region M47.816    4. Gait disorder R26.9          Levels:Right L4 and L5 Transforaminal ALCIDES x2  CPT Codes [id number], [id number]    ----------------------------------------------------------------------------------------------  Injection # 1 + 2    [facility]    Attending Physician       Mercy Saucedo MD.  ----------------------------------------------------------------------------------------------  Injection Scheduled For:    At:    [facility] Pre-Cert#    [employer] Insurance       Pre-Cert#    Comments or Special instructions:    · Infection control  · Tested positive for MRSA in past 12 months:  no  · Tested positive for MSSA \"staph infection\" in past 12 months: no  · Tested positive for VRE (Vancomycin Resistant Enterococci) in past 12 months:   no  · Currently on any antibiotics for an infection: no  · Anticoagulants:  · On a blood thinner:  yes  - Dr Rogerio Pickens Coumadin  · Any history of bleeding disorder: no   · Advanced Liver disease: no   · Advanced Renal disease: no   · Glaucoma: no   · Diabetes: no     Sedation:  Yes  -----------------------------------------------------------------------------------------------  Allergies   Allergen Reactions    Naproxen Swelling     Lips    Pt does not recognize this being an intolerance    Hydrocodone-Acetaminophen Itching

## 2020-06-19 NOTE — CARE COORDINATION
Tanya [facility] Transitions Initial Follow Up Call    Call within 2 business days of discharge: Yes    Patient: Tala Spann Patient : 1950   MRN: [id number]  Reason for Admission:   Discharge Date: 20 RARS: Readmission Risk Score: 18      Last Discharge [facility]       Complaint Diagnosis Description Type Department Provider    6/15/20 Atrial Fibrillation Rapid atrial fibrillation ([facility] [address] . .. ED to Hosp-Admission (Discharged) (ADMITTED) Martin Disla MD; Minal Alvarado Be. .. Spoke with: Tala Spann      Facility:[facility]    Non-face-to-face services provided:  Obtained and reviewed discharge summary and/or continuity of care documents    Care Transitions 24 Hour Call    Do you have any ongoing symptoms?:  No  Do you have a copy of your discharge instructions?:  Yes  Do you have all of your prescriptions and are they filled?:  Yes  Have you been contacted by a [facility] Pharmacist?:  No  Have you scheduled your follow up appointment?:  Yes  How are you going to get to your appointment?:  Car - family or friend to transport  Were you discharged with any Home Care or Post Acute Services:  No  Do you feel like you have everything you need to keep you well at home?:  Yes  Care Transitions Interventions  No Identified Needs       Patient contacted regarding recent discharge and COVID-19 risk. Discussed COVID-19 related testing which was not done at this time. Test results were not done. Patient informed of results, if available? Not done during admission, had one done today to proceed with a procedure     Care Transition Nurse contacted the patient by telephone to perform post discharge assessment. Patient has following risk factors of: COPD and immunocompromised. CTN reviewed discharge instructions, medical action plan and red flags related to discharge diagnosis. Reviewed and educated them on any new and changed medications related to discharge diagnosis.  Advised obtaining a 90-day supply of all daily and as-needed medications. Education provided regarding infection prevention, and signs and symptoms of COVID-19 and when to seek medical attention with patient who verbalized understanding. Discussed exposure protocols and quarantine from [address] you at higher risk for severe illness 2019 and given an opportunity for questions and concerns. The patient agrees to contact the COVID-19 hotline [phone] or PCP office for questions related to their healthcare. CTN provided contact information for future reference. From CDC: Are you at higher risk for severe illness?  Wash your hands often.  Avoid close contact (6 feet, which is about two arm lengths) with people who are sick.  Put distance between yourself and other people if COVID-19 is spreading in your community.  Clean and disinfect frequently touched surfaces.  Avoid all cruise travel and non-essential air travel.  Call your healthcare professional if you have concerns about COVID-19 and your underlying condition or if you are sick. For more information on steps you can take to protect yourself, see CDC's How to Protect Yourself    Pt states doing well, no issues or concerns. Denies SOB, CP,fluttering of heart. Looking forward to getting cortisone shots to leg and back for chronic pain. Has all new meds, reviewed all others. F/U appts listed below. Agreed to more CTC f/u calls    Plan for follow-up call in 5-7 days based on severity of symptoms and risk factors.       Follow Up  Future Appointments   Date Time Provider Vikki Caal   6/22/2020  8:30 AM [facility] ANTICOAGULATION CLINIC [facility]    6/26/2020  8:15 AM JEN Guzman [facility] LEAENNE CHEST [facility]   7/2/2020  1:00 PM SCHEDULE, [facility] FLU CLINIC [facility] FLU [facility]   7/8/2020  7:15 AM SCHEDULE, [place] REMOTE TRANSMISSION FF Cardio [facility]   7/24/2020  8:45 AM MD KEITH Mcmillan CHEST [facility]   8/18/2020 11:00 AM SCHEDULE, [place] DEVICE

## 2020-06-19 NOTE — PROGRESS NOTES
tenderness: Mild L4/5 and L5/S1 tenderness  Bursal tenderness No tenderness bilaterally  There is no paraspinal spasm. · Range of Motion: limited by 25% in all planes due to pain  · Strength:   Strength testing is 5/5 in all muscle groups tested. · Special Tests:   Straight leg raise + on right and crossed SLR negative. · Skin: There are no rashes, ulcerations or lesions. · Reflexes: Reflexes are symmetrically 1+ at the patellar and ankle tendons. Clonus absent bilaterally at the feet. · Gait & station: ambulates with a walker and no ataxia  · Additional Examinations:  · RIGHT LOWER EXTREMITY: Inspection/examination of the right lower extremity does not show any tenderness, deformity or injury. Range of motion is normal and pain-free. There is no gross instability. There are no rashes, ulcerations or lesions. Strength and tone are normal. No atrophy or abnormal movements are noted. · LEFT LOWER EXTREMITY:  Inspection/examination of the left lower extremity does not show any tenderness, deformity or injury. Range of motion is normal and pain-free. There is no gross instability. There are no rashes, ulcerations or lesions. Strength and tone are normal. No atrophy or abnormal movements are noted. Diagnostic Testing:    MR Lumbar spine shows  6/12/20:  Impression   Moderate diffuse degenerative disc disease without evidence of focal disc   protrusion.       Mild central canal stenosis at L3-L4.       Severe bilateral foraminal stenosis at L4-5 and L5-S1.        Results for orders placed or performed during the hospital encounter of 06/15/20   CBC Auto Differential   Result Value Ref Range    WBC 7.2 4.0 - 11.0 K/uL    RBC 4.69 4.20 - 5.90 M/uL    Hemoglobin 15.5 13.5 - 17.5 g/dL    Hematocrit 45.8 40.5 - 52.5 %    MCV 97.8 80.0 - 100.0 fL    MCH 33.1 26.0 - 34.0 pg    MCHC 33.9 31.0 - 36.0 g/dL    RDW 13.6 12.4 - 15.4 %    Platelets 622 379 - 870 K/uL    MPV 8.8 5.0 - 10.5 fL    Neutrophils % 79.0 % Risks, benefits and alternatives of interventional options were discussed. These include and are not limited to bleeding, infection, spinal headache, nerve injury and lack of pain relief. The patient verbalized understanding and would like to proceed. The patient will be scheduled accordingly. 5. Follow up:  2-3 weeks      Melissa Francisco was instructed to call the office if his symptoms worsen or if new symptoms appear prior to the next scheduled visit. He is specifically instructed to contact the office between now & his scheduled appointment if he has concerns related to his condition or if he needs assistance in scheduling the above tests. He is welcome to call for an appointment sooner if he has any additional concerns or questions. IYvrose, am scribing for Dr. Bladimir Young.  06/19/20 9:20 AM Yvrose Gannon. The physical examination was performed between the patient and Dr. Bladimir Young. All counseling during the appointment was performed between the patient and the provider. I, Dr. Bladimir Young, personally performed the services described in this documentation as scribed by Yvrose Powell ATC in my presence and it is both accurate and complete. Gillian Romero. Hardy Almeida MD, JANELL, [facility] [place]  Board Certified in [address] [place] [address] Certified and Fellowship Trained in [facility] ([facility])             This dictation was performed with a verbal recognition program [facility]) and it was checked for errors. It is possible that there are still dictated errors within this office note. If so, please bring any errors to my attention for an addendum. All efforts were made to ensure that this office note is accurate.

## 2020-06-22 ENCOUNTER — HOSPITAL ENCOUNTER (OUTPATIENT)
Age: 70
Setting detail: OUTPATIENT SURGERY
Discharge: HOME | End: 2020-06-22
Attending: PHYSICAL MEDICINE & REHABILITATION | Admitting: PHYSICAL MEDICINE & REHABILITATION
Payer: COMMERCIAL

## 2020-06-22 ENCOUNTER — TELEPHONE (OUTPATIENT)
Dept: ORTHOPEDIC SURGERY | Age: 70
End: 2020-06-22

## 2020-06-22 ENCOUNTER — APPOINTMENT (OUTPATIENT)
Dept: GENERAL RADIOLOGY | Age: 70
End: 2020-06-22
Attending: PHYSICAL MEDICINE & REHABILITATION
Payer: COMMERCIAL

## 2020-06-22 ENCOUNTER — TELEPHONE (OUTPATIENT)
Dept: FAMILY MEDICINE CLINIC | Age: 70
End: 2020-06-22

## 2020-06-22 ENCOUNTER — ANTI-COAG VISIT (OUTPATIENT)
Dept: PHARMACY | Age: 70
End: 2020-06-22
Payer: COMMERCIAL

## 2020-06-22 VITALS
TEMPERATURE: 99 F | SYSTOLIC BLOOD PRESSURE: 100 MMHG | OXYGEN SATURATION: 99 % | HEIGHT: 71 IN | DIASTOLIC BLOOD PRESSURE: 61 MMHG | RESPIRATION RATE: 16 BRPM | BODY MASS INDEX: 19.6 KG/M2 | HEART RATE: 63 BPM | WEIGHT: 140 LBS

## 2020-06-22 VITALS — TEMPERATURE: 98.2 F

## 2020-06-22 LAB — INTERNATIONAL NORMALIZATION RATIO, POC: 1.2

## 2020-06-22 PROCEDURE — 6360000002 HC RX W HCPCS: Performed by: PHYSICAL MEDICINE & REHABILITATION

## 2020-06-22 PROCEDURE — 99153 MOD SED SAME PHYS/QHP EA: CPT | Performed by: PHYSICAL MEDICINE & REHABILITATION

## 2020-06-22 PROCEDURE — 3209999900 FLUORO FOR SURGICAL PROCEDURES

## 2020-06-22 PROCEDURE — 2500000003 HC RX 250 WO HCPCS: Performed by: PHYSICAL MEDICINE & REHABILITATION

## 2020-06-22 PROCEDURE — 3610000057 HC PAIN LEVEL 4 ADDL 15 MIN (NON-OR): Performed by: PHYSICAL MEDICINE & REHABILITATION

## 2020-06-22 PROCEDURE — 2709999900 HC NON-CHARGEABLE SUPPLY: Performed by: PHYSICAL MEDICINE & REHABILITATION

## 2020-06-22 PROCEDURE — 3610000056 HC PAIN LEVEL 4 BASE (NON-OR): Performed by: PHYSICAL MEDICINE & REHABILITATION

## 2020-06-22 PROCEDURE — 99152 MOD SED SAME PHYS/QHP 5/>YRS: CPT | Performed by: PHYSICAL MEDICINE & REHABILITATION

## 2020-06-22 RX ORDER — MIDAZOLAM HYDROCHLORIDE 1 MG/ML
INJECTION INTRAMUSCULAR; INTRAVENOUS
Status: COMPLETED | OUTPATIENT
Start: 2020-06-22 | End: 2020-06-22

## 2020-06-22 RX ORDER — LIDOCAINE HYDROCHLORIDE 10 MG/ML
INJECTION, SOLUTION INFILTRATION; PERINEURAL
Status: COMPLETED | OUTPATIENT
Start: 2020-06-22 | End: 2020-06-22

## 2020-06-22 RX ORDER — BETAMETHASONE SODIUM PHOSPHATE AND BETAMETHASONE ACETATE 3; 3 MG/ML; MG/ML
INJECTION, SUSPENSION INTRA-ARTICULAR; INTRALESIONAL; INTRAMUSCULAR; SOFT TISSUE
Status: COMPLETED | OUTPATIENT
Start: 2020-06-22 | End: 2020-06-22

## 2020-06-22 RX ORDER — FENTANYL CITRATE 50 UG/ML
INJECTION, SOLUTION INTRAMUSCULAR; INTRAVENOUS
Status: COMPLETED | OUTPATIENT
Start: 2020-06-22 | End: 2020-06-22

## 2020-06-22 RX ORDER — BUPIVACAINE HYDROCHLORIDE 5 MG/ML
INJECTION, SOLUTION EPIDURAL; INTRACAUDAL
Status: COMPLETED | OUTPATIENT
Start: 2020-06-22 | End: 2020-06-22

## 2020-06-22 ASSESSMENT — PAIN SCALES - GENERAL
PAINLEVEL_OUTOF10: 0
PAINLEVEL_OUTOF10: 0

## 2020-06-22 ASSESSMENT — PAIN - FUNCTIONAL ASSESSMENT: PAIN_FUNCTIONAL_ASSESSMENT: 0-10

## 2020-06-22 NOTE — TELEPHONE ENCOUNTER
----- Message from Félix Mendoza MD sent at 6/19/2020 10:41 AM EDT -----  Need hospital follow up scheduled  If he has f/u with cardiologist then its ok      Dr Shayy Obrien

## 2020-06-22 NOTE — TELEPHONE ENCOUNTER
DOS   06/22/2020  CPT   87081  63748   DX   M54.16  M51.36  M47.816  R26.9  OP SX AUTH  [id number]    VALID   06/19/2020 - 09/17/2020    Right  LEVELS   L4  L5   PROCEDURE   Trans foraminal ALCIDES  DR. Magallanes [facility]:   [address] Medicare Advantage    CPT  74833.07  [id number]  NPR

## 2020-06-22 NOTE — PROGRESS NOTES
Mr. Melissa Francisco is a 78 y.o. y/o male with history of a fib who presents today for anticoagulation monitoring and adjustment. Mitral valve replaced with porcine valve 1/2015  Patient was in the hospital on 6/6/15 for a TIA. Hx on warfarin, was d/c in 2017 d/t bleed. PMH:  Hx squamous cell carcinoma, COPD  Hx been on eliquis, but not approved for use d/t prosthetic valve   Hx Sikhism   Patient Reported Findings:  Yes     No  [x]   []       Patient verifies current dosing regimen as listed started warfarin 5 mg daily on 6/16. Was told did not need to bridge at discharge     []   [x]       S/S bleeding/bruising/swelling/SOB denies   []   [x]       Blood in urine or stool-    [x]   []       Procedures scheduled in the future at this time- is having epidural today and then again 7/8 for pinched nerve. Then will be put in PT then will have ablation. Advised for pt to let MD doing epidural know that on warfarin to determine if will need to hold for next injection   []   [x]       Missed Dose denies   []   [x]       Extra Dose  [x]   []       Change in medications - finished prednisone at d/c from hospital. Uses biofreeze for nerve pain (advised for pt to talk to MD doing epidural to verify safe to use)   [x]   []       Change in health/diet/appetite--- not much vit k, does not plan to consistently eat   []   [x]       Change in alcohol use  []   [x]       Change in activity  [x]   []       Hospital admission was in hospital 6/15-6/18 for a fib. had CV 6/17. Started warfarin 5 mg qd 6/16 with bridge. Started coreg and digoxin. []   [x]       Emergency department visit -  [x]   []       Other complaints -  Per Dr. Ness Wright note from hospital admit 6/2020 \"His DELGADO has been ligated and plan for short term anticoagulation prior and after ablation.  Long term he can stop anticoagulation for atrial fibrillation\"    Clinical Outcomes:  Yes     No  []   [x]       Major bleeding event  []   [x] Thromboembolic event  Takes warfarin in PM  Duration of warfarin Therapy: indefinitely  INR Range:  2.0-3.0 (2.5-3.0 preferred)     Reestablishing care with the anticoagulation clinic. Hx patient in 2017. Pt states that last chemo caused memory issues. INR 1.2 today   Patient accompanied by friend   Take 7.5 mg tonight and tomorrow then increase weekly dose to 5 mg on Mon, Wed and Fri and 7.5 mg all other days of the week   Discuss warfarin and epidural at appt today.   Recheck INR in 1 week, 6/29    Referring cardiologist is Dr Pamela Tuttle  INR (no units)   Date Value   06/22/2020 1.2   06/18/2020 1.20 (H)   06/17/2020 1.14   06/15/2020 1.11   12/16/2017 1.32 (H)     CLINICAL PHARMACY CONSULT: MED RECONCILIATION/REVIEW ADDENDUM    For Pharmacy Admin Tracking Only    PHSO: Yes  Total # of Interventions Recommended: 1  - Increased Dose #: 1  - Maintenance Safety Lab Monitoring #: 1  Total Interventions Accepted: 1  Time Spent (min): 30    Mimi Norman, MildredD

## 2020-06-23 ENCOUNTER — TELEPHONE (OUTPATIENT)
Dept: CARDIOLOGY CLINIC | Age: 70
End: 2020-06-23

## 2020-06-23 NOTE — TELEPHONE ENCOUNTER
He had a cardioversion on 6/17/20 with Dr. Giovanni Farrell. Ok to proceed with injection. Ok to hold coumadin for 5 days prior if requested by the performing surgeon. Please let him know.     ISIDRA Mcdowell-CNP

## 2020-06-24 ENCOUNTER — TELEPHONE (OUTPATIENT)
Dept: CARDIOLOGY CLINIC | Age: 70
End: 2020-06-24

## 2020-06-24 NOTE — TELEPHONE ENCOUNTER
Pt states his pulse is running high, his BP is 105/76 [id number] 101/69 pulse 122. Pt has dizziness when standing. Screen lit up with IRD.  Please call to advise

## 2020-06-26 ENCOUNTER — CARE COORDINATION (OUTPATIENT)
Dept: CASE MANAGEMENT | Age: 70
End: 2020-06-26

## 2020-06-26 ENCOUNTER — TELEPHONE (OUTPATIENT)
Dept: ORTHOPEDIC SURGERY | Age: 70
End: 2020-06-26

## 2020-06-26 RX ORDER — METHOCARBAMOL 750 MG/1
750 TABLET, FILM COATED ORAL 3 TIMES DAILY
Qty: 42 TABLET | Refills: 0 | Status: SHIPPED | OUTPATIENT
Start: 2020-06-26 | End: 2020-07-10

## 2020-06-26 NOTE — CARE COORDINATION
Tanya [facility] Transitions Follow Up Call    2020    Patient: Niki Wyatt  Patient : 1950   MRN: [id number]  Reason for Admission:  CP; Atrial flutter with RVR. Discharge Date: 20 RARS: Readmission Risk Score: 18      Spoke with: [id number] Transitions Subsequent and Final Call    Subsequent and Final Calls  Do you have any ongoing symptoms?:  No  Have your medications changed?:  No  Do you have any questions related to your medications?:  No  Do you currently have any active services?:  No  Do you have any needs or concerns that I can assist you with?:  No  Identified Barriers:  None  Care Transitions Interventions  No Identified Needs  Other Interventions:          Patient contacted regarding COVID-19 risk and screening. Discussed COVID-19 related testing which was not done at this time. Test results were not done. Patient informed of results, if available? Not done. Care Transition Nurse contacted the patient by telephone to perform follow-up assessment. Patient has following risk factors of: COPD. Symptoms reviewed with patient who verbalized the following symptoms: no new symptoms and no worsening symptoms. Due to no new or worsening symptoms encounter was not routed to provider for escalation. Education provided regarding infection prevention, and signs and symptoms of COVID-19 and when to seek medical attention with patient who verbalized understanding. Discussed exposure protocols and quarantine from [address] you at higher risk for severe illness  and given an opportunity for questions and concerns. The patient agrees to contact the COVID-19 hotline [phone] or PCP office for questions related to their healthcare. CTN provided contact information for future reference. From CDC: Are you at higher risk for severe illness?  Wash your hands often.    Avoid close contact (6 feet, which is about two arm lengths) with people who are

## 2020-06-26 NOTE — TELEPHONE ENCOUNTER
Patient requesting a medication refill.   Medication:   methocarbamol (ROBAXIN-750) 750 MG tablet  Pharmacy:   [address] [address] [address]  Last office visit:   Next office visit: Visit date not found

## 2020-06-29 ENCOUNTER — TELEPHONE (OUTPATIENT)
Dept: ORTHOPEDIC SURGERY | Age: 70
End: 2020-06-29

## 2020-06-29 ENCOUNTER — ANTI-COAG VISIT (OUTPATIENT)
Dept: PHARMACY | Age: 70
End: 2020-06-29
Payer: COMMERCIAL

## 2020-06-29 VITALS — TEMPERATURE: 98.2 F

## 2020-06-29 LAB — INTERNATIONAL NORMALIZATION RATIO, POC: 1.6

## 2020-06-29 PROCEDURE — 99212 OFFICE O/P EST SF 10 MIN: CPT

## 2020-06-29 PROCEDURE — 85610 PROTHROMBIN TIME: CPT

## 2020-06-29 NOTE — TELEPHONE ENCOUNTER
L/M FOR PATIENT asked that he hold Coumadin 5 days prior to procedure - approval rec'd from Jitendra Chavis, ISIDRA-[place] 6/23/2020.

## 2020-07-01 ENCOUNTER — OFFICE VISIT (OUTPATIENT)
Dept: PRIMARY CARE CLINIC | Age: 70
End: 2020-07-01
Payer: COMMERCIAL

## 2020-07-01 PROCEDURE — 99211 OFF/OP EST MAY X REQ PHY/QHP: CPT | Performed by: NURSE PRACTITIONER

## 2020-07-01 NOTE — PROGRESS NOTES
PATIENT REACHED   YES__X__NO____    PREOP INSTUCTIONS  Patient instructed to get their COVID-19 test done as directed by their doctor (5-7 days prior to procedure)  or patient states will get on ___7/1 DONE_______. I The day the COVID test is done is considered day one. Instructed to self quarantine after test until DOS. There is a one visitor policy at [facility] for the pre-op phase only for surgery and endoscopy cases. The visitor is expected to leave the facility after the patient is taken back for the procedure. Pain management is NO VISITOR policyThe patients ride is expected to remain in the car with a cell phone for communication. If the ride is leaving the hospital grounds please make sure they are back in time for pickup. Have the patient inform the staff on arrival what their rides plans are while the patient is in the facility. At the MAIN there is one visitor allowed. Please note that the visitor policy is subject to change. DATE_7/8/20________ TIME__1045_______ARRIVAL_0945_______PLACE__masc__________  NOTHING TO EAT OR DRINK  AFTER MIDNIGHT THE EVENING PRIOR OR AS INSTRUCTED BY YOUR DR.  Debora Pa NEED A RESPONSIBLE ADULT AGE 18 OR OLDER TO DRIVE YOU HOME  PLEASE BRING INSURANCE CARD. PICTURE ID AND COMPLETE LIST OF MEDS  WEAR LOOSE COMFORTABLE CLOTHING  FOLLOW ANY INSTRUCTIONS YOUR DRS OFFICE HAS GIVEN YOU,INCLUDING WHAT MEDICATIONS TO TAKE THE AM OF PROCEDURE AND WHEN AND IF YOU NEED TO STOP ANY BLOOD THINNERS. IF YOU HAVE QUESTIONS REGARDING THIS CALL THE OFFICE  THE GOAL BLOOD SUGAR THE AM OF PROCEDURE  OR LESS ABOVE THAT THE PROCEDURE MAY BE CANCELLED  ANY QUESTIONS CALL YOUR DOCTOR. ALSO,PLEASE READ THE INSTRUCTION PACKET FROM YOUR DR IF YOU RECEIVED ONE.   SPINE INTERVENTION NUMBER [phone]

## 2020-07-01 NOTE — PATIENT INSTRUCTIONS

## 2020-07-02 ENCOUNTER — CARE COORDINATION (OUTPATIENT)
Dept: CASE MANAGEMENT | Age: 70
End: 2020-07-02

## 2020-07-02 NOTE — CARE COORDINATION
[facility] Transitions Follow Up Call    2020    Patient: Ligia Malhotra  Patient : 1950   MRN: [id number]  Reason for Admission:  CP; Atrial flutter with RVR. NO COVID test done     Discharge Date: 20 RARS: Readmission Risk Score: 18    Spoke with: [address] Transitions Subsequent and Final Call    Subsequent and Final Calls  Do you have any ongoing symptoms?:  No  Have your medications changed?:  No  Do you have any questions related to your medications?:  No  Do you currently have any active services?:  No  Do you have any needs or concerns that I can assist you with?:  No  Identified Barriers:  None  Care Transitions Interventions  No Identified Needs  Other Interventions:        Patient contacted regarding COVID-19 risk and screening. Discussed COVID-19 related testing which was not done at this time. Test results were not done. Patient informed of results, if available? Not done during admission. Care Transition Nurse contacted the patient by telephone to perform follow-up assessment. Patient has following risk factors of: COPD and immunocompromised. Symptoms reviewed with patient who verbalized the following symptoms: no new symptoms and no worsening symptoms. Due to no new or worsening symptoms encounter was not routed to provider for escalation. Education provided regarding infection prevention, and signs and symptoms of COVID-19 and when to seek medical attention with patient who verbalized understanding. Discussed exposure protocols and quarantine from [address] you at higher risk for severe illness  and given an opportunity for questions and concerns. The patient agrees to contact the COVID-19 hotline [phone] or PCP office for questions related to their healthcare. CTN provided contact information for future reference. From CDC: Are you at higher risk for severe illness?  Wash your hands often.    Avoid close contact (6

## 2020-07-04 LAB
SARS-COV-2: NOT DETECTED
SOURCE: NORMAL

## 2020-07-06 NOTE — TELEPHONE ENCOUNTER
Because he's taking the oxycodone-acetaminophen when he takes his muscle relaxer. Patient states that he's out.  Please advise, thanks

## 2020-07-06 NOTE — TELEPHONE ENCOUNTER
Medication:   Requested Prescriptions     Pending Prescriptions Disp Refills    oxyCODONE-acetaminophen (PERCOCET) 5-325 MG per tablet 60 tablet 0     Sig: Take 1 tablet by mouth every 8 hours as needed for Pain for up to 30 days. Intended supply: 3 days. Take lowest dose possible to manage pain        Last Filled:  6/15/2020 #60 Refills 0    Patient Phone Number: [phone] (home)     Last appt: 6/10/2020   Next appt: Visit date not found    Last OARRS:   RX Monitoring 12/30/2019   Periodic Controlled Substance Monitoring No signs of potential drug abuse or diversion identified.

## 2020-07-07 ENCOUNTER — TELEPHONE (OUTPATIENT)
Dept: ORTHOPEDIC SURGERY | Age: 70
End: 2020-07-07

## 2020-07-07 RX ORDER — OXYCODONE HYDROCHLORIDE AND ACETAMINOPHEN 5; 325 MG/1; MG/1
1 TABLET ORAL EVERY 8 HOURS PRN
Qty: 60 TABLET | Refills: 0 | Status: SHIPPED | OUTPATIENT
Start: 2020-07-07 | End: 2020-08-06

## 2020-07-07 NOTE — TELEPHONE ENCOUNTER
Auth: # [id number]    Date: 07/08/2020  Type of SX:  OP  Location: [facility]  CPT: 12211  06652   DX Code: M54.16  M51.36  M47.816  R26.9  SX area: Right L4  L5  Trans foraminal ALCIDES  Insurance: [address] [place] [address]  CPT  68028.37  [id number]  NPR

## 2020-07-08 ENCOUNTER — NURSE ONLY (OUTPATIENT)
Dept: CARDIOLOGY CLINIC | Age: 70
End: 2020-07-08
Payer: COMMERCIAL

## 2020-07-08 ENCOUNTER — HOSPITAL ENCOUNTER (OUTPATIENT)
Age: 70
Setting detail: OUTPATIENT SURGERY
Discharge: HOME | End: 2020-07-08
Attending: PHYSICAL MEDICINE & REHABILITATION | Admitting: PHYSICAL MEDICINE & REHABILITATION
Payer: COMMERCIAL

## 2020-07-08 ENCOUNTER — TELEPHONE (OUTPATIENT)
Dept: ORTHOPEDIC SURGERY | Age: 70
End: 2020-07-08

## 2020-07-08 ENCOUNTER — APPOINTMENT (OUTPATIENT)
Dept: GENERAL RADIOLOGY | Age: 70
End: 2020-07-08
Attending: PHYSICAL MEDICINE & REHABILITATION
Payer: COMMERCIAL

## 2020-07-08 VITALS
OXYGEN SATURATION: 98 % | DIASTOLIC BLOOD PRESSURE: 84 MMHG | SYSTOLIC BLOOD PRESSURE: 95 MMHG | HEIGHT: 72 IN | BODY MASS INDEX: 18.69 KG/M2 | TEMPERATURE: 98.5 F | RESPIRATION RATE: 14 BRPM | WEIGHT: 138 LBS | HEART RATE: 80 BPM

## 2020-07-08 LAB
INR BLD: 1.01 (ref 0.86–1.14)
PROTHROMBIN TIME: 11.7 SEC (ref 10–13.2)

## 2020-07-08 PROCEDURE — 85610 PROTHROMBIN TIME: CPT

## 2020-07-08 PROCEDURE — 2709999900 HC NON-CHARGEABLE SUPPLY: Performed by: PHYSICAL MEDICINE & REHABILITATION

## 2020-07-08 PROCEDURE — 3209999900 FLUORO FOR SURGICAL PROCEDURES

## 2020-07-08 PROCEDURE — 6360000002 HC RX W HCPCS: Performed by: PHYSICAL MEDICINE & REHABILITATION

## 2020-07-08 PROCEDURE — G2066 INTER DEVC REMOTE 30D: HCPCS | Performed by: INTERNAL MEDICINE

## 2020-07-08 PROCEDURE — 3610000056 HC PAIN LEVEL 4 BASE (NON-OR): Performed by: PHYSICAL MEDICINE & REHABILITATION

## 2020-07-08 PROCEDURE — 99152 MOD SED SAME PHYS/QHP 5/>YRS: CPT | Performed by: PHYSICAL MEDICINE & REHABILITATION

## 2020-07-08 PROCEDURE — 36415 COLL VENOUS BLD VENIPUNCTURE: CPT

## 2020-07-08 PROCEDURE — 93298 REM INTERROG DEV EVAL SCRMS: CPT | Performed by: INTERNAL MEDICINE

## 2020-07-08 PROCEDURE — 2500000003 HC RX 250 WO HCPCS: Performed by: PHYSICAL MEDICINE & REHABILITATION

## 2020-07-08 RX ORDER — MIDAZOLAM HYDROCHLORIDE 1 MG/ML
INJECTION INTRAMUSCULAR; INTRAVENOUS
Status: COMPLETED | OUTPATIENT
Start: 2020-07-08 | End: 2020-07-08

## 2020-07-08 RX ORDER — BUPIVACAINE HYDROCHLORIDE 5 MG/ML
INJECTION, SOLUTION EPIDURAL; INTRACAUDAL
Status: COMPLETED | OUTPATIENT
Start: 2020-07-08 | End: 2020-07-08

## 2020-07-08 RX ORDER — LIDOCAINE HYDROCHLORIDE 10 MG/ML
INJECTION, SOLUTION INFILTRATION; PERINEURAL
Status: COMPLETED | OUTPATIENT
Start: 2020-07-08 | End: 2020-07-08

## 2020-07-08 RX ORDER — FENTANYL CITRATE 50 UG/ML
INJECTION, SOLUTION INTRAMUSCULAR; INTRAVENOUS
Status: COMPLETED | OUTPATIENT
Start: 2020-07-08 | End: 2020-07-08

## 2020-07-08 RX ORDER — BETAMETHASONE SODIUM PHOSPHATE AND BETAMETHASONE ACETATE 3; 3 MG/ML; MG/ML
INJECTION, SUSPENSION INTRA-ARTICULAR; INTRALESIONAL; INTRAMUSCULAR; SOFT TISSUE
Status: COMPLETED | OUTPATIENT
Start: 2020-07-08 | End: 2020-07-08

## 2020-07-08 ASSESSMENT — PAIN SCALES - GENERAL
PAINLEVEL_OUTOF10: 6
PAINLEVEL_OUTOF10: 5

## 2020-07-08 ASSESSMENT — PAIN DESCRIPTION - PAIN TYPE: TYPE: CHRONIC PAIN

## 2020-07-08 ASSESSMENT — PAIN - FUNCTIONAL ASSESSMENT: PAIN_FUNCTIONAL_ASSESSMENT: 0-10

## 2020-07-08 ASSESSMENT — PAIN DESCRIPTION - LOCATION: LOCATION: HIP

## 2020-07-08 ASSESSMENT — PAIN DESCRIPTION - DESCRIPTORS
DESCRIPTORS: ACHING;RADIATING
DESCRIPTORS: RADIATING

## 2020-07-08 ASSESSMENT — PAIN DESCRIPTION - ORIENTATION: ORIENTATION: RIGHT

## 2020-07-08 ASSESSMENT — PAIN DESCRIPTION - FREQUENCY: FREQUENCY: CONTINUOUS

## 2020-07-08 NOTE — OP NOTE
Patient:  Claude Jorge  YOB: 1950  Medical Record #:  [id number]   Place: [address] [place] [address]  Date:  7/8/2020   Physician:  Meeta Wells MD, JANELL    Procedure: 1. Transforaminal Lumbar Epidural Steroid Injection -  right L4  CPT 14047          2. Transforaminal Lumbar Epidural Steroid Injection -  right L5  CPT 59913    Pre-Procedure Diagnosis: Lumbar radiculopathy    ALCIDES #2    Post-Procedure Diagnosis: Same    Sedation: Local with 1% Lidocaine 3 ml and 2 mg of IV Versed and 50 mcg of IV Fentanyl    EBL: None    Complications: None    Procedure Summary:        The patient was brought to the procedure suite and placed in the prone position. The skin overlying the lumbar spine was prepped and draped in the usual sterile fashion. Using fluoroscopic guidance, the right L4 foramen was identified. Through anesthetized skin, a 22 gauge 3.5 inch curved tip spinal needle was advanced into the foramen. Isovue M 300 was instilled showing an epidurogram/nerve root outline pattern without evidence of vascular or intrathecal spread. Following which, 7.5 mg of Celestone mixed with 1 ml of 0.5% Marcaine was instilled. The needle was removed. Using fluoroscopic guidance, the right L5 foramen was identified. Through anesthetized skin, a 22 gauge 3.5 inch curved tip spinal needle was advanced into the foramen. Isovue M 300 was instilled showing an epidurogram/nerve root outline pattern without evidence of vascular or intrathecal spread. Following which, 7.5 mg of Celestone mixed with 1 ml of 0.5% Marcaine was instilled. The needle was removed and band-aids were applied. The patient was transferred to the post-operative area in stable condition.

## 2020-07-08 NOTE — TELEPHONE ENCOUNTER
S/w patient. Informed him of surgical referrals per [facility] request.  A referral for both Dr. Rolland Councilman and Dr. Malissa Diaz have been placed. He notes driving currently and unable to take referral information, but will call back to get office numbers for both physicians.

## 2020-07-08 NOTE — H&P
HISTORY AND PHYSICAL/PRE-SEDATION ASSESSMENT    Patient:  Wilfrido Delgadillo   :  1950  Medical Record No.:  [id number]   Date:  2020  Physician:  Kimberly Castorena M.D. Facility: [address] [place] [address]    HISTORY OF PRESENT ILLNESS:                 The patient is a 78 y.o. male whom presents with low back and right leg pain. Review of the imaging and physical exam of the patient confirmed the pre-procedure diagnosis. After a thorough discussion of risks, benefits and alternatives informed consent was obtained. Past Medical History:   Past Medical History:   Diagnosis Date    Aortic valve insufficiency     Arthritis     [facility]     Cancer ([facility] 2017    head and neck    Hearing loss     Heart disease     Medical history reviewed with no changes     Obstructive apnea does not use CPAP    Thyroid disease       Past Surgical History:     Past Surgical History:   Procedure Laterality Date    ANKLE SURGERY      AORTIC VALVE REPLACEMENT  1/28/15    Dr. Verner Doss 25mm Mosaic Ultra porcine valve; right and left modified maze procedure with ligation of DELGADO with Atriclip    BACK SURGERY      CSP    CARDIAC CATHETERIZATION      HERNIA REPAIR      KNEE SURGERY      Arthroscopy    LUNG SURGERY      collapsed lung due to broken ribs    MANDIBLE FRACTURE SURGERY      NECK SURGERY      Fusion    PAIN MANAGEMENT PROCEDURE Right 2020    RIGHT L4 AND L5 TRANSFORAMINAL EPIDURAL STEROID INJECTION WITH FLUOROSCOPY ([id number]) [facility]       Current Medications:   Prior to Admission medications    Medication Sig Start Date End Date Taking? Authorizing Provider   oxyCODONE-acetaminophen (PERCOCET) 5-325 MG per tablet Take 1 tablet by mouth every 8 hours as needed for Pain for up to 30 days. Intended supply: 3 days.  Take lowest dose possible to manage pain 20 Yes Ynes Sandoval MD methocarbamol (ROBAXIN-750) 750 MG tablet Take 1 tablet by mouth 3 times daily for 14 days 6/26/20 7/10/20 Yes Renata Leung MD   dilTIAZem (CARDIZEM 12 HR) 60 MG extended release capsule Take 1 capsule by mouth 2 times daily 6/18/20  Yes ISIDRA Gan CNP   digoxin (LANOXIN) 125 MCG tablet Take 1 tablet by mouth daily 6/19/20  Yes ISIDRA Gan CNP   levothyroxine (SYNTHROID) 100 MCG tablet TAKE 1 TABLET BY MOUTH EVERY DAY 5/26/20  Yes Renata Leung MD   warfarin (COUMADIN) 5 MG tablet Take 1 tablet by mouth daily 6/18/20   John Ayala MD   aspirin 81 MG EC tablet Take 81 mg by mouth daily    Historical Provider, MD   Cod Liver Oil OIL Take 1 tablet by mouth daily     Historical Provider, MD   cyclobenzaprine (FLEXERIL) 10 MG tablet Take 10 mg by mouth 2 times daily as needed  1/23/16   Historical Provider, MD   docusate sodium (COLACE) 100 MG capsule Take 100 mg by mouth 2 times daily. Historical Provider, MD     Allergies:  Naproxen and Hydrocodone-acetaminophen  Social History:    reports that he quit smoking about 5 years ago. His smoking use included cigarettes. He started smoking about 45 years ago. He has a 40.00 pack-year smoking history. He has never used smokeless tobacco. He reports that he does not drink alcohol or use drugs. Family History:   Family History   Problem Relation Age of Onset    Heart Disease Other     High Blood Pressure Neg Hx     High Cholesterol Neg Hx        Vitals: Blood pressure 107/85, pulse 104, temperature 98.5 °F (36.9 °C), resp. rate 13, height 5' 11.5\" (1.816 m), weight 138 lb (62.6 kg), SpO2 99 %. PHYSICAL EXAM:including affected areas  HENT: Airway patent and reviewed  Cardiovascular: Normal rate, regular rhythm, normal heart sounds. Pulmonary/Chest: No wheezes. No rhonchi. No rales. Abdominal: Soft. Bowel sounds are normal. No distension.   Extremities: Moves all extremities equally  Cervical and Lumbar Spine: Painful range of motion, no midline tenderness       Diagnosis:Lumbar radiculopathy  M54.16  M51.36  M47.816  R26.9    Plan: Proceed with planned procedure      ASA CLASS:         []   I. Normal, healthy adult           [x]   II.  Mild systemic disease            []   III. Severe systemic disease      Mallampati: Mallampati Class II - (soft palate, fauces & uvula are visible)      Sedation plan:   [x]  Local              []  Minimal                  []  General anesthesia    Patient's condition acceptable for planned procedure/sedation. Post Procedure Plan   Return to same level of care   ______________________     The patient was counseled at length about the risks of celestino Covid-19 in the abdi-operative and post-operative states including the recovery window of their procedure. The patient was made aware that celestino Covid-19 after a surgical procedure may worsen their prognosis for recovering from the virus and lend to a higher morbidity and or mortality risk. The patient was given the options of postponing their procedure. All of the risks, benefits, and alternatives were discussed. The patient does wish to proceed with the procedure. The risks and benefits as well as alternatives to the procedure have been discussed with the patient and or family. The patient and or next of kin understands and agrees to proceed.     Marcelino Soto M.D.

## 2020-07-08 NOTE — PROGRESS NOTES
[facility] remote Linq report shows known AF, some with RVR. Pt is on coumadin. We will continue to monitor remotely.

## 2020-07-08 NOTE — LETTER
[address] [facility] [phone]  [facility]  Andrade Huitron [facility] [phone]    Pacemaker/Defibrillator Clinic          07/08/20        [place] [id number]        Dear Brendan Stein    This letter is to inform you that we received the transmission from your monitor at home that checks your implanted heart device. The next date your monitor will automatically transmit will be 9-21-20. If your report needs attention we will notify you. Your device and monitor are wireless and most transmit cellularly, but please periodically check your monitor is still plugged in to the electrical outlet. If you still use the telephone land line to send please ensure the connection to the phone tomy is secure. This will help to ensure successful automatic transmissions in the future. Also, the monitor needs to be close to you while sleeping at night. Please be aware that the remote device transmission sites are periodically monitored only during regular business hours during which simultaneous in-office device clinics are being run. If your transmission requires attention, we will contact you as soon as possible. Thank you.             [facility]

## 2020-07-09 ENCOUNTER — OFFICE VISIT (OUTPATIENT)
Dept: PRIMARY CARE CLINIC | Age: 70
End: 2020-07-09
Payer: COMMERCIAL

## 2020-07-09 ENCOUNTER — CARE COORDINATION (OUTPATIENT)
Dept: CASE MANAGEMENT | Age: 70
End: 2020-07-09

## 2020-07-09 PROCEDURE — 99211 OFF/OP EST MAY X REQ PHY/QHP: CPT | Performed by: NURSE PRACTITIONER

## 2020-07-09 NOTE — CARE COORDINATION
[facility] Transitions Follow Up Call    2020    Patient: Richard Brennan  Patient : 1950   MRN: [id number]  Reason for Admission: CP; Atrial flutter with RVR. NO COVID test done   Discharge Date: 20 RARS: Readmission Risk Score: 18         Spoke with: [address] Transitions Subsequent and Final Call    Subsequent and Final Calls  Do you have any ongoing symptoms?:  No  Have your medications changed?:  No  Do you have any questions related to your medications?:  No  Do you currently have any active services?:  No  Do you have any needs or concerns that I can assist you with?:  No  Identified Barriers:  None  Care Transitions Interventions  Other Interventions: Follow Up: Patient reports that he had epidural yesterday, feels like he is still in Afib. He is scheduled for ablation 7/15/20. He is waiting for his epidural to \"work\". CTN will continue with follow up outreach calls.   Future Appointments   Date Time Provider Vikki Caal   2020  8:00 AM [facility] ANTICOAGULATION CLINIC [facility]   7/15/2020  8:00 AM [facility] CARDIAC CATH LAB ROOM 3 [facility] CATH [facility]   2020  8:45 AM MD KEITH Esposito W CHEST [facility]   8/3/2020  2:00 PM MD KEITH Car AND [facility]   2020 11:00 AM SCHEDULE, [place] DEVICE CHECK FF Cardio [facility]   2020 11:00 AM ISIDRA Candelario - CNP FF Cardio [facility]   2020 10:30 AM SCHEDULE, [place] REMOTE TRANSMISSION FF Cardio VIBHA Darnell RN

## 2020-07-09 NOTE — PROGRESS NOTES
Steph Cohn received a viral test for COVID-19. They were educated on isolation and quarantine as appropriate. For any symptoms, they were directed to seek care from their PCP, given contact information to establish with a doctor, directed to an urgent care or the emergency room. Patient was seen today for pre op Covid testing.

## 2020-07-09 NOTE — PATIENT INSTRUCTIONS
You have received a viral test for COVID-19. Below is education on quarantine per the CDC guidelines. For any symptoms, seek care from your PCP, call [phone] to establish care with a doctor, or go directly to an urgent care or the emergency room. Test results will take 2-7 days and will be sent to you in your [employer] account. If you test positive, you will be contacted via phone. If you test negative, the ONLY communication will be through [address] GO TO [employer] AND SIGN UP FOR [employer]  (LOWER LEFT OF THE HOME PAGE)  No test is 100%. If you have symptoms, you should follow the guidance of quarantine as previously stated. You can still be contagious if you have symptoms. Your [facility] will reach out to you if you have a positive result. They will provide you with a return to work date and note. If you were tested for a pre-op, then you should remain in quarantine until your procedure. How do I know if I need to be in quarantine? If you live in a community where COVID-19 is or might be spreading (currently, that is virtually everywhere in the [place])  Be alert for symptoms. Watch for fever, cough, shortness of breath, or other symptoms of COVID-19.  Take your temperature if symptoms develop.  Practice social distancing. Maintain 6 feet of distance from others and stay out of crowded places.  Follow CDC guidance if symptoms develop. If you feel healthy but:   Recently had close contact with a person with COVID-19 you need to Quarantine:   Stay home until 14 days after your last exposure.  Check your temperature twice a day and watch for symptoms of COVID-19.  If possible, stay away from people who are at higher-risk for getting very sick from COVID-19.   Stay Home and Monitor Your Health if you:   Have been diagnosed with COVID-19, or   Are waiting for test results, or   Have cough, fever, or shortness of breath, or symptoms of COVID-19      When You Can

## 2020-07-11 LAB
SARS-COV-2: NOT DETECTED
SOURCE: NORMAL

## 2020-07-14 ENCOUNTER — ANTI-COAG VISIT (OUTPATIENT)
Dept: PHARMACY | Age: 70
End: 2020-07-14
Payer: COMMERCIAL

## 2020-07-14 VITALS — TEMPERATURE: 97.4 F

## 2020-07-14 LAB — INTERNATIONAL NORMALIZATION RATIO, POC: 1.2

## 2020-07-14 PROCEDURE — 85610 PROTHROMBIN TIME: CPT

## 2020-07-14 PROCEDURE — 99212 OFFICE O/P EST SF 10 MIN: CPT

## 2020-07-14 NOTE — PROGRESS NOTES
Hospital admission was in hospital 6/15-6/18 for a fib. had CV 6/17. Started warfarin 5 mg qd 6/16 with bridge. Started coreg and digoxin. []   [x]       Emergency department visit -  [x]   []       Other complaints -  Per Dr. Holger Cotton note from hospital admit 6/2020 \"His DELGADO has been ligated and plan for short term anticoagulation prior and after ablation. Long term he can stop anticoagulation for atrial fibrillation\"    Clinical Outcomes:  Yes     No  []   [x]       Major bleeding event  []   [x]       Thromboembolic event  Takes warfarin in PM  Duration of warfarin Therapy: indefinitely  INR Range:  2.0-3.0 (2.5-3.0 preferred)     Reestablishing care with the anticoagulation clinic. Hx patient in 2017. Pt states that last chemo caused memory issues. INR is 1.2 today   Patient accompanied by friend. Procedure 7/8 and held 5 days, did not resume warfarin until the next day and did not boost but not sure what he is taking. Has been back on warfarin for only 5 days but thinks he was alternating 5mg and 7.5mg. Take 10mg tonight then continue taking increased dose from last time: 5mg on Mon and Fri and 7.5mg all other days. Next procedure tomorrow, 7/15 - doesn't need to hold. Then they are deciding to take him off warfarin or not. He will be admitted after and will call us when discharged. Pt also ran out of warfarin so RF called into [facility]PP for 30 day supply only- pt believes he will be off warfarin after procedure and will cancel apt and let us know.    Recheck INR in 1 week, 7/21    Referring cardiologist is Dr Deidre Adams  INR (no units)   Date Value   07/14/2020 1.2   07/08/2020 1.01   06/29/2020 1.6   06/22/2020 1.2   06/18/2020 1.20 (H)   06/17/2020 1.14   06/15/2020 1.11     CLINICAL PHARMACY CONSULT: MED RECONCILIATION/REVIEW ADDENDUM    For Pharmacy Admin Tracking Only    PHSO: Yes  Total # of Interventions Recommended: 2  - Increased Dose #: 1  - Refills Provided #: 1  - Maintenance Safety Lab Monitoring #: 1  Total Interventions Accepted: 2  Time Spent (min): [address], PharmD

## 2020-07-15 ENCOUNTER — HOSPITAL ENCOUNTER (OUTPATIENT)
Dept: CARDIAC CATH/INVASIVE PROCEDURES | Age: 70
Discharge: HOME | End: 2020-07-15
Attending: INTERNAL MEDICINE | Admitting: INTERNAL MEDICINE
Payer: COMMERCIAL

## 2020-07-15 ENCOUNTER — ANESTHESIA EVENT (OUTPATIENT)
Dept: CARDIAC CATH/INVASIVE PROCEDURES | Age: 70
End: 2020-07-15
Payer: COMMERCIAL

## 2020-07-15 ENCOUNTER — ANESTHESIA (OUTPATIENT)
Dept: CARDIAC CATH/INVASIVE PROCEDURES | Age: 70
End: 2020-07-15
Payer: COMMERCIAL

## 2020-07-15 VITALS
WEIGHT: 146 LBS | RESPIRATION RATE: 14 BRPM | TEMPERATURE: 97.3 F | HEART RATE: 83 BPM | HEIGHT: 71 IN | BODY MASS INDEX: 20.44 KG/M2 | DIASTOLIC BLOOD PRESSURE: 66 MMHG | OXYGEN SATURATION: 100 % | SYSTOLIC BLOOD PRESSURE: 91 MMHG

## 2020-07-15 VITALS
TEMPERATURE: 97.9 F | OXYGEN SATURATION: 100 % | RESPIRATION RATE: 9 BRPM | DIASTOLIC BLOOD PRESSURE: 81 MMHG | SYSTOLIC BLOOD PRESSURE: 120 MMHG

## 2020-07-15 LAB
A/G RATIO: 1.6 (ref 1.1–2.2)
ABO/RH: NORMAL
ALBUMIN SERPL-MCNC: 4.1 G/DL (ref 3.4–5)
ALP BLD-CCNC: 43 U/L (ref 40–129)
ALT SERPL-CCNC: 15 U/L (ref 10–40)
ANION GAP SERPL CALCULATED.3IONS-SCNC: 11 MMOL/L (ref 3–16)
ANTIBODY SCREEN: NORMAL
AST SERPL-CCNC: 14 U/L (ref 15–37)
BASOPHILS ABSOLUTE: 0 K/UL (ref 0–0.2)
BASOPHILS RELATIVE PERCENT: 0.8 %
BILIRUB SERPL-MCNC: 0.7 MG/DL (ref 0–1)
BUN BLDV-MCNC: 15 MG/DL (ref 7–20)
CALCIUM SERPL-MCNC: 9 MG/DL (ref 8.3–10.6)
CHLORIDE BLD-SCNC: 101 MMOL/L (ref 99–110)
CO2: 25 MMOL/L (ref 21–32)
CREAT SERPL-MCNC: 0.6 MG/DL (ref 0.8–1.3)
EOSINOPHILS ABSOLUTE: 0.1 K/UL (ref 0–0.6)
EOSINOPHILS RELATIVE PERCENT: 2.2 %
GFR AFRICAN AMERICAN: >60
GFR NON-AFRICAN AMERICAN: >60
GLOBULIN: 2.5 G/DL
GLUCOSE BLD-MCNC: 97 MG/DL (ref 70–99)
HCT VFR BLD CALC: 42.3 % (ref 40.5–52.5)
HEMOGLOBIN: 14.4 G/DL (ref 13.5–17.5)
INR BLD: 1.32 (ref 0.86–1.14)
LYMPHOCYTES ABSOLUTE: 0.9 K/UL (ref 1–5.1)
LYMPHOCYTES RELATIVE PERCENT: 14.8 %
MAGNESIUM: 2.3 MG/DL (ref 1.8–2.4)
MCH RBC QN AUTO: 33.2 PG (ref 26–34)
MCHC RBC AUTO-ENTMCNC: 34 G/DL (ref 31–36)
MCV RBC AUTO: 97.5 FL (ref 80–100)
MONOCYTES ABSOLUTE: 0.5 K/UL (ref 0–1.3)
MONOCYTES RELATIVE PERCENT: 8.7 %
NEUTROPHILS ABSOLUTE: 4.3 K/UL (ref 1.7–7.7)
NEUTROPHILS RELATIVE PERCENT: 73.5 %
PDW BLD-RTO: 13.1 % (ref 12.4–15.4)
PLATELET # BLD: 221 K/UL (ref 135–450)
PMV BLD AUTO: 7 FL (ref 5–10.5)
POC ACT LR: 264 SEC
POC ACT LR: 277 SEC
POC ACT LR: 301 SEC
POTASSIUM SERPL-SCNC: 4 MMOL/L (ref 3.5–5.1)
PROTHROMBIN TIME: 15.3 SEC (ref 10–13.2)
RBC # BLD: 4.34 M/UL (ref 4.2–5.9)
SODIUM BLD-SCNC: 137 MMOL/L (ref 136–145)
TOTAL PROTEIN: 6.6 G/DL (ref 6.4–8.2)
WBC # BLD: 5.9 K/UL (ref 4–11)

## 2020-07-15 PROCEDURE — 6360000002 HC RX W HCPCS

## 2020-07-15 PROCEDURE — 86901 BLOOD TYPING SEROLOGIC RH(D): CPT

## 2020-07-15 PROCEDURE — 85347 COAGULATION TIME ACTIVATED: CPT

## 2020-07-15 PROCEDURE — 93656 COMPRE EP EVAL ABLTJ ATR FIB: CPT | Performed by: INTERNAL MEDICINE

## 2020-07-15 PROCEDURE — 85610 PROTHROMBIN TIME: CPT

## 2020-07-15 PROCEDURE — 93623 PRGRMD STIMJ&PACG IV RX NFS: CPT

## 2020-07-15 PROCEDURE — 93005 ELECTROCARDIOGRAM TRACING: CPT | Performed by: INTERNAL MEDICINE

## 2020-07-15 PROCEDURE — 86850 RBC ANTIBODY SCREEN: CPT

## 2020-07-15 PROCEDURE — 93662 INTRACARDIAC ECG (ICE): CPT | Performed by: INTERNAL MEDICINE

## 2020-07-15 PROCEDURE — 2500000003 HC RX 250 WO HCPCS

## 2020-07-15 PROCEDURE — 80053 COMPREHEN METABOLIC PANEL: CPT

## 2020-07-15 PROCEDURE — C1759 CATH, INTRA ECHOCARDIOGRAPHY: HCPCS

## 2020-07-15 PROCEDURE — 2580000003 HC RX 258

## 2020-07-15 PROCEDURE — 6360000002 HC RX W HCPCS: Performed by: NURSE ANESTHETIST, CERTIFIED REGISTERED

## 2020-07-15 PROCEDURE — 93613 INTRACARDIAC EPHYS 3D MAPG: CPT | Performed by: INTERNAL MEDICINE

## 2020-07-15 PROCEDURE — C1894 INTRO/SHEATH, NON-LASER: HCPCS

## 2020-07-15 PROCEDURE — 93656 COMPRE EP EVAL ABLTJ ATR FIB: CPT

## 2020-07-15 PROCEDURE — C1732 CATH, EP, DIAG/ABL, 3D/VECT: HCPCS

## 2020-07-15 PROCEDURE — 93655 ICAR CATH ABLTJ DSCRT ARRHYT: CPT | Performed by: INTERNAL MEDICINE

## 2020-07-15 PROCEDURE — 36415 COLL VENOUS BLD VENIPUNCTURE: CPT

## 2020-07-15 PROCEDURE — 83735 ASSAY OF MAGNESIUM: CPT

## 2020-07-15 PROCEDURE — 93655 ICAR CATH ABLTJ DSCRT ARRHYT: CPT

## 2020-07-15 PROCEDURE — 3700000001 HC ADD 15 MINUTES (ANESTHESIA)

## 2020-07-15 PROCEDURE — 93662 INTRACARDIAC ECG (ICE): CPT

## 2020-07-15 PROCEDURE — 7100000001 HC PACU RECOVERY - ADDTL 15 MIN

## 2020-07-15 PROCEDURE — 93657 TX L/R ATRIAL FIB ADDL: CPT

## 2020-07-15 PROCEDURE — 2580000003 HC RX 258: Performed by: NURSE ANESTHETIST, CERTIFIED REGISTERED

## 2020-07-15 PROCEDURE — 2500000003 HC RX 250 WO HCPCS: Performed by: NURSE ANESTHETIST, CERTIFIED REGISTERED

## 2020-07-15 PROCEDURE — 93308 TTE F-UP OR LMTD: CPT

## 2020-07-15 PROCEDURE — 85025 COMPLETE CBC W/AUTO DIFF WBC: CPT

## 2020-07-15 PROCEDURE — 93613 INTRACARDIAC EPHYS 3D MAPG: CPT

## 2020-07-15 PROCEDURE — 93623 PRGRMD STIMJ&PACG IV RX NFS: CPT | Performed by: INTERNAL MEDICINE

## 2020-07-15 PROCEDURE — 3700000000 HC ANESTHESIA ATTENDED CARE

## 2020-07-15 PROCEDURE — 93657 TX L/R ATRIAL FIB ADDL: CPT | Performed by: INTERNAL MEDICINE

## 2020-07-15 PROCEDURE — 86900 BLOOD TYPING SEROLOGIC ABO: CPT

## 2020-07-15 PROCEDURE — 93010 ELECTROCARDIOGRAM REPORT: CPT | Performed by: INTERNAL MEDICINE

## 2020-07-15 PROCEDURE — 2709999900 HC NON-CHARGEABLE SUPPLY

## 2020-07-15 PROCEDURE — 7100000000 HC PACU RECOVERY - FIRST 15 MIN

## 2020-07-15 PROCEDURE — C1769 GUIDE WIRE: HCPCS

## 2020-07-15 RX ORDER — DEXAMETHASONE SODIUM PHOSPHATE 4 MG/ML
INJECTION, SOLUTION INTRA-ARTICULAR; INTRALESIONAL; INTRAMUSCULAR; INTRAVENOUS; SOFT TISSUE PRN
Status: DISCONTINUED | OUTPATIENT
Start: 2020-07-15 | End: 2020-07-15 | Stop reason: SDUPTHER

## 2020-07-15 RX ORDER — SODIUM CHLORIDE 9 MG/ML
INJECTION, SOLUTION INTRAVENOUS CONTINUOUS PRN
Status: DISCONTINUED | OUTPATIENT
Start: 2020-07-15 | End: 2020-07-15 | Stop reason: SDUPTHER

## 2020-07-15 RX ORDER — FUROSEMIDE 10 MG/ML
INJECTION INTRAMUSCULAR; INTRAVENOUS PRN
Status: DISCONTINUED | OUTPATIENT
Start: 2020-07-15 | End: 2020-07-15 | Stop reason: SDUPTHER

## 2020-07-15 RX ORDER — HEPARIN SODIUM 1000 [USP'U]/ML
INJECTION, SOLUTION INTRAVENOUS; SUBCUTANEOUS PRN
Status: DISCONTINUED | OUTPATIENT
Start: 2020-07-15 | End: 2020-07-15 | Stop reason: SDUPTHER

## 2020-07-15 RX ORDER — SUCCINYLCHOLINE/SOD CL,ISO/PF 200MG/10ML
SYRINGE (ML) INTRAVENOUS PRN
Status: DISCONTINUED | OUTPATIENT
Start: 2020-07-15 | End: 2020-07-15 | Stop reason: SDUPTHER

## 2020-07-15 RX ORDER — PROPOFOL 10 MG/ML
INJECTION, EMULSION INTRAVENOUS PRN
Status: DISCONTINUED | OUTPATIENT
Start: 2020-07-15 | End: 2020-07-15 | Stop reason: SDUPTHER

## 2020-07-15 RX ORDER — LIDOCAINE HYDROCHLORIDE 10 MG/ML
1 INJECTION, SOLUTION EPIDURAL; INFILTRATION; INTRACAUDAL; PERINEURAL
Status: ACTIVE | OUTPATIENT
Start: 2020-07-15 | End: 2020-07-15

## 2020-07-15 RX ORDER — HYDROMORPHONE HCL 110MG/55ML
0.5 PATIENT CONTROLLED ANALGESIA SYRINGE INTRAVENOUS EVERY 5 MIN PRN
Status: DISCONTINUED | OUTPATIENT
Start: 2020-07-15 | End: 2020-07-16 | Stop reason: HOSPADM

## 2020-07-15 RX ORDER — SODIUM CHLORIDE 9 MG/ML
INJECTION, SOLUTION INTRAVENOUS CONTINUOUS
Status: DISCONTINUED | OUTPATIENT
Start: 2020-07-15 | End: 2020-07-16 | Stop reason: HOSPADM

## 2020-07-15 RX ORDER — HYDROMORPHONE HCL 110MG/55ML
0.25 PATIENT CONTROLLED ANALGESIA SYRINGE INTRAVENOUS EVERY 5 MIN PRN
Status: DISCONTINUED | OUTPATIENT
Start: 2020-07-15 | End: 2020-07-16 | Stop reason: HOSPADM

## 2020-07-15 RX ORDER — CEFAZOLIN SODIUM 1 G/3ML
INJECTION, POWDER, FOR SOLUTION INTRAMUSCULAR; INTRAVENOUS PRN
Status: DISCONTINUED | OUTPATIENT
Start: 2020-07-15 | End: 2020-07-15 | Stop reason: SDUPTHER

## 2020-07-15 RX ORDER — FENTANYL CITRATE 50 UG/ML
INJECTION, SOLUTION INTRAMUSCULAR; INTRAVENOUS PRN
Status: DISCONTINUED | OUTPATIENT
Start: 2020-07-15 | End: 2020-07-15 | Stop reason: SDUPTHER

## 2020-07-15 RX ORDER — LIDOCAINE HYDROCHLORIDE 20 MG/ML
INJECTION, SOLUTION EPIDURAL; INFILTRATION; INTRACAUDAL; PERINEURAL PRN
Status: DISCONTINUED | OUTPATIENT
Start: 2020-07-15 | End: 2020-07-15 | Stop reason: SDUPTHER

## 2020-07-15 RX ORDER — PROTAMINE SULFATE 10 MG/ML
INJECTION, SOLUTION INTRAVENOUS PRN
Status: DISCONTINUED | OUTPATIENT
Start: 2020-07-15 | End: 2020-07-15 | Stop reason: SDUPTHER

## 2020-07-15 RX ORDER — ONDANSETRON 2 MG/ML
INJECTION INTRAMUSCULAR; INTRAVENOUS PRN
Status: DISCONTINUED | OUTPATIENT
Start: 2020-07-15 | End: 2020-07-15 | Stop reason: SDUPTHER

## 2020-07-15 RX ORDER — LIDOCAINE HYDROCHLORIDE 10 MG/ML
INJECTION, SOLUTION EPIDURAL; INFILTRATION; INTRACAUDAL; PERINEURAL PRN
Status: DISCONTINUED | OUTPATIENT
Start: 2020-07-15 | End: 2020-07-15

## 2020-07-15 RX ORDER — DIGOXIN 125 MCG
125 TABLET ORAL DAILY
Qty: 30 TABLET | Refills: 3 | Status: SHIPPED | OUTPATIENT
Start: 2020-07-15 | End: 2020-10-05 | Stop reason: SDUPTHER

## 2020-07-15 RX ORDER — HEPARIN SODIUM 10000 [USP'U]/100ML
INJECTION, SOLUTION INTRAVENOUS CONTINUOUS PRN
Status: DISCONTINUED | OUTPATIENT
Start: 2020-07-15 | End: 2020-07-15 | Stop reason: SDUPTHER

## 2020-07-15 RX ORDER — ONDANSETRON 2 MG/ML
4 INJECTION INTRAMUSCULAR; INTRAVENOUS
Status: ACTIVE | OUTPATIENT
Start: 2020-07-15 | End: 2020-07-15

## 2020-07-15 RX ORDER — PANTOPRAZOLE SODIUM 40 MG/1
40 TABLET, DELAYED RELEASE ORAL
Qty: 30 TABLET | Refills: 0 | Status: SHIPPED | OUTPATIENT
Start: 2020-07-15 | End: 2021-03-04 | Stop reason: ALTCHOICE

## 2020-07-15 RX ADMIN — SODIUM CHLORIDE: 9 INJECTION, SOLUTION INTRAVENOUS at 08:04

## 2020-07-15 RX ADMIN — LIDOCAINE HYDROCHLORIDE 80 MG: 20 INJECTION, SOLUTION EPIDURAL; INFILTRATION; INTRACAUDAL; PERINEURAL at 08:18

## 2020-07-15 RX ADMIN — SODIUM CHLORIDE: 9 INJECTION, SOLUTION INTRAVENOUS at 08:52

## 2020-07-15 RX ADMIN — Medication 160 MG: at 08:18

## 2020-07-15 RX ADMIN — DEXAMETHASONE SODIUM PHOSPHATE 4 MG: 4 INJECTION, SOLUTION INTRAMUSCULAR; INTRAVENOUS at 08:46

## 2020-07-15 RX ADMIN — PROPOFOL 160 MG: 10 INJECTION, EMULSION INTRAVENOUS at 08:18

## 2020-07-15 RX ADMIN — FENTANYL CITRATE 100 MCG: 50 INJECTION INTRAMUSCULAR; INTRAVENOUS at 08:40

## 2020-07-15 RX ADMIN — ONDANSETRON 4 MG: 2 INJECTION INTRAMUSCULAR; INTRAVENOUS at 11:16

## 2020-07-15 RX ADMIN — HEPARIN SODIUM 5000 UNITS: 1000 INJECTION INTRAVENOUS; SUBCUTANEOUS at 08:59

## 2020-07-15 RX ADMIN — FENTANYL CITRATE 100 MCG: 50 INJECTION INTRAMUSCULAR; INTRAVENOUS at 10:59

## 2020-07-15 RX ADMIN — HEPARIN SODIUM 1000 UNITS/HR: 10000 INJECTION, SOLUTION INTRAVENOUS at 09:48

## 2020-07-15 RX ADMIN — FUROSEMIDE 20 MG: 10 INJECTION, SOLUTION INTRAMUSCULAR; INTRAVENOUS at 11:16

## 2020-07-15 RX ADMIN — ISOPROTERENOL HYDROCHLORIDE 5 MCG/MIN: 0.2 INJECTION, SOLUTION INTRAMUSCULAR; INTRAVENOUS at 10:51

## 2020-07-15 RX ADMIN — PHENYLEPHRINE HYDROCHLORIDE 100 MCG: 10 INJECTION INTRAVENOUS at 08:34

## 2020-07-15 RX ADMIN — FENTANYL CITRATE 50 MCG: 50 INJECTION INTRAMUSCULAR; INTRAVENOUS at 08:12

## 2020-07-15 RX ADMIN — HEPARIN SODIUM 4000 UNITS: 1000 INJECTION INTRAVENOUS; SUBCUTANEOUS at 09:21

## 2020-07-15 RX ADMIN — CEFAZOLIN 1000 MG: 1 INJECTION, POWDER, FOR SOLUTION INTRAVENOUS at 08:44

## 2020-07-15 RX ADMIN — HEPARIN SODIUM 4000 UNITS: 1000 INJECTION INTRAVENOUS; SUBCUTANEOUS at 10:03

## 2020-07-15 RX ADMIN — PROTAMINE SULFATE 30 MG: 10 INJECTION, SOLUTION INTRAVENOUS at 11:15

## 2020-07-15 RX ADMIN — HEPARIN SODIUM 3000 UNITS: 1000 INJECTION INTRAVENOUS; SUBCUTANEOUS at 09:05

## 2020-07-15 RX ADMIN — FENTANYL CITRATE 50 MCG: 50 INJECTION INTRAMUSCULAR; INTRAVENOUS at 08:15

## 2020-07-15 RX ADMIN — PHENYLEPHRINE HYDROCHLORIDE 50 MCG/MIN: 10 INJECTION INTRAVENOUS at 08:36

## 2020-07-15 RX ADMIN — HEPARIN SODIUM 3000 UNITS: 1000 INJECTION INTRAVENOUS; SUBCUTANEOUS at 09:43

## 2020-07-15 ASSESSMENT — PULMONARY FUNCTION TESTS
PIF_VALUE: 14
PIF_VALUE: 16
PIF_VALUE: 14
PIF_VALUE: 16
PIF_VALUE: 18
PIF_VALUE: 17
PIF_VALUE: 16
PIF_VALUE: 13
PIF_VALUE: 14
PIF_VALUE: 14
PIF_VALUE: 16
PIF_VALUE: 16
PIF_VALUE: 14
PIF_VALUE: 17
PIF_VALUE: 16
PIF_VALUE: 14
PIF_VALUE: 17
PIF_VALUE: 16
PIF_VALUE: 16
PIF_VALUE: 14
PIF_VALUE: 16
PIF_VALUE: 16
PIF_VALUE: 1
PIF_VALUE: 14
PIF_VALUE: 16
PIF_VALUE: 16
PIF_VALUE: 1
PIF_VALUE: 14
PIF_VALUE: 12
PIF_VALUE: 0
PIF_VALUE: 13
PIF_VALUE: 16
PIF_VALUE: 16
PIF_VALUE: 15
PIF_VALUE: 16
PIF_VALUE: 17
PIF_VALUE: 15
PIF_VALUE: 16
PIF_VALUE: 14
PIF_VALUE: 16
PIF_VALUE: 16
PIF_VALUE: 10
PIF_VALUE: 13
PIF_VALUE: 17
PIF_VALUE: 18
PIF_VALUE: 0
PIF_VALUE: 16
PIF_VALUE: 16
PIF_VALUE: 0
PIF_VALUE: 1
PIF_VALUE: 14
PIF_VALUE: 16
PIF_VALUE: 17
PIF_VALUE: 14
PIF_VALUE: 16
PIF_VALUE: 13
PIF_VALUE: 16
PIF_VALUE: 17
PIF_VALUE: 13
PIF_VALUE: 14
PIF_VALUE: 16
PIF_VALUE: 14
PIF_VALUE: 16
PIF_VALUE: 7
PIF_VALUE: 14
PIF_VALUE: 1
PIF_VALUE: 16
PIF_VALUE: 16
PIF_VALUE: 14
PIF_VALUE: 16
PIF_VALUE: 10
PIF_VALUE: 14
PIF_VALUE: 14
PIF_VALUE: 16
PIF_VALUE: 17
PIF_VALUE: 14
PIF_VALUE: 1
PIF_VALUE: 14
PIF_VALUE: 14
PIF_VALUE: 17
PIF_VALUE: 16
PIF_VALUE: 17
PIF_VALUE: 16
PIF_VALUE: 17
PIF_VALUE: 16
PIF_VALUE: 16
PIF_VALUE: 2
PIF_VALUE: 17
PIF_VALUE: 1
PIF_VALUE: 16
PIF_VALUE: 14
PIF_VALUE: 16
PIF_VALUE: 14
PIF_VALUE: 16
PIF_VALUE: 9
PIF_VALUE: 14
PIF_VALUE: 10
PIF_VALUE: 14
PIF_VALUE: 16
PIF_VALUE: 14
PIF_VALUE: 0
PIF_VALUE: 16
PIF_VALUE: 4
PIF_VALUE: 17
PIF_VALUE: 17
PIF_VALUE: 14
PIF_VALUE: 16
PIF_VALUE: 16
PIF_VALUE: 15
PIF_VALUE: 16
PIF_VALUE: 0
PIF_VALUE: 16
PIF_VALUE: 0
PIF_VALUE: 16
PIF_VALUE: 14
PIF_VALUE: 14
PIF_VALUE: 10
PIF_VALUE: 16
PIF_VALUE: 16
PIF_VALUE: 17
PIF_VALUE: 16
PIF_VALUE: 14
PIF_VALUE: 16
PIF_VALUE: 13
PIF_VALUE: 14
PIF_VALUE: 16
PIF_VALUE: 14
PIF_VALUE: 16
PIF_VALUE: 14
PIF_VALUE: 16
PIF_VALUE: 0
PIF_VALUE: 14
PIF_VALUE: 16
PIF_VALUE: 17
PIF_VALUE: 0
PIF_VALUE: 16
PIF_VALUE: 16
PIF_VALUE: 14
PIF_VALUE: 14
PIF_VALUE: 16
PIF_VALUE: 16
PIF_VALUE: 13
PIF_VALUE: 14
PIF_VALUE: 14
PIF_VALUE: 16
PIF_VALUE: 19
PIF_VALUE: 16
PIF_VALUE: 17
PIF_VALUE: 16
PIF_VALUE: 14
PIF_VALUE: 16
PIF_VALUE: 14
PIF_VALUE: 17
PIF_VALUE: 16
PIF_VALUE: 14
PIF_VALUE: 17
PIF_VALUE: 14
PIF_VALUE: 13
PIF_VALUE: 17
PIF_VALUE: 0
PIF_VALUE: 16
PIF_VALUE: 14
PIF_VALUE: 13
PIF_VALUE: 16
PIF_VALUE: 16
PIF_VALUE: 8
PIF_VALUE: 0
PIF_VALUE: 14
PIF_VALUE: 16
PIF_VALUE: 14
PIF_VALUE: 17
PIF_VALUE: 14
PIF_VALUE: 16
PIF_VALUE: 16
PIF_VALUE: 25
PIF_VALUE: 16
PIF_VALUE: 13
PIF_VALUE: 16
PIF_VALUE: 13
PIF_VALUE: 16
PIF_VALUE: 14
PIF_VALUE: 14
PIF_VALUE: 16
PIF_VALUE: 13
PIF_VALUE: 13

## 2020-07-15 NOTE — ANESTHESIA PRE PROCEDURE
Department of Anesthesiology  Preprocedure Note       Name:  Joaquim Mata   Age:  78 y.o.  :  1950                                          MRN:  [id number]         Date:  7/15/2020      Surgeon: * Surgery not found *    Procedure:     Medications prior to admission:   Prior to Admission medications    Medication Sig Start Date End Date Taking? Authorizing Provider   oxyCODONE-acetaminophen (PERCOCET) 5-325 MG per tablet Take 1 tablet by mouth every 8 hours as needed for Pain for up to 30 days. Intended supply: 3 days. Take lowest dose possible to manage pain 20  Violeta Echeverria MD   warfarin (COUMADIN) 5 MG tablet Take 1 tablet by mouth daily 20   Mayuri Tavares MD   dilTIAZem (CARDIZEM 12 HR) 60 MG extended release capsule Take 1 capsule by mouth 2 times daily 20   ISIDRA Grewal CNP   digoxin [facility]) 125 MCG tablet Take 1 tablet by mouth daily 20   ISIDRA Grewal CNP   aspirin 81 MG EC tablet Take 81 mg by mouth daily    Historical Provider, MD   levothyroxine (SYNTHROID) 100 MCG tablet TAKE 1 TABLET BY MOUTH EVERY DAY 20   Violeta Echeverria MD   Cod Liver Oil OIL Take 1 tablet by mouth daily     Historical Provider, MD   cyclobenzaprine (FLEXERIL) 10 MG tablet Take 10 mg by mouth 2 times daily as needed  16   Historical Provider, MD   docusate sodium (COLACE) 100 MG capsule Take 100 mg by mouth 2 times daily. Historical Provider, MD       Current medications:    Current Facility-Administered Medications   Medication Dose Route Frequency Provider Last Rate Last Dose    ondansetron (ZOFRAN) injection 4 mg  4 mg Intravenous Once PRN Stephani Nash MD        HYDROmorphone (DILAUDID) injection 0.25 mg  0.25 mg Intravenous Q5 Min PRN Stephani Nash MD        HYDROmorphone (DILAUDID) injection 0.5 mg  0.5 mg Intravenous Q5 Min PRN Stephani Nash MD           Allergies:     Allergies   Allergen Reactions    Naproxen Swelling TRANSFORAMINAL EPIDURAL STEROID INJECTION WITH FLUOROSCOPY ([id number]) performed by Natividad Melvin MD at [address] [facility] Right 2020    RIGHT L4 AND L5 TRANSFORAMINAL EPIDURAL STEROID INJECTION WITH FLUOROSCOPY ([id number]) performed by Natividad Melvin MD at [address] [place] [address]         Social History:    Social History     Tobacco Use    Smoking status: Former Smoker     Packs/day: 1.00     Years: 40.00     Pack years: 40.00     Types: Cigarettes     Start date: 1975     Last attempt to quit: 2015     Years since quittin.4    Smokeless tobacco: Never Used    Tobacco comment: Maintain cessation   Substance Use Topics    Alcohol use: No     Alcohol/week: 0.0 standard drinks     Comment: NA beer                                Counseling given: Not Answered  Comment: Maintain cessation      Vital Signs (Current): There were no vitals filed for this visit.                                            BP Readings from Last 3 Encounters:   20 95/84   20 100/61   20 139/84       NPO Status:                                                                                 BMI:   Wt Readings from Last 3 Encounters:   20 138 lb (62.6 kg)   20 140 lb (63.5 kg)   20 140 lb 10.5 oz (63.8 kg)     There is no height or weight on file to calculate BMI.    CBC:   Lab Results   Component Value Date    WBC 5.1 2020    RBC 4.34 2020    HGB 14.2 2020    HCT 42.3 2020    MCV 97.4 2020    RDW 13.2 2020     2020       CMP:   Lab Results   Component Value Date     2020    K 3.7 2020     2020    CO2 26 2020    BUN 13 2020    CREATININE <0.5 2020    GFRAA >60 2020    GFRAA >60 2009    AGRATIO 1.1 2017    LABGLOM >60 2020    GLUCOSE 107 2020    PROT 7.6 2017    CALCIUM 8.8 2020    BILITOT 1.1 2020 with a RVSP estimation   of 30mmHg. Implanted monitor     Neuro/Psych:   (+) TIA,              ROS comment: Cervical fusion GI/Hepatic/Renal:             Endo/Other:    (+) hypothyroidism, blood dyscrasia: anticoagulation therapy, arthritis:., malignancy/cancer (head and neck, s/p radiation). Abdominal:           Vascular:                                      Anesthesia Plan      general     ASA 3     (MERLIN ValdiviaTT  Patient refuses to remove dentures as he states he glued them in 2 hours ago and will be unable to remove them until sometime tomorrow when glue wears off. He verbalizes understanding that the dentures could get damaged or broken to the point of needing to be replaced. He assumes responsibility for the cost of having dentures replaced in the event of damage. He verbalizes his wishes to proceed with anesthetic with denture in place.)  Induction: intravenous. MIPS: Postoperative opioids intended, Prophylactic antiemetics administered and Postoperative trial extubation. Anesthetic plan and risks discussed with patient. Use of blood products discussed with whom did not consent to blood products. Special considerations: [employer]. Blood Products Consent Comment: Papers he brought photocopied to chart as well as our blood product refusal paper signed and attached to paper chart  Plan discussed with CRNA.                 Layla Weaver MD   7/15/2020

## 2020-07-15 NOTE — PROCEDURES
[address]     Electrophysiology Procedure Note       Date of Procedure: 7/15/2020  Patient's Name: Isabel Medina  YOB: 1950   Medical Record Number: [id number]  Referring Physician: Padmini Rico MD  Procedure Performed by: Kaitlin Alvarado MD    Procedure performed:     · Electrophysiology study with radiofrequency ablation of atrial fibrillation and re-isolation of pulmonary vein isolation   · Additional ablation with creation of a roof line, and ablation of complex atrial fractionated electrogram, inferior-posterior line with posterior wall isolation   · Additional ablation of CT isthmus atrial flutter   · 3-D electroanatomical mapping of the left atrium and  right atium. · Transseptal puncture through an intact septum    · Intracardiac echocardiography. · External cardioversion of the atrial arrhythmias       Indications for procedure: Symptomatic atrial fibrillation  Isabel Medina is a 78 y.o. male with symptomatic persistent atrial fibrillation, failed medical therapy and cardioversion. Details of Procedure: The risks, benefits and alternatives of the ablation procedure were discussed with the patient. The risks including, but not limited to, the risks of bleeding, infection, radiation exposure, injury to vascular, cardiac and surrounding structures (including pneumothorax), stroke, cardiac perforation, tamponade, need for emergent open heart surgery, need for pacemaker implantation, esophageal injury and fistula, myocardial infarction and death were discussed in detail. The patient opted to proceed with the ablation. Written informed consent was signed and placed in the chart. Patient was brought to the EP lab in a fasting non-sedate state. Patient underwent general anesthesia by anesthesia team. We initially performed a transesophageal echo that showed no left atrial appendage clot/thrombus. Groins were prepped in a sterile fashion.      Femoral venous access was obtained under live ultrasound guidance. The femoral vein was identified with real time visualization of needle passage to the venous lumen. We gained access to right femoral vein. Two 8F and one long 8.5 sheath was using and were placed in the right femoral vein using modified Seldinger technique and ultrasound guidance. Then a CS cathter was placed inside the coronary sinus under fluoroscopy for recording and mapping of the left atrium. Using ICE we delineated the left pulmonary vein, left atrial appendage,  mitral valve, and right superior and right inferior pulmonary veins. Patient received a bolus of heparin prior transseptal puncture followed by continuous monitoring of the ACT and boluses of heparin during the procedure to keep the ACT more than 350. Also an esophageal temperature probe in addition to Esophastar catheter was advanced into the esophagus for mapping of the esophagus and careful monitoring of the esophageal temperature during ablation. Transseptal punctures through intact septum were done using ICE, pressure monitoring, and SafeSept.     Using Penta ray and Carto navigation system a three dimensional electro anatomical mapping of the left atrium, in addition to right and left sided pulmonary vein was created. Using Penta ray catheter voltage mapping of the left atrium was created. Left atrium was enlarged. There was a large scar on the anterior wall patchy scar and low voltage fractionated scar on the posterior wall, anterior wall and roof. Patient had persistent atrial fibrillation. Then wide area circumferential ablation for right and left sided pulmonary veins were performed. Linear RF lesions was placed around both superior and inferior pulmonary vein in right and left side well outside the pulmonary vein ostium till all four pulmonary veins were isolated.  The power was limited to 30W with low flow, on the posterior wall and careful monitoring of esophageal temperature was done to prevent injury to esophagus. On the right side before ablating the anterior wall high amp pacing was performed to check and locate phrenic capture and avoid injury during ablation. A roof line created by linear ablation on the roof connecting right superior to left superior vein. A lower posterior line was created and posterior wall isolated. The large anterior scar was extended and connected to the mitral annulus. Patient was then cardioverted to sinus rhythm for checking the exit block on pulmonary veins. Patient has history of atrial flutter. Radiofrequency lesions were delivered in a linear fashion to Cavotricuspid isthmus. While maintaining pacing from the proximal coronary sinus, additional lesions were delivered to the isthmus until bidirectional block was observed. Bidirectional block was confirmed by noting split potentials along the ablation line (> 100 ms) in addition to trans-isthmus conduction time of >190 ms. Differential pacing from medial and lateral side of the line also confirmed bidirectional block. Isuprel IV was started to check for induction of any other arrhythmia. Burst pacing and programmed stimulation with up to three premature atrial stimuli did not induce any sustained arrhythmia. Pacing with high output over the ablation lines was performed and areas that had captures on the line was further ablated till no further capture noted. Exit block was checked with high amp pacing inside and over the ablation lines. Both entrance and exit block was confirmed and pacing maneuvers. After ablation EP study and programmed stimulation was performed to rule out any other arrhythmia.  His bundle potentials was recorded and pacing was performed from right atrium, RV apex with the following results:     AH interval was 70  msec  HV interval was 41 msec  Pacing from right atrium, 1:1 conduction over AV node with (AV wenckebach) was 440 msec  Pacing from atrium, AV cole ERP was 500/310 msec   Pacing from RV apex, no retrograde conduction over AV node noted. At the end of the procedure, using ICE we confirmed the lack of any pericardial effusion. Protamine was given to reverse the heapin. Figure of 8 suture was used and sheaths were removed using manual compression. The patient tolerated the procedure well and there were no complications. Patient was extubated and transferred to the floor in stable condition. EBL less than 20 ml. Plan:   He will receive usual post ablation care and if remains stable he can be discharged home.      Ema Lyon MD, MPH  [place] [address]   Office: [phone]

## 2020-07-15 NOTE — H&P
[address]   Electrophysiology   Date: 7/15/2020  Reason for Consultation: Atrial fibrillation     HPI: Allison Azevedo is a 78 y.o. male with multiple comorbidities including paroxysmal atrial fibrillation, s/p AVR with porcine valve, bilateral modified Maze procedure and ligation of left atrial appendage with atrial clip on 1/28/2015. He has nonobstructive coronary artery disease by cath he was a former smoker and has history of obstructive sleep apnea. He was later diagnosed with SCC of the throat and underwent radiation and chemotherapy. He has had episodes of atrial fibrillation with RVR. He was recently in hospital with Afib and had to be cardioverted. He has a loop recorder. Today he is back in atrial fibrillation. He is symptomatic with his afib episodes. He is here for ablation of atrial fibrillation.        Past Medical History:   Diagnosis Date    Aortic valve insufficiency     Arthritis     Atrial fibrillation ([address]     Cancer ([facility] [address] 09/2017    head and neck    Hearing loss     Heart disease     Medical history reviewed with no changes     Obstructive apnea does not use CPAP    Thyroid disease         Past Surgical History:   Procedure Laterality Date    ANKLE SURGERY      AORTIC VALVE REPLACEMENT  1/28/15    Dr. Zunilda Power 25mm Mosaic Ultra porcine valve; right and left modified maze procedure with ligation of DELGADO with Atriclip    BACK SURGERY      CSP    CARDIAC CATHETERIZATION      HERNIA REPAIR      KNEE SURGERY      Arthroscopy    LUNG SURGERY      collapsed lung due to broken ribs    MANDIBLE FRACTURE SURGERY      NECK SURGERY      Fusion    PAIN MANAGEMENT PROCEDURE Right 6/22/2020    RIGHT L4 AND L5 TRANSFORAMINAL EPIDURAL STEROID INJECTION WITH FLUOROSCOPY (30590,00665) performed by Dilshad Mock MD at [address] Right 7/8/2020    RIGHT L4 AND L5 TRANSFORAMINAL EPIDURAL STEROID INJECTION WITH FLUOROSCOPY ([id number]) performed by Brittney Rodriguez MD at [address]         Allergies   Allergen Reactions    Naproxen Swelling     Lips    Pt does not recognize this being an intolerance    Hydrocodone-Acetaminophen Itching       Social History:  Reviewed. reports that he quit smoking about 5 years ago. His smoking use included cigarettes. He started smoking about 45 years ago. He has a 40.00 pack-year smoking history. He has never used smokeless tobacco. He reports that he does not drink alcohol or use drugs. Family History:  Reviewed. family history includes Heart Disease in an other family member. Review of System:  All other systems reviewed except for that noted above. Pertinent negatives and positives are:       · General: negative for fever, chills   · Ophthalmic ROS: negative for - eye pain or loss of vision  · ENT ROS: negative for - headaches, sore throat   · Respiratory: negative for - cough, sputum  · Cardiovascular: Reviewed in HPI + palpitation  · Gastrointestinal: negative for - abdominal pain, diarrhea, N/V  · Hematology: negative for - bleeding, blood clots, bruising or jaundice  · Genito-Urinary:  negative for - Dysuria or incontinence  · Musculoskeletal: negative for - Joint swelling, muscle pain + hip pain  · Neurological: negative for - confusion, dizziness, headaches   · Psychiatric: No anxiety, no depression. · Dermatological: negative for - rash    Physical Examination:  Vitals:    07/15/20 0724   BP: 99/72   Pulse: 86   Resp: 16   Temp: 98.6 °F (37 °C)      No intake/output data recorded. Wt Readings from Last 3 Encounters:   07/15/20 146 lb (66.2 kg)   20 138 lb (62.6 kg)   20 140 lb (63.5 kg)     Temp  Av °F (36.7 °C)  Min: 97.4 °F (36.3 °C)  Max: 98.6 °F (37 °C)  Pulse  Av  Min: 86  Max: 86  BP  Min: 99/72  Max: 99/72  No intake or output data in the 24 hours ending 07/15/20 0742    · Constitutional: Oriented. No distress.    · Head: valve  Rena Fermo is estimated at 0.89 cm^2. No significant regurgitation noted. Aortic   valve leaflets are thickened   -Thickened mitral valve without evidence of stenosis. There is at least   moderate mitral regurgitation noted.   -There is moderate-to-severe tricuspid regurgitation with a RVSP estimation   of 38 mmHg.   -Normal diastolic function. Avg. E/e'=7.25     MCOT: 9/6 - 9/19/18:                             1st degree HB, HT 68                                            PVC burden <1%             PAC burden 1%                                     VT 11 bts at 118                                                            MCOT- 6/2017              Brief AT, doubt afib        Echo: 1/28/2019:   Normal left ventricle size, wall thickness, and systolic function with an   estimated ejection fraction of 60-65%.   No regional wall motion abnormalities are seen.   Normal diastolic function.   The bioprosthetic artificial aortic valve appears well seated with a maximum   velocity of 2.27 m/s, a maximum pressure gradient of 21 mmHg, and a mean   gradient of 12 mmHg.   There is at least moderate severity, centrally-directed mitral regurgitation   noted.   There is moderate tricuspid regurgitation with a PASP estimation of 35-40   mmHg.   The right atrium is mildly dilated.     Stress Echo: 6/22/2017  Summary  Normal stress Echo     Echo: 6/8/2015  Summary   -Normal left ventricle size, wall thickness and systolic function with an   estimated ejection fraction of 60%. -No regional wall motion abnormalities   are seen.   -Moderate mitral regurgitation.   -A bubble study was performed and fails to show evidence of shunting.   -Normally functioning porcine bioprosthetic valve in aortic position.   Maximum gradient of 22mmhg and a mean gradient of 14mmhg. No aortic   insufficiency.   -Moderate tricuspid regurgitation with RVSP estimated at 37 mmHg.     Scheduled Meds:    Continuous Infusions:  PRN Meds:.ondansetron, HYDROmorphone, HYDROmorphone     Patient Active Problem List    Diagnosis Date Noted    S/P AVR      Priority: High    Rapid atrial fibrillation (HCC)     Atrial fibrillation with RVR ([facility] [address] 06/15/2020    Cervical stenosis of spine 03/09/2020    Metastatic squamous cell carcinoma ([facility] [address] 02/18/2020    Encounter for electronic analysis of reveal event recorder 04/29/2019    Palpitation     Chronic obstructive pulmonary disease ([facility] [address] 02/12/2019    Sensorineural hearing loss (SNHL) of both ears 01/29/2019    History of tobacco abuse 04/27/2018    Squamous cell carcinoma of head and neck ([facility] [address] 12/14/2017    Chronic pain due to neoplasm 12/14/2017    Refusal of blood transfusions as patient is Sabianism 12/14/2017    Osteoarthritis of cervical spine 03/21/2016    Primary osteoarthritis of both knees 03/21/2016    Obstructive sleep apnea syndrome     Transient ischemic attack (TIA)     Chronic ischemic heart disease     Essential hypertension     Mixed hyperlipidemia 02/27/2015    PAF (paroxysmal atrial fibrillation) ([facility] [address] 01/24/2015          Assessment:     - Atrial fibrillation:    Episodes of atrial fibrillation. Failed cardioversion. Loop recorder with episodes of atrial fibrillation. His blood pressure is borderline cannot add further antiarrhythmic therapy or rate control medications   Continue with digoxin   Continue with diltiazem     CTA of the chest with elimination of DELGADO and no thrombus. We discussed treatment options including antiarrhythmics, rate control with anticoagulation, and ablation. We discussed progressive nature of atrial fibrillation. Treatment success decreases when AF becomes persistent and last more than 6 months. Antiarrhythmic therapy, side effects, benefits and alternative discussed. Atrial fibrillation ablation procedure was discussed. We discussed the need for repeat procedure. On average patients may need more than one ablation procedure. updated with relevant changes. Consent: I have discussed with the patient and/or the patient representative the indication, alternatives, and the possible risks and/or complications of the planned procedure and the anesthesia methods. The patient and/or patient representative appear to understand and agree to proceed. I have discussed the risks, benefits and alternatives of treatment with her including but not limited to the very small risk of exposure to COVID-19 virus during this period. The hospital and our team have taken measures to reduce the risk and we will minimize her stay here to reduce the risk of exposure. All her questions were answered and she seems to understand and agree with it. Vitals:    07/15/20 0724   BP: 99/72   Pulse: 86   Resp: 16   Temp: 98.6 °F (37 °C)     Prior to Admission medications    Medication Sig Start Date End Date Taking? Authorizing Provider   oxyCODONE-acetaminophen (PERCOCET) 5-325 MG per tablet Take 1 tablet by mouth every 8 hours as needed for Pain for up to 30 days. Intended supply: 3 days. Take lowest dose possible to manage pain 7/7/20 8/6/20 Yes Gonzalez Zelaya MD   warfarin (COUMADIN) 5 MG tablet Take 1 tablet by mouth daily 6/18/20  Yes Romero Hurtado MD   dilTIAZem (CARDIZEM 12 HR) 60 MG extended release capsule Take 1 capsule by mouth 2 times daily 6/18/20  Yes ISIDRA Cosby CNP   digoxin (LANOXIN) 125 MCG tablet Take 1 tablet by mouth daily 6/19/20  Yes ISIDRA Cosby CNP   aspirin 81 MG EC tablet Take 81 mg by mouth daily   Yes Historical Provider, MD   levothyroxine (SYNTHROID) 100 MCG tablet TAKE 1 TABLET BY MOUTH EVERY DAY 5/26/20  Yes Gonzalez Zelaya MD   Cod Liver Oil OIL Take 1 tablet by mouth daily    Yes Historical Provider, MD   cyclobenzaprine (FLEXERIL) 10 MG tablet Take 10 mg by mouth 2 times daily as needed  1/23/16  Yes Historical Provider, MD   docusate sodium (COLACE) 100 MG capsule Take 100 mg by mouth 2 times daily.

## 2020-07-15 NOTE — ANESTHESIA POSTPROCEDURE EVALUATION
Department of Anesthesiology  Postprocedure Note    Patient: Tea Williamson  MRN: [id number]  YOB: 1950  Date of evaluation: 7/15/2020  Time:  2:52 PM     Procedure Summary     Date:  07/15/20 Room / Location:  [facility] Cath Lab; [facility]    Anesthesia Start:  0804 Anesthesia Stop:  6984    Procedure:  ECHO CHIKIS IN CARDIAC CATH Diagnosis:  Paroxysmal atrial fibrillation    Scheduled Providers:   Responsible Provider:      Anesthesia Type:  general ASA Status:  3          Anesthesia Type: general    Darrell Phase I: Darrell Score: 10    Darrell Phase II:      Last vitals: Reviewed and per EMR flowsheets.        Anesthesia Post Evaluation    Patient location during evaluation: PACU  Patient participation: complete - patient participated  Level of consciousness: awake  Airway patency: patent  Nausea & Vomiting: no vomiting  Complications: no  Cardiovascular status: hemodynamically stable  Respiratory status: acceptable  Hydration status: euvolemic

## 2020-07-15 NOTE — PROGRESS NOTES
Patient resting quietly in bed. VSS. Patient meets discharge criteria for phase 1. Seen by anesthesia. OK to transfer back to cath lab.

## 2020-07-15 NOTE — PROGRESS NOTES
Patient received to PACU in stable condition. VSS. Denies pain. Right groin cath site clean dry and intact. No sign of hematoma. Will continue to monitor.

## 2020-07-16 ENCOUNTER — TELEPHONE (OUTPATIENT)
Dept: CARDIOLOGY CLINIC | Age: 70
End: 2020-07-16

## 2020-07-16 ENCOUNTER — CARE COORDINATION (OUTPATIENT)
Dept: CASE MANAGEMENT | Age: 70
End: 2020-07-16

## 2020-07-16 LAB
EKG ATRIAL RATE: 100 BPM
EKG DIAGNOSIS: NORMAL
EKG Q-T INTERVAL: 340 MS
EKG QRS DURATION: 78 MS
EKG QTC CALCULATION (BAZETT): 425 MS
EKG R AXIS: -1 DEGREES
EKG T AXIS: -18 DEGREES
EKG VENTRICULAR RATE: 94 BPM
POC ACT LR: >400 SEC

## 2020-07-16 NOTE — TELEPHONE ENCOUNTER
He underwent an ablation yesterday 7/15/2020.  Reviewed device interrogation  He had episodes of afib but was currently in SR

## 2020-07-16 NOTE — TELEPHONE ENCOUNTER
Pt states he had 3 episodes last night of heart racing. Pt states he sent transmission. Please review and call pt. States.      he will not be available for a call between 10 am and 1130 am.

## 2020-07-20 ENCOUNTER — TELEPHONE (OUTPATIENT)
Dept: CARDIOLOGY CLINIC | Age: 70
End: 2020-07-20

## 2020-07-20 RX ORDER — DILTIAZEM HYDROCHLORIDE 60 MG/1
60 CAPSULE, EXTENDED RELEASE ORAL 2 TIMES DAILY
Qty: 60 CAPSULE | Refills: 5 | Status: SHIPPED | OUTPATIENT
Start: 2020-07-20 | End: 2020-10-06

## 2020-07-20 NOTE — TELEPHONE ENCOUNTER
Medication Refill    Medication needing refilled:  diltiazem    Doseage of the medication:  60    How are you taking this medication (QD, BID, TID, QID, PRN):    30 or 90 day supply called in:    Which Pharmacy are we sending the medication to?:   [facility]     HE NEEDS THIS RX THIS MORNING HE DOESN'T HAVE  HIS MORNING DOSE .

## 2020-07-21 ENCOUNTER — ANTI-COAG VISIT (OUTPATIENT)
Dept: PHARMACY | Age: 70
End: 2020-07-21
Payer: COMMERCIAL

## 2020-07-21 VITALS — TEMPERATURE: 97.9 F

## 2020-07-21 LAB — INTERNATIONAL NORMALIZATION RATIO, POC: 1.3

## 2020-07-21 PROCEDURE — 85610 PROTHROMBIN TIME: CPT

## 2020-07-21 PROCEDURE — 99212 OFFICE O/P EST SF 10 MIN: CPT

## 2020-07-21 NOTE — PROGRESS NOTES
Mr. Asa Bryant is a 78 y.o. y/o male with history of a fib who presents today for anticoagulation monitoring and adjustment. Mitral valve replaced with porcine valve 1/2015  Patient was in the hospital on 6/6/15 for a TIA. Hx on warfarin, was d/c in 2017 d/t bleed. PMH:  Hx squamous cell carcinoma, COPD  Hx been on eliquis, but not approved for use d/t prosthetic valve   Hx Buddhism   Patient Reported Findings:  Yes     No  [x]   []       Patient verifies current dosing regimen as listed started warfarin 5 mg daily on 6/16. Was told did not need to bridge at discharge---> confirmed dose---> cannot confirm dose, but states that he followed paperwork       []   [x]       S/S bleeding/bruising/swelling/SOB denies   []   [x]       Blood in urine or stool-    [x]   []       Procedures scheduled in the future at this time- is having epidural today and then again 7/8 for pinched nerve. Then will be put in PT then will have ablation. Advised for pt to let MD doing epidural know that on warfarin to determine if will need to hold for next injection---> injection 7/8, hold 5 days, no lovenox, ablation 7/15- doesn't need to hold then thinks he will be off of warfarin for good. --> held dose the day of the surgery   States that he might have another procedure for sciatic nerve soon, will let clinic know   [x]   []       Missed Dose the day of procedure he believes    []   [x]       Extra Dose  []   [x]       Change in medications - finished prednisone at d/c from hospital. Uses biofreeze for nerve pain (advised for pt to talk to MD doing epidural to verify safe to use) ---> denies     [x]   []       Change in health/diet/appetite--- not much vit k, does not plan to consistently eat ---> had some vit K foods because got confused---> had more vit K foods again, again explained need for consistency for patient --> states that he is starting to gain weight.  States that he has been eating vegetables daily (broccoli,

## 2020-07-22 ENCOUNTER — TELEPHONE (OUTPATIENT)
Dept: CARDIOLOGY CLINIC | Age: 70
End: 2020-07-22

## 2020-07-22 NOTE — TELEPHONE ENCOUNTER
Called and discussed with Dr. Zackary Toro office. Pt underwent AF ablation last week. Generally we recommend 3 months straight of [facility] following the procedure. Will discuss with Dr. aJylyn Rico once he returns from [address] [place] [address].     Lorene Harrison, ISIDRA-CNP

## 2020-07-22 NOTE — TELEPHONE ENCOUNTER
Tabatha Shelton from Dr. Wyatt Ponce office called ([phone]). This patient underwent AF ablation 7/16/2020. He now may need spinal surgery (not scheduled yet). They want to know if he can hold his Coumadin and for how long?

## 2020-07-24 ENCOUNTER — OFFICE VISIT (OUTPATIENT)
Dept: ORTHOPEDIC SURGERY | Age: 70
End: 2020-07-24
Payer: COMMERCIAL

## 2020-07-24 VITALS — HEIGHT: 71 IN | BODY MASS INDEX: 20.43 KG/M2 | WEIGHT: 145.94 LBS | TEMPERATURE: 97.9 F | RESPIRATION RATE: 14 BRPM

## 2020-07-24 PROCEDURE — 99213 OFFICE O/P EST LOW 20 MIN: CPT | Performed by: PHYSICAL MEDICINE & REHABILITATION

## 2020-07-24 RX ORDER — CYCLOBENZAPRINE HCL 5 MG
5 TABLET ORAL NIGHTLY
Qty: 30 TABLET | Refills: 0 | Status: SHIPPED | OUTPATIENT
Start: 2020-07-24 | End: 2020-08-23

## 2020-07-24 NOTE — PROGRESS NOTES
Follow up: Kelli De La Rosa  1950  [id number]         Chief Complaint   Patient presents with    Lower Back Pain     7/8: R L4 + R L5 TF         HISTORY OF PRESENT ILLNESS:  Mr. Amirah Partida is a 78 y.o. male returns for a follow up visit for multiple medical problems. His current presenting problems are   1. Lumbar radiculopathy    2. DDD (degenerative disc disease), lumbar    3. Spondylosis without myelopathy or radiculopathy, lumbar region    . As per information/history obtained from the PADT(patient assessment and documentation tool) - He complains of pain in the lower back with radiation to the hips Right, upper leg Right and lower leg Right He rates the pain 9/10 and describes it as sharp, aching, numbness. Pain is made worse by: walking, standing, sitting. He denies side effects from the current pain regimen. Patient reports that since the last follow up visit the physical functioning is unchanged, family/social relationships are unchanged, mood is unchanged and sleep patterns are unchanged, and that the overall functioning is unchanged. Patient denies neurological bowel or bladder. Patient underwent a transforaminal lumbar ALCIDES right L4 + L5 on 6/22/2020 and 7/8/2020. He reports 100% improvement of the back pain, however 0% improvement of the right hip and leg pain. The patient rates his hip and leg pain 8/10 and describes it as sharp, shooting. The pain does wake the patient up at night. He reports he has trouble falling asleep due to pain. The patient reports the muscle relaxer he had in the past helped with sleeping. He states he was seen at [facility] for his leg pain as well. He was placed on Gabapentin and started this on 7/23/2020. Patient states he will be undergoing surgery on his low back once he is cleared by his cardiologist. The patient did undergo cardiac surgery 1 1/2 weeks ago as well. He ambulates with a walking stick today in the office.  He does not present with any bracing today. Associated signs and symptoms:   Neurogenic bowel or bladder symptoms:  no   Perceived weakness:  yes   Difficulty walking:  yes            Past medical, surgical, social and family history reviewed with the patient.  No pertinent relevant history  Past Medical History:   Past Medical History:   Diagnosis Date    Aortic valve insufficiency     Arthritis     [facility] [address]     Cancer ([facility] [address] 09/2017    head and neck    Hearing loss     Heart disease     Medical history reviewed with no changes     Obstructive apnea does not use CPAP    Thyroid disease       Past Surgical History:     Past Surgical History:   Procedure Laterality Date    ANKLE SURGERY      AORTIC VALVE REPLACEMENT  1/28/15    Dr. Elizabeth King 25mm Mosaic Ultra porcine valve; right and left modified maze procedure with ligation of DELGADO with Atriclip    BACK SURGERY      CSP    CARDIAC CATHETERIZATION      HERNIA REPAIR      KNEE SURGERY      Arthroscopy    LUNG SURGERY      collapsed lung due to broken ribs    MANDIBLE FRACTURE SURGERY      NECK SURGERY      Fusion    PAIN MANAGEMENT PROCEDURE Right 6/22/2020    RIGHT L4 AND L5 TRANSFORAMINAL EPIDURAL STEROID INJECTION WITH FLUOROSCOPY ([id number]) performed by Ji Meehan MD at [address] Right 7/8/2020    RIGHT L4 AND L5 TRANSFORAMINAL EPIDURAL STEROID INJECTION WITH FLUOROSCOPY ([id number]) performed by Ji Meehan MD at [address]       Current Medications:     Current Outpatient Medications:     GABAPENTIN PO, Take by mouth 300MG, Disp: , Rfl:     dilTIAZem (CARDIZEM 12 HR) 60 MG extended release capsule, Take 1 capsule by mouth 2 times daily, Disp: 60 capsule, Rfl: 5    pantoprazole (PROTONIX) 40 MG tablet, Take 1 tablet by mouth every morning (before breakfast), Disp: 30 tablet, Rfl: 0    digoxin (LANOXIN) 125 MCG tablet, Take 1 tablet by mouth daily, Disp: 30 tablet, Rfl: 3   oxyCODONE-acetaminophen (PERCOCET) 5-325 MG per tablet, Take 1 tablet by mouth every 8 hours as needed for Pain for up to 30 days. Intended supply: 3 days. Take lowest dose possible to manage pain, Disp: 60 tablet, Rfl: 0    warfarin (COUMADIN) 5 MG tablet, Take 1 tablet by mouth daily, Disp: 30 tablet, Rfl: 3    aspirin 81 MG EC tablet, Take 81 mg by mouth daily, Disp: , Rfl:     levothyroxine (SYNTHROID) 100 MCG tablet, TAKE 1 TABLET BY MOUTH EVERY DAY, Disp: 30 tablet, Rfl: 2    Cod Liver Oil OIL, Take 1 tablet by mouth daily , Disp: , Rfl:     cyclobenzaprine (FLEXERIL) 10 MG tablet, Take 10 mg by mouth 2 times daily as needed , Disp: , Rfl: 0    docusate sodium (COLACE) 100 MG capsule, Take 100 mg by mouth 2 times daily. , Disp: , Rfl:   Allergies:  Naproxen and Hydrocodone-acetaminophen  Social History:    reports that he quit smoking about 5 years ago. His smoking use included cigarettes. He started smoking about 45 years ago. He has a 40.00 pack-year smoking history. He has never used smokeless tobacco. He reports that he does not drink alcohol or use drugs. Family History:   Family History   Problem Relation Age of Onset    Heart Disease Other     High Blood Pressure Neg Hx     High Cholesterol Neg Hx        REVIEW OF SYSTEMS:   CONSTITUTIONAL: Denies unexplained weight loss, fevers, chills or fatigue  NEUROLOGICAL: Denies unsteady gait or progressive weakness  MUSCULOSKELETAL: Denies joint swelling or redness  GI: Denies nausea, vomiting, diarrhea   : Denies bowel or bladder issues       PHYSICAL EXAM:    Vitals: Temperature 97.9 °F (36.6 °C), resp. rate 14, height 5' 10.98\" (1.803 m), weight 145 lb 15.1 oz (66.2 kg). GENERAL EXAM:  · General Apparence: Patient is adequately groomed with no evidence of malnutrition. · Psychiatric: Orientation: The patient is oriented to time, place and person.  The patient's mood and affect are appropriate   · Vascular: Examination reveals no swelling and palpation reveals no tenderness in upper or lower extremities. Good capillary refill. · The lymphatic examination of the neck, axillae and groin reveals all areas to be without enlargement or induration  · Sensation is intact without deficit in the upper and lower extremities to light touch and pinprick  · Coordination of the upper and lower extremities are normal.  · RIGHT UPPER EXTREMITY:  Inspection/examination of the right upper extremity does not show any tenderness, deformity or injury. Range of motion is unremarkable and pain-free. There is no gross instability. There are no rashes, ulcerations or lesions. Strength and tone are normal. No atrophy or abnormal movements are noted. · LEFT UPPER EXTREMITY: Inspection/examination of the left upper extremity does not show any tenderness, deformity or injury. Range of motion is unremarkable and pain-free. There is no gross instability. There are no rashes, ulcerations or lesions. Strength and tone are normal. No atrophy or abnormal movements are noted. LUMBAR/SACRAL EXAMINATION:  · Inspection: Local inspection shows no step-off or bruising. Lumbar alignment is normal. No instability is noted. · Palpation:   No evidence of tenderness at the midline. Lumbar paraspinal tenderness: Mild L4/5 and L5/S1 tenderness  Bursal tenderness No tenderness bilaterally  There is no paraspinal spasm. · Range of Motion: limited by 50% in all planes due to pain  · Strength:   Strength testing is 4/5 in all muscle groups tested RLE, 5/5 LLE  · Special Tests:   Straight leg raise + right and crossed SLR negative. · Skin: There are no rashes, ulcerations or lesions. · Reflexes: Reflexes are symmetrically 1+ at the patellar and ankle tendons. Clonus absent bilaterally at the feet.   · Gait & station: uses a single point cane to ambulate and no ataxia  · Additional Examinations:  · RIGHT LOWER EXTREMITY: Inspection/examination of the right lower extremity does not show any PT:  Encouraged to continue with Home exercise program.    3. Further studies: No further studies. 4. Interventional:  Patient plans to undergo surgery with Angel. 5. Follow up:  2-3 months      Polly Gonzalez was instructed to call the office if his symptoms worsen or if new symptoms appear prior to the next scheduled visit. He is specifically instructed to contact the office between now & his scheduled appointment if he has concerns related to his condition or if he needs assistance in scheduling the above tests. He is welcome to call for an appointment sooner if he has any additional concerns or questions. Neel Brown ATC, am scribing for and in the presence of Dr. Alona Lucas.   07/24/20 8:58 AM Gretta Bernstein ATC    The physical examination was performed between the patient and Dr. Alona Lucas. All counseling during the appointment was performed between the patient and provider. I, Dr. Italo Nunez. Hector, personally performed the services described in this documentation as scribed by MARGIE Baptiste in my presence and it is both accurate and complete. Kristopher Eduardo. Wes Linares MD, JANELL, [facility]  Board Certified in [address] [place] [address]  Certified and Fellowship Trained in [facility] ([facility])             This dictation was performed with a verbal recognition program [facility]) and it was checked for errors. It is possible that there are still dictated errors within this office note. If so, please bring any errors to my attention for an addendum. All efforts were made to ensure that this office note is accurate.

## 2020-07-28 ENCOUNTER — ANTI-COAG VISIT (OUTPATIENT)
Dept: PHARMACY | Age: 70
End: 2020-07-28
Payer: COMMERCIAL

## 2020-07-28 VITALS — TEMPERATURE: 98.6 F

## 2020-07-28 PROBLEM — Z45.09 ENCOUNTER FOR LOOP RECORDER CHECK: Status: ACTIVE | Noted: 2020-07-28

## 2020-07-28 PROBLEM — I48.91 ATRIAL FIBRILLATION WITH RVR (HCC): Status: RESOLVED | Noted: 2020-06-15 | Resolved: 2020-07-28

## 2020-07-28 LAB — INTERNATIONAL NORMALIZATION RATIO, POC: 1.7

## 2020-07-28 PROCEDURE — 99211 OFF/OP EST MAY X REQ PHY/QHP: CPT

## 2020-07-28 PROCEDURE — 85610 PROTHROMBIN TIME: CPT

## 2020-07-28 NOTE — PROGRESS NOTES
Mr. Georgia Pearson is a 78 y.o. y/o male with history of a fib who presents today for anticoagulation monitoring and adjustment. Mitral valve replaced with porcine valve 1/2015  Patient was in the hospital on 6/6/15 for a TIA. Hx on warfarin, was d/c in 2017 d/t bleed. PMH:  Hx squamous cell carcinoma, COPD  Hx been on eliquis, but not approved for use d/t prosthetic valve   Hx [facility]   Patient Reported Findings:  Yes     No  [x]   []       Patient verifies current dosing regimen as listed started warfarin 5 mg daily on 6/16. Was told did not need to bridge at discharge---> confirmed dose---> cannot confirm dose, but states that he followed paperwork---> confirmed dose        []   [x]       S/S bleeding/bruising/swelling/SOB denies   []   [x]       Blood in urine or stool-    [x]   []       Procedures scheduled in the future at this time- is having epidural today and then again 7/8 for pinched nerve. Then will be put in PT then will have ablation. Advised for pt to let MD doing epidural know that on warfarin to determine if will need to hold for next injection---> injection 7/8, hold 5 days, no lovenox, ablation 7/15- doesn't need to hold then thinks he will be off of warfarin for good.  --> held dose the day of the surgery   States that he might have another procedure for sciatic nerve soon, will let clinic know----> spinal surgery in future, not scheduled yet    [x]   []       Missed Dose denies   []   [x]       Extra Dose  []   [x]       Change in medications - finished prednisone at d/c from hospital. Uses biofreeze for nerve pain (advised for pt to talk to MD doing epidural to verify safe to use) ---> denies---> added flexeril and mark      [x]   []       Change in health/diet/appetite--- not much vit k, does not plan to consistently eat ---> had some vit K foods because got confused---> had more vit K foods again, again explained need for consistency for patient --> states that he is starting to gain weight. States that he has been eating vegetables daily (broccoli, peppers, zucchini, onion)---> green beans, green peppers, stuffed peppers, brussels- has had greens 6 nights in the past week with dinner   []   [x]       Change in alcohol use  []   [x]       Change in activity  [x]   []       Hospital admission was in hospital 6/15-6/18 for a fib. had CV 6/17. Started warfarin 5 mg qd 6/16 with bridge. Started coreg and digoxin. []   [x]       Emergency department visit -  [x]   []       Other complaints -  Per Dr. Les Gillette note from hospital admit 6/2020 \"His DELGADO has been ligated and plan for short term anticoagulation prior and after ablation. Long term he can stop anticoagulation for atrial fibrillation\"    Clinical Outcomes:  Yes     No  []   [x]       Major bleeding event  []   [x]       Thromboembolic event  Takes warfarin in PM  Duration of warfarin Therapy: indefinitely  INR Range:  2.0-3.0 (2.5-3.0 preferred)     Reestablishing care with the anticoagulation clinic. Hx patient in 2017. Pt states that last chemo caused memory issues. INR is 1.7 today   Patient accompanied by friend. Dose increased at last visit. Increase dose 10mg Wed and Sat and 7.5mg all other days (9.5%).   Asked for patient to use paperwork every day in order to take doses, bring back to next appt   Recheck INR in 1 week, 8/4    Referring cardiologist is Dr William Chapin  INR (no units)   Date Value   07/28/2020 1.7   07/21/2020 1.3   07/15/2020 1.32 (H)   07/14/2020 1.2   07/08/2020 1.01   06/29/2020 1.6   06/18/2020 1.20 (H)   06/17/2020 1.14     CLINICAL PHARMACY CONSULT: MED RECONCILIATION/REVIEW ADDENDUM    For Pharmacy Admin Tracking Only    PHSO: Yes  Total # of Interventions Recommended: 1  - Increased Dose #: 1  - Maintenance Safety Lab Monitoring #: 1  Total Interventions Accepted: 1  Time Spent (min): Via Micky Santa PharmD

## 2020-07-28 NOTE — PROGRESS NOTES
[facility]   Electrophysiology  Sydnee Triana, APRN-CNP  Attending EP: Dr. Charu Deng    Date: 8/18/2020  I had the privilege of visiting Jolly Jorge in the office. Chief Complaint:   Chief Complaint   Patient presents with    Follow-up     History of Present Illness: History obtained from patient and medical record. Jolly Jorge is 78 y.o. male with a past medical history of atrial fibrillation, non-obstructive CAD, CHRISTOPHER, aortic valve insufficiency, and squamous cell cancer. In 2015, he underwent AVR with porcine valve, right/left modified maze procedure with DELGADO ligation with Atriclip (1/28/15, Dr. Maria Fernanda Balderas). He was on coumadin, but this was stopped due to bleeding from his mouth and PEG tube. In September of 2018, a 2 week monitor showed brief SVT and WCT, but no AF. S/p ILR implant (4/22/19). He developed recurrent symptomatic AF. S/p RFCA with re-isolation of PV, roof line, complex atrial fractionated electogram, inferior-posterior line with PWI, CTI atrial flutter (7/15/20, Dr. Charu Deng)    -Interval history: Today, Jolly Jorge is being seen for routine follow up. He remains in sinus rhythm on EKG today. His BP is low-normal, 99/68, which pt reports is normal for him at home. His loop interrogation shows brief episodes of PAF. His last episode was yesterday and last 1.5 hours. He feels heart racing and mildly dizzy when in AF. He also has numerous episodes of atrial tachycardia. We discussed the need to continue to monitor as he is still in the blanking period post ablation and his burden shall hopefully improve as his heart tissue heals. He is wanting to have a back surgery as soon as he is able to stop coumadin. As discussed with Dr. Charu Deng, pt needs to continue coumadin x2 months post ablation so he may stop on 9/15/20. No bleeding/bruising issues on Coumadin. Pt has adequately clipped DELGADO on CHIKIS so he may remain off coumadin indefinitely, which the pt is relieved to hear.   Pt admits that he (66.9 kg)   07/24/20 145 lb 15.1 oz (66.2 kg)   07/15/20 146 lb (66.2 kg)     Constitutional: Cooperative and in no apparent distress, and appears well nourished  Skin: Warm and pink; no pallor, cyanosis, bruising, or clubbing  HEENT: Symmetric and normocephalic. PERRL, EOM intact. Conjunctiva pink with clear sclera. Mucus membranes pink and moist. Teeth intact. Thyroid smooth without nodules or goiter  Respiratory: Respirations symmetric and unlabored. Lungs clear to auscultation bilaterally, no wheezing, rhonchi, or crackles  Cardiovascular:  Regular rate and rhythm. S1/S2 present without murmurs, rubs, or gallops. Peripheral pulses 2+, capillary refill < 3 seconds. No elevation of JVP. No peripheral edema  Gastrointestinal: Abdomen soft and round. Bowel sounds normoactive in all quadrants without tenderness or masses. Musculoskeletal: Bilateral upper and lower extremity strength 5/5 with full ROM. Neurological/Psych: Awake and orientated to person, place and time. Calm affect, appropriate mood. Pertinent labs, diagnostic, device, and imaging results reviewed as a part of this visit    LABS    CBC:   Lab Results   Component Value Date    WBC 5.9 07/15/2020    HGB 14.4 07/15/2020    HCT 42.3 07/15/2020    MCV 97.5 07/15/2020     07/15/2020     BMP:   Lab Results   Component Value Date    CREATININE 0.6 (L) 07/15/2020    BUN 15 07/15/2020     07/15/2020    K 4.0 07/15/2020     07/15/2020    CO2 25 07/15/2020     Estimated Creatinine Clearance: 112 mL/min (A) (based on SCr of 0.6 mg/dL (L)).      Thyroid:   Lab Results   Component Value Date    TSH 2.735 05/21/2020    [id number] 2.12 05/21/2020     Lipid Panel:   Lab Results   Component Value Date    CHOL 225 09/29/2018    HDL 69 09/29/2018    HDL 52 12/27/2009    TRIG 80 09/29/2018     LFTs:  Lab Results   Component Value Date    ALT 15 07/15/2020    AST 14 (L) 07/15/2020    ALKPHOS 43 07/15/2020    BILITOT 0.7 07/15/2020     Coags:   Lab bts at 118       Cardiac CT: 6/20  No stenosis at the pulmonary vein origins.         Clip is seen in the left atrial appendage,.  No thrombus seen in this region.         Mildly dilated aorta. .  Post valve replacement changes are noted              Assessment:    1. Paroxysmal Atrial Fibrillation  - S/p MAZE and DELGADO ligation with Atriclip (2015)  - S/p RFCA with re-isolation of PV, roof line, complex atrial fractionated electogram, inferior-posterior line with PWI, CTI atrial flutter (7/15/20, Dr. Nolberto Fontanez)    - Currently in NSR  - Continue cardizem 60 mg BID, digoxin 125 mcg QD    - [id number] score: 2 (Age, CAD) ; [id number] Vasc score and anticoagulation discussed. High risk for stroke and thromboembolism. Anticoagulation is recommended. Risk of bleeding was discussed.  ~ On coumadin. Ok to stop coumadin due to DELGADO ligation, which was verified on Cardiac CT    - Afib risk factors including age, HTN, obesity, inactivity and CHRISTOPHER were discussed with patient. Risk factor modification recommended   ~ TSH 3.49 (11/19)       - Treatment options including cardioversion, rate control strategy, antiarrhythmics, anticoagulation and possible ablation were discussed with patient. Risks, benefits and alternative of each treatment options were explained. All questions answered    2. Implantable Loop Recorder  - S/p ILR insertion on 4/19  -The CIED was interrogated and programmed and I supervised and reviewed all the data. All findings and changes are in device interrogation sheat and reflect my personal interpretation and changes and is scanned to Epic  - Device shows: NSR. Episodes of PAF (most under 1 hour in length)  - Follow up with device clinic as scheduled    3. Non-sustained atrial tachycardia  - Detected on loop  - Stable; asymptomatic  - Continue CCB    4. PVCs   - Stable    ~ None on EKG today   - Asymptomatic in past   - Continue CCB    5.  Aortic Insufficiency    - S/p AVR porcine valve right/left modified maze procedure with DELGADO ligation with Atriclip (1/28/15, Dr. Suly Garcia)   - Stable    6. Mitral Regurgitation   - Moderate to severe per echo (6/20)   - Refer to interventional cardiology for management    7. CHRISTOPHER  - Stable: Uses CPAP  - Encourage to use CPAP to prevent long term effects of untreated CHRISTOPHER    Plan:  1. Follow up with interventional cardiology to discuss mitral valve  2. Ok to stop coumadin 2 months post ablation (9/15/20)  3. Call if prolonged episodes >1 day of atrial fibrillation  4. Exercise as tolerated    F/U: Follow-up with EP in 3 months and one month with interventional  -Follow up with device clinic as scheduled  -Call Harriet at [phone] with any questions    Diet & Exercise:   The patient is counseled to follow a low salt diet to assure blood pressure remains controlled for cardiovascular risk factor modification   The patient is counseled to avoid excess caffeine, and energy drinks as this may exacerbated ectopy and arrhythmia   The patient is counseled to lose weight to control cardiovascular risk factors   Exercise program discussed: To improve overall cardiovascular health, the patient is instructed to increase cardiovascular related activities with a goal of 150 min/week of moderate level activity or 10,000 steps per day. Encouraged to perform as much activity as tolerated    Quality Metrics  1. Tobacco Cessation Counseling: N/A  2. Retake of BP if >140/90: N/A  3. Documentation to PCP: Note sent to PCP office visit  4. CAD patient on anti-platelet: Yes  5. CAD patient on STATIN therapy: No due to normal LDL  6. Patient with history of CHF and atrial fibrillation on anticoagulation: No due to GI Bleeding     I have addressed the patient's cardiac risk factors and adjusted pharmacologic treatment as needed. In addition, I have reinforced the need for patient directed risk factor modification.     I independently reviewed the device check interrogation and ECG    All questions and concerns were addressed with the patient. Alternatives to treatment were discussed. Thank you for allowing to us to participate in the care of Vicente Fernandez.     Navid Spence, [facility]  [place]    Office: [phone]

## 2020-07-30 ENCOUNTER — TELEPHONE (OUTPATIENT)
Dept: CARDIOLOGY CLINIC | Age: 70
End: 2020-07-30

## 2020-08-04 ENCOUNTER — ANTI-COAG VISIT (OUTPATIENT)
Dept: PHARMACY | Age: 70
End: 2020-08-04
Payer: COMMERCIAL

## 2020-08-04 VITALS — TEMPERATURE: 97.5 F

## 2020-08-04 LAB — INTERNATIONAL NORMALIZATION RATIO, POC: 1.5

## 2020-08-04 PROCEDURE — 99211 OFF/OP EST MAY X REQ PHY/QHP: CPT

## 2020-08-04 PROCEDURE — 85610 PROTHROMBIN TIME: CPT

## 2020-08-04 NOTE — PROGRESS NOTES
Mr. Brendan Stein is a 78 y.o. y/o male with history of a fib who presents today for anticoagulation monitoring and adjustment. Mitral valve replaced with porcine valve 1/2015  Patient was in the hospital on 6/6/15 for a TIA. Hx on warfarin, was d/c in 2017 d/t bleed. PMH:  Hx squamous cell carcinoma, COPD  Hx been on eliquis, but not approved for use d/t prosthetic valve   Hx [employer]   Patient Reported Findings:  Yes     No  [x]   []       Patient verifies current dosing regimen as listed started warfarin 5 mg daily on 6/16. Was told did not need to bridge at discharge---> confirmed dose---> cannot confirm dose, but states that he followed paperwork---> confirmed dose---> did not do dose increase he doesn't think- thinks he just did 7.5mg daily but not sure         []   [x]       S/S bleeding/bruising/swelling/SOB denies   []   [x]       Blood in urine or stool-    [x]   []       Procedures scheduled in the future at this time- is having epidural today and then again 7/8 for pinched nerve. Then will be put in PT then will have ablation. Advised for pt to let MD doing epidural know that on warfarin to determine if will need to hold for next injection---> injection 7/8, hold 5 days, no lovenox, ablation 7/15- doesn't need to hold then thinks he will be off of warfarin for good.  --> held dose the day of the surgery   States that he might have another procedure for sciatic nerve soon, will let clinic know----> spinal surgery in future, not scheduled yet    [x]   []       Missed Dose denies   []   [x]       Extra Dose  []   [x]       Change in medications - finished prednisone at d/c from hospital. Uses biofreeze for nerve pain (advised for pt to talk to MD doing epidural to verify safe to use) ---> denies---> added flexeril and mark ---> digoxin      [x]   []       Change in health/diet/appetite--- not much vit k, does not plan to consistently eat ---> had some vit K foods because got confused---> had more vit K foods again, again explained need for consistency for patient --> states that he is starting to gain weight. States that he has been eating vegetables daily (broccoli, peppers, zucchini, onion)---> green beans, green peppers, stuffed peppers, brussels- has had greens 6 nights in the past week with dinner---> had greens daily in the past week    []   [x]       Change in alcohol use  []   [x]       Change in activity  [x]   []       Hospital admission was in hospital 6/15-6/18 for a fib. had CV 6/17. Started warfarin 5 mg qd 6/16 with bridge. Started coreg and digoxin. []   [x]       Emergency department visit -  [x]   []       Other complaints -  Per Dr. Hagen First note from hospital admit 6/2020 \"His DELGADO has been ligated and plan for short term anticoagulation prior and after ablation. Long term he can stop anticoagulation for atrial fibrillation\"    Clinical Outcomes:  Yes     No  []   [x]       Major bleeding event  []   [x]       Thromboembolic event  Takes warfarin in PM  Duration of warfarin Therapy: indefinitely  INR Range:  2.0-3.0 (2.5-3.0 preferred)     Reestablishing care with the anticoagulation clinic. Hx patient in 2017. Pt states that last chemo caused memory issues. INR is 1.5 today - thinks he did 7.5mg daily. Asked him to follow the AVS.   Patient accompanied by friend. Dose increased at last visit again. Continue taking dose of 10mg Wed and Sat and 7.5mg all other days. Asked for patient to use paperwork every day in order to take doses, bring back to next appt. RF called into [facility].     Recheck INR in 1 week, 8/11    Referring cardiologist is Dr Chica Atkinson  INR (no units)   Date Value   08/04/2020 1.5   07/28/2020 1.7   07/21/2020 1.3   07/15/2020 1.32 (H)   07/14/2020 1.2   07/08/2020 1.01   06/18/2020 1.20 (H)   06/17/2020 1.14     CLINICAL PHARMACY CONSULT: MED RECONCILIATION/REVIEW ADDENDUM    For Pharmacy Admin Tracking Only    PHSO: Yes  Total # of Interventions Recommended: 2  - Increased Dose #: 1  - Refills Provided #: 1  - Maintenance Safety Lab Monitoring #: 1  Total Interventions Accepted: 2  Time Spent (min): [address], PharmD

## 2020-08-07 ENCOUNTER — TELEPHONE (OUTPATIENT)
Dept: PHARMACY | Age: 70
End: 2020-08-07

## 2020-08-07 NOTE — TELEPHONE ENCOUNTER
Called patient. Was wondering what to do for his dose on Tuesday since he takes it in the morning. Asked him to come to clinic to check the INR first then I will tell him what to take that day in case we need to adjust it. He understands.      Zainab Orta, MildredD, [facility]

## 2020-08-07 NOTE — TELEPHONE ENCOUNTER
----- Message from Birgit Sneed sent at 8/7/2020  9:23 AM EDT -----  Regarding: Please call regarding warfarin dosing  Contact: Patient  Please call regarding warfarin dosing, [phone]

## 2020-08-11 ENCOUNTER — ANTI-COAG VISIT (OUTPATIENT)
Dept: PHARMACY | Age: 70
End: 2020-08-11
Payer: COMMERCIAL

## 2020-08-11 VITALS — TEMPERATURE: 97.5 F

## 2020-08-11 LAB — INTERNATIONAL NORMALIZATION RATIO, POC: 1.7

## 2020-08-11 PROCEDURE — 85610 PROTHROMBIN TIME: CPT

## 2020-08-11 PROCEDURE — 99211 OFF/OP EST MAY X REQ PHY/QHP: CPT

## 2020-08-11 NOTE — PROGRESS NOTES
consistently eat ---> had some vit K foods because got confused---> had more vit K foods again, again explained need for consistency for patient --> states that he is starting to gain weight. States that he has been eating vegetables daily (broccoli, peppers, zucchini, onion)---> green beans, green peppers, stuffed peppers, brussels- has had greens 6 nights in the past week with dinner---> had greens daily in the past week---> continued with greens daily and will continue with this, no NVD    []   [x]       Change in alcohol use  []   [x]       Change in activity  [x]   []       Hospital admission was in hospital 6/15-6/18 for a fib. had CV 6/17. Started warfarin 5 mg qd 6/16 with bridge. Started coreg and digoxin. []   [x]       Emergency department visit -  [x]   []       Other complaints -  Per Dr. Lisandro Robertson note from hospital admit 6/2020 \"His DELGADO has been ligated and plan for short term anticoagulation prior and after ablation. Long term he can stop anticoagulation for atrial fibrillation\"    Clinical Outcomes:  Yes     No  []   [x]       Major bleeding event  []   [x]       Thromboembolic event  Takes warfarin in PM  Duration of warfarin Therapy: indefinitely  INR Range:  2.0-3.0 (2.5-3.0 preferred)     Reestablishing care with the anticoagulation clinic. Hx patient in 2017. Pt states that last chemo caused memory issues. INR is 1.7 today   Patient accompanied by friend. Patient continues to fluctuate despite dose increases. Has been increasing vit K food content despite education about it causing the INR to go down, explained again today. He will keep it at 5 days/week and rohini it off on AVS. Patient brought AVS today with days marked off and states no missed doses this time/followed dose increase. Has not taken it yet today. Has apt with Ace Phalen next week to see if he can go off warfarin- hoping that he can.    Take 10mg tonight then increase dose to 10mg on Mon,  Wed and Sat and 7.5mg all other days (4. 3%). Asked for patient to use paperwork every day in order to take doses, bring back to next appt.   Recheck INR in 1 week, 8/18    Referring cardiologist is Dr Keyshawn Tao  INR (no units)   Date Value   08/11/2020 1.7   08/04/2020 1.5   07/28/2020 1.7   07/21/2020 1.3   07/15/2020 1.32 (H)   07/08/2020 1.01   06/18/2020 1.20 (H)   06/17/2020 1.14     CLINICAL PHARMACY CONSULT: MED RECONCILIATION/REVIEW ADDENDUM    For Pharmacy Admin Tracking Only    PHSO: Yes  Total # of Interventions Recommended: 1  - Increased Dose #: 1  - Maintenance Safety Lab Monitoring #: 1  Total Interventions Accepted: 1  Time Spent (min): [address], PharmD

## 2020-08-18 ENCOUNTER — OFFICE VISIT (OUTPATIENT)
Dept: CARDIOLOGY CLINIC | Age: 70
End: 2020-08-18
Payer: COMMERCIAL

## 2020-08-18 ENCOUNTER — ANTI-COAG VISIT (OUTPATIENT)
Dept: PHARMACY | Age: 70
End: 2020-08-18
Payer: COMMERCIAL

## 2020-08-18 ENCOUNTER — NURSE ONLY (OUTPATIENT)
Dept: CARDIOLOGY CLINIC | Age: 70
End: 2020-08-18
Payer: COMMERCIAL

## 2020-08-18 VITALS
OXYGEN SATURATION: 97 % | BODY MASS INDEX: 20.64 KG/M2 | DIASTOLIC BLOOD PRESSURE: 68 MMHG | SYSTOLIC BLOOD PRESSURE: 99 MMHG | HEIGHT: 71 IN | HEART RATE: 86 BPM | WEIGHT: 147.4 LBS

## 2020-08-18 PROBLEM — Z45.09 ENCOUNTER FOR ELECTRONIC ANALYSIS OF REVEAL EVENT RECORDER: Status: RESOLVED | Noted: 2019-04-29 | Resolved: 2020-08-18

## 2020-08-18 LAB — INTERNATIONAL NORMALIZATION RATIO, POC: 1.9

## 2020-08-18 PROCEDURE — 93000 ELECTROCARDIOGRAM COMPLETE: CPT | Performed by: NURSE PRACTITIONER

## 2020-08-18 PROCEDURE — 99211 OFF/OP EST MAY X REQ PHY/QHP: CPT

## 2020-08-18 PROCEDURE — 93291 INTERROG DEV EVAL SCRMS IP: CPT | Performed by: INTERNAL MEDICINE

## 2020-08-18 PROCEDURE — 99214 OFFICE O/P EST MOD 30 MIN: CPT | Performed by: NURSE PRACTITIONER

## 2020-08-18 PROCEDURE — 85610 PROTHROMBIN TIME: CPT

## 2020-08-18 RX ORDER — LEVOTHYROXINE SODIUM 0.1 MG/1
TABLET ORAL
Qty: 90 TABLET | Refills: 1 | Status: SHIPPED | OUTPATIENT
Start: 2020-08-18 | End: 2021-02-12

## 2020-08-18 NOTE — PATIENT INSTRUCTIONS
Plan:  1. Follow up with interventional cardiology to discuss mitral valve  2. Ok to stop coumadin 2 months post ablation (9/15/20)  3.  Call if prolonged episodes >1 day of atrial fibrillation

## 2020-08-18 NOTE — PROGRESS NOTES
Patient comes in for interrogation of their implanted loop recorder. Interrogation shows known AF and AT. Noise noted on episodes as well. Implanted for AF management and palpitations. Patient remains on warfarin and Cardizem. Had MAZE Procedure with DELGADO ligation with Atriclip. Patient currently in NSR. Please see interrogation for more detail. Patient will see NPSR today and we will continue to follow the Patient remotely.

## 2020-08-18 NOTE — TELEPHONE ENCOUNTER
Medication:   Requested Prescriptions     Pending Prescriptions Disp Refills    levothyroxine (SYNTHROID) 100 MCG tablet [Pharmacy Med Name: LEVOTHYROXINE 100 MCG TABLET] 90 tablet 0     Sig: TAKE 1 TABLET BY MOUTH EVERY DAY       Last Filled: 5/26/20 #30, 2 RF      Patient Phone Number: [phone] (home)     Last appt:  2/27/20 shoulder pain   Next appt: Visit date not found    Last Thyroid:   Lab Results   Component Value Date    TSH 2.735 05/21/2020    T4FREE 1.9 07/30/2019    [id number] 2.12 05/21/2020

## 2020-08-18 NOTE — PROGRESS NOTES
[address]    2020    Nav Zaragoza (:  1950) is a 78 y.o. male is here for evaluation of valvular heart disease. Referring Provider: Evelin Kaufman MD    HISTORY: Mr. Gema Mosher has a cardiac history of nonobstructive CAD ([facility] ), CHRISTOPHER, PAF s/p MAZE and DELGADO ligation and Atriclip (2015), aortic insufficiency (s/p AVR tissue 2015), and moderate MR. Other history includes squamous cell tongue CA and transient global amnesia. He was on Coumadin but it was stopped due to bleeding from his mouth and PEG tube. 2018 he wore a 2 wk monitor which showed brief SVT and WCT; no a fib. ILR was placed 2019. He developed recurrent A fib and had RFA with re- isolation of PV, roof line, complex atrial fractionated electrogram, inferior-posterior line with PWI, CTI atrial flutter with Dr Darshan Puckett 7/15/20. Echo 2020 showed worsening mitral regurgitation, now moderate to severe. Today, he was referred for preoperative cardiac risk assessment for back surgery. He has been followed by our electrophysiology service for treatment of cardiac arrhythmia and paroxysmal atrial fibrillation. He recently underwent cardiac ablation for atrial fibrillation. He still notes intermittent episodes of palpitations. Additionally he has been noted by 2D echo Doppler and 2019 to have moderate to severe mitral and tricuspid insufficiencies. A recent limited echo July 15, 2020 suggested mild to moderate mitral insufficiency and moderate tricuspid insufficiency. LV systolic function normal.  He does describe left-sided chest discomfort, nonexertional, nonradiating which lasts a couple of minutes and resolves on its own. At times he may note some palpitations with or without the event. He also does complain of mild shortness of breath. Does note weakness and fatigue. He has not been sleeping well which he says is happened when he has had problems with recurrent atrial fibrillation before.   His biggest complaint is been severe right lower extremity pain due to sciatic nerve impingement requiring surgical correction. He states that he has had epidural treatment x2 and has been started on gabapentin. He has been unable to have the surgery due to unstable cardiac status. He is currently on anticoagulation therapy, post ablation, until September 15, 2020. REVIEW OF SYSTEMS:  A complete review of systems has been reviewed and updated today and is negative except as noted in the history of present illness. Prior to Visit Medications    Medication Sig Taking? Authorizing Provider   oxyCODONE-acetaminophen (PERCOCET) 2.5-325 MG per tablet every 4 hours as needed. Yes Historical Provider, MD   levothyroxine (SYNTHROID) 100 MCG tablet TAKE 1 TABLET BY MOUTH EVERY DAY Yes Marcin Witt MD   GABAPENTIN PO Take 300 mg by mouth three times daily  Yes Historical Provider, MD   cyclobenzaprine (FLEXERIL) 5 MG tablet Take 1 tablet by mouth nightly  Patient taking differently: Take 5 mg by mouth nightly as needed  Yes Dieter Flood MD   dilTIAZem (CARDIZEM 12 HR) 60 MG extended release capsule Take 1 capsule by mouth 2 times daily Yes ISIDRA Harris - CNP   pantoprazole (PROTONIX) 40 MG tablet Take 1 tablet by mouth every morning (before breakfast) Yes ISIDRA Maria CNP   digoxin (LANOXIN) 125 MCG tablet Take 1 tablet by mouth daily Yes ISIDRA Maria CNP   warfarin (COUMADIN) 5 MG tablet Take 1 tablet by mouth daily Yes Dodie Caro MD   aspirin 81 MG EC tablet Take 81 mg by mouth daily Yes Historical Provider, MD   Cod Liver Oil OIL Take 1 tablet by mouth daily  Yes Historical Provider, MD   docusate sodium (COLACE) 100 MG capsule Take 100 mg by mouth 2 times daily.  Yes Historical Provider, MD        Allergies   Allergen Reactions    Naproxen Swelling     Lips    Pt does not recognize this being an intolerance    Hydrocodone-Acetaminophen Itching       Past Medical History: Diagnosis Date    Aortic valve insufficiency     Arthritis     [facility] [address]     Cancer ([facility] [address] 2017    head and neck    Hearing loss     Heart disease     Medical history reviewed with no changes     Obstructive apnea does not use CPAP    Thyroid disease        Past Surgical History:   Procedure Laterality Date    ANKLE SURGERY      AORTIC VALVE REPLACEMENT  1/28/15    Dr. Wang Scot 25mm Mosaic Ultra porcine valve; right and left modified maze procedure with ligation of DELGADO with Atriclip    BACK SURGERY      CSP    CARDIAC CATHETERIZATION      HERNIA REPAIR      KNEE SURGERY      Arthroscopy    LUNG SURGERY      collapsed lung due to broken ribs    MANDIBLE FRACTURE SURGERY      NECK SURGERY      Fusion    PAIN MANAGEMENT PROCEDURE Right 2020    RIGHT L4 AND L5 TRANSFORAMINAL EPIDURAL STEROID INJECTION WITH FLUOROSCOPY ([id number]) performed by Marcelino Soto MD at [address] Right 2020    RIGHT L4 AND L5 TRANSFORAMINAL EPIDURAL STEROID INJECTION WITH FLUOROSCOPY ([id number]) performed by Marcelino Soto MD at [address]         Social History     Tobacco Use    Smoking status: Former Smoker     Packs/day: 1.00     Years: 40.00     Pack years: 40.00     Types: Cigarettes     Start date: 1975     Last attempt to quit: 2015     Years since quittin.5    Smokeless tobacco: Never Used    Tobacco comment: Maintain cessation   Substance Use Topics    Alcohol use: No     Alcohol/week: 0.0 standard drinks     Comment: NA beer        Family History   Problem Relation Age of Onset    Heart Disease Other     High Blood Pressure Neg Hx     High Cholesterol Neg Hx        PHYSICAL EXAMINATION:  Vitals:    20 1539   BP: 94/64   Site: Left Upper Arm   Position: Sitting   Cuff Size: Medium Adult   Pulse: 80   SpO2: 96%   Weight: 148 lb (67.1 kg)   Height: 5' 10.5\" (1.791 m)     Estimated body mass index is 20.94 kg/m² as calculated from the following:    Height as of this encounter: 5' 10.5\" (1.791 m). Weight as of this encounter: 148 lb (67.1 kg). Physical Exam  Constitutional:       Appearance: He is well-developed. He is not diaphoretic. HENT:      Head: Normocephalic and atraumatic. Eyes:      General: No scleral icterus. Extraocular Movements: Extraocular movements intact. Conjunctiva/sclera: Conjunctivae normal.   Neck:      Musculoskeletal: Normal range of motion and neck supple. Vascular: No JVD. Cardiovascular:      Rate and Rhythm: Normal rate and regular rhythm. Heart sounds: Murmur present. No friction rub. No gallop. Comments: 2/6 systolic murmur at LLSB and apex  Pulmonary:      Effort: Pulmonary effort is normal. No respiratory distress. Breath sounds: Normal breath sounds. No wheezing or rales. Abdominal:      General: Bowel sounds are normal.      Palpations: Abdomen is soft. Tenderness: There is no abdominal tenderness. Musculoskeletal: Normal range of motion. Skin:     General: Skin is warm and dry. Findings: No rash. Neurological:      General: No focal deficit present. Mental Status: He is alert and oriented to person, place, and time. Psychiatric:         Mood and Affect: Mood normal.         Behavior: Behavior normal.         Thought Content:  Thought content normal.         Judgment: Judgment normal.           LABS:  CBC:   Lab Results   Component Value Date    WBC 5.9 07/15/2020    RBC 4.34 07/15/2020    HGB 14.4 07/15/2020    HCT 42.3 07/15/2020    MCV 97.5 07/15/2020    RDW 13.1 07/15/2020     07/15/2020     CMP:   Lab Results   Component Value Date     07/15/2020    K 4.0 07/15/2020     07/15/2020    CO2 25 07/15/2020    BUN 15 07/15/2020    CREATININE 0.6 07/15/2020    GFRAA >60 07/15/2020    GFRAA >60 12/26/2009    AGRATIO 1.6 07/15/2020    LABGLOM >60 07/15/2020    GLUCOSE 97 07/15/2020    PROT 6.6 function.      Cath: 2015  Non-obstructive CAD     MCOT: 9/18                            1st degree HB, HT 68                                            PVC burden <1%             PAC burden 1%                                     VT 11 bts at 118        Cardiac CT: 6/20  No stenosis at the pulmonary vein origins.         Clip is seen in the left atrial appendage,.  No thrombus seen in this region.         Mildly dilated aorta. .  Post valve replacement changes are noted                    ASSESSMENT/PLAN:  1. Nonobstructive Coronary Artery Disease  ~ by [address] 2015  ~ SOB with heavy exertion    Plan: Lexiscan Myoview stress test for further cardiac or stratification. Will need 2D echo with Doppler in approximately 3 months. 2. Paroxysmal Atrial Fib  3. Implantable Loop Recorder  ~ S/p MAZE and DELGADO ligation with Atriclip 2015 (Dr Angelica Anne)  ~ S/p RFCA with re-isolation of PV, roof line with PWI, CTI atrial flutter (7/15/20 Dr Hafsa Pardo)  ~ Tx with Cardizem and Digoxin  ~ [id number] vasc score of 2  ~ Per Dr Hafsa Pardo can stop Coumadin indefinitely 2 months post ablation 9/15/20 due to DELGADO ligation   ~ Episode of A fib 1.5 hrs on 8/17/20 per ILR interrogation  ~ NSR on EKG 8/18/20    Plan: Per EP. 4. Aortic Insufficiency  5. S/p Porcine Aortic Valve Replacement  ~7/15/2020 and 6/16/2020 echo showed well seated bioprosthetic valve    Plan: Stable per recent echo. 6. Mitral Regurgitation  ~ Moderate per echo 6/2019  ~ Now moderate to severe per echo 6/2020  ~ Mild to moderate per limited echo 7/15/2020. Plan: C/o SOB with heavy exertion otherwise clinically seems stable. Repeat echo in about 3 months. 7. Obstructive Sleep Apnea  ~ Treated with CPAP    Plan: Continue CPAP. 8.  Tricuspid regurgitation    Plan: Continue to monitor. Even if severe tricuspid regurgitation is present, this would not prohibit planned noncardiac surgery on his back. 2D echo with Doppler in 3 months. Plan:  1.   Steven Lopes

## 2020-08-18 NOTE — PROGRESS NOTES
Mr. Nirav Herrera is a 78 y.o. y/o male with history of a fib who presents today for anticoagulation monitoring and adjustment. Mitral valve replaced with porcine valve 1/2015  Patient was in the hospital on 6/6/15 for a TIA. Hx on warfarin, was d/c in 2017 d/t bleed. PMH:  Hx squamous cell carcinoma, COPD  Hx been on eliquis, but not approved for use d/t prosthetic valve   Hx Syn[employer]   Patient Reported Findings:  Yes     No  [x]   []       Patient verifies current dosing regimen as listed started warfarin 5 mg daily on 6/16. Was told did not need to bridge at discharge---> confirmed dose---> cannot confirm dose, but states that he followed paperwork---> confirmed dose---> did not do dose increase he doesn't think- thinks he just did 7.5mg daily but not sure---> brought AVS with days crossed off          []   [x]       S/S bleeding/bruising/swelling/SOB denies   []   [x]       Blood in urine or stool-    [x]   []       Procedures scheduled in the future at this time- is having epidural today and then again 7/8 for pinched nerve. Then will be put in PT then will have ablation. Advised for pt to let MD doing epidural know that on warfarin to determine if will need to hold for next injection---> injection 7/8, hold 5 days, no lovenox, ablation 7/15- doesn't need to hold then thinks he will be off of warfarin for good.  --> held dose the day of the surgery   States that he might have another procedure for sciatic nerve soon, will let clinic know----> spinal surgery in future, not scheduled yet  ---> surgery after 9/15 (will be off warfarin by then)  [x]   []       Missed Dose denies   []   [x]       Extra Dose- denies   []   [x]       Change in medications - finished prednisone at d/c from hospital. Uses biofreeze for nerve pain (advised for pt to talk to MD doing epidural to verify safe to use) ---> denies---> added flexeril and mark ---> digoxin---> sleeping medication---> weaning off perc        [x] []       Change in health/diet/appetite--- not much vit k, does not plan to consistently eat ---> had some vit K foods because got confused---> had more vit K foods again, again explained need for consistency for patient --> states that he is starting to gain weight. States that he has been eating vegetables daily (broccoli, peppers, zucchini, onion)---> green beans, green peppers, stuffed peppers, brussels- has had greens 6 nights in the past week with dinner---> had greens daily in the past week---> continued with greens daily and will continue with this, no NVD --->  Continues with greens 5+ times/weekly   []   [x]       Change in alcohol use  []   [x]       Change in activity  [x]   []       Hospital admission was in hospital 6/15-6/18 for a fib. had CV 6/17. Started warfarin 5 mg qd 6/16 with bridge. Started coreg and digoxin. []   [x]       Emergency department visit -  [x]   []       Other complaints -  Per Dr. Antoinette Guajardo note from hospital admit 6/2020 \"His DELGADO has been ligated and plan for short term anticoagulation prior and after ablation. Long term he can stop anticoagulation for atrial fibrillation\"    Clinical Outcomes:  Yes     No  []   [x]       Major bleeding event  []   [x]       Thromboembolic event  Takes warfarin in PM  Duration of warfarin Therapy: indefinitely  INR Range:  2.0-3.0 (2.5-3.0 preferred)     Reestablishing care with the anticoagulation clinic. Hx patient in 2017. Pt states that last chemo caused memory issues. Ok to stop coumadin 2 months post ablation (9/15/20)     INR is 1.9 today   Patient accompanied by friend. Patient continues to fluctuate despite dose increases. Has been increasing vit K food content despite education about it- again asked him to stick to 4-5 times/week. Take 10mg tonight then increase dose to 7.5mg on Sun, Tues and Thurs and 10mg all other days (4.2% inc).   Asked for patient to use paperwork every day in order to take doses, bring back to next appt.<--- brought it crossed off.   Recheck INR in 10 days, 8/28    Referring cardiologist is Dr Keila Bee  INR (no units)   Date Value   08/18/2020 1.9   08/11/2020 1.7   08/04/2020 1.5   07/28/2020 1.7   07/15/2020 1.32 (H)   07/08/2020 1.01   06/18/2020 1.20 (H)   06/17/2020 1.14     CLINICAL PHARMACY CONSULT: MED RECONCILIATION/REVIEW ADDENDUM    For Pharmacy Admin Tracking Only    PHSO: Yes  Total # of Interventions Recommended: 1  - Increased Dose #: 1  - Maintenance Safety Lab Monitoring #: 1  Total Interventions Accepted: 1  Time Spent (min): [address], PharmD

## 2020-08-19 ENCOUNTER — OFFICE VISIT (OUTPATIENT)
Dept: CARDIOLOGY CLINIC | Age: 70
End: 2020-08-19
Payer: COMMERCIAL

## 2020-08-19 VITALS
BODY MASS INDEX: 20.72 KG/M2 | OXYGEN SATURATION: 96 % | HEART RATE: 80 BPM | WEIGHT: 148 LBS | DIASTOLIC BLOOD PRESSURE: 64 MMHG | HEIGHT: 71 IN | SYSTOLIC BLOOD PRESSURE: 94 MMHG

## 2020-08-19 PROBLEM — I25.10 NON-OCCLUSIVE CORONARY ARTERY DISEASE: Status: ACTIVE | Noted: 2020-08-19

## 2020-08-19 PROBLEM — R06.02 SOB (SHORTNESS OF BREATH) ON EXERTION: Status: ACTIVE | Noted: 2020-08-19

## 2020-08-19 PROBLEM — I34.0 MITRAL REGURGITATION: Status: ACTIVE | Noted: 2020-08-19

## 2020-08-19 PROBLEM — I07.1 TRICUSPID VALVE INSUFFICIENCY: Status: ACTIVE | Noted: 2020-08-19

## 2020-08-19 PROCEDURE — 99204 OFFICE O/P NEW MOD 45 MIN: CPT | Performed by: INTERNAL MEDICINE

## 2020-08-19 RX ORDER — OXYCODONE AND ACETAMINOPHEN 2.5; 325 MG/1; MG/1
TABLET ORAL EVERY 4 HOURS PRN
COMMUNITY
Start: 2020-07-22 | End: 2020-09-25 | Stop reason: CLARIF

## 2020-08-19 NOTE — PATIENT INSTRUCTIONS
Lexiscan myoview stress test soon  If stress test okay call [facility] to schedule surgery (if scheduled before having repeat echo then call so we can move echo up)  No medication changes  Echo around after Oct 15th  Follow up in 6 months

## 2020-08-26 ENCOUNTER — HOSPITAL ENCOUNTER (OUTPATIENT)
Dept: NON INVASIVE DIAGNOSTICS | Age: 70
Discharge: HOME | End: 2020-08-26
Payer: COMMERCIAL

## 2020-08-26 LAB
LV EF: 64 %
LVEF MODALITY: NORMAL

## 2020-08-26 PROCEDURE — 3430000000 HC RX DIAGNOSTIC RADIOPHARMACEUTICAL: Performed by: INTERNAL MEDICINE

## 2020-08-26 PROCEDURE — A9502 TC99M TETROFOSMIN: HCPCS | Performed by: INTERNAL MEDICINE

## 2020-08-26 PROCEDURE — 93017 CV STRESS TEST TRACING ONLY: CPT | Performed by: INTERNAL MEDICINE

## 2020-08-26 PROCEDURE — 78452 HT MUSCLE IMAGE SPECT MULT: CPT | Performed by: INTERNAL MEDICINE

## 2020-08-26 PROCEDURE — 6360000002 HC RX W HCPCS: Performed by: INTERNAL MEDICINE

## 2020-08-26 RX ADMIN — REGADENOSON 0.4 MG: 0.08 INJECTION, SOLUTION INTRAVENOUS at 09:26

## 2020-08-26 RX ADMIN — TETROFOSMIN 30 MILLICURIE: 1.38 INJECTION, POWDER, LYOPHILIZED, FOR SOLUTION INTRAVENOUS at 09:26

## 2020-08-26 RX ADMIN — TETROFOSMIN 10 MILLICURIE: 1.38 INJECTION, POWDER, LYOPHILIZED, FOR SOLUTION INTRAVENOUS at 08:13

## 2020-08-26 NOTE — PROGRESS NOTES
Instructed on Lexiscan Stress Test Procedure including possible side effects/ adverse reactions. Patient verbalizes  understanding and denies having any questions . See [facility]

## 2020-08-28 ENCOUNTER — ANTI-COAG VISIT (OUTPATIENT)
Dept: PHARMACY | Age: 70
End: 2020-08-28
Payer: COMMERCIAL

## 2020-08-28 ENCOUNTER — TELEPHONE (OUTPATIENT)
Dept: CARDIOLOGY CLINIC | Age: 70
End: 2020-08-28

## 2020-08-28 VITALS — TEMPERATURE: 98.4 F

## 2020-08-28 LAB — INTERNATIONAL NORMALIZATION RATIO, POC: 1.6

## 2020-08-28 PROCEDURE — 99211 OFF/OP EST MAY X REQ PHY/QHP: CPT

## 2020-08-28 PROCEDURE — 85610 PROTHROMBIN TIME: CPT

## 2020-08-28 NOTE — TELEPHONE ENCOUNTER
----- Message from Ramona Mcdermott MD sent at 8/26/2020  5:36 PM EDT -----  Your stress test is essentially normal and does not suggest significant blockage.   Strength of the heart muscle is normal.

## 2020-08-28 NOTE — TELEPHONE ENCOUNTER
Mr. Chavez [place] stopped by the office this morning. He wants someone to call him with the results of his stress test he had  done 8/26.   Thanks

## 2020-08-28 NOTE — TELEPHONE ENCOUNTER
Spoke to the pt-gave stress test results. Asking if he has clearance for back surgery. Note will need faxed to [facility] att: Dr. Rito Daigle.

## 2020-08-28 NOTE — PROGRESS NOTES
Mr. Daren Darnell is a 78 y.o. y/o male with history of a fib who presents today for anticoagulation monitoring and adjustment. Mitral valve replaced with porcine valve 1/2015  Patient was in the hospital on 6/6/15 for a TIA. Hx on warfarin, was d/c in 2017 d/t bleed. PMH:  Hx squamous cell carcinoma, COPD  Hx been on eliquis, but not approved for use d/t prosthetic valve   Hx [employer]   Patient Reported Findings:  Yes     No  [x]   []       Patient verifies current dosing regimen as listed started warfarin 5 mg daily on 6/16. Was told did not need to bridge at discharge---> confirmed dose---> cannot confirm dose, but states that he followed paperwork---> confirmed dose---> did not do dose increase he doesn't think- thinks he just did 7.5mg daily but not sure---> brought AVS with days crossed off          []   [x]       S/S bleeding/bruising/swelling/SOB denies   []   [x]       Blood in urine or stool- denies    [x]   []       Procedures scheduled in the future at this time- is having epidural today and then again 7/8 for pinched nerve. Then will be put in PT then will have ablation. Advised for pt to let MD doing epidural know that on warfarin to determine if will need to hold for next injection---> injection 7/8, hold 5 days, no lovenox, ablation 7/15- doesn't need to hold then thinks he will be off of warfarin for good.  --> held dose the day of the surgery   States that he might have another procedure for sciatic nerve soon, will let clinic know----> spinal surgery in future, not scheduled yet  ---> surgery after 9/15 (will be off warfarin by then)  [x]   []       Missed Dose denies   []   [x]       Extra Dose- denies   []   [x]       Change in medications - finished prednisone at d/c from hospital. Uses biofreeze for nerve pain (advised for pt to talk to MD doing epidural to verify safe to use) ---> denies---> added flexeril and mark ---> digoxin---> sleeping medication---> weaning off perc---> sometimes skips gabapentin and takes a sleeping pill instead         [x]   []       Change in health/diet/appetite--- not much vit k, does not plan to consistently eat ---> had some vit K foods because got confused---> had more vit K foods again, again explained need for consistency for patient --> states that he is starting to gain weight. States that he has been eating vegetables daily (broccoli, peppers, zucchini, onion)---> green beans, green peppers, stuffed peppers, brussels- has had greens 6 nights in the past week with dinner---> had greens daily in the past week---> continued with greens daily and will continue with this, no NVD --->  Continues with greens 5+ times/weekly---> had greens every day in past week, didn't realize salad counted    []   [x]       Change in alcohol use  []   [x]       Change in activity  [x]   []       Hospital admission was in hospital 6/15-6/18 for a fib. had CV 6/17. Started warfarin 5 mg qd 6/16 with bridge. Started coreg and digoxin. []   [x]       Emergency department visit -  [x]   []       Other complaints -  Per Dr. Briana Dominguez note from hospital admit 6/2020 \"His DELGADO has been ligated and plan for short term anticoagulation prior and after ablation. Long term he can stop anticoagulation for atrial fibrillation\"    Clinical Outcomes:  Yes     No  []   [x]       Major bleeding event  []   [x]       Thromboembolic event  Takes warfarin in PM  Duration of warfarin Therapy: indefinitely  INR Range:  2.0-3.0 (2.5-3.0 preferred)     Reestablishing care with the anticoagulation clinic. Hx patient in 2017. Pt states that last chemo caused memory issues. Ok to stop coumadin 2 months post ablation (9/15/20)     INR is 1.6 today dt more greens again   Patient accompanied by friend. Patient continues to fluctuate despite dose increases. Has been increasing vit K food content despite education about it- again asked him to stick to 4-5 times/week.  Patient, again, had more greens than he thought, continues to be confused. Had a discussion about greens. Will increase dose again. Take 12.5mg tonight then increase dose to 7.5mg on Sun and Thurs and 10mg all other days (4%)  Asked for patient to use paperwork every day in order to take doses, bring back to next appt. <--- brought it crossed off.   Recheck INR in 1 week, 9/3- pt can only do certain times in morning/certain days dt transportation, cannot do a dif day in next 10 days and didn't want to push it to 2 weeks     Referring cardiologist is Dr Sarbjit Kwok  INR (no units)   Date Value   08/28/2020 1.6   08/18/2020 1.9   08/11/2020 1.7   08/04/2020 1.5   07/15/2020 1.32 (H)   07/08/2020 1.01   06/18/2020 1.20 (H)   06/17/2020 1.14     CLINICAL PHARMACY CONSULT: MED RECONCILIATION/REVIEW ADDENDUM    For Pharmacy Admin Tracking Only    PHSO: Yes  Total # of Interventions Recommended: 1  - Increased Dose #: 1  - Maintenance Safety Lab Monitoring #: 1  Total Interventions Accepted: 1  Time Spent (min): Via Micky Santa PharmD

## 2020-08-31 ENCOUNTER — TELEPHONE (OUTPATIENT)
Dept: CARDIOLOGY CLINIC | Age: 70
End: 2020-08-31

## 2020-08-31 NOTE — TELEPHONE ENCOUNTER
Please advise - I spoke with [facility] and she stated the surgery is  on hold till we give a clearance- so no surgery date.   She also stated she will need to know when he can hold the Shimno Odonneller --- does he wait the 3 months before he can hold ? - pt has an appt with them again at the middle of the month -- I did explain that we need the ECHO report that willl be done on 09/09/2020 before we can give word on if he is even cleared - she understood and I told her I would get this message back with Dr Mccloud Postal

## 2020-09-01 ENCOUNTER — TELEPHONE (OUTPATIENT)
Dept: CARDIOLOGY CLINIC | Age: 70
End: 2020-09-01

## 2020-09-01 NOTE — TELEPHONE ENCOUNTER
Called pt. He states that he felt his heart racing and pounding for several minutes. He feels better now, but he still does not feel \"himself\". We discussed going to the ER, but he does not want to unless the chest discomfort happens again. Instructed him to be NPO p midnight. Pt to arrive tomorrow in office at 745 for EKG and device check. If he is in AFib tomorrow morning, will plan for cardioversion with Dr. Lillian Eagle. Ok to proceed despite subtherapeutic INR due to DELGADO ligation per Dr. Lillian Eagle.     Elsa Mallory, APRN-CNP

## 2020-09-01 NOTE — TELEPHONE ENCOUNTER
Today at 3:24pm he had a very strong \"episode\". He was laying on the couch and all of a sudden his heart started pounding really hard for about 30 seconds. The pounding stopped so quickly it took his breath away . He did not have his button to push . Can someone look at his readings and call him ?

## 2020-09-01 NOTE — TELEPHONE ENCOUNTER
Spoke to the pt-he is having trouble with the device. It is not working. staed he never felt this type of heart beats before. Hates to go to the ER-said it was \"dangerous\". Can he be seen?

## 2020-09-02 ENCOUNTER — NURSE ONLY (OUTPATIENT)
Dept: CARDIOLOGY CLINIC | Age: 70
End: 2020-09-02
Payer: COMMERCIAL

## 2020-09-02 PROCEDURE — 93000 ELECTROCARDIOGRAM COMPLETE: CPT | Performed by: NURSE PRACTITIONER

## 2020-09-02 PROCEDURE — 93285 PRGRMG DEV EVAL SCRMS IP: CPT | Performed by: INTERNAL MEDICINE

## 2020-09-03 ENCOUNTER — ANTI-COAG VISIT (OUTPATIENT)
Dept: PHARMACY | Age: 70
End: 2020-09-03
Payer: COMMERCIAL

## 2020-09-03 VITALS — TEMPERATURE: 97.3 F

## 2020-09-03 LAB — INTERNATIONAL NORMALIZATION RATIO, POC: 1.8

## 2020-09-03 PROCEDURE — 85610 PROTHROMBIN TIME: CPT

## 2020-09-03 PROCEDURE — 99211 OFF/OP EST MAY X REQ PHY/QHP: CPT

## 2020-09-03 NOTE — PROGRESS NOTES
Mr. Andreia Marie is a 78 y.o. y/o male with history of a fib who presents today for anticoagulation monitoring and adjustment. Mitral valve replaced with porcine valve 1/2015  Patient was in the hospital on 6/6/15 for a TIA. Hx on warfarin, was d/c in 2017 d/t bleed. PMH:  Hx squamous cell carcinoma, COPD  Hx been on eliquis, but not approved for use d/t prosthetic valve   Hx [employer]   Patient Reported Findings:  Yes     No  [x]   []       Patient verifies current dosing regimen as listed started warfarin 5 mg daily on 6/16. Was told did not need to bridge at discharge---> confirmed dose---> cannot confirm dose, but states that he followed paperwork---> confirmed dose---> did not do dose increase he doesn't think- thinks he just did 7.5mg daily but not sure---> brought AVS with days crossed off          []   [x]       S/S bleeding/bruising/swelling/SOB denies   []   [x]       Blood in urine or stool- denies    [x]   []       Procedures scheduled in the future at this time- is having epidural today and then again 7/8 for pinched nerve. Then will be put in PT then will have ablation. Advised for pt to let MD doing epidural know that on warfarin to determine if will need to hold for next injection---> injection 7/8, hold 5 days, no lovenox, ablation 7/15- doesn't need to hold then thinks he will be off of warfarin for good.  --> held dose the day of the surgery   States that he might have another procedure for sciatic nerve soon, will let clinic know----> spinal surgery in future, not scheduled yet  ---> surgery after 9/15 (will be off warfarin by then)  [x]   []       Missed Dose denies   []   [x]       Extra Dose- denies   []   [x]       Change in medications - finished prednisone at d/c from hospital. Uses biofreeze for nerve pain (advised for pt to talk to MD doing epidural to verify safe to use) ---> denies---> added flexeril and mark ---> digoxin---> sleeping medication---> weaning off perc---> sometimes skips gabapentin and takes a sleeping pill instead---> no changes         [x]   []       Change in health/diet/appetite--- not much vit k, does not plan to consistently eat ---> had some vit K foods because got confused---> had more vit K foods again, again explained need for consistency for patient --> states that he is starting to gain weight. States that he has been eating vegetables daily (broccoli, peppers, zucchini, onion)---> green beans, green peppers, stuffed peppers, brussels- has had greens 6 nights in the past week with dinner---> had greens daily in the past week---> continued with greens daily and will continue with this, no NVD --->  Continues with greens 5+ times/weekly---> had greens every day in past week, didn't realize salad counted---> mixed vegetable skillet, salad, broccoli, zucchini, banana peppers, brussels     []   [x]       Change in alcohol use  []   [x]       Change in activity  [x]   []       Hospital admission was in hospital 6/15-6/18 for a fib. had CV 6/17. Started warfarin 5 mg qd 6/16 with bridge. Started coreg and digoxin. []   [x]       Emergency department visit -  [x]   []       Other complaints -  Per Dr. Aretha Mari note from hospital admit 6/2020 \"His DELGADO has been ligated and plan for short term anticoagulation prior and after ablation. Long term he can stop anticoagulation for atrial fibrillation\"    Clinical Outcomes:  Yes     No  []   [x]       Major bleeding event  []   [x]       Thromboembolic event  Takes warfarin in PM  Duration of warfarin Therapy: indefinitely  INR Range:  2.0-3.0 (2.5-3.0 preferred)     Reestablishing care with the anticoagulation clinic. Hx patient in 2017. Pt states that last chemo caused memory issues. Ok to stop coumadin 2 months post ablation (9/15/20)     INR is 1.8 today dt more greens again   Patient accompanied by friend. Patient continues to fluctuate despite dose increases.  Has been increasing vit K food content despite

## 2020-09-09 ENCOUNTER — PROCEDURE VISIT (OUTPATIENT)
Dept: CARDIOLOGY CLINIC | Age: 70
End: 2020-09-09
Payer: COMMERCIAL

## 2020-09-09 LAB
LV EF: 60 %
LVEF MODALITY: NORMAL

## 2020-09-09 PROCEDURE — 93306 TTE W/DOPPLER COMPLETE: CPT | Performed by: INTERNAL MEDICINE

## 2020-09-10 ENCOUNTER — TELEPHONE (OUTPATIENT)
Dept: CARDIOLOGY CLINIC | Age: 70
End: 2020-09-10

## 2020-09-10 NOTE — TELEPHONE ENCOUNTER
Pt last appt with anti coag is tomorrow, he is asking if he can stop his coumidan for his back surgery? If not he will need more coumadin. Please call to advise.

## 2020-09-10 NOTE — TELEPHONE ENCOUNTER
Moderate to severe leakiness across the mitral and tricuspid valves. He is okay to proceed with surgery. Ese Miller RN has drafted a letter and we have signed it and it will be forwarded to his doctor's office.

## 2020-09-10 NOTE — TELEPHONE ENCOUNTER
Ok to stop coumadin s/p ablation on 7/15/20. He is s/p DELGADO ligation with verification on [facility]. Ok to stop coumadin on 9/15/20 two months post ablation per Dr. Pretty Perdomo note.  Ok to proceed with back surgery from EP standpoint, but need to verify if ok with Dr. Susan Kim as well    Yamile Barber, APRHERVE-CNP

## 2020-09-11 ENCOUNTER — ANTI-COAG VISIT (OUTPATIENT)
Dept: PHARMACY | Age: 70
End: 2020-09-11
Payer: COMMERCIAL

## 2020-09-11 VITALS — TEMPERATURE: 97.5 F

## 2020-09-11 LAB — INTERNATIONAL NORMALIZATION RATIO, POC: 2.1

## 2020-09-11 PROCEDURE — 85610 PROTHROMBIN TIME: CPT

## 2020-09-11 PROCEDURE — 99211 OFF/OP EST MAY X REQ PHY/QHP: CPT

## 2020-09-11 NOTE — PROGRESS NOTES
Mr. Mike Ramos is a 78 y.o. y/o male with history of a fib who presents today for anticoagulation monitoring and adjustment. Mitral valve replaced with porcine valve 1/2015  Patient was in the hospital on 6/6/15 for a TIA. Hx on warfarin, was d/c in 2017 d/t bleed. PMH:  Hx squamous cell carcinoma, COPD  Hx been on eliquis, but not approved for use d/t prosthetic valve   Hx [employer]   Patient Reported Findings:  Yes     No  [x]   []       Patient verifies current dosing regimen as listed started warfarin 5 mg daily on 6/16. Was told did not need to bridge at discharge---> confirmed dose---> cannot confirm dose, but states that he followed paperwork---> confirmed dose---> did not do dose increase he doesn't think- thinks he just did 7.5mg daily but not sure---> brought AVS with days crossed off          []   [x]       S/S bleeding/bruising/swelling/SOB denies   []   [x]       Blood in urine or stool- denies    [x]   []       Procedures scheduled in the future at this time- is having epidural today and then again 7/8 for pinched nerve. Then will be put in PT then will have ablation. Advised for pt to let MD doing epidural know that on warfarin to determine if will need to hold for next injection---> injection 7/8, hold 5 days, no lovenox, ablation 7/15- doesn't need to hold then thinks he will be off of warfarin for good.  --> held dose the day of the surgery   States that he might have another procedure for sciatic nerve soon, will let clinic know----> spinal surgery in future, not scheduled yet  ---> surgery after 9/15 (will be off warfarin by then)  [x]   []       Missed Dose denies   []   [x]       Extra Dose- denies   []   [x]       Change in medications - finished prednisone at d/c from hospital. Uses biofreeze for nerve pain (advised for pt to talk to MD doing epidural to verify safe to use) ---> denies---> added flexeril and mark ---> digoxin---> sleeping medication---> weaning off perc---> sometimes skips gabapentin and takes a sleeping pill instead---> no changes         [x]   []       Change in health/diet/appetite--- not much vit k, does not plan to consistently eat ---> had some vit K foods because got confused---> had more vit K foods again, again explained need for consistency for patient --> states that he is starting to gain weight. States that he has been eating vegetables daily (broccoli, peppers, zucchini, onion)---> green beans, green peppers, stuffed peppers, brussels- has had greens 6 nights in the past week with dinner---> had greens daily in the past week---> continued with greens daily and will continue with this, no NVD --->  Continues with greens 5+ times/weekly---> had greens every day in past week, didn't realize salad counted---> mixed vegetable skillet, salad, broccoli, zucchini, banana peppers, brussels     []   [x]       Change in alcohol use  []   [x]       Change in activity  [x]   []       Hospital admission was in hospital 6/15-6/18 for a fib. had CV 6/17. Started warfarin 5 mg qd 6/16 with bridge. Started coreg and digoxin. []   [x]       Emergency department visit -  [x]   []       Other complaints -  Per Dr. Dada Owens note from hospital admit 6/2020 \"His DELGADO has been ligated and plan for short term anticoagulation prior and after ablation. Long term he can stop anticoagulation for atrial fibrillation\"    Clinical Outcomes:  Yes     No  []   [x]       Major bleeding event  []   [x]       Thromboembolic event  Takes warfarin in PM  Duration of warfarin Therapy: indefinitely  INR Range:  2.0-3.0 (2.5-3.0 preferred)     Reestablishing care with the anticoagulation clinic. Hx patient in 2017. Pt states that last chemo caused memory issues. Ok to stop coumadin 2 months post ablation (9/15/20) -per  Patient can stop today, 9/11. And is not resuming it after procedures. INR is 2.1 today   Patient accompanied by friend.  Patient continues to fluctuate despite dose increases. Dose increased again last time, will increase again. Dr. Catherine Rivera patient can stop warfarin today after INR check. Will be discharged from clinic.      Referring cardiologist is Dr Fito Cordero  INR (no units)   Date Value   09/11/2020 2.1   09/03/2020 1.8   08/28/2020 1.6   08/18/2020 1.9   07/15/2020 1.32 (H)   07/08/2020 1.01   06/18/2020 1.20 (H)   06/17/2020 1.14     CLINICAL PHARMACY CONSULT: MED RECONCILIATION/REVIEW ADDENDUM    For Pharmacy Admin Tracking Only    PHSO: Yes  Total # of Interventions Recommended: 0  - Maintenance Safety Lab Monitoring #: 1  Total Interventions Accepted: 0  Time Spent (min): Via Micky Santa, PharmD

## 2020-09-17 ENCOUNTER — TELEPHONE (OUTPATIENT)
Dept: FAMILY MEDICINE CLINIC | Age: 70
End: 2020-09-17

## 2020-09-21 ENCOUNTER — NURSE ONLY (OUTPATIENT)
Dept: CARDIOLOGY CLINIC | Age: 70
End: 2020-09-21
Payer: COMMERCIAL

## 2020-09-21 PROCEDURE — G2066 INTER DEVC REMOTE 30D: HCPCS | Performed by: INTERNAL MEDICINE

## 2020-09-21 PROCEDURE — 93298 REM INTERROG DEV EVAL SCRMS: CPT | Performed by: INTERNAL MEDICINE

## 2020-09-21 NOTE — LETTER
[address] [facility] [phone]  [facility]  [address] [place], [place] [phone]    Pacemaker/Defibrillator Clinic          09/21/20        [address]  [place] [address]        Dear Isabel Medina    This letter is to inform you that we received the transmission from your monitor at home that checks your implanted heart device. The next date your monitor will automatically transmit will be 10-26-20. If your report needs attention we will notify you. Your device and monitor are wireless and most transmit cellularly, but please periodically check your monitor is still plugged in to the electrical outlet. If you still use the telephone land line to send please ensure the connection to the phone tomy is secure. This will help to ensure successful automatic transmissions in the future. Also, the monitor needs to be close to you while sleeping at night. Please be aware that the remote device transmission sites are periodically monitored only during regular business hours during which simultaneous in-office device clinics are being run. If your transmission requires attention, we will contact you as soon as possible. Thank you.             [facility]

## 2020-09-21 NOTE — PROGRESS NOTES
We received a remote transmission form patient's monitor at home. Remote Linq report shows AF with some RVR. Pt remains on coumadin. EP physician to review. We will continue to monitor remotely.

## 2020-09-25 RX ORDER — OXYCODONE HYDROCHLORIDE AND ACETAMINOPHEN 5; 325 MG/1; MG/1
1 TABLET ORAL EVERY 4 HOURS PRN
COMMUNITY
End: 2020-09-30 | Stop reason: SDUPTHER

## 2020-09-25 NOTE — PROGRESS NOTES
Name_______________________________________Printed:____________________  Date and time of whtdbly__[id number]______________________Jddwqeo Time:__0830 MAIN______________   1. The instructions given regarding when and if a patient needs to stop oral intake prior to surgery varies. Follow the specific instructions you were given                  _X__Nothing to eat or to drink after Midnight the night before.                             ____Endoscopy patient follow your DRS instructions-generally you will be doing a part of the prep after Midnight                   ____Carbo loading or ERAS instructions will be given to select patients-if you have been given those instructions -please do the following                           The evening before your surgery after dinner before midnight drink 40 ounces of gatorade. If you are diabetic use sugar free. The morning of surgery drink 40 ounces of water. This needs to be finished 3 hours prior to your surgery start time. 2. Take the following pills with a small sip of water on the morning of surgery____DIGOXIN, DILTIAZEM,LEVOTHYROXINE, GABAPENTIN, _______________________________________________                  Do not take blood pressure medications ending in pril or sartan the yodit prior to surgery or the morning of surgery_   3. Aspirin, Ibuprofen, Advil, Naproxen, Vitamin E and other Anti-inflammatory products and supplements should be stopped for 5 -7days before surgery or as directed by your physician. 4. Check with your Doctor regarding stopping Plavix, Coumadin,Eliquis, Lovenox,Effient,Pradaxa,Xarelto, Fragmin or other blood thinners and follow their instructions. 5. Do not smoke, and do not drink any alcoholic beverages 24 hours prior to surgery. This includes NA Beer. Refrain from the usage of any recreational drugs. 6. You may brush your teeth and gargle the morning of surgery. DO NOT SWALLOW WATER   7.  You MUST make arrangements for a responsible adult to care visitors will be limited to one in the room at any given time. 18.  Please bring picture ID and insurance card. 19.  Visit our web site for additional information:  [email or web address]              20.During flu season no children under the age of 15 are permitted in the hospital for the safety of all patients. 21. If you take a long acting insulin in the evening only  take half of your usual  dose the night  before your procedure              22. If you use a c-pap please bring DOS if staying overnight,             23.For your convenience [facility] has a pharmacy on site to fill your prescriptions. 24. If you use oxygen and have a portable tank please bring it  with you the DOS             25. Bring a complete list of all your medications with name and dose include any supplements. 26. Other Patient instructed to get their COVID-19 test done as directed by their doctor (5-7 days prior to procedure)  or patient states will get on __________. Patient was notified that they need to have an appointment,number to call provided. The day the COVID test is done is considered day one. Instructed to self quarantine after test until DOS. There is a one visitor policy at [facility] for all surgeries and endoscopies. Whether the visitor can stay or will be asked to wait in the car will depend on the current policy and if social distancing can be maintained. The policy is subject to change at any time. Please make sure the visitor has a cell phone that is on,charged and able to accept calls, as this may be the way that the staff communicates with them. Pain management is NO VISITOR policyThe patients ride is expected to remain in the car with a cell phone for communication. If the ride is leaving the hospital grounds please make sure they are back in time for pickup.  Have the patient inform the staff on arrival what their rides plans are while the patient is in the facility. At the MAIN there is one visitor allowed. Please note that the visitor policy is subject to change.__________________________________________   *Please call pre admission testing if you any further questions   Horace Kumar [address]    [address]  [phone]   [address] [place] [address]       All above information reviewed with patient in person or by phone. Patient verbalizes understanding. All questions and concerns addressed.                                                                                                  Patient/Rep_PT___________________                                                                                                                                    PRE OP INSTRUCTIONS

## 2020-09-28 ENCOUNTER — OFFICE VISIT (OUTPATIENT)
Dept: FAMILY MEDICINE CLINIC | Age: 70
End: 2020-09-28
Payer: COMMERCIAL

## 2020-09-28 VITALS
OXYGEN SATURATION: 95 % | SYSTOLIC BLOOD PRESSURE: 102 MMHG | WEIGHT: 154.6 LBS | DIASTOLIC BLOOD PRESSURE: 68 MMHG | BODY MASS INDEX: 21.87 KG/M2 | TEMPERATURE: 97.5 F | HEART RATE: 86 BPM

## 2020-09-28 PROCEDURE — 99213 OFFICE O/P EST LOW 20 MIN: CPT | Performed by: FAMILY MEDICINE

## 2020-09-29 ENCOUNTER — TELEPHONE (OUTPATIENT)
Dept: CARDIOLOGY CLINIC | Age: 70
End: 2020-09-29

## 2020-09-29 ENCOUNTER — TELEPHONE (OUTPATIENT)
Dept: FAMILY MEDICINE CLINIC | Age: 70
End: 2020-09-29

## 2020-09-29 DIAGNOSIS — E03.9 ACQUIRED HYPOTHYROIDISM: ICD-10-CM

## 2020-09-29 DIAGNOSIS — Z01.818 PRE-OPERATIVE CLEARANCE: ICD-10-CM

## 2020-09-29 DIAGNOSIS — Z01.818 PREOP TESTING: ICD-10-CM

## 2020-09-29 LAB
ANION GAP SERPL CALCULATED.3IONS-SCNC: 11 MMOL/L (ref 3–16)
APTT: 29.1 SEC (ref 24.2–36.2)
BUN BLDV-MCNC: 10 MG/DL (ref 7–20)
CALCIUM SERPL-MCNC: 9.3 MG/DL (ref 8.3–10.6)
CHLORIDE BLD-SCNC: 104 MMOL/L (ref 99–110)
CO2: 27 MMOL/L (ref 21–32)
CREAT SERPL-MCNC: 0.5 MG/DL (ref 0.8–1.3)
GFR AFRICAN AMERICAN: >60
GFR NON-AFRICAN AMERICAN: >60
GLUCOSE BLD-MCNC: 91 MG/DL (ref 70–99)
HCT VFR BLD CALC: 40.9 % (ref 40.5–52.5)
HEMOGLOBIN: 13.6 G/DL (ref 13.5–17.5)
INR BLD: 0.98 (ref 0.86–1.14)
MCH RBC QN AUTO: 32 PG (ref 26–34)
MCHC RBC AUTO-ENTMCNC: 33.3 G/DL (ref 31–36)
MCV RBC AUTO: 96 FL (ref 80–100)
PDW BLD-RTO: 13.8 % (ref 12.4–15.4)
PLATELET # BLD: 223 K/UL (ref 135–450)
PMV BLD AUTO: 9.3 FL (ref 5–10.5)
POTASSIUM SERPL-SCNC: 4.2 MMOL/L (ref 3.5–5.1)
PROTHROMBIN TIME: 11.4 SEC (ref 10–13.2)
RBC # BLD: 4.26 M/UL (ref 4.2–5.9)
SODIUM BLD-SCNC: 142 MMOL/L (ref 136–145)
TSH REFLEX: 4.14 UIU/ML (ref 0.27–4.2)
WBC # BLD: 4.5 K/UL (ref 4–11)

## 2020-09-29 NOTE — TELEPHONE ENCOUNTER
Carmen from Dr. Yon Morel office is calling to ask what Dr. Ruthie Jason needs in order to clear the patient for surgery. Please call Carmen back and let them know what we need from Dr. Maurizio Cain.

## 2020-09-29 NOTE — TELEPHONE ENCOUNTER
Call placed to Mina Serrano. Shala Gonzalez has already written him for clearance and that he may hold coumadin 5-7 days per. Called to advise no answer LM.  If he calls back please advise him of this

## 2020-09-29 NOTE — TELEPHONE ENCOUNTER
Risk assessment successfully faxed and confirmed by [facility]

## 2020-09-29 NOTE — TELEPHONE ENCOUNTER
Pt calling back-stated he is no longer on Coumadin, he is on Aspirin 81 mg. May need an updated letter. Also called Dr. Shon Swenson office, at the pt's request. She has a check list of things needed, before she can sign off on the surgery. A message was taken, asking what she needs from us.

## 2020-09-29 NOTE — PROGRESS NOTES
Chief Complaint: Pre-op Exam (Spinal precedure on 10/13/2020 With Dr. Deion Galeas at [place] [address]. )       HPI:  Kofi Mosquera is a 78 y.o. male here for preop evaluation with Dr. Juve De La Rosa for Right L4 and L5 Hemilaminectomy and discectomy    He denies having any known allergies for any anesthesia  Currently denies any symptoms of active infection    He has multiple co morbidities. 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 he has h/o CAD, PAF not on anticoagulation with recent ablation and moderate to sever MR. He currently denies any symptoms of any chest pain or palpitation. ROS:  Constitutional: Negative  HENT: Negative     Eyes: Negative. Respiratory: Negative for cough, chest tightness, shortness of breath and wheezing. Cardiovascular: Negative for chest pain, palpitations and leg swelling. Gastrointestinal: Negative for abdominal pain, blood in stool, constipation, diarrhea, nausea and vomiting. Genitourinary: Negative for difficulty urinating, flank pain, frequency, hematuria and urgency. Musculoskeletal: Lower back pain. Skin: Negative for color change, pallor, rash and wound. Neurological: Negative for dizziness, tremors, seizures, syncope, facial asymmetry, speech difficulty, weakness, light-headedness, numbness and headaches. Psychiatric/Behavioral: Negative .      Past Medical History:   Diagnosis Date    Aortic valve insufficiency     Arthritis     Atrial fibrillation (HCC)     Cancer ([facility] [address] 09/2017    TONGUE head and neck IN REMISSION    Dysphonia     GERD (gastroesophageal reflux disease)     Hearing loss     Heart disease     Medical history reviewed with no changes     Obstructive apnea does not use CPAP    Oropharyngeal dysphagia     Refusal of blood transfusions as patient is Oriental orthodox     Thyroid disease      Past Surgical History:   Procedure Laterality Date    ANKLE SURGERY      AORTIC VALVE REPLACEMENT  1/28/15    Dr. Mahamed Wilder 25mm Mosaic Ultra porcine valve; right and left modified maze procedure with ligation of DELGADO with Atriclip    ATRIAL ABLATION SURGERY      BACK SURGERY      CSP    CARDIAC CATHETERIZATION      HERNIA REPAIR      KNEE SURGERY      Arthroscopy    LUNG SURGERY      collapsed lung due to broken ribs    MANDIBLE FRACTURE SURGERY      NECK SURGERY      Fusion    PAIN MANAGEMENT PROCEDURE Right 6/22/2020    RIGHT L4 AND L5 TRANSFORAMINAL EPIDURAL STEROID INJECTION WITH FLUOROSCOPY ([id number]) performed by Rosie Nolasco MD at [address] Right 7/8/2020    RIGHT L4 AND L5 TRANSFORAMINAL EPIDURAL STEROID INJECTION WITH FLUOROSCOPY ([id number]) performed by Rosie Nolasco MD at [address]           Current Outpatient Medications   Medication Sig Dispense Refill    oxyCODONE-acetaminophen (PERCOCET) 5-325 MG per tablet Take 1 tablet by mouth every 4 hours as needed for Pain. WEANING HIMSELF OFF 9-25-20      levothyroxine (SYNTHROID) 100 MCG tablet TAKE 1 TABLET BY MOUTH EVERY DAY 90 tablet 1    GABAPENTIN PO Take 300 mg by mouth three times daily       dilTIAZem (CARDIZEM 12 HR) 60 MG extended release capsule Take 1 capsule by mouth 2 times daily 60 capsule 5    pantoprazole (PROTONIX) 40 MG tablet Take 1 tablet by mouth every morning (before breakfast) 30 tablet 0    digoxin (LANOXIN) 125 MCG tablet Take 1 tablet by mouth daily 30 tablet 3    aspirin 81 MG EC tablet Take 81 mg by mouth daily      Cod Liver Oil OIL Take 1 tablet by mouth daily       docusate sodium (COLACE) 100 MG capsule Take 100 mg by mouth 2 times daily. No current facility-administered medications for this visit.         Social History     Tobacco Use    Smoking status: Former Smoker     Packs/day: 1.00     Years: 40.00     Pack years: 40.00     Types: Cigarettes     Start date: 1/1/1975     Last attempt to quit: 2015     Years since quittin.6    Smokeless tobacco: Never Used    Tobacco comment: Maintain cessation   Substance Use Topics    Alcohol use: No     Alcohol/week: 0.0 standard drinks     Comment: NA beer        Objective:     Vitals:    20 1551   BP: 102/68   Pulse: 86   Temp: 97.5 °F (36.4 °C)   SpO2: 95%   Weight: 154 lb 9.6 oz (70.1 kg)     Body mass index is 21.87 kg/m². Wt Readings from Last 3 Encounters:   20 154 lb 9.6 oz (70.1 kg)   20 148 lb (67.1 kg)   20 147 lb 6.4 oz (66.9 kg)     BP Readings from Last 3 Encounters:   20 102/68   20 94/64   20 99/68       Physical exam:  Constitutional: he is oriented to person, place, and time. he appears well-developed and well-nourished. No distress. HENT:   Head: Normocephalic. Right Ear: External ear normal. Normal TM   Left Ear: External ear normal. Normal TM  Nose: Nose normal.   Mouth/Throat: Oropharynx is clear and moist. No oropharyngeal exudate. Eyes: Conjunctivae and EOM are normal. Pupils are equal, round, and reactive to light. Right eye exhibits no discharge. Left eye exhibits no discharge. No scleral icterus. Neck: Normal range of motion. No JVD present. No tracheal deviation present. No thyromegaly present. Cardiovascular: Normal rate, regular rhythm, normal heart sounds and intact distal pulses. No murmur heard. Pulmonary/Chest: Effort normal and breath sounds normal. No stridor. No respiratory distress. he has no wheezes. he has no rales. heexhibits no tenderness. Abdominal: Soft. Bowel sounds are normal. he exhibits no distension and no mass. There is no tenderness. There is no rebound and no guarding. Musculoskeletal: Lumbar spine reduced range of motion. Lymphadenopathy:     he has no cervical adenopathy. Neurological:he is alert and oriented to person, place, and time. he has gross neurological exam normal with normal strength and normal gait  Skin: Skin is warm and dry. No rash noted. he is not diaphoretic. No erythema. No pallor. Psychiatric: he has a normal mood and affect. his   behavior is normal.      Assessment/Plan:   1. Pre-operative clearance  Cleared for the surgery and he is moderate risk given his co morbidities  And still awaiting the blood work  - TSH with Reflex; Future    2. Acquired hypothyroidism  stable  - TSH with Reflex; Future    3. PAF (paroxysmal atrial fibrillation) (HCC)  Currently stable and had recent ablation   Rate controlled   4.  Essential hypertension  Stable          Francheska Nicholas  9/29/2020 7:05 PM

## 2020-09-30 RX ORDER — OXYCODONE HYDROCHLORIDE AND ACETAMINOPHEN 5; 325 MG/1; MG/1
1 TABLET ORAL EVERY 8 HOURS PRN
Qty: 30 TABLET | Refills: 0 | Status: ON HOLD | OUTPATIENT
Start: 2020-09-30 | End: 2020-10-13 | Stop reason: HOSPADM

## 2020-10-05 RX ORDER — DIGOXIN 125 MCG
125 TABLET ORAL DAILY
Qty: 30 TABLET | Refills: 3 | Status: SHIPPED | OUTPATIENT
Start: 2020-10-05 | End: 2020-12-28 | Stop reason: SDUPTHER

## 2020-10-05 NOTE — TELEPHONE ENCOUNTER
Spoke to the RN for Dr. Radha Petersen us she needs a cardiac clearance for this pt. There is a letter dated 9/10/20, giving the OK. If Dr. Jennifer Uriostegui is ok with it, so is she. They will do an EKG in their office. He is not having any issues, just the pain in his lower back.

## 2020-10-05 NOTE — TELEPHONE ENCOUNTER
Let adan Know that I believe he will need the clearance from cardio too due to extensive cardio history. Hence I did not do Ekg during his office visit. But however he dose not have symptoms. Please ask if they are going to see him for pre op clearance?      If they are not seeing please call him to office tomorrow just for nurse visit for EKG

## 2020-10-05 NOTE — TELEPHONE ENCOUNTER
Patient was cleared originally on September 10th 2020, checking with Dr Tello Yeung to see if patient needs another appointmetn with cardio. If patietn is not seen again by cardio, please schedule for nurse visit for EKG only.

## 2020-10-05 NOTE — TELEPHONE ENCOUNTER
Per [facility], patient is still a moderate risk for procedure.  A new clearance is not needed

## 2020-10-06 ENCOUNTER — TELEPHONE (OUTPATIENT)
Dept: CARDIOLOGY CLINIC | Age: 70
End: 2020-10-06

## 2020-10-06 ENCOUNTER — TELEPHONE (OUTPATIENT)
Dept: FAMILY MEDICINE CLINIC | Age: 70
End: 2020-10-06

## 2020-10-06 ENCOUNTER — NURSE ONLY (OUTPATIENT)
Dept: FAMILY MEDICINE CLINIC | Age: 70
End: 2020-10-06
Payer: COMMERCIAL

## 2020-10-06 PROCEDURE — 93000 ELECTROCARDIOGRAM COMPLETE: CPT | Performed by: FAMILY MEDICINE

## 2020-10-06 RX ORDER — DILTIAZEM HYDROCHLORIDE 60 MG/1
60 CAPSULE, EXTENDED RELEASE ORAL 3 TIMES DAILY
Qty: 90 CAPSULE | Refills: 5 | Status: SHIPPED | OUTPATIENT
Start: 2020-10-06 | End: 2021-04-13

## 2020-10-06 NOTE — TELEPHONE ENCOUNTER
No bradycardic episodes on loop recorder  Increased diltiazem to  60mg TID (patient aware)   Okay for surgery per cardiology

## 2020-10-06 NOTE — TELEPHONE ENCOUNTER
PCP office called-they saw the pt today, for an EKG. (in Epic) this was part of his surgical clearance. Pt reports two episodes of bradycardia, when he was in bed, after he turned over. No other symptoms. It lasted about three minutes  Asking if Dr. Yobany Ulloa wants to see him again Surgery is scheduled for 10/13.

## 2020-10-06 NOTE — TELEPHONE ENCOUNTER
Spoke to Chaim Hanson at Dr Emy Champagne office - Patient had 2 recent cardiac episodes but EKG from today looks fine. Awaiting to see if Dr Erik Matson needs further workup.

## 2020-10-06 NOTE — TELEPHONE ENCOUNTER
Dayanara Leyva and ROWENA both reviewed patient's loop recorder readings. They are ok with patient proceeding with procedure at a moderate risk. Dayanara Leyva would like patient to increase diltiazem to 60 mg three times a day and monitor BP while taking the increased dose. I relayed this information to the patient and he expressed understanding.  New prescription sent to [facility].

## 2020-10-07 ENCOUNTER — OFFICE VISIT (OUTPATIENT)
Dept: PRIMARY CARE CLINIC | Age: 70
End: 2020-10-07
Payer: COMMERCIAL

## 2020-10-07 PROCEDURE — 99211 OFF/OP EST MAY X REQ PHY/QHP: CPT | Performed by: NURSE PRACTITIONER

## 2020-10-07 NOTE — PATIENT INSTRUCTIONS

## 2020-10-07 NOTE — PROGRESS NOTES
Louisa Chance received a viral test for COVID-19. They were educated on isolation and quarantine as appropriate. For any symptoms, they were directed to seek care from their PCP, given contact information to establish with a doctor, directed to an urgent care or the emergency room.

## 2020-10-08 LAB — SARS-COV-2, NAA: NOT DETECTED

## 2020-10-09 NOTE — RESULT ENCOUNTER NOTE
Your Covid-10 teste resulted not detected/negative. What happens if I have a negative test?    Remember to wash your hands often, avoid touching your face, stay 6 feet from people you do not live with, and wear a cloth facemask when you go out in public. A negative COVID-19 test at one point in time does not mean you will stay negative. You could become ill with COVID-19 and/or test positive at any time. If you are a close contact of a confirmed or suspected case, continue to stay home and away from others until 14 days after your last exposure. If you do not have symptoms, and were not in close contact with a confirmed or suspected case, you can stop isolating. If you currently have symptoms of COVID-19, and were not in close contact with a confirmed or suspected case, you should keep monitoring symptoms and talk to your doctor or other healthcare provider about staying home and if you need to get tested again. If you develop symptoms of COVID-19, stay at home and away from others and talk to your doctor or other healthcare provider about getting tested again. For additional information, visit coronavirus. [place].gov. For answers to your COVID-19 questions, call 3-142-7-ASK-[facility] ([phone]).

## 2020-10-13 ENCOUNTER — HOSPITAL ENCOUNTER (OUTPATIENT)
Age: 70
Setting detail: OUTPATIENT SURGERY
Discharge: HOME | End: 2020-10-13
Attending: NEUROLOGICAL SURGERY | Admitting: NEUROLOGICAL SURGERY
Payer: COMMERCIAL

## 2020-10-13 ENCOUNTER — ANESTHESIA (OUTPATIENT)
Dept: OPERATING ROOM | Age: 70
End: 2020-10-13
Payer: COMMERCIAL

## 2020-10-13 ENCOUNTER — ANESTHESIA EVENT (OUTPATIENT)
Dept: OPERATING ROOM | Age: 70
End: 2020-10-13
Payer: COMMERCIAL

## 2020-10-13 ENCOUNTER — APPOINTMENT (OUTPATIENT)
Dept: GENERAL RADIOLOGY | Age: 70
End: 2020-10-13
Attending: NEUROLOGICAL SURGERY
Payer: COMMERCIAL

## 2020-10-13 ENCOUNTER — ANTI-COAG VISIT (OUTPATIENT)
Dept: PHARMACY | Age: 70
End: 2020-10-13

## 2020-10-13 VITALS
RESPIRATION RATE: 13 BRPM | SYSTOLIC BLOOD PRESSURE: 102 MMHG | OXYGEN SATURATION: 100 % | DIASTOLIC BLOOD PRESSURE: 73 MMHG | TEMPERATURE: 95.2 F

## 2020-10-13 VITALS
TEMPERATURE: 96.9 F | SYSTOLIC BLOOD PRESSURE: 124 MMHG | RESPIRATION RATE: 15 BRPM | HEART RATE: 73 BPM | HEIGHT: 71 IN | OXYGEN SATURATION: 94 % | BODY MASS INDEX: 21.61 KG/M2 | DIASTOLIC BLOOD PRESSURE: 79 MMHG | WEIGHT: 154.38 LBS

## 2020-10-13 PROCEDURE — 72020 X-RAY EXAM OF SPINE 1 VIEW: CPT

## 2020-10-13 PROCEDURE — 3700000001 HC ADD 15 MINUTES (ANESTHESIA): Performed by: NEUROLOGICAL SURGERY

## 2020-10-13 PROCEDURE — 2580000003 HC RX 258: Performed by: NEUROLOGICAL SURGERY

## 2020-10-13 PROCEDURE — 2720000010 HC SURG SUPPLY STERILE: Performed by: NEUROLOGICAL SURGERY

## 2020-10-13 PROCEDURE — 6370000000 HC RX 637 (ALT 250 FOR IP): Performed by: ANESTHESIOLOGY

## 2020-10-13 PROCEDURE — 3600000004 HC SURGERY LEVEL 4 BASE: Performed by: NEUROLOGICAL SURGERY

## 2020-10-13 PROCEDURE — 2500000003 HC RX 250 WO HCPCS: Performed by: NURSE ANESTHETIST, CERTIFIED REGISTERED

## 2020-10-13 PROCEDURE — 3600000014 HC SURGERY LEVEL 4 ADDTL 15MIN: Performed by: NEUROLOGICAL SURGERY

## 2020-10-13 PROCEDURE — 7100000010 HC PHASE II RECOVERY - FIRST 15 MIN: Performed by: NEUROLOGICAL SURGERY

## 2020-10-13 PROCEDURE — 6360000002 HC RX W HCPCS: Performed by: ANESTHESIOLOGY

## 2020-10-13 PROCEDURE — 6360000002 HC RX W HCPCS: Performed by: NEUROLOGICAL SURGERY

## 2020-10-13 PROCEDURE — 6360000002 HC RX W HCPCS: Performed by: NURSE ANESTHETIST, CERTIFIED REGISTERED

## 2020-10-13 PROCEDURE — 7100000000 HC PACU RECOVERY - FIRST 15 MIN: Performed by: NEUROLOGICAL SURGERY

## 2020-10-13 PROCEDURE — 2709999900 HC NON-CHARGEABLE SUPPLY: Performed by: NEUROLOGICAL SURGERY

## 2020-10-13 PROCEDURE — 7100000001 HC PACU RECOVERY - ADDTL 15 MIN: Performed by: NEUROLOGICAL SURGERY

## 2020-10-13 PROCEDURE — 2500000003 HC RX 250 WO HCPCS: Performed by: NEUROLOGICAL SURGERY

## 2020-10-13 PROCEDURE — 3700000000 HC ANESTHESIA ATTENDED CARE: Performed by: NEUROLOGICAL SURGERY

## 2020-10-13 PROCEDURE — 7100000011 HC PHASE II RECOVERY - ADDTL 15 MIN: Performed by: NEUROLOGICAL SURGERY

## 2020-10-13 RX ORDER — FENTANYL CITRATE 50 UG/ML
25 INJECTION, SOLUTION INTRAMUSCULAR; INTRAVENOUS EVERY 5 MIN PRN
Status: DISCONTINUED | OUTPATIENT
Start: 2020-10-13 | End: 2020-10-13 | Stop reason: HOSPADM

## 2020-10-13 RX ORDER — EPHEDRINE SULFATE 50 MG/ML
INJECTION INTRAVENOUS PRN
Status: DISCONTINUED | OUTPATIENT
Start: 2020-10-13 | End: 2020-10-13 | Stop reason: SDUPTHER

## 2020-10-13 RX ORDER — FENTANYL CITRATE 50 UG/ML
INJECTION, SOLUTION INTRAMUSCULAR; INTRAVENOUS PRN
Status: DISCONTINUED | OUTPATIENT
Start: 2020-10-13 | End: 2020-10-13 | Stop reason: SDUPTHER

## 2020-10-13 RX ORDER — MAGNESIUM HYDROXIDE 1200 MG/15ML
LIQUID ORAL CONTINUOUS PRN
Status: COMPLETED | OUTPATIENT
Start: 2020-10-13 | End: 2020-10-13

## 2020-10-13 RX ORDER — ROCURONIUM BROMIDE 10 MG/ML
INJECTION, SOLUTION INTRAVENOUS PRN
Status: DISCONTINUED | OUTPATIENT
Start: 2020-10-13 | End: 2020-10-13 | Stop reason: SDUPTHER

## 2020-10-13 RX ORDER — OXYCODONE HYDROCHLORIDE 5 MG/1
5 TABLET ORAL ONCE
Status: COMPLETED | OUTPATIENT
Start: 2020-10-13 | End: 2020-10-13

## 2020-10-13 RX ORDER — SODIUM CHLORIDE 9 MG/ML
INJECTION, SOLUTION INTRAVENOUS CONTINUOUS
Status: DISCONTINUED | OUTPATIENT
Start: 2020-10-13 | End: 2020-10-13 | Stop reason: HOSPADM

## 2020-10-13 RX ORDER — BUPIVACAINE HYDROCHLORIDE 5 MG/ML
INJECTION, SOLUTION EPIDURAL; INTRACAUDAL
Status: COMPLETED | OUTPATIENT
Start: 2020-10-13 | End: 2020-10-13

## 2020-10-13 RX ORDER — FENTANYL CITRATE 50 UG/ML
50 INJECTION, SOLUTION INTRAMUSCULAR; INTRAVENOUS EVERY 5 MIN PRN
Status: DISCONTINUED | OUTPATIENT
Start: 2020-10-13 | End: 2020-10-13 | Stop reason: HOSPADM

## 2020-10-13 RX ORDER — HYDROMORPHONE HCL 110MG/55ML
0.5 PATIENT CONTROLLED ANALGESIA SYRINGE INTRAVENOUS EVERY 5 MIN PRN
Status: DISCONTINUED | OUTPATIENT
Start: 2020-10-13 | End: 2020-10-13 | Stop reason: HOSPADM

## 2020-10-13 RX ORDER — METHYLPREDNISOLONE ACETATE 40 MG/ML
INJECTION, SUSPENSION INTRA-ARTICULAR; INTRALESIONAL; INTRAMUSCULAR; SOFT TISSUE
Status: COMPLETED | OUTPATIENT
Start: 2020-10-13 | End: 2020-10-13

## 2020-10-13 RX ORDER — HYDROMORPHONE HCL 110MG/55ML
0.25 PATIENT CONTROLLED ANALGESIA SYRINGE INTRAVENOUS EVERY 5 MIN PRN
Status: DISCONTINUED | OUTPATIENT
Start: 2020-10-13 | End: 2020-10-13 | Stop reason: HOSPADM

## 2020-10-13 RX ORDER — SODIUM CHLORIDE 0.9 % (FLUSH) 0.9 %
10 SYRINGE (ML) INJECTION EVERY 12 HOURS SCHEDULED
Status: DISCONTINUED | OUTPATIENT
Start: 2020-10-13 | End: 2020-10-13 | Stop reason: HOSPADM

## 2020-10-13 RX ORDER — LIDOCAINE HYDROCHLORIDE 10 MG/ML
1 INJECTION, SOLUTION EPIDURAL; INFILTRATION; INTRACAUDAL; PERINEURAL
Status: DISCONTINUED | OUTPATIENT
Start: 2020-10-13 | End: 2020-10-13 | Stop reason: HOSPADM

## 2020-10-13 RX ORDER — LIDOCAINE HYDROCHLORIDE 20 MG/ML
INJECTION, SOLUTION EPIDURAL; INFILTRATION; INTRACAUDAL; PERINEURAL PRN
Status: DISCONTINUED | OUTPATIENT
Start: 2020-10-13 | End: 2020-10-13 | Stop reason: SDUPTHER

## 2020-10-13 RX ORDER — PROPOFOL 10 MG/ML
INJECTION, EMULSION INTRAVENOUS PRN
Status: DISCONTINUED | OUTPATIENT
Start: 2020-10-13 | End: 2020-10-13 | Stop reason: SDUPTHER

## 2020-10-13 RX ORDER — KETAMINE HYDROCHLORIDE 10 MG/ML
INJECTION, SOLUTION INTRAMUSCULAR; INTRAVENOUS PRN
Status: DISCONTINUED | OUTPATIENT
Start: 2020-10-13 | End: 2020-10-13 | Stop reason: SDUPTHER

## 2020-10-13 RX ORDER — PROMETHAZINE HYDROCHLORIDE 25 MG/ML
6.25 INJECTION, SOLUTION INTRAMUSCULAR; INTRAVENOUS EVERY 30 MIN PRN
Status: DISCONTINUED | OUTPATIENT
Start: 2020-10-13 | End: 2020-10-13 | Stop reason: HOSPADM

## 2020-10-13 RX ORDER — LIDOCAINE HYDROCHLORIDE 10 MG/ML
0.5 INJECTION, SOLUTION EPIDURAL; INFILTRATION; INTRACAUDAL; PERINEURAL ONCE
Status: DISCONTINUED | OUTPATIENT
Start: 2020-10-13 | End: 2020-10-13 | Stop reason: HOSPADM

## 2020-10-13 RX ORDER — SODIUM CHLORIDE 0.9 % (FLUSH) 0.9 %
10 SYRINGE (ML) INJECTION PRN
Status: DISCONTINUED | OUTPATIENT
Start: 2020-10-13 | End: 2020-10-13 | Stop reason: HOSPADM

## 2020-10-13 RX ORDER — OXYCODONE HYDROCHLORIDE 5 MG/1
5 TABLET ORAL EVERY 4 HOURS PRN
Qty: 30 TABLET | Refills: 0 | Status: SHIPPED | OUTPATIENT
Start: 2020-10-13 | End: 2020-10-20

## 2020-10-13 RX ORDER — MIDAZOLAM HYDROCHLORIDE 1 MG/ML
INJECTION INTRAMUSCULAR; INTRAVENOUS PRN
Status: DISCONTINUED | OUTPATIENT
Start: 2020-10-13 | End: 2020-10-13 | Stop reason: SDUPTHER

## 2020-10-13 RX ORDER — MEPERIDINE HYDROCHLORIDE 25 MG/ML
12.5 INJECTION INTRAMUSCULAR; INTRAVENOUS; SUBCUTANEOUS EVERY 5 MIN PRN
Status: DISCONTINUED | OUTPATIENT
Start: 2020-10-13 | End: 2020-10-13 | Stop reason: HOSPADM

## 2020-10-13 RX ORDER — SUCCINYLCHOLINE CHLORIDE 20 MG/ML
INJECTION INTRAMUSCULAR; INTRAVENOUS PRN
Status: DISCONTINUED | OUTPATIENT
Start: 2020-10-13 | End: 2020-10-13 | Stop reason: SDUPTHER

## 2020-10-13 RX ORDER — SODIUM CHLORIDE, SODIUM LACTATE, POTASSIUM CHLORIDE, CALCIUM CHLORIDE 600; 310; 30; 20 MG/100ML; MG/100ML; MG/100ML; MG/100ML
INJECTION, SOLUTION INTRAVENOUS CONTINUOUS
Status: DISCONTINUED | OUTPATIENT
Start: 2020-10-13 | End: 2020-10-13 | Stop reason: HOSPADM

## 2020-10-13 RX ORDER — ONDANSETRON 2 MG/ML
INJECTION INTRAMUSCULAR; INTRAVENOUS PRN
Status: DISCONTINUED | OUTPATIENT
Start: 2020-10-13 | End: 2020-10-13 | Stop reason: SDUPTHER

## 2020-10-13 RX ORDER — DIPHENHYDRAMINE HYDROCHLORIDE 50 MG/ML
12.5 INJECTION INTRAMUSCULAR; INTRAVENOUS
Status: DISCONTINUED | OUTPATIENT
Start: 2020-10-13 | End: 2020-10-13 | Stop reason: HOSPADM

## 2020-10-13 RX ORDER — DEXAMETHASONE SODIUM PHOSPHATE 4 MG/ML
INJECTION, SOLUTION INTRA-ARTICULAR; INTRALESIONAL; INTRAMUSCULAR; INTRAVENOUS; SOFT TISSUE PRN
Status: DISCONTINUED | OUTPATIENT
Start: 2020-10-13 | End: 2020-10-13 | Stop reason: SDUPTHER

## 2020-10-13 RX ADMIN — LIDOCAINE HYDROCHLORIDE 100 MG: 20 INJECTION, SOLUTION EPIDURAL; INFILTRATION; INTRACAUDAL; PERINEURAL at 10:50

## 2020-10-13 RX ADMIN — PHENYLEPHRINE HYDROCHLORIDE 100 MCG: 10 INJECTION INTRAVENOUS at 10:56

## 2020-10-13 RX ADMIN — PHENYLEPHRINE HYDROCHLORIDE 100 MCG: 10 INJECTION INTRAVENOUS at 11:06

## 2020-10-13 RX ADMIN — HYDROMORPHONE HYDROCHLORIDE 0.5 MG: 2 INJECTION, SOLUTION INTRAMUSCULAR; INTRAVENOUS; SUBCUTANEOUS at 13:08

## 2020-10-13 RX ADMIN — HYDROMORPHONE HYDROCHLORIDE 0.5 MG: 2 INJECTION, SOLUTION INTRAMUSCULAR; INTRAVENOUS; SUBCUTANEOUS at 13:34

## 2020-10-13 RX ADMIN — OXYCODONE 5 MG: 5 TABLET ORAL at 14:21

## 2020-10-13 RX ADMIN — SUCCINYLCHOLINE CHLORIDE 160 MG: 20 INJECTION, SOLUTION INTRAMUSCULAR; INTRAVENOUS at 10:51

## 2020-10-13 RX ADMIN — MIDAZOLAM 2 MG: 1 INJECTION INTRAMUSCULAR; INTRAVENOUS at 10:47

## 2020-10-13 RX ADMIN — KETAMINE HYDROCHLORIDE 20 MG: 10 INJECTION, SOLUTION INTRAMUSCULAR; INTRAVENOUS at 11:05

## 2020-10-13 RX ADMIN — EPHEDRINE SULFATE 5 MG: 50 INJECTION, SOLUTION INTRAVENOUS at 11:52

## 2020-10-13 RX ADMIN — KETAMINE HYDROCHLORIDE 10 MG: 10 INJECTION, SOLUTION INTRAMUSCULAR; INTRAVENOUS at 11:44

## 2020-10-13 RX ADMIN — ROCURONIUM BROMIDE 10 MG: 10 INJECTION, SOLUTION INTRAVENOUS at 12:04

## 2020-10-13 RX ADMIN — EPHEDRINE SULFATE 10 MG: 50 INJECTION, SOLUTION INTRAVENOUS at 11:04

## 2020-10-13 RX ADMIN — EPHEDRINE SULFATE 10 MG: 50 INJECTION, SOLUTION INTRAVENOUS at 11:07

## 2020-10-13 RX ADMIN — ROCURONIUM BROMIDE 10 MG: 10 INJECTION, SOLUTION INTRAVENOUS at 11:38

## 2020-10-13 RX ADMIN — ONDANSETRON 4 MG: 2 INJECTION INTRAMUSCULAR; INTRAVENOUS at 12:06

## 2020-10-13 RX ADMIN — PROPOFOL 17 MG: 10 INJECTION, EMULSION INTRAVENOUS at 10:50

## 2020-10-13 RX ADMIN — ROCURONIUM BROMIDE 35 MG: 10 INJECTION, SOLUTION INTRAVENOUS at 11:00

## 2020-10-13 RX ADMIN — PHENYLEPHRINE HYDROCHLORIDE 100 MCG: 10 INJECTION INTRAVENOUS at 11:26

## 2020-10-13 RX ADMIN — PHENYLEPHRINE HYDROCHLORIDE 50 MCG: 10 INJECTION INTRAVENOUS at 12:02

## 2020-10-13 RX ADMIN — DEXAMETHASONE SODIUM PHOSPHATE 10 MG: 4 INJECTION, SOLUTION INTRAMUSCULAR; INTRAVENOUS at 11:04

## 2020-10-13 RX ADMIN — PHENYLEPHRINE HYDROCHLORIDE 100 MCG: 10 INJECTION INTRAVENOUS at 11:34

## 2020-10-13 RX ADMIN — ROCURONIUM BROMIDE 5 MG: 10 INJECTION, SOLUTION INTRAVENOUS at 10:50

## 2020-10-13 RX ADMIN — FENTANYL CITRATE 25 MCG: 50 INJECTION, SOLUTION INTRAMUSCULAR; INTRAVENOUS at 12:32

## 2020-10-13 RX ADMIN — PHENYLEPHRINE HYDROCHLORIDE 50 MCG: 10 INJECTION INTRAVENOUS at 11:54

## 2020-10-13 RX ADMIN — SUGAMMADEX 150 MG: 100 INJECTION, SOLUTION INTRAVENOUS at 12:13

## 2020-10-13 RX ADMIN — FENTANYL CITRATE 50 MCG: 50 INJECTION, SOLUTION INTRAMUSCULAR; INTRAVENOUS at 10:50

## 2020-10-13 RX ADMIN — EPHEDRINE SULFATE 5 MG: 50 INJECTION, SOLUTION INTRAVENOUS at 11:41

## 2020-10-13 RX ADMIN — SODIUM CHLORIDE, POTASSIUM CHLORIDE, SODIUM LACTATE AND CALCIUM CHLORIDE: 600; 310; 30; 20 INJECTION, SOLUTION INTRAVENOUS at 12:17

## 2020-10-13 RX ADMIN — CEFAZOLIN SODIUM 2 G: 10 INJECTION, POWDER, FOR SOLUTION INTRAVENOUS at 10:39

## 2020-10-13 RX ADMIN — SODIUM CHLORIDE, POTASSIUM CHLORIDE, SODIUM LACTATE AND CALCIUM CHLORIDE: 600; 310; 30; 20 INJECTION, SOLUTION INTRAVENOUS at 09:22

## 2020-10-13 ASSESSMENT — PULMONARY FUNCTION TESTS
PIF_VALUE: 16
PIF_VALUE: 15
PIF_VALUE: 16
PIF_VALUE: 16
PIF_VALUE: 15
PIF_VALUE: 16
PIF_VALUE: 15
PIF_VALUE: 16
PIF_VALUE: 15
PIF_VALUE: 15
PIF_VALUE: 16
PIF_VALUE: 15
PIF_VALUE: 16
PIF_VALUE: 15
PIF_VALUE: 16
PIF_VALUE: 1
PIF_VALUE: 16
PIF_VALUE: 0
PIF_VALUE: 17
PIF_VALUE: 16
PIF_VALUE: 15
PIF_VALUE: 15
PIF_VALUE: 16
PIF_VALUE: 16
PIF_VALUE: 15
PIF_VALUE: 16
PIF_VALUE: 15
PIF_VALUE: 17
PIF_VALUE: 17
PIF_VALUE: 16
PIF_VALUE: 15
PIF_VALUE: 1
PIF_VALUE: 15
PIF_VALUE: 16
PIF_VALUE: 14
PIF_VALUE: 16
PIF_VALUE: 16
PIF_VALUE: 15
PIF_VALUE: 16
PIF_VALUE: 15
PIF_VALUE: 7
PIF_VALUE: 15
PIF_VALUE: 16
PIF_VALUE: 15
PIF_VALUE: 16
PIF_VALUE: 15
PIF_VALUE: 16
PIF_VALUE: 16
PIF_VALUE: 5
PIF_VALUE: 16
PIF_VALUE: 15
PIF_VALUE: 15
PIF_VALUE: 20
PIF_VALUE: 16
PIF_VALUE: 16
PIF_VALUE: 17
PIF_VALUE: 16
PIF_VALUE: 17
PIF_VALUE: 16
PIF_VALUE: 15
PIF_VALUE: 16
PIF_VALUE: 17
PIF_VALUE: 16
PIF_VALUE: 2
PIF_VALUE: 16
PIF_VALUE: 16
PIF_VALUE: 15
PIF_VALUE: 16
PIF_VALUE: 15
PIF_VALUE: 16
PIF_VALUE: 15
PIF_VALUE: 16
PIF_VALUE: 15
PIF_VALUE: 16
PIF_VALUE: 5
PIF_VALUE: 16
PIF_VALUE: 15
PIF_VALUE: 0
PIF_VALUE: 16
PIF_VALUE: 1
PIF_VALUE: 16

## 2020-10-13 ASSESSMENT — PAIN SCALES - GENERAL
PAINLEVEL_OUTOF10: 5
PAINLEVEL_OUTOF10: 8
PAINLEVEL_OUTOF10: 7
PAINLEVEL_OUTOF10: 6
PAINLEVEL_OUTOF10: 6
PAINLEVEL_OUTOF10: 8
PAINLEVEL_OUTOF10: 4

## 2020-10-13 ASSESSMENT — PAIN DESCRIPTION - DESCRIPTORS: DESCRIPTORS: OTHER (COMMENT)

## 2020-10-13 ASSESSMENT — PAIN DESCRIPTION - PAIN TYPE
TYPE: SURGICAL PAIN

## 2020-10-13 ASSESSMENT — ENCOUNTER SYMPTOMS: SHORTNESS OF BREATH: 1

## 2020-10-13 ASSESSMENT — PAIN - FUNCTIONAL ASSESSMENT: PAIN_FUNCTIONAL_ASSESSMENT: 0-10

## 2020-10-13 NOTE — PROGRESS NOTES
Pt arrived from phase 1 in stable condition. VSS. Pt states surgical pain is a 6/10. Will administer PO pain med per pt request. Dressing clean dry and intact. Will continue to monitor.

## 2020-10-13 NOTE — PROGRESS NOTES
Pt states pain is tolerable and that he is ready to go home. Pt discharged home in stable condition with daughter. Via wheelchair by this RN. PIV removed, pressure and gauze applied.

## 2020-10-13 NOTE — PROGRESS NOTES
CLINICAL PHARMACY NOTE: MEDS TO [address] Select Patient?: No  Total # of Prescriptions Filled: 1   The following medications were delivered to the patient:  · Oxycodone 5mg  Total # of Interventions Completed: 0  Time Spent (min): 15    Additional Documentation:    Delivered to Patient daughter    [facility]

## 2020-10-13 NOTE — ANESTHESIA PRE PROCEDURE
Department of Anesthesiology  Preprocedure Note       Name:  Frank Mead   Age:  78 y.o.  :  1950                                          MRN:  [id number]         Date:  10/13/2020      Surgeon: Raisa Garcia):  Duane Baldy, MD    Procedure:     Medications prior to admission:   Prior to Admission medications    Medication Sig Start Date End Date Taking? Authorizing Provider   dilTIAZem (CARDIZEM 12 HR) 60 MG extended release capsule Take 1 capsule by mouth 3 times daily 10/6/20   Erick Stevens MD   digoxin [facility]) 125 MCG tablet Take 1 tablet by mouth daily 10/5/20   RudyISIDRA Luther CNP   oxyCODONE-acetaminophen (PERCOCET) 5-325 MG per tablet Take 1 tablet by mouth every 8 hours as needed for Pain for up to 30 days. WEANING HIMSELF OFF 9-25-20 9/30/20 10/30/20  Pretty Griffith MD   levothyroxine (SYNTHROID) 100 MCG tablet TAKE 1 TABLET BY MOUTH EVERY DAY 20   Pretty Griffith MD   GABAPENTIN PO Take 300 mg by mouth three times daily     Historical Provider, MD   pantoprazole (PROTONIX) 40 MG tablet Take 1 tablet by mouth every morning (before breakfast) 7/15/20   ISIDRA Mao CNP   aspirin 81 MG EC tablet Take 81 mg by mouth daily    Historical Provider, MD   [facility] Liver Oil OIL Take 1 tablet by mouth daily     Historical Provider, MD   docusate sodium (COLACE) 100 MG capsule Take 100 mg by mouth 2 times daily. Historical Provider, MD       Current medications:    Current Outpatient Medications   Medication Sig Dispense Refill    dilTIAZem (CARDIZEM 12 HR) 60 MG extended release capsule Take 1 capsule by mouth 3 times daily 90 capsule 5    digoxin (LANOXIN) 125 MCG tablet Take 1 tablet by mouth daily 30 tablet 3    oxyCODONE-acetaminophen (PERCOCET) 5-325 MG per tablet Take 1 tablet by mouth every 8 hours as needed for Pain for up to 30 days.  WEANING HIMSELF OFF 20 30 tablet 0    levothyroxine (SYNTHROID) 100 MCG tablet TAKE 1 TABLET BY MOUTH EVERY DAY 90 tablet 1  GABAPENTIN PO Take 300 mg by mouth three times daily       pantoprazole (PROTONIX) 40 MG tablet Take 1 tablet by mouth every morning (before breakfast) 30 tablet 0    aspirin 81 MG EC tablet Take 81 mg by mouth daily      Cod Liver Oil OIL Take 1 tablet by mouth daily       docusate sodium (COLACE) 100 MG capsule Take 100 mg by mouth 2 times daily. No current facility-administered medications for this visit. 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) C76.0    Chronic pain due to neoplasm G89.3    Refusal of blood transfusions as patient is Restorationism Z53.1    S/P AVR Z95.2    Sensorineural hearing loss (SNHL) of both ears H90.3    Chronic obstructive pulmonary disease (HCC) J44.9    History of tobacco abuse Z87.891    Mixed hyperlipidemia E78.2    Osteoarthritis of cervical spine M47.812    Primary osteoarthritis of both knees M17.0    Metastatic squamous cell carcinoma (HCC) C79.9    Cervical stenosis of spine M48.02    Encounter for loop recorder check Z45.09    Non-occlusive coronary artery disease I25.10    SOB (shortness of breath) on exertion R06.02    Mitral regurgitation I34.0    Tricuspid valve insufficiency I07.1       Past Medical History:        Diagnosis Date    Aortic valve insufficiency     Arthritis     Atrial fibrillation (HCC)     Cancer (HCC) 09/2017    TONGUE head and neck IN REMISSION    Dysphonia     GERD (gastroesophageal reflux disease)     Hearing loss     Heart disease     Medical history reviewed with no changes     Obstructive apnea does not use CPAP    Oropharyngeal dysphagia     Refusal of blood transfusions as patient is Catholic     Thyroid disease        Past Surgical History:        Procedure Laterality Date    ANKLE SURGERY      AORTIC VALVE REPLACEMENT  1/28/15    Dr. Justyna Manzanares 25mm Mosaic Ultra porcine valve; right and left modified maze procedure with ligation of DELGADO with Atriclip    ATRIAL ABLATION SURGERY      BACK SURGERY      CSP    CARDIAC CATHETERIZATION      HERNIA REPAIR      KNEE SURGERY      Arthroscopy    LUNG SURGERY      collapsed lung due to broken ribs    MANDIBLE FRACTURE SURGERY      NECK SURGERY      Fusion    PAIN MANAGEMENT PROCEDURE Right 2020    RIGHT L4 AND L5 TRANSFORAMINAL EPIDURAL STEROID INJECTION WITH FLUOROSCOPY ([id number]) performed by Silverio Lopez MD at [address] Right 2020    RIGHT L4 AND L5 TRANSFORAMINAL EPIDURAL STEROID INJECTION WITH FLUOROSCOPY ([id number]) performed by Silverio Lopez MD at [address]         Social History:    Social History     Tobacco Use    Smoking status: Former Smoker     Packs/day: 1.00     Years: 40.00     Pack years: 40.00     Types: Cigarettes     Start date: 1975     Last attempt to quit: 2015     Years since quittin.7    Smokeless tobacco: Never Used    Tobacco comment: Maintain cessation   Substance Use Topics    Alcohol use: No     Alcohol/week: 0.0 standard drinks     Comment: NA beer                                Counseling given: Not Answered  Comment: Maintain cessation      Vital Signs (Current): There were no vitals filed for this visit.                                            BP Readings from Last 3 Encounters:   20 102/68   20 94/64   20 99/68       NPO Status:                                                                                 BMI:   Wt Readings from Last 3 dentures  Comment: Discussed risks of leaving dentures in with patient. He states they are glued in and not coming out; wishes to proceed despite risks of damage or loss of dentures. Pulmonary: breath sounds clear to auscultation  (+) COPD (denies inhaler or oxygen use):  shortness of breath:  sleep apnea: on noncompliant,                             Cardiovascular:  Exercise tolerance: good (>4 METS),   (+) hypertension:, valvular problems/murmurs (AVR porcine 2015):, CAD: non-obstructive, dysrhythmias (AF with RVR): atrial fibrillation,         Rhythm: regular  Rate: normal                 ROS comment: Conclusions      Summary   -Normal left ventricle size, wall thickness, and systolic function with an   estimated ejection fraction of 60-65%. -No regional wall motion abnormalities are seen.   -Indeterminate diastolic function. -E/e'=7   -Moderately severe mitral regurgitation   -The bioprosthetic artificial aortic valve appears well seated   -There is moderate-to-severe tricuspid regurgitation with a RVSP estimation   of 30mmHg. Implanted monitor     Neuro/Psych:   (+) TIA,              ROS comment: Cervical fusion GI/Hepatic/Renal:   (+) GERD: well controlled,           Endo/Other:    (+) hypothyroidism, blood dyscrasia: anticoagulation therapy, arthritis:., malignancy/cancer (head and neck, s/p radiation). Abdominal:           Vascular:                                        Anesthesia Plan      general     ASA 3     (Flo chaidez. Refuses blood or blood products)  Induction: intravenous and rapid sequence. MIPS: Postoperative opioids intended, Prophylactic antiemetics administered and Postoperative trial extubation. Anesthetic plan and risks discussed with patient. Use of blood products discussed with whom did not consent to blood products. Special considerations: Rastafarian.  Blood Products Consent Comment: Papers he brought photocopied to chart as well as our blood product refusal paper signed and attached to paper chart  Plan discussed with CRNA.                 Justin Kelly MD   10/13/2020

## 2020-10-13 NOTE — OP NOTE
[facility] OR  10/13/2020 12:18 PM    PATIENT NAME:         Louisa Chance  YOB: 1950   MEDICAL RECORD#         [id number]  SURGERY DATE:         10/13/2020  SURGEON:                 Sharmila Croft                                                      OPERATIVE REPORT     PREOPERATIVE DIAGNOSIS: herniated lumbar disc L4-5 on the right            POSTOPERATIVE DIAGNOSIS:    same    PROCEDURES PERFORMED:  1. Right L4-5 hemilaminotomy and diskectomy  2. Microdissection using the operating room microscope. ASSISTANT: Abbi BAGLEY     ANESTHESIA:  General    ESTIMATED BLOOD LOSS:  50  cc    INDICATIONS FOR SURGERY:   Louisa Chance is a 78 y.o. male with back and leg pain. The symptoms failed to respond to conservative intervention. An MRI scan was performed and this showed evidence of a disk herniation at the L4-5 level on the right. Having failed conservative management and experiencing persistent symptoms, the patient elected to proceed ahead with the surgical option of microdiskectomy at L4-5. DETAILS OF PROCEDURE:  The patient was brought to the operating room and placed under general anesthesia. The patient was then placed prone on a Rhett frame. All bony prominences were inspected and padded prior to sterile draping. Using a #15 blade knife, the skin was incised in the midline and monopolar cautery was used to dissect through the subcutaneous tissue to open the fascia and reflect the paraspinal muscles laterally exposing the L4-5 interspace on the right side. The microscope was then brought into the field and used to assist with performing a microsurgical diskectomy at this level. Using the Midas Pablo drill, I burred down the hemilamina of  L4 and a more significant portion of the inferior L5 lamina. I exposed beyond the pedicle of L4 where the disk herniation was located.   The underlying ligamentum flavum was freed with the micronerve hook from the underlying dura and removed with the 2 mm punch laterally, exposing the lateral dural tube and takeoff of the L5 nerve root. The L4 nerve root was noted to be dorsally displaced in the foramina. I gently retracted the nerve root medially and found a subligamentous disk herniation directly on the takeoff of the nerve roots. An incision was made in the ligamentous tissue and the fragment immediately presented itself for removal.  The pituitary forceps were used to further explore the interspace and additional loose fragments were removed. The wound was copiously irrigated with antibiotic solution. Duramorph paste was placed in the epidural space and 0.5% Marcaine in subcutaneous tissues for analgesia. The fascia was then reapproximated using interrupted 0 Vicryl sutures and interrupted 3-0 Vicryl sutures were used to reapproximate the subcuticular layer. A sterile dressing was then applied. The patient was extubated in the operating room and transferred to the recovery room in stable condition. There were no complications. In accordance with CMS guidelines, I attest that I was present for the entire procedure from the creation of the skin incision to the closure.       Severiano Speak, MD

## 2020-10-13 NOTE — PROGRESS NOTES
Patient admitted to PACU via stretcher, arouses to stimuli, moves ext to command. Equal and strong hand grasp and pedal pushes. Respirations adeq on 6L o2 per simple mask spo2 100%. Skin warm and dry with good color. abd soft. Back drsg dry and intact. Will continue to monitor.

## 2020-10-13 NOTE — BRIEF OP NOTE
Brief Postoperative Note      Patient: José Miguel Carson  YOB: 1950  MRN: [id number]    Date of Procedure: 10/13/2020    Pre-Op Diagnosis: M51.6  HERNIATED LUMBAR DISC WITH RADICULOPATHY    Post-Op Diagnosis: Same       Procedure(s):  RIGHT LUMBAR4-LUMBAR5 MICRO HEMILAMINECTOMY AND DISCECTOMY (24224, [id number])    Surgeon(s):  Namrata Alonso MD    Assistant:  First Assistant: Lobo Dao    Anesthesia: General    Estimated Blood Loss (mL): less than 085     Complications: None    Specimens:   * No specimens in log *    Implants:  * No implants in log *      Drains:   [REMOVED] Urethral Catheter Non-latex;Straight-tip 16 fr (Removed)       Findings: L45 HNP.      Electronically signed by Namrata Alonso MD on 10/13/2020 at 12:14 PM

## 2020-10-13 NOTE — ANESTHESIA POSTPROCEDURE EVALUATION
Department of Anesthesiology  Postprocedure Note    Patient: Stephany Doyle  MRN: [id number]  YOB: 1950  Date of evaluation: 10/13/2020  Time:  1:35 PM     Procedure Summary     Date:  10/13/20 Room / Location:  [address]    Anesthesia Start:  4866 Anesthesia Stop:  7366    Procedure:  RIGHT LUMBAR4-LUMBAR5 MICRO HEMILAMINECTOMY AND DISCECTOMY (66565, 76129) (Right ) Diagnosis:  (M51.6  HERNIATED LUMBAR DISC WITH RADICULOPATHY)    Surgeon:  Denny Maradiaga MD Responsible Provider:  Carlito Patel MD    Anesthesia Type:  general ASA Status:  3          Anesthesia Type: general    Darrell Phase I: Darrell Score: 4    Darrell Phase II:      Last vitals: Reviewed and per EMR flowsheets.        Anesthesia Post Evaluation    Patient location during evaluation: PACU  Patient participation: complete - patient participated  Level of consciousness: awake  Airway patency: patent  Nausea & Vomiting: no nausea and no vomiting  Complications: no  Cardiovascular status: blood pressure returned to baseline  Respiratory status: acceptable  Hydration status: euvolemic

## 2020-10-13 NOTE — PROGRESS NOTES
Date of Surgery Update:  Alma Rosa Quezada was seen, history and physical examination reviewed, and patient examined by me today. There have been no significant clinical changes since the completion of the previous history and physical.    The risk, benefits, and alternatives of the proposed procedure have been explained to the patient (or appropriate guardian) and understanding verbalized. All questions answered. Patient wishes to proceed.     Electronically signed by: Melonie Darnell MD,10/13/2020,10:26 AM

## 2020-10-27 NOTE — PROGRESS NOTES
[address]   Electrophysiology  ISIDRA Denney-CNP  Attending EP: Dr. Pricila Goddard    Date: 11/24/2020  I had the privilege of visiting José Miguel Carson in the office. Chief Complaint:   Chief Complaint   Patient presents with    Follow-up     History of Present Illness: History obtained from patient and medical record. José Miguel Carson is 78 y.o. male with a past medical history of atrial fibrillation, non-obstructive CAD, CHRISTOPHER, aortic valve insufficiency, and squamous cell cancer. In 2015, he underwent AVR with porcine valve, right/left modified maze procedure with DELGADO ligation with Atriclip (1/28/15, Dr. Wilkie Primrose). He was on coumadin, but this was stopped due to bleeding from his mouth and PEG tube. In September of 2018, a 2 week monitor showed brief SVT and WCT, but no AF. S/p ILR implant (4/22/19). He developed recurrent symptomatic AF. S/p RFCA with re-isolation of PV, roof line, complex atrial fractionated electogram, inferior-posterior line with PWI, CTI atrial flutter (7/15/20, Dr. Pricila Goddard)    -Interval history: Today, José Miguel Carson is being seen for follow up. He is currently in sinus rhythm with stable BP, 110/70. His loop interrogation shows brief episodes of PAT. One episode of AF on October 15th for 2 hours. He had symptoms last night that occurred with PVCs. He took an extra cardizem and they went away. He has an occasional left arm pain/numbness every few weeks \"out of nowhere\" that will last for a few minutes and then resolve on its own. No chest pain, dizziness, or SOB. He had a negative GXT a few months ago. He had back surgery a month ago and is recovering from that. He is completing therapy and acupuncture. His leg and back pain are much improved, but still somewhat bothersome. 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      Denies having chest pain, palpitations, shortness of breath, orthopnea/PND, cough, or dizziness at the time of this visit. With regard to medication therapy the patient has been compliant with prescribed regimen. They have tolerated therapy to date. Allergies: Allergies   Allergen Reactions    Naproxen Swelling     Lips    Pt does not recognize this being an intolerance    Hydrocodone-Acetaminophen Itching     Home Medications:  Prior to Visit Medications    Medication Sig Taking? Authorizing Provider   methocarbamol (ROBAXIN) 500 MG tablet TAKE 1 TABLET BY MOUTH FOUR TIMES A DAY AS NEEDED Yes Historical Provider, MD   aspirin (ASPIRIN LOW DOSE) 81 MG EC tablet ASPIRIN EC LOW DOSE 81 MG TBEC Yes Historical Provider, MD   oxyCODONE (ROXICODONE) 5 MG immediate release tablet  Yes Historical Provider, MD   oxyCODONE-acetaminophen (PERCOCET) 5-325 MG per tablet Take 1 tablet by mouth 2 times daily as needed for Pain for up to 30 days. Intended supply: 3 days. Take lowest dose possible to manage pain Yes Latricia Oscar MD   fluticasone (FLOVENT HFA) 110 MCG/ACT inhaler Inhale 1 puff into the lungs 2 times daily Yes Latricia Oscar MD   naloxone 4 MG/0.1ML LIQD nasal spray 1 spray by Nasal route as needed for Opioid Reversal Yes Latricia Oscar MD   dilTIAZem (CARDIZEM 12 HR) 60 MG extended release capsule Take 1 capsule by mouth 3 times daily Yes Rachid King MD   digoxin (LANOXIN) 125 MCG tablet Take 1 tablet by mouth daily Yes ISIDRA Alcazar CNP   levothyroxine (SYNTHROID) 100 MCG tablet TAKE 1 TABLET BY MOUTH EVERY DAY Yes Latricia Oscar MD   GABAPENTIN PO Take 300 mg by mouth three times daily  Yes Historical Provider, MD   pantoprazole (PROTONIX) 40 MG tablet Take 1 tablet by mouth every morning (before breakfast) Yes ISIDRA Alcazar CNP   Cod Liver Oil OIL Take 1 tablet by mouth daily  Yes Historical Provider, MD   docusate sodium (COLACE) 100 MG capsule Take 100 mg by mouth 2 times daily.  Yes Historical Provider, MD      Past Medical History:  Past Medical History:   Diagnosis Date    Aortic valve insufficiency     Arthritis     Atrial fibrillation (HCC)     Cancer (HCC) 09/2017    TONGUE head and neck IN REMISSION    Dysphonia     GERD (gastroesophageal reflux disease)     Hearing loss     Heart disease     Medical history reviewed with no changes     Obstructive apnea does not use CPAP    Oropharyngeal dysphagia     Refusal of blood transfusions as patient is Christian     Thyroid disease      Past Surgical History:    has a past surgical history that includes Lung surgery; knee surgery; Neck surgery; hernia repair; Aortic valve replacement (1/28/15); Cardiac catheterization; Tonsillectomy and adenoidectomy; Mandible fracture surgery; Ankle surgery; back surgery; Pain management procedure (Right, 6/22/2020); Pain management procedure (Right, 7/8/2020); Atrial ablation surgery; and Lumbar spine surgery (Right, 10/13/2020). Social History:  Reviewed. reports that he quit smoking about 5 years ago. His smoking use included cigarettes. He started smoking about 45 years ago. He has a 40.00 pack-year smoking history. He has never used smokeless tobacco. He reports that he does not drink alcohol or use drugs. Family History:  Reviewed. family history includes Heart Disease in an other family member.      Review of System:  · Constitutional: Negative for fever, night sweats, chills, weight changes, or weakness  · Skin: Negative for rash, dry skin, pruritus, bruising, bleeding, blood clots, or changes in skin pigment  · HEENT: Negative for vision changes, ringing in the ears, sore throat, dysphagia, or swollen lymph nodes  · Respiratory: Reviewed in HPI  · Cardiovascular: Reviewed in HPI  · Gastrointestinal: Negative for abdominal pain, N/V/D, constipation, or black/tarry stools  · Genito-Urinary: Negative for dysuria, incontinence, urgency, or hematuria  · Musculoskeletal: Negative for joint swelling, muscle pain, or injuries  · Neurological/Psych: Negative for confusion, seizures, dizziness, headaches, balance issues or TIA-like symptoms. No anxiety, depression, or insomnia    Physical Examination:  Vitals:    11/24/20 1032   BP: 110/70   Pulse: 80   SpO2: 97%      Wt Readings from Last 3 Encounters:   11/24/20 154 lb (69.9 kg)   11/16/20 151 lb (68.5 kg)   10/13/20 154 lb 6 oz (70 kg)     Constitutional: Cooperative and in no apparent distress, and appears well nourished  Skin: Warm and pink; no pallor, cyanosis, bruising, or clubbing  HEENT: Symmetric and normocephalic. PERRL, EOM intact. Conjunctiva pink with clear sclera. Mucus membranes pink and moist. Teeth intact. Thyroid smooth without nodules or goiter  Respiratory: Respirations symmetric and unlabored. Lungs clear to auscultation bilaterally, no wheezing, rhonchi, or crackles  Cardiovascular:  Regular rate and rhythm. S1/S2 present without murmurs, rubs, or gallops. Peripheral pulses 2+, capillary refill < 3 seconds. No elevation of JVP. No peripheral edema  Gastrointestinal: Abdomen soft and round. Bowel sounds normoactive in all quadrants without tenderness or masses. Musculoskeletal: Bilateral upper and lower extremity strength 5/5 with full ROM. + Uses cane  Neurological/Psych: Awake and orientated to person, place and time. Calm affect, appropriate mood. Pertinent labs, diagnostic, device, and imaging results reviewed as a part of this visit    LABS    CBC:   Lab Results   Component Value Date    WBC 4.5 09/29/2020    HGB 13.6 09/29/2020    HCT 40.9 09/29/2020    MCV 96.0 09/29/2020     09/29/2020     BMP:   Lab Results   Component Value Date    CREATININE 0.5 (L) 09/29/2020    BUN 10 09/29/2020     09/29/2020    K 4.2 09/29/2020     09/29/2020    CO2 27 09/29/2020     Estimated Creatinine Clearance: 140 mL/min (A) (based on SCr of 0.5 mg/dL (L)).      Thyroid:   Lab Results   Component Value Date    TSH 2.735 05/21/2020    M6PABZD 2.12 05/21/2020     Lipid Panel:   Lab Results   Component Value Date    CHOL 225 2018    HDL 69 2018    HDL 52 2009    TRIG 80 2018     LFTs:  Lab Results   Component Value Date    ALT 15 07/15/2020    AST 14 (L) 07/15/2020    ALKPHOS 43 07/15/2020    BILITOT 0.7 07/15/2020     Coags:   Lab Results   Component Value Date    PROTIME 11.4 2020    INR 0.98 2020    APTT 29.1 2020       EC2020  - NSR, rate 77, QTc 373    Echo:   Normal left ventricle size, wall thickness and systolic function with an estimated ejection fraction of 60%. No regional wall motion abnormalities are seen. E/e\"= 10. Moderate to severe mitral regurgitation. A bioprosthetic aortic valve appears well seated with a maximum gradient of   17 mmHg and a mean gradient of 11 mmHg. Aortic valve area of 1.97cm. No evidence of aortic valve regurgitation. The aortic root is mildly dilated. 3.7cm   The ascending aorta is mildly dilated. 4.2cm   The right ventricle is mildly enlarged but normal in function. Moderate to severe tricuspid regurgitation. PASP 34mmHg. The right atrium is mildly dilated. IVC size is normal (<2.1 cm) but collapses < 50% with respiration consistent   with elevated RA pressure (8 mmHg). Limited Echo:    -Limited echocardiogram was performed to rule out pericardial effusion,   status post cardiac ablation today.   -Normal left ventricle size, wall thickness and systolic function with an   estimated ejection fraction of 55%.   -No regional wall motion abnormalities are seen.   -Abnormal (paradoxical) septal motion noted.   -No evidence of any pericardial effusion. Echo:    -Normal left ventricle size, wall thickness, and systolic function with an   estimated ejection fraction of 60-65%. -No regional wall motion abnormalities are seen.   -Indeterminate diastolic function.    -E/e'=7   -Moderately severe mitral regurgitation   -The bioprosthetic artificial aortic valve appears well seated   -There is moderate-to-severe tricuspid regurgitation with a RVSP estimation of 30mmHg. GXT: 8/20  -There is a small fixed inferior apical defect that is likely bowel artifact     rather than scar.     -LV function is normal with no regional abnormalities and EF=64%.  -Low risk study.           Cath: 2015  Non-obstructive CAD    MCOT: 9/18                            1st degree HB, HT 68                                            PVC burden <1%             PAC burden 1%                                     VT 11 bts at 118       Cardiac CT: 6/20  No stenosis at the pulmonary vein origins.         Clip is seen in the left atrial appendage,.  No thrombus seen in this region.         Mildly dilated aorta. .  Post valve replacement changes are noted              Assessment:    1. Paroxysmal Atrial Fibrillation  - S/p MAZE and DELGADO ligation with Atriclip (2015)  - S/p RFCA with re-isolation of PV, roof line, complex atrial fractionated electogram, inferior-posterior line with PWI, CTI atrial flutter (7/15/20, Dr. Antelmo Grayson)    - Currently in NSR  - Continue cardizem 60 mg BID (May take extra dose if symptomatic), digoxin 125 mcg QD    - [id number] score: 2 (Age, CAD) ; [id number] Vasc score and anticoagulation discussed. High risk for stroke and thromboembolism. Anticoagulation is recommended. Risk of bleeding was discussed. ~ Off oral AC due to successful DELGADO ligation verified by [facility]    - Afib risk factors including age, HTN, obesity, inactivity and CHRISTOPHER were discussed with patient. Risk factor modification recommended   ~ TSH 3.49 (11/19)       - Treatment options including cardioversion, rate control strategy, antiarrhythmics, anticoagulation and possible ablation were discussed with patient. Risks, benefits and alternative of each treatment options were explained. All questions answered    2. Implantable Loop Recorder  - S/p ILR insertion on 4/19  -The CIED was interrogated and programmed and I supervised and reviewed all the data.  All findings and changes are in device interrogation sheat and reflect my personal interpretation and changes and is scanned to Epic  - Device shows: NSR. Episodes of PAT. Symptoms with PVC  - Follow up with device clinic as scheduled    3. Non-sustained atrial tachycardia  - Detected on loop  - Stable; asymptomatic  - Continue CCB    4. PVCs   - Stable    ~ None on EKG today   - Mild symptomatic (Palpitations, SOB)   - Continue CCB    5. Aortic Insufficiency    - S/p AVR porcine valve right/left modified maze procedure with DELGADO ligation with Atriclip (1/28/15, Dr. Cheryl Sarabia)   - Stable    6. CAD  - Non obstructive per cath (2015)   ~ Negative GXT (2020)  - Stable  - No complaints of angina, occasional left arm pain at rest  - Continue ASA  - May need to consider adding statin   ~ No BB/ACE due to soft BP  - Followed by Dr. Erik Matson    7. Mitral Regurgitation   - Moderate to severe per echo (9/20)   - Followed by Dr. Erik Matson    8. CHRISTOPHER  - Stable: Uses CPAP  - Encourage to use CPAP to prevent long term effects of untreated CHRISTOPHER    Plan:  1. Continue current medications  2. Continue therapy for back  3. Call office if any problems    F/U: Follow-up with Dr. Erik Matson in 3 months and Dr. Monica Limon in 6 months  -Follow up with device clinic as scheduled  -Call [facility] at [phone] with any questions    Diet & Exercise:   The patient is counseled to follow a low salt diet to assure blood pressure remains controlled for cardiovascular risk factor modification   The patient is counseled to avoid excess caffeine, and energy drinks as this may exacerbated ectopy and arrhythmia   The patient is counseled to lose weight to control cardiovascular risk factors   Exercise program discussed: To improve overall cardiovascular health, the patient is instructed to increase cardiovascular related activities with a goal of 150 min/week of moderate level activity or 10,000 steps per day.  Encouraged to perform as much activity as tolerated    Quality Metrics  1. Tobacco Cessation Counseling: N/A  2. Retake of BP if >140/90: N/A  3. Documentation to PCP: Note sent to PCP office visit  4. CAD patient on anti-platelet: Yes  5. CAD patient on STATIN therapy: No due to normal LDL  6. Patient with history of CHF and atrial fibrillation on anticoagulation: No due to GI Bleeding     I have addressed the patient's cardiac risk factors and adjusted pharmacologic treatment as needed. In addition, I have reinforced the need for patient directed risk factor modification. I independently reviewed the device check interrogation and ECG    All questions and concerns were addressed with the patient. Alternatives to treatment were discussed. Thank you for allowing to us to participate in the care of Elmer Pastor.     Magda De, ISIDRA-CNP  [facility]   Office: [phone]

## 2020-10-28 ENCOUNTER — TELEPHONE (OUTPATIENT)
Dept: FAMILY MEDICINE CLINIC | Age: 70
End: 2020-10-28

## 2020-10-28 NOTE — TELEPHONE ENCOUNTER
----- Message from Maynor Mckeon sent at 10/27/2020  2:53 PM EDT -----  Subject: Appointment Request    Reason for Call: Routine Medicare AWV    QUESTIONS  Type of Appointment? Established Patient  Reason for appointment request? Available appointments did not meet   patient need  Additional Information for Provider? pt is requesting to get in before   next available in-person 11/18 appt slot; pt missed yesterday's appt; also   is asking about getting more pain meds which is also a reason he expresses   he is unable to wait for 11/18 availability; wants to speak with someone   and also is requesting flu shot  ---------------------------------------------------------------------------  --------------  CALL BACK INFO  What is the best way for the office to contact you? OK to leave message on   voicemail  Preferred Call Back Phone Number? [phone]  ---------------------------------------------------------------------------  --------------  SCRIPT ANSWERS  Relationship to Patient? Self  Appointment reason? Well Care/Follow Ups  Select a Well Care/Follow Ups appointment reason? Adult Physical Exam   [Medicare Annual Wellness   AWV   PAP   Pelvic]  (If the patient is Medicare / 65+ ask this question) Are you requesting a   Medicare Annual Wellness Visit? Yes   (If the patient is female   ask this question) Are you requesting a pap smear with your physical   exam? No  (Is the patient requesting their annual physical and does not need PAP or   AWV per above)? Yes   Have you been diagnosed with   tested for   or told that you are suspected of having COVID-19 (Coronavirus)? No  Have you had a fever or taken medication to treat a fever within the past   3 days? No  Have you had a cough   shortness of breath or flu-like symptoms within the past 3 days? No  Do you currently have flu-like symptoms including fever or chills   cough   shortness of breath   or difficulty breathing   or new loss of taste or smell?  No  (Service Expert  click yes below to proceed with [employer] As Usual   Scheduling)?  Yes

## 2020-11-03 ENCOUNTER — CARE COORDINATION (OUTPATIENT)
Dept: CARE COORDINATION | Age: 70
End: 2020-11-03

## 2020-11-03 NOTE — CARE COORDINATION
Ambulatory Care Coordination Note  11/3/2020  CM Risk Score: 2  Charlson 10 Year Mortality Risk Score: 100%     ACC: Eilleen Rinne, RN    Summary Note: Initial ACM outreach call to pt. Pt voiced his main concern at this time if to have [facility] started due to issues with transportation due to not currently driving and he has had to cancel outpt PT due to no transportation with his daughter or ex-wife. Pt stated he lives alone and needs therapy at home. ACM encouraged pt to reach out ot his insurance to see if he has a transportation benefit available- Pt voiced understanding. Pt asked ACM to reach out to [place] to leave a message for Dr. Ramon Peraza who performed recent surgery on 10/13/2020 at [facility] due to \"everytime I call I am on hold and never hear back\". ACM will reach out to Dr. Petr Porras office per pt's request.     Outreach call to [facility] to request [facility] due to transportation issues per pt's request. Pt requesting [facility] (PT)- message left for [address] [place] [address] St. with pt's request to call pt directly if [facility] has been ordered- ACM left her contact number for any additional questions or concerns. Outreach call to pt to notify him message was left with Assistant, Rosanne Thorne regarding [facility] order to be sent to [facility] due to transportation issues- and request for her to reach out to pt when or was or wasn't placed. Pt voiced appreciation for f/u call. Plan:   - F/U with Dr. Ramon Peraza requesting [facility] per pt using them in the past with good services)  - F/U with Pt on Friday to check status of [facility]  - Pt is aware of upcoming appt with PCP- pt stated his daughter will transport him to that appt.       Ambulatory Care Coordination Assessment    Care Coordination Protocol  Program Enrollment:  Complex Care  Referral from Primary Care Provider:  No  Week 1 - Initial Assessment     Do you have all of your prescriptions and are they filled?:  Yes  Barriers to medication adherence:  None  Are you able to afford your medications?:  Yes  How often do you have trouble taking your medications the way you have been told to take them?:  I always take them as prescribed. Do you have Home O2 Therapy?:  No      Ability to seek help/take action for Emergent Urgent situations i.e. fire, crime, inclement weather or health crisis.:  Needs Assistance  Ability to ambulate to restroom:  Independent  Ability handle personal hygeine needs (bathing/dressing/grooming): Independent  Ability to manage Medications: Independent  Ability to prepare Food Preparation:  Independent  Ability to maintain home (clean home, laundry):  Needs Assistance  Ability to drive and/or has transportation:  Dependent  Ability to do shopping:  Needs Assistance  Ability to manage finances: Independent  Is patient able to live independently?:  Yes     Current Housing:  Private Residence           Do you have a non-slip tub mat?:  Yes     Are you experiencing loss of meaning?:  No  Are you experiencing loss of hope and peace?:  No     Suggested Interventions and Community Resources                  Prior to Admission medications    Medication Sig Start Date End Date Taking? Authorizing Provider   dilTIAZem (CARDIZEM 12 HR) 60 MG extended release capsule Take 1 capsule by mouth 3 times daily 10/6/20   Chi Garnett MD   digoxin [facility]) 125 MCG tablet Take 1 tablet by mouth daily 10/5/20   ISIDRA Vargas CNP   levothyroxine (SYNTHROID) 100 MCG tablet TAKE 1 TABLET BY MOUTH EVERY DAY 8/18/20   Crystal Burton MD   GABAPENTIN PO Take 300 mg by mouth three times daily     Historical Provider, MD   pantoprazole (PROTONIX) 40 MG tablet Take 1 tablet by mouth every morning (before breakfast) 7/15/20   ISIDRA Vargas CNP   Cod Liver Oil OIL Take 1 tablet by mouth daily     Historical Provider, MD   docusate sodium (COLACE) 100 MG capsule Take 100 mg by mouth 2 times daily.     Historical Provider, MD       Future Appointments   Date Time Provider Vikki Caal   11/16/2020 12:15 PM Sherlyn Hall MD [facility]   11/24/2020 10:15 AM ISIDRA Mohamud - CNP [facility]   12/28/2020  9:00 AM SCHEDULE, [facility] TRANSMISSION  Cardio [facility]

## 2020-11-06 ENCOUNTER — CARE COORDINATION (OUTPATIENT)
Dept: CARE COORDINATION | Age: 70
End: 2020-11-06

## 2020-11-06 NOTE — CARE COORDINATION
Ambulatory Care Coordination Note  11/6/2020  CM Risk Score: 2  Charlson 10 Year Mortality Risk Score: 100%     ACC: Timo Mathews RN    Summary Note: Pt stated he spoke with [facility] and they will not order [address] (PT) due to him \"only having transportation issues\". Pt stated he has a car/truck he can drive- which he limits due to medications he takes for his pain to prevent getting in a wreck he doesn't drive. Pt state his daughter lives in [facility] [address] and that's not an option for her to help and his ex wife who lives by him helps when she isn't working. Pt stated he will discuss with PCP at upcoming appt- AC advised pt she will send her a message asking if she would be willing to order [address] (PT) with [facility] for pt to have PT therapy due to recent surgery on 10/13/2020. Will plan on next outreach on 11/16/2020 or 11/17/2020    Care Coordination Interventions    Program Enrollment:  Complex Care  Referral from Primary Care Provider:  No  Suggested Interventions and Community Resources  Fall Risk Prevention:  Completed (Comment: pt uses cane when ambulating)  Home Health Services:  Not Started (Comment: Per pt he spoke with [facility] and they declined to order [address] PT due to transportation issues)  Physical Therapy:  Not Started (Comment: pt would like to know if pcp would order [address] PT)  Transportation Support:  Not Started         Goals Addressed    None         Prior to Admission medications    Medication Sig Start Date End Date Taking?  Authorizing Provider   dilTIAZem (CARDIZEM 12 HR) 60 MG extended release capsule Take 1 capsule by mouth 3 times daily 10/6/20   Janette Varner MD   digoxin [facility]) 125 MCG tablet Take 1 tablet by mouth daily 10/5/20   Jacklyn James APRN - CNP   levothyroxine (SYNTHROID) 100 MCG tablet TAKE 1 TABLET BY MOUTH EVERY DAY 8/18/20   Steph Harmon MD   GABAPENTIN PO Take 300 mg by mouth three times daily     Historical Provider, MD   pantoprazole (PROTONIX) 40 MG tablet Take

## 2020-11-09 NOTE — TELEPHONE ENCOUNTER
Let us contact [facility] and see   They will run through his insurance again and see if he is qualified or not

## 2020-11-09 NOTE — TELEPHONE ENCOUNTER
Please ask if he wants PT at home.  If yes please let the [facility] PT

## 2020-11-09 NOTE — TELEPHONE ENCOUNTER
LF staff to assist with [address] order to be placed and sent to [facility].

## 2020-11-16 ENCOUNTER — OFFICE VISIT (OUTPATIENT)
Dept: FAMILY MEDICINE CLINIC | Age: 70
End: 2020-11-16
Payer: COMMERCIAL

## 2020-11-16 ENCOUNTER — CARE COORDINATION (OUTPATIENT)
Dept: CARE COORDINATION | Age: 70
End: 2020-11-16

## 2020-11-16 VITALS
HEIGHT: 71 IN | WEIGHT: 151 LBS | TEMPERATURE: 97.4 F | SYSTOLIC BLOOD PRESSURE: 100 MMHG | DIASTOLIC BLOOD PRESSURE: 72 MMHG | HEART RATE: 79 BPM | OXYGEN SATURATION: 99 % | BODY MASS INDEX: 21.14 KG/M2

## 2020-11-16 PROCEDURE — G0439 PPPS, SUBSEQ VISIT: HCPCS | Performed by: FAMILY MEDICINE

## 2020-11-16 PROCEDURE — 90694 VACC AIIV4 NO PRSRV 0.5ML IM: CPT | Performed by: FAMILY MEDICINE

## 2020-11-16 PROCEDURE — G0008 ADMIN INFLUENZA VIRUS VAC: HCPCS | Performed by: FAMILY MEDICINE

## 2020-11-16 RX ORDER — NALOXONE HYDROCHLORIDE 4 MG/.1ML
1 SPRAY NASAL PRN
Qty: 1 EACH | Refills: 5 | Status: ON HOLD | OUTPATIENT
Start: 2020-11-16 | End: 2021-03-04

## 2020-11-16 RX ORDER — ASPIRIN 81 MG/1
TABLET ORAL
COMMUNITY
Start: 2020-11-12 | End: 2022-01-19

## 2020-11-16 RX ORDER — METHOCARBAMOL 500 MG/1
TABLET, FILM COATED ORAL
COMMUNITY
Start: 2020-11-10 | End: 2021-03-04 | Stop reason: ALTCHOICE

## 2020-11-16 RX ORDER — OXYCODONE HYDROCHLORIDE AND ACETAMINOPHEN 5; 325 MG/1; MG/1
1 TABLET ORAL 2 TIMES DAILY PRN
Qty: 60 TABLET | Refills: 0 | Status: SHIPPED | OUTPATIENT
Start: 2020-11-16 | End: 2020-12-16

## 2020-11-16 RX ORDER — FLUTICASONE PROPIONATE 110 UG/1
1 AEROSOL, METERED RESPIRATORY (INHALATION) 2 TIMES DAILY
Qty: 1 INHALER | Refills: 3 | Status: SHIPPED | OUTPATIENT
Start: 2020-11-16 | End: 2021-09-15

## 2020-11-16 RX ORDER — OXYCODONE HYDROCHLORIDE 5 MG/1
TABLET ORAL
COMMUNITY
Start: 2020-11-12 | End: 2021-01-04 | Stop reason: SDUPTHER

## 2020-11-16 SDOH — SOCIAL STABILITY: SOCIAL NETWORK: HOW OFTEN DO YOU GET TOGETHER WITH FRIENDS OR RELATIVES?: TWICE A WEEK

## 2020-11-16 SDOH — SOCIAL STABILITY: SOCIAL NETWORK: IN A TYPICAL WEEK, HOW MANY TIMES DO YOU TALK ON THE PHONE WITH FAMILY, FRIENDS, OR NEIGHBORS?: TWICE A WEEK

## 2020-11-16 SDOH — ECONOMIC STABILITY: INCOME INSECURITY: HOW HARD IS IT FOR YOU TO PAY FOR THE VERY BASICS LIKE FOOD, HOUSING, MEDICAL CARE, AND HEATING?: NOT HARD AT ALL

## 2020-11-16 SDOH — SOCIAL STABILITY: SOCIAL NETWORK: ARE YOU MARRIED, WIDOWED, DIVORCED, SEPARATED, NEVER MARRIED, OR LIVING WITH A PARTNER?: DIVORCED

## 2020-11-16 SDOH — SOCIAL STABILITY: SOCIAL NETWORK: HOW OFTEN DO YOU ATTEND CHURCH OR RELIGIOUS SERVICES?: NEVER

## 2020-11-16 SDOH — HEALTH STABILITY: PHYSICAL HEALTH: ON AVERAGE, HOW MANY DAYS PER WEEK DO YOU ENGAGE IN MODERATE TO STRENUOUS EXERCISE (LIKE A BRISK WALK)?: 0 DAYS

## 2020-11-16 SDOH — ECONOMIC STABILITY: FOOD INSECURITY: WITHIN THE PAST 12 MONTHS, YOU WORRIED THAT YOUR FOOD WOULD RUN OUT BEFORE YOU GOT MONEY TO BUY MORE.: NEVER TRUE

## 2020-11-16 SDOH — ECONOMIC STABILITY: FOOD INSECURITY: WITHIN THE PAST 12 MONTHS, THE FOOD YOU BOUGHT JUST DIDN'T LAST AND YOU DIDN'T HAVE MONEY TO GET MORE.: NEVER TRUE

## 2020-11-16 SDOH — HEALTH STABILITY: MENTAL HEALTH
STRESS IS WHEN SOMEONE FEELS TENSE, NERVOUS, ANXIOUS, OR CAN'T SLEEP AT NIGHT BECAUSE THEIR MIND IS TROUBLED. HOW STRESSED ARE YOU?: ONLY A LITTLE

## 2020-11-16 SDOH — SOCIAL STABILITY: SOCIAL NETWORK: HOW OFTEN DO YOU ATTENT MEETINGS OF THE CLUB OR ORGANIZATION YOU BELONG TO?: NEVER

## 2020-11-16 SDOH — ECONOMIC STABILITY: TRANSPORTATION INSECURITY
IN THE PAST 12 MONTHS, HAS THE LACK OF TRANSPORTATION KEPT YOU FROM MEDICAL APPOINTMENTS OR FROM GETTING MEDICATIONS?: YES

## 2020-11-16 SDOH — HEALTH STABILITY: PHYSICAL HEALTH: ON AVERAGE, HOW MANY MINUTES DO YOU ENGAGE IN EXERCISE AT THIS LEVEL?: 0 MIN

## 2020-11-16 SDOH — SOCIAL STABILITY: SOCIAL NETWORK
DO YOU BELONG TO ANY CLUBS OR ORGANIZATIONS SUCH AS CHURCH GROUPS UNIONS, FRATERNAL OR ATHLETIC GROUPS, OR SCHOOL GROUPS?: NO

## 2020-11-16 SDOH — HEALTH STABILITY: MENTAL HEALTH: HOW OFTEN DO YOU HAVE A DRINK CONTAINING ALCOHOL?: NEVER

## 2020-11-16 SDOH — ECONOMIC STABILITY: TRANSPORTATION INSECURITY
IN THE PAST 12 MONTHS, HAS LACK OF TRANSPORTATION KEPT YOU FROM MEETINGS, WORK, OR FROM GETTING THINGS NEEDED FOR DAILY LIVING?: YES

## 2020-11-16 ASSESSMENT — PATIENT HEALTH QUESTIONNAIRE - PHQ9
2. FEELING DOWN, DEPRESSED OR HOPELESS: 0
SUM OF ALL RESPONSES TO PHQ QUESTIONS 1-9: 2
SUM OF ALL RESPONSES TO PHQ9 QUESTIONS 1 & 2: 2
1. LITTLE INTEREST OR PLEASURE IN DOING THINGS: 2
SUM OF ALL RESPONSES TO PHQ QUESTIONS 1-9: 2
SUM OF ALL RESPONSES TO PHQ QUESTIONS 1-9: 2

## 2020-11-16 ASSESSMENT — LIFESTYLE VARIABLES: HOW OFTEN DO YOU HAVE A DRINK CONTAINING ALCOHOL: 0

## 2020-11-16 NOTE — PROGRESS NOTES
Medicare Annual Wellness Visit  Name: Juaquin Arreola Date: 2020   MRN: [id number] Sex: Male   Age: 78 y.o. Ethnicity: Non-/Non    : 1950 Race: Preeti Valerio is here for Medicare AWV and Immunizations (Flu shot)    Screenings for behavioral, psychosocial and functional/safety risks, and cognitive dysfunction are all negative except as indicated below. These results, as well as other patient data from the [address] form, are documented in Flowsheets linked to this Encounter. He is here for annual wellness visit. And also he had right lumbar L4 and Hemilaminectomy and disectomy but still continues to be pain and has not got the PT and he has been taking Tylenol and oxycodone and its not helping him. He at times get the pain so severe and he is in tears. He has history of atrial fibrillation stable currently  He has hypothyroidism and recent labs have been normal        Allergies   Allergen Reactions    Naproxen Swelling     Lips    Pt does not recognize this being an intolerance    Hydrocodone-Acetaminophen Itching         Prior to Visit Medications    Medication Sig Taking? Authorizing Provider   methocarbamol (ROBAXIN) 500 MG tablet TAKE 1 TABLET BY MOUTH FOUR TIMES A DAY AS NEEDED Yes Historical Provider, MD   aspirin (ASPIRIN LOW DOSE) 81 MG EC tablet ASPIRIN EC LOW DOSE 81 MG TBEC Yes Historical Provider, MD   oxyCODONE (ROXICODONE) 5 MG immediate release tablet  Yes Historical Provider, MD   oxyCODONE-acetaminophen (PERCOCET) 5-325 MG per tablet Take 1 tablet by mouth 2 times daily as needed for Pain for up to 30 days. Intended supply: 3 days.  Take lowest dose possible to manage pain Yes Zac Ramos MD   fluticasone (FLOVENT HFA) 110 MCG/ACT inhaler Inhale 1 puff into the lungs 2 times daily Yes Zac Ramos MD   naloxone 4 MG/0.1ML LIQD nasal spray 1 spray by Nasal route as needed for Opioid Reversal Yes Zac Ramos MD dilTIAZem (CARDIZEM 12 HR) 60 MG extended release capsule Take 1 capsule by mouth 3 times daily Yes Vernell Blackwood MD   digoxin (LANOXIN) 125 MCG tablet Take 1 tablet by mouth daily Yes ISIDRA Kelly CNP   levothyroxine (SYNTHROID) 100 MCG tablet TAKE 1 TABLET BY MOUTH EVERY DAY Yes Gemini Hussein MD   GABAPENTIN PO Take 300 mg by mouth three times daily  Yes Historical Provider, MD   Cod Liver Oil OIL Take 1 tablet by mouth daily  Yes Historical Provider, MD   docusate sodium (COLACE) 100 MG capsule Take 100 mg by mouth 2 times daily.  Yes Historical Provider, MD   pantoprazole (PROTONIX) 40 MG tablet Take 1 tablet by mouth every morning (before breakfast)  ISIDRA Kelly CNP         Past Medical History:   Diagnosis Date    Aortic valve insufficiency     Arthritis     [facility]rial [facility]     [facility]2017    TONGUE head and neck IN REMISSION    Dysphonia     GERD (gastroesophageal reflux disease)     Hearing loss     Heart disease     Medical history reviewed with no changes     Obstructive apnea does not use CPAP    Oropharyngeal dysphagia     Refusal of blood transfusions as patient is Mandaen     Thyroid disease        Past Surgical History:   Procedure Laterality Date    ANKLE SURGERY      AORTIC VALVE REPLACEMENT  1/28/15    Dr. Cheryl Sarabia 25mm Mosaic Ultra porcine valve; right and left modified maze procedure with ligation of DELGADO with Atriclip    ATRIAL ABLATION SURGERY      BACK SURGERY      CSP    CARDIAC CATHETERIZATION      HERNIA REPAIR      KNEE SURGERY      Arthroscopy    LUMBAR SPINE SURGERY Right 10/13/2020    RIGHT LUMBAR4-LUMBAR5 MICRO HEMILAMINECTOMY AND DISCECTOMY ([id number], [id number]) performed by Paolo Rios MD at [address] [place] [address]      collapsed lung due to broken ribs    MANDIBLE FRACTURE SURGERY      NECK SURGERY      Fusion    PAIN MANAGEMENT PROCEDURE Right 6/22/2020    RIGHT L4 AND L5 TRANSFORAMINAL EPIDURAL STEROID INJECTION WITH FLUOROSCOPY ([id number],[id number]) performed by Vee Molina MD at [address] [facility] Right 7/8/2020    RIGHT L4 AND L5 TRANSFORAMINAL EPIDURAL STEROID INJECTION WITH FLUOROSCOPY ([id number]) performed by Vee Molina MD at [address] [place] [address]           Family History   Problem Relation Age of Onset    Heart Disease Other     High Blood Pressure Neg Hx     High Cholesterol Neg Hx        CareTeam (Including outside providers/suppliers regularly involved in providing care):   Patient Care Team:  Pretty Griffith MD as PCP - General (Family Medicine)  Pretty Griffith MD as PCP - [facility]  Natan Rockwell MD as Consulting Physician (Electrophysiology)  Rodolfo Pope MD as Consulting Physician (Sleep Medicine)  Natan Rockwell MD as Consulting Physician (Electrophysiology)  ISIDRA Pimentel CNP as Nurse Practitioner (Sleep Medicine)  Leander Rodriguez MD as Consulting Physician (Otolaryngology)  Leander Rodriguez MD as Consulting Physician (Otolaryngology)  Valeriano Franks RN as [address] [place] [address]    Wt Readings from Last 3 Encounters:   11/16/20 151 lb (68.5 kg)   10/13/20 154 lb 6 oz (70 kg)   09/28/20 154 lb 9.6 oz (70.1 kg)     Vitals:    11/16/20 1223   BP: 100/72   Pulse: 79   Temp: 97.4 °F (36.3 °C)   SpO2: 99%   Weight: 151 lb (68.5 kg)   Height: 5' 10.5\" (1.791 m)     Body mass index is 21.36 kg/m². Based upon direct observation of the patient, evaluation of cognition reveals recent and remote memory intact.     General Appearance: alert and oriented to person, place and time, well developed and well- nourished, in no acute distress  Skin: warm and dry, no rash or erythema  Neck: supple and non-tender without mass, no thyromegaly or thyroid nodules, no cervical lymphadenopathy  Pulmonary/Chest: clear to auscultation bilaterally- no wheezes, rales or rhonchi, normal air movement, no respiratory distress  Cardiovascular: normal rate, regular rhythm, normal S1 and S2, no murmurs, rubs, clicks, or gallops, distal pulses intact, no carotid bruits  Abdomen: soft, non-tender, non-distended, normal bowel sounds, no masses or organomegaly  Extremities: no cyanosis, clubbing or edema  Musculoskeletal: uncomfortable to lay flat and walk due to the pain and limited range of motion in lumbar spine and tenderness at surgical site  Neurologic: reflexes normal and symmetric, no cranial nerve deficit, gait, coordination and speech normal    Patient's complete Health Risk Assessment and screening values have been reviewed and are found in Flowsheets. The following problems were reviewed today and where indicated follow up appointments were made and/or referrals ordered. Positive Risk Factor Screenings with Interventions:       General Health and ACP:  General  In general, how would you say your health is?: Fair  In the past 7 days, have you experienced any of the following?  New or Increased Pain, New or Increased Fatigue, Loneliness, Social Isolation, Stress or Anger?: (!) New or Increased Pain(back pain)  Do you get the social and emotional support that you need?: Yes  Do you have a Living Will?: (!) No(patient has a copy at home)  Advance Directives     Power of [address] [place] [address] Will ACP-Advance Directive ACP-Power of     Not on File [place] on 07/17/20 Filed [facility] [place] Risk Interventions:  · We will get the PT    Health Habits/Nutrition:  Health Habits/Nutrition  Do you exercise for at least 20 minutes 2-3 times per week?: Yes  Have you lost any weight without trying in the past 3 months?: No  Do you eat fewer than 2 meals per day?: No  Have you seen a dentist within the past year?: (!) No  Body mass index: 21.36  Health Habits/Nutrition Interventions:  · Mostly very healthy    Hearing/Vision:  No exam data present  Hearing/Vision  Do you or your family notice any trouble with your hearing?: (!) Yes(Has hearing aids)  Do you have difficulty driving, watching TV, or doing any of your daily activities because of your eyesight?: No  Have you had an eye exam within the past year?: (!) No  Hearing/Vision Interventions:  · Has earing aids    Safety:  Safety  Do you have working smoke detectors?: Yes  Have all throw rugs been removed or fastened?: Yes  Do you have non-slip mats or surfaces in all bathtubs/showers?: (!) No  Do all of your stairways have a railing or banister?: (!) No  Are your doorways, halls and stairs free of clutter?: Yes  Do you always fasten your seatbelt when you are in a car?: Yes  Safety Interventions:  · Due to pain he is been walking with cane    ADL:  ADLs  In the past 7 days, did you need help from others to perform any of the following everyday activities? Eating, dressing, grooming, bathing, toileting, or walking/balance?: None  In the past 7 days, did you need help from others to take care of any of the following?  Laundry, housekeeping, banking/finances, shopping, telephone use, food preparation, transportation, or taking medications?: (!) Transportation(Daughter does all the banking/ Ulysses from [employer] drives him around.)      Personalized Preventive Plan   Current Health Maintenance Status  Immunization History   Administered Date(s) Administered    Influenza Virus Vaccine 01/25/2015, 10/30/2015    Influenza, High Dose (Fluzone 65 yrs and older) 08/27/2018, 11/01/2018    Influenza, Quadv, adjuvanted, 65 yrs +, IM, PF (Fluad) 11/16/2020    Pneumococcal Conjugate 13-valent ([id number]) 06/28/2019    Pneumococcal Polysaccharide ([id number]) 06/07/2015    Tdap (Boostrix, Adacel) 09/07/2018        Health Maintenance   Topic Date Due    Shingles Vaccine (1 of 2) 04/26/2002    Low dose CT lung screening  04/07/2019    Annual Wellness Visit (AWV)  05/29/2019    Pneumococcal 65+ yrs at Risk Vaccine (2 of 2 - PPSV23) 06/28/2020    Potassium monitoring  09/29/2021    Creatinine monitoring  09/29/2021    Lipid screen  09/29/2023    Colon cancer screen colonoscopy  07/13/2027    DTaP/Tdap/Td vaccine (2 - Td) 09/07/2028    Flu vaccine  Completed    AAA screen  Completed    Hepatitis A vaccine  Aged Out    Hepatitis B vaccine  Aged Out    Hib vaccine  Aged Out    Meningococcal (ACWY) vaccine  Aged Out    Hepatitis C screen  Discontinued     Recommendations for 99Bill Due: see orders and patient instructions/AVS.  . Recommended screening schedule for the next 5-10 years is provided to the patient in written form: see Patient Instructions/AVS.    Brigette Huizar was seen today for medicare awv and immunizations. Diagnoses and all orders for this visit:    Routine general medical examination at a health care facility  Up to date with shots    Back pain with history of spinal surgery  -     [address]  -     oxyCODONE-acetaminophen (PERCOCET) 5-325 MG per tablet; Take 1 tablet by mouth 2 times daily as needed for Pain for up to 30 days. Intended supply: 3 days. Take lowest dose possible to manage pain  -     naloxone 4 MG/0.1ML LIQD nasal spray; 1 spray by Nasal route as needed for Opioid Reversal    Flu vaccine need  -     INFLUENZA, QUADV, ADJUVANTED, 65 YRS =, IM, PF, PREFILL SYR, 0.5ML (FLUAD)    Simple chronic bronchitis (HCC)  Currently no wheezing but at times sob and we will start  -     fluticasone (FLOVENT HFA) 110 MCG/ACT inhaler;  Inhale 1 puff into the lungs 2 times daily

## 2020-11-16 NOTE — PATIENT INSTRUCTIONS
Personalized Preventive Plan for Jonh Gonzales - 11/16/2020  Medicare offers a range of preventive health benefits. Some of the tests and screenings are paid in full while other may be subject to a deductible, co-insurance, and/or copay. Some of these benefits include a comprehensive review of your medical history including lifestyle, illnesses that may run in your family, and various assessments and screenings as appropriate. After reviewing your medical record and screening and assessments performed today your provider may have ordered immunizations, labs, imaging, and/or referrals for you. A list of these orders (if applicable) as well as your Preventive Care list are included within your After Visit Summary for your review. Other Preventive Recommendations:    · A preventive eye exam performed by an eye specialist is recommended every 1-2 years to screen for glaucoma; cataracts, macular degeneration, and other eye disorders. · A preventive dental visit is recommended every 6 months. · Try to get at least 150 minutes of exercise per week or 10,000 steps per day on a pedometer . · Order or download the FREE \"Exercise & Physical Activity: Your Everyday Guide\" from [employer]. Call 0-901.772.7891 or search [employer] online. · You need 9475-3040 mg of calcium and 0670-3412 IU of vitamin D per day. It is possible to meet your calcium requirement with diet alone, but a vitamin D supplement is usually necessary to meet this goal.  · When exposed to the sun, use a sunscreen that protects against both UVA and UVB radiation with an SPF of 30 or greater. Reapply every 2 to 3 hours or after sweating, drying off with a towel, or swimming. · Always wear a seat belt when traveling in a car. Always wear a helmet when riding a bicycle or motorcycle.

## 2020-11-16 NOTE — CARE COORDINATION
Ambulatory Care Coordination Note  11/16/2020  CM Risk Score: 2  Charlson 10 Year Mortality Risk Score: 100%     ACC: Krystina Orellana RN    Summary Note: F/U call to pt. Pt on his way home after OV with PCP that his friend drove him to due to not driving. Pt going to [place] Wednesday with a prearranged transportation to make sure he is doing his exercises correctly. Pt stated PCP Madina Morgan) gave him the number for [facility] to call regarding starting services [facility] and another [facility]) place per pt. Plan: f/u with pt in next 2 weeks to check on status and answer any questions or concerns. Care Coordination Interventions    Program Enrollment:  Complex Care  Referral from Primary Care Provider:  No  Suggested Interventions and Community Resources  Fall Risk Prevention:  Completed (Comment: pt uses cane when ambulating)  Home Health Services:  Not Started (Comment: Per pt he spoke with [place] and they declined to order [facility] PT due to transportation issues)  Physical Therapy:  Not Started (Comment: pt would like to know if pcp would order [facility] PT)  Transportation Support:  Not Started         Goals Addressed                 This Visit's Progress     Conditions and Symptoms   Improving     I will schedule office visits, as directed by my provider. I will keep my appointment or reschedule if I have to cancel. I will notify my provider of any barriers to my plan of care. I will notify my provider of any symptoms that indicate a worsening of my condition. Barriers: stress  Plan for overcoming my barriers: I will f/u with my PCP or proivders with questions/concerns related to my health  Confidence: 9/10  Anticipated Goal Completion Date: 2/21/2021              Prior to Admission medications    Medication Sig Start Date End Date Taking?  Authorizing Provider   methocarbamol (ROBAXIN) 500 MG tablet TAKE 1 TABLET BY MOUTH FOUR TIMES A DAY AS NEEDED 11/10/20  Yes Historical Provider, MD   aspirin (ASPIRIN LOW DOSE) 81 MG EC tablet ASPIRIN EC LOW DOSE 81 MG TBEC 11/12/20  Yes Historical Provider, MD   oxyCODONE (ROXICODONE) 5 MG immediate release tablet  11/12/20  Yes Historical Provider, MD   dilTIAZem (CARDIZEM 12 HR) 60 MG extended release capsule Take 1 capsule by mouth 3 times daily 10/6/20  Yes Ray Alas MD   digoxin [facility]) 125 MCG tablet Take 1 tablet by mouth daily 10/5/20  Yes ISIDRA Ventura CNP   levothyroxine (SYNTHROID) 100 MCG tablet TAKE 1 TABLET BY MOUTH EVERY DAY 8/18/20  Yes Reanna Granados MD   GABAPENTIN PO Take 300 mg by mouth three times daily    Yes Historical Provider, MD   pantoprazole (PROTONIX) 40 MG tablet Take 1 tablet by mouth every morning (before breakfast) 7/15/20  Yes ISIDRA Ventura CNP   Cod Liver Oil OIL Take 1 tablet by mouth daily    Yes Historical Provider, MD   docusate sodium (COLACE) 100 MG capsule Take 100 mg by mouth 2 times daily.    Yes Historical Provider, MD       Future Appointments   Date Time Provider Vikki Caal   11/24/2020 10:15 AM ISIDRA Ventura CNP [facility]   12/28/2020  9:00 AM SCHEDULE, [facility] TRANSMISSION [facility]

## 2020-11-16 NOTE — PROGRESS NOTES
Vaccine Information Sheet, \"Influenza - Inactivated\"  given to Graham Carrillo, or parent/legal guardian of  Graham Carrillo and verbalized understanding. Patient responses:    Have you ever had a reaction to a flu vaccine? No  Do you have any current illness? No  Have you ever had Guillian Clinton Syndrome? No  Do you have a serious allergy to any of the follow: Neomycin, Polymyxin, Thimerosal, eggs or egg products? No    Flu vaccine given per order. Please see immunization tab. Risks and benefits explained. Current VIS given.

## 2020-11-24 ENCOUNTER — NURSE ONLY (OUTPATIENT)
Dept: CARDIOLOGY CLINIC | Age: 70
End: 2020-11-24
Payer: COMMERCIAL

## 2020-11-24 ENCOUNTER — OFFICE VISIT (OUTPATIENT)
Dept: CARDIOLOGY CLINIC | Age: 70
End: 2020-11-24
Payer: COMMERCIAL

## 2020-11-24 VITALS
WEIGHT: 154 LBS | SYSTOLIC BLOOD PRESSURE: 110 MMHG | HEIGHT: 70 IN | OXYGEN SATURATION: 97 % | DIASTOLIC BLOOD PRESSURE: 70 MMHG | BODY MASS INDEX: 22.05 KG/M2 | HEART RATE: 80 BPM

## 2020-11-24 PROCEDURE — 93000 ELECTROCARDIOGRAM COMPLETE: CPT | Performed by: NURSE PRACTITIONER

## 2020-11-24 PROCEDURE — 93291 INTERROG DEV EVAL SCRMS IP: CPT | Performed by: INTERNAL MEDICINE

## 2020-11-24 PROCEDURE — 99214 OFFICE O/P EST MOD 30 MIN: CPT | Performed by: NURSE PRACTITIONER

## 2020-11-24 NOTE — PROGRESS NOTES
Patient comes in for interrogation of their implanted loop recorder. Interrogation shows 1 AF episode on 10/15/2020, lasting 2 hours 15 minutes. 18 AT episodes. Last on 11/21/2020, longest 8 minutes. 11 Symptom recordings the appear to be SR with ectopy . Implanted for AF management. Patient had Maze procedure. Patient remains on ASA 81 mg, Cardizem and digoxin. Please see interrogation for more detail. Patient will see NPSR today and we will continue to follow the Patient remotely.

## 2020-12-08 ENCOUNTER — CARE COORDINATION (OUTPATIENT)
Dept: CARE COORDINATION | Age: 70
End: 2020-12-08

## 2020-12-08 NOTE — CARE COORDINATION
Ambulatory Care Coordination Note  12/8/2020  CM Risk Score: 2  Charlson 10 Year Mortality Risk Score: 100%     ACC: Dale Garcia RN    Summary Note: F/U call to check on pt's status. Pt voiced overall he is doing well. Pt stated he recent ly saw the Cardiology NP and his medications were changed. Pt stated he doesn't feel the medications are working well for him. Pt stated, My afib has been ongoing for sometime and they added more Diltiazem to my pills I am taking. ACM educated pt that the NP and Cardiology Provider work together to help manage pt's care- if he is more comfortable seeing the MD- he is able to request to only see them. Pt voiced appreciation due to stating, \"I didn't know I could do that\". Pt encouraged to monitor his HR and if he is having issues or is concerned to f/u with Cardiologist office. Pt voiced appreciation and understanding. Will plan an additional outreach in next 3-4 weeks. Pt voiced appreciation. Per chart review the following medicine changes were made: \"- Continue cardizem 60 mg BID (May take extra dose if symptomatic), digoxin 125 mcg QD\"    - Pt returned call and medications reviewed (Card meds) Pt stated he is taking Cardizem TID \"the 60mg dose, not the 125mg dose\"      Medications were not reviewed at time of call due to pt needing to end call. ACM began reviewing medications, Second outreach call attempted unable to reach pt, message left requesting call back to review medications to ensure pt is taking correctly. Due to Card note stating to take Cardizem 60mg BID and an additional dose if symptomatic and being ordered TID. Pt stated he takes the additonal dose at 1:30pm. Pt encouraged if he is not feeling right to reach out to Cardiology in next 3-5 days, especially if he notices a elevated HR or huge fluctuation. Pt voiced understanding.      Care Coordination Interventions    Program Enrollment:  Complex Care  Referral from Primary Care Provider:  No  Suggested Interventions and Community Resources  Fall Risk Prevention:  Completed (Comment: pt uses cane when ambulating)  Home Health Services:  Not Started (Comment: Per pt he spoke with [facility] and they declined to order Kurtgraciela Darcy PT due to transportation issues)  Physical Therapy:  Not Started (Comment: pt would like to know if pcp would order R[facility] [address] PT)  Transportation Support:  Not Started         Goals Addressed    None       - Second outreach call to review medications, message left for pt to call back. Prior to Admission medications    Medication Sig Start Date End Date Taking? Authorizing Provider   methocarbamol (ROBAXIN) 500 MG tablet TAKE 1 TABLET BY MOUTH FOUR TIMES A DAY AS NEEDED 11/10/20   Historical Provider, MD   aspirin (ASPIRIN LOW DOSE) 81 MG EC tablet ASPIRIN EC LOW DOSE 81 MG TBEC 11/12/20   Historical Provider, MD   oxyCODONE (ROXICODONE) 5 MG immediate release tablet  11/12/20   Historical Provider, MD   oxyCODONE-acetaminophen (PERCOCET) 5-325 MG per tablet Take 1 tablet by mouth 2 times daily as needed for Pain for up to 30 days. Intended supply: 3 days.  Take lowest dose possible to manage pain 11/16/20 12/16/20  Meeta Manzano MD   fluticasone (FLOVENT HFA) 110 MCG/ACT inhaler Inhale 1 puff into the lungs 2 times daily 11/16/20   Meeta Manzano MD   naloxone 4 MG/0.1ML LIQD nasal spray 1 spray by Nasal route as needed for Opioid Reversal 11/16/20   Meeta Manzano MD   dilTIAZem (CARDIZEM 12 HR) 60 MG extended release capsule Take 1 capsule by mouth 3 times daily 10/6/20   Jazmin García MD   digoxin [facility]) 125 MCG tablet Take 1 tablet by mouth daily 10/5/20   ISIDRA Pozo - CNP   levothyroxine (SYNTHROID) 100 MCG tablet TAKE 1 TABLET BY MOUTH EVERY DAY 8/18/20   Meeta Manzano MD   GABAPENTIN PO Take 300 mg by mouth three times daily     Historical Provider, MD   pantoprazole (PROTONIX) 40 MG tablet Take 1 tablet by mouth every morning (before breakfast) 7/15/20   Lamond Shone David Ji, APRN - CNP   Cod Liver Oil OIL Take 1 tablet by mouth daily     Historical Provider, MD   docusate sodium (COLACE) 100 MG capsule Take 100 mg by mouth 2 times daily.     Historical Provider, MD       Future Appointments   Date Time Provider Vikki Afua   12/28/2020  9:00 AM SCHEDULE, [facility] REMOTE TRANSMISSION [facility] Card[facility]   2/17/2021 10:00 AM Vernell Blackwood MD [facility]   5/25/2021  9:30 AM Ben Chacko MD FF [facility]

## 2020-12-27 ENCOUNTER — CARE COORDINATION (OUTPATIENT)
Dept: CARE COORDINATION | Age: 70
End: 2020-12-27

## 2020-12-27 ASSESSMENT — PATIENT HEALTH QUESTIONNAIRE - PHQ9
SUM OF ALL RESPONSES TO PHQ QUESTIONS 1-9: 0

## 2020-12-27 NOTE — CARE COORDINATION
Ambulatory Care Coordination Note  12/27/2020  CM Risk Score: 2  Charlson 10 Year Mortality Risk Score: 100%     ACC: Federica Newman RN    Summary Note: 1/5/2021 Acupuncture at [address] Pt stated his back hurts more since he had back surgery. ACM encouraged pt to f/u with his providers with any questions or concerns. Pt voiced he will. Pt aware of upcoming appts with Cards. Will plan next outreach in next  2-3 weeks. Pt voiced appreciation for f/u call. Care Coordination Interventions    Program Enrollment: Complex Care  Referral from Primary Care Provider: No  Suggested Interventions and Community Resources  Fall Risk Prevention: Completed (Comment: pt uses cane when ambulating)  Disease Specific Clinic: Completed (Comment: see care team)  Home Health Services: Not Started  Medication Assistance Program: Not Started  Medi Set or Pill Pack: Not Started  Physical Therapy: Not Started (Comment: pt would like to know if pcp would order Dede[facility] [address] PT)  Registered Dietician: Not Started  Senior Services: Not Started  Other Therapy Services: In Process (Comment: starts acupuncture on 1/5/2021)  Transportation Support: Not Started         Goals Addressed                 This Visit's Progress     Conditions and Symptoms   Improving     I will schedule office visits, as directed by my provider. I will keep my appointment or reschedule if I have to cancel. I will notify my provider of any barriers to my plan of care. I will notify my provider of any symptoms that indicate a worsening of my condition. Barriers: stress  Plan for overcoming my barriers: I will f/u with my PCP or proivders with questions/concerns related to my health  Confidence: 9/10  Anticipated Goal Completion Date: 2/21/2021              Prior to Admission medications    Medication Sig Start Date End Date Taking?  Authorizing Provider   methocarbamol (ROBAXIN) 500 MG tablet TAKE 1 TABLET BY MOUTH FOUR TIMES A DAY AS NEEDED 11/10/20  Yes Historical Provider, MD   aspirin (ASPIRIN LOW DOSE) 81 MG EC tablet ASPIRIN EC LOW DOSE 81 MG TBEC 11/12/20  Yes Historical Provider, MD   oxyCODONE (ROXICODONE) 5 MG immediate release tablet  11/12/20  Yes Historical Provider, MD   fluticasone (FLOVENT HFA) 110 MCG/ACT inhaler Inhale 1 puff into the lungs 2 times daily 11/16/20  Yes Didi Flores MD   naloxone 4 MG/0.1ML LIQD nasal spray 1 spray by Nasal route as needed for Opioid Reversal 11/16/20  Yes Didi Flores MD   dilTIAZem (CARDIZEM 12 HR) 60 MG extended release capsule Take 1 capsule by mouth 3 times daily 10/6/20  Yes Darell Juarez MD   digoxin [facility]) 125 MCG tablet Take 1 tablet by mouth daily 10/5/20  Yes ISIDRA Duff CNP   levothyroxine (SYNTHROID) 100 MCG tablet TAKE 1 TABLET BY MOUTH EVERY DAY 8/18/20  Yes Didi Flores MD   GABAPENTIN PO Take 300 mg by mouth three times daily    Yes Historical Provider, MD   pantoprazole (PROTONIX) 40 MG tablet Take 1 tablet by mouth every morning (before breakfast) 7/15/20  Yes ISIDRA Duff CNP   Cod Liver Oil OIL Take 1 tablet by mouth daily    Yes Historical Provider, MD   docusate sodium (COLACE) 100 MG capsule Take 100 mg by mouth 2 times daily.    Yes Historical Provider, MD       Future Appointments   Date Time Provider Vikki Caal   12/28/2020  9:00 AM SCHEDULE, [facility] TRANSMISSION FF Cardio [facility]   2/1/2021  9:15 AM SCHEDULE, [facility] TRANSMISSION FF Cardio [facility]   2/17/2021 10:00 AM Darell Juarez MD FF Cardio [facility]   5/25/2021  9:30 AM Modesta Bucio MD FF Cardio [facility]

## 2020-12-28 ENCOUNTER — NURSE ONLY (OUTPATIENT)
Dept: CARDIOLOGY CLINIC | Age: 70
End: 2020-12-28
Payer: COMMERCIAL

## 2020-12-28 PROCEDURE — 93298 REM INTERROG DEV EVAL SCRMS: CPT | Performed by: INTERNAL MEDICINE

## 2020-12-28 PROCEDURE — G2066 INTER DEVC REMOTE 30D: HCPCS | Performed by: INTERNAL MEDICINE

## 2020-12-28 RX ORDER — DIGOXIN 125 MCG
125 TABLET ORAL DAILY
Qty: 30 TABLET | Refills: 3 | Status: SHIPPED | OUTPATIENT
Start: 2020-12-28 | End: 2021-01-05 | Stop reason: SDUPTHER

## 2020-12-28 NOTE — PROGRESS NOTES
We received a remote transmission form patient's monitor at home. Remote Linq report shows no arrhythmias. Pt has known AF. Off anti coags. Pt had Maze procedure. EP physician to review. We will continue to monitor remotely.

## 2020-12-28 NOTE — LETTER
[address] [facility] [phone]  [address] [facility],[address] [phone]    Pacemaker/Defibrillator Clinic          12/28/20        Tessdestacey Tanna Zaragoza  [address] [place] [id number]        Dear Claudia Taylor    This letter is to inform you that we received the transmission from your monitor at home that checks your implanted heart device. The next date your monitor will automatically transmit will be 2-1-21. If your report needs attention we will notify you. Your device and monitor are wireless and most transmit cellularly, but please periodically check your monitor is still plugged in to the electrical outlet. If you still use the telephone land line to send please ensure the connection to the phone tomy is secure. This will help to ensure successful automatic transmissions in the future. Also, the monitor needs to be close to you while sleeping at night. Please be aware that the remote device transmission sites are periodically monitored only during regular business hours during which simultaneous in-office device clinics are being run. If your transmission requires attention, we will contact you as soon as possible. Thank you.             Harriet

## 2021-01-04 DIAGNOSIS — G89.3 CHRONIC PAIN DUE TO NEOPLASM: Primary | ICD-10-CM

## 2021-01-04 RX ORDER — OXYCODONE HYDROCHLORIDE 5 MG/1
5 TABLET ORAL EVERY 8 HOURS PRN
Qty: 60 TABLET | Refills: 0 | Status: SHIPPED | OUTPATIENT
Start: 2021-01-04 | End: 2021-02-03

## 2021-01-04 NOTE — TELEPHONE ENCOUNTER
Medication and Quantity requested: oxyCODONE-acetaminophen (PERCOCET) 5-325 MG per tablet   QTY:60     Last Visit  11/16/20    Pharmacy and phone number updated in EPIC:  Yes [facility]

## 2021-01-04 NOTE — TELEPHONE ENCOUNTER
Medication:   Requested Prescriptions     Pending Prescriptions Disp Refills    oxyCODONE (ROXICODONE) 5 MG immediate release tablet          Last Filled:  Unknown    Patient Phone Number: [phone] (home)     Last appt: 11/16/2020   Next appt: Visit date not found    Last OARRS:   RX Monitoring 12/30/2019   Periodic Controlled Substance Monitoring No signs of potential drug abuse or diversion identified. Medication:   Requested Prescriptions     Pending Prescriptions Disp Refills    oxyCODONE (ROXICODONE) 5 MG immediate release tablet          Last Filled:      Patient Phone Number: [phone] (home)     Last appt: 11/16/2020   Next appt: Visit date not found    Last OARRS:   RX Monitoring 12/30/2019   Periodic Controlled Substance Monitoring No signs of potential drug abuse or diversion identified.      No information given, outside source

## 2021-01-05 RX ORDER — DIGOXIN 125 MCG
125 TABLET ORAL DAILY
Qty: 30 TABLET | Refills: 5 | Status: SHIPPED | OUTPATIENT
Start: 2021-01-05 | End: 2021-06-07 | Stop reason: SDUPTHER

## 2021-01-12 ENCOUNTER — CARE COORDINATION (OUTPATIENT)
Dept: CARE COORDINATION | Age: 71
End: 2021-01-12

## 2021-01-29 ENCOUNTER — TELEPHONE (OUTPATIENT)
Dept: FAMILY MEDICINE CLINIC | Age: 71
End: 2021-01-29

## 2021-01-29 DIAGNOSIS — M54.16 LUMBAR RADICULOPATHY: Primary | ICD-10-CM

## 2021-01-29 DIAGNOSIS — G89.3 CHRONIC PAIN DUE TO NEOPLASM: ICD-10-CM

## 2021-01-29 NOTE — TELEPHONE ENCOUNTER
Patient is requesting a refill on his percocet, however he would like the previous prescription with the larger sized pills, so he can break them in half.  Please review and advise

## 2021-02-01 ENCOUNTER — NURSE ONLY (OUTPATIENT)
Dept: CARDIOLOGY CLINIC | Age: 71
End: 2021-02-01
Payer: COMMERCIAL

## 2021-02-01 DIAGNOSIS — I48.0 PAF (PAROXYSMAL ATRIAL FIBRILLATION) (HCC): Chronic | ICD-10-CM

## 2021-02-01 DIAGNOSIS — Z45.09 ENCOUNTER FOR ELECTRONIC ANALYSIS OF REVEAL EVENT RECORDER: ICD-10-CM

## 2021-02-01 PROCEDURE — 93298 REM INTERROG DEV EVAL SCRMS: CPT | Performed by: INTERNAL MEDICINE

## 2021-02-01 PROCEDURE — G2066 INTER DEVC REMOTE 30D: HCPCS | Performed by: INTERNAL MEDICINE

## 2021-02-01 RX ORDER — OXYCODONE HYDROCHLORIDE AND ACETAMINOPHEN 5; 325 MG/1; MG/1
1 TABLET ORAL EVERY 8 HOURS PRN
Qty: 60 TABLET | Refills: 0 | Status: SHIPPED | OUTPATIENT
Start: 2021-02-01 | End: 2021-03-03

## 2021-02-01 NOTE — PROGRESS NOTES
We received a remote transmission from patient's monitor at home. Remote Linq report shows no arrhythmias. Cardiac compass shows AF. Pt has known AF. Not on anti coags. Pt has had Maze procedure. EP physician to review. We will continue to monitor remotely.

## 2021-02-01 NOTE — LETTER
[address] [facility] [phone]  [address] [facility],4[address] [phone]    Pacemaker/Defibrillator Clinic          02/01/21        Ivan Zaragoza  [address] [place] [address]        Dear Sathya Spence    This letter is to inform you that we received the transmission from your monitor at home that checks your implanted heart device. The next date your monitor will automatically transmit will be 3-8-21. If your report needs attention we will notify you. Your device and monitor are wireless and most transmit cellularly, but please periodically check your monitor is still plugged in to the electrical outlet. If you still use the telephone land line to send please ensure the connection to the phone tomy is secure. This will help to ensure successful automatic transmissions in the future. Also, the monitor needs to be close to you while sleeping at night. Please be aware that the remote device transmission sites are periodically monitored only during regular business hours during which simultaneous in-office device clinics are being run. If your transmission requires attention, we will contact you as soon as possible. Thank you.             Harriet

## 2021-02-12 DIAGNOSIS — E03.9 ACQUIRED HYPOTHYROIDISM: ICD-10-CM

## 2021-02-12 RX ORDER — LEVOTHYROXINE SODIUM 0.1 MG/1
TABLET ORAL
Qty: 90 TABLET | Refills: 1 | Status: SHIPPED | OUTPATIENT
Start: 2021-02-12 | End: 2021-05-03 | Stop reason: SDUPTHER

## 2021-02-12 NOTE — TELEPHONE ENCOUNTER
Medication:   Requested Prescriptions     Pending Prescriptions Disp Refills    levothyroxine (SYNTHROID) 100 MCG tablet [Pharmacy Med Name: LEVOTHYROXINE 100 MCG TABLET] 90 tablet 1     Sig: TAKE 1 TABLET BY MOUTH EVERY DAY       Last Filled:  8/18/2020 #90 Refills 1    Patient Phone Number: [phone] (home)     Last appt: 11/16/2020   Return for Medicare Annual Wellness Visit in 1 year.     Next appt: Visit date not found    Last Thyroid:   Lab Results   Component Value Date    TSH 2.735 05/21/2020    T4FREE 1.9 07/30/2019    [id number] 2.12 05/21/2020

## 2021-02-12 NOTE — PROGRESS NOTES
per tablet Take 1 tablet by mouth every 8 hours as needed for Pain for up to 30 days. Intended supply: 3 days. Take lowest dose possible to manage pain Yes Patricio Pittman MD   digoxin (LANOXIN) 125 MCG tablet Take 1 tablet by mouth daily Yes ISIDRA Kern CNP   methocarbamol (ROBAXIN) 500 MG tablet TAKE 1 TABLET BY MOUTH FOUR TIMES A DAY AS NEEDED Yes Historical Provider, MD   aspirin (ASPIRIN LOW DOSE) 81 MG EC tablet ASPIRIN EC LOW DOSE 81 MG TBEC Yes Historical Provider, MD   fluticasone (FLOVENT HFA) 110 MCG/ACT inhaler Inhale 1 puff into the lungs 2 times daily Yes Patricio Pittman MD   naloxone 4 MG/0.1ML LIQD nasal spray 1 spray by Nasal route as needed for Opioid Reversal Yes Patricio Pittman MD   dilTIAZem (CARDIZEM 12 HR) 60 MG extended release capsule Take 1 capsule by mouth 3 times daily Yes Birgit Tirado MD   GABAPENTIN PO Take 300 mg by mouth three times daily  Yes Historical Provider, MD   pantoprazole (PROTONIX) 40 MG tablet Take 1 tablet by mouth every morning (before breakfast) Yes ISIDRA Kern CNP   Cod Liver Oil OIL Take 1 tablet by mouth daily  Yes Historical Provider, MD   docusate sodium (COLACE) 100 MG capsule Take 100 mg by mouth 2 times daily.  Yes Historical Provider, MD        Allergies   Allergen Reactions    Naproxen Swelling     Lips    Pt does not recognize this being an intolerance    Hydrocodone-Acetaminophen Itching       Past Medical History:   Diagnosis Date    Aortic valve insufficiency     Arthritis     Atrial fibrillation ([facility] [address]     Cancer ([facility] [address] 09/2017    TONGUE head and neck IN REMISSION    Dysphonia     GERD (gastroesophageal reflux disease)     Hearing loss     Heart disease     Medical history reviewed with no changes     Obstructive apnea does not use CPAP    Oropharyngeal dysphagia     Refusal of blood transfusions as patient is Protestant     Thyroid disease        Past Surgical History:   Procedure Laterality Date    ANKLE SURGERY      AORTIC VALVE REPLACEMENT  1/28/15    Dr. Rosalie Flores 25mm Mosaic Ultra porcine valve; right and left modified maze procedure with ligation of DELGADO with Atriclip    ATRIAL ABLATION SURGERY      BACK SURGERY      CSP    CARDIAC CATHETERIZATION      HERNIA REPAIR      KNEE SURGERY      Arthroscopy    LUMBAR SPINE SURGERY Right 10/13/2020    RIGHT LUMBAR4-LUMBAR5 MICRO HEMILAMINECTOMY AND DISCECTOMY (60873, [id number]) performed by Oneil Taylor MD at [address] [facility]      collapsed lung due to broken ribs    MANDIBLE FRACTURE SURGERY      NECK SURGERY      Fusion    PAIN MANAGEMENT PROCEDURE Right 2020    RIGHT L4 AND L5 TRANSFORAMINAL EPIDURAL STEROID INJECTION WITH FLUOROSCOPY ([id number]) performed by Kayley Mai MD at [facility] Right 2020    RIGHT L4 AND L5 TRANSFORAMINAL EPIDURAL STEROID INJECTION WITH FLUOROSCOPY ([id number]) performed by Kayley Mai MD at [address] [place] [address]         Social History     Tobacco Use    Smoking status: Former Smoker     Packs/day: 1.00     Years: 40.00     Pack years: 40.00     Types: Cigarettes     Start date: 1975     Quit date: 2015     Years since quittin.0    Smokeless tobacco: Never Used    Tobacco comment: Maintain cessation   Substance Use Topics    Alcohol use: No     Alcohol/week: 0.0 standard drinks     Frequency: Never     Binge frequency: Never     Comment: NA beer        Family History   Problem Relation Age of Onset    Heart Disease Other     High Blood Pressure Neg Hx     High Cholesterol Neg Hx        PHYSICAL EXAMINATION:  Vitals:    21 0950   BP: 110/60   Site: Right Upper Arm   Position: Sitting   Cuff Size: Medium Adult   Pulse: 57   SpO2: 97%   Weight: 156 lb (70.8 kg)   Height: 5' 11\" (1.803 m)     Estimated body mass index is 21.76 kg/m² as calculated from the following:    Height as of this encounter: 5' 11\" (1.803 m).     Weight as of this encounter: 156 lb (70.8 kg). Physical Exam  Constitutional:       Appearance: He is well-developed. He is not diaphoretic. HENT:      Head: Normocephalic and atraumatic. Eyes:      General: No scleral icterus. Extraocular Movements: Extraocular movements intact. Conjunctiva/sclera: Conjunctivae normal.   Neck:      Musculoskeletal: Normal range of motion and neck supple. Vascular: No JVD. Cardiovascular:      Rate and Rhythm: Normal rate and regular rhythm. Heart sounds: Murmur present. No friction rub. No gallop. Comments: 2/6 systolic murmur at LLSB and apex  Pulmonary:      Effort: Pulmonary effort is normal. No respiratory distress. Breath sounds: Normal breath sounds. No wheezing or rales. Abdominal:      General: Bowel sounds are normal.      Palpations: Abdomen is soft. Tenderness: There is no abdominal tenderness. Musculoskeletal: Normal range of motion. Skin:     General: Skin is warm and dry. Findings: No rash. Neurological:      General: No focal deficit present. Mental Status: He is alert and oriented to person, place, and time. Mental status is at baseline. Psychiatric:         Mood and Affect: Mood normal.         Behavior: Behavior normal.         Thought Content: Thought content normal.         Judgment: Judgment normal.       I have reviewed all pertinent lab results and diagnostic testing.         LABS:  CBC:   Lab Results   Component Value Date    WBC 4.5 09/29/2020    RBC 4.26 09/29/2020    HGB 13.6 09/29/2020    HCT 40.9 09/29/2020    MCV 96.0 09/29/2020    RDW 13.8 09/29/2020     09/29/2020     CMP:   Lab Results   Component Value Date     09/29/2020    K 4.2 09/29/2020     09/29/2020    CO2 27 09/29/2020    BUN 10 09/29/2020    CREATININE 0.5 09/29/2020    GFRAA >60 09/29/2020    GFRAA >60 12/26/2009    AGRATIO 1.6 07/15/2020    LABGLOM >60 09/29/2020    GLUCOSE 91 09/29/2020    PROT 6.6 07/15/2020 CALCIUM 9.3 09/29/2020    BILITOT 0.7 07/15/2020    ALKPHOS 43 07/15/2020    AST 14 07/15/2020    ALT 15 07/15/2020     Lab Results   Component Value Date    CHOL 225 09/29/2018    TRIG 80 09/29/2018    HDL 69 09/29/2018    HDL 52 12/27/2009    LDLCALC 140 09/29/2018       Echo 9/9/20:  Summary   Normal left ventricle size, wall thickness and systolic function with an   estimated ejection fraction of 60%. No regional wall motion abnormalities   are seen. E/e\"= 10. Moderate to severe mitral regurgitation. A bioprosthetic aortic valve appears well seated with a maximum gradient of   17 mmHg and a mean gradient of 11 mmHg. Aortic valve area of 1.97cm. No evidence of aortic valve regurgitation. The aortic root is mildly dilated. 3.7cm   The ascending aorta is mildly dilated. 4.2cm   The right ventricle is mildly enlarged but normal in function. Moderate to severe tricuspid regurgitation. PASP 34mmHg. The right atrium is mildly dilated. IVC size is normal (<2.1 cm) but collapses < 50% with respiration consistent   with elevated RA pressure (8 mmHg). Tiffanie Santiago myoview 8/26/20:  Summary    -There is a small fixed inferior apical defect that is likely bowel artifact    rather than scar.    -LV function is normal with no regional abnormalities and EF=64%.  -Low risk study.        ECG 8/18/2020:   - NSR. Rate 85, QTc 389     Limited Echo 7/15/2020:   -Limited echocardiogram was performed to rule out pericardial effusion,   status post cardiac ablation today.   -Normal left ventricle size, wall thickness and systolic function with an   estimated ejection fraction of 55%.    -No regional wall motion abnormalities are seen.   -Abnormal (paradoxical) septal motion noted.   -No evidence of any pericardial effusion.   -Mild to moderate mitral regurgitation.   -Moderate tricuspid regurgitation.     Echo 6/16/2020:    -Normal left ventricle size, wall thickness, and systolic function with an   estimated ejection Implantable Loop Recorder  -  S/p MAZE and DELGADO ligation with Atriclip 2015 (Dr Lissy Quinonez)  - S/p RFCA with re-isolation of PV, roof line with PWI, CTI atrial flutter (7/15/20 Dr Valencia Redding)  -  Tx with Cardizem 60 mg TID, and Digoxin 125 mcg  - [id number] vasc score of 2. Warfarin stopped 9/2020 due to hx of GI bleed and is s/p DELGADO ligation  - ILR implant to monitor AF burden - had AF on 10/15 for two hrs. Plan: Interrogation up to 2/1/2021 revealed multiple episodes of tachycardia and paroxysmal atrial fibrillation. Reinterrogate device given complaints of additional palpitations within the past 2 to 3 days. May not be able to increase diltiazem much more given systolic blood pressure of 110 mmHg. Refer back to EP. 4. S/p Porcine Aortic Valve Replacement (1/2015) for aortic insufficiency  - 7/15/2020 and 6/16/2020 echo showed well seated bioprosthetic valve  - 9/9/20 Echo - bioprosthetic AVR appears well seated, max gradient 17 mmHg, mean gradient 11 mmHg. TULIO 1.97 cm2, No AI     Plan: Appears to be functioning well. 5. Mitral Regurgitation  - Moderate per echo 6/2019  - Moderate to severe per echo 6/2020  - Mild to moderate per limited echo 7/15/2020.    - Moderate to severe MR per echo 9/9/20    Plan: Repeat limited echo. 6. Obstructive Sleep Apnea  - Treated with CPAP    Plan: Continue current management. 7.  Tricuspid regurgitation  - mod to severe TR with RVSP 30 mmHg per echo 6/2020  - mod to severe TE with PASP 34 mmHg    Plan: Continue to observe. 8.  Hyperlipidemia  - Simvastatin stopped in 2015 due to possible interaction with Diltiazem  - Pravastatin 20 mg started, but no longer taking (reason unclear)  - last lipid 9/2018 - TC- 225, TG- 80, HDL- 69, LDL- 140    Plan : Start Crestor 10 mg nightly. Fasting lipid profile, LFTs and CK now and in 2 to 3 months after being on Crestor. Plan:  1. Start Crestor 10 mg nightly. 2.  Limited echo. 3.  Pulmonary function tests.   4.  Refer back to EP in regards to cardiac arrhythmia. 5.  NP in 3 months. MD in 6 months. Scribe's attestation: This note was scribed in the presence of Michael Resendiz M.D. by Fazal Holman RN     Physician Attestation: The scribe's documentation has been prepared under my direction and personally reviewed by me in its entirety. I confirm that the note above accurately reflects all work, treatment, procedures, and medical decision making performed by me. An  electronic signature was used to authenticate this note. Janay Palomino MD, [facility], [address]

## 2021-02-17 ENCOUNTER — TELEPHONE (OUTPATIENT)
Dept: CARDIOLOGY CLINIC | Age: 71
End: 2021-02-17

## 2021-02-17 ENCOUNTER — NURSE ONLY (OUTPATIENT)
Dept: CARDIOLOGY CLINIC | Age: 71
End: 2021-02-17
Payer: COMMERCIAL

## 2021-02-17 ENCOUNTER — OFFICE VISIT (OUTPATIENT)
Dept: CARDIOLOGY CLINIC | Age: 71
End: 2021-02-17
Payer: COMMERCIAL

## 2021-02-17 VITALS
DIASTOLIC BLOOD PRESSURE: 60 MMHG | BODY MASS INDEX: 21.84 KG/M2 | HEIGHT: 71 IN | HEART RATE: 57 BPM | WEIGHT: 156 LBS | SYSTOLIC BLOOD PRESSURE: 110 MMHG | OXYGEN SATURATION: 97 %

## 2021-02-17 DIAGNOSIS — G47.33 OBSTRUCTIVE SLEEP APNEA SYNDROME: ICD-10-CM

## 2021-02-17 DIAGNOSIS — I48.0 PAF (PAROXYSMAL ATRIAL FIBRILLATION) (HCC): Chronic | ICD-10-CM

## 2021-02-17 DIAGNOSIS — I34.0 MITRAL VALVE INSUFFICIENCY, UNSPECIFIED ETIOLOGY: Primary | ICD-10-CM

## 2021-02-17 DIAGNOSIS — Z45.09 ENCOUNTER FOR LOOP RECORDER CHECK: ICD-10-CM

## 2021-02-17 DIAGNOSIS — I25.10 NON-OCCLUSIVE CORONARY ARTERY DISEASE: ICD-10-CM

## 2021-02-17 DIAGNOSIS — Z95.2 S/P AVR: ICD-10-CM

## 2021-02-17 DIAGNOSIS — E78.2 MIXED HYPERLIPIDEMIA: ICD-10-CM

## 2021-02-17 DIAGNOSIS — Z45.09 ENCOUNTER FOR ELECTRONIC ANALYSIS OF REVEAL EVENT RECORDER: ICD-10-CM

## 2021-02-17 DIAGNOSIS — R06.02 SHORTNESS OF BREATH: ICD-10-CM

## 2021-02-17 DIAGNOSIS — I07.1 TRICUSPID VALVE INSUFFICIENCY, UNSPECIFIED ETIOLOGY: ICD-10-CM

## 2021-02-17 PROCEDURE — 93291 INTERROG DEV EVAL SCRMS IP: CPT | Performed by: INTERNAL MEDICINE

## 2021-02-17 PROCEDURE — 99214 OFFICE O/P EST MOD 30 MIN: CPT | Performed by: INTERNAL MEDICINE

## 2021-02-17 RX ORDER — ROSUVASTATIN CALCIUM 10 MG/1
10 TABLET, COATED ORAL DAILY
Qty: 30 TABLET | Refills: 5 | Status: SHIPPED | OUTPATIENT
Start: 2021-02-17 | End: 2021-08-11

## 2021-02-17 NOTE — TELEPHONE ENCOUNTER
Pt saw [facility] today, has appt with rmm in may, [facility] would like this appt moved up to discuss med changes if possible. Please advise a day and time. Thank you.

## 2021-02-17 NOTE — PATIENT INSTRUCTIONS
1.  No change in medications. Will need to discuss episodes of fast heart rate with Dr. Trini Bhagat who would decide if any med changes are needed  2. Check fasting lipids, liver enzymes, and CK  3.  Start Rosuvastatin (Crestor) 10 mg daily  4. Repeat fasting lipids, liver enzymes, and CK 2-3 months after starting Rosuvastatin  5. Limited Echo to check valve leakage  6. Try to move up appointment with Dr. Johanny Fabian to discuss medications  7.   Follow up with Dr. Alis Grayson in

## 2021-02-17 NOTE — LETTER
[address] Cardiology [facility]  [address] [address]  Phone: [phone]  Fax: [phone]    Modesto Gonzalez MD        2021     Nancy Daniel, [address] [address] [address] [facility] [address] [place] [address]    Patient: Renee Patricio  MR Number: [id number]  YOB: 1950  Date of Visit: 2021    Dear Dr. Nancy Daniel:    Below are the relevant portions of my assessment and plan of care. [address]    2021    Gaylan Pair Juanpablo Martínez (:  1950) is a 78 y.o. male is here for follow-up and management of CAD and modifiable risk factors. Referring Provider: Nancy Daniel MD    HISTORY: Mr. Juanpablo Martínez has a cardiac history of nonobstructive CAD ([facility] ), CHRISTOPHER, PAF s/p MAZE and DELGADO ligation and Atriclip (2015), aortic insufficiency (s/p AVR tissue 2015), and moderate MR. Other history includes squamous cell tongue CA and transient global amnesia. He was on Coumadin but it was stopped due to bleeding from his mouth and PEG tube. 2018 he wore a 2 wk monitor which showed brief SVT and WCT; no a fib. ILR was placed 2019. He developed recurrent A fib and had RFA with re- isolation of PV, roof line, complex atrial fractionated electrogram, inferior-posterior line with PWI, CTI atrial flutter with Dr Kristen Ruvalcaba 7/15/20. Echo 2020 showed worsening mitral regurgitation, now moderate to severe. Today, he denies chest discomfort. He has noted some shortness of breath and fatigue. He also has noted lightheadedness and dizziness which tend to occur at rest.  At the time of the lightheadedness and dizziness he notes fast heart rates/sense of palpitations. He states that when he is checked his blood pressure at that time he has been as low as systolic blood pressure of 67 mmHg. Interrogation of his ILR up to the beginning of 2021 shows multiple episodes of tachycardia and paroxysmal atrial fibrillation.  Aortic valve insufficiency     Arthritis     Atrial fibrillation (HCC)     Cancer (HCC) 2017    TONGUE head and neck IN REMISSION    Dysphonia     GERD (gastroesophageal reflux disease)     Hearing loss     Heart disease     Medical history reviewed with no changes     Obstructive apnea does not use CPAP    Oropharyngeal dysphagia     Refusal of blood transfusions as patient is Advent     Thyroid disease        Past Surgical History:   Procedure Laterality Date    ANKLE SURGERY      AORTIC VALVE REPLACEMENT  1/28/15    Dr. Geovanna Banegas 25mm Mosaic Ultra porcine valve; right and left modified maze procedure with ligation of DELGADO with Atriclip    ATRIAL ABLATION SURGERY      BACK SURGERY      CSP    CARDIAC CATHETERIZATION      HERNIA REPAIR      KNEE SURGERY      Arthroscopy    LUMBAR SPINE SURGERY Right 10/13/2020    RIGHT LUMBAR4-LUMBAR5 MICRO HEMILAMINECTOMY AND DISCECTOMY ([id number], [id number]) performed by Karen Alberts MD at [facility]      collapsed lung due to broken ribs    MANDIBLE FRACTURE SURGERY      NECK SURGERY      Fusion    PAIN MANAGEMENT PROCEDURE Right 2020    RIGHT L4 AND L5 TRANSFORAMINAL EPIDURAL STEROID INJECTION WITH FLUOROSCOPY ([id number],41590) performed by Shree Castillo MD at [address] Right 2020    RIGHT L4 AND L5 TRANSFORAMINAL EPIDURAL STEROID INJECTION WITH FLUOROSCOPY (24717,[id number]) performed by Shree Castillo MD at [address] [place] [address]         Social History     Tobacco Use    Smoking status: Former Smoker     Packs/day: 1.00     Years: 40.00     Pack years: 40.00     Types: Cigarettes     Start date: 1975     Quit date: 2015     Years since quittin.0    Smokeless tobacco: Never Used    Tobacco comment: Maintain cessation   Substance Use Topics    Alcohol use: No     Alcohol/week: 0.0 standard drinks     Frequency: Never     Binge frequency: Never     Comment: NA beer Component Value Date    WBC 4.5 09/29/2020    RBC 4.26 09/29/2020    HGB 13.6 09/29/2020    HCT 40.9 09/29/2020    MCV 96.0 09/29/2020    RDW 13.8 09/29/2020     09/29/2020     CMP:   Lab Results   Component Value Date     09/29/2020    K 4.2 09/29/2020     09/29/2020    CO2 27 09/29/2020    BUN 10 09/29/2020    CREATININE 0.5 09/29/2020    GFRAA >60 09/29/2020    GFRAA >60 12/26/2009    AGRATIO 1.6 07/15/2020    LABGLOM >60 09/29/2020    GLUCOSE 91 09/29/2020    PROT 6.6 07/15/2020    CALCIUM 9.3 09/29/2020    BILITOT 0.7 07/15/2020    ALKPHOS 43 07/15/2020    AST 14 07/15/2020    ALT 15 07/15/2020     Lab Results   Component Value Date    CHOL 225 09/29/2018    TRIG 80 09/29/2018    HDL 69 09/29/2018    HDL 52 12/27/2009    LDLCALC 140 09/29/2018       Echo 9/9/20:  Summary   Normal left ventricle size, wall thickness and systolic function with an   estimated ejection fraction of 60%. No regional wall motion abnormalities   are seen. E/e\"= 10. Moderate to severe mitral regurgitation. A bioprosthetic aortic valve appears well seated with a maximum gradient of   17 mmHg and a mean gradient of 11 mmHg. Aortic valve area of 1.97cm. No evidence of aortic valve regurgitation. The aortic root is mildly dilated. 3.7cm   The ascending aorta is mildly dilated. 4.2cm   The right ventricle is mildly enlarged but normal in function. Moderate to severe tricuspid regurgitation. PASP 34mmHg. The right atrium is mildly dilated. IVC size is normal (<2.1 cm) but collapses < 50% with respiration consistent   with elevated RA pressure (8 mmHg). Carl Mcintyreu myoview 8/26/20:  Summary    -There is a small fixed inferior apical defect that is likely bowel artifact    rather than scar.    -LV function is normal with no regional abnormalities and EF=64%.  -Low risk study.        ECG 8/18/2020:   - NSR.  Rate 85, QTc 389     Limited Echo 7/15/2020:  -Limited echocardiogram was performed to rule out pericardial effusion,   status post cardiac ablation today.   -Normal left ventricle size, wall thickness and systolic function with an   estimated ejection fraction of 55%.  -No regional wall motion abnormalities are seen.   -Abnormal (paradoxical) septal motion noted.   -No evidence of any pericardial effusion.   -Mild to moderate mitral regurgitation.   -Moderate tricuspid regurgitation.     Echo 6/16/2020:    -Normal left ventricle size, wall thickness, and systolic function with an   estimated ejection fraction of 60-65%.  -No regional wall motion abnormalities are seen.   -Indeterminate diastolic function.   -E/e'=7   -Moderately severe mitral regurgitation   -The bioprosthetic artificial aortic valve appears well seated   -There is moderate-to-severe tricuspid regurgitation with a RVSP estimation   of 30mmHg.     Echo 6/18/2019:   -Normal left ventricle size, wall thickness, and systolic function with an estimated ejection fraction of 60-65%.  -No regional wall motion abnormalities are seen.   -The bioprosthetic artificial aortic valve appears well seated with a maximum velocity of 2.63m/s and a mean gradient of 16 mmHg. The aortic valve area is estimated at 0.89 cm^2. No significant regurgitation noted. Aortic valve leaflets are thickened   -Thickened mitral valve without evidence of stenosis. There is at least moderate mitral regurgitation noted.   -There is moderate-to-severe tricuspid regurgitation with a RVSP estimation of 38 mmHg.   -Normal diastolic function. Avg. E/e'=7.25    Cardiac CT: 6/17/20  No stenosis at the pulmonary vein origins.         Clip is seen in the left atrial appendage,.  No thrombus seen in this region.         Mildly dilated aorta. .  Post valve replacement changes are noted                MCOT: 9/6/18                            1st degree HB, HT 68                                            PVC burden <1%            PAC burden 1%                                     VT 11 bts at 118           GXT: 10/15  Normal myocardial perfusion.    Normal LV function.      Cath: 2015  Non-obstructive CAD       ASSESSMENT/PLAN:  1. Coronary Artery Disease  - Nonobstructive CAD by [facility] 2015  - 8/26/20 Lexiscan myoview - small fixed inferior apical defect likely bowel artifact than scar. No reported reversible ischemia. EF normal  - 9/9/20 Echo - EF 60%    Plan: Clinically asymptomatic. Risk factor modification. Resume statin for goal LDL less than 100.    2. Paroxysmal Atrial Fib  3. Implantable Loop Recorder  -  S/p MAZE and DELGADO ligation with Atriclip 2015 (Dr Chicho Mallory)  - S/p RFCA with re-isolation of PV, roof line with PWI, CTI atrial flutter (7/15/20 Dr Damián Hurd)  -  Tx with Cardizem 60 mg TID, and Digoxin 125 mcg  - [id number] vasc score of 2. Warfarin stopped 9/2020 due to hx of GI bleed and is s/p DELGADO ligation  - ILR implant to monitor AF burden - had AF on 10/15 for two hrs. Plan: Interrogation up to 2/1/2021 revealed multiple episodes of tachycardia and paroxysmal atrial fibrillation. Reinterrogate device given complaints of additional palpitations within the past 2 to 3 days. May not be able to increase diltiazem much more given systolic blood pressure of 110 mmHg. Refer back to EP. 4. S/p Porcine Aortic Valve Replacement (1/2015) for aortic insufficiency  - 7/15/2020 and 6/16/2020 echo showed well seated bioprosthetic valve  - 9/9/20 Echo - bioprosthetic AVR appears well seated, max gradient 17 mmHg, mean gradient 11 mmHg. TULIO 1.97 cm2, No AI     Plan: Appears to be functioning well. 5. Mitral Regurgitation  - Moderate per echo 6/2019  - Moderate to severe per echo 6/2020  - Mild to moderate per limited echo 7/15/2020.    - Moderate to severe MR per echo 9/9/20    Plan: Repeat limited echo. 6. Obstructive Sleep Apnea  - Treated with CPAP    Plan: Continue current management.     7.  Tricuspid regurgitation - mod to severe TR with RVSP 30 mmHg per echo 6/2020  - mod to severe TE with PASP 34 mmHg    Plan: Continue to observe. 8.  Hyperlipidemia  - Simvastatin stopped in 2015 due to possible interaction with Diltiazem  - Pravastatin 20 mg started, but no longer taking (reason unclear)  - last lipid 9/2018 - TC- 225, TG- 80, HDL- 69, LDL- 140    Plan : Start Crestor 10 mg nightly. Fasting lipid profile, LFTs and CK now and in 2 to 3 months after being on Crestor. Plan:  1. Start Crestor 10 mg nightly. 2.  Limited echo. 3.  Pulmonary function tests. 4.  Refer back to EP in regards to cardiac arrhythmia. 5.  NP in 3 months. MD in 6 months. Scribe's attestation: This note was scribed in the presence of Henry Pascal M.D. by Henrry Tam RN     Physician Attestation: The scribe's documentation has been prepared under my direction and personally reviewed by me in its entirety. I confirm that the note above accurately reflects all work, treatment, procedures, and medical decision making performed by me. An  electronic signature was used to authenticate this note. Palma Simpson MD, Pérez Tiwari      If you have questions, please do not hesitate to call me. I look forward to following Emily Shelby along with you.     Sincerely,        Henry Pascal MD

## 2021-02-17 NOTE — PROGRESS NOTES
Patient comes in for interrogation of their implanted loop recorder. Interrogation shows many symptoms recordings that appear to be ectopy. Implanted for AF management. Patient remains on Cardizem and digoxin. Patient not on [facility] [place] medication- Had MAZE procedure. Please see interrogation for more detail. Patient will see Dr. Reilly Bojorquez today and we will continue to follow the Patient remotely.

## 2021-02-23 ENCOUNTER — HOSPITAL ENCOUNTER (OUTPATIENT)
Age: 71
Discharge: HOME | End: 2021-02-23
Payer: COMMERCIAL

## 2021-02-23 DIAGNOSIS — E78.2 MIXED HYPERLIPIDEMIA: ICD-10-CM

## 2021-02-23 LAB
ALT SERPL-CCNC: 11 U/L (ref 10–40)
AST SERPL-CCNC: 16 U/L (ref 15–37)
CHOLESTEROL, TOTAL: 208 MG/DL (ref 0–199)
HDLC SERPL-MCNC: 51 MG/DL (ref 40–60)
LDL CHOLESTEROL CALCULATED: 140 MG/DL
TOTAL CK: 64 U/L (ref 39–308)
TRIGL SERPL-MCNC: 85 MG/DL (ref 0–150)
VLDLC SERPL CALC-MCNC: 17 MG/DL

## 2021-02-23 PROCEDURE — 82550 ASSAY OF CK (CPK): CPT

## 2021-02-23 PROCEDURE — 84450 TRANSFERASE (AST) (SGOT): CPT

## 2021-02-23 PROCEDURE — 80061 LIPID PANEL: CPT

## 2021-02-23 PROCEDURE — 36415 COLL VENOUS BLD VENIPUNCTURE: CPT

## 2021-02-23 PROCEDURE — 84460 ALANINE AMINO (ALT) (SGPT): CPT

## 2021-02-24 ENCOUNTER — PROCEDURE VISIT (OUTPATIENT)
Dept: CARDIOLOGY CLINIC | Age: 71
End: 2021-02-24
Payer: COMMERCIAL

## 2021-02-24 ENCOUNTER — TELEPHONE (OUTPATIENT)
Dept: CARDIOLOGY CLINIC | Age: 71
End: 2021-02-24

## 2021-02-24 DIAGNOSIS — I34.0 MITRAL VALVE INSUFFICIENCY, UNSPECIFIED ETIOLOGY: ICD-10-CM

## 2021-02-24 DIAGNOSIS — R06.02 SHORTNESS OF BREATH: ICD-10-CM

## 2021-02-24 PROCEDURE — 93321 DOPPLER ECHO F-UP/LMTD STD: CPT | Performed by: INTERNAL MEDICINE

## 2021-02-24 PROCEDURE — 93308 TTE F-UP OR LMTD: CPT | Performed by: INTERNAL MEDICINE

## 2021-02-24 PROCEDURE — 93325 DOPPLER ECHO COLOR FLOW MAPG: CPT | Performed by: INTERNAL MEDICINE

## 2021-02-24 NOTE — TELEPHONE ENCOUNTER
Left message on recorder about fasting lipid results showing elevated TG and LDL. These results were baseline results as Dr. Claudeen Nation started Rosuvastatin 10 mg at 2/17/21 office visit. Goal LDL is at least < 100 but with DM or pre-DM the goal is < 70. Reminded patient to get fasting lipids, liver enzymes, and CK drawn in 2-3 month to assess effectiveness of statin dose. Instructed to call office back if he had questions.

## 2021-02-28 NOTE — PROGRESS NOTES
[facility]   Electrophysiology Follow up   Date: 3/2/2021  I had the privilege of visiting Jack Cazares in the office. CC: Afib   HPI: Jack Cazares is a 78 y.o. male with a past medical history of atrial fibrillation, non-obstructive CAD, CHRISTOPHER, aortic valve insufficiency, and squamous cell cancer. In 2015, he underwent AVR with porcine valve, right/left modified maze procedure with DELGADO ligation with Atriclip (1/28/15, Dr. Erci Zarate). He was on coumadin, but this was stopped due to bleeding from his mouth and PEG tube. In September of 2018, a 2 week monitor showed brief SVT and WCT, but no AF. S/p ILR implant (4/22/19). He developed recurrent symptomatic AF. S/p RFCA with re-isolation of PV, roof line, complex atrial fractionated electogram, inferior-posterior line with PWI, CTI atrial flutter (7/15/20)    Eloina Adam presents to the office in follow up. He states for the past three nights in a row he has noticed chest pain and shortness of breath. These symptoms are keeping him up at night. He tries to stay active walking around his home. Discussed that he does not have arrhythmia on his device and much of these symptoms could be related to his valvular disease. His pain is a pressure in his chest that has radiated to his left arm. His shortness of breath occurs off and on daily. Assessment and plan:   Shortness of breath/Chest pain   - Complaints of ongoing chest pain and shortness of breath, feels like pressure in his chest and pain down his left arm.   - In conjunctions with dizziness   These episodes are not associated with any arrhythmia. Loop recorder interrogated. At the time of his symptoms he has remained in sinus rhythm. He has valvular heart disease including severe tricuspid regurgitation and moderate to severe mitral regurgitation.     He is also being evaluated for COPD with PFT test.      Follow up with      Paroxysmal Atrial Fibrillation   - ECG today shows 07/28/2020    Cervical stenosis of spine 03/09/2020    Metastatic squamous cell carcinoma ([facility] 02/18/2020    Chronic obstructive pulmonary disease [facility] 02/12/2019    Sensorineural hearing loss (SNHL) of both ears 01/29/2019    History of tobacco abuse 04/27/2018    Squamous cell carcinoma of head and neck ([facility] 12/14/2017    Chronic pain due to neoplasm 12/14/2017    Refusal of blood transfusions as patient is Mandaeism 12/14/2017    Osteoarthritis of cervical spine 03/21/2016    Primary osteoarthritis of both knees 03/21/2016    Obstructive sleep apnea syndrome     Transient ischemic attack (TIA)     Chronic ischemic heart disease     Essential hypertension     Mixed hyperlipidemia 02/27/2015    PAF (paroxysmal atrial fibrillation) ([facility] 01/24/2015     Diagnostic studies:     ECG:3/2/21  Sinus rhythm 1st degree AV block     Limited echo 2/14/2021   -Limited echocardiogram was performed to evaluate mitral regurgitation.   -Normal left ventricle size and systolic function with an estimated ejection   fraction of 60%. -No regional wall motion abnormalities are seen. -The tips of the mitral leaflet appears to be slightly myxomatous and   minimal prolapse of posterior leaflet. -Moderate to severe anteriorly directed mitral regurgitation (appears   similar when compared to last echocardiogram dated September 2020). ERO=0.25   -A bioprosthetic artificial aortic valve appears well seated with a maximum   velocity of 2.4m/s and a mean gradient of 13mmHg. -Moderate to severe tricuspid regurgitation.   -Estimated pulmonary artery systolic pressure is mildly elevated at 38 mmHg   assuming a right atrial pressure of 8 mmHg. -The right ventricle is mildly enlarged.   -The right atrium is moderately dilated. Echo: 9/9/2020  Normal left ventricle size, wall thickness and systolic function with an estimated ejection fraction of 60%. No regional wall motion abnormalities are seen.  E/e\"= pulmonary vein origins.       Clip is seen in the left atrial appendage,.  No thrombus seen in this region.       Mildly dilated aorta. .  Post valve replacement changes are noted         I independently reviewed the cardiac diagnostic studies, ECG and relevant imaging studies. Lab Results   Component Value Date    LVEF 60 09/09/2020    LVEFMODE Echo 01/28/2019     Lab Results   Component Value Date    TSH 2.735 05/21/2020         Physical Examination:  Vitals:    03/02/21 0915   BP: 95/65   Pulse: 77   SpO2: 97%      Wt Readings from Last 3 Encounters:   03/02/21 148 lb 6.4 oz (67.3 kg)   02/17/21 156 lb (70.8 kg)   11/24/20 154 lb (69.9 kg)       · Constitutional: Oriented. No distress. · Head: Normocephalic and atraumatic. · Mouth/Throat: Oropharynx is clear and moist.   · Eyes: Conjunctivae normal. EOM are normal.   · Neck: Neck supple. No JVD present. · Cardiovascular: Normal rate, regular rhythm, J1&H6.  + systolic murmur   · Pulmonary/Chest: Bilateral respiratory sounds. No rhonchi. · Abdominal: Soft. No tenderness. · Musculoskeletal: No tenderness. No edema    · Lymphadenopathy: Has no cervical adenopathy. · Neurological: Alert and oriented. Follows command, No Gross deficit   · Skin: Skin is warm, No rash noted. · Psychiatric: Has a normal behavior     Review of System:  [x] Full ROS obtained and negative except as mentioned in HPI    Prior to Admission medications    Medication Sig Start Date End Date Taking? Authorizing Provider   rosuvastatin (CRESTOR) 10 MG tablet Take 1 tablet by mouth daily 2/17/21  Yes Mayra Puente MD   levothyroxine (SYNTHROID) 100 MCG tablet TAKE 1 TABLET BY MOUTH EVERY DAY 2/12/21  Yes Anastacio Foss MD   oxyCODONE-acetaminophen (PERCOCET) 5-325 MG per tablet Take 1 tablet by mouth every 8 hours as needed for Pain for up to 30 days. Intended supply: 3 days.  Take lowest dose possible to manage pain 2/1/21 3/3/21 Yes Anastacio Foss MD   digoxin (LANOXIN) 125 MCG tablet Take 1 tablet by mouth daily 1/5/21  Yes ISIDRA Liu CNP   aspirin (ASPIRIN LOW DOSE) 81 MG EC tablet ASPIRIN EC LOW DOSE 81 MG TBEC 11/12/20  Yes Historical Provider, MD   fluticasone (FLOVENT HFA) 110 MCG/ACT inhaler Inhale 1 puff into the lungs 2 times daily  Patient taking differently: Inhale 1 puff into the lungs daily  11/16/20  Yes Latrice Anderson MD   naloxone 4 MG/0.1ML LIQD nasal spray 1 spray by Nasal route as needed for Opioid Reversal 11/16/20  Yes Latrice Anderson MD   dilTIAZem (CARDIZEM 12 HR) 60 MG extended release capsule Take 1 capsule by mouth 3 times daily 10/6/20  Yes Linden Aponte MD   GABAPENTIN PO Take 300 mg by mouth three times daily    Yes Historical Provider, MD   Cod Liver Oil OIL Take 1 tablet by mouth daily    Yes Historical Provider, MD   docusate sodium (COLACE) 100 MG capsule Take 100 mg by mouth 2 times daily. Yes Historical Provider, MD   methocarbamol (ROBAXIN) 500 MG tablet TAKE 1 TABLET BY MOUTH FOUR TIMES A DAY AS NEEDED 11/10/20   Historical Provider, MD   pantoprazole (PROTONIX) 40 MG tablet Take 1 tablet by mouth every morning (before breakfast)  Patient not taking: Reported on 3/2/2021 7/15/20   Nini ISIDRA Sanchez CNP       Allergies   Allergen Reactions    Naproxen Swelling     Lips    Pt does not recognize this being an intolerance    Hydrocodone-Acetaminophen Itching       Social History:  Reviewed. reports that he quit smoking about 6 years ago. His smoking use included cigarettes. He started smoking about 46 years ago. He has a 40.00 pack-year smoking history. He has never used smokeless tobacco. He reports that he does not drink alcohol or use drugs. Family History:  Reviewed. Reviewed. No family history of SCD. Relevant and available labs, and cardiovascular diagnostics reviewed. Reviewed.      I independently reviewed relevant and available cardiac diagnostic tests ECG, CXR, Echo, Stress test, Device interrogation, Holter, CT scan. Complex medical condition with multiple medical problems affecting prognosis and outcome of EP interventions    All questions and concerns were addressed to the patient/family. Alternatives to my treatment were discussed. I have discussed the above stated plan and the patient verbalized understanding and agreed with the plan. Scribe attestation: This note was scribed in the presence of Nikki Velasquez MD by Afua Márquez RN  Physician Attestation: I, Dr. Nikki Velasquez, confirm that the scribe's documentation has been prepared under my direction and personally reviewed by me in its entirety. I also confirm that the note above accurately reflects all work, treatment, procedures, and medical decision making performed by me. NOTE: This report was transcribed using voice recognition software. Every effort was made to ensure accuracy, however, inadvertent computerized transcription errors may be present.      Nikki Velasquez MD, MPH  [facility]   Office: [phone]  Fax: [phone]

## 2021-03-02 ENCOUNTER — TELEPHONE (OUTPATIENT)
Dept: CARDIOLOGY CLINIC | Age: 71
End: 2021-03-02

## 2021-03-02 ENCOUNTER — NURSE ONLY (OUTPATIENT)
Dept: CARDIOLOGY CLINIC | Age: 71
End: 2021-03-02
Payer: COMMERCIAL

## 2021-03-02 ENCOUNTER — OFFICE VISIT (OUTPATIENT)
Dept: CARDIOLOGY CLINIC | Age: 71
End: 2021-03-02
Payer: COMMERCIAL

## 2021-03-02 VITALS
HEIGHT: 71 IN | SYSTOLIC BLOOD PRESSURE: 95 MMHG | OXYGEN SATURATION: 97 % | WEIGHT: 148.4 LBS | HEART RATE: 77 BPM | BODY MASS INDEX: 20.77 KG/M2 | DIASTOLIC BLOOD PRESSURE: 65 MMHG

## 2021-03-02 DIAGNOSIS — I34.0 NONRHEUMATIC MITRAL VALVE REGURGITATION: ICD-10-CM

## 2021-03-02 DIAGNOSIS — Z45.09 ENCOUNTER FOR LOOP RECORDER CHECK: ICD-10-CM

## 2021-03-02 DIAGNOSIS — Z45.09 ENCOUNTER FOR ELECTRONIC ANALYSIS OF REVEAL EVENT RECORDER: ICD-10-CM

## 2021-03-02 DIAGNOSIS — G45.9 TRANSIENT ISCHEMIC ATTACK (TIA): Chronic | ICD-10-CM

## 2021-03-02 DIAGNOSIS — I48.0 PAF (PAROXYSMAL ATRIAL FIBRILLATION) (HCC): Primary | Chronic | ICD-10-CM

## 2021-03-02 DIAGNOSIS — G47.33 OBSTRUCTIVE SLEEP APNEA SYNDROME: Chronic | ICD-10-CM

## 2021-03-02 DIAGNOSIS — Z95.2 S/P AVR: ICD-10-CM

## 2021-03-02 DIAGNOSIS — I48.0 PAF (PAROXYSMAL ATRIAL FIBRILLATION) (HCC): Chronic | ICD-10-CM

## 2021-03-02 DIAGNOSIS — E78.2 MIXED HYPERLIPIDEMIA: ICD-10-CM

## 2021-03-02 DIAGNOSIS — I10 ESSENTIAL HYPERTENSION: Chronic | ICD-10-CM

## 2021-03-02 PROCEDURE — 99214 OFFICE O/P EST MOD 30 MIN: CPT | Performed by: INTERNAL MEDICINE

## 2021-03-02 PROCEDURE — 93291 INTERROG DEV EVAL SCRMS IP: CPT | Performed by: INTERNAL MEDICINE

## 2021-03-02 NOTE — TELEPHONE ENCOUNTER
RMM wants pt to see [facility] soon. [facility] has no availability. Please advise day and time. Thank you.

## 2021-03-02 NOTE — PROGRESS NOTES
Patient comes in for interrogation of their implanted loop recorder. Interrogation shows many symptoms recordings that appear to be little ectopy. ST noted. Implanted for AF management. Patient remains on Cardizem and digoxin. [facility]- Had MAZE procedure. Please see interrogation for more detail. Patient will see Dr. Raejean Aschoff today and we will continue to follow the Patient remotely.

## 2021-03-04 ENCOUNTER — NURSE TRIAGE (OUTPATIENT)
Dept: OTHER | Facility: CLINIC | Age: 71
End: 2021-03-04

## 2021-03-04 ENCOUNTER — APPOINTMENT (OUTPATIENT)
Dept: GENERAL RADIOLOGY | Age: 71
DRG: 287 | End: 2021-03-04
Payer: COMMERCIAL

## 2021-03-04 ENCOUNTER — HOSPITAL ENCOUNTER (INPATIENT)
Age: 71
LOS: 4 days | Discharge: HOME | DRG: 287 | End: 2021-03-08
Attending: INTERNAL MEDICINE | Admitting: INTERNAL MEDICINE
Payer: COMMERCIAL

## 2021-03-04 DIAGNOSIS — R07.9 CHEST PAIN, UNSPECIFIED TYPE: Primary | ICD-10-CM

## 2021-03-04 DIAGNOSIS — G89.3 CHRONIC PAIN DUE TO NEOPLASM: Primary | ICD-10-CM

## 2021-03-04 LAB
A/G RATIO: 1.6 (ref 1.1–2.2)
ALBUMIN SERPL-MCNC: 4.2 G/DL (ref 3.4–5)
ALP BLD-CCNC: 45 U/L (ref 40–129)
ALT SERPL-CCNC: 10 U/L (ref 10–40)
ANION GAP SERPL CALCULATED.3IONS-SCNC: 8 MMOL/L (ref 3–16)
APTT: 29.5 SEC (ref 24.2–36.2)
AST SERPL-CCNC: 14 U/L (ref 15–37)
BASOPHILS ABSOLUTE: 0.1 K/UL (ref 0–0.2)
BASOPHILS RELATIVE PERCENT: 1 %
BILIRUB SERPL-MCNC: 0.6 MG/DL (ref 0–1)
BUN BLDV-MCNC: 12 MG/DL (ref 7–20)
CALCIUM SERPL-MCNC: 9.4 MG/DL (ref 8.3–10.6)
CHLORIDE BLD-SCNC: 102 MMOL/L (ref 99–110)
CO2: 25 MMOL/L (ref 21–32)
CREAT SERPL-MCNC: 0.5 MG/DL (ref 0.8–1.3)
DIGOXIN LEVEL: 0.5 NG/ML (ref 0.8–2)
EOSINOPHILS ABSOLUTE: 0.1 K/UL (ref 0–0.6)
EOSINOPHILS RELATIVE PERCENT: 2.3 %
GFR AFRICAN AMERICAN: >60
GFR NON-AFRICAN AMERICAN: >60
GLOBULIN: 2.6 G/DL
GLUCOSE BLD-MCNC: 120 MG/DL (ref 70–99)
HCT VFR BLD CALC: 40 % (ref 40.5–52.5)
HEMOGLOBIN: 13.3 G/DL (ref 13.5–17.5)
INR BLD: 1.05 (ref 0.86–1.14)
LYMPHOCYTES ABSOLUTE: 1.4 K/UL (ref 1–5.1)
LYMPHOCYTES RELATIVE PERCENT: 24 %
MCH RBC QN AUTO: 31.5 PG (ref 26–34)
MCHC RBC AUTO-ENTMCNC: 33.2 G/DL (ref 31–36)
MCV RBC AUTO: 94.8 FL (ref 80–100)
MONOCYTES ABSOLUTE: 0.5 K/UL (ref 0–1.3)
MONOCYTES RELATIVE PERCENT: 8.4 %
NEUTROPHILS ABSOLUTE: 3.8 K/UL (ref 1.7–7.7)
NEUTROPHILS RELATIVE PERCENT: 64.3 %
PDW BLD-RTO: 13.6 % (ref 12.4–15.4)
PLATELET # BLD: 194 K/UL (ref 135–450)
PMV BLD AUTO: 7.4 FL (ref 5–10.5)
POTASSIUM SERPL-SCNC: 3.7 MMOL/L (ref 3.5–5.1)
PRO-BNP: 62 PG/ML (ref 0–124)
PROTHROMBIN TIME: 12.2 SEC (ref 10–13.2)
RBC # BLD: 4.22 M/UL (ref 4.2–5.9)
SODIUM BLD-SCNC: 135 MMOL/L (ref 136–145)
TOTAL PROTEIN: 6.8 G/DL (ref 6.4–8.2)
TROPONIN: <0.01 NG/ML
TROPONIN: <0.01 NG/ML
WBC # BLD: 5.9 K/UL (ref 4–11)

## 2021-03-04 PROCEDURE — 83880 ASSAY OF NATRIURETIC PEPTIDE: CPT

## 2021-03-04 PROCEDURE — 2580000003 HC RX 258: Performed by: INTERNAL MEDICINE

## 2021-03-04 PROCEDURE — 84484 ASSAY OF TROPONIN QUANT: CPT

## 2021-03-04 PROCEDURE — 85610 PROTHROMBIN TIME: CPT

## 2021-03-04 PROCEDURE — 93005 ELECTROCARDIOGRAM TRACING: CPT | Performed by: INTERNAL MEDICINE

## 2021-03-04 PROCEDURE — 80162 ASSAY OF DIGOXIN TOTAL: CPT

## 2021-03-04 PROCEDURE — 85025 COMPLETE CBC W/AUTO DIFF WBC: CPT

## 2021-03-04 PROCEDURE — 1200000000 HC SEMI PRIVATE

## 2021-03-04 PROCEDURE — 36415 COLL VENOUS BLD VENIPUNCTURE: CPT

## 2021-03-04 PROCEDURE — 6370000000 HC RX 637 (ALT 250 FOR IP): Performed by: NURSE PRACTITIONER

## 2021-03-04 PROCEDURE — 80053 COMPREHEN METABOLIC PANEL: CPT

## 2021-03-04 PROCEDURE — 99284 EMERGENCY DEPT VISIT MOD MDM: CPT

## 2021-03-04 PROCEDURE — 6370000000 HC RX 637 (ALT 250 FOR IP): Performed by: PHYSICIAN ASSISTANT

## 2021-03-04 PROCEDURE — 71046 X-RAY EXAM CHEST 2 VIEWS: CPT

## 2021-03-04 PROCEDURE — 85730 THROMBOPLASTIN TIME PARTIAL: CPT

## 2021-03-04 PROCEDURE — 6370000000 HC RX 637 (ALT 250 FOR IP): Performed by: INTERNAL MEDICINE

## 2021-03-04 RX ORDER — DIGOXIN 125 MCG
125 TABLET ORAL DAILY
Status: DISCONTINUED | OUTPATIENT
Start: 2021-03-05 | End: 2021-03-08 | Stop reason: HOSPADM

## 2021-03-04 RX ORDER — FLUTICASONE PROPIONATE 110 UG/1
1 AEROSOL, METERED RESPIRATORY (INHALATION) 2 TIMES DAILY
Status: DISCONTINUED | OUTPATIENT
Start: 2021-03-04 | End: 2021-03-08 | Stop reason: HOSPADM

## 2021-03-04 RX ORDER — NITROGLYCERIN 0.4 MG/1
0.4 TABLET SUBLINGUAL EVERY 5 MIN PRN
Status: DISCONTINUED | OUTPATIENT
Start: 2021-03-04 | End: 2021-03-08 | Stop reason: HOSPADM

## 2021-03-04 RX ORDER — ASPIRIN 325 MG
325 TABLET ORAL ONCE
Status: COMPLETED | OUTPATIENT
Start: 2021-03-04 | End: 2021-03-04

## 2021-03-04 RX ORDER — OXYCODONE HYDROCHLORIDE AND ACETAMINOPHEN 5; 325 MG/1; MG/1
1 TABLET ORAL 2 TIMES DAILY PRN
Qty: 60 TABLET | Refills: 0 | Status: SHIPPED | OUTPATIENT
Start: 2021-03-04 | End: 2021-04-15 | Stop reason: SDUPTHER

## 2021-03-04 RX ORDER — PROMETHAZINE HYDROCHLORIDE 25 MG/1
12.5 TABLET ORAL EVERY 6 HOURS PRN
Status: DISCONTINUED | OUTPATIENT
Start: 2021-03-04 | End: 2021-03-08 | Stop reason: HOSPADM

## 2021-03-04 RX ORDER — SODIUM CHLORIDE 0.9 % (FLUSH) 0.9 %
10 SYRINGE (ML) INJECTION PRN
Status: DISCONTINUED | OUTPATIENT
Start: 2021-03-04 | End: 2021-03-08 | Stop reason: HOSPADM

## 2021-03-04 RX ORDER — ASPIRIN 81 MG/1
81 TABLET, CHEWABLE ORAL DAILY
Status: DISCONTINUED | OUTPATIENT
Start: 2021-03-05 | End: 2021-03-08 | Stop reason: HOSPADM

## 2021-03-04 RX ORDER — ACETAMINOPHEN 325 MG/1
650 TABLET ORAL EVERY 6 HOURS PRN
Status: DISCONTINUED | OUTPATIENT
Start: 2021-03-04 | End: 2021-03-08 | Stop reason: HOSPADM

## 2021-03-04 RX ORDER — BUDESONIDE 0.5 MG/2ML
0.5 INHALANT ORAL DAILY
Status: DISCONTINUED | OUTPATIENT
Start: 2021-03-05 | End: 2021-03-04

## 2021-03-04 RX ORDER — ROSUVASTATIN CALCIUM 10 MG/1
10 TABLET, COATED ORAL DAILY
Status: DISCONTINUED | OUTPATIENT
Start: 2021-03-05 | End: 2021-03-08 | Stop reason: HOSPADM

## 2021-03-04 RX ORDER — DOCUSATE SODIUM 100 MG/1
100 CAPSULE, LIQUID FILLED ORAL 2 TIMES DAILY
Status: DISCONTINUED | OUTPATIENT
Start: 2021-03-04 | End: 2021-03-08 | Stop reason: HOSPADM

## 2021-03-04 RX ORDER — METHOCARBAMOL 500 MG/1
500 TABLET, FILM COATED ORAL 4 TIMES DAILY
Status: DISCONTINUED | OUTPATIENT
Start: 2021-03-04 | End: 2021-03-08 | Stop reason: HOSPADM

## 2021-03-04 RX ORDER — ONDANSETRON 2 MG/ML
4 INJECTION INTRAMUSCULAR; INTRAVENOUS EVERY 6 HOURS PRN
Status: DISCONTINUED | OUTPATIENT
Start: 2021-03-04 | End: 2021-03-08 | Stop reason: HOSPADM

## 2021-03-04 RX ORDER — DILTIAZEM HYDROCHLORIDE 60 MG/1
60 CAPSULE, EXTENDED RELEASE ORAL 3 TIMES DAILY
Status: DISCONTINUED | OUTPATIENT
Start: 2021-03-04 | End: 2021-03-08 | Stop reason: HOSPADM

## 2021-03-04 RX ORDER — FLUTICASONE PROPIONATE 110 UG/1
1 AEROSOL, METERED RESPIRATORY (INHALATION) DAILY
Status: DISCONTINUED | OUTPATIENT
Start: 2021-03-05 | End: 2021-03-04 | Stop reason: CLARIF

## 2021-03-04 RX ORDER — LEVOTHYROXINE SODIUM 0.1 MG/1
100 TABLET ORAL DAILY
Status: DISCONTINUED | OUTPATIENT
Start: 2021-03-05 | End: 2021-03-08 | Stop reason: HOSPADM

## 2021-03-04 RX ORDER — ACETAMINOPHEN 650 MG/1
650 SUPPOSITORY RECTAL EVERY 6 HOURS PRN
Status: DISCONTINUED | OUTPATIENT
Start: 2021-03-04 | End: 2021-03-08 | Stop reason: HOSPADM

## 2021-03-04 RX ORDER — OXYCODONE HYDROCHLORIDE AND ACETAMINOPHEN 5; 325 MG/1; MG/1
1 TABLET ORAL 2 TIMES DAILY PRN
Status: DISCONTINUED | OUTPATIENT
Start: 2021-03-04 | End: 2021-03-06

## 2021-03-04 RX ORDER — SODIUM CHLORIDE 0.9 % (FLUSH) 0.9 %
10 SYRINGE (ML) INJECTION EVERY 12 HOURS SCHEDULED
Status: DISCONTINUED | OUTPATIENT
Start: 2021-03-04 | End: 2021-03-08 | Stop reason: HOSPADM

## 2021-03-04 RX ORDER — POLYETHYLENE GLYCOL 3350 17 G/17G
17 POWDER, FOR SOLUTION ORAL DAILY PRN
Status: DISCONTINUED | OUTPATIENT
Start: 2021-03-04 | End: 2021-03-08 | Stop reason: HOSPADM

## 2021-03-04 RX ADMIN — DILTIAZEM HYDROCHLORIDE 60 MG: 60 CAPSULE, EXTENDED RELEASE ORAL at 22:56

## 2021-03-04 RX ADMIN — Medication 10 ML: at 22:57

## 2021-03-04 RX ADMIN — DOCUSATE SODIUM 100 MG: 100 CAPSULE ORAL at 22:56

## 2021-03-04 RX ADMIN — ASPIRIN 325 MG ORAL TABLET 325 MG: 325 PILL ORAL at 17:56

## 2021-03-04 RX ADMIN — METHOCARBAMOL TABLETS 500 MG: 500 TABLET, COATED ORAL at 22:56

## 2021-03-04 RX ADMIN — OXYCODONE HYDROCHLORIDE AND ACETAMINOPHEN 1 TABLET: 5; 325 TABLET ORAL at 22:56

## 2021-03-04 ASSESSMENT — PAIN DESCRIPTION - LOCATION
LOCATION: CHEST
LOCATION: CHEST

## 2021-03-04 ASSESSMENT — ENCOUNTER SYMPTOMS
CHEST TIGHTNESS: 1
WHEEZING: 0
RHINORRHEA: 0
SHORTNESS OF BREATH: 1
ABDOMINAL PAIN: 0
COUGH: 0
VOMITING: 0
NAUSEA: 0
DIARRHEA: 0

## 2021-03-04 ASSESSMENT — PAIN DESCRIPTION - PAIN TYPE: TYPE: ACUTE PAIN

## 2021-03-04 ASSESSMENT — HEART SCORE: ECG: 1

## 2021-03-04 ASSESSMENT — PAIN DESCRIPTION - PROGRESSION: CLINICAL_PROGRESSION: NOT CHANGED

## 2021-03-04 ASSESSMENT — PAIN DESCRIPTION - DESCRIPTORS: DESCRIPTORS: PRESSURE;TIGHTNESS

## 2021-03-04 ASSESSMENT — PAIN DESCRIPTION - FREQUENCY: FREQUENCY: CONTINUOUS

## 2021-03-04 NOTE — PROGRESS NOTES
Pt seen in  ED, admission completed. Pt is alert and oriented x 3. Pt lives at home alone and is being admitted for chest pain. Plan of care updated, all questions answered.

## 2021-03-04 NOTE — ED PROVIDER NOTES
I did not see or evaluate this patient. I only interpreted the EKG. Reveals normal sinus rhythm. No ST changes. Heart rate 73. QTc 409.   Similar to prior EKG performed in July 2020      Alanna Mcarthur MD  03/04/21 [id number]

## 2021-03-04 NOTE — PROGRESS NOTES
Pharmacy Medication History Note      List of current medications patient is taking is complete. Source of information: [facility] pharmacy, Patient    Changes made to medication list:    Medications removed (include reason, ex. therapy complete or physician discontinued):  Gabapentin (therapy completed)  Pantoprazole (therapy completed)  Methocarbamol (therapy completed)     Other notes (ex. Recent course of antibiotics, Coumadin dosing):  Denies use of other OTC or herbal medications. Last dose times updated. Thanks,  Antonia Hensley, PharmD  PGY-1 Pharmacy Resident  [id number]    No current facility-administered medications on file prior to encounter. Current Outpatient Medications on File Prior to Encounter   Medication Sig Dispense Refill    oxyCODONE-acetaminophen (PERCOCET) 5-325 MG per tablet Take 1 tablet by mouth 2 times daily as needed for Pain for up to 30 days. Intended supply: 3 days. Take lowest dose possible to manage pain 60 tablet 0    rosuvastatin (CRESTOR) 10 MG tablet Take 1 tablet by mouth daily 30 tablet 5    levothyroxine (SYNTHROID) 100 MCG tablet TAKE 1 TABLET BY MOUTH EVERY DAY 90 tablet 1    digoxin (LANOXIN) 125 MCG tablet Take 1 tablet by mouth daily 30 tablet 5    aspirin (ASPIRIN LOW DOSE) 81 MG EC tablet ASPIRIN EC LOW DOSE 81 MG TBEC      fluticasone (FLOVENT HFA) 110 MCG/ACT inhaler Inhale 1 puff into the lungs 2 times daily (Patient taking differently: Inhale 1 puff into the lungs daily ) 1 Inhaler 3    dilTIAZem (CARDIZEM 12 HR) 60 MG extended release capsule Take 1 capsule by mouth 3 times daily 90 capsule 5    docusate sodium (COLACE) 100 MG capsule Take 100 mg by mouth 2 times daily. [] oxyCODONE-acetaminophen (PERCOCET) 5-325 MG per tablet Take 1 tablet by mouth every 8 hours as needed for Pain for up to 30 days. Intended supply: 3 days.  Take lowest dose possible to manage pain 60 tablet 0    naloxone 4 MG/0.1ML LIQD nasal spray 1 spray by Nasal route as needed for Opioid Reversal 1 each 5    [DISCONTINUED] methocarbamol (ROBAXIN) 500 MG tablet TAKE 1 TABLET BY MOUTH FOUR TIMES A DAY AS NEEDED      [DISCONTINUED] GABAPENTIN PO Take 300 mg by mouth three times daily       [DISCONTINUED] pantoprazole (PROTONIX) 40 MG tablet Take 1 tablet by mouth every morning (before breakfast) (Patient not taking: Reported on 3/2/2021) 30 tablet 0    [DISCONTINUED] Cod Liver Oil OIL Take 1 tablet by mouth daily

## 2021-03-04 NOTE — TELEPHONE ENCOUNTER
Reason for Disposition   Chest pain lasting longer than 5 minutes and ANY of the following:* Over 39years old* Over 27years old and at least one cardiac risk factor (e.g., diabetes, high blood pressure, high cholesterol, smoker, or strong family history of heart disease)* History of heart disease (i.e., angina, heart attack, heart failure, bypass surgery, takes nitroglycerin)* Pain is crushing, pressure-like, or heavy    Answer Assessment - Initial Assessment Questions  1. LOCATION: \"Where does it hurt? \"        States it is his heart. 2. RADIATION: \"Does the pain go anywhere else? \" (e.g., into neck, jaw, arms, back)      States there is no pain medicine. He had pain in his arm, but his MD adjusted his medication and it is no longer. 3. ONSET: \"When did the chest pain begin? \" (Minutes, hours or days)       This pain began 2/24 and he saw his cardiologist Daniele May MD and then had testing done on the 3/2. He has been scheduled for a follow-up but is still in pain. 4. PATTERN \"Does the pain come and go, or has it been constant since it started? \"  \"Does it get worse with exertion? \"       It is on and off all day. Pt is unsure if it gets worse with exertion. Pt states he feels more it when he is resting. 5. DURATION: \"How long does it last\" (e.g., seconds, minutes, hours)      Pt states the pain is constant, but it lasts for \"half an hour. \" He clarified it will go away for a short periods. 6. SEVERITY: \"How bad is the pain? \"  (e.g., Scale 1-10; mild, moderate, or severe)     - MILD (1-3): doesn't interfere with normal activities      - MODERATE (4-7): interferes with normal activities or awakens from sleep     - SEVERE (8-10): excruciating pain, unable to do any normal activities      The pain has kept him from sleeping. 6/10. \"It is very uncomfortable. \" States he cannot move without his heart rate changing and It takes forever for it to slow down.     7. CARDIAC RISK FACTORS: \"Do you have any history of heart problems or risk factors for heart disease? \" (e.g., angina, prior heart attack; diabetes, high blood pressure, high cholesterol, smoker, or strong family history of heart disease)     Stated he has aortic valve replacement, implantable \"heart monitor,\" a fib, ablation summer 2020, and recently had an echo was told recently his valves are now severely leaking. 8. PULMONARY RISK FACTORS: \"Do you have any history of lung disease? \"  (e.g., blood clots in lung, asthma, emphysema, birth control pills)    States he was told he has COPD, but it has never bothered him. 9. CAUSE: \"What do you think is causing the chest pain? \"      \"Other heart valves were severely leaking. \" Pt read results/paper that he got from the hospital that stated he has \"hyperlipidemia and non-rheumatic mitral valve regurgitation s/p AVR. \"    10. OTHER SYMPTOMS: \"Do you have any other symptoms? \" (e.g., dizziness, nausea, vomiting, sweating, fever, difficulty breathing, cough)        Endorsed dizziness, difficulty breathing. This difficulty breathing comes and goes everyday. Denies nausea, vomiting, sweating, and cough. 11. PREGNANCY: \"Is there any chance you are pregnant? \" \"When was your last menstrual period? \"        N/A    Protocols used: CHEST PAIN-ADULT-[place]    [facility]  with red flag complaint. Brief description of triage: Chest pain. Triage indicates for patient to call . Pt doesn't want to do that as he has been seen for this chest pain before, but the pain hasn't been addressed and it is interfering with his ADLs and sleep. Writer called [facility] [phone] and spoke with Leyda to attempt to reach PCP.  Writer spoke with Nguyen Grullon MD regarding his situation and provided a warm transfer to MD.    Care advice provided, patient verbalizes understanding; denies any other questions or concerns; instructed to call back for any new or worsening symptoms. Attention Provider: Thank you for allowing me to participate in the care of your patient. The patient was connected to triage in response to information provided to the ECC. Please do not respond through this encounter as the response is not directed to a shared pool.

## 2021-03-04 NOTE — H&P
Hospital Medicine History and Physical    3/4/2021    Date of Admission: 3/4/2021    Date of Service: Pt seen/examined on 3/4/2021 and admitted to observation. Assessment/plan:  1. Chest tightness. Obtain serial troponin. Continue aspirin and statin. Nitroglycerin. Patient had recent negative stress test in August 2020. We will not repeat stress test.  Consult cardiology to assist with evaluation. 2. Shortness of breath underlying valvular heart disease. Patient had ECHO from February of 2021 with moderate to severe mitral regurgitation, moderate to severe tricuspid regurgitation, presence of bioprosthetic aortic valve. Cardiology consult as noted above. 3. Other comorbidities: History of nonobstructive coronary artery disease, atrial fibrillation (taken off anticoagulation due to bleed), gastroesophageal reflux disease, aortic valve insufficiency, obstructive sleep apnea, hypothyroidism. Activities: Up with assist  Prophylaxis: Sucutaneous Lovenox  Code status: Full code    ==========================================================  Chief complaint:  Chief Complaint   Patient presents with    Chest Pain     Intermittent CP & SOB lasting two weeks in duration. Reports seeing card recently & dx with leaking valves. Pain described as 6/10 tightness. History of Presenting Illness: This is a pleasant 78 y.o. male who is a [employer], with history of nonobstructive coronary artery disease, valvular heart disease (recent echocardiogram from February 2021 with moderate to severe mitral regurgitation, moderate to severe tricuspid regurgitation, presence of bioprosthetic aortic valve), atrial fibrillation, gastroesophageal reflux disease, obstructive sleep apnea, hypothyroidism, who had negative stress test in August 2020, who presents to the emergency room with complaints of chest tightness, shortness of breath, ongoing for the past 2 weeks.   He had unremarkable work-up in the emergency room. Hospital medicine service consulted for admission for further evaluation. Past Medical History:      Diagnosis Date    Aortic valve insufficiency     Arthritis     [facility]     Cancer ([facility] 09/2017    TONGUE head and neck IN REMISSION    Dysphonia     GERD (gastroesophageal reflux disease)     Hearing loss     Heart disease     Medical history reviewed with no changes     Obstructive apnea does not use CPAP    Oropharyngeal dysphagia     Refusal of blood transfusions as patient is Mandaen     Thyroid disease        Past Surgical History:      Procedure Laterality Date    ANKLE SURGERY      AORTIC VALVE REPLACEMENT  1/28/15    Dr. Steph Byrnes 25mm Mosaic Ultra porcine valve; right and left modified maze procedure with ligation of DELGADO with Atriclip    ATRIAL ABLATION SURGERY      BACK SURGERY      CSP    CARDIAC CATHETERIZATION      HERNIA REPAIR      KNEE SURGERY      Arthroscopy    LUMBAR SPINE SURGERY Right 10/13/2020    RIGHT LUMBAR4-LUMBAR5 MICRO HEMILAMINECTOMY AND DISCECTOMY ([id number], [id number]) performed by Irina Mclean MD at [facility]      collapsed lung due to broken ribs    MANDIBLE FRACTURE SURGERY      NECK SURGERY      Fusion    PAIN MANAGEMENT PROCEDURE Right 6/22/2020    RIGHT L4 AND L5 TRANSFORAMINAL EPIDURAL STEROID INJECTION WITH FLUOROSCOPY ([id number],[id number]) performed by Liliya Ortiz MD at [facility] Right 7/8/2020    RIGHT L4 AND L5 TRANSFORAMINAL EPIDURAL STEROID INJECTION WITH FLUOROSCOPY (79885,[id number]) performed by Liliya Ortiz MD at [address] [place] [address]         Medications (prior to admission):  Prior to Admission medications    Medication Sig Start Date End Date Taking? Authorizing Provider   oxyCODONE-acetaminophen (PERCOCET) 5-325 MG per tablet Take 1 tablet by mouth 2 times daily as needed for Pain for up to 30 days. Intended supply: 3 days.  Take lowest dose possible to manage pain 3/4/21 4/3/21  Latrice Anderson MD   rosuvastatin (CRESTOR) 10 MG tablet Take 1 tablet by mouth daily 2/17/21   Linden Aponte MD   levothyroxine (SYNTHROID) 100 MCG tablet TAKE 1 TABLET BY MOUTH EVERY DAY 2/12/21   Latrice Anderson MD   digoxin [facility]) 125 MCG tablet Take 1 tablet by mouth daily 1/5/21   ISIDRA Liu CNP   methocarbamol (ROBAXIN) 500 MG tablet TAKE 1 TABLET BY MOUTH FOUR TIMES A DAY AS NEEDED 11/10/20   Historical Provider, MD   aspirin (ASPIRIN LOW DOSE) 81 MG EC tablet ASPIRIN EC LOW DOSE 81 MG TBEC 11/12/20   Historical Provider, MD   fluticasone (FLOVENT HFA) 110 MCG/ACT inhaler Inhale 1 puff into the lungs 2 times daily  Patient taking differently: Inhale 1 puff into the lungs daily  11/16/20   Latrice Anderson MD   naloxone 4 MG/0.1ML LIQD nasal spray 1 spray by Nasal route as needed for Opioid Reversal 11/16/20   Latrice Anderson MD   dilTIAZem (CARDIZEM 12 HR) 60 MG extended release capsule Take 1 capsule by mouth 3 times daily 10/6/20   Linden Aponte MD   GABAPENTIN PO Take 300 mg by mouth three times daily     Historical Provider, MD   pantoprazole (PROTONIX) 40 MG tablet Take 1 tablet by mouth every morning (before breakfast)  Patient not taking: Reported on 3/2/2021 7/15/20   ISIDRA Liu CNP   Cod Liver Oil OIL Take 1 tablet by mouth daily     Historical Provider, MD   docusate sodium (COLACE) 100 MG capsule Take 100 mg by mouth 2 times daily. Historical Provider, MD       Allergy(ies):  Naproxen and Hydrocodone-acetaminophen    Social History:  TOBACCO:  reports that he quit smoking about 6 years ago. His smoking use included cigarettes. He started smoking about 46 years ago. He has a 40.00 pack-year smoking history. He has never used smokeless tobacco.  ETOH:  reports no history of alcohol use.     Family History:      Problem Relation Age of Onset    Heart Disease Other     High Blood Pressure Neg Hx     High Cholesterol Neg Hx        Review of Systems:  Pertinent positives are listed in HPI. At least 10-point ROS reviewed and were negative. Vitals and physical examination:  /71   Pulse 75   Temp 98.4 °F (36.9 °C) (Oral)   Resp 16   Ht 5' 11\" (1.803 m)   Wt 150 lb (68 kg)   SpO2 98%   BMI 20.92 kg/m²   Gen/overall appearance: Not in acute distress. Alert. Oriented x3. Head: Normocephalic, atraumatic  Eyes: EOMI, good acuity  ENT: Oral mucosa moist  Neck: No JVD, thyromegaly  CVS: Nml S1S2, no MRG, RRR  Pulm: Clear bilaterally. No crackles/wheezes  Gastrointestinal: Soft, NT/ND, +BS  Musculoskeletal: No edema. Warm  Neuro: No focal deficit. Moves extremity spontaneously. Psychiatry: Appropriate affect. Not agitated. Skin: Warm, dry with normal turgor. No rash  Capillary refill: Brisk,< 3 seconds   Peripheral Pulses: +2 palpable, equal bilaterally       Labs/imaging/EKG:  CBC:   Recent Labs     03/04/21  1630   WBC 5.9   HGB 13.3*        BMP:    Recent Labs     03/04/21  1630   *   K 3.7      CO2 25   BUN 12   CREATININE 0.5*   GLUCOSE 120*     Hepatic:   Recent Labs     03/04/21  1630   AST 14*   ALT 10   BILITOT 0.6   ALKPHOS 39     Xr Chest (2 Vw)    No acute cardiopulmonary disease. EKG: Normal sinus rhythm, rate 73 beats per minutes. No acute ST/T changes. I reviewed EKG. Discussed with ER provider.       Thank you Shahana Peraza MD for the opportunity to be involved in this patient's care.    -----------------------------  Teresa Swenson MD  [facility] hospitalist

## 2021-03-04 NOTE — ED PROVIDER NOTES
[facility]        Pt Name: Afua Joaquin  MRN: [id number]  [place] 1950  Date of evaluation: 3/4/2021  Provider: Daniella Gilbert PA-C  PCP: Evette Hernandez MD    GOLDEN. I have evaluated this patient. My supervising physician was available for consultation. CHIEF COMPLAINT       Chief Complaint   Patient presents with    Chest Pain     Intermittent CP & SOB lasting two weeks in duration. Reports seeing card recently & dx with leaking valves. Pain described as 6/10 tightness. HISTORY OF PRESENT ILLNESS   (Location, Timing/Onset, Context/Setting, Quality, Duration, Modifying Factors, Severity, Associated Signs and Symptoms)  Note limiting factors. Afua Joaquin is a 78 y.o. male with complex medical and cardiac history including history of aortic insufficiency with valve replacement, tricuspid regurgitation mitral regurgitation as well as atrial fibrillation, CAD with loop recorder presents for evaluation of chest pain shortness of breath has been going on for the past 2 weeks. Describes the pain as a tightness, 6/10. No radiation of the pain. No numbness tingling or weakness of his extremities. No abdominal pain nausea vomiting or diarrhea. No radiation of the pain. States that he has seen his electrophysiologist, Dr. Prema Burrell thought as well as his cardiologist, Dr. Daniele May, regarding current complaints and had repeat echo and order for pulmonary function testing. States that they are planning what they are going to do next. Upon further chart review, he had a clean cath back in 2015. [place] 2020 showed low risk. He was started back on 2 weeks ago. He denies cough congestion runny nose. No fevers or chills. No dizziness/lightheadedness, weakness, visual disturbances or syncope. States that he does occasionally feel like his heart is racing and beating harder.   He has no other complaints or concerns at this time. Nursing Notes were all reviewed and agreed with or any disagreements were addressed in the HPI. REVIEW OF SYSTEMS    (2-9 systems for level 4, 10 or more for level 5)     Review of Systems   Constitutional: Negative for appetite change, chills and fever. HENT: Negative for congestion and rhinorrhea. Respiratory: Positive for chest tightness and shortness of breath. Negative for cough and wheezing. Cardiovascular: Positive for chest pain. Gastrointestinal: Negative for abdominal pain, diarrhea, nausea and vomiting. Genitourinary: Negative for difficulty urinating, dysuria and hematuria. Musculoskeletal: Negative for neck pain and neck stiffness. Skin: Negative for rash. Neurological: Negative for headaches. Positives and Pertinent negatives as per HPI. Except as noted above in the ROS, all other systems were reviewed and negative.        PAST MEDICAL HISTORY     Past Medical History:   Diagnosis Date    Aortic valve insufficiency     Arthritis     [facility] [address]     Cancer ([facility] [address] 09/2017    TONGUE head and neck IN REMISSION    Dysphonia     GERD (gastroesophageal reflux disease)     Hearing loss     Heart disease     Medical history reviewed with no changes     Obstructive apnea does not use CPAP    Oropharyngeal dysphagia     Refusal of blood transfusions as patient is Shinto     Thyroid disease          SURGICAL HISTORY     Past Surgical History:   Procedure Laterality Date    ANKLE SURGERY      AORTIC VALVE REPLACEMENT  1/28/15    Dr. Steph Byrnes 25mm Mosaic Ultra porcine valve; right and left modified maze procedure with ligation of DELGADO with Atriclip    ATRIAL ABLATION SURGERY      BACK SURGERY      CSP    CARDIAC CATHETERIZATION      HERNIA REPAIR      KNEE SURGERY      Arthroscopy    LUMBAR SPINE SURGERY Right 10/13/2020    RIGHT LUMBAR4-LUMBAR5 MICRO HEMILAMINECTOMY AND DISCECTOMY (71015, [id number]) performed [facility] Mclean [facility] OR    LUNG SURGERY      collapsed lung due to broken ribs    MANDIBLE FRACTURE SURGERY      NECK SURGERY      Fusion    PAIN MANAGEMENT PROCEDURE Right 6/22/2020    RIGHT L4 AND L5 TRANSFORAMINAL EPIDURAL STEROID INJECTION WITH FLUOROSCOPY (13069,87586) performed by Zaki Bernal MD at [address] Right 7/8/2020    RIGHT L4 AND L5 TRANSFORAMINAL EPIDURAL STEROID INJECTION WITH FLUOROSCOPY (75462,30505) [facility]       Previous Medications    ASPIRIN (ASPIRIN LOW DOSE) 81 MG EC TABLET    ASPIRIN EC LOW DOSE 81 MG TBEC    COD LIVER OIL OIL    Take 1 tablet by mouth daily     DIGOXIN (LANOXIN) 125 MCG TABLET    Take 1 tablet by mouth daily    DILTIAZEM (CARDIZEM 12 HR) 60 MG EXTENDED RELEASE CAPSULE    Take 1 capsule by mouth 3 times daily    DOCUSATE SODIUM (COLACE) 100 MG CAPSULE    Take 100 mg by mouth 2 times daily. FLUTICASONE (FLOVENT HFA) 110 MCG/ACT INHALER    Inhale 1 puff into the lungs 2 times daily    GABAPENTIN PO    Take 300 mg by mouth three times daily     LEVOTHYROXINE (SYNTHROID) 100 MCG TABLET    TAKE 1 TABLET BY MOUTH EVERY DAY    METHOCARBAMOL (ROBAXIN) 500 MG TABLET    TAKE 1 TABLET BY MOUTH FOUR TIMES A DAY AS NEEDED    NALOXONE 4 MG/0.1ML LIQD NASAL SPRAY    1 spray by Nasal route as needed for Opioid Reversal    OXYCODONE-ACETAMINOPHEN (PERCOCET) 5-325 MG PER TABLET    Take 1 tablet by mouth 2 times daily as needed for Pain for up to 30 days. Intended supply: 3 days.  Take lowest dose possible to manage pain    PANTOPRAZOLE (PROTONIX) 40 MG TABLET    Take 1 tablet by mouth every morning (before breakfast)    ROSUVASTATIN (CRESTOR) 10 MG TABLET    Take 1 tablet by mouth daily         ALLERGIES     Naproxen and Hydrocodone-acetaminophen    FAMILYHISTORY       Family History   Problem Relation Age of Onset    Heart Disease Other     High Blood Pressure Neg Hx     High Cholesterol Neg Hx           SOCIAL HISTORY       Social History     Tobacco Use    Smoking status: Former Smoker     Packs/day: 1.00     Years: 40.00     Pack years: 40.00     Types: Cigarettes     Start date: 1975     Quit date: 2015     Years since quittin.1    Smokeless tobacco: Never Used    Tobacco comment: Maintain cessation   Substance Use Topics    Alcohol use: No     Alcohol/week: 0.0 standard drinks     Frequency: Never     Binge frequency: Never     Comment: NA beer    Drug use: No       SCREENINGS      Heart Score for chest pain patients  History: Moderately Suspicious  ECG: Non-Specifc repolarization disturbance/LBTB/PM  Patient Age: > 65 years  *Risk factors for Atherosclerotic disease: Diabetes Mellitus, Hypertension, Coronary Artery Disease  Risk Factors: > 3 Risk factors or history of atherosclerotic disease*  Troponin: < 1X normal limit  Heart Score Total: 6      PHYSICAL EXAM    (up to 7 for level 4, 8 or more for level 5)     ED Triage Vitals [21 1614]   BP Temp Temp Source Pulse Resp SpO2 Height Weight   108/71 98.4 °F (36.9 °C) Oral 75 16 98 % 5' 11\" (1.803 m) 150 lb (68 kg)       Physical Exam  Vitals signs and nursing note reviewed. Constitutional:       General: He is not in acute distress. Appearance: He is well-developed. He is not ill-appearing, toxic-appearing or diaphoretic. HENT:      Head: Normocephalic and atraumatic. Right Ear: External ear normal.      Left Ear: External ear normal.      Nose: Nose normal.   Eyes:      General:         Right eye: No discharge. Left eye: No discharge. Neck:      Musculoskeletal: Normal range of motion and neck supple. Cardiovascular:      Rate and Rhythm: Normal rate and regular rhythm. Heart sounds: Normal heart sounds. Pulmonary:      Effort: Pulmonary effort is normal. No respiratory distress. Breath sounds: Normal breath sounds. Chest:      Chest wall: No tenderness. Abdominal:      General: There is no distension. Palpations: Abdomen is soft. Tenderness: There is no abdominal tenderness. Musculoskeletal: Normal range of motion. Skin:     General: Skin is warm and dry. Neurological:      Mental Status: He is alert and oriented to person, place, and time. Psychiatric:         Behavior: Behavior normal.         DIAGNOSTIC RESULTS   LABS:    Labs Reviewed   CBC WITH AUTO DIFFERENTIAL - Abnormal; Notable for the following components:       Result Value    Hemoglobin 13.3 (*)     Hematocrit 40.0 (*)     All other components within normal limits    Narrative:     Performed at:  [facility] [address]   Phone [phone]   COMPREHENSIVE METABOLIC PANEL - Abnormal; Notable for the following components:    Sodium 135 (*)     Glucose 120 (*)     CREATININE 0.5 (*)     AST 14 (*)     All other components within normal limits    Narrative:     Performed at:  [facility] [address]   Phone [phone]   TROPONIN    Narrative:     Performed at:  [facility] [address]   Phone [phone]   APTT    Narrative:     Performed at:  [facility] [address]   Phone [phone]   PROTIME-INR    Narrative:     Performed at:  [facility] [address]   Phone [phone]   BRAIN NATRIURETIC PEPTIDE    Narrative:     Performed at:  [facility] [address]   Phone [phone]       All other labs were within normal range or not returned as of this dictation. EKG:  All EKG's are interpreted by the Emergency Department Physician in the absence of a cardiologist.  Please see their note for interpretation of EKG.      RADIOLOGY:   Non-plain film images such as CT, Ultrasound and MRI are read by the radiologist. Plain radiographic images are visualized and preliminarily interpreted by the ED Provider with the below findings:        Interpretation per the Radiologist below, if available at the time of this note:    XR CHEST (2 VW)   Preliminary Result   No acute cardiopulmonary disease. No results found. PROCEDURES   Unless otherwise noted below, none     Procedures    CRITICAL CARE TIME   N/A    CONSULTS:  None      EMERGENCY DEPARTMENT COURSE and DIFFERENTIAL DIAGNOSIS/MDM:   Vitals:    Vitals:    03/04/21 1614   BP: 108/71   Pulse: 75   Resp: 16   Temp: 98.4 °F (36.9 °C)   TempSrc: Oral   SpO2: 98%   Weight: 150 lb (68 kg)   Height: 5' 11\" (1.803 m)       Patient was given the following medications:  Medications   aspirin tablet 325 mg (has no administration in time range)           Patient presents for evaluation of chest pain/tightness and shortness of breath. On exam, he is resting comfortably in bed no acute distress and nontoxic. Vitals are stable and he is afebrile. Lungs are clear to auscultation bilaterally, chest is nontender and abdomen is benign. Please see attending note for EKG interpretation. Patient was given aspirin and will be reevaluated. CBC and CMP are unremarkable. Troponin is negative. BNP 62.  Coags are within normal limits. Chest x-ray shows no acute cardiopulmonary disease. Patient's heart score is 6. I do believe he warrants admission for cardiology consultation and further ACS rule out. Hospitalist will resume care the patient at this time. Patient was informed and agreeable. He is stable for admission. FINAL IMPRESSION      1. Chest pain, unspecified type          DISPOSITION/PLAN   DISPOSITION Decision To Admit 03/04/2021 05:32:48 PM      PATIENT REFERREDTO:  No follow-up provider specified.     DISCHARGE MEDICATIONS:  New Prescriptions    No medications on file       DISCONTINUED MEDICATIONS:  Discontinued Medications    No medications on file              (Please note that portions of this note were completed with a voice recognition program.  Efforts were made to edit the dictations but occasionally words are mis-transcribed.)    Irina Sales PA-C (electronically signed)           Lafayette Spatz, PA-C  03/04/21 6247

## 2021-03-05 LAB
A/G RATIO: 1.4 (ref 1.1–2.2)
ALBUMIN SERPL-MCNC: 4.2 G/DL (ref 3.4–5)
ALP BLD-CCNC: 44 U/L (ref 40–129)
ALT SERPL-CCNC: 10 U/L (ref 10–40)
ANION GAP SERPL CALCULATED.3IONS-SCNC: 5 MMOL/L (ref 3–16)
AST SERPL-CCNC: 14 U/L (ref 15–37)
BILIRUB SERPL-MCNC: 1 MG/DL (ref 0–1)
BUN BLDV-MCNC: 8 MG/DL (ref 7–20)
CALCIUM SERPL-MCNC: 9.4 MG/DL (ref 8.3–10.6)
CHLORIDE BLD-SCNC: 105 MMOL/L (ref 99–110)
CO2: 29 MMOL/L (ref 21–32)
CREAT SERPL-MCNC: <0.5 MG/DL (ref 0.8–1.3)
EKG ATRIAL RATE: 73 BPM
EKG DIAGNOSIS: NORMAL
EKG P AXIS: 76 DEGREES
EKG P-R INTERVAL: 204 MS
EKG Q-T INTERVAL: 372 MS
EKG QRS DURATION: 82 MS
EKG QTC CALCULATION (BAZETT): 409 MS
EKG R AXIS: 3 DEGREES
EKG T AXIS: 21 DEGREES
EKG VENTRICULAR RATE: 73 BPM
GFR AFRICAN AMERICAN: >60
GFR NON-AFRICAN AMERICAN: >60
GLOBULIN: 2.9 G/DL
GLUCOSE BLD-MCNC: 98 MG/DL (ref 70–99)
MAGNESIUM: 2.2 MG/DL (ref 1.8–2.4)
PHOSPHORUS: 3.3 MG/DL (ref 2.5–4.9)
POTASSIUM SERPL-SCNC: 4 MMOL/L (ref 3.5–5.1)
SODIUM BLD-SCNC: 139 MMOL/L (ref 136–145)
TOTAL PROTEIN: 7.1 G/DL (ref 6.4–8.2)
TROPONIN: <0.01 NG/ML

## 2021-03-05 PROCEDURE — 83735 ASSAY OF MAGNESIUM: CPT

## 2021-03-05 PROCEDURE — 36415 COLL VENOUS BLD VENIPUNCTURE: CPT

## 2021-03-05 PROCEDURE — 80053 COMPREHEN METABOLIC PANEL: CPT

## 2021-03-05 PROCEDURE — 94640 AIRWAY INHALATION TREATMENT: CPT

## 2021-03-05 PROCEDURE — 2580000003 HC RX 258: Performed by: INTERNAL MEDICINE

## 2021-03-05 PROCEDURE — 6370000000 HC RX 637 (ALT 250 FOR IP): Performed by: INTERNAL MEDICINE

## 2021-03-05 PROCEDURE — 84100 ASSAY OF PHOSPHORUS: CPT

## 2021-03-05 PROCEDURE — 99222 1ST HOSP IP/OBS MODERATE 55: CPT | Performed by: INTERNAL MEDICINE

## 2021-03-05 PROCEDURE — 84439 ASSAY OF FREE THYROXINE: CPT

## 2021-03-05 PROCEDURE — 80061 LIPID PANEL: CPT

## 2021-03-05 PROCEDURE — 84443 ASSAY THYROID STIM HORMONE: CPT

## 2021-03-05 PROCEDURE — 1200000000 HC SEMI PRIVATE

## 2021-03-05 PROCEDURE — 84484 ASSAY OF TROPONIN QUANT: CPT

## 2021-03-05 PROCEDURE — 94761 N-INVAS EAR/PLS OXIMETRY MLT: CPT

## 2021-03-05 PROCEDURE — 93010 ELECTROCARDIOGRAM REPORT: CPT | Performed by: INTERNAL MEDICINE

## 2021-03-05 RX ORDER — PANTOPRAZOLE SODIUM 40 MG/1
40 TABLET, DELAYED RELEASE ORAL
Status: DISCONTINUED | OUTPATIENT
Start: 2021-03-06 | End: 2021-03-08

## 2021-03-05 RX ADMIN — DILTIAZEM HYDROCHLORIDE 60 MG: 60 CAPSULE, EXTENDED RELEASE ORAL at 21:34

## 2021-03-05 RX ADMIN — OXYCODONE HYDROCHLORIDE AND ACETAMINOPHEN 1 TABLET: 5; 325 TABLET ORAL at 08:27

## 2021-03-05 RX ADMIN — Medication 1 PUFF: at 21:23

## 2021-03-05 RX ADMIN — METHOCARBAMOL TABLETS 500 MG: 500 TABLET, COATED ORAL at 21:34

## 2021-03-05 RX ADMIN — Medication 10 ML: at 21:35

## 2021-03-05 RX ADMIN — DIGOXIN 125 MCG: 125 TABLET ORAL at 08:27

## 2021-03-05 RX ADMIN — LEVOTHYROXINE SODIUM 100 MCG: 0.1 TABLET ORAL at 06:00

## 2021-03-05 RX ADMIN — DOCUSATE SODIUM 100 MG: 100 CAPSULE ORAL at 21:34

## 2021-03-05 RX ADMIN — METHOCARBAMOL TABLETS 500 MG: 500 TABLET, COATED ORAL at 18:08

## 2021-03-05 RX ADMIN — Medication 1 PUFF: at 08:12

## 2021-03-05 RX ADMIN — METHOCARBAMOL TABLETS 500 MG: 500 TABLET, COATED ORAL at 14:57

## 2021-03-05 RX ADMIN — OXYCODONE HYDROCHLORIDE AND ACETAMINOPHEN 1 TABLET: 5; 325 TABLET ORAL at 18:08

## 2021-03-05 RX ADMIN — DILTIAZEM HYDROCHLORIDE 60 MG: 60 CAPSULE, EXTENDED RELEASE ORAL at 14:56

## 2021-03-05 RX ADMIN — DILTIAZEM HYDROCHLORIDE 60 MG: 60 CAPSULE, EXTENDED RELEASE ORAL at 08:28

## 2021-03-05 RX ADMIN — ASPIRIN 81 MG: 81 TABLET, CHEWABLE ORAL at 08:27

## 2021-03-05 RX ADMIN — DOCUSATE SODIUM 100 MG: 100 CAPSULE ORAL at 08:27

## 2021-03-05 RX ADMIN — Medication 1 PUFF: at 00:54

## 2021-03-05 RX ADMIN — ROSUVASTATIN 10 MG: 10 TABLET, FILM COATED ORAL at 08:28

## 2021-03-05 RX ADMIN — METHOCARBAMOL TABLETS 500 MG: 500 TABLET, COATED ORAL at 08:27

## 2021-03-05 ASSESSMENT — PAIN DESCRIPTION - PROGRESSION
CLINICAL_PROGRESSION: NOT CHANGED

## 2021-03-05 ASSESSMENT — PAIN DESCRIPTION - LOCATION
LOCATION: CHEST
LOCATION: CHEST

## 2021-03-05 ASSESSMENT — PAIN SCALES - GENERAL
PAINLEVEL_OUTOF10: 5
PAINLEVEL_OUTOF10: 0
PAINLEVEL_OUTOF10: 4

## 2021-03-05 ASSESSMENT — PAIN DESCRIPTION - ORIENTATION
ORIENTATION: MID
ORIENTATION: MID

## 2021-03-05 ASSESSMENT — PAIN DESCRIPTION - PAIN TYPE: TYPE: ACUTE PAIN

## 2021-03-05 NOTE — CARE COORDINATION
Discharge Planning Assessment  RN discharge planner met with patient and family member- daughter to discuss reason for admission, current living situation, and potential needs at the time of discharge    Demographics/Insurance verified Yes- [employer]    Current type of dwelling: one level house with 3  Steps to get in    Patient from ECF/SW confirmed with:  N/A    Living arrangements:  Lives alone    Level of function/Support: Independent , drove himself to ER. PCP:  Aga Sanchez    Last Visit to PCP:  3/4/21  By phone    DME: [employer] but does not use it    Active with any community resources/agencies/skilled home care: Had [facility] agency 5 yrs ago  S/p CABG    Medication compliance issues:  Denies    Financial issues that could impact healthcare:  No    Tentative discharge plan:  Home     Discussed and provided facilities of choice if transition to a skilled nursing facility is required at the time of discharge- No    Discussed with patient and/or family that on the day of discharge home tentative time of discharge will be between 10 AM and noon.     Transportation at the time of discharge:  Daughter will transport

## 2021-03-05 NOTE — PROGRESS NOTES
Assessment completed, see doc flow sheet, in bed resting, call light within reach, without distress, vital signs stable, denies needs at present time, will continue to monitor and treat. Julio Watters

## 2021-03-05 NOTE — CONSULTS
10/13/2020). Social History:   reports that he quit smoking about 6 years ago. His smoking use included cigarettes. He started smoking about 46 years ago. He has a 40.00 pack-year smoking history. He has never used smokeless tobacco. He reports that he does not drink alcohol or use drugs. Family History:  No family history of premature coronary artery disease, aortic disease, or valve disease. Home Medications:  Were reviewed and are listed in nursing record. and/or listed below  Prior to Admission medications    Medication Sig Start Date End Date Taking? Authorizing Provider   oxyCODONE-acetaminophen (PERCOCET) 5-325 MG per tablet Take 1 tablet by mouth 2 times daily as needed for Pain for up to 30 days. Intended supply: 3 days. Take lowest dose possible to manage pain 3/4/21 4/3/21 Yes Andreea Goldberg MD   rosuvastatin (CRESTOR) 10 MG tablet Take 1 tablet by mouth daily 2/17/21  Yes Brooklyn Thorne MD   levothyroxine (SYNTHROID) 100 MCG tablet TAKE 1 TABLET BY MOUTH EVERY DAY 2/12/21  Yes Andreea Goldberg MD   digoxin (LANOXIN) 125 MCG tablet Take 1 tablet by mouth daily 1/5/21  Yes ISIDRA Glasgow - CNP   aspirin (ASPIRIN LOW DOSE) 81 MG EC tablet ASPIRIN EC LOW DOSE 81 MG TBEC 11/12/20  Yes Historical Provider, MD   fluticasone (FLOVENT HFA) 110 MCG/ACT inhaler Inhale 1 puff into the lungs 2 times daily  Patient taking differently: Inhale 1 puff into the lungs daily  11/16/20  Yes Andreea Goldberg MD   dilTIAZem (CARDIZEM 12 HR) 60 MG extended release capsule Take 1 capsule by mouth 3 times daily 10/6/20  Yes Brooklyn Thorne MD   docusate sodium (COLACE) 100 MG capsule Take 100 mg by mouth 2 times daily.    Yes Historical Provider, MD        Current Medications:  Current Facility-Administered Medications   Medication Dose Route Frequency Provider Last Rate Last Admin    digoxin (LANOXIN) tablet 125 mcg  125 mcg Oral Daily Asa Lujan MD   125 mcg at 03/05/21 0827    dilTIAZem (CARDIZEM 12 HR) extended release capsule 60 mg  60 mg Oral TID Donald Chavira MD   60 mg at 03/05/21 6185    docusate sodium (COLACE) capsule 100 mg  100 mg Oral BID Donald Chavira MD   100 mg at 03/05/21 0827    levothyroxine (SYNTHROID) tablet 100 mcg  100 mcg Oral Daily Donald Chavira MD   100 mcg at 03/05/21 0600    methocarbamol (ROBAXIN) tablet 500 mg  500 mg Oral 4x Daily Donald Chavira MD   500 mg at 03/05/21 0827    oxyCODONE-acetaminophen (PERCOCET) 5-325 MG per tablet 1 tablet  1 tablet Oral BID PRN Donald Chavira MD   1 tablet at 03/05/21 0827    rosuvastatin (CRESTOR) tablet 10 mg  10 mg Oral Daily Donald Chavira MD   10 mg at 03/05/21 3265    sodium chloride flush 0.9 % injection 10 mL  10 mL Intravenous 2 times per day Donald Chavira MD   10 mL at 03/04/21 2257    sodium chloride flush 0.9 % injection 10 mL  10 mL Intravenous PRN Donald Chavira MD        promethazine (PHENERGAN) tablet 12.5 mg  12.5 mg Oral Q6H PRN Donald Chavira MD        Or    ondansetron (ZOFRAN) injection 4 mg  4 mg Intravenous Q6H PRN Donald Chavira MD        acetaminophen (TYLENOL) tablet 650 mg  650 mg Oral Q6H PRN Donald Chavira MD        Or    acetaminophen (TYLENOL) suppository 650 mg  650 mg Rectal Q6H PRN Donald Chavira MD        polyethylene glycol (GLYCOLAX) packet 17 g  17 g Oral Daily PRN Donald Chavira MD        aspirin chewable tablet 81 mg  81 mg Oral Daily Donald Chavira MD   81 mg at 03/05/21 0827    enoxaparin (LOVENOX) injection 40 mg  40 mg Subcutaneous Daily Donald Chavira MD        nitroGLYCERIN (NITROSTAT) SL tablet 0.4 mg  0.4 mg Sublingual Q5 Min PRN Donald Chavira MD        fluticasone (FLOVENT HFA) 110 MCG/ACT inhaler 1 puff  1 puff Inhalation BID Nicole Flores, APRN - CNP   1 puff at 03/05/21 8394        Allergies:  Naproxen and Hydrocodone-acetaminophen     Review of Systems:     · Constitutional: there has been no unanticipated weight loss. There's been no change in energy level, sleep pattern, or activity level. · Eyes: No visual changes or diplopia. No scleral icterus. · ENT: No Headaches, hearing loss or vertigo. No mouth sores or sore throat. · Cardiovascular: +chest pain  · Respiratory: No cough or wheezing, no sputum production. No hematemesis. · Gastrointestinal: +dysphagia  · Genitourinary: No dysuria, trouble voiding, or hematuria. · Musculoskeletal:  No gait disturbance, weakness or joint complaints. · Integumentary: No rash or pruritis. · Neurological: No headache, diplopia, change in muscle strength, numbness or tingling. No change in gait, balance, coordination, mood, affect, memory, mentation, behavior. · Psychiatric: No anxiety, no depression. +confusion  · Endocrine: No malaise, fatigue or temperature intolerance. No excessive thirst, fluid intake, or urination. No tremor. · Hematologic/Lymphatic: No abnormal bruising or bleeding, blood clots or swollen lymph nodes. · Allergic/Immunologic: No nasal congestion or hives.   ·     Physical Examination:    Vitals:    03/05/21 0813   BP:    Pulse:    Resp: 20   Temp:    SpO2: 96%    Weight: 150 lb 12.8 oz (68.4 kg)         General Appearance:  Alert, cooperative, no distress, thin   Head:  Normocephalic, without obvious abnormality, atraumatic   Eyes:  PERRL, conjunctiva/corneas clear       Nose: Nares normal, no drainage or sinus tenderness   Throat: Lips, mucosa, and tongue normal   Neck: Supple, symmetrical, trachea midline, no adenopathy, thyroid: not enlarged, symmetric, no tenderness/mass/nodules, no carotid bruit or JVD       Lungs:   crackles   Chest Wall:  No tenderness or deformity, prior median sternotomy    Heart:  Regular rate and rhythm, 3/6 murmur   Abdomen:   Soft, non-tender, bowel sounds active all four quadrants,  no masses, no organomegaly           Extremities: Extremities normal, atraumatic, no cyanosis or edema   Pulses: 1+ and symmetric   Skin: Skin color, texture, turgor normal, no rashes or lesions   Pysch: Normal mood and affect   Neurologic: Normal gross motor and sensory exam.         Labs  CBC:   Lab Results   Component Value Date    WBC 5.9 03/04/2021    RBC 4.22 03/04/2021    HGB 13.3 03/04/2021    HCT 40.0 03/04/2021    MCV 94.8 03/04/2021    RDW 13.6 03/04/2021     03/04/2021     CMP:    Lab Results   Component Value Date     03/05/2021    K 4.0 03/05/2021     03/05/2021    CO2 29 03/05/2021    BUN 8 03/05/2021    CREATININE <0.5 03/05/2021    GFRAA >60 03/05/2021    GFRAA >60 12/26/2009    AGRATIO 1.4 03/05/2021    LABGLOM >60 03/05/2021    GLUCOSE 98 03/05/2021    PROT 7.1 03/05/2021    CALCIUM 9.4 03/05/2021    BILITOT 1.0 03/05/2021    ALKPHOS 44 03/05/2021    AST 14 03/05/2021    ALT 10 03/05/2021     PT/INR:  No results found for: PTINR  Lab Results   Component Value Date    CKTOTAL 64 02/23/2021    CKMB 0.83 12/27/2009    TROPONINI <0.01 03/05/2021       EKG:  I have reviewed EKG with the following interpretation:  Impression: sinus rhythm, nonspecific st-t wave changes    Echo:  Summary   -Limited echocardiogram was performed to evaluate mitral regurgitation.   -Normal left ventricle size and systolic function with an estimated ejection   fraction of 60%. -No regional wall motion abnormalities are seen. -The tips of the mitral leaflet appears to be slightly myxomatous and   minimal prolapse of posterior leaflet. -Moderate to severe anteriorly directed mitral regurgitation (appears   similar when compared to last echocardiogram dated September 2020). ERO=0.25   -A bioprosthetic artificial aortic valve appears well seated with a maximum   velocity of 2.4m/s and a mean gradient of 13mmHg. -Moderate to severe tricuspid regurgitation.   -Estimated pulmonary artery systolic pressure is mildly elevated at 38 mmHg   assuming a right atrial pressure of 8 mmHg.    -The right ventricle is mildly enlarged.   -The right atrium is moderately dilated. Stress:    -There is a small fixed inferior apical defect that is likely bowel artifact    rather than scar.    -LV function is normal with no regional abnormalities and EF=64%.  -Low risk study.               [id number] :Anatomy:   LM- normal  LAD-40% prox, Ca2+. FFR 0.89  Cx-normal  OM1- normal  RCA-normal  RPDA- normal  LVEF- 55%      Old notes reviewed  Telemetry reviewd  Ekg personally reviewed  Chest xray personally reviewed  Echo and cath reviewe  Medications and labs reviewed  moderate complexity/medical decision making due to extensive data review, extensive history review, independent review of data  moderate risk due to acute illness, evaluation of drug-drug interactions, medication management and diagnostic interventions        Assessment  Patient Active Problem List   Diagnosis    [facility] [address]    Transient ischemic attack (TIA)    Chronic ischemic heart disease    Essential hypertension    Obstructive sleep apnea syndrome    Squamous 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)    Chronic pain due to neoplasm    Refusal of blood transfusions as patient is Sikhism    S/P AVR    Sensorineural hearing loss (SNHL) of both ears    Chronic obstructive pulmonary disease (HCC)    History of tobacco abuse    Mixed hyperlipidemia    Osteoarthritis of cervical spine    Primary osteoarthritis of both knees    Metastatic squamous cell carcinoma ([address]    Cervical stenosis of spine    Encounter for loop recorder check    Non-occlusive coronary artery disease    SOB (shortness of breath) on exertion    Mitral regurgitation    Tricuspid valve insufficiency    Chest pain         Plan:    I had the opportunity to review the clinical symptoms and presentation of Rui Garibay.      Assessment/Plan:  Active Problems:  Atypical chest pain  - new  - differential: cad, gerd, arrhythmia, valvular disease  Plan:   - aspirin, statin, diltiazem 60 q8h  - will review prior cath from 2015, may need cath with FFR of LAD on monday    Moderate to severe MR  Plan:  - echocardiogram    History of alcohol use disorder  Plan:  - cessation advised    Aortic valve replacement   Plan:  - echo    History of afib  - no longer on anticoagulation due to bleeding, atrial appendage ligation  Plan:  - interrogate linq   - digoxin     Altered mental status  Plan:  - counseled on alcohol cessation   . All questions and concerns were addressed to the patient/family. Alternatives to my treatment were discussed. The note was completed using EMR. Every effort was made to ensure accuracy; however, inadvertent computerized transcription errors may be present.   Janna Li MD 3/5/2021 2:35 PM

## 2021-03-05 NOTE — PROGRESS NOTES
(1.803 m)   Wt 150 lb 12.8 oz (68.4 kg)   SpO2 96%   BMI 21.03 kg/m²   No intake or output data in the 24 hours ending 03/05/21 0809   Wt Readings from Last 3 Encounters:   03/04/21 150 lb 12.8 oz (68.4 kg)   03/02/21 148 lb 6.4 oz (67.3 kg)   02/17/21 156 lb (70.8 kg)       General appearance:  Appears comfortable in the bed. Alert and pleasant   Eyes: Sclera clear. Pupils equal.  ENT: Moist oral mucosa. Trachea midline, no adenopathy. Cardiovascular: Regular rhythm, normal S1, S2. Harsh  murmur. No edema in lower extremities  Respiratory: Not using accessory muscles. Good inspiratory effort. Clear to auscultation bilaterally, no wheeze or crackles. GI: Abdomen soft, no tenderness, not distended, normal bowel sounds  Musculoskeletal: No cyanosis in digits, neck supple  Neurology: CN 2-12 grossly intact. No speech or motor deficits  Psych: Normal affect. Alert and oriented in time, place and person  Skin: Warm, dry, normal turgor    Labs and Tests:  CBC:   Recent Labs     03/04/21  1630   WBC 5.9   HGB 13.3*        BMP:    Recent Labs     03/04/21  1630 03/05/21  0708   * 139   K 3.7 4.0    105   CO2 25 29   BUN 12 8   CREATININE 0.5* <0.5*   GLUCOSE 120* 98     Hepatic:   Recent Labs     03/04/21  1630 03/05/21  0708   AST 14* 14*   ALT 10 10   BILITOT 0.6 1.0   ALKPHOS 45 44     Echo: 9/20  Normal left ventricle size, wall thickness and systolic function with an estimated ejection fraction of 60%. No regional wall motion abnormalities are seen. E/e\"= 10.   Moderate to severe mitral regurgitation.   A bioprosthetic aortic valve appears well seated with a maximum gradient of   17 mmHg and a mean gradient of 11 mmHg. Aortic valve area of 1.97cm.   No evidence of aortic valve regurgitation.   The aortic root is mildly dilated. 3.7cm   The ascending aorta is mildly dilated. 4.2cm   The right ventricle is mildly enlarged but normal in function.   Moderate to severe tricuspid regurgitation.  PASP 34mmHg.   The right atrium is mildly dilated.  IVC size is normal (<2.1 cm) but collapses < 50% with respiration consistent   with elevated RA pressure (8 mmHg). ECHO 2/24/21   Summary   -Limited echocardiogram was performed to evaluate mitral regurgitation.   -Normal left ventricle size and systolic function with an estimated ejection   fraction of 60%. -No regional wall motion abnormalities are seen. -The tips of the mitral leaflet appears to be slightly myxomatous and   minimal prolapse of posterior leaflet. -Moderate to severe anteriorly directed mitral regurgitation (appears   similar when compared to last echocardiogram dated September 2020). ERO=0.25   -A bioprosthetic artificial aortic valve appears well seated with a maximum   velocity of 2.4m/s and a mean gradient of 13mmHg. -Moderate to severe tricuspid regurgitation.   -Estimated pulmonary artery systolic pressure is mildly elevated at 38 mmHg   assuming a right atrial pressure of 8 mmHg. -The right ventricle is mildly enlarged.   -The right atrium is moderately dilated. Problem List  Active Problems:    Chest pain  Resolved Problems:    * No resolved hospital problems. *       Assessment & Plan:   1. Chest Pain: now resolved. 2. S/p AV replacement with Dr De La Paz Mt 1/2015, Has Severe TR:  Both recent echo's were reviewed and results shared with pt and daughter at bedside. 3. Hx of AF: S/P MAXE and DELGADO ligation,   Not  On AC due to hx of bleed   4. CHRISTOPHER: compliant with CPAP at hs   5. Intermittent AMS at home:  Likely due to daily alcohol use and percocet.   Pt agrees to stop all alcohol for now and work on cutting back on percocet         Diet: Diet NPO, After Midnight  Code:Full Code  DVT PPX      Lux NG 97., APRN - CNP   3/5/2021 8:09 AM

## 2021-03-06 LAB
CHOLESTEROL, TOTAL: 171 MG/DL (ref 0–199)
HDLC SERPL-MCNC: 52 MG/DL (ref 40–60)
LDL CHOLESTEROL CALCULATED: 103 MG/DL
TRIGL SERPL-MCNC: 82 MG/DL (ref 0–150)
VLDLC SERPL CALC-MCNC: 16 MG/DL

## 2021-03-06 PROCEDURE — 6370000000 HC RX 637 (ALT 250 FOR IP): Performed by: INTERNAL MEDICINE

## 2021-03-06 PROCEDURE — 94640 AIRWAY INHALATION TREATMENT: CPT

## 2021-03-06 PROCEDURE — 94760 N-INVAS EAR/PLS OXIMETRY 1: CPT

## 2021-03-06 PROCEDURE — 6370000000 HC RX 637 (ALT 250 FOR IP): Performed by: HOSPITALIST

## 2021-03-06 PROCEDURE — 99233 SBSQ HOSP IP/OBS HIGH 50: CPT | Performed by: NURSE PRACTITIONER

## 2021-03-06 PROCEDURE — 2580000003 HC RX 258: Performed by: INTERNAL MEDICINE

## 2021-03-06 PROCEDURE — 6370000000 HC RX 637 (ALT 250 FOR IP): Performed by: NURSE PRACTITIONER

## 2021-03-06 PROCEDURE — 1200000000 HC SEMI PRIVATE

## 2021-03-06 RX ORDER — OXYCODONE HYDROCHLORIDE AND ACETAMINOPHEN 5; 325 MG/1; MG/1
1 TABLET ORAL EVERY 6 HOURS PRN
Status: DISCONTINUED | OUTPATIENT
Start: 2021-03-06 | End: 2021-03-07

## 2021-03-06 RX ORDER — LANOLIN ALCOHOL/MO/W.PET/CERES
3 CREAM (GRAM) TOPICAL NIGHTLY PRN
Status: DISCONTINUED | OUTPATIENT
Start: 2021-03-06 | End: 2021-03-08 | Stop reason: HOSPADM

## 2021-03-06 RX ADMIN — ASPIRIN 81 MG: 81 TABLET, CHEWABLE ORAL at 08:28

## 2021-03-06 RX ADMIN — DIGOXIN 125 MCG: 125 TABLET ORAL at 08:28

## 2021-03-06 RX ADMIN — METHOCARBAMOL TABLETS 500 MG: 500 TABLET, COATED ORAL at 17:30

## 2021-03-06 RX ADMIN — LEVOTHYROXINE SODIUM 100 MCG: 0.1 TABLET ORAL at 06:31

## 2021-03-06 RX ADMIN — OXYCODONE HYDROCHLORIDE AND ACETAMINOPHEN 1 TABLET: 5; 325 TABLET ORAL at 08:28

## 2021-03-06 RX ADMIN — DILTIAZEM HYDROCHLORIDE 60 MG: 60 CAPSULE, EXTENDED RELEASE ORAL at 20:25

## 2021-03-06 RX ADMIN — MELATONIN TAB 3 MG 3 MG: 3 TAB at 22:42

## 2021-03-06 RX ADMIN — Medication 1 PUFF: at 09:03

## 2021-03-06 RX ADMIN — METHOCARBAMOL TABLETS 500 MG: 500 TABLET, COATED ORAL at 08:28

## 2021-03-06 RX ADMIN — DOCUSATE SODIUM 100 MG: 100 CAPSULE ORAL at 08:28

## 2021-03-06 RX ADMIN — Medication 10 ML: at 08:29

## 2021-03-06 RX ADMIN — PANTOPRAZOLE SODIUM 40 MG: 40 TABLET, DELAYED RELEASE ORAL at 06:31

## 2021-03-06 RX ADMIN — ROSUVASTATIN 10 MG: 10 TABLET, FILM COATED ORAL at 08:28

## 2021-03-06 RX ADMIN — OXYCODONE HYDROCHLORIDE AND ACETAMINOPHEN 1 TABLET: 5; 325 TABLET ORAL at 15:04

## 2021-03-06 RX ADMIN — DOCUSATE SODIUM 100 MG: 100 CAPSULE ORAL at 20:25

## 2021-03-06 RX ADMIN — Medication 10 ML: at 20:28

## 2021-03-06 RX ADMIN — OXYCODONE HYDROCHLORIDE AND ACETAMINOPHEN 1 TABLET: 5; 325 TABLET ORAL at 22:42

## 2021-03-06 RX ADMIN — DILTIAZEM HYDROCHLORIDE 60 MG: 60 CAPSULE, EXTENDED RELEASE ORAL at 08:28

## 2021-03-06 RX ADMIN — DILTIAZEM HYDROCHLORIDE 60 MG: 60 CAPSULE, EXTENDED RELEASE ORAL at 14:47

## 2021-03-06 RX ADMIN — METHOCARBAMOL TABLETS 500 MG: 500 TABLET, COATED ORAL at 14:47

## 2021-03-06 RX ADMIN — METHOCARBAMOL TABLETS 500 MG: 500 TABLET, COATED ORAL at 20:25

## 2021-03-06 ASSESSMENT — PAIN DESCRIPTION - LOCATION: LOCATION: CHEST

## 2021-03-06 ASSESSMENT — PAIN SCALES - GENERAL
PAINLEVEL_OUTOF10: 5
PAINLEVEL_OUTOF10: 0
PAINLEVEL_OUTOF10: 8

## 2021-03-06 ASSESSMENT — PAIN DESCRIPTION - PROGRESSION
CLINICAL_PROGRESSION: NOT CHANGED

## 2021-03-06 NOTE — PROGRESS NOTES
[address]              Progress Note      Admit Date 3/4/2021  HPI: Genna Durham is a 78 y.o. patient who presented to the hospital with complaints of shortness of breath, chest pain, and confusion. History is provided by patient and his daughter. He reports that he has had two weeks of \"grabbing his chest\" on the left side. He states that the pain comes on suddenly and can last up to 20 minutes. He states that it is associated with shortness of breath. It is moderate in intensity. He states that it was different from the dysphagia and trouble swallowing that he typically gets. He said yesterday his doctor called while he was eating a sandwich and told him to go to the ER. He states that he is drinking \"no alcohol beer\" these days, but sips on \"watered down vodka all day. \" He just moved recently and can walk up 14 steps. His daughter notes that he has episodes of being confused where he is.      Interval history:  Sinus rhythm on telemetry      VSS     Troponin <0.01 x3    Mr. Dinh De Santiago describes feeling SOB and fatigue PTA. His heart felt like it was on the outside of his chest and someone squeezing it. It was pain and most noticeable at HS    Subjective: he slept good. He had some chest discomfort last night. He was trying to go to sleep. He had left axilla radiation. He c/o SOB. Last night he woke feeling he couldn't get enough air in his lungs. Walking from his bed to the BR causes SOB.    His HR feels like it speeds up       Scheduled Meds:   pantoprazole  40 mg Oral QAM AC    digoxin  125 mcg Oral Daily    dilTIAZem  60 mg Oral TID    docusate sodium  100 mg Oral BID    levothyroxine  100 mcg Oral Daily    methocarbamol  500 mg Oral 4x Daily    rosuvastatin  10 mg Oral Daily    sodium chloride flush  10 mL Intravenous 2 times per day    aspirin  81 mg Oral Daily    enoxaparin  40 mg Subcutaneous Daily    fluticasone  1 puff Inhalation BID     Continuous Infusions:  PRN Meds:oxyCODONE-acetaminophen, sodium chloride flush, promethazine **OR** ondansetron, acetaminophen **OR** acetaminophen, polyethylene glycol, nitroGLYCERIN       Objective: Wt Readings from Last 3 Encounters:   21 150 lb 12.8 oz (68.4 kg)   21 148 lb 6.4 oz (67.3 kg)   21 156 lb (70.8 kg)   Admit weight: Weight: 150 lb (68 kg)      Temperature range over 24hrs:   Temp  Av.6 °F (36.4 °C)  Min: 97.1 °F (36.2 °C)  Max: 98 °F (36.7 °C)  Current Respiratory Rate:  Resp: 18  Current Pulse:  Pulse: 65  Current Blood Pressure:  BP: 113/79  24hr Blood Pressure Range:  Systolic (99BSB), ZX , Min:94 , FQI:553   ; Diastolic (19JOY), FVX:81, Min:60, Max:80    Current Pulse Oximetry:  SpO2: 97 % RA    No intake or output data in the 24 hours ending 21 1104    Telemetry monitor: sinus rhythm    Physical Exam:  General:  Awake, alert, NAD  Skin:  Warm and dry  Neck:  No JVD  Chest:  Clear to auscultation, respiration easy  Cardiovascular:  RRR 64 S1S2 no murmur to auscultation  Abdomen: Bowel sounds normal, abd soft, non-tender  Extremities:  No edema  : unremarkable      Imaging    myoview : 20:  Summary    -There is a small fixed inferior apical defect that is likely bowel artifact    rather than scar.    -LV function is normal with no regional abnormalities and EF=64%.  -Low risk study. Echo : limited ; 21:   Summary   -Limited echocardiogram was performed to evaluate mitral regurgitation.   -Normal left ventricle size and systolic function with an estimated ejection   fraction of 60%. -No regional wall motion abnormalities are seen. -The tips of the mitral leaflet appears to be slightly myxomatous and   minimal prolapse of posterior leaflet. -Moderate to severe anteriorly directed mitral regurgitation (appears   similar when compared to last echocardiogram dated 2020).  ERO=0.25   -A bioprosthetic artificial aortic valve appears well seated with a maximum   velocity of 2.4m/s and a mean gradient of 13mmHg. -Moderate to severe tricuspid regurgitation.   -Estimated pulmonary artery systolic pressure is mildly elevated at 38 mmHg   assuming a right atrial pressure of 8 mmHg. -The right ventricle is mildly enlarged.   -The right atrium is moderately dilated. PaceArt: 3/2/21:  interrogation of their implanted loop recorder. Interrogation shows many symptoms recordings that appear to be little ectopy. ST noted. Implanted for AF management. Patient remains on Cardizem and digoxin. Patient not on [facility] medication- Had MAZE procedure    Lab Review     Renal Profile:   Lab Results   Component Value Date    CREATININE <0.5 03/05/2021    BUN 8 03/05/2021     03/05/2021    K 4.0 03/05/2021     03/05/2021    CO2 29 03/05/2021     CBC:    Lab Results   Component Value Date    WBC 5.9 03/04/2021    RBC 4.22 03/04/2021    HGB 13.3 03/04/2021    HCT 40.0 03/04/2021    MCV 94.8 03/04/2021    RDW 13.6 03/04/2021     03/04/2021     Fasting Lipid Panel:    Lab Results   Component Value Date    CHOL 208 02/23/2021    HDL 51 02/23/2021    HDL 52 12/27/2009    TRIG 85 02/23/2021     Cardiac Enzymes:    Lab Results   Component Value Date    CKTOTAL 64 02/23/2021    CKMB 0.83 12/27/2009    TROPONINI <0.01 03/05/2021     PT/ INR   Lab Results   Component Value Date    INR 1.05 03/04/2021    INR 0.98 09/29/2020    INR 2.1 09/11/2020    INR 1.8 09/03/2020    INR 1.6 08/28/2020    INR 1.32 07/15/2020    PROTIME 12.2 03/04/2021    PROTIME 11.4 09/29/2020    PROTIME 15.3 07/15/2020    PROTIME 28.7 11/16/2017    PROTIME 12.2 11/06/2017    PROTIME 33.3 10/09/2017    PROTIME NA 02/12/2015     PTT No results found for: PTT   Lab Results   Component Value Date    MG 2.20 03/05/2021      Lab Results   Component Value Date    TSH 2.735 05/21/2020      Ref. Range 3/4/2021 16:30   Pro-BNP Latest Ref Range: 0 - 124 pg/mL 62        Ref.  Range 3/4/2021 16:30 3/4/2021 21:53 3/5/2021 02:40   Troponin Latest Ref Range: <0.01 ng/mL <0.01 <0.01 <0.01     Assessment/Plan:     Patient Active Problem List   Diagnosis    PA[facility]ox[facility]mal atrial fibrillation) [facility] [address]    Transient ischemic attack (TIA)    Chronic ischemic heart disease    Essential hypertension    Obstructive sleep apnea syndrome    Squamous 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)    Chronic pain due to neoplasm    Refusal of blood transfusions as patient is Confucianism    S/P AVR    Sensorineural hearing loss (SNHL) of both ears    Chronic obstructive pulmonary disease ([facility] [address]    History of tobacco abuse    Mixed hyperlipidemia    Osteoarthritis of cervical spine    Primary osteoarthritis of both knees    Metastatic squamous cell carcinoma (HCC)    Cervical stenosis of spine    Encounter for loop recorder check    Non-occlusive coronary artery disease    SOB (shortness of breath) on exertion    Mitral regurgitation    Tricuspid valve insufficiency    Chest pain        Assessment/Plan:  Active Problems:  Atypical chest pain  ~reports episodes occur at HS  - differential: cad, gerd, arrhythmia, valvular disease   ~protonix prescribed  Plan:   - aspirin, statin, diltiazem 60 q8h  - will review prior cath from 2015, may need cath with FFR of LAD on monday     Moderate to severe MR  Plan:  - echocardiogram planned to assess progression of regurg     Aortic valve replacement   ~s/p AVR   Plan:  - echo     History of afib  ~regular AP  ~0.3% AF burden on last ILR interrogation   ~s/p DELGADO ligation & MAZE '15  ~hx of RFA July '20  ~hx of sleep apnea  - no longer on anticoagulation due to bleeding, atrial appendage ligation  Plan:  - interrogate linq   - digoxin / ASA / diltiazem     Plan : will ask RN to interrogate loop today to reassess AF burden

## 2021-03-06 NOTE — PROGRESS NOTES
[facility] PROGRESS NOTE    3/6/2021 7:55 AM        Name: Deshawn Diego . Admitted: 3/4/2021  Primary Care Provider: Ashanti Morrison MD (Tel: [phone])      Subjective:  . Admitted with chest pain and pressure   Pt reports chest pressure last evening when trying to sleep. States sx are always worse when in bed/ at hs. Anticipate possible LHC on Monday       Reviewed interval ancillary notes    Current Medications  pantoprazole (PROTONIX) tablet 40 mg, QAM AC  digoxin (LANOXIN) tablet 125 mcg, Daily  dilTIAZem (CARDIZEM 12 HR) extended release capsule 60 mg, TID  docusate sodium (COLACE) capsule 100 mg, BID  levothyroxine (SYNTHROID) tablet 100 mcg, Daily  methocarbamol (ROBAXIN) tablet 500 mg, 4x Daily  oxyCODONE-acetaminophen (PERCOCET) 5-325 MG per tablet 1 tablet, BID PRN  rosuvastatin (CRESTOR) tablet 10 mg, Daily  sodium chloride flush 0.9 % injection 10 mL, 2 times per day  sodium chloride flush 0.9 % injection 10 mL, PRN  promethazine (PHENERGAN) tablet 12.5 mg, Q6H PRN    Or  ondansetron (ZOFRAN) injection 4 mg, Q6H PRN  acetaminophen (TYLENOL) tablet 650 mg, Q6H PRN    Or  acetaminophen (TYLENOL) suppository 650 mg, Q6H PRN  polyethylene glycol (GLYCOLAX) packet 17 g, Daily PRN  aspirin chewable tablet 81 mg, Daily  enoxaparin (LOVENOX) injection 40 mg, Daily  nitroGLYCERIN (NITROSTAT) SL tablet 0.4 mg, Q5 Min PRN  fluticasone (FLOVENT HFA) 110 MCG/ACT inhaler 1 puff, BID        Objective:  /80   Pulse 73   Temp 97.6 °F (36.4 °C) (Oral)   Resp 18   Ht 5' 11\" (1.803 m)   Wt 150 lb 12.8 oz (68.4 kg)   SpO2 97%   BMI 21.03 kg/m²   No intake or output data in the 24 hours ending 03/06/21 0755   Wt Readings from Last 3 Encounters:   03/04/21 150 lb 12.8 oz (68.4 kg)   03/02/21 148 lb 6.4 oz (67.3 kg)   02/17/21 156 lb (70.8 kg)       General appearance:  Appears comfortable in the bed. Alert and pleasant   Eyes: Sclera clear. Pupils equal.  ENT: Moist oral mucosa. Trachea midline, no adenopathy. Cardiovascular: Regular rhythm, normal S1, S2. Harsh  murmur. No edema in lower extremities  Respiratory: Not using accessory muscles. Good inspiratory effort. Clear to auscultation bilaterally, no wheeze or crackles. GI: Abdomen soft, no tenderness, not distended, normal bowel sounds  Musculoskeletal: No cyanosis in digits, neck supple  Neurology: CN 2-12 grossly intact. No speech or motor deficits  Psych: Normal affect. Alert and oriented in time, place and person  Skin: Warm, dry, normal turgor    Labs and Tests:  CBC:   Recent Labs     03/04/21  1630   WBC 5.9   HGB 13.3*        BMP:    Recent Labs     03/04/21  1630 03/05/21  0708   * 139   K 3.7 4.0    105   CO2 25 29   BUN 12 8   CREATININE 0.5* <0.5*   GLUCOSE 120* 98     Hepatic:   Recent Labs     03/04/21  1630 03/05/21  0708   AST 14* 14*   ALT 10 10   BILITOT 0.6 1.0   ALKPHOS 45 44     Echo: 9/20  Normal left ventricle size, wall thickness and systolic function with an estimated ejection fraction of 60%. No regional wall motion abnormalities are seen. E/e\"= 10.   Moderate to severe mitral regurgitation.   A bioprosthetic aortic valve appears well seated with a maximum gradient of   17 mmHg and a mean gradient of 11 mmHg. Aortic valve area of 1.97cm.   No evidence of aortic valve regurgitation.   The aortic root is mildly dilated. 3.7cm   The ascending aorta is mildly dilated. 4.2cm   The right ventricle is mildly enlarged but normal in function.   Moderate to severe tricuspid regurgitation. PASP 34mmHg.   The right atrium is mildly dilated.  IVC size is normal (<2.1 cm) but collapses < 50% with respiration consistent   with elevated RA pressure (8 mmHg).     ECHO 2/24/21   Summary   -Limited echocardiogram was performed to evaluate mitral regurgitation.   -Normal left ventricle size and systolic function with an estimated ejection   fraction of 60%. -No regional wall motion abnormalities are seen. -The tips of the mitral leaflet appears to be slightly myxomatous and   minimal prolapse of posterior leaflet. -Moderate to severe anteriorly directed mitral regurgitation (appears   similar when compared to last echocardiogram dated September 2020). ERO=0.25   -A bioprosthetic artificial aortic valve appears well seated with a maximum   velocity of 2.4m/s and a mean gradient of 13mmHg. -Moderate to severe tricuspid regurgitation.   -Estimated pulmonary artery systolic pressure is mildly elevated at 38 mmHg   assuming a right atrial pressure of 8 mmHg. -The right ventricle is mildly enlarged.   -The right atrium is moderately dilated. Problem List  Active Problems:    Chest pain  Resolved Problems:    * No resolved hospital problems. *       Assessment & Plan:   1. Chest Pain: Intermittent and worse at hs. Cardiology is following, anticipate [facility] on Monday   2. S/p AV replacement with Dr Arsenio Clay 1/2015, Has Severe TR:  Both recent echo's were reviewed   3. Hx of AF: S/P MAXE and DELGADO ligation,   Not  On AC due to hx of bleed   4. CHRISTOPHER: compliant with CPAP at hs   5. Intermittent AMS at home:  Likely due to daily alcohol use and percocet.   Pt agrees to stop all alcohol for now and work on cutting back on percocet         Diet: DIET CARDIAC; No Caffeine  Code:Full Code  DVT PPX      Mango ISIDRA Ruff CNP   3/6/2021 7:55 AM

## 2021-03-07 LAB
T4 FREE: 2.2 NG/DL (ref 0.9–1.8)
TROPONIN: <0.01 NG/ML
TSH REFLEX: 7.75 UIU/ML (ref 0.27–4.2)

## 2021-03-07 PROCEDURE — 84484 ASSAY OF TROPONIN QUANT: CPT

## 2021-03-07 PROCEDURE — 6370000000 HC RX 637 (ALT 250 FOR IP): Performed by: INTERNAL MEDICINE

## 2021-03-07 PROCEDURE — 93005 ELECTROCARDIOGRAM TRACING: CPT | Performed by: FAMILY MEDICINE

## 2021-03-07 PROCEDURE — 36415 COLL VENOUS BLD VENIPUNCTURE: CPT

## 2021-03-07 PROCEDURE — 6370000000 HC RX 637 (ALT 250 FOR IP): Performed by: NURSE PRACTITIONER

## 2021-03-07 PROCEDURE — 1200000000 HC SEMI PRIVATE

## 2021-03-07 PROCEDURE — 94640 AIRWAY INHALATION TREATMENT: CPT

## 2021-03-07 PROCEDURE — 99232 SBSQ HOSP IP/OBS MODERATE 35: CPT | Performed by: NURSE PRACTITIONER

## 2021-03-07 PROCEDURE — 94761 N-INVAS EAR/PLS OXIMETRY MLT: CPT

## 2021-03-07 PROCEDURE — 2580000003 HC RX 258: Performed by: INTERNAL MEDICINE

## 2021-03-07 RX ORDER — OXYCODONE HYDROCHLORIDE AND ACETAMINOPHEN 5; 325 MG/1; MG/1
1 TABLET ORAL EVERY 8 HOURS PRN
Status: DISCONTINUED | OUTPATIENT
Start: 2021-03-07 | End: 2021-03-08 | Stop reason: HOSPADM

## 2021-03-07 RX ADMIN — METHOCARBAMOL TABLETS 500 MG: 500 TABLET, COATED ORAL at 08:28

## 2021-03-07 RX ADMIN — DILTIAZEM HYDROCHLORIDE 60 MG: 60 CAPSULE, EXTENDED RELEASE ORAL at 21:30

## 2021-03-07 RX ADMIN — LEVOTHYROXINE SODIUM 100 MCG: 0.1 TABLET ORAL at 07:34

## 2021-03-07 RX ADMIN — OXYCODONE HYDROCHLORIDE AND ACETAMINOPHEN 1 TABLET: 5; 325 TABLET ORAL at 21:32

## 2021-03-07 RX ADMIN — Medication 10 ML: at 08:29

## 2021-03-07 RX ADMIN — ROSUVASTATIN 10 MG: 10 TABLET, FILM COATED ORAL at 08:28

## 2021-03-07 RX ADMIN — PANTOPRAZOLE SODIUM 40 MG: 40 TABLET, DELAYED RELEASE ORAL at 07:33

## 2021-03-07 RX ADMIN — DILTIAZEM HYDROCHLORIDE 60 MG: 60 CAPSULE, EXTENDED RELEASE ORAL at 08:28

## 2021-03-07 RX ADMIN — METHOCARBAMOL TABLETS 500 MG: 500 TABLET, COATED ORAL at 15:52

## 2021-03-07 RX ADMIN — Medication 1 PUFF: at 20:12

## 2021-03-07 RX ADMIN — DILTIAZEM HYDROCHLORIDE 60 MG: 60 CAPSULE, EXTENDED RELEASE ORAL at 15:51

## 2021-03-07 RX ADMIN — MELATONIN TAB 3 MG 3 MG: 3 TAB at 21:33

## 2021-03-07 RX ADMIN — METHOCARBAMOL TABLETS 500 MG: 500 TABLET, COATED ORAL at 21:31

## 2021-03-07 RX ADMIN — Medication 10 ML: at 21:31

## 2021-03-07 RX ADMIN — DOCUSATE SODIUM 100 MG: 100 CAPSULE ORAL at 21:31

## 2021-03-07 RX ADMIN — DIGOXIN 125 MCG: 125 TABLET ORAL at 08:28

## 2021-03-07 RX ADMIN — Medication 1 PUFF: at 09:41

## 2021-03-07 RX ADMIN — DOCUSATE SODIUM 100 MG: 100 CAPSULE ORAL at 08:28

## 2021-03-07 RX ADMIN — ASPIRIN 81 MG: 81 TABLET, CHEWABLE ORAL at 08:28

## 2021-03-07 RX ADMIN — OXYCODONE HYDROCHLORIDE AND ACETAMINOPHEN 1 TABLET: 5; 325 TABLET ORAL at 08:28

## 2021-03-07 ASSESSMENT — PAIN DESCRIPTION - PROGRESSION
CLINICAL_PROGRESSION: NOT CHANGED

## 2021-03-07 ASSESSMENT — PAIN SCALES - GENERAL: PAINLEVEL_OUTOF10: 6

## 2021-03-07 NOTE — PROGRESS NOTES
[facility] PROGRESS NOTE    3/7/2021 7:38 AM        Name: Afua Joaquin . Admitted: 3/4/2021  Primary Care Provider: Evette Hernandez MD (Tel: [phone])      Subjective:  .     Admitted with chest pain and pressure   Pt seen while up walking the halls and then while in bed  States he slept well for a total of 6 hours which is very unusual for him  No reports of chest pain or dyspnea last evening     Anticipate possible LHC on Monday       Reviewed interval ancillary notes    Current Medications  oxyCODONE-acetaminophen (PERCOCET) 5-325 MG per tablet 1 tablet, Q6H PRN  melatonin tablet 3 mg, Nightly PRN  pantoprazole (PROTONIX) tablet 40 mg, QAM AC  digoxin (LANOXIN) tablet 125 mcg, Daily  dilTIAZem (CARDIZEM 12 HR) extended release capsule 60 mg, TID  docusate sodium (COLACE) capsule 100 mg, BID  levothyroxine (SYNTHROID) tablet 100 mcg, Daily  methocarbamol (ROBAXIN) tablet 500 mg, 4x Daily  rosuvastatin (CRESTOR) tablet 10 mg, Daily  sodium chloride flush 0.9 % injection 10 mL, 2 times per day  sodium chloride flush 0.9 % injection 10 mL, PRN  promethazine (PHENERGAN) tablet 12.5 mg, Q6H PRN    Or  ondansetron (ZOFRAN) injection 4 mg, Q6H PRN  acetaminophen (TYLENOL) tablet 650 mg, Q6H PRN    Or  acetaminophen (TYLENOL) suppository 650 mg, Q6H PRN  polyethylene glycol (GLYCOLAX) packet 17 g, Daily PRN  aspirin chewable tablet 81 mg, Daily  enoxaparin (LOVENOX) injection 40 mg, Daily  nitroGLYCERIN (NITROSTAT) SL tablet 0.4 mg, Q5 Min PRN  fluticasone (FLOVENT HFA) 110 MCG/ACT inhaler 1 puff, BID        Objective:  BP 91/63   Pulse 61   Temp 98 °F (36.7 °C) (Oral)   Resp 16   Ht 5' 11\" (1.803 m)   Wt 151 lb 1.6 oz (68.5 kg)   SpO2 97%   BMI 21.07 kg/m²   No intake or output data in the 24 hours ending 03/07/21 0738   Wt Readings from Last 3 Encounters:   03/07/21 151 lb 1.6 oz (68.5 kg)   03/02/21 148 lb 6.4 oz (67.3 kg)   02/17/21 156 lb (70.8 kg)       General appearance:  Appears comfortable in the bed. Alert and pleasant   Eyes: Sclera clear. Pupils equal.  ENT: Moist oral mucosa. Trachea midline, no adenopathy. Cardiovascular: Regular rhythm, normal S1, S2. Harsh  murmur. No edema in lower extremities  Respiratory: Not using accessory muscles. Good inspiratory effort. Clear to auscultation bilaterally, no wheeze or crackles. GI: Abdomen soft, no tenderness, not distended, normal bowel sounds  Musculoskeletal: No cyanosis in digits, neck supple  Neurology: CN 2-12 grossly intact. No speech or motor deficits  Psych: Normal affect. Alert and oriented in time, place and person  Skin: Warm, dry, normal turgor    Labs and Tests:  CBC:   Recent Labs     03/04/21  1630   WBC 5.9   HGB 13.3*        BMP:    Recent Labs     03/04/21  1630 03/05/21  0708   * 139   K 3.7 4.0    105   CO2 25 29   BUN 12 8   CREATININE 0.5* <0.5*   GLUCOSE 120* 98     Hepatic:   Recent Labs     03/04/21  1630 03/05/21  0708   AST 14* 14*   ALT 10 10   BILITOT 0.6 1.0   ALKPHOS 45 44     Echo: 9/20  Normal left ventricle size, wall thickness and systolic function with an estimated ejection fraction of 60%. No regional wall motion abnormalities are seen. E/e\"= 10.   Moderate to severe mitral regurgitation.   A bioprosthetic aortic valve appears well seated with a maximum gradient of   17 mmHg and a mean gradient of 11 mmHg. Aortic valve area of 1.97cm.   No evidence of aortic valve regurgitation.   The aortic root is mildly dilated. 3.7cm   The ascending aorta is mildly dilated. 4.2cm   The right ventricle is mildly enlarged but normal in function.   Moderate to severe tricuspid regurgitation. PASP 34mmHg.   The right atrium is mildly dilated.  IVC size is normal (<2.1 cm) but collapses < 50% with respiration consistent   with elevated RA pressure (8 mmHg).     ECHO 2/24/21   Summary   -Limited echocardiogram was performed to evaluate mitral regurgitation.   -Normal left ventricle size and systolic function with an estimated ejection   fraction of 60%. -No regional wall motion abnormalities are seen. -The tips of the mitral leaflet appears to be slightly myxomatous and   minimal prolapse of posterior leaflet. -Moderate to severe anteriorly directed mitral regurgitation (appears   similar when compared to last echocardiogram dated September 2020). ERO=0.25   -A bioprosthetic artificial aortic valve appears well seated with a maximum   velocity of 2.4m/s and a mean gradient of 13mmHg. -Moderate to severe tricuspid regurgitation.   -Estimated pulmonary artery systolic pressure is mildly elevated at 38 mmHg   assuming a right atrial pressure of 8 mmHg. -The right ventricle is mildly enlarged.   -The right atrium is moderately dilated. Problem List  Active Problems:    Chest pain  Resolved Problems:    * No resolved hospital problems. *       Assessment & Plan:   1. Chest Pain: Intermittent and worse at hs, however had no pain last evening. Cardiology is following, anticipate [facility] on Monday   2. S/p AV replacement with Dr Arsenio Clay 1/2015, Has Severe TR:  Both recent echo's were reviewed   3. Hx of AF: S/P MAXE and DELGADO ligation,   Not  On AC due to hx of bleed, on cardizem, dig , asa. Rate is controlled    4. CHRISTOPHER: compliant with CPAP at hs   5. Hypothyroidism: On replacement therapy. tsh pending   6. Intermittent AMS at home:  Likely due to daily alcohol use and percocet. Pt agrees to stop all alcohol for now and work on cutting back on percocet .   I have decreased percocet to q 8 hours prn and discouraged its use         Diet: DIET CARDIAC; No Caffeine  Code:Full Code  DVT PPX      Mango Ruff, APRN - CNP   3/7/2021 7:38 AM

## 2021-03-07 NOTE — PROGRESS NOTES
Pt c/o rapid pounding in chest and mild sob that woke him up out of a deep sleep. /71 and heart rate 68. Pt said it was uncomfortable but not exactly pain. Pt seemed very scared and concerned. Stat EKG and troponin ordered. EKG with changes. Forrest Rascon with cardiology made aware. Trop results pending. Pt feeling better.

## 2021-03-07 NOTE — PROGRESS NOTES
[address]              Progress Note      Admit Date 3/4/2021  HPI: Deshawn Diego is a 78 y.o. patient who presented to the hospital with complaints of shortness of breath, chest pain, and confusion. History is provided by patient and his daughter. He reports that he has had two weeks of \"grabbing his chest\" on the left side. He states that the pain comes on suddenly and can last up to 20 minutes. He states that it is associated with shortness of breath. It is moderate in intensity. He states that it was different from the dysphagia and trouble swallowing that he typically gets. He said yesterday his doctor called while he was eating a sandwich and told him to go to the ER. He states that he is drinking \"no alcohol beer\" these days, but sips on \"watered down vodka all day. \" He just moved recently and can walk up 14 steps. His daughter notes that he has episodes of being confused where he is.      Interval history:  Sinus rhythm on telemetry      ILR interrogation 3/6/21: symptomatic in sinus rhythm      Troponin <0.01 x3    Mr. Samuel Hansen describes feeling SOB and fatigue PTA. His heart felt like it was on the outside of his chest and someone squeezing it. It was pain and most noticeable at HS    Subjective: slept good last night after taking melatonin. Had an episode of chest discomfort with Lt axilla radiation when going to bed. Lasting a few minutes.   States breathing is not normal  Denies palpitations        Scheduled Meds:   pantoprazole  40 mg Oral QAM AC    digoxin  125 mcg Oral Daily    dilTIAZem  60 mg Oral TID    docusate sodium  100 mg Oral BID    levothyroxine  100 mcg Oral Daily    methocarbamol  500 mg Oral 4x Daily    rosuvastatin  10 mg Oral Daily    sodium chloride flush  10 mL Intravenous 2 times per day    aspirin  81 mg Oral Daily    enoxaparin  40 mg Subcutaneous Daily    fluticasone  1 puff Inhalation BID     Continuous Infusions:  PRN Meds:oxyCODONE-acetaminophen, melatonin, sodium chloride flush, promethazine **OR** ondansetron, acetaminophen **OR** acetaminophen, polyethylene glycol, nitroGLYCERIN       Objective: Wt Readings from Last 3 Encounters:   21 151 lb 1.6 oz (68.5 kg)   21 148 lb 6.4 oz (67.3 kg)   21 156 lb (70.8 kg)   Admit weight: Weight: 150 lb (68 kg)      Temperature range over 24hrs:   Temp  Av.7 °F (36.5 °C)  Min: 97.3 °F (36.3 °C)  Max: 98.2 °F (36.8 °C)  Current Respiratory Rate:  Resp: 18  Current Pulse:  Pulse: 68  Current Blood Pressure:  BP: 103/68  24hr Blood Pressure Range:  Systolic (43ZJB), WBV:061 , Min:91 , SIE:356   ; Diastolic (18NKT), XQW:55, Min:63, Max:80    Current Pulse Oximetry:  SpO2: 97 % RA    No intake or output data in the 24 hours ending 21 0856    Telemetry monitor: sinus rhythm    Physical Exam:  General:  Awake, alert, NAD  Skin:  Warm and dry  Neck:  No JVD  Chest:  Clear to auscultation, respiration easy  Cardiovascular:  RRR 72 S1S2 no murmur to auscultation  Abdomen: Bowel sounds normal, abd soft, non-tender  Extremities:  No edema  : unremarkable      Imaging    myoview : 20:  Summary    -There is a small fixed inferior apical defect that is likely bowel artifact    rather than scar.    -LV function is normal with no regional abnormalities and EF=64%.  -Low risk study. Echo : limited ; 21:   Summary   -Limited echocardiogram was performed to evaluate mitral regurgitation.   -Normal left ventricle size and systolic function with an estimated ejection   fraction of 60%. -No regional wall motion abnormalities are seen. -The tips of the mitral leaflet appears to be slightly myxomatous and   minimal prolapse of posterior leaflet. -Moderate to severe anteriorly directed mitral regurgitation (appears   similar when compared to last echocardiogram dated 2020).  ERO=0.25   -A bioprosthetic artificial aortic valve appears well seated with a maximum   velocity of 2.4m/s and a mean gradient of 13mmHg. -Moderate to severe tricuspid regurgitation.   -Estimated pulmonary artery systolic pressure is mildly elevated at 38 mmHg   assuming a right atrial pressure of 8 mmHg. -The right ventricle is mildly enlarged.   -The right atrium is moderately dilated. PaceArt: 3/2/21:  interrogation of their implanted loop recorder. Interrogation shows many symptoms recordings that appear to be little ectopy. ST noted. Implanted for AF management. Patient remains on Cardizem and digoxin.   Patient not on [facility] medication- Had MAZE procedure    ILR interrogation : 3/6/21: symptomatic x3 episodes on 3/1/21 in sinus rhythm                     x2 episodes of AT on 2/28/21 rate 103 bpm          x5 episodes of AT on 2/22/21 rate 102-130 bpm    Lab Review     Renal Profile:   Lab Results   Component Value Date    CREATININE <0.5 03/05/2021    BUN 8 03/05/2021     03/05/2021    K 4.0 03/05/2021     03/05/2021    CO2 29 03/05/2021     CBC:    Lab Results   Component Value Date    WBC 5.9 03/04/2021    RBC 4.22 03/04/2021    HGB 13.3 03/04/2021    HCT 40.0 03/04/2021    MCV 94.8 03/04/2021    RDW 13.6 03/04/2021     03/04/2021     Fasting Lipid Panel:    Lab Results   Component Value Date    CHOL 171 03/05/2021    HDL 52 03/05/2021    HDL 52 12/27/2009    TRIG 82 03/05/2021     Cardiac Enzymes:    Lab Results   Component Value Date    CKTOTAL 64 02/23/2021    CKMB 0.83 12/27/2009    TROPONINI <0.01 03/05/2021     PT/ INR   Lab Results   Component Value Date    INR 1.05 03/04/2021    INR 0.98 09/29/2020    INR 2.1 09/11/2020    INR 1.8 09/03/2020    INR 1.6 08/28/2020    INR 1.32 07/15/2020    PROTIME 12.2 03/04/2021    PROTIME 11.4 09/29/2020    PROTIME 15.3 07/15/2020    PROTIME 28.7 11/16/2017    PROTIME 12.2 11/06/2017    PROTIME 33.3 10/09/2017    PROTIME NA 02/12/2015     PTT No results found for: PTT   Lab Results   Component Value Date    MG 2.20 03/05/2021      Lab Results Component Value Date    TSH 2.735 05/21/2020      Ref. Range 3/4/2021 16:30   Pro-BNP Latest Ref Range: 0 - 124 pg/mL 62        Ref.  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)    Chronic pain due to neoplasm    Refusal of blood transfusions as patient is Mormon    S/P AVR    Sensorineural hearing loss (SNHL) of both ears    Chronic obstructive pulmonary disease ([facility]    History of tobacco abuse    Mixed hyperlipidemia    Osteoarthritis of cervical spine    Primary osteoarthritis of both knees    Metastatic squamous cell carcinoma (HCC)    Cervical stenosis of spine    Encounter for loop recorder check    Non-occlusive coronary artery disease    SOB (shortness of breath) on exertion    Mitral regurgitation    Tricuspid valve insufficiency    Chest pain        Assessment/Plan:  Active Problems:  Atypical chest pain  ~reports episodes occur at HS : episode last night of brief duration   - differential: cad, gerd, arrhythmia, valvular disease   ~protonix prescribed  Plan:   - aspirin, statin, diltiazem 60 q8h  - cardiologist to review prior cath from 2015, may need cath with FFR of LAD on monday     Moderate to severe MR  ~c/o SOB ; neg to exam for decompensation  Plan:  - echocardiogram planned to assess progression of regurg     Aortic valve replacement   ~s/p AVR   Plan:  - echo     History of afib  ~regular AP  ~0.3% AT burden on last ILR interrogation   ~s/p DELGADO ligation & MAZE '15  ~hx of RFA July '20  ~hx of sleep apnea  - no longer on anticoagulation due to bleeding, atrial appendage ligation    Plan:  ~continue present management pending review of cath films and echo for MR progression

## 2021-03-08 VITALS
DIASTOLIC BLOOD PRESSURE: 78 MMHG | BODY MASS INDEX: 20.73 KG/M2 | HEIGHT: 71 IN | HEART RATE: 73 BPM | SYSTOLIC BLOOD PRESSURE: 118 MMHG | OXYGEN SATURATION: 96 % | WEIGHT: 148.1 LBS | TEMPERATURE: 97.7 F | RESPIRATION RATE: 18 BRPM

## 2021-03-08 LAB
EKG ATRIAL RATE: 64 BPM
EKG DIAGNOSIS: NORMAL
EKG P AXIS: 58 DEGREES
EKG P-R INTERVAL: 220 MS
EKG Q-T INTERVAL: 396 MS
EKG QRS DURATION: 76 MS
EKG QTC CALCULATION (BAZETT): 408 MS
EKG R AXIS: -9 DEGREES
EKG T AXIS: -7 DEGREES
EKG VENTRICULAR RATE: 64 BPM

## 2021-03-08 PROCEDURE — 99152 MOD SED SAME PHYS/QHP 5/>YRS: CPT

## 2021-03-08 PROCEDURE — 4A023N7 MEASUREMENT OF CARDIAC SAMPLING AND PRESSURE, LEFT HEART, PERCUTANEOUS APPROACH: ICD-10-PCS | Performed by: INTERNAL MEDICINE

## 2021-03-08 PROCEDURE — 99233 SBSQ HOSP IP/OBS HIGH 50: CPT | Performed by: INTERNAL MEDICINE

## 2021-03-08 PROCEDURE — 6370000000 HC RX 637 (ALT 250 FOR IP): Performed by: INTERNAL MEDICINE

## 2021-03-08 PROCEDURE — 93010 ELECTROCARDIOGRAM REPORT: CPT | Performed by: INTERNAL MEDICINE

## 2021-03-08 PROCEDURE — B2111ZZ FLUOROSCOPY OF MULTIPLE CORONARY ARTERIES USING LOW OSMOLAR CONTRAST: ICD-10-PCS | Performed by: INTERNAL MEDICINE

## 2021-03-08 PROCEDURE — 99152 MOD SED SAME PHYS/QHP 5/>YRS: CPT | Performed by: INTERNAL MEDICINE

## 2021-03-08 PROCEDURE — 6360000004 HC RX CONTRAST MEDICATION: Performed by: INTERNAL MEDICINE

## 2021-03-08 PROCEDURE — 2580000003 HC RX 258: Performed by: INTERNAL MEDICINE

## 2021-03-08 PROCEDURE — C1769 GUIDE WIRE: HCPCS

## 2021-03-08 PROCEDURE — 99222 1ST HOSP IP/OBS MODERATE 55: CPT | Performed by: THORACIC SURGERY (CARDIOTHORACIC VASCULAR SURGERY)

## 2021-03-08 PROCEDURE — 2580000003 HC RX 258

## 2021-03-08 PROCEDURE — 94760 N-INVAS EAR/PLS OXIMETRY 1: CPT

## 2021-03-08 PROCEDURE — 2709999900 HC NON-CHARGEABLE SUPPLY

## 2021-03-08 PROCEDURE — 93454 CORONARY ARTERY ANGIO S&I: CPT | Performed by: INTERNAL MEDICINE

## 2021-03-08 PROCEDURE — 6370000000 HC RX 637 (ALT 250 FOR IP): Performed by: NURSE PRACTITIONER

## 2021-03-08 PROCEDURE — 93454 CORONARY ARTERY ANGIO S&I: CPT

## 2021-03-08 PROCEDURE — 2500000003 HC RX 250 WO HCPCS

## 2021-03-08 PROCEDURE — 6360000002 HC RX W HCPCS

## 2021-03-08 PROCEDURE — 99153 MOD SED SAME PHYS/QHP EA: CPT

## 2021-03-08 RX ORDER — SODIUM CHLORIDE 0.9 % (FLUSH) 0.9 %
10 SYRINGE (ML) INJECTION PRN
Status: DISCONTINUED | OUTPATIENT
Start: 2021-03-08 | End: 2021-03-08 | Stop reason: HOSPADM

## 2021-03-08 RX ORDER — PANTOPRAZOLE SODIUM 40 MG/1
40 TABLET, DELAYED RELEASE ORAL
Qty: 60 TABLET | Refills: 3 | Status: SHIPPED | OUTPATIENT
Start: 2021-03-08 | End: 2021-04-20 | Stop reason: SDUPTHER

## 2021-03-08 RX ORDER — PANTOPRAZOLE SODIUM 40 MG/1
40 TABLET, DELAYED RELEASE ORAL
Status: DISCONTINUED | OUTPATIENT
Start: 2021-03-08 | End: 2021-03-08 | Stop reason: HOSPADM

## 2021-03-08 RX ORDER — SODIUM CHLORIDE 0.9 % (FLUSH) 0.9 %
10 SYRINGE (ML) INJECTION EVERY 12 HOURS SCHEDULED
Status: DISCONTINUED | OUTPATIENT
Start: 2021-03-08 | End: 2021-03-08 | Stop reason: HOSPADM

## 2021-03-08 RX ADMIN — DIGOXIN 125 MCG: 125 TABLET ORAL at 08:16

## 2021-03-08 RX ADMIN — OXYCODONE HYDROCHLORIDE AND ACETAMINOPHEN 1 TABLET: 5; 325 TABLET ORAL at 09:56

## 2021-03-08 RX ADMIN — DILTIAZEM HYDROCHLORIDE 60 MG: 60 CAPSULE, EXTENDED RELEASE ORAL at 08:16

## 2021-03-08 RX ADMIN — METHOCARBAMOL TABLETS 500 MG: 500 TABLET, COATED ORAL at 13:49

## 2021-03-08 RX ADMIN — IOPAMIDOL 63 ML: 755 INJECTION, SOLUTION INTRAVENOUS at 09:06

## 2021-03-08 RX ADMIN — Medication 10 ML: at 08:18

## 2021-03-08 RX ADMIN — PANTOPRAZOLE SODIUM 40 MG: 40 TABLET, DELAYED RELEASE ORAL at 08:16

## 2021-03-08 RX ADMIN — METHOCARBAMOL TABLETS 500 MG: 500 TABLET, COATED ORAL at 08:16

## 2021-03-08 RX ADMIN — ROSUVASTATIN 10 MG: 10 TABLET, FILM COATED ORAL at 08:16

## 2021-03-08 RX ADMIN — DILTIAZEM HYDROCHLORIDE 60 MG: 60 CAPSULE, EXTENDED RELEASE ORAL at 13:49

## 2021-03-08 RX ADMIN — LEVOTHYROXINE SODIUM 100 MCG: 0.1 TABLET ORAL at 08:16

## 2021-03-08 RX ADMIN — DOCUSATE SODIUM 100 MG: 100 CAPSULE ORAL at 08:15

## 2021-03-08 RX ADMIN — ASPIRIN 81 MG: 81 TABLET, CHEWABLE ORAL at 08:15

## 2021-03-08 ASSESSMENT — PAIN DESCRIPTION - PROGRESSION
CLINICAL_PROGRESSION: NOT CHANGED

## 2021-03-08 NOTE — PROGRESS NOTES
Pt up from cath lab with cath lab RNs. Pt responds to voice, is still sleepy from medication. Pt stable.

## 2021-03-08 NOTE — PROGRESS NOTES
Cardiovascular Progress Note      Chief Complaint:   Chief Complaint   Patient presents with    Chest Pain     Intermittent CP & SOB lasting two weeks in duration. Reports seeing card recently & dx with leaking valves. Pain described as 6/10 tightness. Impression/Recommendations:    79 y/o patient of Dr. Maldonado Iqbal:     Chest pain, concerning for new onset angina   Hx. Nonobstructive CAD (40% pLAD FFR negative)  Moderate to severe MR   Hx. SAVR 2015  (Dr Juliann Umaña)  PAF, DELGADO ligation & MAZE '15  Hx. Atrial flutter ablation 2020  CHRISTOPHER  GERD  ETOH Abuse? Former tobacco   Taoism       No change to ASA, Statin, calcium channel blocker and Digoxin at this point. No AC with history of MAZE  Coronary angiogram today revealed progression of ostial LAD  CTS consult to discuss Redo Sternotomy for LIMA-LAD and potential MVR for significant, primary MR    Interval History:   Pt. S/E. Reports brief, squeezing chest pain yesterday afternoon, not requiring Nitroglycerin. Slept overnight. No new concerns this morning. Reports difficulty swallowing. No CP/SOB. Asks good questions and shows understanding. Myoview : 8/26/20:  Summary    -There is a small fixed inferior apical defect that is likely bowel artifact    rather than scar.    -LV function is normal with no regional abnormalities and EF=64%.  -Low risk study.        Echo : Limited ; 2/24/21:   Summary   -Limited echocardiogram was performed to evaluate mitral regurgitation.   -Normal left ventricle size and systolic function with an estimated ejection   fraction of 60%.  -No regional wall motion abnormalities are seen.   -The tips of the mitral leaflet appears to be slightly myxomatous and   minimal prolapse of posterior leaflet.   -Moderate to severe anteriorly directed mitral regurgitation (appears   similar when compared to last echocardiogram dated September 2020).  ERO=0.25   -A bioprosthetic artificial aortic valve appears well seated with a maximum   velocity of 2.4m/s and a mean gradient of 13mmHg.   -Moderate to severe tricuspid regurgitation.   -Estimated pulmonary artery systolic pressure is mildly elevated at 38 mmHg   assuming a right atrial pressure of 8 mmHg.   -The right ventricle is mildly enlarged.   -The right atrium is moderately dilated.      PaceArt: 3/2/21:  Interrogation of their implanted loop recorder. Interrogation shows many symptoms recordings that appear to be little ectopy. ST noted. Implanted for AF management. Patient remains on Cardizem and digoxin. Patient not on [facility] medication- Had MAZE procedure      [id number] :  Anatomy:   LM- normal  LAD-40% prox, Ca2+.   FFR 0.89  Cx-normal  OM1- normal  RCA-normal  RPDA- normal  LVEF- 55%    Medications:  oxyCODONE-acetaminophen (PERCOCET) 5-325 MG per tablet 1 tablet, Q8H PRN  melatonin tablet 3 mg, Nightly PRN  pantoprazole (PROTONIX) tablet 40 mg, QAM AC  digoxin (LANOXIN) tablet 125 mcg, Daily  dilTIAZem (CARDIZEM 12 HR) extended release capsule 60 mg, TID  docusate sodium (COLACE) capsule 100 mg, BID  levothyroxine (SYNTHROID) tablet 100 mcg, Daily  methocarbamol (ROBAXIN) tablet 500 mg, 4x Daily  rosuvastatin (CRESTOR) tablet 10 mg, Daily  sodium chloride flush 0.9 % injection 10 mL, 2 times per day  sodium chloride flush 0.9 % injection 10 mL, PRN  promethazine (PHENERGAN) tablet 12.5 mg, Q6H PRN    Or  ondansetron (ZOFRAN) injection 4 mg, Q6H PRN  acetaminophen (TYLENOL) tablet 650 mg, Q6H PRN    Or  acetaminophen (TYLENOL) suppository 650 mg, Q6H PRN  polyethylene glycol (GLYCOLAX) packet 17 g, Daily PRN  aspirin chewable tablet 81 mg, Daily  enoxaparin (LOVENOX) injection 40 mg, Daily  nitroGLYCERIN (NITROSTAT) SL tablet 0.4 mg, Q5 Min PRN  fluticasone (FLOVENT HFA) 110 MCG/ACT inhaler 1 puff, BID        I/O:   No intake or output data in the 24 hours ending 03/08/21 0729    Physical Exam:    /71   Pulse 69   Temp 97.5 °F (36.4 °C) (Oral)   Resp 18   Ht 5' 11\" (1.803 m)   Wt 148 lb 1.6 oz (67.2 kg)   SpO2 98%   BMI 20.66 kg/m²   Wt Readings from Last 3 Encounters:   03/08/21 148 lb 1.6 oz (67.2 kg)   03/02/21 148 lb 6.4 oz (67.3 kg)   02/17/21 156 lb (70.8 kg)       GENERAL: Well developed, well nourished, no acute distress  NEUROLOGICAL: Alert and oriented x3  PSYCH: Normal mood and affect   SKIN: Warm and dry, without lesions  HEENT: Normocephalic, atraumatic, Sclera non-icteric, mucous membranes moist  NECK: supple, JVP normal, thyroid not enlarged   CAROTID: Normal upstroke, no bruits  CARDIAC: Normal PMI, regular rate and rhythm, normal S1S2, no murmur, rub  RESPIRATORY: Normal respiratory effort, clear to auscultation bilaterally  EXTREMITIES: No cyanosis, clubbing or edema, palpable pulses bilaterally   MUSCULOSKELETAL: No joint swelling or tenderness, no chest wall tenderness  GASTROINTESTINAL:  soft, non-tender, no bruit    Data Review:    Recent Labs     03/07/21  1619   TROPONINI <0.01     Pedrito Wagner DO [facility]  Interventional Cardiology     o: [phone]  [address] [place] [address]., [address] [facility], [address] [facility]      NOTE:  This report was transcribed using voice recognition software. Every effort was made to ensure accuracy; however, inadvertent computerized transcription errors may be present.

## 2021-03-08 NOTE — PROGRESS NOTES
Cath lab Charge RN, Prince Conn, assessed pt. Pt stable. Will restart cath assessment at 4mL instilled.

## 2021-03-08 NOTE — ADT AUTH CERT
Chest Pain - Care Day 2 (3/5/2021) by Balaji Estrada RN       Review Status Review Entered   Completed 3/8/2021 09:14      Criteria Review      Care Day: 2 Care Date: 3/5/2021 Level of Care: Telemetry    Guideline Day 1    Level Of Care    (X) ICU, [B] intermediate care, [C] or telemetry [D]    3/8/2021 9:14 AM EST by BrendU*tiquebeth Kimble      m/s tele    Clinical Status    ( ) * Clinical Indications met [E]    Interventions    (X) Cardiac biomarkers    3/8/2021 9:14 AM EST by Brendolyn Nip      trop less than 0.01 x 1    Medications    (X) Possible antiplatelet agents, anticoagulants    3/8/2021 9:14 AM EST by Brendolyn Nip      asa po qd  lovenox sq qd    (X) Possible statin    3/8/2021 9:14 AM EST by Brendolyn Nip      crestor po qd    * Milestone   Additional Notes   3/5/21         97.8 (36.6)  18  65  112/76    96% on ra            Sodium 136 - 145 mmol/L 139    Potassium 3.5 - 5.1 mmol/L 4.0    Chloride 99 - 110 mmol/L 105    CO2 21 - 32 mmol/L 29    Anion Gap 3 - 16 5    Glucose 70 - 99 mg/dL 98    BUN 7 - 20 mg/dL 8    CREATININE 0.8 - 1.3 mg/dL <0.5Low    Ca 9.4         Trop <0.01         Cholesterol, Total 0 - 199 mg/dL 171    Triglycerides 0 - 150 mg/dL 82    HDL 40 - 60 mg/dL 52    LDL Calculated <100 mg/dL 103High     VLDL Cholesterol Calculated Not Established mg/dL 16             Meds;   Scheduled Meds:pantoprazole, 40 mg, Oral, QAM AC   digoxin, 125 mcg, Oral, Daily   dilTIAZem, 60 mg, Oral, TID   docusate sodium, 100 mg, Oral, BID   levothyroxine, 100 mcg, Oral, Daily   methocarbamol, 500 mg, Oral, 4x Daily   rosuvastatin, 10 mg, Oral, Daily   sodium chloride flush, 10 mL, Intravenous, 2 times per day   aspirin, 81 mg, Oral, Daily   enoxaparin, 40 mg, Subcutaneous, Daily   fluticasone, 1 puff, Inhalation, BID            Md notes:   3/5 int med:   Admitted with chest pain and pressure which has been resolved since yesterday   Had negative stress test in Aug   Awaiting cardiology evaluation        Seen this am with daughter at bedside who is concerned regarding intermittent mental status changes.  Daughter reports that he call her and reports that he does not know where he is at.       Sips vodka all day long,  however a pint last 1 week , also takes percocet tabs at home for back pain. 1. Chest Pain: now resolved. 2. S/p AV replacement with Dr Mario Alberto Minor 1/2015, Has Severe TR:  Both recent echo's were reviewed and results shared with pt and daughter at bedside. 3. Hx of AF: S/P MAXE and DELGADO ligation,   Not  On AC due to hx of bleed    4. CHRISTOPHER: compliant with CPAP at hs    5. Intermittent AMS at home: Farzad Boo due to daily alcohol use and percocet.  Pt agrees to stop all alcohol for now and work on cutting back on percocet           3/5 card:    Atypical chest pain   - new   - differential: cad, gerd, arrhythmia, valvular disease   Plan:    - aspirin, statin, diltiazem 60 q8h   - will review prior cath from 2015, may need cath with FFR of LAD on monday       Moderate to severe MR   Plan:   - echocardiogram       History of alcohol use disorder   Plan:   - cessation advised       Aortic valve replacement    Plan:   - echo       History of afib   - no longer on anticoagulation due to bleeding, atrial appendage ligation   Plan:   - interrogate linq    - digoxin        Altered mental status   Plan:   - counseled on alcohol cessation          Orders:   3/5 protonix po qd, percocet po x 2, above meds, tele      Chest Pain - Care Day 1 (3/4/2021) by Balaji Estrada RN       Review Status Review Entered   Completed 3/5/2021 11:11      Criteria Review      Care Day: 1 Care Date: 3/4/2021 Level of Care: Telemetry    Guideline Day 1    Level Of Care    (X) ICU, [B] intermediate care, [C] or telemetry [D]    3/5/2021 11:10 AM EST by Yady Kimble      m/s tele    Clinical Status    ( ) * Clinical Indications met [E]    Interventions    (X) Cardiac biomarkers    3/5/2021 11:10 AM EST by Yady Kimble      trop less than 0.01 x 2    Medications (X) Possible antiplatelet agents, anticoagulants    3/5/2021 11:10 AM EST by Gustavo Grey      asa po qd    (X) Possible statin    3/5/2021 11:10 AM EST by Gustavo Grey      crestor po qd    * Milestone   Additional Notes   Inpt tele            Dx: Chest tightness, Shortness of breath underlying valvular heart disease            Meds:   Scheduled Meds:·  digoxin, 125 mcg, Oral, Daily   ·  dilTIAZem, 60 mg, Oral, TID   ·  docusate sodium, 100 mg, Oral, BID   ·  levothyroxine, 100 mcg, Oral, Daily   ·  methocarbamol, 500 mg, Oral, 4x Daily   ·  rosuvastatin, 10 mg, Oral, Daily   ·  sodium chloride flush, 10 mL, Intravenous, 2 times per day   ·  aspirin, 81 mg, Oral, Daily   ·  enoxaparin, 40 mg, Subcutaneous, Daily   ·  fluticasone, 1 puff, Inhalation, BID            Md notes:   3/4 int med:   1. Chest tightness.  Obtain serial troponin.  Continue aspirin and statin.  Nitroglycerin.  Patient had recent negative stress test in August 2020.  We will not repeat stress test. [facility] cardiology to assist with evaluation. 2. Shortness of breath underlying valvular heart disease. Patient had ECHO from February of 2021 with moderate to severe mitral regurgitation, moderate to severe tricuspid regurgitation, presence of bioprosthetic aortic valve.  Cardiology consult as noted above.    3. Other comorbidities: History of nonobstructive coronary artery disease, atrial fibrillation (taken off anticoagulation due to bleed), gastroesophageal reflux disease, aortic valve insufficiency, obstructive sleep apnea, hypothyroidism.             Orders:   3/4 tele, serial trop, c/s card, percocet po x 1 and prn, ntg sl prn, npo after mn      Chest Pain - Clinical Indications for Admission to Inpatient Care by Kaylan Slater RN       Review Status Review Entered   Completed 3/5/2021 11:00      Criteria Review      Clinical Indications for Admission to Inpatient Care    Most Recent : Gustavo Grey Most Recent Date: 3/5/2021 11:00 AM EST  (X) Other Indication: cp      Entered 3/5/2021 11:00 AM EST by Verner Shape     trop less than 0.01 x 2   Additional Notes   Ed:   Brigid Miller is a 78 y.o. male with complex medical and cardiac history including history of aortic insufficiency with valve replacement, tricuspid regurgitation mitral regurgitation as well as atrial fibrillation, CAD with loop recorder presents for evaluation of chest pain shortness of breath has been going on for the past 2 weeks.  Describes the pain as a tightness, 6/10.  No radiation of the pain.  No numbness tingling or weakness of his extremities.  No abdominal pain nausea vomiting or diarrhea.  No radiation of the pain.  States that he has seen his electrophysiologist, Dr. Gertha Lesches thought as well as his cardiologist, Dr. Isabella Valenzuela, regarding current complaints and had repeat echo and order for pulmonary function testing.  States that they are planning what they are going to do next. Rosalinda Grijalvar further chart review, he had a clean cath back in 2015. Cassi Dodd 2020 showed low risk.  He was started back on 2 weeks ago.        Physical Exam   Vitals signs and nursing note reviewed. Constitutional:        General: He is not in acute distress.      Appearance: He is well-developed. He is not ill-appearing, toxic-appearing or diaphoretic. HENT:       Head: Normocephalic and atraumatic.       Right Ear: External ear normal.       Left Ear: External ear normal.       Nose: Nose normal.    Eyes:       General:          Right eye: No discharge.          Left eye: No discharge. Neck:       Musculoskeletal: Normal range of motion and neck supple. Cardiovascular:       Rate and Rhythm: Normal rate and regular rhythm.       Heart sounds: Normal heart sounds. Pulmonary:       Effort: Pulmonary effort is normal. No respiratory distress.       Breath sounds: Normal breath sounds. Chest:       Chest wall: No tenderness. Abdominal:       General: There is no distension.        Palpations: Abdomen is soft.       Tenderness: There is no abdominal tenderness. Musculoskeletal: Normal range of motion. Skin:      General: Skin is warm and dry. Neurological:       Mental Status: He is alert and oriented to person, place, and time. Psychiatric:          Behavior: Behavior normal.             Ed tx: asa po x 1          has a past medical history of Aortic valve insufficiency, Arthritis, Atrial fibrillation ([address] Cancer ([address] Dysphonia, GERD (gastroesophageal reflux disease), Hearing loss, Heart disease, Medical history reviewed with no changes, Obstructive apnea, Oropharyngeal dysphagia, Refusal of blood transfusions as patient is Gnosticist, and Thyroid disease. 98.4 (36.9)  16  75  108/71   98% on ra         Cxr= neg. WBC 5.9 K/uL      RBC 4.22 M/uL      Hemoglobin 13.3 g/dL      Hematocrit 40.0 %       aPTT 29.5 sec       Protime 12.2 sec      INR 1.05      Sodium 135 mmol/L      Potassium 3.7 mmol/L      Chloride 102 mmol/L      CO2 25 mmol/L      Anion Gap 8     Glucose 120 mg/dL      BUN 12 mg/dL      CREATININE 0.5 mg/dL      GFR Non- >60     GFR African American >60     Calcium 9.4 mg/dL       Troponin <0.01 ng/mL x 2         Ekg= nsr at 75.

## 2021-03-08 NOTE — PROGRESS NOTES
CLINICAL PHARMACY NOTE: MEDS TO [address] Select Patient?: Yes  Total # of Prescriptions Filled: 1   The following medications were delivered to the patient:  · Pantoprazole 40mg  Total # of Interventions Completed: 0  Time Spent (min): 15    Additional Documentation:    Delivered to Patient  Crista Contreras CPhT

## 2021-03-08 NOTE — PRE SEDATION
Oral Daily Sami Rausch MD   100 mcg at 03/07/21 0734    methocarbamol (ROBAXIN) tablet 500 mg  500 mg Oral 4x Daily Sami Rausch MD   500 mg at 03/07/21 2131    rosuvastatin (CRESTOR) tablet 10 mg  10 mg Oral Daily Sami Rausch MD   10 mg at 03/07/21 4791    sodium chloride flush 0.9 % injection 10 mL  10 mL Intravenous 2 times per day Sami Rausch MD   10 mL at 03/07/21 2131    sodium chloride flush 0.9 % injection 10 mL  10 mL Intravenous PRN Sami Rausch MD        promethazine (PHENERGAN) tablet 12.5 mg  12.5 mg Oral Q6H PRN Sami Rausch MD        Or    ondansetron (ZOFRAN) injection 4 mg  4 mg Intravenous Q6H PRN Sami Rausch MD        acetaminophen (TYLENOL) tablet 650 mg  650 mg Oral Q6H PRN Sami Rausch MD        Or    acetaminophen (TYLENOL) suppository 650 mg  650 mg Rectal Q6H PRN Sami Rausch MD        polyethylene glycol (GLYCOLAX) packet 17 g  17 g Oral Daily PRN Sami Rausch MD        aspirin chewable tablet 81 mg  81 mg Oral Daily Sami Rausch MD   81 mg at 03/07/21 0828    enoxaparin (LOVENOX) injection 40 mg  40 mg Subcutaneous Daily Sami Rausch MD        nitroGLYCERIN (NITROSTAT) SL tablet 0.4 mg  0.4 mg Sublingual Q5 Min PRN Sami Rausch MD        fluticasone (FLOVENT HFA) 110 MCG/ACT inhaler 1 puff  1 puff Inhalation BID ISIDRA White - CNP   1 puff at 03/07/21 2012           Pre-Sedation:    Pre-Sedation Documentation and Exam:  I have assessed the patient and agree with the H&P present on the chart. Prior History of Anesthesia Complications:   none    Modified Mallampati:  II (soft palate, uvula, fauces visible)    ASA Classification:  Class 3 - A patient with severe systemic disease that limits activity but is not incapacitating      Darrell Scale:   Activity:  2 - Able to move 4 extremities voluntarily on command  Respiration:  2 - Able to breathe deeply and cough freely  Circulation:  2 - BP+/- 20mmHg of normal  Consciousness:  2 - Fully awake  Oxygen Saturation (color):  2 - Able to maintain oxygen saturation >92% on room air    Sedation/Anesthesia Plan:  Guard the patient's safety and welfare. Minimize physical discomfort and pain. Minimize negative psychological responses to treatment by providing sedation and analgesia and maximize the potential amnesia. Patient to meet pre-procedure discharge plan. Medication Planned:  midazolam intravenously and fentanyl intravenously    Patient is an appropriate candidate for plan of sedation: yes    The risks, benefits, goals, and alternatives of the procedure were discussed in detail with the patient. Informed consent was obtained and further recommendations will be made following the procedure. Isaac Feliciano DO, [facility]  Interventional Cardiology     o: [phone]  [facility] [employer] [facility] [address], [address]      NOTE:  This report was transcribed using voice recognition software. Every effort was made to ensure accuracy; however, inadvertent computerized transcription errors may be present.

## 2021-03-08 NOTE — PLAN OF CARE
Problem: Falls - Risk of:  Goal: Will remain free from falls  Description: Will remain free from falls  Outcome: Completed  Goal: Absence of physical injury  Description: Absence of physical injury  Outcome: Completed     Problem: Pain:  Goal: Pain level will decrease  Description: Pain level will decrease  Outcome: Completed  Goal: Control of acute pain  Description: Control of acute pain  Outcome: Completed  Goal: Control of chronic pain  Description: Control of chronic pain  Outcome: Completed     Problem: Respiratory  Goal: No pulmonary complications  Outcome: Completed  Goal: O2 Sat > 90%  Outcome: Completed  Goal: Supplemental O2 requirements decreased  Outcome: Completed  Goal: Agreement to quit smoking  Outcome: Completed  Goal: Pneumonia vaccine at discharge as needed  Outcome: Completed

## 2021-03-08 NOTE — DISCHARGE SUMMARY
[facility]    Patient Demographics    Patient. Chaim Maldonado  Date of Birth. 1950  MRN. [id number]     Primary care provider. Chelsea Rojo MD  (Tel: [phone])    Admit date: 3/4/2021    Discharge date 3/8/2021  Note Date: 3/8/2021     Reason for Hospitalization. Chief Complaint   Patient presents with    Chest Pain     Intermittent CP & SOB lasting two weeks in duration. Reports seeing card recently & dx with leaking valves. Pain described as 6/10 tightness. Significant Findings. Active Problems:    Chest pain  Resolved Problems:    * No resolved hospital problems. *       Problems and results from this hospitalization that need follow up. COPD:  Consider outpatient PFT's and CT chest  Possible GERD:  Start PPI therapy   CAD:  Consider yearly GXT,  Will need follow up with cardiology and Dr Negro Sneed   Stop alcohol use,  Cut back on the percocet use       Significant test results and incidental findings. Invasive procedures and treatments. ECHO 2/24/21   Summary   -Limited echocardiogram was performed to evaluate mitral regurgitation.   -Normal left ventricle size and systolic function with an estimated ejection   fraction of 60%.  -No regional wall motion abnormalities are seen.   -The tips of the mitral leaflet appears to be slightly myxomatous and   minimal prolapse of posterior leaflet.   -Moderate to severe anteriorly directed mitral regurgitation (appears   similar when compared to last echocardiogram dated September 2020).  ERO=0.25   -A bioprosthetic artificial aortic valve appears well seated with a maximum   velocity of 2.4m/s and a mean gradient of 13mmHg.   -Moderate to severe tricuspid regurgitation.   -Estimated pulmonary artery systolic pressure is mildly elevated at 38 mmHg   assuming a right atrial pressure of 8 mmHg.   -The right ventricle is mildly enlarged.   -The right atrium is moderately dilated.     Left Heart Cath  3/8/21   Dominance: Right       LM: luminal irregularities  LAD: at least moderate calcification of long, eccentric 80% ostial to proximal stenosis ; 30% ostial first diagonal   LCx: 30-40% distal into OM2   RCA: tortuous with luminal irregularities     Did not cross SAVR      Impression/Recommendations:  CTS consult to discuss Redo Sternotomy for LIMA-LAD and significant, primary [facility]. The patient was admitted from home with known severe mitral regurgitation  with symptoms of chest pressure and dyspnea which occur mainly at hs. He was admitted and followed by cardiology and CT surgery. He was treated for the followin. Chest Pain: He had LHC on 3/8/21 and CT surgery was consulted to discuss redo sternotomy form LIMA- LAD and potential MRV . Dr Lissy Quinoenz was consulted and evaluated the patient . CT surgery felt the patient was high risk and encouraged aggressive tx of potential GERD symptoms and further monitoring with yearly GXT and outpatient follow up   2. S/p AV replacement with Dr Lissy Quinonez 2015, Has Severe TR:  echo's noted above    3. Hx of AF: S/P MAXE and DELGADO ligation,   Not  On AC due to hx of bleed, on cardizem, dig , asa. Rate is controlled    4. CHRISTOPHER: compliant with CPAP at hs   5. Hypothyroidism: On replacement therapy. tsh 7.7 , recheck in 6 weeks for potential dose adjustment  6. Intermittent AMS at home:  Likely due to daily alcohol use and percocet. Pt agrees to stop all alcohol for now and work on cutting back on percocet . Consults. IP CONSULT TO CARDIOLOGY  IP CONSULT TO CARDIOTHORACIC SURGERY    Physical examination on discharge day. /78   Pulse 73   Temp 97.7 °F (36.5 °C) (Oral)   Resp 18   Ht 5' 11\" (1.803 m)   Wt 148 lb 1.6 oz (67.2 kg)   SpO2 96%   BMI 20.66 kg/m²   General appearance. Alert. Looks comfortable. Alert and pleasant   HEENT.  Sclera clear. Moist mucus membranes. Cardiovascular. Regular rate and rhythm, normal S1, S2. Harsh  murmur. Respiratory. Not using accessory muscles. Clear to auscultation bilaterally, no wheeze. Gastrointestinal. Abdomen soft, non-tender, not distended, normal bowel sounds  Neurology. Facial symmetry. No speech deficits. Moving all extremities equally. Extremities. No edema in lower extremities. Skin. Warm, dry, normal turgor, right radial site is unremarkable. Finger warm and mobile with quick cap refill , radial pulse palpable     Condition at time of discharge stable     Medication instructions provided to patient at discharge. Medication List      CHANGE how you take these medications    fluticasone 110 MCG/ACT inhaler  Commonly known as: Flovent HFA  Inhale 1 puff into the lungs 2 times daily  What changed: when to take this     pantoprazole 40 MG tablet  Commonly known as: PROTONIX  Take 1 tablet by mouth 2 times daily (before meals)  What changed: when to take this        CONTINUE taking these medications    ASPIRIN LOW DOSE 81 MG EC tablet  Generic drug: aspirin     Colace 100 MG capsule  Generic drug: docusate sodium     digoxin 125 MCG tablet  Commonly known as: LANOXIN  Take 1 tablet by mouth daily     dilTIAZem 60 MG extended release capsule  Commonly known as: CARDIZEM 12 HR  Take 1 capsule by mouth 3 times daily     levothyroxine 100 MCG tablet  Commonly known as: SYNTHROID  TAKE 1 TABLET BY MOUTH EVERY DAY     oxyCODONE-acetaminophen 5-325 MG per tablet  Commonly known as: Percocet  Take 1 tablet by mouth 2 times daily as needed for Pain for up to 30 days. Intended supply: 3 days.  Take lowest dose possible to manage pain     rosuvastatin 10 MG tablet  Commonly known as: CRESTOR  Take 1 tablet by mouth daily        STOP taking these medications    Cod Liver Oil Oil     GABAPENTIN PO     methocarbamol 500 MG tablet  Commonly known as: ROBAXIN     naloxone 4 MG/0.1ML Liqd nasal spray           Where to Get Your Medications      These medications were sent to [facility], [address]  [address] [facility] [address]    Phone: [phone]   · pantoprazole 40 MG tablet         Discharge recommendations given to patient. Follow Up. in 1 week   Disposition. home  Activity. As tolerated   Diet: DIET CARDIAC; No Caffeine      Spent > 30  minutes in discharge process.     Signed:  ISIDRA Gonzalez CNP     3/8/2021 1:21 PM

## 2021-03-08 NOTE — PROGRESS NOTES
Data- discharge order received, pt verbalized agreement to discharge, disposition to previous residence, no needs for HHC/DME. Action- discharge instructions prepared and given to pt and daughter, pt verbalized understanding. Medication information packet given r/t NEW and/or CHANGED prescriptions emphasizing name/purpose/side effects, pt verbalized understanding. Discharge instruction summary: Diet- general, Activity- as tolerated, following post cath instuctions, Primary Care Physician as follows: Sherlyn Rios -[phone] f/u appointment Friday, immunizations reviewed and uodated, prescription medications filled meds to beds. Inpatient surgical procedure precautions reviewed:  CHF Education reviewed. Pt/ Family has had a total of 60 minutes CHF education this admission encounter. Response- Pt belongings gathered, IV removed. Disposition is home (no HHC/DME needs), transported with daughter, taken to lobby via w/c w/ RN, no complications.

## 2021-03-09 ENCOUNTER — TELEPHONE (OUTPATIENT)
Dept: CARDIOTHORACIC SURGERY | Age: 71
End: 2021-03-09

## 2021-03-09 ENCOUNTER — OFFICE VISIT (OUTPATIENT)
Dept: PRIMARY CARE CLINIC | Age: 71
End: 2021-03-09
Payer: COMMERCIAL

## 2021-03-09 ENCOUNTER — CARE COORDINATION (OUTPATIENT)
Dept: CASE MANAGEMENT | Age: 71
End: 2021-03-09

## 2021-03-09 DIAGNOSIS — Z20.828 EXPOSURE TO SARS-ASSOCIATED CORONAVIRUS: Primary | ICD-10-CM

## 2021-03-09 PROCEDURE — 99211 OFF/OP EST MAY X REQ PHY/QHP: CPT | Performed by: NURSE PRACTITIONER

## 2021-03-09 NOTE — ADT AUTH CERT
bid before meals po  percocet 1 tab q8 prn x1 po    Interventions    (X) Possible stress test or cardiac catheterization    Medications    (X) Possible aspirin or cardiac medications    * Milestone   Additional Notes   3/8/2021      Vs see above      Consult CTS      Post cath orders   Strict bedrest for 6 hrs   Check cath site and distal pulses   Cardiac diet no caffeine   ========================================================      Per cardio   Impression/Recommendations:       79 y/o patient of Dr. Meeta Cornejo:        Chest pain, concerning for new onset angina    Hx. Nonobstructive CAD (40% pLAD FFR negative)   Moderate to severe MR    Hx. SAVR   (Dr Nell Bautista)   PAF, DELGADO ligation & MAZE '15   Hx. Atrial flutter ablation    CHRISTOPHER   GERD   ETOH Abuse? Former tobacco    Quaker            No change to ASA, Statin, calcium channel blocker and Digoxin at this point. No AC with history of MAZE   Coronary angiogram today revealed progression of ostial LAD   CTS consult to discuss Redo Sternotomy for LIMA-LAD and potential MVR for significant, primary MR       ____________________________________________________________      Per IM      Impression/Recommendations:   CTS consult to discuss Redo Sternotomy for LIMA-LAD and significant, primary [address] Course.           The patient was admitted from home with known severe mitral regurgitation  with symptoms of chest pressure and dyspnea which occur mainly at hs. Benjamín Enrique was admitted and followed by cardiology and CT surgery.   He was treated for the followin. Chest Pain: He had LHC on 3/8/21 and CT surgery was consulted to discuss redo sternotomy form LIMA- LAD and potential MRV .  Dr Nell Bautista was consulted and evaluated the patient .  CT surgery felt the patient was high risk and encouraged aggressive tx of potential GERD symptoms and further monitoring with yearly GXT and outpatient follow up    2.  S/p AV replacement with  Lissy Quinonez 1/2015, Has Severe TR:  echo's noted above     3. Hx of AF: S/P MAXE and DELGADO ligation,   Not  On AC due to hx of bleed, on cardizem, dig , asa. Rate is controlled     4. CHRISTOPHER: compliant with CPAP at hs    5. Hypothyroidism:  On replacement therapy. tsh 7.7 , recheck in 6 weeks for potential dose adjustment   6. Intermittent AMS at home: Worthy Lopez due to daily alcohol use and percocet.  Pt agrees to stop all alcohol for now and work on cutting back on percocet .     ___________________________________________________________-      Per CTS      ASSESSMENT AND PLAN:       Nocturnal CP most consistent with reflux, 1 V CAD likely non-occlusive, stable MR, worse TR in patient who sound to have episodes of aspiration with h/o RT for head and neck cancer that left him with xerostomia and hypothyroidism, significant h/o tobacco use and who refuses blood transfusion       I discussed the findings with the patient, his daughter and Dr. Mary Jo Waldron favor aggressive treatment of reflux and monitoring symptoms.  If they get better would consider yearly GXT.   A CT scan of the chest and PFT's may be helpful in delineating degree of COPD / interstitial stigmata of chronic aspiration.  The patient would be very high risk for redo cardiac surgery.  I answered all the patient and his daughter's questions.  Further work-up could likely be continued as outpatient.      Chest Pain - Care Day 4 (3/7/2021) by Dong Thibodeaux RN       Review Status Review Entered   Completed 3/9/2021 13:52      Criteria Review      Care Day: 4 Care Date: 3/7/2021 Level of Care: Telemetry    Guideline Day 2    Clinical Status    (X) * Hemodynamic stability    3/9/2021 1:52 PM EST by Saumel Thompson      97.5 16 70 99/63 97% ra    ( ) * Acute coronary syndrome ruled out    ( ) * Pain absent or managed    (X) * Dangerous arrhythmia absent    ( ) * No identification of etiology of pain requiring inpatient care    ( ) * Discharge plans and education understood Activity    (X) * Ambulatory or acceptable for next level of care    Routes    (X) * Oral hydration, medications, and diet    3/9/2021 1:52 PM EST by Omayra zhou current meds plus  melatonin 3 mg qhs prn x1 po  percocet 1 tab q8 prn x2 po    Medications    (X) Possible aspirin or cardiac medications    * Milestone   Additional Notes   3/7/2021      Vs see above      Ekg   Sinus rhythm with 1st degree A-V block      Tsh 7.75   T4 free 2.2      Npo after midnight      ============================================================      Per IM    Assessment & Plan:    1. Chest Pain: Intermittent and worse at hs, however had no pain last evening.  Cardiology is following, anticipate [facility] on Monday    2. S/p AV replacement with Dr Geovanna Banegas 1/2015, Has Severe TR:  Both recent echo's were reviewed    3. Hx of AF: S/P MAXE and DELGADO ligation,   Not  On AC due to hx of bleed, on cardizem, dig , asa. Rate is controlled     4. CHRISTOPHER: compliant with CPAP at hs    5. Hypothyroidism:  On replacement therapy. tsh pending    6.  Intermittent AMS at home: Bradly Six due to daily alcohol use and percocet.  Pt agrees to stop all alcohol for now and work on cutting back on percocet .  I have decreased percocet to q 8 hours prn and discouraged its use        Diet: DIET CARDIAC; No Caffeine   Code:Full Code   DVT PPX    ___________________________________________________________      Per cardio      Assessment/Plan:   Active Problems:   Atypical chest pain   ~reports episodes occur at HS : episode last night of brief duration    - differential: cad, gerd, arrhythmia, valvular disease               ~protonix prescribed   Plan:    - aspirin, statin, diltiazem 60 q8h   - cardiologist to review prior cath from 2015, may need cath with FFR of LAD on monday       Moderate to severe MR   ~c/o SOB ; neg to exam for decompensation   Plan:   - echocardiogram planned to assess progression of regurg       Aortic valve replacement    ~s/p AVR =========================================================      Per IM    Subjective:  .       Admitted with chest pain and pressure    Pt reports chest pressure last evening when trying to sleep.  States sx are always worse when in bed/ at hs.        Anticipate possible LHC on Monday        Problem List   Active Problems:     Chest pain   Resolved Problems:     * No resolved hospital problems. *           Assessment & Plan:    1. Chest Pain: Intermittent and worse at hs.  Cardiology is following, anticipate LHC on Monday    2. S/p AV replacement with Dr Pniky Motta 1/2015, Has Severe TR:  Both recent echo's were reviewed    3. Hx of AF: S/P MAXE and DELGADO ligation,   Not  On AC due to hx of bleed    4. CHRISTOPHER: compliant with CPAP at hs    5.  Intermittent AMS at home: Win Cynthia due to daily alcohol use and percocet.  Pt agrees to stop all alcohol for now and work on cutting back on percocet                Diet: DIET CARDIAC; No Caffeine   Code:Full Code   ____________________________________________________________      Per cardio       Assessment/Plan:   Active Problems:   Atypical chest pain   ~reports episodes occur at HS   - differential: cad, gerd, arrhythmia, valvular disease               ~protonix prescribed   Plan:    - aspirin, statin, diltiazem 60 q8h   - will review prior cath from 2015, may need cath with FFR of LAD on monday       Moderate to severe MR   Plan:   - echocardiogram planned to assess progression of regurg       Aortic valve replacement    ~s/p AVR    Plan:   - echo       History of afib   ~regular AP   ~0.3% AF burden on last ILR interrogation    ~s/p DELGADO ligation & MAZE '15   ~hx of RFA July '20   ~hx of sleep apnea   - no longer on anticoagulation due to bleeding, atrial appendage ligation   Plan:   - interrogate linq    - digoxin / ASA / diltiazem       Plan : will ask RN to interrogate loop today to reassess AF burden

## 2021-03-09 NOTE — CARE COORDINATION
[facility] Transitions Initial Follow Up Call    Call within 2 business days of discharge: Yes    Patient: Luz Maria Bonilla Patient : 1950   MRN: [id number]  Reason for Admission:   Discharge Date: 3/8/21 RARS: Readmission Risk Score: 16      Last Discharge [facility]       Complaint Diagnosis Description Type Department Provider    3/4/21 Chest Pain Chest pain, unspecified type ED to Hosp-Admission (Discharged) (ADMITTED) [facility] 3A Dahlia Summers MD; Albertina Vann. .. Non-face-to-face services provided:  Obtained and reviewed discharge summary and/or continuity of care documents    Challenges to be reviewed by the provider   Additional needs identified to be addressed with provider Yes  none             Method of communication with provider : none    Discussed COVID-19 related testing which was not done at this time. Test results were not done. Patient informed of results, if available? No    Advance Care Planning:   Does patient have an Advance Directive:  reviewed and current. Was this a readmission? No  Patient stated reason for admission: CP  Patients top risk factors for readmission: medical condition    Care Transition Nurse (CTN) contacted the patient by telephone to perform post hospital discharge assessment. Verified name and  with patient as identifiers. Provided introduction to self, and explanation of the CTN role. CTN reviewed discharge instructions, medical action plan and red flags with patient who verbalized understanding. Patient given an opportunity to ask questions and does not have any further questions or concerns at this time. Were discharge instructions available to patient? Yes. Reviewed appropriate site of care based on symptoms and resources available to patient including: When to call 911. The patient agrees to contact the PCP office for questions related to their healthcare.      Medication reconciliation was performed with patient, who verbalizes understanding of administration of home medications. Advised obtaining a 90-day supply of all daily and as-needed medications. Covid Risk Education    Patient has following risk factors of: COPD and immunocompromised. Education provided regarding infection prevention, and signs and symptoms of COVID-19 and when to seek medical attention with patient who verbalized understanding. Discussed exposure protocols and quarantine From CDC: Are you at higher risk for severe illness?   and given an opportunity for questions and concerns. The patient agrees to contact the COVID-19 hotline [phone] or PCP office for questions related to COVID-19. For more information on steps you can take to protect yourself, see CDC's How to Protect Yourself     Was patient discharged with a pulse oximeter? No Discussed and confirmed pulse oximeter discharge instructions and when to notify provider or seek emergency care. Discussed follow-up appointments. If no appointment was previously scheduled, appointment scheduling offered: Yes. Is follow up appointment scheduled within 7 days of discharge? No  Non-[facility] follow up appointment(s): no    Plan for follow-up call in 5-7 days based on severity of symptoms and risk factors. Plan for next call: self management-CP, f/u appts     Pt states doing ok, still has CP on and off. Same pain as he had in the hospital. Taking all meds as directed. F/U appts listed below. Agreed to more CTC f/u calls. CTN provided contact information for future needs.     Follow Up  Future Appointments   Date Time Provider Vikki Caal   3/11/2021  1:50 PM [facility] AND FLU/RED CLIN, COVID-19 VACCINE SCHEDULE AND FLU [facility]   3/12/2021  8:00 AM [facility]   3/18/2021 12:45 PM [facility]   3/22/2021 10:30 AM ISIDRA Tirado - CNP FF Cardio [facility]   3/25/2021 11:45 AM Armand Steele MD [facility] FM MMA   4/5/2021  8:30 AM SCHEDULE, [facility] TRANSMISSION FF Cardio [facility]   5/12/2021  9:30 AM SIIDRA Ceja CNP FF Cardio [facility]   9/14/2021 10:00 AM [facility], [place] DEVICE CHECK FF Cardio [facility]   9/14/2021 10:00 AM ISIDRA Beasley CNP FF Cardio [facility]       Marlyn Herman RN

## 2021-03-10 LAB — SARS-COV-2: NOT DETECTED

## 2021-03-11 NOTE — PROGRESS NOTES
Karlee Gomez received a viral test for COVID-19. They were educated on isolation and quarantine as appropriate. For any symptoms, they were directed to seek care from their PCP, given contact information to establish with a doctor, directed to an urgent care or the emergency room.

## 2021-03-12 ENCOUNTER — HOSPITAL ENCOUNTER (OUTPATIENT)
Dept: PULMONOLOGY | Age: 71
Discharge: HOME | End: 2021-03-12
Payer: COMMERCIAL

## 2021-03-12 VITALS — RESPIRATION RATE: 18 BRPM | HEART RATE: 74 BPM | OXYGEN SATURATION: 100 %

## 2021-03-12 DIAGNOSIS — R06.02 SHORTNESS OF BREATH: ICD-10-CM

## 2021-03-12 PROCEDURE — 94200 LUNG FUNCTION TEST (MBC/MVV): CPT

## 2021-03-12 PROCEDURE — 94729 DIFFUSING CAPACITY: CPT

## 2021-03-12 PROCEDURE — 94760 N-INVAS EAR/PLS OXIMETRY 1: CPT

## 2021-03-12 PROCEDURE — 94010 BREATHING CAPACITY TEST: CPT

## 2021-03-12 PROCEDURE — 94726 PLETHYSMOGRAPHY LUNG VOLUMES: CPT

## 2021-03-12 RX ORDER — ALBUTEROL SULFATE 90 UG/1
4 AEROSOL, METERED RESPIRATORY (INHALATION) ONCE
Status: CANCELLED | OUTPATIENT
Start: 2021-03-12

## 2021-03-16 ENCOUNTER — CARE COORDINATION (OUTPATIENT)
Dept: CASE MANAGEMENT | Age: 71
End: 2021-03-16

## 2021-03-16 ENCOUNTER — TELEPHONE (OUTPATIENT)
Dept: CARDIOTHORACIC SURGERY | Age: 71
End: 2021-03-16

## 2021-03-16 LAB
DLCO %PRED: 89 %
DLCO PRED: NORMAL
DLCO/VA %PRED: NORMAL
DLCO/VA PRED: NORMAL
DLCO/VA: NORMAL
DLCO: NORMAL
EXPIRATORY TIME-POST: NORMAL
EXPIRATORY TIME: NORMAL
FEF 25-75% %CHNG: NORMAL
FEF 25-75% %PRED-POST: NORMAL
FEF 25-75% %PRED-PRE: NORMAL
FEF 25-75% PRED: NORMAL
FEF 25-75%-POST: NORMAL
FEF 25-75%-PRE: NORMAL
FEV1 %PRED-POST: NORMAL %
FEV1 %PRED-PRE: 94 %
FEV1 PRED: NORMAL
FEV1-POST: NORMAL
FEV1-PRE: NORMAL
FEV1/FVC %PRED-POST: NORMAL
FEV1/FVC %PRED-PRE: NORMAL
FEV1/FVC PRED: NORMAL
FEV1/FVC-POST: NORMAL %
FEV1/FVC-PRE: 74 %
FVC %PRED-POST: NORMAL
FVC %PRED-PRE: NORMAL
FVC PRED: NORMAL
FVC-POST: NORMAL
FVC-PRE: NORMAL
GAW %PRED: NORMAL
GAW PRED: NORMAL
GAW: NORMAL
IC %PRED: NORMAL
IC PRED: NORMAL
IC: NORMAL
MEP: NORMAL
MIP: NORMAL
MVV %PRED-PRE: NORMAL
MVV PRED: NORMAL
MVV-PRE: NORMAL
PEF %PRED-POST: NORMAL
PEF %PRED-PRE: NORMAL
PEF PRED: NORMAL
PEF%CHNG: NORMAL
PEF-POST: NORMAL
PEF-PRE: NORMAL
RAW %PRED: NORMAL
RAW PRED: NORMAL
RAW: NORMAL
RV %PRED: NORMAL
RV PRED: NORMAL
RV: NORMAL
SVC %PRED: NORMAL
SVC PRED: NORMAL
SVC: NORMAL
TLC %PRED: 111 %
TLC PRED: NORMAL
TLC: NORMAL
VA %PRED: NORMAL
VA PRED: NORMAL
VA: NORMAL
VTG %PRED: NORMAL
VTG PRED: NORMAL
VTG: NORMAL

## 2021-03-16 ASSESSMENT — PULMONARY FUNCTION TESTS
FEV1/FVC_PRE: 74
FEV1_PERCENT_PREDICTED_PRE: 94

## 2021-03-16 NOTE — TELEPHONE ENCOUNTER
Spoke with Dr. Jonelle Marrero last week, he felt that Mr. Jai Jones should first treatment for reflux and monitoring symptoms. I called Mr Jai Jones, he let me kmow that they symptoms of CP have almost resolved. He stopped drinking last week, per  and daughters request.  He said that he has completed his PFTs. He will call if his symptoms of CP return.

## 2021-03-22 ENCOUNTER — OFFICE VISIT (OUTPATIENT)
Dept: CARDIOLOGY CLINIC | Age: 71
End: 2021-03-22
Payer: COMMERCIAL

## 2021-03-22 VITALS
HEIGHT: 71 IN | WEIGHT: 148.5 LBS | DIASTOLIC BLOOD PRESSURE: 62 MMHG | BODY MASS INDEX: 20.79 KG/M2 | HEART RATE: 73 BPM | OXYGEN SATURATION: 98 % | SYSTOLIC BLOOD PRESSURE: 100 MMHG

## 2021-03-22 DIAGNOSIS — I34.0 NONRHEUMATIC MITRAL VALVE REGURGITATION: ICD-10-CM

## 2021-03-22 DIAGNOSIS — I10 ESSENTIAL HYPERTENSION: ICD-10-CM

## 2021-03-22 DIAGNOSIS — I36.1 NONRHEUMATIC TRICUSPID VALVE REGURGITATION: ICD-10-CM

## 2021-03-22 DIAGNOSIS — I25.10 CORONARY ARTERY DISEASE INVOLVING NATIVE CORONARY ARTERY OF NATIVE HEART WITHOUT ANGINA PECTORIS: Primary | ICD-10-CM

## 2021-03-22 DIAGNOSIS — I48.0 PAF (PAROXYSMAL ATRIAL FIBRILLATION) (HCC): ICD-10-CM

## 2021-03-22 DIAGNOSIS — E78.2 MIXED HYPERLIPIDEMIA: ICD-10-CM

## 2021-03-22 PROCEDURE — 99214 OFFICE O/P EST MOD 30 MIN: CPT | Performed by: NURSE PRACTITIONER

## 2021-03-22 RX ORDER — LISINOPRIL 2.5 MG/1
1.25 TABLET ORAL DAILY
Qty: 30 TABLET | Refills: 1 | Status: SHIPPED | OUTPATIENT
Start: 2021-03-22 | Stop reason: SDUPTHER

## 2021-03-22 NOTE — PROGRESS NOTES
[address]     Outpatient Follow Up Note    CHIEF COMPLAINT / HPI: Hospital Follow Up secondary to status post coronary angiogram     Hospital record has been reviewed  Hospital Course progressed as follows per discharge summary:   Intermittent CP & SOB lasting two weeks in duration. Reports seeing card recently & dx with leaking valves. Pain described as 6/10 tightness. 1. Chest Pain: He had LHC on 3/8/21 and CT surgery was consulted to discuss redo sternotomy form LIMA- LAD and potential MRV . Dr Mario Alberto Minor was consulted and evaluated the patient . CT surgery felt the patient was high risk and encouraged aggressive tx of potential GERD symptoms and further monitoring with yearly GXT and outpatient follow up   2. S/p AV replacement with Dr Mario Alberto Minor 1/2015, Has Severe TR:  echo's noted above    3. Hx of AF: S/P MAXE and DELGADO ligation,   Not  On AC due to hx of bleed, on cardizem, dig , asa. Rate is controlled    4. ~ ILR interrogation 3/6/21: symptomatic in sinus   5. CHRISTOPHER: compliant with CPAP at hs   6. Hypothyroidism:  On replacement therapy. tsh 7.7 , recheck in 6 weeks for potential dose adjustment  7. Intermittent AMS at home: Farzad Boo due to daily alcohol use and percocet.  Pt agrees to stop all alcohol for now and work on cutting back on Chela Hatfield is 78 y.o. male who presents today for a routine follow up after a recent hospitalization related to the above mentioned issues. He recalls having a squeezing in his heart, pain, SOB and tired. Subjective:   Since the time of discharge, the patient admits their symptoms have not changed. He's always tired / has no energy. He'd just moved and not happy about it so suspects it may have something to do with it (house with 3 acres to a small apartment)  He denies significant chest pain. There is SOB noticed at rest and talking. He has a hard time singing at his [employer] services. He's on a inhaler; he can't say that its helped.  He sleeps with 2 pillows for comfort / bone fusions in his neck. He denies PND. He doesn't sleep. He takes melatonin with a pain pill. He has no swelling. His wt is down to 143# from 150#  He walks and feels worn out when he finishes. The patient is not experiencing palpitations. He had a brief, couple of seconds, episode of AF last night. He trigger his Loop. These symptoms show no change over the last many days. With regard to medication therapy the patient has been compliant with prescribed regimen. They have tolerated therapy to date. Past Medical History:   Diagnosis Date    Aortic valve insufficiency     Arthritis     [facility] [address]     Cancer ([facility] [address] 2017    TONGUE head and neck IN REMISSION    Dysphonia     GERD (gastroesophageal reflux disease)     Hearing loss     Heart disease     Medical history reviewed with no changes     Obstructive apnea does not use CPAP    Oropharyngeal dysphagia     Refusal of blood transfusions as patient is [employer]     Thyroid disease      Social History:    Social History     Tobacco Use   Smoking Status Former Smoker    Packs/day: 1.00    Years: 40.00    Pack years: 40.00    Types: Cigarettes    Start date: 1975   Rhys Meier Quit date: 2015    Years since quittin.1   Smokeless Tobacco Never Used   Tobacco Comment    Maintain cessation     Current Medications:  Current Outpatient Medications   Medication Sig Dispense Refill    pantoprazole (PROTONIX) 40 MG tablet Take 1 tablet by mouth 2 times daily (before meals) 60 tablet 3    oxyCODONE-acetaminophen (PERCOCET) 5-325 MG per tablet Take 1 tablet by mouth 2 times daily as needed for Pain for up to 30 days. Intended supply: 3 days.  Take lowest dose possible to manage pain 60 tablet 0    rosuvastatin (CRESTOR) 10 MG tablet Take 1 tablet by mouth daily 30 tablet 5    levothyroxine (SYNTHROID) 100 MCG tablet TAKE 1 TABLET BY MOUTH EVERY DAY 90 tablet 1    digoxin (LANOXIN) 125 MCG tablet Take 1 tablet by mouth daily 30 tablet 5    aspirin (ASPIRIN LOW DOSE) 81 MG EC tablet ASPIRIN EC LOW DOSE 81 MG TBEC      fluticasone (FLOVENT HFA) 110 MCG/ACT inhaler Inhale 1 puff into the lungs 2 times daily (Patient taking differently: Inhale 1 puff into the lungs daily ) 1 Inhaler 3    dilTIAZem (CARDIZEM 12 HR) 60 MG extended release capsule Take 1 capsule by mouth 3 times daily 90 capsule 5    docusate sodium (COLACE) 100 MG capsule Take 100 mg by mouth 2 times daily. No current facility-administered medications for this visit. REVIEW OF SYSTEMS:   CONSTITUTIONAL: No major weight gain or loss, fatigue, weakness, night sweats or fever. There's been no change in energy level, sleep pattern, or activity level. HEENT: No new vision difficulties or ringing in the ears. RESPIRATORY: No new SOB, PND, orthopnea or cough. CARDIOVASCULAR: See HPI  GI: No nausea, vomiting, diarrhea, constipation, abdominal pain or changes in bowel habits. : No urinary frequency, urgency, incontinence hematuria or dysuria. SKIN: No cyanosis or skin lesions. MUSCULOSKELETAL: No new muscle or joint pain. NEUROLOGICAL: No syncope or TIA-like symptoms. PSYCHIATRIC: No anxiety, pain, insomnia or depression    Objective:   PHYSICAL EXAM:        Vitals:    03/22/21 1032 03/22/21 1101   BP: 90/62 100/62   Site: Right Upper Arm Left Upper Arm   Position: Sitting    Cuff Size: Large Adult Medium Adult   Pulse: 73    SpO2: 98%    Weight: 148 lb 8 oz (67.4 kg)    Height: 5' 10.5\" (1.791 m)        VITALS:  BP 90/62 (Site: Right Upper Arm, Position: Sitting, Cuff Size: Large Adult)   Pulse 73   Ht 5' 10.5\" (1.791 m)   Wt 148 lb 8 oz (67.4 kg)   SpO2 98%   BMI 21.01 kg/m²     CONSTITUTIONAL: Cooperative, no apparent distress, and appears well nourished / developed  NEUROLOGIC:  Awake and orientated to person, place and time. PSYCH: Calm affect. SKIN: Warm and dry.   HEENT: Sclera non-icteric, normocephalic, neck supple, no elevation of JVP, normal carotid pulses with no bruits and thyroid normal size. LUNGS:  No increased work of breathing and clear to auscultation, no crackles or wheezing. CARDIOVASCULAR:  Regular rate 76 and rhythm with + murmur 5th ICS Lt MCL, gallops, rubs, or abnormal heart sounds, normal PMI. The apical impulses not displaced. Heart tones are crisp and normal                                                                                            Cervical veins are not engorged                 JVP less than 8 cm H2O                                                                              The carotid upstroke is normal in amplitude and contour without delay or bruit    ABDOMEN:  Normal bowel sounds, non-distended and non-tender to palpation   EXT: No edema, no calf tenderness. Pulses are present bilaterally.     DATA:    Lab Results   Component Value Date    ALT 10 03/05/2021    AST 14 (L) 03/05/2021    ALKPHOS 44 03/05/2021    BILITOT 1.0 03/05/2021     Lab Results   Component Value Date    CREATININE <0.5 (L) 03/05/2021    BUN 8 03/05/2021     03/05/2021    K 4.0 03/05/2021     03/05/2021    CO2 29 03/05/2021     Lab Results   Component Value Date    TSH 2.735 05/21/2020    M8SBOCH 2.12 05/21/2020     Lab Results   Component Value Date    WBC 5.9 03/04/2021    HGB 13.3 (L) 03/04/2021    HCT 40.0 (L) 03/04/2021    MCV 94.8 03/04/2021     03/04/2021     No components found for: CHLPL  Lab Results   Component Value Date    TRIG 82 03/05/2021    TRIG 85 02/23/2021    TRIG 80 09/29/2018     Lab Results   Component Value Date    HDL 52 03/05/2021    HDL 51 02/23/2021    HDL 69 (H) 09/29/2018     Lab Results   Component Value Date    LDLCALC 103 (H) 03/05/2021    LDLCALC 140 (H) 02/23/2021    LDLCALC 140 (H) 09/29/2018     Lab Results   Component Value Date    LABVLDL 16 03/05/2021    LABVLDL 17 02/23/2021    LABVLDL 16 09/29/2018 Radiology Review:  Pertinent images / reports were reviewed as a part of this visit and reveals the following:    [id number] Score for Atrial Fibrillation Stroke Risk   Risk   Factors  Component Value   C CHF No 0   H HTN Yes 1   A2 Age >= 75 No,  (79 y.o.) 0   D DM No 0   S2 Prior Stroke/TIA Yes 2   V Vascular Disease No 0   A Age 74-69 Yes,  (79 y.o.) 1   Sc Sex male 0    [id number]  Score  4   Score last updated 3/22/21 12:25 PM EDT    myoview : 8/26/20:  Summary    -There is a small fixed inferior apical defect that is likely bowel artifact    rather than scar.    -LV function is normal with no regional abnormalities and EF=64%.  -Low risk study.         Echo : limited ; 2/24/21:   Summary   -Limited echocardiogram was performed to evaluate mitral regurgitation.   -Normal left ventricle size and systolic function with an estimated ejection   fraction of 60%.  -No regional wall motion abnormalities are seen.   -The tips of the mitral leaflet appears to be slightly myxomatous and   minimal prolapse of posterior leaflet.   -Moderate to severe anteriorly directed mitral regurgitation (appears   similar when compared to last echocardiogram dated September 2020). ERO=0.25   -A bioprosthetic artificial aortic valve appears well seated with a maximum   velocity of 2.4m/s and a mean gradient of 13mmHg.   -Moderate to severe tricuspid regurgitation.   -Estimated pulmonary artery systolic pressure is mildly elevated at 38 mmHg   assuming a right atrial pressure of 8 mmHg.   -The right ventricle is mildly enlarged.   -The right atrium is moderately dilated.      PaceArt: 3/2/21:  interrogation of their implanted loop recorder. Interrogation shows many symptoms recordings that appear to be little ectopy. ST noted. Implanted for AF management. Patient remains on Cardizem and digoxin.   Patient not on [facility] medication- Had MAZE procedure     ILR interrogation : 3/6/21: symptomatic x3 episodes on 3/1/21 in sinus rhythm x2 episodes of AT on 2/28/21 rate 103 bpm                                           x5 episodes of AT on 2/22/21 rate 102-130 bpm    Cardiac cath: 3/8/21:  Dominance: Right    LM: luminal irregularities  LAD: at least moderate calcification of long, eccentric 80% ostial to proximal stenosis ; 30% ostial first diagonal   LCx: 30-40% distal into OM2   RCA: tortuous with luminal irregularities     Did not cross SAVR      Impression/Recommendations:  CTS consult to discuss Redo Sternotomy for LIMA-LAD and significant, primary MR       Assessment:      Diagnosis Orders   1. Coronary artery disease involving native coronary artery of native heart without angina pectoris   ~mod disease of the LAD on cath 3/8/21 : prox eccentric 75-80%. ~surgical consult : nocturnal CP attributed to reflux. PFTs normal.   ~FFR LAD 0.89 on cath Darshan '15  ~ASA / CCB / statin     2. Nonrheumatic mitral valve regurgitation   ~mod to severe on echo  ~possibly contributing to SOB  ~neg to exam for crackles / swelling  ~PFTs : normal  Echo 2D w doppler w color complete    BASIC METABOLIC PANEL   3. Nonrheumatic tricuspid valve regurgitation   ~mod-severe noted on echo  ~comparable to previous studies Echo 2D w doppler w color complete   4. Essential hypertension   ~controlled     5. PAF (paroxysmal atrial fibrillation) (HCC)   ~AP regular : diltiazem & digoxin  ~c/o episode last evening : no episodes of AF noted for last evening on loop interrogation today  ~last episode seen on 3/11/21 : VR  for 2 minutes  ~s/p MAZE & DELGADO ligation '15  ~MCOT : PAT/SVT Sept '18  ~hx of RFA July '20  ~on ASA ; no AC d/t GIB ; hx of MR & TR  ~CHADsVASc 4  ~hx of not well tx sleep apnea : intolerant to mask  ~thyroid followed by PCP     6.  Mixed hyperlipidemia   ~LDL near goal on low dose crestor yet significantly improved  ~had been changed from simvastatin to pravastatin then crestor 2/17/21        Patient  is stable since hospital discharge. Plan:  Begin lisinopril 1.25 mg qpm : afterload reduction   ~BMP after one week   Echo in August : surveillance of valves    F/U in 1 month    I have addressed the patient's cardiac risk factors and adjusted pharmacologic treatment as needed. In addition, I have reinforced the need for patient directed risk factor modification. Further evaluation will be based upon the patient's clinical course and testing results. All questions and concerns were addressed to the patient. Alternatives to  treatment were discussed. The patient  currently  is not smoking. The risks related to smoking were reviewed with the patient. Recommend maintaining a smoke-free lifestyle. Products available for smoking cessation were discussed. Daily weight, low sodium diet were discussed. Patient instructed to call the office with a weight gain: > 3 # over night or 5# in one week; swelling, SOB/orthopnea/PND     Antiplatelet therapy has been recommended / prescribed for this patient. Education conducted on adverse reactions including bleeding was discussed. The patient verbalizes understanding. Pt is on a BB  Pt is not on an ace-i/ARB : BP did not support  Pt is on a statin      Saturated fat diet discussed  Exercise program discussed    Thank you for allowing to us to participate in the care of Honey Chaya.       [address]  Documentation of today's visit sent to PCP

## 2021-03-22 NOTE — PATIENT INSTRUCTIONS
Begin low dose lisinopril : 2.5 mg tablet at 1/2 tablet every evening    Non-fasting labs after one week    appt in 1 month    Plan to repeat an echo in August

## 2021-03-23 ENCOUNTER — CARE COORDINATION (OUTPATIENT)
Dept: CASE MANAGEMENT | Age: 71
End: 2021-03-23

## 2021-03-23 NOTE — CARE COORDINATION
[facility] Transitions Follow Up Call    3/23/2021    Patient: Matthew Mchugh  Patient : 1950   MRN: [id number]  Reason for Admission:   Discharge Date: 3/8/21 RARS: Readmission Risk Score: 12         Spoke with: Matthew Mchugh    Pt states doing well, no issues or concerns. F/U appts have gone well. Next F/U appts listed below.  No need for further f/u CTC calls    Follow Up  Future Appointments   Date Time Provider Vikki Caal   3/25/2021 11:45 AM Fede Zelaya MD [facility] LF FM [facility]   2021  8:30 AM SCHEDULE, [place] REMOTE TRANSMISSION FF Cardio [facility]   2021  9:15 AM ISIDRA Henry CNP FF Cardio [facility]   2021  9:30 AM ISIDRA Henry CNP FF Cardio [facility]   2021  7:30 AM SCHEDULE, [facility] [place] CARDIO [place] [facility]   2021 10:00 AM SCHEDULE, [place] DEVICE CHECK FF Cardio [facility]   2021 10:00 AM ISIDRA Rosario CNP FF Cardio [facility]       Sarah Roca RN

## 2021-03-25 ENCOUNTER — OFFICE VISIT (OUTPATIENT)
Dept: FAMILY MEDICINE CLINIC | Age: 71
End: 2021-03-25
Payer: COMMERCIAL

## 2021-03-25 VITALS
WEIGHT: 147 LBS | HEART RATE: 79 BPM | OXYGEN SATURATION: 95 % | TEMPERATURE: 97.7 F | BODY MASS INDEX: 20.79 KG/M2 | SYSTOLIC BLOOD PRESSURE: 108 MMHG | DIASTOLIC BLOOD PRESSURE: 60 MMHG

## 2021-03-25 DIAGNOSIS — F41.9 ANXIETY: ICD-10-CM

## 2021-03-25 DIAGNOSIS — E78.2 MIXED HYPERLIPIDEMIA: ICD-10-CM

## 2021-03-25 DIAGNOSIS — E03.9 ACQUIRED HYPOTHYROIDISM: ICD-10-CM

## 2021-03-25 DIAGNOSIS — Z09 HOSPITAL DISCHARGE FOLLOW-UP: Primary | ICD-10-CM

## 2021-03-25 DIAGNOSIS — I25.118 CORONARY ARTERY DISEASE OF NATIVE HEART WITH STABLE ANGINA PECTORIS, UNSPECIFIED VESSEL OR LESION TYPE (HCC): ICD-10-CM

## 2021-03-25 DIAGNOSIS — I48.0 PAF (PAROXYSMAL ATRIAL FIBRILLATION) (HCC): ICD-10-CM

## 2021-03-25 PROCEDURE — 99214 OFFICE O/P EST MOD 30 MIN: CPT | Performed by: FAMILY MEDICINE

## 2021-03-25 RX ORDER — MIRTAZAPINE 7.5 MG/1
7.5 TABLET, FILM COATED ORAL NIGHTLY
Qty: 90 TABLET | Refills: 1 | Status: SHIPPED | OUTPATIENT
Start: 2021-03-25 | End: 2021-05-03 | Stop reason: SDUPTHER

## 2021-03-25 ASSESSMENT — PATIENT HEALTH QUESTIONNAIRE - PHQ9
SUM OF ALL RESPONSES TO PHQ QUESTIONS 1-9: 0
SUM OF ALL RESPONSES TO PHQ9 QUESTIONS 1 & 2: 0
SUM OF ALL RESPONSES TO PHQ QUESTIONS 1-9: 0
SUM OF ALL RESPONSES TO PHQ QUESTIONS 1-9: 0
1. LITTLE INTEREST OR PLEASURE IN DOING THINGS: 0

## 2021-03-25 NOTE — PROGRESS NOTES
Chief Complaint: Follow-Up from Hospital and Chest Pain (yesterday- cant sleep )       HPI:  Olvin Mckenzie is a 78 y.o. male here for hospital follow-up visit. He was also in the hospital from 3/4/2021 to 3/8/2021  He was admitted for chest pain intermittent. He had left heart cath which was done on 3/8/2021 LM: luminal irregularities  LAD: at least moderate calcification of long, eccentric 80% ostial to proximal stenosis ; 30% ostial first diagonal   LCx: 30-40% distal into OM2   RCA: tortuous with luminal irregularities. However CT surgery was consulted Dr. Mario Alberto Minor who did not recommend the surgery as he was high risk for the surgery. He was placed on Cardizem 60 mg 3 times a day and digoxin 125 mcg daily and was discharged. He was also started on lisinopril half a pill of 2.5 mg daily  He still continues to have palpitation and intermittent chest pain. He is irritated and frustrated due to change in his living environment as he moved from his home to apartment. He has not been sleeping good. He has history of hypothyroidism and its been stable and he has been taking 100 mcg daily    He has history of disc degeneration and chronic lower back pain for which he is on Percocet 5/325 as needed 2 times a day  The pain seems to be well controlled. ROS:  Constitutional: Negative   Respiratory: Negative for cough, chest tightness, shortness of breath and wheezing. Cardiovascular: As mentioned above  Gastrointestinal: Negative for abdominal pain, blood in stool, constipation, diarrhea, nausea and vomiting. Genitourinary: Negative for difficulty urinating, flank pain, frequency, hematuria and urgency. Musculoskeletal: Low back pain as mentioned above  Skin: Negative for color change, pallor, rash and wound. Neurological: Negative   Psychiatric/Behavioral: Frustrated and anxious.      Patient's problem list, medications, allergies, past medical, surgical, social and family histories were reviewed and updated as appropriate. Current Outpatient Medications   Medication Sig Dispense Refill    mirtazapine (REMERON) 7.5 MG tablet Take 1 tablet by mouth nightly 90 tablet 1    lisinopril (PRINIVIL;ZESTRIL) 2.5 MG tablet Take 0.5 tablets by mouth daily 30 tablet 1    pantoprazole (PROTONIX) 40 MG tablet Take 1 tablet by mouth 2 times daily (before meals) 60 tablet 3    oxyCODONE-acetaminophen (PERCOCET) 5-325 MG per tablet Take 1 tablet by mouth 2 times daily as needed for Pain for up to 30 days. Intended supply: 3 days. Take lowest dose possible to manage pain 60 tablet 0    rosuvastatin (CRESTOR) 10 MG tablet Take 1 tablet by mouth daily 30 tablet 5    levothyroxine (SYNTHROID) 100 MCG tablet TAKE 1 TABLET BY MOUTH EVERY DAY 90 tablet 1    digoxin (LANOXIN) 125 MCG tablet Take 1 tablet by mouth daily 30 tablet 5    aspirin (ASPIRIN LOW DOSE) 81 MG EC tablet ASPIRIN EC LOW DOSE 81 MG TBEC      fluticasone (FLOVENT HFA) 110 MCG/ACT inhaler Inhale 1 puff into the lungs 2 times daily (Patient taking differently: Inhale 1 puff into the lungs daily ) 1 Inhaler 3    dilTIAZem (CARDIZEM 12 HR) 60 MG extended release capsule Take 1 capsule by mouth 3 times daily 90 capsule 5    docusate sodium (COLACE) 100 MG capsule Take 100 mg by mouth 2 times daily. No current facility-administered medications for this visit.         Social History     Tobacco Use    Smoking status: Former Smoker     Packs/day: 1.00     Years: 40.00     Pack years: 40.00     Types: Cigarettes     Start date: 1975     Quit date: 2015     Years since quittin.1    Smokeless tobacco: Never Used    Tobacco comment: Maintain cessation   Substance Use Topics    Alcohol use: No     Alcohol/week: 0.0 standard drinks     Frequency: Never     Binge frequency: Never     Comment: KATELYN reyes        Objective:     Vitals:    21 1145   BP: 108/60   Pulse: 79   Temp: 97.7 °F (36.5 °C)   SpO2: 95%   Weight: 147 lb (66.7 kg)     Body mass index is 20.79 kg/m². Wt Readings from Last 3 Encounters:   03/25/21 147 lb (66.7 kg)   03/22/21 148 lb 8 oz (67.4 kg)   03/08/21 148 lb 1.6 oz (67.2 kg)     BP Readings from Last 3 Encounters:   03/25/21 108/60   03/22/21 100/62   03/08/21 118/78       Physical exam:  Constitutional: he is oriented to person, place, and time. he appears well-developed and well-nourished. No distress. Neck: Normal range of motion. No JVD present. No tracheal deviation present. No thyromegaly present. Cardiovascular: Normal rate, irregular rhythm, normal heart sounds and intact distal pulses. No murmur heard. Pulmonary/Chest: Effort normal and breath sounds normal. No stridor. No respiratory distress. he has no wheezes. he has no rales. heexhibits no tenderness. Abdominal: Soft. Bowel sounds are normal. he exhibits no distension and no mass. There is no tenderness. There is no rebound and no guarding. Musculoskeletal: Lower back pain in his lumbar region. Normal gait   Neurological:he is alert and oriented to person, place, and time. he has gross neurological exam normal with normal strength and normal gait  Skin: Skin is warm and dry. No rash noted. he is not diaphoretic. No erythema. No pallor. Psychiatric: he has a normal mood and affect. his   behavior is normal.      Assessment/Plan:   1. Anxiety  We will start  - mirtazapine (REMERON) 7.5 MG tablet; Take 1 tablet by mouth nightly  Dispense: 90 tablet; Refill: 1    2. Acquired hypothyroidism  Stable and continue the synthroid    3. Mixed hyperlipidemia  Continue the crestor 10 mg daily    4. PAF (paroxysmal atrial fibrillation) (HCC)  Diltiazem 60 mg tid  digoxin    5. Coronary artery disease of native heart with stable angina pectoris, unspecified vessel or lesion type [facility])  Not surgical candidate and we will continue the medications. Continue the statins and aspirin    6.  Hospital discharge follow-up  Reviewed all the records         Trista BECERRA Evelio Riddle  3/25/2021 4:57 PM

## 2021-03-26 ENCOUNTER — TELEPHONE (OUTPATIENT)
Dept: CARDIOLOGY CLINIC | Age: 71
End: 2021-03-26

## 2021-03-26 ENCOUNTER — TELEPHONE (OUTPATIENT)
Dept: FAMILY MEDICINE CLINIC | Age: 71
End: 2021-03-26

## 2021-03-26 DIAGNOSIS — R07.9 CHEST PAIN, UNSPECIFIED TYPE: ICD-10-CM

## 2021-03-26 DIAGNOSIS — I25.9 CHRONIC ISCHEMIC HEART DISEASE: Primary | Chronic | ICD-10-CM

## 2021-03-26 NOTE — TELEPHONE ENCOUNTER
----- Message from Eder Jorge. sent at 3/26/2021  9:03 AM EDT -----  Subject: Message to Provider    QUESTIONS  Information for Provider? Patient daughter called to follow up   in regards to patients last appointment. Would like to discuss with Dr. Evelio Riddle.   ---------------------------------------------------------------------------  --------------  Windy BOWENS  What is the best way for the office to contact you? OK to leave message on   voicemail   OK to respond with electronic message via [employer] portal (only for   patients who have registered [employer] account)  Preferred Call Back Phone Number? [phone]  ---------------------------------------------------------------------------  --------------  SCRIPT ANSWERS  Relationship to Patient? Other  Representative Name? Brooke Cruz   Is the Representative on the appropriate HIPAA document in Epic?  Yes

## 2021-03-26 NOTE — TELEPHONE ENCOUNTER
----- Message from Laurie Garcia RN sent at 3/25/2021  6:50 PM EDT -----  Please tell patient that pulmonary function test is normal

## 2021-03-29 ENCOUNTER — HOSPITAL ENCOUNTER (OUTPATIENT)
Age: 71
Discharge: HOME | End: 2021-03-29
Payer: COMMERCIAL

## 2021-03-29 DIAGNOSIS — I34.0 NONRHEUMATIC MITRAL VALVE REGURGITATION: ICD-10-CM

## 2021-03-29 LAB
ANION GAP SERPL CALCULATED.3IONS-SCNC: 6 MMOL/L (ref 3–16)
BUN BLDV-MCNC: 14 MG/DL (ref 7–20)
CALCIUM SERPL-MCNC: 9.1 MG/DL (ref 8.3–10.6)
CHLORIDE BLD-SCNC: 101 MMOL/L (ref 99–110)
CO2: 30 MMOL/L (ref 21–32)
CREAT SERPL-MCNC: 0.6 MG/DL (ref 0.8–1.3)
GFR AFRICAN AMERICAN: >60
GFR NON-AFRICAN AMERICAN: >60
GLUCOSE BLD-MCNC: 85 MG/DL (ref 70–99)
POTASSIUM SERPL-SCNC: 4.4 MMOL/L (ref 3.5–5.1)
SODIUM BLD-SCNC: 137 MMOL/L (ref 136–145)

## 2021-03-29 PROCEDURE — 80048 BASIC METABOLIC PNL TOTAL CA: CPT

## 2021-03-29 PROCEDURE — 36415 COLL VENOUS BLD VENIPUNCTURE: CPT

## 2021-04-01 ENCOUNTER — TELEPHONE (OUTPATIENT)
Dept: CARDIOLOGY CLINIC | Age: 71
End: 2021-04-01

## 2021-04-02 RX ORDER — PANTOPRAZOLE SODIUM 40 MG/1
40 TABLET, DELAYED RELEASE ORAL
Qty: 60 TABLET | Refills: 3 | OUTPATIENT
Start: 2021-04-02

## 2021-04-02 NOTE — TELEPHONE ENCOUNTER
Medication Refill    Medication needing refilled:  pantoprazole (PROTONIX      Dosage of the medication: 40 mg    How are you taking this medication (QD, BID, TID, QID, PRN):    30 or 90 day supply called in: 90 day supply    When will you run out of your medication:    Which Pharmacy are we sending the medication to?: [facility]/pharmacy #[id number]- [address] [facility], [address] [place] [id number]   Phone:  [phone]  Fax:  [facility]

## 2021-04-05 ENCOUNTER — NURSE ONLY (OUTPATIENT)
Dept: CARDIOLOGY CLINIC | Age: 71
End: 2021-04-05
Payer: COMMERCIAL

## 2021-04-05 DIAGNOSIS — I48.0 PAF (PAROXYSMAL ATRIAL FIBRILLATION) (HCC): Chronic | ICD-10-CM

## 2021-04-05 DIAGNOSIS — Z45.09 ENCOUNTER FOR ELECTRONIC ANALYSIS OF REVEAL EVENT RECORDER: ICD-10-CM

## 2021-04-05 PROCEDURE — G2066 INTER DEVC REMOTE 30D: HCPCS | Performed by: INTERNAL MEDICINE

## 2021-04-05 PROCEDURE — 93298 REM INTERROG DEV EVAL SCRMS: CPT | Performed by: INTERNAL MEDICINE

## 2021-04-05 NOTE — LETTER
[address] [facility] [phone]  [address] [place] [address] [facility] [phone]    Pacemaker/Defibrillator Clinic          04/05/21        Kalyn Mann [phone]        Dear Juan Manuel Kaufman    This letter is to inform you that we received the transmission from your monitor at home that checks your implanted heart device. The next date your monitor will automatically transmit will be 5-10-21. If your report needs attention we will notify you. Your device and monitor are wireless and most transmit cellularly, but please periodically check your monitor is still plugged in to the electrical outlet. If you still use the telephone land line to send please ensure the connection to the phone tomy is secure. This will help to ensure successful automatic transmissions in the future. Also, the monitor needs to be close to you while sleeping at night. Please be aware that the remote device transmission sites are periodically monitored only during regular business hours during which simultaneous in-office device clinics are being run. If your transmission requires attention, we will contact you as soon as possible. Thank you.             [facility]

## 2021-04-07 ENCOUNTER — HOSPITAL ENCOUNTER (OUTPATIENT)
Dept: NON INVASIVE DIAGNOSTICS | Age: 71
Discharge: HOME | End: 2021-04-07
Payer: COMMERCIAL

## 2021-04-07 DIAGNOSIS — R07.9 CHEST PAIN, UNSPECIFIED TYPE: ICD-10-CM

## 2021-04-07 DIAGNOSIS — I25.9 CHRONIC ISCHEMIC HEART DISEASE: Chronic | ICD-10-CM

## 2021-04-07 LAB
LV EF: 70 %
LVEF MODALITY: NORMAL

## 2021-04-07 PROCEDURE — 78452 HT MUSCLE IMAGE SPECT MULT: CPT | Performed by: INTERNAL MEDICINE

## 2021-04-07 PROCEDURE — 93017 CV STRESS TEST TRACING ONLY: CPT | Performed by: INTERNAL MEDICINE

## 2021-04-07 PROCEDURE — 3430000000 HC RX DIAGNOSTIC RADIOPHARMACEUTICAL: Performed by: INTERNAL MEDICINE

## 2021-04-07 PROCEDURE — A9502 TC99M TETROFOSMIN: HCPCS | Performed by: INTERNAL MEDICINE

## 2021-04-07 RX ADMIN — TETROFOSMIN 10 MILLICURIE: 1.38 INJECTION, POWDER, LYOPHILIZED, FOR SOLUTION INTRAVENOUS at 09:07

## 2021-04-07 RX ADMIN — TETROFOSMIN 30 MILLICURIE: 1.38 INJECTION, POWDER, LYOPHILIZED, FOR SOLUTION INTRAVENOUS at 10:47

## 2021-04-12 NOTE — TELEPHONE ENCOUNTER
Medication Refill    Medication needing refilled:    (PROTONIX) 40 MG tablet, digoxin (LANOXIN) 125 MCG tablet, dilTIAZem (CARDIZEM 12 HR) 60 MG extended release capsule,  rosuvastatin (CRESTOR) 10 MG tablet    Dosage of the medication:    How are you taking this medication (QD, BID, TID, QID, PRN):    30 or 90 day supply called in:    When will you run out of your medication: pt says he is out of all medications except for lisinopril pls call to advise thank you     Which Pharmacy are we sending the medication to?: [id number]- [address]

## 2021-04-12 NOTE — TELEPHONE ENCOUNTER
Patient called in and states that the pharmacy has not yet recievev his scripts previously requested in other encounter. Call pharmacy staff an she states she does not have script for Protonix or Diltiazem . She states the pt has refills on Digoxin and Crestor. Protonix was sent to [facility] can we send it to local pharmacy ?

## 2021-04-12 NOTE — TELEPHONE ENCOUNTER
Call placed to patient who was unavailable for message. The patient has refills he will ashia to contact his pharmacy to have them to fill all of the ones that he is requested.

## 2021-04-13 RX ORDER — DILTIAZEM HYDROCHLORIDE 60 MG/1
CAPSULE, EXTENDED RELEASE ORAL
Qty: 270 CAPSULE | Refills: 1 | Status: SHIPPED | OUTPATIENT
Start: 2021-04-13 | End: 2021-11-03

## 2021-04-13 RX ORDER — PANTOPRAZOLE SODIUM 40 MG/1
40 TABLET, DELAYED RELEASE ORAL
Qty: 60 TABLET | Refills: 3 | OUTPATIENT
Start: 2021-04-13

## 2021-04-15 DIAGNOSIS — G89.3 CHRONIC PAIN DUE TO NEOPLASM: ICD-10-CM

## 2021-04-15 RX ORDER — OXYCODONE HYDROCHLORIDE AND ACETAMINOPHEN 5; 325 MG/1; MG/1
1 TABLET ORAL 2 TIMES DAILY PRN
Qty: 60 TABLET | Refills: 0 | Status: SHIPPED | OUTPATIENT
Start: 2021-04-15 | End: 2021-05-15

## 2021-04-19 ENCOUNTER — HOSPITAL ENCOUNTER (OUTPATIENT)
Age: 71
Discharge: HOME | End: 2021-04-19
Payer: COMMERCIAL

## 2021-04-19 ENCOUNTER — OFFICE VISIT (OUTPATIENT)
Dept: CARDIOLOGY CLINIC | Age: 71
End: 2021-04-19
Payer: COMMERCIAL

## 2021-04-19 ENCOUNTER — TELEPHONE (OUTPATIENT)
Dept: CARDIOLOGY CLINIC | Age: 71
End: 2021-04-19

## 2021-04-19 VITALS
BODY MASS INDEX: 21.22 KG/M2 | HEART RATE: 80 BPM | HEIGHT: 71 IN | DIASTOLIC BLOOD PRESSURE: 64 MMHG | SYSTOLIC BLOOD PRESSURE: 100 MMHG | OXYGEN SATURATION: 97 % | WEIGHT: 151.6 LBS

## 2021-04-19 DIAGNOSIS — I10 ESSENTIAL HYPERTENSION: ICD-10-CM

## 2021-04-19 DIAGNOSIS — R00.0 TACHYCARDIA: ICD-10-CM

## 2021-04-19 DIAGNOSIS — I25.10 CORONARY ARTERY DISEASE INVOLVING NATIVE CORONARY ARTERY OF NATIVE HEART WITHOUT ANGINA PECTORIS: Primary | ICD-10-CM

## 2021-04-19 DIAGNOSIS — Z79.899 LONG-TERM USE OF HIGH-RISK MEDICATION: ICD-10-CM

## 2021-04-19 DIAGNOSIS — I34.0 NONRHEUMATIC MITRAL VALVE REGURGITATION: ICD-10-CM

## 2021-04-19 DIAGNOSIS — I36.1 NONRHEUMATIC TRICUSPID VALVE REGURGITATION: ICD-10-CM

## 2021-04-19 LAB
ANION GAP SERPL CALCULATED.3IONS-SCNC: 8 MMOL/L (ref 3–16)
BUN BLDV-MCNC: 8 MG/DL (ref 7–20)
CALCIUM SERPL-MCNC: 9.2 MG/DL (ref 8.3–10.6)
CHLORIDE BLD-SCNC: 103 MMOL/L (ref 99–110)
CO2: 30 MMOL/L (ref 21–32)
CREAT SERPL-MCNC: 0.6 MG/DL (ref 0.8–1.3)
GFR AFRICAN AMERICAN: >60
GFR NON-AFRICAN AMERICAN: >60
GLUCOSE BLD-MCNC: 89 MG/DL (ref 70–99)
MAGNESIUM: 2.2 MG/DL (ref 1.8–2.4)
POTASSIUM SERPL-SCNC: 4.4 MMOL/L (ref 3.5–5.1)
SODIUM BLD-SCNC: 141 MMOL/L (ref 136–145)
T4 FREE: 2.4 NG/DL (ref 0.9–1.8)
TSH REFLEX: 4.08 UIU/ML (ref 0.27–4.2)

## 2021-04-19 PROCEDURE — 83735 ASSAY OF MAGNESIUM: CPT

## 2021-04-19 PROCEDURE — 84443 ASSAY THYROID STIM HORMONE: CPT

## 2021-04-19 PROCEDURE — 80048 BASIC METABOLIC PNL TOTAL CA: CPT

## 2021-04-19 PROCEDURE — 84439 ASSAY OF FREE THYROXINE: CPT

## 2021-04-19 PROCEDURE — 36415 COLL VENOUS BLD VENIPUNCTURE: CPT

## 2021-04-19 PROCEDURE — 99214 OFFICE O/P EST MOD 30 MIN: CPT | Performed by: NURSE PRACTITIONER

## 2021-04-19 NOTE — TELEPHONE ENCOUNTER
NPTS wants pt to see rmm in 4 wks. RMM does not have any open spots, pls advise day/time, Thank you.

## 2021-04-19 NOTE — PROGRESS NOTES
Jenny 83     Outpatient Follow Up Note    Dorinda Files is 78 y.o. male who presents today with a history of CAD with LAD disease at 80% by cath March '21 (high risk for surgery);  MR & TR, AS s/p AVR porcine prothesis (PAF) MAZE & DELGADO ligation '15 ; PAT/SVT Sept '18, s/p ILR April '19, atrial flutter s/p RFA July '20 and hyperlipidemia  ~Off oral AC due to successful DELGADO ligation verified by [facility]    Interval hx:   4/5/21: paceart transmission : report shows ST   4/7/21: myoview stress : neg for ischemia    CHIEF COMPLAINT / HPI:  Follow Up secondary to CAD & MR beginning low dose lisinopril for afterload reduction. Subjective:   He had a rapid HB. Seems like every time he turns over in bed, his HR speeds up and it takes about 15 minutes to slow back down. He can tell if its AF as that feels like a thumbing of which he hasn't noticed. His chest discomfort is better. He gets some pains that last about a minute. They always feel like they travel up his left arm. The other day, he had one in his leg. He denies significant chest pain. There is no SOB/MARTINEZ. The patient denies orthopnea/PND. He sleeps better taking remeron. The patient does not have swelling. The patients weight was 147# at home this morning up ~ 4#. He has a pot belly which he attributes to eating when bored . The patient has experienced dizziness but nothing like it was    These symptoms are improving since the last OV. With regard to medication therapy the patient has been compliant with prescribed regimen. They have tolerated therapy to date.      Past Medical History:   Diagnosis Date    Aortic valve insufficiency     Arthritis     [facility]     Cancer ([facility] [address] 09/2017    TONGUE head and neck IN REMISSION    Dysphonia     GERD (gastroesophageal reflux disease)     Hearing loss     Heart disease     Medical history reviewed with no changes     Obstructive apnea does not use CPAP    Oropharyngeal dysphagia nausea, vomiting, diarrhea, constipation, abdominal pain or changes in bowel habits. : No urinary frequency, urgency, incontinence hematuria or dysuria. SKIN: No cyanosis or skin lesions. MUSCULOSKELETAL: No new muscle or joint pain. NEUROLOGICAL: No syncope or TIA-like symptoms. PSYCHIATRIC: No anxiety, pain, insomnia or depression    Objective:   PHYSICAL EXAM:        Vitals:    04/19/21 0930 04/19/21 0950 04/19/21 0951   BP: 110/72 100/62 100/64   Site:  Left Upper Arm Right Upper Arm   Cuff Size:  Medium Adult Medium Adult   Pulse: 80     SpO2: 97%     Weight: 151 lb 9.6 oz (68.8 kg)     Height: 5' 11\" (1.803 m)         VITALS:  /64 (Site: Right Upper Arm, Cuff Size: Medium Adult)   Pulse 80   Ht 5' 11\" (1.803 m)   Wt 151 lb 9.6 oz (68.8 kg)   SpO2 97%   BMI 21.14 kg/m²   CONSTITUTIONAL: Cooperative, no apparent distress, and appears well nourished / developed  NEUROLOGIC:  Awake and orientated to person, place and time. PSYCH: Calm affect. SKIN: Warm and dry. HEENT: Sclera non-icteric, normocephalic, neck supple, no elevation of JVP, normal carotid pulses with no bruits and thyroid normal size. LUNGS:  No increased work of breathing and clear to auscultation, no crackles or wheezing  CARDIOVASCULAR:  Regular rate 80 and rhythm with no murmurs, gallops, rubs, or abnormal heart sounds, normal PMI. The apical impulses not displaced  JVP less than 8 cm H2O  Heart tones are crisp and normal  Cervical veins are not engorged  The carotid upstroke is normal in amplitude and contour without delay or bruit  JVP is not elevated  ABDOMEN:  Normal bowel sounds, non-distended and non-tender to palpation  EXT: No edema, no calf tenderness. Pulses are present bilaterally.     DATA:    Lab Results   Component Value Date    ALT 10 03/05/2021    AST 14 (L) 03/05/2021    ALKPHOS 44 03/05/2021    BILITOT 1.0 03/05/2021     Lab Results   Component Value Date    CREATININE 0.6 (L) 03/29/2021    BUN 14 03/29/2021 at 38 mmHg   assuming a right atrial pressure of 8 mmHg.   -The right ventricle is mildly enlarged.   -The right atrium is moderately dilated.      PaceArt: 3/2/21:  interrogation of their implanted loop recorder. Interrogation shows many symptoms recordings that appear to be little ectopy. ST noted. Implanted for AF management. Patient remains on Cardizem and digoxin. Patient not on [facility] medication- Had MAZE procedure     ILR interrogation : 3/6/21: symptomatic x3 episodes on 3/1/21 in sinus rhythm                                           x2 episodes of AT on 2/28/21 rate 103 bpm                                           x5 episodes of AT on 2/22/21 rate 102-130 bpm      Cardiac cath: 3/8/21:  Dominance: Right    LM: luminal irregularities  LAD: at least moderate calcification of long, eccentric 80% ostial to proximal stenosis ; 30% ostial first diagonal   LCx: 30-40% distal into OM2   RCA: tortuous with luminal irregularities     Did not cross SAVR      Impression/Recommendations:  CTS consult to discuss Redo Sternotomy for LIMA-LAD and significant, primary MR     Device transmission: 4/5/21:  Remote Linq report shows ST. Pt has known AF    myoview stress: 4/7/21:  Summary    Normal myocardial perfusion.    Normal LV size and systolic function.           Assessment:      Diagnosis Orders   1. Coronary artery disease involving native coronary artery of native heart without angina pectoris   ~stable   ~mod disease LAD, prox 75-80% > medically managed   ~high risk surgical candidate  ~myoview stress neg for ischemia 4/7/21 : surgeon recommending surveillance NM    2. Nonrheumatic mitral valve regurgitation   ~mod to severe  ~will be following with surveillance echos  ~tolerating lisinopril at 1.25 mg daily   ~neg to exam for decompensation ; home wt up ~ 4#    3. Nonrheumatic tricuspid valve regurgitation   ~unchanged at mod-severe    4.  Essential hypertension   ~well controlled at low normal   ~tolerating the addition of low dose lisinopril     5. Tachycardia   ~ noted on loop interrogation 4/5/21  ~c/o fast HR laying on side   ~titrating diltiazem difficult with BP  ~last TSH 7.75 March 5th : thyroid supplement managed by PCP    PAF (paroxysmal atrial fibrillation) (HCC)   ~AP regular : diltiazem & digoxin  ~c/o episode last evening : no episodes of AF noted for last evening on loop interrogation today  ~last episode seen on 3/11/21 : VR  for 2 minutes  ~s/p MAZE & DELGADO ligation '15  ~[facility] : PAT/SVT Sept '18  ~hx of RFA July '20  ~on ASA ; no AC d/t GIB ; hx of MR & TR  ~CHADsVASc 4  ~hx of not well tx sleep apnea : intolerant to mask  ~thyroid followed by PCP             6. Long-term use of high-risk medication  BASIC METABOLIC PANEL    MAGNESIUM    TSH with Reflex    T4, Free         I had the opportunity to review the clinical symptoms and presentation of Shahrzad Vazquez. Plan:     1. BMP/Mg+/TSH, free T-4  2. F/U with Dr. Abelardo Garcia in four weeks   3. Plan for echo in August as recommended : valve surveillance     Overall the patient is stable from CV standpoint    I have addresed the patient's cardiac risk factors and adjusted pharmacologic treatment as needed. In addition, I have reinforced the need for patient directed risk factor modification. Further evaluation will be based upon the patient's clinical course and testing results. All questions and concerns were addressed to the patient. Alternatives to my treatment were discussed. The patient is not currently smoking. The risks related to smoking were reviewed with the patient. Recommend maintaining a smoke-free lifestyle. Patient is not on a beta-blocker  Patient is on an ace-i/ARB  Patient is on a statin    Antiplatelet therapy has been recommended / prescribed for this patient. Education conducted on adverse reactions including bleeding was discussed.   ~Off oral AC due to successful DELGADO ligation verified by [facility]     Daily weight, low

## 2021-04-20 ENCOUNTER — TELEPHONE (OUTPATIENT)
Dept: CARDIOLOGY CLINIC | Age: 71
End: 2021-04-20

## 2021-04-20 RX ORDER — PANTOPRAZOLE SODIUM 40 MG/1
40 TABLET, DELAYED RELEASE ORAL
Qty: 60 TABLET | Refills: 3 | Status: SHIPPED | OUTPATIENT
Start: 2021-04-20 | End: 2021-08-05

## 2021-04-20 NOTE — TELEPHONE ENCOUNTER
Medication and Quantity requested:  pantoprazole (PROTONIX) 40 MG tablet  QTY: 60    Last Visit  3/25/21    Pharmacy and phone number updated in [facility]:  Yes [facility] [address]

## 2021-04-20 NOTE — TELEPHONE ENCOUNTER
Medication:   Requested Prescriptions     Pending Prescriptions Disp Refills    pantoprazole (PROTONIX) 40 MG tablet 60 tablet 3     Sig: Take 1 tablet by mouth 2 times daily (before meals)        Last Filled:  03/08/2021    Patient Phone Number: [phone] (home)     Last appt: 3/25/2021   Next appt: 5/3/2021    Last [place]AR[place]:   RX Monitoring 12/30/2019   Periodic Controlled Substance Monitoring No signs of potential drug abuse or diversion identified.

## 2021-05-03 ENCOUNTER — OFFICE VISIT (OUTPATIENT)
Dept: FAMILY MEDICINE CLINIC | Age: 71
End: 2021-05-03
Payer: COMMERCIAL

## 2021-05-03 VITALS
HEART RATE: 86 BPM | DIASTOLIC BLOOD PRESSURE: 64 MMHG | WEIGHT: 152.6 LBS | BODY MASS INDEX: 21.28 KG/M2 | SYSTOLIC BLOOD PRESSURE: 98 MMHG | OXYGEN SATURATION: 98 %

## 2021-05-03 DIAGNOSIS — J41.0 SIMPLE CHRONIC BRONCHITIS (HCC): ICD-10-CM

## 2021-05-03 DIAGNOSIS — R68.89 FORGETFULNESS: Primary | ICD-10-CM

## 2021-05-03 DIAGNOSIS — F41.9 ANXIETY: ICD-10-CM

## 2021-05-03 DIAGNOSIS — E03.9 ACQUIRED HYPOTHYROIDISM: ICD-10-CM

## 2021-05-03 PROCEDURE — 99214 OFFICE O/P EST MOD 30 MIN: CPT | Performed by: FAMILY MEDICINE

## 2021-05-03 RX ORDER — LEVOTHYROXINE SODIUM 0.1 MG/1
TABLET ORAL
Qty: 90 TABLET | Refills: 1 | Status: SHIPPED | OUTPATIENT
Start: 2021-05-03 | End: 2021-10-12 | Stop reason: SDUPTHER

## 2021-05-03 RX ORDER — MIRTAZAPINE 7.5 MG/1
7.5 TABLET, FILM COATED ORAL NIGHTLY
Qty: 90 TABLET | Refills: 3 | Status: SHIPPED | OUTPATIENT
Start: 2021-05-03 | End: 2021-06-22 | Stop reason: SDUPTHER

## 2021-05-03 NOTE — PROGRESS NOTES
Chief Complaint: Anxiety and Other (Memory Loss eval)       HPI:  Heaven Simeon is a 73 y.o. 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 Alex Otoole. He has history of atrial fibrillation coronary artery disease 80% blockage and high risk for the surgery and also has h/o aortic valve insufficiency for which he AVR porcine prothesis in 2015. He also had atrial flutter s/p ablation in 2020. He is off the anticoagulation. Currently he still continues to have intermittent chest pain and palpitation and has mitral regurgitation  Is able to tolerate biking without having any discomfort or chest pain    Now his anxiety and sleep has improved with Remeron    He still has intermittent low back pain due to disc degeneration for which he takes oxycodone  That helps him to keep going with his daily activities    ROS:  Constitutional: Negative  Respiratory: Negative for cough, chest tightness, shortness of breath and wheezing. Cardiovascular: As mentioned above  Gastrointestinal: Negative for abdominal pain, blood in stool, constipation, diarrhea, nausea and vomiting. Genitourinary: Negative  Musculoskeletal: Negative   Skin: Negative for color change, pallor, rash and wound. Neurological: Negative   Psychiatric/Behavioral: As mentioned above    Patient's problem list, medications, allergies, past medical, surgical, social and family histories were reviewed and updated as appropriate.      Current Outpatient Medications   Medication Sig Dispense Refill    levothyroxine (SYNTHROID) 100 MCG tablet Take one tab daily 90 tablet 1    mirtazapine (REMERON) 7.5 MG tablet Take 1 tablet by mouth nightly 90 tablet 3    pantoprazole (PROTONIX) 40 MG tablet Take 1 tablet by mouth 2 times daily (before meals) 60 tablet 3    oxyCODONE-acetaminophen (PERCOCET) 5-325 MG per tablet Take 1 tablet by mouth 2 times daily as needed for Pain for up to 30 days. Intended supply: 3 days. Take lowest dose possible to manage pain 60 tablet 0    dilTIAZem (CARDIZEM 12 HR) 60 MG extended release capsule TAKE 1 CAPSULE BY MOUTH THREE TIMES A  capsule 1    lisinopril (PRINIVIL;ZESTRIL) 2.5 MG tablet Take 0.5 tablets by mouth daily 30 tablet 1    rosuvastatin (CRESTOR) 10 MG tablet Take 1 tablet by mouth daily 30 tablet 5    digoxin (LANOXIN) 125 MCG tablet Take 1 tablet by mouth daily 30 tablet 5    aspirin (ASPIRIN LOW DOSE) 81 MG EC tablet ASPIRIN EC LOW DOSE 81 MG TBEC      fluticasone (FLOVENT HFA) 110 MCG/ACT inhaler Inhale 1 puff into the lungs 2 times daily 1 Inhaler 3    docusate sodium (COLACE) 100 MG capsule Take 100 mg by mouth 2 times daily. No current facility-administered medications for this visit. Social History     Tobacco Use    Smoking status: Former Smoker     Packs/day: 1.00     Years: 40.00     Pack years: 40.00     Types: Cigarettes     Start date: 1975     Quit date: 2015     Years since quittin.2    Smokeless tobacco: Never Used    Tobacco comment: Maintain cessation   Substance Use Topics    Alcohol use: No     Alcohol/week: 0.0 standard drinks     Frequency: Never     Binge frequency: Never     Comment: KATELYN reyes        Objective:     Vitals:    21 1124   BP: 98/64   Pulse: 86   SpO2: 98%   Weight: 152 lb 9.6 oz (69.2 kg)     Body mass index is 21.28 kg/m². Wt Readings from Last 3 Encounters:   21 152 lb 9.6 oz (69.2 kg)   21 151 lb 9.6 oz (68.8 kg)   21 147 lb (66.7 kg)     BP Readings from Last 3 Encounters:   21 98/64   21 100/64   21 108/60       Physical exam:  Constitutional: he is oriented to person, place, and time. he appears well-developed and well-nourished. No distress. Neck: Normal range of motion. No JVD present. No tracheal deviation present. No thyromegaly present. Cardiovascular: Normal rate, regular rhythm, normal heart sounds and intact distal pulses. No murmur heard. Pulmonary/Chest: Effort normal and breath sounds normal. No stridor. No respiratory distress. he has no wheezes. he has no rales. heexhibits no tenderness. Abdominal: Soft. Bowel sounds are normal. he exhibits no distension and no mass. There is no tenderness. There is no rebound and no guarding. Musculoskeletal: Normal range of motion. he exhibits no edema, tenderness or deformity. Lymphadenopathy:     he has no cervical adenopathy. Neurological:he is alert and oriented to person, place, and time. he has gross neurological exam normal with normal strength and normal gait  MMSE score 28  Skin: Skin is warm and dry. No rash noted. he is not diaphoretic. No erythema. No pallor. Psychiatric: he has a normal mood and affect. his   behavior is normal.      Assessment/Plan:   1. Acquired hypothyroidism  Stable continue the same  - levothyroxine (SYNTHROID) 100 MCG tablet; Take one tab daily  Dispense: 90 tablet; Refill: 1    2. Metastatic squamous cell carcinoma ([address]  On remission currently    3. Simple chronic bronchitis (HCC)  No concerns of shortness of breath currently stable    4. Anxiety  Is doing very good with Remeron so we will continue  - mirtazapine (REMERON) 7.5 MG tablet; Take 1 tablet by mouth nightly  Dispense: 90 tablet; Refill: 3    5.  Forgetfulness  Is score on MMSE was 28  Reassured that he does not need any neurologist evaluation for dementia  Will monitor     6 Chronic pain due to lower back pain  Continue the Percocet 5/325 as needed    Dominguez Brown  5/3/2021 1:48 PM

## 2021-05-10 ENCOUNTER — NURSE ONLY (OUTPATIENT)
Dept: CARDIOLOGY CLINIC | Age: 71
End: 2021-05-10
Payer: COMMERCIAL

## 2021-05-10 DIAGNOSIS — I48.0 PAF (PAROXYSMAL ATRIAL FIBRILLATION) (HCC): Chronic | ICD-10-CM

## 2021-05-10 DIAGNOSIS — Z45.09 ENCOUNTER FOR ELECTRONIC ANALYSIS OF REVEAL EVENT RECORDER: ICD-10-CM

## 2021-05-10 PROCEDURE — 93298 REM INTERROG DEV EVAL SCRMS: CPT | Performed by: INTERNAL MEDICINE

## 2021-05-10 PROCEDURE — G2066 INTER DEVC REMOTE 30D: HCPCS | Performed by: INTERNAL MEDICINE

## 2021-05-10 NOTE — PROGRESS NOTES
We received a remote transmission from patient's monitor at home. Remote Linq report shows known AF and SR with ectopy. Pt had Maze procedure. Off anti coags. EP physician to review. We will continue to monitor remotely.

## 2021-05-10 NOTE — LETTER
[address] [facility] [phone]  [id number] [place] [address] [phone]    Pacemaker/Defibrillator Clinic    05/10/21      Nataly Zaragoza  [phone]      Dear Heather Tejeda    This letter is to inform you that we received the transmission from your monitor at home that checks your implanted heart device. The next date your monitor will automatically transmit will be 6-14-21. If your report needs attention we will notify you. Your device and monitor are wireless and most transmit cellularly, but please periodically check your monitor is still plugged in to the electrical outlet. If you still use the telephone land line to send please ensure the connection to the phone tomy is secure. This will help to ensure successful automatic transmissions in the future. Also, the monitor needs to be close to you while sleeping at night. Please be aware that the remote device transmission sites are periodically monitored only during regular business hours during which simultaneous in-office device clinics are being run. If your transmission requires attention, we will contact you as soon as possible. **PLEASE NOTE** that our [facility] policy and processes are changing to ensure a more seamless approach for all parties involved, allowing more time for our nurses to address patient issues and concerns. We will no longer be sending letters for NORMAL remote transmissions. You will be contacted by phone if your transmission requires attention (as previously done), and letters will only be sent regarding monitor disconnections or missed transmissions if you are unable to be reached by phone. Please do not be alarmed by this new process, as we will continue to contact you if your transmission report requires attention. This will be your final \"remote received\" letter.   From this point forward, the [facility] will be utilizing the no news is good news approach. As always, please feel free to contact your nurse with any questions or concerns. Thank you.     [facility]

## 2021-05-14 RX ORDER — LISINOPRIL 2.5 MG/1
TABLET ORAL
Qty: 30 TABLET | Refills: 1 | Status: SHIPPED | OUTPATIENT
Start: 2021-05-14 | End: 2021-08-10

## 2021-05-21 NOTE — PROGRESS NOTES
[facility]   Electrophysiology Follow up   Date: 5/25/2021  I had the privilege of visiting Neo Alcocer in the office. CC: Afib   HPI: Neo Alcocer is a 73 y.o. male with a past medical history of atrial fibrillation, non-obstructive CAD, CHRISTOPHER, aortic valve insufficiency, and squamous cell cancer. In 2015, he underwent AVR with porcine valve, right/left modified maze procedure with DELGADO ligation with Atriclip (1/28/15, Dr. Esthela Vera). He was on coumadin, but this was stopped due to bleeding from his mouth and PEG tube. In September of 2018, a 2 week monitor showed brief SVT and WCT, but no AF. S/p ILR implant (4/22/19). He developed recurrent symptomatic AF. S/p RFCA with re-isolation of PV, roof line, complex atrial fractionated electogram, inferior-posterior line with PWI, CTI atrial flutter (7/15/20)    3/8/2021 he underwent a LHC per . Was to be evaluated by CTS for possible re-do sternotomy  for LIMA-LAD and significant, primary MR. He was determined to be too high risk. Dee Lanza presents to the office in follow up. He states he bought a bicycle and rides it daily. He is feeling well from a cardiac standpoint. He rides his bike 10 miles twice a week. Patient denies lightheadedness, dizziness, chest pain, palpitations, orthopnea, edema, presyncope or syncope. Assessment and plan:     -Paroxysmal Atrial Fibrillation   ECG today shows SR   AT/AF burden 0.1% by ILR     S/p MAZE and DELGADO ligation with Atriclip (2015)    S/p RFCA with re-isolation of PV, roof line, complex atrial fractionated electogram, inferior-posterior line with PWI, CTI atrial flutter (7/15/20)    On cardizem 60 mg TID (May take extra dose if symptomatic), digoxin 125 mcg QD     Blood pressure tends to be soft, and heart rate 74 BPM first degree AV  Block, medications are limited d/t these    Off oral AC due to successful DELGADO ligation verified by [facility]     Patient is now active and biking.  Episodes of tachycardia with heart rates 110-120s likely sinus tachycardia.     -Implantable Loop Recorder  S/p ILR insertion on 4/2019  The CIED was interrogated and programmed and I supervised and reviewed all the data.  All findings and changes are in device interrogation sheat and reflect my personal interpretation and changes and is scanned to Epic  AT/AF burden 0.1%, Sinus tachycardia noted on device     -Aortic Insufficiency               S/p AVR porcine valve right/left modified maze procedure with DELGADO ligation with Atriclip (1/28/15, Dr. Vickey Dumont)              Stable     -[facility] 3/8/2021 CTS consult to discuss Redo Sternotomy for LIMA-LAD and significant, primary MR   Negative Lexiscan  (8/2020)  Continue ASA, Crestor 10 mg daily  Followed by Dr. Valerie Brantley     -Mitral Regurgitation              Moderate to severe per echo (2/2021)   Complains of shortness of breath and chest pain at night              Followed by Dr. Valerie Brantley / Tasha Bowens    -Tricuspid regurgitation:    Severe by echo    Follow up with Dr. Valerie Brantley / Tasha Bowens       -CHRISTOPHER  Stable: Uses CPAP  Encourage to use CPAP to prevent long term effects of untreated CHRISTOPHER     1 Year EPNP    Patient Active Problem List    Diagnosis Date Noted    S/P AVR     Chest pain 03/04/2021    Non-occlusive coronary artery disease 08/19/2020    SOB (shortness of breath) on exertion 08/19/2020    Mitral regurgitation 08/19/2020    Tricuspid valve insufficiency 08/19/2020    Encounter for loop recorder check 07/28/2020    Cervical stenosis of spine 03/09/2020    Metastatic squamous cell carcinoma ([address] 02/18/2020    Chronic obstructive pulmonary disease ([address] 02/12/2019    Sensorineural hearing loss (SNHL) of both ears 01/29/2019    History of tobacco abuse 04/27/2018    Squamous cell carcinoma of head and neck ([address] 12/14/2017    Chronic pain due to neoplasm 12/14/2017    Refusal of blood transfusions as patient is Confucianist 12/14/2017    Osteoarthritis of cervical spine 2016    Primary osteoarthritis of both knees 2016    Obstructive sleep apnea syndrome     Transient ischemic attack (TIA)     Chronic ischemic heart disease     Essential hypertension     Mixed hyperlipidemia 2015    PAF (paroxysmal atrial fibrillation) ([facility] [address] 2015     Diagnostic studies:     EC21  SR, first degree AV block       LHC 3/8/2021  Dominance: Right       LM: luminal irregularities  LAD: at least moderate calcification of long, eccentric 80% ostial to proximal stenosis ; 30% ostial first diagonal   LCx: 30-40% distal into OM2   RCA: tortuous with luminal irregularities     Did not cross SAVR      Impression/Recommendations:  CTS consult to discuss Redo Sternotomy for LIMA-LAD and significant, primary MR     Limited echo 2021   -Limited echocardiogram was performed to evaluate mitral regurgitation.   -Normal left ventricle size and systolic function with an estimated ejection   fraction of 60%. -No regional wall motion abnormalities are seen. -The tips of the mitral leaflet appears to be slightly myxomatous and   minimal prolapse of posterior leaflet. -Moderate to severe anteriorly directed mitral regurgitation (appears   similar when compared to last echocardiogram dated 2020). ERO=0.25   -A bioprosthetic artificial aortic valve appears well seated with a maximum   velocity of 2.4m/s and a mean gradient of 13mmHg. -Moderate to severe tricuspid regurgitation.   -Estimated pulmonary artery systolic pressure is mildly elevated at 38 mmHg   assuming a right atrial pressure of 8 mmHg. -The right ventricle is mildly enlarged.   -The right atrium is moderately dilated. Echo: 2020  Normal left ventricle size, wall thickness and systolic function with an estimated ejection fraction of 60%. No regional wall motion abnormalities are seen.  E/e\"= 10.   Moderate to severe mitral regurgitation.   A bioprosthetic aortic valve appears well seated with a maximum gradient of   17 mmHg and a mean gradient of 11 mmHg. Aortic valve area of 1.97cm.   No evidence of aortic valve regurgitation.   The aortic root is mildly dilated. 3.7cm   The ascending aorta is mildly dilated. 4.2cm   The right ventricle is mildly enlarged but normal in function.   Moderate to severe tricuspid regurgitation. PASP 34mmHg.   The right atrium is mildly dilated.  IVC size is normal (<2.1 cm) but collapses < 50% with respiration consistent   with elevated RA pressure (8 mmHg).    Limited Echo: 7/15/2020   -Limited echocardiogram was performed to rule out pericardial effusion,   status post cardiac ablation today.   -Normal left ventricle size, wall thickness and systolic function with an   estimated ejection fraction of 55%.  -No regional wall motion abnormalities are seen.   -Abnormal (paradoxical) septal motion noted.   -No evidence of any pericardial effusion.     Echo: 6/16/2020   -Normal left ventricle size, wall thickness, and systolic function with an   estimated ejection fraction of 60-65%.  -No regional wall motion abnormalities are seen.   -Indeterminate diastolic function.   -E/e'=7   -Moderately severe mitral regurgitation   -The bioprosthetic artificial aortic valve appears well seated   -There is moderate-to-severe tricuspid regurgitation with a RVSP estimation of 30mmHg.     Lexiscan: 8/26/2020  -There is a small fixed inferior apical defect that is likely bowel artifact    rather than scar.    -LV function is normal with no regional abnormalities and EF=64%.  -Low risk study.          Cath: 2015  Non-obstructive CAD     MCOT: 9/2018                            1st degree HB, HT 68                                            PVC burden <1%             PAC burden 1%                                     VT 11 bts at 118        Cardiac CT: 6/20  No stenosis at the pulmonary vein origins.       Clip is seen in the left atrial appendage,.  No thrombus seen in this region. 4/20/21  Yes Joanie Aaron MD   dilTIAZem (CARDIZEM 12 HR) 60 MG extended release capsule TAKE 1 CAPSULE BY MOUTH THREE TIMES A DAY 4/13/21  Yes ISIDRA Zelaya CNP   rosuvastatin (CRESTOR) 10 MG tablet Take 1 tablet by mouth daily 2/17/21  Yes Luna Blankenship MD   digoxin [facility]) 125 MCG tablet Take 1 tablet by mouth daily 1/5/21  Yes ISIDRA Ardon CNP   aspirin (ASPIRIN LOW DOSE) 81 MG EC tablet ASPIRIN EC LOW DOSE 81 MG TBEC 11/12/20  Yes Historical Provider, MD   fluticasone (FLOVENT HFA) 110 MCG/ACT inhaler Inhale 1 puff into the lungs 2 times daily 11/16/20  Yes Joanie Aaron MD   docusate sodium (COLACE) 100 MG capsule Take 100 mg by mouth 2 times daily. Yes Historical Provider, MD   lisinopril (PRINIVIL;ZESTRIL) 2.5 MG tablet Take 0.5 tablets by mouth daily 3/22/21   ISIDRA Zelaya CNP       Allergies   Allergen Reactions    Naproxen Swelling     Lips    Pt does not recognize this being an intolerance    Hydrocodone-Acetaminophen Itching       Social History:  Reviewed. reports that he quit smoking about 6 years ago. His smoking use included cigarettes. He started smoking about 46 years ago. He has a 40.00 pack-year smoking history. He has never used smokeless tobacco. He reports that he does not drink alcohol and does not use drugs. Family History:  Reviewed. Reviewed. No family history of SCD. Relevant and available labs, and cardiovascular diagnostics reviewed. Reviewed. I independently reviewed relevant and available cardiac diagnostic tests ECG, CXR, Echo, Stress test, Device interrogation, Holter, CT scan. Complex medical condition with multiple medical problems affecting prognosis and outcome of EP interventions    All questions and concerns were addressed to the patient/family. Alternatives to my treatment were discussed. I have discussed the above stated plan and the patient verbalized understanding and agreed with the plan.     Karley attestation: This note was scribed in the presence of Ewelina Boone MD by Anay Juarez RN  Physician Attestation: I, Dr. Ewelina Boone, confirm that the scribe's documentation has been prepared under my direction and personally reviewed by me in its entirety. I also confirm that the note above accurately reflects all work, treatment, procedures, and medical decision making performed by me. NOTE: This report was transcribed using voice recognition software. Every effort was made to ensure accuracy, however, inadvertent computerized transcription errors may be present.      Ewelina Boone MD, MPH  [address]   Office: [phone]  Fax: [phone]

## 2021-05-25 ENCOUNTER — OFFICE VISIT (OUTPATIENT)
Dept: CARDIOLOGY CLINIC | Age: 71
End: 2021-05-25
Payer: COMMERCIAL

## 2021-05-25 ENCOUNTER — NURSE ONLY (OUTPATIENT)
Dept: CARDIOLOGY CLINIC | Age: 71
End: 2021-05-25

## 2021-05-25 VITALS
HEIGHT: 71 IN | WEIGHT: 151.6 LBS | SYSTOLIC BLOOD PRESSURE: 107 MMHG | HEART RATE: 68 BPM | OXYGEN SATURATION: 97 % | BODY MASS INDEX: 21.22 KG/M2 | DIASTOLIC BLOOD PRESSURE: 71 MMHG

## 2021-05-25 DIAGNOSIS — I25.9 CHRONIC ISCHEMIC HEART DISEASE: Chronic | ICD-10-CM

## 2021-05-25 DIAGNOSIS — G45.9 TRANSIENT ISCHEMIC ATTACK (TIA): Chronic | ICD-10-CM

## 2021-05-25 DIAGNOSIS — Z45.09 ENCOUNTER FOR LOOP RECORDER CHECK: ICD-10-CM

## 2021-05-25 DIAGNOSIS — I25.10 NON-OCCLUSIVE CORONARY ARTERY DISEASE: ICD-10-CM

## 2021-05-25 DIAGNOSIS — G47.33 OBSTRUCTIVE SLEEP APNEA SYNDROME: Chronic | ICD-10-CM

## 2021-05-25 DIAGNOSIS — I10 ESSENTIAL HYPERTENSION: Chronic | ICD-10-CM

## 2021-05-25 DIAGNOSIS — I48.0 PAF (PAROXYSMAL ATRIAL FIBRILLATION) (HCC): Primary | Chronic | ICD-10-CM

## 2021-05-25 DIAGNOSIS — Z95.2 S/P AVR: ICD-10-CM

## 2021-05-25 PROCEDURE — 99214 OFFICE O/P EST MOD 30 MIN: CPT | Performed by: INTERNAL MEDICINE

## 2021-05-25 RX ORDER — OXYCODONE HYDROCHLORIDE AND ACETAMINOPHEN 5; 325 MG/1; MG/1
1 TABLET ORAL EVERY 4 HOURS PRN
COMMUNITY
End: 2021-05-28

## 2021-05-28 DIAGNOSIS — M47.812 OSTEOARTHRITIS OF CERVICAL SPINE, UNSPECIFIED SPINAL OSTEOARTHRITIS COMPLICATION STATUS: Primary | ICD-10-CM

## 2021-05-28 RX ORDER — OXYCODONE HYDROCHLORIDE AND ACETAMINOPHEN 5; 325 MG/1; MG/1
1 TABLET ORAL 2 TIMES DAILY PRN
Qty: 60 TABLET | Refills: 0 | Status: SHIPPED | OUTPATIENT
Start: 2021-05-28 | End: 2021-07-15 | Stop reason: SDUPTHER

## 2021-06-07 RX ORDER — DIGOXIN 125 MCG
125 TABLET ORAL DAILY
Qty: 30 TABLET | Refills: 5 | Status: SHIPPED | OUTPATIENT
Start: 2021-06-07 | End: 2022-01-18 | Stop reason: SDUPTHER

## 2021-06-07 NOTE — TELEPHONE ENCOUNTER
Received refill request for digoxin from [facility] pharmacy.     Last ov: 2021 RMM    Last labs: bmp, tsh 2021, digoxin 3/4/2021    Last EK2021    Last Refill:2021 #30 with 5 refills    Next appointment: 2021 NPSR

## 2021-06-14 ENCOUNTER — NURSE ONLY (OUTPATIENT)
Dept: CARDIOLOGY CLINIC | Age: 71
End: 2021-06-14
Payer: COMMERCIAL

## 2021-06-14 DIAGNOSIS — I48.0 PAF (PAROXYSMAL ATRIAL FIBRILLATION) (HCC): Chronic | ICD-10-CM

## 2021-06-14 DIAGNOSIS — Z45.09 ENCOUNTER FOR ELECTRONIC ANALYSIS OF REVEAL EVENT RECORDER: ICD-10-CM

## 2021-06-19 PROCEDURE — 93298 REM INTERROG DEV EVAL SCRMS: CPT | Performed by: INTERNAL MEDICINE

## 2021-06-19 PROCEDURE — G2066 INTER DEVC REMOTE 30D: HCPCS | Performed by: INTERNAL MEDICINE

## 2021-06-22 DIAGNOSIS — F41.9 ANXIETY: ICD-10-CM

## 2021-06-22 RX ORDER — MIRTAZAPINE 7.5 MG/1
7.5 TABLET, FILM COATED ORAL NIGHTLY
Qty: 90 TABLET | Refills: 3 | Status: SHIPPED | OUTPATIENT
Start: 2021-06-22 | End: 2021-09-28 | Stop reason: SDUPTHER

## 2021-06-22 NOTE — TELEPHONE ENCOUNTER
Medication:   Requested Prescriptions      No prescriptions requested or ordered in this encounter        Last Filled:  5/3/2021 #90 R3    Patient Phone Number: [phone] (home)     Last appt: 5/3/2021   Next appt: 9/28/2021    Last OARRS:   RX Monitoring 12/30/2019   Periodic Controlled Substance Monitoring No signs of potential drug abuse or diversion identified.

## 2021-07-01 ENCOUNTER — TELEPHONE (OUTPATIENT)
Dept: CARDIOLOGY CLINIC | Age: 71
End: 2021-07-01

## 2021-07-01 NOTE — TELEPHONE ENCOUNTER
Device interrogation shows no abnormal rhythms that would explain his symptoms. Please make sure he checks his blood pressure and if continues to have symptoms see if NPTS knows of anything else this could be.  Symptoms were brief

## 2021-07-01 NOTE — TELEPHONE ENCOUNTER
Patient states he was sitting at his desk and felt like someone punched him in the chest. It only lasts a few seconds. Feels fuzzy and light headed after wards. Has not checked his BP or heart rate. He will send an interrogation.

## 2021-07-14 DIAGNOSIS — M47.812 OSTEOARTHRITIS OF CERVICAL SPINE, UNSPECIFIED SPINAL OSTEOARTHRITIS COMPLICATION STATUS: ICD-10-CM

## 2021-07-14 NOTE — TELEPHONE ENCOUNTER
Medication:   Requested Prescriptions     Pending Prescriptions Disp Refills    oxyCODONE-acetaminophen (PERCOCET) 5-325 MG per tablet 60 tablet 0     Sig: Take 1 tablet by mouth 2 times daily as needed for Pain for up to 30 days. Take lowest dose possible to manage pain        Last Filled:  5/28/21 60 tabs 0 rf    Patient Phone Number: [phone] (home)     Last appt: 5/3/2021   Next appt: 9/28/2021    Last OARRS:   RX Monitoring 12/30/2019   Periodic Controlled Substance Monitoring No signs of potential drug abuse or diversion identified.

## 2021-07-15 RX ORDER — OXYCODONE HYDROCHLORIDE AND ACETAMINOPHEN 5; 325 MG/1; MG/1
1 TABLET ORAL 2 TIMES DAILY PRN
Qty: 60 TABLET | Refills: 0 | Status: SHIPPED | OUTPATIENT
Start: 2021-07-15 | End: 2021-08-27 | Stop reason: SDUPTHER

## 2021-07-19 ENCOUNTER — NURSE ONLY (OUTPATIENT)
Dept: CARDIOLOGY CLINIC | Age: 71
End: 2021-07-19

## 2021-07-19 DIAGNOSIS — Z45.09 ENCOUNTER FOR ELECTRONIC ANALYSIS OF REVEAL EVENT RECORDER: ICD-10-CM

## 2021-07-19 DIAGNOSIS — I48.0 PAF (PAROXYSMAL ATRIAL FIBRILLATION) (HCC): Chronic | ICD-10-CM

## 2021-07-19 PROCEDURE — G2066 INTER DEVC REMOTE 30D: HCPCS | Performed by: INTERNAL MEDICINE

## 2021-07-19 PROCEDURE — 93298 REM INTERROG DEV EVAL SCRMS: CPT | Performed by: INTERNAL MEDICINE

## 2021-07-19 NOTE — PROGRESS NOTES
We received a remote transmission from patient's monitor at home. Remote Linq report shows no arrhythmias. No egm's for AT. Not turned on to collect. EP physician to review. We will continue to monitor remotely.

## 2021-08-05 RX ORDER — PANTOPRAZOLE SODIUM 40 MG/1
TABLET, DELAYED RELEASE ORAL
Qty: 180 TABLET | Refills: 1 | Status: SHIPPED | OUTPATIENT
Start: 2021-08-05 | End: 2021-12-06

## 2021-08-11 DIAGNOSIS — I25.10 CORONARY ARTERY DISEASE INVOLVING NATIVE CORONARY ARTERY OF NATIVE HEART WITHOUT ANGINA PECTORIS: Primary | ICD-10-CM

## 2021-08-11 DIAGNOSIS — E78.2 MIXED HYPERLIPIDEMIA: ICD-10-CM

## 2021-08-11 RX ORDER — ROSUVASTATIN CALCIUM 10 MG/1
TABLET, COATED ORAL
Qty: 90 TABLET | Refills: 0 | Status: SHIPPED | OUTPATIENT
Start: 2021-08-11 | End: 2021-08-16

## 2021-08-11 NOTE — TELEPHONE ENCOUNTER
Patient notified of the need to repeat fasting lipids, liver enzymes, and CK soon to check lipid status after starting Rosuvastatin 10 mg in Feb 2021. Had lipid drawn in March 2021 (LDL- 103) less than 30 days after statin started. Patient verbalized understanding and will get fasting labs drawn soon.

## 2021-08-13 ENCOUNTER — TELEPHONE (OUTPATIENT)
Dept: CARDIOLOGY CLINIC | Age: 71
End: 2021-08-13

## 2021-08-13 ENCOUNTER — HOSPITAL ENCOUNTER (OUTPATIENT)
Age: 71
Discharge: HOME | End: 2021-08-13
Payer: COMMERCIAL

## 2021-08-13 DIAGNOSIS — E78.2 MIXED HYPERLIPIDEMIA: ICD-10-CM

## 2021-08-13 DIAGNOSIS — I25.10 CORONARY ARTERY DISEASE INVOLVING NATIVE CORONARY ARTERY OF NATIVE HEART WITHOUT ANGINA PECTORIS: ICD-10-CM

## 2021-08-13 LAB
ALT SERPL-CCNC: 11 U/L (ref 10–40)
AST SERPL-CCNC: 17 U/L (ref 15–37)
CHOLESTEROL, TOTAL: 179 MG/DL (ref 0–199)
HDLC SERPL-MCNC: 45 MG/DL (ref 40–60)
LDL CHOLESTEROL CALCULATED: 115 MG/DL
TOTAL CK: 49 U/L (ref 39–308)
TRIGL SERPL-MCNC: 97 MG/DL (ref 0–150)
VLDLC SERPL CALC-MCNC: 19 MG/DL

## 2021-08-13 PROCEDURE — 84460 ALANINE AMINO (ALT) (SGPT): CPT

## 2021-08-13 PROCEDURE — 84450 TRANSFERASE (AST) (SGOT): CPT

## 2021-08-13 PROCEDURE — 36415 COLL VENOUS BLD VENIPUNCTURE: CPT

## 2021-08-13 PROCEDURE — 82550 ASSAY OF CK (CPK): CPT

## 2021-08-13 PROCEDURE — 80061 LIPID PANEL: CPT

## 2021-08-16 ENCOUNTER — TELEPHONE (OUTPATIENT)
Dept: CARDIOLOGY CLINIC | Age: 71
End: 2021-08-16

## 2021-08-16 DIAGNOSIS — E78.2 MIXED HYPERLIPIDEMIA: ICD-10-CM

## 2021-08-16 DIAGNOSIS — I25.10 CORONARY ARTERY DISEASE INVOLVING NATIVE CORONARY ARTERY OF NATIVE HEART WITHOUT ANGINA PECTORIS: ICD-10-CM

## 2021-08-16 RX ORDER — ROSUVASTATIN CALCIUM 20 MG/1
20 TABLET, COATED ORAL DAILY
Qty: 30 TABLET | Refills: 6 | Status: SHIPPED | OUTPATIENT
Start: 2021-08-16 | End: 2022-01-19

## 2021-08-16 NOTE — TELEPHONE ENCOUNTER
Talked with patient and he is taking his Crestor 10 mg tablets. Relayed message per NPTS and patient gave verbal understanding of message. Labs and new RX Crestor 20 mg po qd send per NPTS.

## 2021-08-16 NOTE — TELEPHONE ENCOUNTER
----- Message from ISIDRA Chowdary CNP sent at 8/16/2021 11:04 AM EDT -----  Is he still taking 10 mg of crestor? His LDL went up. If he is, then increase it to 20 mg daily and recheck lipids / LFTs after 6 weeks.    If he's not, then he needs to go back on it

## 2021-08-18 ENCOUNTER — PROCEDURE VISIT (OUTPATIENT)
Dept: CARDIOLOGY CLINIC | Age: 71
End: 2021-08-18
Payer: COMMERCIAL

## 2021-08-18 DIAGNOSIS — I34.0 NONRHEUMATIC MITRAL VALVE REGURGITATION: ICD-10-CM

## 2021-08-18 DIAGNOSIS — I36.1 NONRHEUMATIC TRICUSPID VALVE REGURGITATION: ICD-10-CM

## 2021-08-18 LAB
LV EF: 63 %
LVEF MODALITY: NORMAL

## 2021-08-19 PROCEDURE — 93306 TTE W/DOPPLER COMPLETE: CPT | Performed by: INTERNAL MEDICINE

## 2021-08-23 ENCOUNTER — NURSE ONLY (OUTPATIENT)
Dept: CARDIOLOGY CLINIC | Age: 71
End: 2021-08-23
Payer: COMMERCIAL

## 2021-08-23 DIAGNOSIS — I48.0 PAF (PAROXYSMAL ATRIAL FIBRILLATION) (HCC): Chronic | ICD-10-CM

## 2021-08-23 DIAGNOSIS — Z45.09 ENCOUNTER FOR ELECTRONIC ANALYSIS OF REVEAL EVENT RECORDER: ICD-10-CM

## 2021-08-23 PROCEDURE — G2066 INTER DEVC REMOTE 30D: HCPCS | Performed by: INTERNAL MEDICINE

## 2021-08-23 PROCEDURE — 93298 REM INTERROG DEV EVAL SCRMS: CPT | Performed by: INTERNAL MEDICINE

## 2021-08-23 NOTE — PROGRESS NOTES
We received a remote transmission from patient's monitor at home. Remote Linq report shows symptom recording being SR with ectopy. No egm's for AT. Not turned on to collect. EP physician to review. We will continue to monitor remotely.

## 2021-08-27 ENCOUNTER — TELEPHONE (OUTPATIENT)
Dept: FAMILY MEDICINE CLINIC | Age: 71
End: 2021-08-27

## 2021-08-27 DIAGNOSIS — M47.812 OSTEOARTHRITIS OF CERVICAL SPINE, UNSPECIFIED SPINAL OSTEOARTHRITIS COMPLICATION STATUS: ICD-10-CM

## 2021-08-27 RX ORDER — OXYCODONE HYDROCHLORIDE AND ACETAMINOPHEN 5; 325 MG/1; MG/1
1 TABLET ORAL 2 TIMES DAILY PRN
Qty: 60 TABLET | Refills: 0 | Status: SHIPPED | OUTPATIENT
Start: 2021-08-27 | End: 2021-09-26

## 2021-08-27 NOTE — TELEPHONE ENCOUNTER
Medication:   Requested Prescriptions     Pending Prescriptions Disp Refills    oxyCODONE-acetaminophen (PERCOCET) 5-325 MG per tablet 60 tablet 0     Sig: Take 1 tablet by mouth 2 times daily as needed for Pain for up to 30 days. Take lowest dose possible to manage pain        Last Filled:  7/15/2021 60 tabs 0 refills     Patient Phone Number: [phone] (home)     Last appt: 5/3/2021   Next appt: 9/28/2021    Last OARRS:   RX Monitoring 12/30/2019   Periodic Controlled Substance Monitoring No signs of potential drug abuse or diversion identified.

## 2021-09-15 ENCOUNTER — OFFICE VISIT (OUTPATIENT)
Dept: CARDIOLOGY CLINIC | Age: 71
End: 2021-09-15
Payer: COMMERCIAL

## 2021-09-15 VITALS
WEIGHT: 152.9 LBS | BODY MASS INDEX: 21.4 KG/M2 | OXYGEN SATURATION: 95 % | HEART RATE: 73 BPM | DIASTOLIC BLOOD PRESSURE: 62 MMHG | HEIGHT: 71 IN | SYSTOLIC BLOOD PRESSURE: 102 MMHG

## 2021-09-15 DIAGNOSIS — I36.1 NONRHEUMATIC TRICUSPID VALVE REGURGITATION: ICD-10-CM

## 2021-09-15 DIAGNOSIS — R00.0 TACHYCARDIA: ICD-10-CM

## 2021-09-15 DIAGNOSIS — I34.0 NONRHEUMATIC MITRAL VALVE REGURGITATION: ICD-10-CM

## 2021-09-15 DIAGNOSIS — I48.0 PAF (PAROXYSMAL ATRIAL FIBRILLATION) (HCC): ICD-10-CM

## 2021-09-15 DIAGNOSIS — I10 ESSENTIAL HYPERTENSION: ICD-10-CM

## 2021-09-15 DIAGNOSIS — I25.10 CORONARY ARTERY DISEASE INVOLVING NATIVE CORONARY ARTERY OF NATIVE HEART WITHOUT ANGINA PECTORIS: Primary | ICD-10-CM

## 2021-09-15 PROCEDURE — 99214 OFFICE O/P EST MOD 30 MIN: CPT | Performed by: NURSE PRACTITIONER

## 2021-09-15 RX ORDER — MELATONIN 10 MG
10 CAPSULE ORAL NIGHTLY PRN
COMMUNITY
End: 2022-06-08

## 2021-09-15 NOTE — PROGRESS NOTES
Date    Aortic valve insufficiency     Arthritis     Atrial fibrillation (HCC)     Cancer (HCC) 2017    TONGUE head and neck IN REMISSION    Dysphonia     GERD (gastroesophageal reflux disease)     Hearing loss     Heart disease     Medical history reviewed with no changes     Obstructive apnea does not use CPAP    Oropharyngeal dysphagia     Refusal of blood transfusions as patient is Sabianist     Thyroid disease      Social History:    Social History     Tobacco Use   Smoking Status Former Smoker    Packs/day: 1.00    Years: 40.00    Pack years: 40.00    Types: Cigarettes    Start date: 1975   Deena Sim Quit date: 2015    Years since quittin.6   Smokeless Tobacco Never Used   Tobacco Comment    Maintain cessation     Current Medications:  Current Outpatient Medications   Medication Sig Dispense Refill    melatonin 10 MG CAPS capsule Take 10 mg by mouth nightly as needed      oxyCODONE-acetaminophen (PERCOCET) 5-325 MG per tablet Take 1 tablet by mouth 2 times daily as needed for Pain for up to 30 days. Take lowest dose possible to manage pain 60 tablet 0    rosuvastatin (CRESTOR) 20 MG tablet Take 1 tablet by mouth daily 30 tablet 6    lisinopril (PRINIVIL;ZESTRIL) 2.5 MG tablet Take 0.5 tablets by mouth daily 45 tablet 1    pantoprazole (PROTONIX) 40 MG tablet TAKE 1 TABLET BY MOUTH TWICE A DAY BEFORE MEALS 180 tablet 1    mirtazapine (REMERON) 7.5 MG tablet Take 1 tablet by mouth nightly 90 tablet 3    digoxin (LANOXIN) 125 MCG tablet Take 1 tablet by mouth daily 30 tablet 5    levothyroxine (SYNTHROID) 100 MCG tablet Take one tab daily 90 tablet 1    dilTIAZem (CARDIZEM 12 HR) 60 MG extended release capsule TAKE 1 CAPSULE BY MOUTH THREE TIMES A  capsule 1    aspirin (ASPIRIN LOW DOSE) 81 MG EC tablet ASPIRIN EC LOW DOSE 81 MG TBEC      docusate sodium (COLACE) 100 MG capsule Take 100 mg by mouth 2 times daily.        No current facility-administered medications for this visit. REVIEW OF SYSTEMS:    CONSTITUTIONAL: No major weight gain or loss, fatigue, weakness, night sweats or fever. HEENT: No new vision difficulties or ringing in the ears. RESPIRATORY: No new SOB, PND, orthopnea or cough. CARDIOVASCULAR: See HPI  GI: No nausea, vomiting, diarrhea, constipation, abdominal pain or changes in bowel habits. : No urinary frequency, urgency, incontinence hematuria or dysuria. SKIN: No cyanosis or skin lesions. MUSCULOSKELETAL: No new muscle or joint pain. NEUROLOGICAL: No syncope or TIA-like symptoms. PSYCHIATRIC: No anxiety, pain, insomnia or depression    Objective:   PHYSICAL EXAM:        Vitals:    09/15/21 0904 09/15/21 0927   BP: 90/62 102/62   Site: Right Upper Arm    Position: Sitting    Cuff Size: Medium Adult    Pulse: 73    SpO2: 95%    Weight: 152 lb 14.4 oz (69.4 kg)    Height: 5' 11\" (1.803 m)        VITALS:  BP 90/62 (Site: Right Upper Arm, Position: Sitting, Cuff Size: Medium Adult)   Pulse 73   Ht 5' 11\" (1.803 m)   Wt 152 lb 14.4 oz (69.4 kg)   SpO2 95%   BMI 21.33 kg/m²   CONSTITUTIONAL: Cooperative, no apparent distress, and appears well nourished / developed  NEUROLOGIC:  Awake and orientated to person, place and time. PSYCH: Calm affect. SKIN: Warm and dry. HEENT: Sclera non-icteric, normocephalic, neck supple, no elevation of JVP, normal carotid pulses with no bruits and thyroid normal size. LUNGS:  No increased work of breathing and clear to auscultation, no crackles or wheezing  CARDIOVASCULAR:  Regular rate 80 and rhythm with + murmur 4th ICS Lt MCL, gallops, rubs, or abnormal heart sounds, normal PMI. The apical impulses not displaced  JVP less than 8 cm H2O  Heart tones are crisp and normal  Cervical veins are not engorged  The carotid upstroke is normal in amplitude and contour without delay or bruit  JVP is not elevated  ABDOMEN:  Normal bowel sounds, non-distended and non-tender to palpation  EXT: No edema, no calf tenderness. Pulses are present bilaterally. DATA:    Lab Results   Component Value Date    ALT 11 08/13/2021    AST 17 08/13/2021    ALKPHOS 44 03/05/2021    BILITOT 1.0 03/05/2021     Lab Results   Component Value Date    CREATININE 0.6 (L) 04/19/2021    BUN 8 04/19/2021     04/19/2021    K 4.4 04/19/2021     04/19/2021    CO2 30 04/19/2021     Lab Results   Component Value Date    WBC 5.9 03/04/2021    HGB 13.3 (L) 03/04/2021    HCT 40.0 (L) 03/04/2021    MCV 94.8 03/04/2021     03/04/2021     No components found for: CHLPL  Lab Results   Component Value Date    TRIG 97 08/13/2021    TRIG 82 03/05/2021    TRIG 85 02/23/2021     Lab Results   Component Value Date    HDL 45 08/13/2021    HDL 52 03/05/2021    HDL 51 02/23/2021     Lab Results   Component Value Date    LDLCALC 115 (H) 08/13/2021    LDLCALC 103 (H) 03/05/2021    LDLCALC 140 (H) 02/23/2021     Lab Results   Component Value Date    LABVLDL 19 08/13/2021    LABVLDL 16 03/05/2021    LABVLDL 17 02/23/2021       Radiology Review:  Pertinent images / reports were reviewed as a part of this visit and reveals the following:    [id number] Score for Atrial Fibrillation Stroke Risk   Risk   Factors  Component Value   C CHF No 0   H HTN Yes 1   A2 Age >= 76 No,  (77 y.o.) 0   D DM No 0   S2 Prior Stroke/TIA Yes 2   V Vascular Disease No 1   A Age 74-69 Yes,  (77 y.o.) 1   Sc Sex male 0    [id number]  Score  5   Score last updated 9/15/21 9:09 AM EDT    Echo : limited ; 2/24/21:   Summary   -Limited echocardiogram was performed to evaluate mitral regurgitation.   -Normal left ventricle size and systolic function with an estimated ejection   fraction of 60%.    -No regional wall motion abnormalities are seen.   -The tips of the mitral leaflet appears to be slightly myxomatous and   minimal prolapse of posterior leaflet.   -Moderate to severe anteriorly directed mitral regurgitation (appears   similar when compared to last echocardiogram dated September 2020). ERO=0.25   -A bioprosthetic artificial aortic valve appears well seated with a maximum   velocity of 2.4m/s and a mean gradient of 13mmHg.   -Moderate to severe tricuspid regurgitation.   -Estimated pulmonary artery systolic pressure is mildly elevated at 38 mmHg   assuming a right atrial pressure of 8 mmHg.   -The right ventricle is mildly enlarged.   -The right atrium is moderately dilated.     ILR interrogation : 3/6/21: symptomatic x3 episodes on 3/1/21 in sinus rhythm                                           x2 episodes of AT on 2/28/21 rate 103 bpm                                           x5 episodes of AT on 2/22/21 rate 102-130 bpm      Cardiac cath: 3/8/21:  Dominance: Right    LM: luminal irregularities  LAD: at least moderate calcification of long, eccentric 80% ostial to proximal stenosis ; 30% ostial first diagonal   LCx: 30-40% distal into OM2   RCA: tortuous with luminal irregularities     Did not cross SAVR      Impression/Recommendations:  CTS consult to discuss Redo Sternotomy for LIMA-LAD and significant, primary MR     myoview stress: 4/7/21:  Summary    Normal myocardial perfusion.    Normal LV size and systolic function.         Echo: 8/18/21:  Summary   -Normal left ventricle size, wall thickness and systolic function with an   estimated ejection fraction of 60-65%. -No regional wall motion abnormalities are seen.   -Indeterminate diastolic function.   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 -Moderate to severe mitral regurgitation. ( appears stable from previous echo   dated 2/24/2021). ERO 0.21.   -A bioprosthetic artificial aortic valve appears well seated with a maximum   velocity of 2.6m/s and a mean gradient of 16mmHg. -Moderate to severe tricuspid regurgitation.   -Estimated pulmonary artery systolic pressure is mildly elevated at 38 mmHg   assuming a right atrial pressure of 8 mmHg.    -The right ventricle is the need for patient directed risk factor modification. Further evaluation will be based upon the patient's clinical course and testing results. All questions and concerns were addressed to the patient. Alternatives to my treatment were discussed. The patient is not currently smoking. The risks related to smoking were reviewed with the patient. Recommend maintaining a smoke-free lifestyle. Patient is not on a beta-blocker  Patient is on an ace-i/ARB  Patient is on a statin    Antiplatelet therapy has been recommended / prescribed for this patient. Education conducted on adverse reactions including bleeding was discussed. ~Off oral AC due to successful DELGADO ligation verified by [facility]     Daily weight, low sodium diet were discussed. Patient instructed to call the office with a weight gain: > 3 # over night or 5# in one week; swelling, SOB/orthopnea/PND    The patient verbalizes understanding not to stop medications without discussing with us. Discussed exercise: 30-60 minutes 7 days/week  Discussed Low saturated fat/ANIVAL diet. Thank you for allowing to us to participate in the care of Hunter White.     ISIDRA Abreu    Documentation of today's visit sent to PCP

## 2021-09-25 PROCEDURE — G2066 INTER DEVC REMOTE 30D: HCPCS | Performed by: INTERNAL MEDICINE

## 2021-09-25 PROCEDURE — 93298 REM INTERROG DEV EVAL SCRMS: CPT | Performed by: INTERNAL MEDICINE

## 2021-09-27 ENCOUNTER — NURSE ONLY (OUTPATIENT)
Dept: CARDIOLOGY CLINIC | Age: 71
End: 2021-09-27
Payer: COMMERCIAL

## 2021-09-27 DIAGNOSIS — I48.0 PAF (PAROXYSMAL ATRIAL FIBRILLATION) (HCC): Chronic | ICD-10-CM

## 2021-09-27 DIAGNOSIS — Z45.09 ENCOUNTER FOR ELECTRONIC ANALYSIS OF REVEAL EVENT RECORDER: ICD-10-CM

## 2021-09-28 ENCOUNTER — OFFICE VISIT (OUTPATIENT)
Dept: FAMILY MEDICINE CLINIC | Age: 71
End: 2021-09-28
Payer: COMMERCIAL

## 2021-09-28 VITALS
SYSTOLIC BLOOD PRESSURE: 102 MMHG | OXYGEN SATURATION: 98 % | DIASTOLIC BLOOD PRESSURE: 68 MMHG | WEIGHT: 153 LBS | BODY MASS INDEX: 21.34 KG/M2 | HEART RATE: 66 BPM

## 2021-09-28 DIAGNOSIS — F41.9 ANXIETY: ICD-10-CM

## 2021-09-28 DIAGNOSIS — G89.4 CHRONIC PAIN SYNDROME: ICD-10-CM

## 2021-09-28 DIAGNOSIS — I10 ESSENTIAL HYPERTENSION: ICD-10-CM

## 2021-09-28 DIAGNOSIS — E03.9 ACQUIRED HYPOTHYROIDISM: Primary | ICD-10-CM

## 2021-09-28 DIAGNOSIS — J41.0 SIMPLE CHRONIC BRONCHITIS (HCC): ICD-10-CM

## 2021-09-28 PROCEDURE — 99214 OFFICE O/P EST MOD 30 MIN: CPT | Performed by: FAMILY MEDICINE

## 2021-09-28 RX ORDER — OXYCODONE HYDROCHLORIDE AND ACETAMINOPHEN 5; 325 MG/1; MG/1
1 TABLET ORAL 2 TIMES DAILY PRN
Qty: 60 TABLET | Refills: 0 | Status: SHIPPED | OUTPATIENT
Start: 2021-09-28 | End: 2022-01-05 | Stop reason: SDUPTHER

## 2021-09-28 RX ORDER — MIRTAZAPINE 7.5 MG/1
7.5 TABLET, FILM COATED ORAL NIGHTLY
Qty: 90 TABLET | Refills: 3 | Status: SHIPPED | OUTPATIENT
Start: 2021-09-28 | End: 2021-11-22 | Stop reason: SDUPTHER

## 2021-09-28 RX ORDER — FLUTICASONE PROPIONATE 110 UG/1
2 AEROSOL, METERED RESPIRATORY (INHALATION) 2 TIMES DAILY
Qty: 12 G | Refills: 3 | Status: SHIPPED | OUTPATIENT
Start: 2021-09-28 | End: 2022-07-22

## 2021-09-28 NOTE — PROGRESS NOTES
inhaler Inhale 2 puffs into the lungs 2 times daily 12 g 3    rosuvastatin (CRESTOR) 20 MG tablet Take 1 tablet by mouth daily 30 tablet 6    lisinopril (PRINIVIL;ZESTRIL) 2.5 MG tablet Take 0.5 tablets by mouth daily 45 tablet 1    pantoprazole (PROTONIX) 40 MG tablet TAKE 1 TABLET BY MOUTH TWICE A DAY BEFORE MEALS 180 tablet 1    mirtazapine (REMERON) 7.5 MG tablet Take 1 tablet by mouth nightly 90 tablet 3    digoxin (LANOXIN) 125 MCG tablet Take 1 tablet by mouth daily 30 tablet 5    levothyroxine (SYNTHROID) 100 MCG tablet Take one tab daily 90 tablet 1    dilTIAZem (CARDIZEM 12 HR) 60 MG extended release capsule TAKE 1 CAPSULE BY MOUTH THREE TIMES A  capsule 1    aspirin (ASPIRIN LOW DOSE) 81 MG EC tablet ASPIRIN EC LOW DOSE 81 MG TBEC      docusate sodium (COLACE) 100 MG capsule Take 100 mg by mouth 2 times daily.  melatonin 10 MG CAPS capsule Take 10 mg by mouth nightly as needed (Patient not taking: Reported on 2021)       No current facility-administered medications for this visit. Social History     Tobacco Use    Smoking status: Former Smoker     Packs/day: 1.00     Years: 40.00     Pack years: 40.00     Types: Cigarettes     Start date: 1975     Quit date: 2015     Years since quittin.6    Smokeless tobacco: Never Used    Tobacco comment: Maintain cessation   Substance Use Topics    Alcohol use: No     Alcohol/week: 0.0 standard drinks     Comment: KATELYN reyes        Objective:     Vitals:    21 0812   BP: 102/68   Pulse: 66   SpO2: 98%   Weight: 153 lb (69.4 kg)     Body mass index is 21.34 kg/m². Wt Readings from Last 3 Encounters:   21 153 lb (69.4 kg)   09/15/21 152 lb 14.4 oz (69.4 kg)   21 151 lb 9.6 oz (68.8 kg)     BP Readings from Last 3 Encounters:   21 102/68   09/15/21 102/62   21 107/71       Physical exam:  Constitutional: he is oriented to person, place, and time. he appears well-developed and well-nourished.  No

## 2021-10-01 ENCOUNTER — TELEPHONE (OUTPATIENT)
Dept: CARDIOLOGY CLINIC | Age: 71
End: 2021-10-01

## 2021-10-01 NOTE — TELEPHONE ENCOUNTER
Patient trying to send the phone transmission he forgot to send on 9/27/21. When he tries to send it numbers flash orange on his monitor. Patient calling [employer] to see what is going on. If they send him a new monitor he will call the office to let us know.

## 2021-10-07 ENCOUNTER — TELEPHONE (OUTPATIENT)
Dept: CARDIOLOGY CLINIC | Age: 71
End: 2021-10-07

## 2021-10-07 DIAGNOSIS — I47.1 ATRIAL TACHYCARDIA (HCC): Primary | ICD-10-CM

## 2021-10-07 NOTE — TELEPHONE ENCOUNTER
Patient calls in stating he has been having episodes of fast hear rate every day. He has not felt well and is very fatigued. /77,HR 80. He is scheduled for repeat lab work the first week of Oct.     Interrogation sent  And reviewed with patient. Reviewed With NPSR. No medication changes at this time. Add Magnesium to pending lab work. Called and left message for patient as above with request to have lab work drawn.

## 2021-10-11 ENCOUNTER — HOSPITAL ENCOUNTER (OUTPATIENT)
Age: 71
Discharge: HOME | End: 2021-10-11
Payer: COMMERCIAL

## 2021-10-11 DIAGNOSIS — E03.9 ACQUIRED HYPOTHYROIDISM: ICD-10-CM

## 2021-10-11 DIAGNOSIS — I47.1 ATRIAL TACHYCARDIA (HCC): ICD-10-CM

## 2021-10-11 DIAGNOSIS — I25.10 CORONARY ARTERY DISEASE INVOLVING NATIVE CORONARY ARTERY OF NATIVE HEART WITHOUT ANGINA PECTORIS: ICD-10-CM

## 2021-10-11 DIAGNOSIS — E78.2 MIXED HYPERLIPIDEMIA: ICD-10-CM

## 2021-10-11 LAB
ALBUMIN SERPL-MCNC: 4.4 G/DL (ref 3.4–5)
ALP BLD-CCNC: 46 U/L (ref 40–129)
ALT SERPL-CCNC: 15 U/L (ref 10–40)
ANION GAP SERPL CALCULATED.3IONS-SCNC: 12 MMOL/L (ref 3–16)
AST SERPL-CCNC: 20 U/L (ref 15–37)
BILIRUB SERPL-MCNC: 1 MG/DL (ref 0–1)
BILIRUBIN DIRECT: <0.2 MG/DL (ref 0–0.3)
BILIRUBIN, INDIRECT: NORMAL MG/DL (ref 0–1)
BUN BLDV-MCNC: 8 MG/DL (ref 7–20)
CALCIUM SERPL-MCNC: 9.1 MG/DL (ref 8.3–10.6)
CHLORIDE BLD-SCNC: 101 MMOL/L (ref 99–110)
CHOLESTEROL, FASTING: 162 MG/DL (ref 0–199)
CO2: 24 MMOL/L (ref 21–32)
CREAT SERPL-MCNC: 0.6 MG/DL (ref 0.8–1.3)
GFR AFRICAN AMERICAN: >60
GFR NON-AFRICAN AMERICAN: >60
GLUCOSE BLD-MCNC: 85 MG/DL (ref 70–99)
HDLC SERPL-MCNC: 45 MG/DL (ref 40–60)
LDL CHOLESTEROL CALCULATED: 102 MG/DL
MAGNESIUM: 2.3 MG/DL (ref 1.8–2.4)
POTASSIUM SERPL-SCNC: 4 MMOL/L (ref 3.5–5.1)
SODIUM BLD-SCNC: 137 MMOL/L (ref 136–145)
T4 FREE: 2.3 NG/DL (ref 0.9–1.8)
TOTAL PROTEIN: 7.1 G/DL (ref 6.4–8.2)
TRIGLYCERIDE, FASTING: 77 MG/DL (ref 0–150)
TSH REFLEX: 4.69 UIU/ML (ref 0.27–4.2)
VLDLC SERPL CALC-MCNC: 15 MG/DL

## 2021-10-11 PROCEDURE — 36415 COLL VENOUS BLD VENIPUNCTURE: CPT

## 2021-10-11 PROCEDURE — 80061 LIPID PANEL: CPT

## 2021-10-11 PROCEDURE — 80076 HEPATIC FUNCTION PANEL: CPT

## 2021-10-11 PROCEDURE — 83735 ASSAY OF MAGNESIUM: CPT

## 2021-10-11 PROCEDURE — 84439 ASSAY OF FREE THYROXINE: CPT

## 2021-10-11 PROCEDURE — 84443 ASSAY THYROID STIM HORMONE: CPT

## 2021-10-11 PROCEDURE — 80048 BASIC METABOLIC PNL TOTAL CA: CPT

## 2021-10-12 ENCOUNTER — TELEPHONE (OUTPATIENT)
Dept: FAMILY MEDICINE CLINIC | Age: 71
End: 2021-10-12

## 2021-10-12 ENCOUNTER — TELEPHONE (OUTPATIENT)
Dept: CARDIOLOGY CLINIC | Age: 71
End: 2021-10-12

## 2021-10-12 DIAGNOSIS — E03.9 ACQUIRED HYPOTHYROIDISM: ICD-10-CM

## 2021-10-12 RX ORDER — LEVOTHYROXINE SODIUM 112 UG/1
TABLET ORAL
Qty: 90 TABLET | Refills: 0 | Status: SHIPPED | OUTPATIENT
Start: 2021-10-12 | End: 2021-12-06

## 2021-10-12 NOTE — TELEPHONE ENCOUNTER
----- Message from ISIDRA Diallo - CNP sent at 10/12/2021  7:56 AM EDT -----  Thyroid level off; needs to f/u with PCP

## 2021-10-12 NOTE — TELEPHONE ENCOUNTER
Dr. Melissa Joaquin ordered blood work for patient and advised him to speak w/ PCP regarding his thyroid. Told him he might need to alter medication.

## 2021-10-13 ENCOUNTER — TELEPHONE (OUTPATIENT)
Dept: CARDIOLOGY CLINIC | Age: 71
End: 2021-10-13

## 2021-10-13 NOTE — TELEPHONE ENCOUNTER
He had a TSH ordered by his PCP on 10/11/21. He has been calling his pcp to get the results but she is out of the office and the staff told him no one is covering for her. He knows npts can see the results of all his labs and he wants to know if she could give them to him ?

## 2021-10-13 NOTE — TELEPHONE ENCOUNTER
Thyroid test shows that the thyroid is just a little bit under controlled. It is such a small amount, that I wouldn't recommend changing the dose unless he has symptoms of low thyroid: Does he have any constipation, cold intolerance, slow heart rate? Other things that could have affected the thyroid test:  Does he take any multivitamins or supplements? Has he gained or lost weight recently? Any changes to his medications? Thank you!

## 2021-10-13 NOTE — TELEPHONE ENCOUNTER
Patient is anxious about the test results - asking if someone else can look into this for Dr. Tawana Perez?

## 2021-11-01 ENCOUNTER — HOSPITAL ENCOUNTER (OUTPATIENT)
Dept: GENERAL RADIOLOGY | Age: 71
Discharge: HOME | End: 2021-11-01
Payer: COMMERCIAL

## 2021-11-01 ENCOUNTER — HOSPITAL ENCOUNTER (OUTPATIENT)
Age: 71
Discharge: HOME | End: 2021-11-01
Payer: COMMERCIAL

## 2021-11-01 ENCOUNTER — NURSE ONLY (OUTPATIENT)
Dept: CARDIOLOGY CLINIC | Age: 71
End: 2021-11-01
Payer: COMMERCIAL

## 2021-11-01 DIAGNOSIS — I48.0 PAF (PAROXYSMAL ATRIAL FIBRILLATION) (HCC): Chronic | ICD-10-CM

## 2021-11-01 DIAGNOSIS — Z45.09 ENCOUNTER FOR ELECTRONIC ANALYSIS OF REVEAL EVENT RECORDER: ICD-10-CM

## 2021-11-01 DIAGNOSIS — R52 PAIN: ICD-10-CM

## 2021-11-01 PROCEDURE — 72110 X-RAY EXAM L-2 SPINE 4/>VWS: CPT

## 2021-11-01 NOTE — PROGRESS NOTES
We received a remote transmission from patient's monitor at home. Remote Linq report shows symptom recording being SR with ectopy and short AT. Pt had recording of NSVT. Pt is on Lanoxin and Cardizem. No egm's for AT. Not turned on to collect. EP physician to review. We will continue to monitor remotely.

## 2021-11-03 PROCEDURE — 93298 REM INTERROG DEV EVAL SCRMS: CPT | Performed by: INTERNAL MEDICINE

## 2021-11-03 PROCEDURE — G2066 INTER DEVC REMOTE 30D: HCPCS | Performed by: INTERNAL MEDICINE

## 2021-11-03 RX ORDER — DILTIAZEM HYDROCHLORIDE 60 MG/1
CAPSULE, EXTENDED RELEASE ORAL
Qty: 270 CAPSULE | Refills: 1 | Status: SHIPPED | OUTPATIENT
Start: 2021-11-03 | End: 2021-11-22

## 2021-11-12 ENCOUNTER — HOSPITAL ENCOUNTER (OUTPATIENT)
Dept: PHYSICAL THERAPY | Age: 71
Setting detail: THERAPIES SERIES
Discharge: HOME | End: 2021-11-12
Payer: COMMERCIAL

## 2021-11-12 PROCEDURE — 97161 PT EVAL LOW COMPLEX 20 MIN: CPT

## 2021-11-12 PROCEDURE — 97110 THERAPEUTIC EXERCISES: CPT

## 2021-11-12 NOTE — PLAN OF CARE
[facility]  Outpatient Physical Therapy, [address] [facility], [address] [place] [address] Drive  Phone: [phone]   Fax: [phone]     Physical Therapy Certification    Dear Referring Practitioner: ISIDRA Reyes CNP,    We had the pleasure of evaluating the following patient for physical therapy services at [address] [place] [address]. A summary of our findings can be found in the initial assessment below. This includes our plan of care. If you have any questions or concerns regarding these findings, please do not hesitate to contact me at the office phone number checked above. Thank you for the referral.       Physician Signature:_______________________________Date:__________________  By signing above (or electronic signature), therapists plan is approved by physician      Patient: Antonio Harden   : 1950   MRN: [id number]  Referring Physician: Referring Practitioner: ISIDRA Reyes CNP      Evaluation Date: 2021      Medical Diagnosis Information:  Diagnosis: M54.16 (ICD-10-CM) - Radiculopathy, lumbar region  , M54.50 (ICD-10-CM) - Low back pain, unspecified   Treatment Diagnosis: L sided low back pain, painful SI joint, decreased LLE strength, decreased flexibility, hypomobility of lumbar spine , impaired functional status. Insurance information: PT Insurance Information: [employer] medicare advantage     Precautions/ Contra-indications:   Latex Allergy:  [x]NO      []YES  Preferred Language for Healthcare:   [x]English       []Other:    C-SSRS Triggered by Intake questionnaire (Past 2 wk assessment ):   [x] No, Questionnaire did not trigger screening.   [] Yes, Patient intake triggered C-SSRS Screening     [] Completed, no further action required. [] Completed, PCP notified via Epic    SUBJECTIVE: Patient stated complaint: Pt was referred to PT by Ambrosio Leavitt CNP due to low back pain.  Pt has a long history of spine problems/ surgeries . Pain is describes along L belt line , states pain is not radiating down leg. Pt was on muscle relaxer and steroid which helped a lot but pain is now back after he stopped taking it. Pt states it is difficult for him to get into bed and hard time finding a comfortable position. Pt states if he steps off a curb the pain lights him up. He went to chiropractor and reports he told him to get surgery. Fear avoidance: I should not do physical activities that (might) make my pain worse   [] True   [x] False     Relevant Medical History: OA, AFIB, hx of CA, knee scope, neck fusion, lumbar laminectomy , DDD. Functional Outcome: Oswestry: raw score = 16    Pain Scale: 4/10  Easing factors: rest, medication   Provocative factors: movement     Type: []Constant   [x]Intermittent  []Radiating []Localized []other:     Numbness/Tingling: none     Occupation/School: retired     Living Status/Prior Level of Function: Prior to this injury / incident, pt was independent with ADLs and IADLs,  Pt enjoys riding bike everyday.      OBJECTIVE:   Palpation: TTP at L SI joint / PSIS    Functional Mobility/Transfers: IND no issues noted     Posture: decreased lumbar lordosis     Inspection: slight Lateral shift to R    Gait: (include devices/WB status) decreased arm swing , decreased stance on L    Bandages/Dressings/Incisions: NA    Dermatomes Normal Abnormal Comments   inguinal area (L1)       anterior mid-thigh (L2)      distal ant thigh/med knee (L3) x     medial lower leg and foot (L4) x     lateral lower leg and foot (L5) x     posterior calf (S1) x     medial calcaneus (S2) x         Reflexes Normal Abnormal Comments   S1-2 Seated achilles  x Diminished on L   S1-2 Prone knee bend      L3-4 Patellar tendon  x Diminished on L   Clonus x     Babinski          ROM  Comments   Lumbar Flex WNL    Lumbar Ext WNL      ROM LEFT RIGHT Comments   Lumbar Side Bend 50% limited  WNL    Lumbar Rotation 40% LIMITED 40% LIMITED     Hip Flexion WNL WNL    Hip Abd      Hip ER      Hip IR      Hip Extension      Knee Ext      Knee Flex      Hamstring Flex + +    Piriformis - -                    Joint mobility: LUMBO-SACRAL    []Normal    [x]Hypo   []Hyper      Strength / Myotomes LEFT RIGHT Comments   Multifidus      Transverse Ab      Hip Flexors (L1-2) 4 4+    Hip Abductors 3+ 4-    Hip Extensors 3+ 4    Hip Internal Rotators      Hip External Rotators      Quads (L2-4) 4 5    Hamstrings  4 5    Ankle Plantarflexion (S1-2)      Ankle Dorsiflexion (L4-5) 4- 5    Ankle Inversion      Ankle Eversion (S1-2)      Great Toe Extension (L5) 4- 5            Neural dynamic tension testing Normal Abnormal Comments   Slump Test  - Degree of knee flexion:       SLR  x     0-30      30-70      Femoral nerve (L2-4) x         Orthopedic Special Tests:    Normal Abnormal N/A Comments   Toe walk   x      Heel Walk x      Fwd Bend-aberrant or innominate mvmt)  x  Gowers sign   Trendelenburg x      Kemps/Quadrant       Stork       LEORA/Adriano x      Hip scour       SLR x      Crossed SLR x      Supine to sit    unable   Hip thrust  x     SI distraction/compression x      PA/Spring  x     Prone Instability test       Prone knee bend       Sacral Spring/thrust  x                [x] Patient history, allergies, meds reviewed. Medical chart reviewed. See intake form. Review Of Systems (ROS):  [x]Performed Review of systems (Integumentary, CardioPulmonary, Neurological) by intake and observation. Intake form has been scanned into medical record. Patient has been instructed to contact their primary care physician regarding ROS issues if not already being addressed at this time.       Co-morbidities/Complexities (which will affect course of rehabilitation):   []None        []Hx of COVID   Arthritic conditions   []Rheumatoid arthritis (M05.9)  []Osteoarthritis (M19.91)  []Gout   Cardiovascular conditions   [x]Hypertension (I10)  []Hyperlipidemia (E78.5)  []Angina pectoris (I20)  []Atherosclerosis (I70)  [x]Pacemaker  []Hx of CABG/stent/  cardiac surgeries   Musculoskeletal conditions   [x]Disc pathology   []Congenital spine pathologies   []Osteoporosis (M81.8)  []Osteopenia (M85.8)  []Scoliosis       Endocrine conditions   []Hypothyroid (E03.9)  []Hyperthyroid Gastrointestinal conditions   []Constipation (T91.64)   Metabolic conditions   []Morbid obesity (E66.01)  []Diabetes type 1(E10.65) or 2 (E11.65)   []Neuropathy (G60.9)     Cardio/Pulmonary conditions   []Asthma (J45)  []Coughing   []COPD (J44.9)  []CHF  [x]A-fib   Psychological Disorders  []Anxiety (F41.9)  []Depression (F32.9)   []Other:   Developmental Disorders  []Autism (F84.0)  []CP (G80)  []Down Syndrome (Q90.9)  []Developmental delay     Neurological conditions  []Prior Stroke (I69.30)  []Parkinson's (G20)  []Encephalopathy (G93.40)  []MS (G35)  []Post-polio (G14)  []SCI  []TBI  []ALS Other conditions  []Fibromyalgia (M79.7)  []Vertigo  []Syncope  []Kidney Failure  [x]Cancer      []currently undergoing                treatment  []Pregnancy  []Incontinence   Prior surgeries  []involved limb  [x]previous spinal surgery  [] section birth  []hysterectomy  []bowel / bladder surgery  []other relevant surgeries   []Other:              Barriers to/and or personal factors that will affect rehab potential:              []Age  []Sex    []Smoker              []Motivation/Lack of Motivation                        [x]Co-Morbidities              []Cognitive Function, education/learning barriers              []Environmental, home barriers              []profession/work barriers  []past PT/medical experience  []other:  Justification:     Falls Risk Assessment (30 days):  [x] Falls Risk assessed and no intervention required.   [] Falls Risk assessed and Patient requires intervention due to being higher risk   TUG score (>12s at risk):     [] Falls education provided, including         ASSESSMENT: Functional Impairments:     [x]Noted lumbar/proximal hip hypomobility   []Noted lumbosacral and/or generalized hypermobility   [x]Decreased Lumbosacral/hip/LE functional ROM   [x]Decreased core/proximal hip strength and neuromuscular control    [x]Decreased LE functional strength    []Abnormal reflexes/sensation/myotomal/dermatomal deficits  []Reduced balance/proprioceptive control    []other:      Functional Activity Limitations (from functional questionnaire and intake)   [x]Reduced ability to tolerate prolonged functional positions   [x]Reduced ability or difficulty with changes of positions or transfers between positions   [x]Reduced ability to maintain good posture and demonstrate good body mechanics with sitting, bending, and lifting   [x]Reduced ability to sleep   [x] Reduced ability or tolerance with driving and/or computer work   [x]Reduced ability to perform lifting, reaching, carrying tasks   [x]Reduced ability to squat   [x]Reduced ability to forward bend   [x]Reduced ability to ambulate prolonged functional periods/distances/surfaces   [x]Reduced ability to ascend/descend stairs   []other:       Participation Restrictions   [x]Reduced participation in self care activities   [x]Reduced participation in home management activities   [x]Reduced participation in work activities   [x]Reduced participation in social activities. [x]Reduced participation in sport/recreational activities. Classification:   [x]Signs/symptoms consistent with Lumbar instability/stabilization subgroup. [x]Signs/symptoms consistent with Lumbar mobilization/ subgroup, myotomes and dermatomes intact. Meets manipulation criteria.     []Signs/symptoms consistent with Lumbar direction specific/centralization subgroup   []Signs/symptoms consistent with Lumbar traction subgroup     []Signs/symptoms consistent with lumbar facet dysfunction   []Signs/symptoms consistent with lumbar stenosis type dysfunction   []Signs/symptoms consistent with nerve root involvement including myotome & dermatome dysfunction   []Signs/symptoms consistent with post-surgical status including: decreased ROM, strength and function. []signs/symptoms consistent with pathology which may benefit from Dry needling     []other:      Prognosis/Rehab Potential:      []Excellent   [x]Good    []Fair   []Poor    Tolerance of evaluation/treatment:    []Excellent   []Good    [x]Fair   []Poor     Physical Therapy Evaluation Complexity Justification  [x] A history of present problem with:  [] no personal factors and/or comorbidities that impact the plan of care;  []1-2 personal factors and/or comorbidities that impact the plan of care  [x]3 personal factors and/or comorbidities that impact the plan of care  [x] An examination of body systems using standardized tests and measures addressing any of the following: body structures and functions (impairments), activity limitations, and/or participation restrictions;:  [] a total of 1-2 or more elements   [] a total of 3 or more elements   [x] a total of 4 or more elements   [x] A clinical presentation with:  [x] stable and/or uncomplicated characteristics   [] evolving clinical presentation with changing characteristics  [] unstable and unpredictable characteristics;   [x] Clinical decision making of [x] low, [] moderate, [] high complexity using standardized patient assessment instrument and/or measurable assessment of functional outcome.     [x] EVAL (LOW) 23727 (typically 15 minutes face-to-face)  [] EVAL (MOD) 65031 (typically 30 minutes face-to-face)  [] EVAL (HIGH) 93931 (typically 45 minutes face-to-face)  [] RE-EVAL     PLAN: Begin PT focusing on: proximal hip mobilizations, LB mobs, LB core activation, proximal hip activation, and HEP    Frequency/Duration:  1-2 days per week for 6 Weeks:  Interventions:  [x]  Therapeutic exercise including: strength training, ROM, for LE, Glutes and core   [x]  NMR activation and proprioception for glutes , LE and Core   [x]  Manual therapy as indicated for Hip complex, LE and spine to include: Dry Needling/IASTM, STM, PROM, Gr I-IV mobilizations, manipulation. [x]  Modalities as needed that may include: thermal agents, E-stim, Biofeedback, US, iontophoresis as indicated  [x]  Patient education on joint protection, postural re-education, activity modification, progression of HEP. HEP instruction: Written HEP instructions provided and reviewed     GOALS:  Patient stated goal: decrease pain  [] Progressing: [] Met: [] Not Met: [] Adjusted    Therapist goals for Patient:   Short Term Goals: To be achieved in: 2 weeks  1. Independent in HEP and progression per patient tolerance, in order to prevent re-injury. [] Progressing: [] Met: [] Not Met: [] Adjusted  2. Patient will have a decrease in pain to facilitate improvement in movement, function, and ADLs as indicated by Functional Deficits. [] Progressing: [] Met: [] Not Met: [] Adjusted    Long Term Goals: To be achieved in: 6 weeks  1. Disability index score of 10% or less for the VINCENT to assist with reaching prior level of function. [] Progressing: [] Met: [] Not Met: [] Adjusted  2. Patient will demonstrate increased AROM to WNL, good LS mobility, good hip ROM to allow for proper joint functioning as indicated by patients Functional Deficits. [] Progressing: [] Met: [] Not Met: [] Adjusted  3. Patient will demonstrate an increase in Strength to grossly 4+/5 good proximal hip and core activation to allow for proper functional mobility as indicated by patients Functional Deficits. [] Progressing: [] Met: [] Not Met: [] Adjusted  4. Patient will return to functional activities including ADLs, walking, riding bike without increased symptoms or restriction.    [] Progressing: [] Met: [] Not Met: [] Adjusted       Electronically signed by:  Kishore Summers PT

## 2021-11-12 NOTE — FLOWSHEET NOTE
[facility]  Phone: [phone]   Fax: [phone]    Physical Therapy Treatment Note/ Progress Report:     Date:  2021    Patient Name:  Keisha Mckeon    :  1950  MRN: [id number]  Restrictions/Precautions:    Medical/Treatment Diagnosis Information:  Diagnosis: M54.16 (ICD-10-CM) - Radiculopathy, lumbar region  , M54.50 (ICD-10-CM) - Low back pain, unspecified  Treatment Diagnosis: L sided low back pain, painful SI joint, decreased LLE strength, decreased flexibility, hypomobility of lumbar spine , impaired functional status. Insurance/Certification information:  PT Insurance Information: [employer] advantage  Physician Information:  Referring Practitioner: ISIDRA Lane CNP  Plan of care signed (Y/N): []  Yes [x]  No    Date of Patient follow up with Physician:      Progress Report: []  Yes  [x]  No     Date Range for reporting period:  Beginnin21  Ending:     Progress report due (10 Rx/or 30 days whichever is less): visit #10 or /    Recertification due (POC duration/ or 90 days whichever is less): visit #12 or 21    Visit # Insurance Allowable Auth required? Date Range   1 mn - $25 copay []  Yes  [x]  No        Latex Allergy:  [x]NO      []YES  Preferred Language for Healthcare:   [x]English       []other:    Functional Scale:        Date assessed:  VINCENT: raw score =16 ; dysfunction = %    21    Pain level:  5/10     SUBJECTIVE:  See eval    OBJECTIVE: See eval   Observation:    Test measurements:      RESTRICTIONS/PRECAUTIONS: heart monitor , lumbar laminectomy , hx of CA, cervical fusion .   Treatment based classification: flexion + mobilization       Exercises/Interventions:     Therapeutic Exercise (11019) Resistance / level Sets / Seconds Reps Notes / Cues   bike       IB       [facility]       [facility]                                   Therapeutic Activities (69912) Neuromuscular Re-ed (20694)       CORE STRENGTH                                   Manual Intervention ([id number])       SI mobs  AS TOLERATED                                                                 Modalities:     Pt. Education:  -pt educated on diagnosis, prognosis and expectations for rehab  -all pt questions were answered    Home Exercise Program:  Access Code: [email or web address]  URL: [email or web address]  Date: 11/12/2021  Prepared by: Sierra Pierre    Exercises  Supine Bridge - 1 x daily - 7 x weekly - 3 sets - 10 reps  Supine Lower Trunk Rotation - 1 x daily - 7 x weekly - 3 sets - 10 reps  Supine Hip Adduction Isometric with Ball - 1 x daily - 7 x weekly - 3 sets - 10 reps  Seated Hamstring Stretch - 1 x daily - 7 x weekly - 3 sets - 10 reps      Therapeutic Exercise and NMR EXR  [x] (06103) Provided verbal/tactile cueing for activities related to strengthening, flexibility, endurance, ROM  for improvements in proximal hip and core control with self care, mobility, lifting and ambulation.  [] (41232) Provided verbal/tactile cueing for activities related to improving balance, coordination, kinesthetic sense, posture, motor skill, proprioception  to assist with core control in self care, mobility, lifting, and ambulation.   [] (61792) Therapist is in constant attendance of 2 or more patients providing skilled therapy interventions, but not providing any significant amount of measurable one-on-one time to either patient, for improvements in LE, proximal hip, and core control in self care, mobility, lifting, ambulation and eccentric single leg control.      Therapeutic Activities:    [] (61902 or 33451) Provided verbal/tactile cueing for activities related to improving balance, coordination, kinesthetic sense, posture, motor skill, proprioception and motor activation to allow for proper function  with self care and ADLs  [] (62070) Provided training and instruction to the patient for proper core and proximal hip recruitment and positioning with ambulation re-education     Home Exercise Program:    [x] (80668) Reviewed/Progressed HEP activities related to strengthening, flexibility, endurance, ROM of core, proximal hip and LE for functional self-care, mobility, lifting and ambulation   [] (55808) Reviewed/Progressed HEP activities related to improving balance, coordination, kinesthetic sense, posture, motor skill, proprioception of core, proximal hip and LE for self care, mobility, lifting, and ambulation      Manual Treatments:  PROM / STM / Oscillations-Mobs:  G-I, II, III, IV (PA's, Inf., Post.)  [] (22945) Provided manual therapy to mobilize proximal hip and LS spine soft tissue/joints for the purpose of modulating pain, promoting relaxation,  increasing ROM, reducing/eliminating soft tissue swelling/inflammation/restriction, improving soft tissue extensibility and allowing for proper ROM for normal function with self care, mobility, lifting and ambulation. Charges:  Timed Code Treatment Minutes: 15   Total Treatment Minutes: 43       [x] EVAL - LOW (54729)   [] EVAL - MOD (90781)  [] EVAL - HIGH (95675)  [] RE-EVAL (82542)  [x] WI(55899) x 1     [] Ionto  [] NMR (76487) x      [] Vaso  [] Manual (15751) x      [] Ultrasound  [] TA x       [] Mech Traction (43950)  [] GaitTraining x     [] ES (un) (26165):   [] Home Management Training [] ES(attended) (31385)             [] Aquatics    [] Group  [] Other    GOALS:  Patient stated goal: decrease pain  []? Progressing: []? Met: []? Not Met: []? Adjusted     Therapist goals for Patient:   Short Term Goals: To be achieved in: 2 weeks  1. Independent in HEP and progression per patient tolerance, in order to prevent re-injury. []? Progressing: []? Met: []? Not Met: []? Adjusted  2. Patient will have a decrease in pain to facilitate improvement in movement, function, and ADLs as indicated by Functional Deficits. []? Progressing: []? Met: []?  Not Met: []? Adjusted     Long Term Goals: To be achieved in: 6 weeks  1. Disability index score of 10% or less for the VINCENT to assist with reaching prior level of function. []? Progressing: []? Met: []? Not Met: []? Adjusted  2. Patient will demonstrate increased AROM to WNL, good LS mobility, good hip ROM to allow for proper joint functioning as indicated by patients Functional Deficits. []? Progressing: []? Met: []? Not Met: []? Adjusted  3. Patient will demonstrate an increase in Strength to grossly 4+/5 good proximal hip and core activation to allow for proper functional mobility as indicated by patients Functional Deficits. []? Progressing: []? Met: []? Not Met: []? Adjusted  4. Patient will return to functional activities including ADLs, walking, riding bike without increased symptoms or restriction. []? Progressing: []? Met: []? Not Met: []? Adjusted    Overall Progression Towards Functional goals/ Treatment Progress Update:  [] Patient is progressing as expected towards functional goals listed. [] Progression is slowed due to complexities/Impairments listed. [] Progression has been slowed due to co-morbidities. [x] Plan just implemented, too soon to assess goals progression <30days   [] Goals require adjustment due to lack of progress  [] Patient is not progressing as expected and requires additional follow up with physician  [] Other    Persisting Functional Limitations/Impairments:  []Sitting []Standing   []Walking []Squatting/bending    []Stairs []ADL's    []Transfers []Reaching  []Housework []Job related tasks  []Driving []Sports/Recreation   []Sleeping []Other:    ASSESSMENT:  See eval  Treatment/Activity Tolerance:  [x] Patient able to complete tx  [] Patient limited by fatique  [x] Patient limited by pain  [] Patient limited by other medical complications  [] Other:     Prognosis: [] Good [] Fair  [] Poor    Patient Requires Follow-up: [x] Yes  [] No    PLAN: See eval. PT 1-2x / week for 6 weeks.    [] Continue per plan of care [] Alter current plan (see comments)  [x] Plan of care initiated [] Hold pending MD visit [] Discharge    Electronically signed by: Martin Justice, PT PT, DPT      Note: If patient does not return for scheduled/ recommended follow up visits, this note will serve as a discharge from care along with most recent update on progress.

## 2021-11-16 ENCOUNTER — TELEPHONE (OUTPATIENT)
Dept: FAMILY MEDICINE CLINIC | Age: 71
End: 2021-11-16

## 2021-11-16 NOTE — TELEPHONE ENCOUNTER
Patient called regarding constant fatigue, dry mouth, dizziness, and trouble concentrating. Received COVID booster on 11/1/21. Recently starting taking cyclobenzaprine, wants to know if his symptoms can be a side affect of taking the medication, taking the medication w/ steroids, the COVID booster, or something else.

## 2021-11-17 ENCOUNTER — HOSPITAL ENCOUNTER (OUTPATIENT)
Dept: PHYSICAL THERAPY | Age: 71
Setting detail: THERAPIES SERIES
Discharge: HOME | End: 2021-11-17
Payer: COMMERCIAL

## 2021-11-17 PROCEDURE — 97140 MANUAL THERAPY 1/> REGIONS: CPT | Performed by: SPECIALIST/TECHNOLOGIST

## 2021-11-17 PROCEDURE — 97110 THERAPEUTIC EXERCISES: CPT | Performed by: SPECIALIST/TECHNOLOGIST

## 2021-11-17 NOTE — FLOWSHEET NOTE
[facility]  Phone: [phone]   Fax: [phone]    Physical Therapy Treatment Note/ Progress Report:     Date:  2021    Patient Name:  Christen Christy    :  1950  MRN: [id number]  Restrictions/Precautions:    Medical/Treatment Diagnosis Information:  Diagnosis: M54.16 (ICD-10-CM) - Radiculopathy, lumbar region  , M54.50 (ICD-10-CM) - Low back pain, unspecified  Treatment Diagnosis: L sided low back pain, painful SI joint, decreased LLE strength, decreased flexibility, hypomobility of lumbar spine , impaired functional status. Insurance/Certification information:  PT Insurance Information: [employer]  Physician Information:  Referring Practitioner: ISIDRA Goldman CNP  Plan of care signed (Y/N): []  Yes [x]  No    Date of Patient follow up with Physician:      Progress Report: []  Yes  [x]  No     Date Range for reporting period:  Beginnin21  Ending:     Progress report due (10 Rx/or 30 days whichever is less): visit #10 or     Recertification due (POC duration/ or 90 days whichever is less): visit #12 or 21    Visit # Insurance Allowable Auth required? Date Range   2 mn - $25 copay []  Yes  [x]  No        Latex Allergy:  [x]NO      []YES  Preferred Language for Healthcare:   [x]English       []other:    Functional Scale:        Date assessed:  VINCENT: raw score =16 ; dysfunction = %    21    Pain level:  5/10     SUBJECTIVE:  PT stated today his symptoms are not bad. 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 Pt stated he is taking muscle relaxors 3x a day to help manage pain. Pt states he will do morning stretches due to stiffness (squats, wall push ups?, Trunk rotations). Pt stated he had broke 11-12 ribs in the past.    OBJECTIVE:    Observation:    Test measurements:    : Stiffness with PROM ER of L hip, Pain with IR, Stiff ITB band with manual stretch. RESTRICTIONS/PRECAUTIONS: heart monitor , lumbar laminectomy , hx of CA, cervical fusion . Treatment based classification: flexion + mobilization       Exercises/Interventions: 39'  Therapeutic Exercise (21476)  35' Resistance / level Sets / Seconds Reps Notes / Cues   bike  2.30   11/17   IB       HSS       [facility]  30\" 2x 11/17   [address]  30\" 1x 11/17   Trunk rotation to the L   30\" 1x 11/17   B lateral trunk stretch   Twisting   Over head lean  30\" B 1x  11/17   IT band stretch   15\" 2x 11/17   90/90 hamstring   15\" 3x 11/17   90/90S. Flossing   2 15x 11/17   Standing Hip ABD  2 10x 11/17  Continued going into flex, Broken ribs caused discomfort    Leg EXT  1 10x ea 11/17                 Therapeutic Activities (41230)                                          Neuromuscular Re-ed (97929)       CORE STRENGTH                                   Manual Intervention (05821) 10'       Hip/PROM  5'   Focus on IR with left hip   Manual traction  B w/ SB  5'  In supine                                                      Modalities: States he uses a heating pad on his lower L hip around PSIS    Pt. Education:  -pt educated on diagnosis, prognosis and expectations for rehab  -all pt questions were answered    Home Exercise Program:  Access Code: [email or web address]  URL: [email or web address]  Date: 11/12/2021  Prepared by: Myla Maier    Exercises  Supine Bridge - 1 x daily - 7 x weekly - 3 sets - 10 reps  Supine Lower Trunk Rotation - 1 x daily - 7 x weekly - 3 sets - 10 reps  Supine Hip Adduction Isometric with Ball - 1 x daily - 7 x weekly - 3 sets - 10 reps  Seated Hamstring Stretch - 1 x daily - 7 x weekly - 3 sets - 10 reps      Therapeutic Exercise and NMR EXR  [x] (32600) Provided verbal/tactile cueing for activities related to strengthening, flexibility, endurance, ROM  for improvements in proximal hip and core control with self care, mobility, lifting and ambulation.  [] (49012) Provided verbal/tactile cueing for activities related to improving balance, coordination, kinesthetic sense, posture, motor skill, proprioception  to assist with core control in self care, mobility, lifting, and ambulation.   [] (25433) Therapist is in constant attendance of 2 or more patients providing skilled therapy interventions, but not providing any significant amount of measurable one-on-one time to either patient, for improvements in LE, proximal hip, and core control in self care, mobility, lifting, ambulation and eccentric single leg control. Therapeutic Activities:    [] (83994 or 26856) Provided verbal/tactile cueing for activities related to improving balance, coordination, kinesthetic sense, posture, motor skill, proprioception and motor activation to allow for proper function  with self care and ADLs  [] (55433) Provided training and instruction to the patient for proper core and proximal hip recruitment and positioning with ambulation re-education     Home Exercise Program:    [x] (06253) Reviewed/Progressed HEP activities related to strengthening, flexibility, endurance, ROM of core, proximal hip and LE for functional self-care, mobility, lifting and ambulation   [] (88744) Reviewed/Progressed HEP activities related to improving balance, coordination, kinesthetic sense, posture, motor skill, proprioception of core, proximal hip and LE for self care, mobility, lifting, and ambulation      Manual Treatments:  PROM / STM / Oscillations-Mobs:  G-I, II, III, IV (PA's, Inf., Post.)  [x] (56439) Provided manual therapy to mobilize proximal hip and LS spine soft tissue/joints for the purpose of modulating pain, promoting relaxation,  increasing ROM, reducing/eliminating soft tissue swelling/inflammation/restriction, improving soft tissue extensibility and allowing for proper ROM for normal function with self care, mobility, lifting and ambulation.        Charges:  Timed Code Treatment Minutes: 45   Total Treatment Minutes: 45       [] EVAL - LOW (94049)   [] EVAL - MOD (46190)  [] EVAL - HIGH (19782)  [] RE-EVAL (83403)  [x] IC(24514) x 2     [] Ionto  [] NMR (04794) x      [] Vaso  [x] Manual (25494) x  1    [] Ultrasound  [] TA x       [] Mech Traction (16986)  [] GaitTraining x     [] ES (un) (24558):   [] Home Management Training [] ES(attended) (08596)             [] Aquatics    [] Group  [] Other    GOALS:  Patient stated goal: decrease pain  []? Progressing: []? Met: []? Not Met: []? Adjusted     Therapist goals for Patient:   Short Term Goals: To be achieved in: 2 weeks  1. Independent in HEP and progression per patient tolerance, in order to prevent re-injury. []? Progressing: []? Met: []? Not Met: []? Adjusted  2. Patient will have a decrease in pain to facilitate improvement in movement, function, and ADLs as indicated by Functional Deficits. []? Progressing: []? Met: []? Not Met: []? Adjusted     Long Term Goals: To be achieved in: 6 weeks  1. Disability index score of 10% or less for the VINCENT to assist with reaching prior level of function. []? Progressing: []? Met: []? Not Met: []? Adjusted  2. Patient will demonstrate increased AROM to WNL, good LS mobility, good hip ROM to allow for proper joint functioning as indicated by patients Functional Deficits. []? Progressing: []? Met: []? Not Met: []? Adjusted  3. Patient will demonstrate an increase in Strength to grossly 4+/5 good proximal hip and core activation to allow for proper functional mobility as indicated by patients Functional Deficits. []? Progressing: []? Met: []? Not Met: []? Adjusted  4. Patient will return to functional activities including ADLs, walking, riding bike without increased symptoms or restriction. []? Progressing: []? Met: []? Not Met: []? Adjusted    Overall Progression Towards Functional goals/ Treatment Progress Update:  [] Patient is progressing as expected towards functional goals listed.     [] Progression is slowed due to complexities/Impairments listed. [] Progression has been slowed due to co-morbidities. [x] Plan just implemented, too soon to assess goals progression <30days   [] Goals require adjustment due to lack of progress  [] Patient is not progressing as expected and requires additional follow up with physician  [] Other    Persisting Functional Limitations/Impairments:  []Sitting []Standing   []Walking []Squatting/bending    []Stairs []ADL's    []Transfers []Reaching  []Housework []Job related tasks  []Driving []Sports/Recreation   []Sleeping []Other:    ASSESSMENT:  Pt demonstrated minor shuffling gait with mild kyphotic posture due to possible trunk comfort in flexion. Pt taken through PROM in all planes of plane with increased pain through 90/90 L hip IR. Pt tolerated most ther ex however pt limited in interventions that involved the hip at 90 deg and external rotation. Pt tolerated lumbar extension and rotations interventions however pt seems to show trouble with L counter-nutation focused motions. Pt started with SL hip ABD however pt would start to rotate posteriorly due to increased weakness with his glute medius muscle group. Pt was unable to correct this posture due to the previous fracture involving multiple R ribs and forcing oneself to stand up tall. Pt placed in manual traction with swiss ball to possibly help lessen the pain with trunk rotation with no positive yield. Treatment/Activity Tolerance:  [x] Patient able to complete tx  [] Patient limited by fatique  [x] Patient limited by pain  [] Patient limited by other medical complications  [] Other:     Prognosis: [] Good [] Fair  [] Poor    Patient Requires Follow-up: [x] Yes  [] No    PLAN: PT 1-2x / week for 6 weeks. Continue to progress pt with overall/trunk mobility. Work on strengthening core. Continue to progress pt per PT's POC.     [] Continue per plan of care [] Alter current plan (see comments)  [x] Plan of care initiated [] Hold pending MD visit [] Discharge    Electronically signed by: Marge Leyden, PTA, [id number] Therapist was present, directed the patient's care, made skilled judgement, and was responsible for assessment and treatment of the patient. KIERA Hoover    Note: If patient does not return for scheduled/ recommended follow up visits, this note will serve as a discharge from care along with most recent update on progress.

## 2021-11-18 NOTE — TELEPHONE ENCOUNTER
Spoke to patient he still states constant fatigue, dry mouth, dizziness, and trouble concentrating.      Please advise, thanks

## 2021-11-19 ENCOUNTER — HOSPITAL ENCOUNTER (OUTPATIENT)
Dept: PHYSICAL THERAPY | Age: 71
Setting detail: THERAPIES SERIES
Discharge: HOME | End: 2021-11-19
Payer: COMMERCIAL

## 2021-11-19 PROCEDURE — 97112 NEUROMUSCULAR REEDUCATION: CPT | Performed by: SPECIALIST/TECHNOLOGIST

## 2021-11-19 PROCEDURE — 97110 THERAPEUTIC EXERCISES: CPT | Performed by: SPECIALIST/TECHNOLOGIST

## 2021-11-19 PROCEDURE — 97140 MANUAL THERAPY 1/> REGIONS: CPT | Performed by: SPECIALIST/TECHNOLOGIST

## 2021-11-19 NOTE — FLOWSHEET NOTE
[facility]  Phone: [phone]   Fax: [phone]    Physical Therapy Treatment Note/ Progress Report:     Date:  2021    Patient Name:  Zoya Lombardo    :  1950  MRN: [id number]  Restrictions/Precautions:    Medical/Treatment Diagnosis Information:  Diagnosis: M54.16 (ICD-10-CM) - Radiculopathy, lumbar region  , M54.50 (ICD-10-CM) - Low back pain, unspecified  Treatment Diagnosis: L sided low back pain, painful SI joint, decreased LLE strength, decreased flexibility, hypomobility of lumbar spine , impaired functional status. Insurance/Certification information:  PT Insurance Information: [employer] advantage  Physician Information:  Referring Practitioner: ISIDRA Richards CNP  Plan of care signed (Y/N): []  Yes [x]  No    Date of Patient follow up with Physician:      Progress Report: []  Yes  [x]  No     Date Range for reporting period:  Beginnin21  Ending:     Progress report due (10 Rx/or 30 days whichever is less): visit #10 or     Recertification due (POC duration/ or 90 days whichever is less): visit #12 or 21    Visit # Insurance Allowable Auth required? Date Range   3 mn - $25 copay []  Yes  [x]  No        Latex Allergy:  [x]NO      []YES  Preferred Language for Healthcare:   [x]English       []other:    Functional Scale:        Date assessed:  VINCENT: raw score =16 ; dysfunction = %    21    Pain level:  5/10     SUBJECTIVE: Pt states had stiffness the next day after the pervious tx session on . Pt states he can only usually sit down for about an hour before he has to get up. OBJECTIVE:    Observation:    Test measurements:    : Stiffness with PROM ER of L hip, Pain with IR, Stiff ITB band with manual stretch.   : Stiffness with PROM ER of L hip, Pain with IR/ER.  Stiffness with hip flexors as well as IT Band      RESTRICTIONS/PRECAUTIONS: heart monitor , lumbar laminectomy , hx of CA, cervical fusion . Treatment based classification: flexion + mobilization       Exercises/Interventions: 39'  Therapeutic Exercise (24652)  15' Resistance / level Sets / Seconds Reps Notes / Cues   Bike  5'  ^ Time 11/19   L SKC  30\" 2x 11/19   Trunk rotation to the L   30\" 1x 11/19   B lateral trunk stretch   Twisting   Over head lean  30\" B 1x  11/19   Supine SLR @ 20 deg  1  11/19   Bridging w/ [place]  5\"-10\" 10x 11/19   Standing TB   B EXT  B ABD Green  ^ 2  2   10x  10x 11/19                                                                                                       Therapeutic Activities (82292)                                          Neuromuscular Re-ed (60918) 15'              HL trunk rotations with ball 4# 10\" 10 Cue to maintain core 11/19   UE circles w/ ball (CW/CCW) 4# 1 20/20 Cue to maintain core 11/19   TB lateral side steps Green  4 laps 11/19          Manual Intervention (57042) 15'       Hip/PROM  5'   Focus on IR with left hip 11/19   Manual traction  B w/ SB  5'  In supine 11/19   manual stretch   IT Band  Hip Flex  5'  11/19                                               Modalities: Denied ice, states will ice later tonight     Pt. Education:  -pt educated on diagnosis, prognosis and expectations for rehab  -all pt questions were answered    Home Exercise Program:  Access Code: [email or web address]  URL: [email or web address]  Date: 11/12/2021  Prepared by: Sierra Pierre    Exercises  Supine Bridge - 1 x daily - 7 x weekly - 3 sets - 10 reps  Supine Lower Trunk Rotation - 1 x daily - 7 x weekly - 3 sets - 10 reps  Supine Hip Adduction Isometric with Ball - 1 x daily - 7 x weekly - 3 sets - 10 reps  Seated Hamstring Stretch - 1 x daily - 7 x weekly - 3 sets - 10 reps      Therapeutic Exercise and NMR EXR  [x] (85648) Provided verbal/tactile cueing for activities related to strengthening, flexibility, endurance, ROM  for improvements in proximal hip and core control with self care, mobility, lifting and ambulation.  [] (90898) Provided verbal/tactile cueing for activities related to improving balance, coordination, kinesthetic sense, posture, motor skill, proprioception  to assist with core control in self care, mobility, lifting, and ambulation.   [] (45389) Therapist is in constant attendance of 2 or more patients providing skilled therapy interventions, but not providing any significant amount of measurable one-on-one time to either patient, for improvements in LE, proximal hip, and core control in self care, mobility, lifting, ambulation and eccentric single leg control. Therapeutic Activities:    [] (29821 or 63104) Provided verbal/tactile cueing for activities related to improving balance, coordination, kinesthetic sense, posture, motor skill, proprioception and motor activation to allow for proper function  with self care and ADLs  [] (36768) Provided training and instruction to the patient for proper core and proximal hip recruitment and positioning with ambulation re-education     Home Exercise Program:    [x] (67712) Reviewed/Progressed HEP activities related to strengthening, flexibility, endurance, ROM of core, proximal hip and LE for functional self-care, mobility, lifting and ambulation   [] (97867) Reviewed/Progressed HEP activities related to improving balance, coordination, kinesthetic sense, posture, motor skill, proprioception of core, proximal hip and LE for self care, mobility, lifting, and ambulation      Manual Treatments:  PROM / STM / Oscillations-Mobs:  G-I, II, III, IV (PA's, Inf., Post.)  [x] (92579) Provided manual therapy to mobilize proximal hip and LS spine soft tissue/joints for the purpose of modulating pain, promoting relaxation,  increasing ROM, reducing/eliminating soft tissue swelling/inflammation/restriction, improving soft tissue extensibility and allowing for proper ROM for normal function with self care, mobility, lifting and ambulation. Charges:  Timed Code Treatment Minutes: 45   Total Treatment Minutes: 45       [] EVAL - LOW (38296)   [] EVAL - MOD (93527)  [] EVAL - HIGH (60448)  [] RE-EVAL (89080)  [x] UP(33506) x 1     [] Ionto  [x] NMR (04200) x  1    [] Vaso  [x] Manual (98458) x  1    [] Ultrasound  [] TA x       [] Mech Traction (54213)  [] GaitTraining x     [] ES (un) (53934):   [] Home Management Training [] ES(attended) (47916)             [] Aquatics    [] Group  [] Other    GOALS:  Patient stated goal: decrease pain  []? Progressing: []? Met: []? Not Met: []? Adjusted     Therapist goals for Patient:   Short Term Goals: To be achieved in: 2 weeks  1. Independent in HEP and progression per patient tolerance, in order to prevent re-injury. []? Progressing: []? Met: []? Not Met: []? Adjusted  2. Patient will have a decrease in pain to facilitate improvement in movement, function, and ADLs as indicated by Functional Deficits. []? Progressing: []? Met: []? Not Met: []? Adjusted     Long Term Goals: To be achieved in: 6 weeks  1. Disability index score of 10% or less for the VINCENT to assist with reaching prior level of function. []? Progressing: []? Met: []? Not Met: []? Adjusted  2. Patient will demonstrate increased AROM to WNL, good LS mobility, good hip ROM to allow for proper joint functioning as indicated by patients Functional Deficits. []? Progressing: []? Met: []? Not Met: []? Adjusted  3. Patient will demonstrate an increase in Strength to grossly 4+/5 good proximal hip and core activation to allow for proper functional mobility as indicated by patients Functional Deficits. []? Progressing: []? Met: []? Not Met: []? Adjusted  4. Patient will return to functional activities including ADLs, walking, riding bike without increased symptoms or restriction. []? Progressing: []? Met: []? Not Met: []?  Adjusted    Overall Progression Towards Functional goals/ Treatment Progress Update:  [] Patient is progressing as expected towards functional goals listed. [] Progression is slowed due to complexities/Impairments listed. [] Progression has been slowed due to co-morbidities. [x] Plan just implemented, too soon to assess goals progression <30days   [] Goals require adjustment due to lack of progress  [] Patient is not progressing as expected and requires additional follow up with physician  [] Other    Persisting Functional Limitations/Impairments:  []Sitting []Standing   []Walking []Squatting/bending    []Stairs []ADL's    []Transfers []Reaching  []Housework []Job related tasks  []Driving []Sports/Recreation   []Sleeping []Other:    ASSESSMENT:  Pt demonstrated minor shuffling gait with mild kyphotic posture due to possible \"trunk comfort postition\" in flexion. Pt taken through PROM in all planes. With pt in HL pt expressed pain with ER and with 90/90 pt expressed pain with IR. Pt expressed pain a little more than our previous session however pt stated that he had not taken his muscle relaxors prior to today's session. Pt tolerated all progressed ther ex requiring moderate verbal/tactile cues. Pt verbalized fatigue with Airex EXT/ABD. Pt educated on proper posture to help improve symptoms related to L lumbar related pain. Treatment/Activity Tolerance:  [x] Patient able to complete tx  [] Patient limited by fatique  [x] Patient limited by pain  [] Patient limited by other medical complications  [] Other:     Prognosis: [] Good [] Fair  [] Poor    Patient Requires Follow-up: [x] Yes  [] No    PLAN: PT 1-2x / week for 6 weeks. Continue to progress pt with overall/trunk mobility. Work on strengthening core. Continue to progress pt per PT's POC.     [] Continue per plan of care [] Alter current plan (see comments)  [x] Plan of care initiated [] Hold pending MD visit [] Discharge    Electronically signed by: Alberto Dutton, PTA, [id number] Therapist was present, directed the patient's care, made skilled judgement, and was responsible for assessment and treatment of the patient. KIERA Bishop    Note: If patient does not return for scheduled/ recommended follow up visits, this note will serve as a discharge from care along with most recent update on progress.

## 2021-11-22 ENCOUNTER — OFFICE VISIT (OUTPATIENT)
Dept: FAMILY MEDICINE CLINIC | Age: 71
End: 2021-11-22
Payer: COMMERCIAL

## 2021-11-22 VITALS
WEIGHT: 153 LBS | DIASTOLIC BLOOD PRESSURE: 68 MMHG | HEART RATE: 71 BPM | HEIGHT: 70 IN | SYSTOLIC BLOOD PRESSURE: 108 MMHG | OXYGEN SATURATION: 99 % | BODY MASS INDEX: 21.9 KG/M2

## 2021-11-22 DIAGNOSIS — R53.83 FATIGUE, UNSPECIFIED TYPE: Primary | ICD-10-CM

## 2021-11-22 DIAGNOSIS — E03.9 ACQUIRED HYPOTHYROIDISM: ICD-10-CM

## 2021-11-22 DIAGNOSIS — F41.9 ANXIETY: ICD-10-CM

## 2021-11-22 DIAGNOSIS — E55.9 VITAMIN D DEFICIENCY: ICD-10-CM

## 2021-11-22 DIAGNOSIS — R53.83 FATIGUE, UNSPECIFIED TYPE: ICD-10-CM

## 2021-11-22 LAB
BASOPHILS ABSOLUTE: 0 K/UL (ref 0–0.2)
BASOPHILS RELATIVE PERCENT: 0.6 %
EOSINOPHILS ABSOLUTE: 0.1 K/UL (ref 0–0.6)
EOSINOPHILS RELATIVE PERCENT: 2.7 %
HCT VFR BLD CALC: 41.7 % (ref 40.5–52.5)
HEMOGLOBIN: 13.9 G/DL (ref 13.5–17.5)
LYMPHOCYTES ABSOLUTE: 1.2 K/UL (ref 1–5.1)
LYMPHOCYTES RELATIVE PERCENT: 25.1 %
MCH RBC QN AUTO: 31.2 PG (ref 26–34)
MCHC RBC AUTO-ENTMCNC: 33.3 G/DL (ref 31–36)
MCV RBC AUTO: 93.8 FL (ref 80–100)
MONOCYTES ABSOLUTE: 0.3 K/UL (ref 0–1.3)
MONOCYTES RELATIVE PERCENT: 6.8 %
NEUTROPHILS ABSOLUTE: 3 K/UL (ref 1.7–7.7)
NEUTROPHILS RELATIVE PERCENT: 64.8 %
PDW BLD-RTO: 13.3 % (ref 12.4–15.4)
PLATELET # BLD: 203 K/UL (ref 135–450)
PMV BLD AUTO: 8.9 FL (ref 5–10.5)
RBC # BLD: 4.45 M/UL (ref 4.2–5.9)
WBC # BLD: 4.6 K/UL (ref 4–11)

## 2021-11-22 PROCEDURE — 99213 OFFICE O/P EST LOW 20 MIN: CPT | Performed by: FAMILY MEDICINE

## 2021-11-22 RX ORDER — MIRTAZAPINE 7.5 MG/1
3.5 TABLET, FILM COATED ORAL NIGHTLY
Qty: 90 TABLET | Refills: 3 | COMMUNITY
Start: 2021-11-22 | End: 2022-02-16

## 2021-11-22 RX ORDER — ASPIRIN 81 MG/1
81 TABLET ORAL DAILY
COMMUNITY
End: 2022-10-04

## 2021-11-22 RX ORDER — OXYCODONE HYDROCHLORIDE AND ACETAMINOPHEN 5; 325 MG/1; MG/1
1 TABLET ORAL EVERY 4 HOURS PRN
COMMUNITY
End: 2022-03-11

## 2021-11-22 RX ORDER — CYCLOBENZAPRINE HCL 10 MG
TABLET ORAL
COMMUNITY
Start: 2021-11-01 | End: 2022-08-15

## 2021-11-22 RX ORDER — DILTIAZEM HYDROCHLORIDE 60 MG/1
60 CAPSULE, EXTENDED RELEASE ORAL 2 TIMES DAILY
Qty: 270 CAPSULE | Refills: 1 | COMMUNITY
Start: 2021-11-22 | End: 2022-01-19 | Stop reason: DRUGHIGH

## 2021-11-22 RX ORDER — DILTIAZEM HYDROCHLORIDE 180 MG/1
120 CAPSULE, EXTENDED RELEASE ORAL DAILY
COMMUNITY
End: 2021-11-22

## 2021-11-22 SDOH — ECONOMIC STABILITY: FOOD INSECURITY: WITHIN THE PAST 12 MONTHS, YOU WORRIED THAT YOUR FOOD WOULD RUN OUT BEFORE YOU GOT MONEY TO BUY MORE.: NEVER TRUE

## 2021-11-22 SDOH — ECONOMIC STABILITY: FOOD INSECURITY: WITHIN THE PAST 12 MONTHS, THE FOOD YOU BOUGHT JUST DIDN'T LAST AND YOU DIDN'T HAVE MONEY TO GET MORE.: NEVER TRUE

## 2021-11-22 ASSESSMENT — SOCIAL DETERMINANTS OF HEALTH (SDOH): HOW HARD IS IT FOR YOU TO PAY FOR THE VERY BASICS LIKE FOOD, HOUSING, MEDICAL CARE, AND HEATING?: NOT HARD AT ALL

## 2021-11-22 NOTE — PROGRESS NOTES
Chief Complaint: Medication Problem (constant fatigue, dry mouth, dizziness, and trouble concentrating. )       HPI:  Bethanne Cabot is a 73 y.o. male here with c/o fatigue and feeling very sleepy    He has been having low back pain for which she has been taking Flexeril 3 times a day  Also is the same dose of diltiazem was increased to 60 mg 3 times a day  And his blood pressure has 100/60 and complains of feeling dizzy at times  Denies any chest pain  He has a history of mitral regurgitation and tricuspid regurgitation. But denies any pedal edema orthopnea. He has history of atrial fibrillation currently rate controlled    He has history of hypothyroidism continues on Synthroid    He has anxiety for which he has been taking Remeron 7.5 mg daily    ROS:  Constitutional: Negative for appetite change, fatigue, fever and unexpected weight change. HENT: Negative   Respiratory: Negative for cough, chest tightness, shortness of breath and wheezing. Cardiovascular: Negative  Gastrointestinal: Negative  Genitourinary: Negative   Musculoskeletal: Chronic lower back pain   Skin: Negative for color change, pallor, rash and wound. Neurological: As mentioned above   Psychiatric/Behavioral: Depression and anxiety    Patient's problem list, medications, allergies, past medical, surgical, social and family histories were reviewed and updated as appropriate. Current Outpatient Medications   Medication Sig Dispense Refill    cyclobenzaprine (FLEXERIL) 10 MG tablet       aspirin 81 MG EC tablet Take 81 mg by mouth daily      oxyCODONE-acetaminophen (PERCOCET) 5-325 MG per tablet Take 1 tablet by mouth every 4 hours as needed for Pain.       mirtazapine (REMERON) 7.5 MG tablet Take 0.5 tablets by mouth nightly 90 tablet 3    dilTIAZem (CARDIZEM 12 HR) 60 MG extended release capsule Take 1 capsule by mouth 2 times daily 270 capsule 1    levothyroxine (SYNTHROID) 112 MCG tablet Take one tab daily 90 tablet 0    fluticasone (FLOVENT HFA) 110 MCG/ACT inhaler Inhale 2 puffs into the lungs 2 times daily 12 g 3    melatonin 10 MG CAPS capsule Take 10 mg by mouth nightly as needed       rosuvastatin (CRESTOR) 20 MG tablet Take 1 tablet by mouth daily 30 tablet 6    lisinopril (PRINIVIL;ZESTRIL) 2.5 MG tablet Take 0.5 tablets by mouth daily 45 tablet 1    pantoprazole (PROTONIX) 40 MG tablet TAKE 1 TABLET BY MOUTH TWICE A DAY BEFORE MEALS 180 tablet 1    digoxin (LANOXIN) 125 MCG tablet Take 1 tablet by mouth daily 30 tablet 5    aspirin (ASPIRIN LOW DOSE) 81 MG EC tablet ASPIRIN EC LOW DOSE 81 MG TBEC      docusate sodium (COLACE) 100 MG capsule Take 100 mg by mouth 2 times daily. No current facility-administered medications for this visit. Social History     Tobacco Use    Smoking status: Former Smoker     Packs/day: 1.00     Years: 40.00     Pack years: 40.00     Types: Cigarettes     Start date: 1975     Quit date: 2015     Years since quittin.8    Smokeless tobacco: Never Used    Tobacco comment: Maintain cessation   Substance Use Topics    Alcohol use: No     Alcohol/week: 0.0 standard drinks     Comment: KATELYN reyes        Objective:     Vitals:    21 1536   BP: 108/68   Pulse: 71   SpO2: 99%   Weight: 153 lb (69.4 kg)   Height: 5' 10\" (1.778 m)     Body mass index is 21.95 kg/m². Wt Readings from Last 3 Encounters:   21 153 lb (69.4 kg)   21 153 lb (69.4 kg)   09/15/21 152 lb 14.4 oz (69.4 kg)     BP Readings from Last 3 Encounters:   21 108/68   21 102/68   09/15/21 102/62       Physical exam:  Constitutional: he is oriented to person, place, and time. he appears well-developed and well-nourished. No distress. Cardiovascular: Normal rate, irregular rhythm, normal heart sounds and intact distal pulses. Ejection systolic murmur heard  Pulmonary/Chest: Effort normal and breath sounds normal. No stridor. No respiratory distress. he has no wheezes.  he has no rales. heexhibits no tenderness. Abdominal: Soft. Bowel sounds are normal. he exhibits no distension and no mass. Musculoskeletal: Normal range of motion. he exhibits no edema, tenderness or deformity. Neurological:he is alert and oriented to person, place, and time. he has gross neurological exam normal with normal strength and normal gait  Skin: Skin is warm and dry. No rash noted. he is not diaphoretic. No erythema. No pallor. Psychiatric: he has a normal mood and affect. his   behavior is normal.      Assessment/Plan:   1. Anxiety  Reduce the dose to half a pill  - mirtazapine (REMERON) 7.5 MG tablet; Take 0.5 tablets by mouth nightly  Dispense: 90 tablet; Refill: 3    2. Fatigue, unspecified type  We will check  - Basic Metabolic Panel; Future  - CBC Auto Differential; Future  - TSH with Reflex; Future  - VITAMIN D 25 HYDROXY; Future    3. Vitamin D deficiency  We will check  - TSH with Reflex; Future  - VITAMIN D 25 HYDROXY; Future    4. Acquired hypothyroidism  - TSH with Reflex;  Future  - VITAMIN D 25 HYDROXY; Future         Yara Alvarado MD  11/22/2021 4:51 PM

## 2021-11-23 ENCOUNTER — TELEPHONE (OUTPATIENT)
Dept: CARDIOLOGY CLINIC | Age: 71
End: 2021-11-23

## 2021-11-23 DIAGNOSIS — E03.9 ACQUIRED HYPOTHYROIDISM: Primary | ICD-10-CM

## 2021-11-23 LAB
ANION GAP SERPL CALCULATED.3IONS-SCNC: 12 MMOL/L (ref 3–16)
BUN BLDV-MCNC: 9 MG/DL (ref 7–20)
CALCIUM SERPL-MCNC: 8.9 MG/DL (ref 8.3–10.6)
CHLORIDE BLD-SCNC: 103 MMOL/L (ref 99–110)
CO2: 26 MMOL/L (ref 21–32)
CREAT SERPL-MCNC: 0.6 MG/DL (ref 0.8–1.3)
GFR AFRICAN AMERICAN: >60
GFR NON-AFRICAN AMERICAN: >60
GLUCOSE BLD-MCNC: 91 MG/DL (ref 70–99)
POTASSIUM SERPL-SCNC: 4.2 MMOL/L (ref 3.5–5.1)
SODIUM BLD-SCNC: 141 MMOL/L (ref 136–145)
T4 FREE: 2.4 NG/DL (ref 0.9–1.8)
TSH REFLEX: 4.85 UIU/ML (ref 0.27–4.2)
VITAMIN D 25-HYDROXY: 25.2 NG/ML

## 2021-11-23 NOTE — TELEPHONE ENCOUNTER
Pt called asking if it is ok to discontinue his mid day BP med and just take it BID, he is exteremly tired all the time and has no energy.     Pls advise thank you

## 2021-11-23 NOTE — TELEPHONE ENCOUNTER
Medication mid day is a extra dose diltiazem and he is asking if this could be why he is tired and has no energy. Patient spoke to his PCP about his medications and the PCP told him to call about the diltiazem mid day dose.   BP readings are low could not find his BP log to give me readings and his heart rate was in the 70's

## 2021-11-23 NOTE — TELEPHONE ENCOUNTER
Spoke to patient he sent in a remote transmission due to having heart racing, skipping and or fluttering happens when he tries to relax and when exercising. Has anyone went over these transmission's? He will stop the mid day dose diltiazem. Please advise     Patient would like Dr. Ynes De La Rosa to answer about his 2 leaking valves, Would they be causing him to have no energy and no strength?   Please advise

## 2021-11-23 NOTE — TELEPHONE ENCOUNTER
8/18/21 Echo :   - Moderate to severe MR ( appears stable from previous echo dated 2/24/2021). ERO 0.21.   -A bioprosthetic artificial aortic valve appears well seated with a maximum   velocity of 2.6m/s and a mean gradient of 16mmHg. -Moderate to severe tricuspid regurgitation.   -Estimated pulmonary artery systolic pressure is mildly elevated at 38 mmHg   assuming a right atrial pressure of 8 mmHg.

## 2021-11-24 ENCOUNTER — HOSPITAL ENCOUNTER (OUTPATIENT)
Dept: PHYSICAL THERAPY | Age: 71
Setting detail: THERAPIES SERIES
Discharge: HOME | End: 2021-11-24
Payer: COMMERCIAL

## 2021-11-24 PROCEDURE — 97110 THERAPEUTIC EXERCISES: CPT

## 2021-11-24 PROCEDURE — 97140 MANUAL THERAPY 1/> REGIONS: CPT

## 2021-11-24 PROCEDURE — 97112 NEUROMUSCULAR REEDUCATION: CPT

## 2021-11-24 NOTE — TELEPHONE ENCOUNTER
Remote interrogation of implanted cardiac event monitor shows normal function. Interrogation shows 62 AT episodes noted. Last on 11/21/2021 with a 0.6% AT/AF burden. 1 Tachy episode on 10/29/2021 lasting 6 seconds with a Max V rate of 188 bpm that appears to be NSVT. 1 symptom recording on 10/17/2021 that appears to be Sinus with 1 degree HB. EP to review. See Pace[facility] report under the Cardiology tab.

## 2021-11-24 NOTE — TELEPHONE ENCOUNTER
Spoke to patient and relayed info about ILR interrogation. Patient states third pill (mid-day) was added when he couldn't sleep at night (?Palpitations). Instructed patient to call our office back if palpitations worsen. Per Dr. Mary Thomas - he thinks fatigue and feeling tired would be associated with severe valve insufficiency and patient is in the moderate to severe range of MR and TR. He would consider reassessment of valve insufficiency with either CHIKIS or cardiac MRI, but he would like patient see if medication change suggested by PCP and approved by EP staff makes a difference in symptoms. States PCP is having difficulty regulating thyroid function. She referred patient to Dr. Arnaud Lopez but he can't see her until April '22. Her partner has openings in late Feb or early March. Recommended that patient proceed with endocrinology evaluation first before cardiac testing is done because his symptoms could be related to abnormal thyroid function. Patient voiced understanding of info discussed.

## 2021-11-24 NOTE — FLOWSHEET NOTE
[facility]  Phone: [phone]   Fax: [phone]    Physical Therapy Treatment Note/ Progress Report:     Date:  2021    Patient Name:  Melissa Black    :  1950  MRN: [id number]  Restrictions/Precautions:    Medical/Treatment Diagnosis Information:  Diagnosis: M54.16 (ICD-10-CM) - Radiculopathy, lumbar region  , M54.50 (ICD-10-CM) - Low back pain, unspecified  Treatment Diagnosis: L sided low back pain, painful SI joint, decreased LLE strength, decreased flexibility, hypomobility of lumbar spine , impaired functional status. Insurance/Certification information:  PT Insurance Information: [id number]  Physician Information:  Referring Practitioner: ISIDRA Ford CNP  Plan of care signed (Y/N): []  Yes [x]  No    Date of Patient follow up with Physician:      Progress Report: []  Yes  [x]  No     Date Range for reporting period:  Beginnin21  Ending:     Progress report due (10 Rx/or 30 days whichever is less): visit #10 or     Recertification due (POC duration/ or 90 days whichever is less): visit #12 or 21    Visit # Insurance Allowable Auth required? Date Range   4 mn - $25 copay []  Yes  [x]  No        Latex Allergy:  [x]NO      []YES  Preferred Language for Healthcare:   [x]English       []other:    Functional Scale:        Date assessed:  VINCENT: raw score =16 ; dysfunction = %    21    Pain level:  0/10     SUBJECTIVE: Pt reports currently no pain. Did take a muscle relaxer which helps. States things are slowly getting better. OBJECTIVE:    Observation:    Test measurements:    : Stiffness with PROM ER of L hip, Pain with IR, Stiff ITB band with manual stretch.   : Stiffness with PROM ER of L hip, Pain with IR/ER. Stiffness with hip flexors as well as IT Band      RESTRICTIONS/PRECAUTIONS: heart monitor , lumbar laminectomy , hx of CA, cervical fusion .   Treatment based classification: flexion + mobilization       Exercises/Interventions:   Therapeutic Exercise (28101)   Resistance / level Sets / Seconds Reps Notes / Cues   Bike  5'  ^ Time 11/19   L SKC  30\" 2x 11/19   Trunk rotation to the L   11/19   B lateral trunk stretch   Twisting   Over head lean  11/19   Supine SLR @ 20 deg  11/19   Bridging w/ Ball  11/19   Standing TB   B EXT  B ABD Green  ^ 11/19                 QUAD rocking   5'' 10    DKC   5'' 10    SB flexion + side bend roll outs   5 10                                                                    Therapeutic Activities (10596)                                                               Neuromuscular Re-ed (81074)               HL trunk rotations with ball 4# 10\" 10 Cue to maintain core 11/19   UE circles w/ ball (CW/CCW) 4# 1 20/20 Cue to maintain core 11/19   TB lateral side steps Green  4 laps 11/19   Pelvic tilts on SB  Ant/post, M/L, cw/cww  10 ea  Cues for pelvic disassociation    SB anti rotation press   5'' 10                                              Manual Intervention (79200)        Hip/PROM   Focus on IR with left hip 11/19   Manual traction  B w/ SB   In supine 11/19   manual stretch   IT Band  Hip Flex   11/19   SI mobs to L sacral base,  G1-3 x8'                                          Modalities: Denied ice, states will ice later tonight     Pt. Education:  -pt educated on diagnosis, prognosis and expectations for rehab  -all pt questions were answered    Home Exercise Program:  Access Code: [email or web address]  URL: [email or web address]  Date: 11/12/2021  Prepared by: Danelle Rubio    Exercises  Supine Bridge - 1 x daily - 7 x weekly - 3 sets - 10 reps  Supine Lower Trunk Rotation - 1 x daily - 7 x weekly - 3 sets - 10 reps  Supine Hip Adduction Isometric with Ball - 1 x daily - 7 x weekly - 3 sets - 10 reps  Seated Hamstring Stretch - 1 x daily - 7 x weekly - 3 sets - 10 reps      Therapeutic Exercise and NMR EXR  [x] (39313) Provided verbal/tactile cueing for activities related to strengthening, flexibility, endurance, ROM  for improvements in proximal hip and core control with self care, mobility, lifting and ambulation.  [] (34929) Provided verbal/tactile cueing for activities related to improving balance, coordination, kinesthetic sense, posture, motor skill, proprioception  to assist with core control in self care, mobility, lifting, and ambulation.   [] (00790) Therapist is in constant attendance of 2 or more patients providing skilled therapy interventions, but not providing any significant amount of measurable one-on-one time to either patient, for improvements in LE, proximal hip, and core control in self care, mobility, lifting, ambulation and eccentric single leg control.      Therapeutic Activities:    [] (28074 or 54970) Provided verbal/tactile cueing for activities related to improving balance, coordination, kinesthetic sense, posture, motor skill, proprioception and motor activation to allow for proper function  with self care and ADLs  [] (18393) Provided training and instruction to the patient for proper core and proximal hip recruitment and positioning with ambulation re-education     Home Exercise Program:    [x] (11076) Reviewed/Progressed HEP activities related to strengthening, flexibility, endurance, ROM of core, proximal hip and LE for functional self-care, mobility, lifting and ambulation   [] (69106) Reviewed/Progressed HEP activities related to improving balance, coordination, kinesthetic sense, posture, motor skill, proprioception of core, proximal hip and LE for self care, mobility, lifting, and ambulation      Manual Treatments:  PROM / STM / Oscillations-Mobs:  G-I, II, III, IV (PA's, Inf., Post.)  [x] (40066) Provided manual therapy to mobilize proximal hip and LS spine soft tissue/joints for the purpose of modulating pain, promoting relaxation,  increasing ROM, reducing/eliminating soft tissue restriction. []? Progressing: []? Met: []? Not Met: []? Adjusted    Overall Progression Towards Functional goals/ Treatment Progress Update:  [] Patient is progressing as expected towards functional goals listed. [] Progression is slowed due to complexities/Impairments listed. [] Progression has been slowed due to co-morbidities. [x] Plan just implemented, too soon to assess goals progression <30days   [] Goals require adjustment due to lack of progress  [] Patient is not progressing as expected and requires additional follow up with physician  [] Other    Persisting Functional Limitations/Impairments:  []Sitting []Standing   []Walking []Squatting/bending    []Stairs []ADL's    []Transfers []Reaching  []Housework []Job related tasks  []Driving []Sports/Recreation   []Sleeping []Other:    ASSESSMENT:  Pt noted with improved sacral mobility as compared to eval with less pain. PT able to mobilize sacral with little to no pain to pt. Progressions added in bold above to continue to improve lumbar/SI mobility and improve strength for LTG achievement. Treatment/Activity Tolerance:  [x] Patient able to complete tx  [] Patient limited by fatique  [x] Patient limited by pain  [] Patient limited by other medical complications  [] Other:     Prognosis: [] Good [] Fair  [] Poor    Patient Requires Follow-up: [x] Yes  [] No    PLAN: PT 1-2x / week for 6 weeks. Continue to progress pt with overall/trunk mobility. Work on strengthening core. Continue to progress pt per PT's POC. [] Continue per plan of care [] Alter current plan (see comments)  [x] Plan of care initiated [] Hold pending MD visit [] Discharge    Electronically signed by: Melissa Kaur PT,     Note: If patient does not return for scheduled/ recommended follow up visits, this note will serve as a discharge from care along with most recent update on progress.

## 2021-11-26 ENCOUNTER — HOSPITAL ENCOUNTER (OUTPATIENT)
Dept: PHYSICAL THERAPY | Age: 71
Setting detail: THERAPIES SERIES
Discharge: HOME | End: 2021-11-26
Payer: COMMERCIAL

## 2021-11-26 PROCEDURE — 97140 MANUAL THERAPY 1/> REGIONS: CPT | Performed by: SPECIALIST/TECHNOLOGIST

## 2021-11-26 PROCEDURE — 97112 NEUROMUSCULAR REEDUCATION: CPT | Performed by: SPECIALIST/TECHNOLOGIST

## 2021-11-26 PROCEDURE — 97110 THERAPEUTIC EXERCISES: CPT | Performed by: SPECIALIST/TECHNOLOGIST

## 2021-11-26 NOTE — FLOWSHEET NOTE
[facility]  Phone: [phone]   Fax: [phone]    Physical Therapy Treatment Note/ Progress Report:     Date:  2021    Patient Name:  Jaden Oswald    :  1950  MRN: [id number]  Restrictions/Precautions:    Medical/Treatment Diagnosis Information:  Diagnosis: M54.16 (ICD-10-CM) - Radiculopathy, lumbar region  , M54.50 (ICD-10-CM) - Low back pain, unspecified  Treatment Diagnosis: L sided low back pain, painful SI joint, decreased LLE strength, decreased flexibility, hypomobility of lumbar spine , impaired functional status. Insurance/Certification information:  PT Insurance Information: [employer]  Physician Information:  Referring Practitioner: Doree Landau, APRN - CNP  Plan of care signed (Y/N): []  Yes [x]  No    Date of Patient follow up with Physician:      Progress Report: []  Yes  [x]  No     Date Range for reporting period:  Beginnin21  Ending:     Progress report due (10 Rx/or 30 days whichever is less): visit #10 or 10/07/56    Recertification due (POC duration/ or 90 days whichever is less): visit #12 or 21    Visit # Insurance Allowable Auth required? Date Range   5 mn - $25 copay []  Yes  [x]  No        Latex Allergy:  [x]NO      []YES  Preferred Language for Healthcare:   [x]English       []other:    Functional Scale:        Date assessed:  VINCENT: raw score =16 ; dysfunction = %    21    Pain level:  0/10     SUBJECTIVE: Pt reports currently no pain and feels his mobility is much better. Is compliant with his HEP and feels better when he does his program  OBJECTIVE:    Observation:    Test measurements:    : Stiffness with PROM ER of L hip, Pain with IR, Stiff ITB band with manual stretch.   : Stiffness with PROM ER of L hip, Pain with IR/ER. Stiffness with hip flexors as well as IT Band   Increased antalgia entering clinic but this was improved upon leaving.  Left SI has decreased mobility compared to Rt with ambulation      RESTRICTIONS/PRECAUTIONS: heart monitor , lumbar laminectomy , hx of CA, cervical fusion . Treatment based classification: flexion + mobilization     Exercises/Interventions:   Therapeutic Exercise (04052)  25' Resistance / level Sets / Seconds Reps Notes / Cues   Bike  5'  11/26   L SKC  30\" 2x 11/19   Trunk rotation to the L   11/19   B lateral trunk stretch   Twisting   Over head lean  11/19   Supine SLR @ 20 deg  11/19   Bridging w/ Ball  11/19   Standing TB   B EXT  B ABD Green  ^ 11/19                 QUAD rocking / cat & camel  Quad prayer stretch in 3 positions. 5'' 1x 11/26   DKC   5'' 10x    SB flexion + side bend roll outs   5 10                                                                    Therapeutic Activities (47562)                                                               Neuromuscular Re-ed (01764)   15'              TB lateral side steps Green  4 laps 11/19   Pelvic tilts on SB  Ant/post, M/L, cw/cww  10 ea  Cues for pelvic disassociation    SB anti rotation press   5'' 10x    Prone on HI/LO top dropped down  HIP extension   15x 11/26   SB with TB rows / core posture Green 2 10x 11/26                               Manual Intervention (36594) 20'        Hip/PROM   Focus on IR with left hip 11/19   Manual traction  B w/ SB   In supine 11/19   manual stretch   IT Band  Hip Flex   11/19   SI mobs to L sacral base,  G1-3 x15'     Mobs grade 2 in lumbar extension- prone press ups  5'                                   Modalities: Denied ice, states will ice later tonight     Pt. Education:  -pt educated on diagnosis, prognosis and expectations for rehab  -all pt questions were answered    Home Exercise Program:  Access Code: [email or web address]  URL: [email or web address]  Date: 11/12/2021  Prepared by: Latoya Phelan    Exercises  Supine Bridge - 1 x daily - 7 x weekly - 3 sets - 10 reps  Supine Lower Trunk Rotation - 1 x daily - 7 x weekly - 3 sets - 10 reps  Supine Hip Adduction Isometric with Ball - 1 x daily - 7 x weekly - 3 sets - 10 reps  Seated Hamstring Stretch - 1 x daily - 7 x weekly - 3 sets - 10 reps      Therapeutic Exercise and NMR EXR  [x] (03679) Provided verbal/tactile cueing for activities related to strengthening, flexibility, endurance, ROM  for improvements in proximal hip and core control with self care, mobility, lifting and ambulation.  [] (53509) Provided verbal/tactile cueing for activities related to improving balance, coordination, kinesthetic sense, posture, motor skill, proprioception  to assist with core control in self care, mobility, lifting, and ambulation.   [] (49151) Therapist is in constant attendance of 2 or more patients providing skilled therapy interventions, but not providing any significant amount of measurable one-on-one time to either patient, for improvements in LE, proximal hip, and core control in self care, mobility, lifting, ambulation and eccentric single leg control.      Therapeutic Activities:    [] (69903 or 91741) Provided verbal/tactile cueing for activities related to improving balance, coordination, kinesthetic sense, posture, motor skill, proprioception and motor activation to allow for proper function  with self care and ADLs  [] (62926) Provided training and instruction to the patient for proper core and proximal hip recruitment and positioning with ambulation re-education     Home Exercise Program:    [x] (13513) Reviewed/Progressed HEP activities related to strengthening, flexibility, endurance, ROM of core, proximal hip and LE for functional self-care, mobility, lifting and ambulation   [] (70100) Reviewed/Progressed HEP activities related to improving balance, coordination, kinesthetic sense, posture, motor skill, proprioception of core, proximal hip and LE for self care, mobility, lifting, and ambulation      Manual Treatments:  PROM / STM / Oscillations-Mobs:  G-I, II, III, IV (PA's, Inf., Post.)  [x] (07042) Provided manual therapy to mobilize proximal hip and LS spine soft tissue/joints for the purpose of modulating pain, promoting relaxation,  increasing ROM, reducing/eliminating soft tissue swelling/inflammation/restriction, improving soft tissue extensibility and allowing for proper ROM for normal function with self care, mobility, lifting and ambulation. Charges:  Timed Code Treatment Minutes: 42   Total Treatment Minutes: 42       [] EVAL - LOW (76026)   [] EVAL - MOD (16452)  [] EVAL - HIGH (42779)  [] RE-EVAL (44136)  [x] SK(13709) x 1     [] Ionto  [x] NMR (56963) x  1    [] Vaso  [x] Manual (08590) x  1    [] Ultrasound  [] TA x       [] Mech Traction (97511)  [] GaitTraining x     [] ES (un) (15209):   [] Home Management Training [] ES(attended) (20087)             [] Aquatics    [] Group  [] Other    GOALS:  Patient stated goal: decrease pain  []? Progressing: []? Met: []? Not Met: []? Adjusted     Therapist goals for Patient:   Short Term Goals: To be achieved in: 2 weeks  1. Independent in HEP and progression per patient tolerance, in order to prevent re-injury. []? Progressing: []? Met: []? Not Met: []? Adjusted  2. Patient will have a decrease in pain to facilitate improvement in movement, function, and ADLs as indicated by Functional Deficits. []? Progressing: []? Met: []? Not Met: []? Adjusted     Long Term Goals: To be achieved in: 6 weeks  1. Disability index score of 10% or less for the VINCENT to assist with reaching prior level of function. []? Progressing: []? Met: []? Not Met: []? Adjusted  2. Patient will demonstrate increased AROM to WNL, good LS mobility, good hip ROM to allow for proper joint functioning as indicated by patients Functional Deficits. []? Progressing: []? Met: []? Not Met: []? Adjusted  3.  Patient will demonstrate an increase in Strength to grossly 4+/5 good proximal hip and core activation to allow for proper functional mobility as indicated by patients Functional Deficits. []? Progressing: []? Met: []? Not Met: []? Adjusted  4. Patient will return to functional activities including ADLs, walking, riding bike without increased symptoms or restriction. []? Progressing: []? Met: []? Not Met: []? Adjusted    Overall Progression Towards Functional goals/ Treatment Progress Update:  [] Patient is progressing as expected towards functional goals listed. [] Progression is slowed due to complexities/Impairments listed. [] Progression has been slowed due to co-morbidities. [x] Plan just implemented, too soon to assess goals progression <30days   [] Goals require adjustment due to lack of progress  [] Patient is not progressing as expected and requires additional follow up with physician  [] Other    Persisting Functional Limitations/Impairments:  []Sitting []Standing   []Walking []Squatting/bending    []Stairs []ADL's    []Transfers []Reaching  []Housework []Job related tasks  []Driving []Sports/Recreation   []Sleeping []Other:    ASSESSMENT:  Pt noted with improved sacral mobility as compared to prior sessions but still has increased stiffness over the left side of lumbar spine/ SI joint. Continues to reports feeling better with increased mobility but was challenged with SB with ROWS due to poor trunk control and abdominal bracing. Pt challenged with EOB prone extensions due to weakened glutes. Pt.  able to mobilize sacral with little to no pain to pt and felt relief with prone press ups w/ mobs. Progressions added in bold above to continue to improve lumbar/SI mobility and improve strength for LTG achievement. Treatment/Activity Tolerance:  [x] Patient able to complete tx  [] Patient limited by fatique  [x] Patient limited by pain  [] Patient limited by other medical complications  [] Other:     Prognosis: [] Good [] Fair  [] Poor    Patient Requires Follow-up: [x] Yes  [] No    PLAN: PT 1-2x / week for 6 weeks.  Continue to progress pt with overall/trunk mobility. Work on strengthening core. Continue to progress pt per PT's POC. [x] Continue per plan of care [] Alter current plan (see comments)  [] Plan of care initiated [] Hold pending MD visit [] Discharge    Electronically signed by: Tamara Mcmahon PTA, [id number]    Note: If patient does not return for scheduled/ recommended follow up visits, this note will serve as a discharge from care along with most recent update on progress.

## 2021-11-29 ENCOUNTER — TELEPHONE (OUTPATIENT)
Dept: FAMILY MEDICINE CLINIC | Age: 71
End: 2021-11-29

## 2021-11-29 NOTE — TELEPHONE ENCOUNTER
Patient called thyroid specialist and they said they cannot get him in until April.  Requesting advice

## 2021-12-01 ENCOUNTER — HOSPITAL ENCOUNTER (OUTPATIENT)
Dept: PHYSICAL THERAPY | Age: 71
Setting detail: THERAPIES SERIES
Discharge: HOME | End: 2021-12-01
Payer: COMMERCIAL

## 2021-12-01 PROCEDURE — 97140 MANUAL THERAPY 1/> REGIONS: CPT

## 2021-12-01 PROCEDURE — 97110 THERAPEUTIC EXERCISES: CPT

## 2021-12-01 PROCEDURE — 97112 NEUROMUSCULAR REEDUCATION: CPT

## 2021-12-01 NOTE — FLOWSHEET NOTE
[facility]  Phone: [phone]   Fax: [phone]    Physical Therapy Treatment Note/ Progress Report:     Date:  2021    Patient Name:  Criss Heath    :  1950  MRN: [id number]  Restrictions/Precautions:    Medical/Treatment Diagnosis Information:  Diagnosis: M54.16 (ICD-10-CM) - Radiculopathy, lumbar region  , M54.50 (ICD-10-CM) - Low back pain, unspecified  Treatment Diagnosis: L sided low back pain, painful SI joint, decreased LLE strength, decreased flexibility, hypomobility of lumbar spine , impaired functional status. Insurance/Certification information:  PT Insurance Information: [employer]  Physician Information:  Referring Practitioner: ISIDRA Moctezuma CNP  Plan of care signed (Y/N): []  Yes [x]  No    Date of Patient follow up with Physician:      Progress Report: []  Yes  [x]  No     Date Range for reporting period:  Beginnin21  Ending:     Progress report due (10 Rx/or 30 days whichever is less): visit #10 or     Recertification due (POC duration/ or 90 days whichever is less): visit #12 or 21    Visit # Insurance Allowable Auth required? Date Range   6 mn - $25 copay []  Yes  [x]  No        Latex Allergy:  [x]NO      []YES  Preferred Language for Healthcare:   [x]English       []other:    Functional Scale:        Date assessed:  VINCENT: raw score =16 ; dysfunction = %    21    Pain level:  0/10     SUBJECTIVE:  Pt reports no pain at all , he states he is feeling so much better. OBJECTIVE:    Observation:    Test measurements:    : Stiffness with PROM ER of L hip, Pain with IR, Stiff ITB band with manual stretch.   : Stiffness with PROM ER of L hip, Pain with IR/ER. Stiffness with hip flexors as well as IT Band   Increased antalgia entering clinic but this was improved upon leaving.  Left SI has decreased mobility compared to Rt with ambulation      RESTRICTIONS/PRECAUTIONS: heart monitor , lumbar laminectomy , hx of CA, cervical fusion . Treatment based classification: flexion + mobilization     Exercises/Interventions:   Therapeutic Exercise (28390)   Resistance / level Sets / Seconds Reps Notes / Cues   Bike 3.0 5'  11/26   L SKC  30\" 2x 11/19   Trunk rotation to the L   11/19   B lateral trunk stretch   Twisting   Over head lean  11/19   Supine SLR @ 20 deg  11/19   Bridging w/ Obinna Shires with TB abd   GREEN 96909/19   Standing TB   B EXT  B ABD Green  ^ 11/19                 QUAD rocking / cat & camel  Quad prayer stretch in 3 positions. 5'' 10 11/26   DKC   5'' 10x    SB flexion + side bend roll outs   5 10    TrA SLR  1 10                                                             Therapeutic Activities (89343)                                                               Neuromuscular Re-ed (38343)                 TB lateral side steps Green  4 laps 11/19   Pelvic tilts on SB  Ant/post, M/L, cw/cww  10 ea  Cues for pelvic disassociation    SB anti rotation press   5'' 10x    Prone on HI/LO top dropped down  HIP extension   15x 11/26   SB with TB rows / core posture Green 2 10x 11/26   SB TrA marches   2 10                                              Manual Intervention (35918)        Hip/PROM   Focus on IR with left hip 11/19   Manual traction  B w/ SB   In supine 11/19   manual stretch   IT Band  Hip Flex   11/19   SI mobs to B sacral base,  G1-3 x10'     Mobs grade 2 in lumbar extension- prone press ups                                     Modalities: Denied ice, states will ice later tonight     Pt. Education:  -pt educated on diagnosis, prognosis and expectations for rehab  -all pt questions were answered    Home Exercise Program:  Access Code: [email or web address]  URL: [email or web address]  Date: 11/12/2021  Prepared by: Myla Maier    Exercises  Supine Bridge - 1 x daily - 7 x weekly - 3 sets - 10 reps  Supine Lower Trunk Rotation - 1 x daily - 7 x weekly - 3 sets - 10 reps  Supine Hip Adduction Isometric with Ball - 1 x daily - 7 x weekly - 3 sets - 10 reps  Seated Hamstring Stretch - 1 x daily - 7 x weekly - 3 sets - 10 reps      Therapeutic Exercise and NMR EXR  [x] (36542) Provided verbal/tactile cueing for activities related to strengthening, flexibility, endurance, ROM  for improvements in proximal hip and core control with self care, mobility, lifting and ambulation.  [] (25854) Provided verbal/tactile cueing for activities related to improving balance, coordination, kinesthetic sense, posture, motor skill, proprioception  to assist with core control in self care, mobility, lifting, and ambulation.   [] (57454) Therapist is in constant attendance of 2 or more patients providing skilled therapy interventions, but not providing any significant amount of measurable one-on-one time to either patient, for improvements in LE, proximal hip, and core control in self care, mobility, lifting, ambulation and eccentric single leg control.      Therapeutic Activities:    [] (77236 or 65477) Provided verbal/tactile cueing for activities related to improving balance, coordination, kinesthetic sense, posture, motor skill, proprioception and motor activation to allow for proper function  with self care and ADLs  [] (29238) Provided training and instruction to the patient for proper core and proximal hip recruitment and positioning with ambulation re-education     Home Exercise Program:    [x] (34187) Reviewed/Progressed HEP activities related to strengthening, flexibility, endurance, ROM of core, proximal hip and LE for functional self-care, mobility, lifting and ambulation   [] (38722) Reviewed/Progressed HEP activities related to improving balance, coordination, kinesthetic sense, posture, motor skill, proprioception of core, proximal hip and LE for self care, mobility, lifting, and ambulation      Manual Treatments:  PROM / STM / Oscillations-Mobs:  G-I, II, III, IV (PA's, Inf., Post.)  [x] (05574) Provided manual therapy to mobilize proximal hip and LS spine soft tissue/joints for the purpose of modulating pain, promoting relaxation,  increasing ROM, reducing/eliminating soft tissue swelling/inflammation/restriction, improving soft tissue extensibility and allowing for proper ROM for normal function with self care, mobility, lifting and ambulation. Charges:  Timed Code Treatment Minutes: 45   Total Treatment Minutes: 45       [] EVAL - LOW (15498)   [] EVAL - MOD (24214)  [] EVAL - HIGH (61379)  [] RE-EVAL (00627)  [x] JZ(02291) x 1     [] Ionto  [x] NMR (36966) x  1    [] Vaso  [x] Manual (85620) x  1    [] Ultrasound  [] TA x       [] Mech Traction (20517)  [] GaitTraining x     [] ES (un) (83415):   [] Home Management Training [] ES(attended) (20177)             [] Aquatics    [] Group  [] Other    GOALS:  Patient stated goal: decrease pain  []? Progressing: []? Met: []? Not Met: []? Adjusted     Therapist goals for Patient:   Short Term Goals: To be achieved in: 2 weeks  1. Independent in HEP and progression per patient tolerance, in order to prevent re-injury. []? Progressing: []? Met: []? Not Met: []? Adjusted  2. Patient will have a decrease in pain to facilitate improvement in movement, function, and ADLs as indicated by Functional Deficits. []? Progressing: []? Met: []? Not Met: []? Adjusted     Long Term Goals: To be achieved in: 6 weeks  1. Disability index score of 10% or less for the VINCENT to assist with reaching prior level of function. []? Progressing: []? Met: []? Not Met: []? Adjusted  2. Patient will demonstrate increased AROM to WNL, good LS mobility, good hip ROM to allow for proper joint functioning as indicated by patients Functional Deficits. []? Progressing: []? Met: []? Not Met: []? Adjusted  3.  Patient will demonstrate an increase in Strength to grossly 4+/5 good proximal hip and core activation to allow for proper functional mobility as indicated by patients Functional Deficits. []? Progressing: []? Met: []? Not Met: []? Adjusted  4. Patient will return to functional activities including ADLs, walking, riding bike without increased symptoms or restriction. []? Progressing: []? Met: []? Not Met: []? Adjusted    Overall Progression Towards Functional goals/ Treatment Progress Update:  [] Patient is progressing as expected towards functional goals listed. [] Progression is slowed due to complexities/Impairments listed. [] Progression has been slowed due to co-morbidities. [x] Plan just implemented, too soon to assess goals progression <30days   [] Goals require adjustment due to lack of progress  [] Patient is not progressing as expected and requires additional follow up with physician  [] Other    Persisting Functional Limitations/Impairments:  []Sitting []Standing   []Walking []Squatting/bending    []Stairs []ADL's    []Transfers []Reaching  []Housework []Job related tasks  []Driving []Sports/Recreation   []Sleeping []Other:    ASSESSMENT:  Pt continues with improved mobility of lumbar spine and sacrum which has resulted in a decrease in pain . Progressions added in bold above to continue to improve lumbar/SI mobility and improve strength for LTG achievement. Treatment/Activity Tolerance:  [x] Patient able to complete tx  [] Patient limited by fatique  [x] Patient limited by pain  [] Patient limited by other medical complications  [] Other:     Prognosis: [] Good [] Fair  [] Poor    Patient Requires Follow-up: [x] Yes  [] No    PLAN: PT 1-2x / week for 6 weeks. Continue to progress pt with overall/trunk mobility. Work on strengthening core. Continue to progress pt per PT's POC.     [x] Continue per plan of care [] Alter current plan (see comments)  [] Plan of care initiated [] Hold pending MD visit [] Discharge    Electronically signed by: Aylin Cooper PT    Note: If patient does not return for scheduled/ recommended follow up visits, this note will serve as a discharge from care along with most recent update on progress.

## 2021-12-01 NOTE — TELEPHONE ENCOUNTER
Recommend continuing his synthroid dose. Make sure he is taking it qam on empty stomach w water only and waiting at least 30 min prior to eating/taking other meds.    Can try other endos if he wants like  Health

## 2021-12-03 ENCOUNTER — HOSPITAL ENCOUNTER (OUTPATIENT)
Dept: PHYSICAL THERAPY | Age: 71
Setting detail: THERAPIES SERIES
Discharge: HOME | End: 2021-12-03
Payer: COMMERCIAL

## 2021-12-03 PROCEDURE — 97112 NEUROMUSCULAR REEDUCATION: CPT

## 2021-12-03 PROCEDURE — 97110 THERAPEUTIC EXERCISES: CPT

## 2021-12-03 NOTE — FLOWSHEET NOTE
[facility]  Phone: [phone]   Fax: [phone]    Physical Therapy Treatment Note/ Progress Report:     Date:  12/3/2021    Patient Name:  Antonio Harden    :  1950  MRN: [id number]  Restrictions/Precautions:    Medical/Treatment Diagnosis Information:  Diagnosis: M54.16 (ICD-10-CM) - Radiculopathy, lumbar region  , M54.50 (ICD-10-CM) - Low back pain, unspecified  Treatment Diagnosis: L sided low back pain, painful SI joint, decreased LLE strength, decreased flexibility, hypomobility of lumbar spine , impaired functional status. Insurance/Certification information:  PT Insurance Information: [employer]  Physician Information:  Referring Practitioner: ISIDRA Reyes CNP  Plan of care signed (Y/N): []  Yes [x]  No    Date of Patient follow up with Physician:      Progress Report: []  Yes  [x]  No     Date Range for reporting period:  Beginnin21  Ending:     Progress report due (10 Rx/or 30 days whichever is less): visit #10 or     Recertification due (POC duration/ or 90 days whichever is less): visit #12 or 21    Visit # Insurance Allowable Auth required? Date Range   7 mn - $25 copay []  Yes  [x]  No        Latex Allergy:  [x]NO      []YES  Preferred Language for Healthcare:   [x]English       []other:    Functional Scale:        Date assessed:  VINCENT: raw score =16 ; dysfunction = %    21    Pain level:  0/10     SUBJECTIVE:  Pt reports no pain, very happy with how he is feeling. OBJECTIVE:    Observation:    Test measurements:    : Stiffness with PROM ER of L hip, Pain with IR, Stiff ITB band with manual stretch.   : Stiffness with PROM ER of L hip, Pain with IR/ER. Stiffness with hip flexors as well as IT Band   Increased antalgia entering clinic but this was improved upon leaving.  Left SI has decreased mobility compared to Rt with ambulation      RESTRICTIONS/PRECAUTIONS: heart Rotation - 1 x daily - 7 x weekly - 3 sets - 10 reps  Supine Hip Adduction Isometric with Ball - 1 x daily - 7 x weekly - 3 sets - 10 reps  Seated Hamstring Stretch - 1 x daily - 7 x weekly - 3 sets - 10 reps      Therapeutic Exercise and NMR EXR  [x] (80119) Provided verbal/tactile cueing for activities related to strengthening, flexibility, endurance, ROM  for improvements in proximal hip and core control with self care, mobility, lifting and ambulation.  [] (09202) Provided verbal/tactile cueing for activities related to improving balance, coordination, kinesthetic sense, posture, motor skill, proprioception  to assist with core control in self care, mobility, lifting, and ambulation.   [] (53776) Therapist is in constant attendance of 2 or more patients providing skilled therapy interventions, but not providing any significant amount of measurable one-on-one time to either patient, for improvements in LE, proximal hip, and core control in self care, mobility, lifting, ambulation and eccentric single leg control.      Therapeutic Activities:    [] (74336 or 80525) Provided verbal/tactile cueing for activities related to improving balance, coordination, kinesthetic sense, posture, motor skill, proprioception and motor activation to allow for proper function  with self care and ADLs  [] (43979) Provided training and instruction to the patient for proper core and proximal hip recruitment and positioning with ambulation re-education     Home Exercise Program:    [x] (74166) Reviewed/Progressed HEP activities related to strengthening, flexibility, endurance, ROM of core, proximal hip and LE for functional self-care, mobility, lifting and ambulation   [] (67402) Reviewed/Progressed HEP activities related to improving balance, coordination, kinesthetic sense, posture, motor skill, proprioception of core, proximal hip and LE for self care, mobility, lifting, and ambulation      Manual Treatments:  PROM / STM / Oscillations-Mobs:  G-I, II, III, IV (PA's, Inf., Post.)  [x] (79358) Provided manual therapy to mobilize proximal hip and LS spine soft tissue/joints for the purpose of modulating pain, promoting relaxation,  increasing ROM, reducing/eliminating soft tissue swelling/inflammation/restriction, improving soft tissue extensibility and allowing for proper ROM for normal function with self care, mobility, lifting and ambulation. Charges:  Timed Code Treatment Minutes: 44   Total Treatment Minutes: 44       [] EVAL - LOW (77812)   [] EVAL - MOD (74828)  [] EVAL - HIGH (33573)  [] RE-EVAL (90159)  [x] RT(37745) x 2     [] Ionto  [x] NMR (34905) x  1    [] Vaso  [] Manual (82712) x  1    [] Ultrasound  [] TA x       [] Mech Traction (85419)  [] GaitTraining x     [] ES (un) (50958):   [] Home Management Training [] ES(attended) (88111)             [] Aquatics    [] Group  [] Other    GOALS:  Patient stated goal: decrease pain  []? Progressing: []? Met: []? Not Met: []? Adjusted     Therapist goals for Patient:   Short Term Goals: To be achieved in: 2 weeks  1. Independent in HEP and progression per patient tolerance, in order to prevent re-injury. []? Progressing: []? Met: []? Not Met: []? Adjusted  2. Patient will have a decrease in pain to facilitate improvement in movement, function, and ADLs as indicated by Functional Deficits. []? Progressing: []? Met: []? Not Met: []? Adjusted     Long Term Goals: To be achieved in: 6 weeks  1. Disability index score of 10% or less for the VINCENT to assist with reaching prior level of function. []? Progressing: []? Met: []? Not Met: []? Adjusted  2. Patient will demonstrate increased AROM to WNL, good LS mobility, good hip ROM to allow for proper joint functioning as indicated by patients Functional Deficits. []? Progressing: []? Met: []? Not Met: []? Adjusted  3.  Patient will demonstrate an increase in Strength to grossly 4+/5 good proximal hip and core activation to allow for proper functional mobility as indicated by patients Functional Deficits. []? Progressing: []? Met: []? Not Met: []? Adjusted  4. Patient will return to functional activities including ADLs, walking, riding bike without increased symptoms or restriction. []? Progressing: []? Met: []? Not Met: []? Adjusted    Overall Progression Towards Functional goals/ Treatment Progress Update:  [] Patient is progressing as expected towards functional goals listed. [] Progression is slowed due to complexities/Impairments listed. [] Progression has been slowed due to co-morbidities. [x] Plan just implemented, too soon to assess goals progression <30days   [] Goals require adjustment due to lack of progress  [] Patient is not progressing as expected and requires additional follow up with physician  [] Other    Persisting Functional Limitations/Impairments:  []Sitting []Standing   []Walking []Squatting/bending    []Stairs []ADL's    []Transfers []Reaching  []Housework []Job related tasks  []Driving []Sports/Recreation   []Sleeping []Other:    ASSESSMENT:  Pt continues with improved mobility of lumbar spine and sacrum which has resulted in a decrease in pain . Progressions added in bold above to continue to improve lumbar/SI mobility and improve strength for LTG achievement. Pt only has 1 more visit scheduled, do re-assessment next visit to determine more visits or d/c to HEP. Treatment/Activity Tolerance:  [x] Patient able to complete tx  [] Patient limited by fatique  [x] Patient limited by pain  [] Patient limited by other medical complications  [] Other:     Prognosis: [] Good [] Fair  [] Poor    Patient Requires Follow-up: [x] Yes  [] No    PLAN: PT 1-2x / week for 6 weeks. Continue to progress pt with overall/trunk mobility. Work on strengthening core. Continue to progress pt per PT's POC.     [x] Continue per plan of care [] Alter current plan (see comments)  [] Plan of care initiated [] Hold pending MD visit [] Discharge    Electronically signed by: Sherif Raines PT    Note: If patient does not return for scheduled/ recommended follow up visits, this note will serve as a discharge from care along with most recent update on progress.

## 2021-12-06 ENCOUNTER — HOSPITAL ENCOUNTER (OUTPATIENT)
Dept: PHYSICAL THERAPY | Age: 71
Setting detail: THERAPIES SERIES
Discharge: HOME | End: 2021-12-06
Payer: COMMERCIAL

## 2021-12-06 ENCOUNTER — NURSE ONLY (OUTPATIENT)
Dept: CARDIOLOGY CLINIC | Age: 71
End: 2021-12-06
Payer: COMMERCIAL

## 2021-12-06 DIAGNOSIS — Z45.09 ENCOUNTER FOR ELECTRONIC ANALYSIS OF REVEAL EVENT RECORDER: ICD-10-CM

## 2021-12-06 DIAGNOSIS — E03.9 ACQUIRED HYPOTHYROIDISM: ICD-10-CM

## 2021-12-06 DIAGNOSIS — I48.0 PAF (PAROXYSMAL ATRIAL FIBRILLATION) (HCC): Chronic | ICD-10-CM

## 2021-12-06 PROCEDURE — G2066 INTER DEVC REMOTE 30D: HCPCS | Performed by: INTERNAL MEDICINE

## 2021-12-06 PROCEDURE — 97112 NEUROMUSCULAR REEDUCATION: CPT

## 2021-12-06 PROCEDURE — 93298 REM INTERROG DEV EVAL SCRMS: CPT | Performed by: INTERNAL MEDICINE

## 2021-12-06 PROCEDURE — 97110 THERAPEUTIC EXERCISES: CPT

## 2021-12-06 RX ORDER — LEVOTHYROXINE SODIUM 112 UG/1
TABLET ORAL
Qty: 90 TABLET | Refills: 3 | Status: SHIPPED | OUTPATIENT
Start: 2021-12-06 | End: 2022-07-15

## 2021-12-06 RX ORDER — PANTOPRAZOLE SODIUM 40 MG/1
TABLET, DELAYED RELEASE ORAL
Qty: 180 TABLET | Refills: 3 | Status: SHIPPED | OUTPATIENT
Start: 2021-12-06 | End: 2022-02-22 | Stop reason: SDUPTHER

## 2021-12-06 NOTE — TELEPHONE ENCOUNTER
Medication:   Requested Prescriptions     Pending Prescriptions Disp Refills    pantoprazole (PROTONIX) 40 MG tablet [Pharmacy Med Name: PANTOPRAZOLE SOD DR 40 MG TAB] 180 tablet 1     Sig: TAKE 1 TABLET BY MOUTH TWICE A DAY BEFORE MEALS        Last Filled:  8/5/2021 #180 Refills 1    Patient Phone Number: [phone] (home)     Last appt: 11/22/2021   Next appt: Visit date not found    Last OARRS:   RX Monitoring 12/30/2019   Periodic Controlled Substance Monitoring No signs of potential drug abuse or diversion identified.

## 2021-12-06 NOTE — TELEPHONE ENCOUNTER
Medication:   Requested Prescriptions     Pending Prescriptions Disp Refills    levothyroxine (SYNTHROID) 112 MCG tablet [Pharmacy Med Name: LEVOTHYROXINE 112 MCG TABLET] 90 tablet 0     Sig: TAKE 1 TABLET BY MOUTH EVERY DAY       Last Filled:  10/12/2021 #90 Refills 0    Patient Phone Number: [phone] (home)     Last appt: 11/22/2021   Next appt: 12/6/2021    Last Thyroid:   Lab Results   Component Value Date    TSH 2.735 05/21/2020    T4FREE 2.4 11/22/2021    M6TKZUW 2.12 05/21/2020

## 2021-12-06 NOTE — FLOWSHEET NOTE
[facility]  Phone: [phone]   Fax: [phone]    Physical Therapy Treatment Note/ Progress Report:     Date:  2021    Patient Name:  Nany Fam    :  1950  MRN: [id number]  Restrictions/Precautions:    Medical/Treatment Diagnosis Information:  Diagnosis: M54.16 (ICD-10-CM) - Radiculopathy, lumbar region  , M54.50 (ICD-10-CM) - Low back pain, unspecified  Treatment Diagnosis: L sided low back pain, painful SI joint, decreased LLE strength, decreased flexibility, hypomobility of lumbar spine , impaired functional status. Insurance/Certification information:  PT Insurance Information: [employer]  Physician Information:  Referring Practitioner: ISIDRA Russell CNP  Plan of care signed (Y/N): [x]  Yes-  []  No    Date of Patient follow up with Physician:      Progress Report: []  Yes  [x]  No     Date Range for reporting period:  Beginnin21  Ending:     Progress report due (10 Rx/or 30 days whichever is less): visit #10 or     Recertification due (POC duration/ or 90 days whichever is less): visit #12 or 21    Visit # Insurance Allowable Auth required? Date Range   8 mn - $25 copay []  Yes  [x]  No        Latex Allergy:  [x]NO      []YES  Preferred Language for Healthcare:   [x]English       []other:    Functional Scale:        Date assessed:  VINCENT: raw score =16 ; dysfunction = %    21    Pain level:  1/10     SUBJECTIVE:  Pt reports his LB is minimally painful now once he gets going. He states it sometimes flares if he sleeps wrong or when moving in bed. He reports 6/10 L-LBP when shifting /turning in bed. He feels PT has been helping    OBJECTIVE:    Observation:    Test measurements:    : Stiffness with PROM ER of L hip, Pain with IR, Stiff ITB band with manual stretch.   : Stiffness with PROM ER of L hip, Pain with IR/ER.  Stiffness with hip flexors as well as IT Band   Increased antalgia entering clinic but this was improved upon leaving. Left SI has decreased mobility compared to Rt with ambulation      RESTRICTIONS/PRECAUTIONS: heart monitor , lumbar laminectomy , hx of CA, cervical fusion . Treatment based classification: flexion + mobilization     Exercises/Interventions:   Therapeutic Exercise (17719)x 20'   Resistance / level Sets / Seconds Reps Notes / Cues   Bike 4.0 4'  11/26   L [facility]  11/19   Trunk rotation to the L   11/19   B lateral trunk stretch   Twisting   Over head lean  11/19   Supine SLR @ 20 deg  11/19   Bridging w/ Flonnie Sea with TB abd   BLUE 21011/19   Standing TB   B EXT  B ABD Green  ^ 11/19   90/90S. Flossing   5'' 10x 11/19   QUAD rocking / cat & camel  Quad prayer stretch in 3 positions. 11/26   DKC   5'' 10x    SB flexion + side bend roll outs      TrA SLR  1 10    S/L clamshells BLUE 2 10    TG squats  DL  SL 1  1 15  15 12/6 added SL   Seated bilat hip IR w/ TB green 2 10 12/6 added                                      Therapeutic Activities (49236)                                                               Neuromuscular Re-ed (85330)   X 25'              TB lateral side steps Green  4 laps 11/19   Pelvic tilts seated on SB  Ant/post, M/L, cw/cww  1 15 ea direction Cues for pelvic disassociation    SB anti rotation press      Prone on HI/LO top dropped down  HIP extension    11/26   SB with  rows / core posture CC 3 pl mid   CC 4 pl high   2  2     10x  10x     12/6 added CC.  Mod tc's for scap ret w/o UT activation   SB TrA marches   2 10    Standing anti rot press CC 2.5 pl 1 10 ea side 12/6 pt stated he needed to rest between sides                                      Manual Intervention (05354)        Hip/PROM   Focus on IR with left hip 11/19   Manual traction  B w/ SB   In supine 11/19   manual stretch   IT Band  Hip Flex   11/19   SI mobs to B sacral base,  G1-3      Mobs grade 2 in lumbar extension- prone press ups Modalities: Denied ice, states will ice later tonight     Pt. Education:  -pt educated on diagnosis, prognosis and expectations for rehab  -all pt questions were answered    Home Exercise Program:  Access Code: [email or web address]  URL: [email or web address]  Date: 11/12/2021  Prepared by: Myla Maier    Exercises  Supine Bridge - 1 x daily - 7 x weekly - 3 sets - 10 reps  Supine Lower Trunk Rotation - 1 x daily - 7 x weekly - 3 sets - 10 reps  Supine Hip Adduction Isometric with Ball - 1 x daily - 7 x weekly - 3 sets - 10 reps  Seated Hamstring Stretch - 1 x daily - 7 x weekly - 3 sets - 10 reps      Therapeutic Exercise and NMR EXR  [x] (10484) Provided verbal/tactile cueing for activities related to strengthening, flexibility, endurance, ROM  for improvements in proximal hip and core control with self care, mobility, lifting and ambulation.  [] (93949) Provided verbal/tactile cueing for activities related to improving balance, coordination, kinesthetic sense, posture, motor skill, proprioception  to assist with core control in self care, mobility, lifting, and ambulation.   [] (70306) Therapist is in constant attendance of 2 or more patients providing skilled therapy interventions, but not providing any significant amount of measurable one-on-one time to either patient, for improvements in LE, proximal hip, and core control in self care, mobility, lifting, ambulation and eccentric single leg control.      Therapeutic Activities:    [] (41406 or 23367) Provided verbal/tactile cueing for activities related to improving balance, coordination, kinesthetic sense, posture, motor skill, proprioception and motor activation to allow for proper function  with self care and ADLs  [] (77608) Provided training and instruction to the patient for proper core and proximal hip recruitment and positioning with ambulation re-education     Home Exercise Program:    [] (72495) Reviewed/Progressed HEP activities related to strengthening, flexibility, endurance, ROM of core, proximal hip and LE for functional self-care, mobility, lifting and ambulation   [] (29324) Reviewed/Progressed HEP activities related to improving balance, coordination, kinesthetic sense, posture, motor skill, proprioception of core, proximal hip and LE for self care, mobility, lifting, and ambulation      Manual Treatments:  PROM / STM / Oscillations-Mobs:  G-I, II, III, IV (PA's, Inf., Post.)  [x] (99544) Provided manual therapy to mobilize proximal hip and LS spine soft tissue/joints for the purpose of modulating pain, promoting relaxation,  increasing ROM, reducing/eliminating soft tissue swelling/inflammation/restriction, improving soft tissue extensibility and allowing for proper ROM for normal function with self care, mobility, lifting and ambulation. Charges:  Timed Code Treatment Minutes: 45   Total Treatment Minutes: 45       [] EVAL - LOW (60635)   [] EVAL - MOD (58853)  [] EVAL - HIGH (56040)  [] RE-EVAL (92105)  [x] FP(62576) x 1    [] Ionto  [x] NMR (75070) x  2    [] Vaso  [] Manual (27391) x  1    [] Ultrasound  [] TA x       [] Mech Traction (42631)  [] GaitTraining x     [] ES (un) (76965):   [] Home Management Training [] ES(attended) (40539)             [] Aquatics    [] Group  [] Other    GOALS:  Patient stated goal: decrease pain  []? Progressing: []? Met: []? Not Met: []? Adjusted     Therapist goals for Patient:   Short Term Goals: To be achieved in: 2 weeks  1. Independent in HEP and progression per patient tolerance, in order to prevent re-injury. []? Progressing: []? Met: []? Not Met: []? Adjusted  2. Patient will have a decrease in pain to facilitate improvement in movement, function, and ADLs as indicated by Functional Deficits. []? Progressing: []? Met: []? Not Met: []? Adjusted     Long Term Goals: To be achieved in: 6 weeks  1.  Disability index score of 10% or less for the VINCENT to assist with reaching prior level of function. []? Progressing: []? Met: []? Not Met: []? Adjusted  2. Patient will demonstrate increased AROM to WNL, good LS mobility, good hip ROM to allow for proper joint functioning as indicated by patients Functional Deficits. []? Progressing: []? Met: []? Not Met: []? Adjusted  3. Patient will demonstrate an increase in Strength to grossly 4+/5 good proximal hip and core activation to allow for proper functional mobility as indicated by patients Functional Deficits. []? Progressing: []? Met: []? Not Met: []? Adjusted  4. Patient will return to functional activities including ADLs, walking, riding bike without increased symptoms or restriction. []? Progressing: []? Met: []? Not Met: []? Adjusted    Overall Progression Towards Functional goals/ Treatment Progress Update:  [x] Patient is progressing as expected towards functional goals listed. [] Progression is slowed due to complexities/Impairments listed. [] Progression has been slowed due to co-morbidities. [] Plan just implemented, too soon to assess goals progression <30days   [] Goals require adjustment due to lack of progress  [] Patient is not progressing as expected and requires additional follow up with physician  [] Other    Persisting Functional Limitations/Impairments:  []Sitting [x]Standing   []Walking []Squatting/bending    []Stairs []ADL's    []Transfers []Reaching  []Housework []Job related tasks  []Driving [x]Sports/Recreation   [x]Sleeping []Other:    ASSESSMENT:  Pt overall improving since starting PT. He has intermittent sharp pains in his LB but it isn't persistent. He tolerated most exercises pretty well but he needs cues for exercises involving core stability with limb movements or 2-step commands. He struggles to do scap retraction without slumping or activating UT's. Pt doesn't notice LLE weakness currently. He states his cardiac issues affect his endurance during exercises. Bed mobility is still quite painful.   Next session is last one scheduled and he will need reassessment to determine if more PT is needed. Treatment/Activity Tolerance:  [x] Patient able to complete tx  [] Patient limited by fatique  [] Patient limited by pain  [] Patient limited by other medical complications  [] Other:     Prognosis: [x] Good [] Fair  [] Poor    Patient Requires Follow-up: [x] Yes  [] No    PLAN: PT 1-2x / week for 6 weeks. Continue to progress pt with overall/trunk mobility. Work on strengthening core. Continue to progress pt per PT's POC. Pt only has 1 more visit scheduled, do re-assessment next visit to determine more visits or d/c to HEP. [x] Continue per plan of care [] Alter current plan (see comments)  [] Plan of care initiated [] Hold pending MD visit [] Discharge    Electronically signed by: Lidia Waddell, PT    Note: If patient does not return for scheduled/ recommended follow up visits, this note will serve as a discharge from care along with most recent update on progress.

## 2021-12-07 RX ORDER — LISINOPRIL 2.5 MG/1
TABLET ORAL
Qty: 45 TABLET | Refills: 1 | Status: SHIPPED | OUTPATIENT
Start: 2021-12-07 | End: 2022-02-21 | Stop reason: SDUPTHER

## 2021-12-07 NOTE — PROGRESS NOTES
We received a remote transmission from patient's monitor at home. Remote Linq report shows known AF. Pt not on anti coags. He has had a Maze procedure. EP physician to review. We will continue to monitor remotely.

## 2021-12-08 ENCOUNTER — HOSPITAL ENCOUNTER (OUTPATIENT)
Dept: PHYSICAL THERAPY | Age: 71
Setting detail: THERAPIES SERIES
Discharge: HOME | End: 2021-12-08
Payer: COMMERCIAL

## 2021-12-08 PROCEDURE — 97110 THERAPEUTIC EXERCISES: CPT

## 2021-12-08 PROCEDURE — 97112 NEUROMUSCULAR REEDUCATION: CPT

## 2021-12-08 NOTE — PLAN OF CARE
[facility]  Phone: [phone]   Fax: [phone]      Physical Therapy Re-Certification Plan of Care    Dear  , ISIDRA Wayne CNP    We had the pleasure of treating the following patient for physical therapy services at [facility]. A summary of our findings can be found in the updated assessment below. This includes our plan of care. If you have any questions or concerns regarding these findings, please do not hesitate to contact me at the office phone number checked above. Thank you for the referral.     Physician Signature:________________________________Date:__________________  By signing above (or electronic signature), therapist's plan is approved by physician      Functional Outcome:                                                    Sub-sections:   ;   ;       Overall Response to Treatment:   [x]Patient is responding well to treatment and improvement is noted with regards  to goals   []Patient should continue to improve in reasonable time if they continue HEP   []Patient has plateaued and is no longer responding to skilled PT intervention    []Patient is getting worse and would benefit from return to referring MD   []Patient unable to adhere to initial POC   [x]Other: Pt is progressing nicely with PT. He is having decreasing pain, improved mobility, and improving strength which has resulted in improvement VINCENT score. PT to continue over the next 2-3 weeks to continue progress for LTG achievement. Date range of Visits: 21-21  Total Visits: 9    Recommendation:    [x]Continue PT 1-2x / wk for 3 weeks.                []Hold PT, pending MD visit        Physical Therapy Treatment Note/ Progress Report:     Date:  2021    Patient Name:  Wiasm Lynn    :  1950  MRN: [id number]  Restrictions/Precautions:    Medical/Treatment Diagnosis Information:  Diagnosis: M54.16 (ICD-10-CM) - Radiculopathy, lumbar region  , M54.50 (ICD-10-CM) - Low back pain, unspecified  Treatment Diagnosis: L sided low back pain, painful SI joint, decreased LLE strength, decreased flexibility, hypomobility of lumbar spine , impaired functional status. Insurance/Certification information:  PT Insurance Information: [employer]  Physician Information:  Referring Practitioner: ISIDRA Armstrong CNP  Plan of care signed (Y/N): [x]  Yes-  []  No    Date of Patient follow up with Physician:      Progress Report: [x]  Yes  [x]  No     Date Range for reporting period:  Beginnin21  POC: 21    Progress report due (10 Rx/or 30 days whichever is less): completed     Recertification due (POC duration/ or 90 days whichever is less):  22    Visit # Insurance Allowable Auth required? Date Range   9 mn - $25 copay []  Yes  [x]  No        Latex Allergy:  [x]NO      []YES  Preferred Language for Healthcare:   [x]English       []other:    Functional Scale:        Date assessed:  VINCENT: raw score =16 ; dysfunction = %    21  VINCENT: raw score =12 ; dysfunction = %                                            21     Pain level:  2-3/10     SUBJECTIVE:  pt states he had to get the snow off his car and neighbors today and it flared him up some. Last visit went well but did have some pain with the rotation press. Pt is getting a swiss ball and would like to learn more exercises with it. OBJECTIVE:   : B hip abd 4/5 , hip flexion B 4+/5, lumbar ROM - WNL , TrA activation = strong/good+     : Stiffness with PROM ER of L hip, Pain with IR, Stiff ITB band with manual stretch.   : Stiffness with PROM ER of L hip, Pain with IR/ER. Stiffness with hip flexors as well as IT Band   Increased antalgia entering clinic but this was improved upon leaving.  Left SI has decreased mobility compared to Rt with ambulation      RESTRICTIONS/PRECAUTIONS: heart monitor , lumbar laminectomy , hx of CA, cervical fusion . Treatment based classification: flexion + mobilization     Exercises/Interventions:   Therapeutic Exercise (08334)x    Resistance / level Sets / Seconds Reps Notes / Cues   Bike 4.0 4'  11/26   L [facility]  11/19   Trunk rotation to the L   11/19   B lateral trunk stretch   Twisting   Over head lean  11/19   Supine SLR @ 20 deg  11/19   Bridging w/ Levern Seed with TB abd   BLUE 21011/19   Standing TB   B EXT  B ABD Green  ^ 11/19   90/90S. Flossing   5'' 10x 11/19   QUAD rocking / cat & camel  Quad prayer stretch in 3 positions. 11/26   DKC   5'' 10x    SB flexion + side bend roll outs      TrA SLR  1 10    S/L clamshells BLUE 2 10    TG squats  DL  SL 1  1 15  15 12/6 added SL   Seated bilat hip IR w/ TB green 2 10 12/6 added   S/L open books  x10     Leg press - DL 90# 2 10                                              Therapeutic Activities (10063)                                                               Neuromuscular Re-ed (37482)   X               TB lateral side steps Green  4 laps 11/19   Pelvic tilts seated on SB  Ant/post, M/L, cw/cww  1 15 ea direction Cues for pelvic disassociation    SB anti rotation press      Prone on HI/LO top dropped down  HIP extension    11/26   SB with  rows / core posture CC 3 pl mid   CC 4 pl high   2  2     10x  10x     12/6 added CC.  Mod tc's for scap ret w/o UT activation   SB TrA marches   2 10    Standing anti rot press CC 2.5 pl 1 10 ea side 12/6 pt stated he needed to rest between sides   SB anti rotation isometric   5'' 10    Half kneeling SB plank  20'' 4                                              Manual Intervention (73993)        Hip/PROM   Focus on IR with left hip 11/19   Manual traction  B w/ SB   In supine 11/19   manual stretch   IT Band  Hip Flex   11/19   SI mobs to B sacral base,  G1-3      Mobs grade 2 in lumbar extension- prone press ups                                     Modalities: Denied ice, states will ice later tonight     Pt. Education:  -pt educated on diagnosis, prognosis and expectations for rehab  -all pt questions were answered    Home Exercise Program:  Access Code: [email or web address]  URL: [email or web address]  Date: 11/12/2021  Prepared by: Glennie Shutter    Exercises  Supine Bridge - 1 x daily - 7 x weekly - 3 sets - 10 reps  Supine Lower Trunk Rotation - 1 x daily - 7 x weekly - 3 sets - 10 reps  Supine Hip Adduction Isometric with Ball - 1 x daily - 7 x weekly - 3 sets - 10 reps  Seated Hamstring Stretch - 1 x daily - 7 x weekly - 3 sets - 10 reps    Access Code: [email or web address]  URL: [email or web address]  Date: 12/08/2021  Prepared by: Glennie Shutter    Exercises  Swiss Ball March - 1 x daily - 7 x weekly - 3 sets - 10 reps  Seated Thoracic Flexion and Rotation with Swiss Ball - 1 x daily - 7 x weekly - 3 sets - 10 reps  Supine Lower Trunk Rotation with Swiss Ball - 1 x daily - 7 x weekly - 3 sets - 10 reps  Pelvic Circles on Swiss Ball - 1 x daily - 7 x weekly - 3 sets - 10 reps  Prone Plank Elbows on Ball - 1 x daily - 7 x weekly - 3 sets - 10 reps        Therapeutic Exercise and NMR EXR  [x] (19762) Provided verbal/tactile cueing for activities related to strengthening, flexibility, endurance, ROM  for improvements in proximal hip and core control with self care, mobility, lifting and ambulation.  [] (57840) Provided verbal/tactile cueing for activities related to improving balance, coordination, kinesthetic sense, posture, motor skill, proprioception  to assist with core control in self care, mobility, lifting, and ambulation.   [] (83202) Therapist is in constant attendance of 2 or more patients providing skilled therapy interventions, but not providing any significant amount of measurable one-on-one time to either patient, for improvements in LE, proximal hip, and core control in self care, mobility, lifting, ambulation and eccentric single leg control.      Therapeutic Activities:    [] (78915 or 39578) Provided verbal/tactile cueing for activities related to improving balance, coordination, kinesthetic sense, posture, motor skill, proprioception and motor activation to allow for proper function  with self care and ADLs  [] (88741) Provided training and instruction to the patient for proper core and proximal hip recruitment and positioning with ambulation re-education     Home Exercise Program:    [] (08919) Reviewed/Progressed HEP activities related to strengthening, flexibility, endurance, ROM of core, proximal hip and LE for functional self-care, mobility, lifting and ambulation   [] (84221) Reviewed/Progressed HEP activities related to improving balance, coordination, kinesthetic sense, posture, motor skill, proprioception of core, proximal hip and LE for self care, mobility, lifting, and ambulation      Manual Treatments:  PROM / STM / Oscillations-Mobs:  G-I, II, III, IV (PA's, Inf., Post.)  [x] (05987) Provided manual therapy to mobilize proximal hip and LS spine soft tissue/joints for the purpose of modulating pain, promoting relaxation,  increasing ROM, reducing/eliminating soft tissue swelling/inflammation/restriction, improving soft tissue extensibility and allowing for proper ROM for normal function with self care, mobility, lifting and ambulation. Charges:  Timed Code Treatment Minutes: 45   Total Treatment Minutes: 45       [] EVAL - LOW (78816)   [] EVAL - MOD (47772)  [] EVAL - HIGH (84514)  [] RE-EVAL (03436)  [x] XN(74988) x 1    [] Ionto  [x] NMR (09202) x  2    [] Vaso  [] Manual (11019) x  1    [] Ultrasound  [] TA x       [] Mech Traction (67030)  [] GaitTraining x     [] ES (un) (05254):   [] Home Management Training [] ES(attended) (15696)             [] Aquatics    [] Group  [] Other    GOALS:  Patient stated goal: decrease pain  [x]? Progressing: []? Met: []? Not Met: []? Adjusted     Therapist goals for Patient:   Short Term Goals: To be achieved in: 2 weeks  1.  Independent in HEP and progression per patient tolerance, in order to prevent re-injury. [x]? Progressing: []? Met: []? Not Met: []? Adjusted  2. Patient will have a decrease in pain to facilitate improvement in movement, function, and ADLs as indicated by Functional Deficits. [x]? Progressing: []? Met: []? Not Met: []? Adjusted     Long Term Goals: To be achieved in: 6 weeks  1. Disability index score of 10% or less for the VINCENT to assist with reaching prior level of function. [x]? Progressing: []? Met: []? Not Met: []? Adjusted  2. Patient will demonstrate increased AROM to WNL, good LS mobility, good hip ROM to allow for proper joint functioning as indicated by patients Functional Deficits. []? Progressing: [x]? Met: []? Not Met: []? Adjusted  3. Patient will demonstrate an increase in Strength to grossly 4+/5 good proximal hip and core activation to allow for proper functional mobility as indicated by patients Functional Deficits. [x]? Progressing: []? Met: []? Not Met: []? Adjusted  4. Patient will return to functional activities including ADLs, walking, riding bike without increased symptoms or restriction. [x]? Progressing: []? Met: []? Not Met: []? Adjusted    Overall Progression Towards Functional goals/ Treatment Progress Update:  [x] Patient is progressing as expected towards functional goals listed. [] Progression is slowed due to complexities/Impairments listed. [] Progression has been slowed due to co-morbidities.   [] Plan just implemented, too soon to assess goals progression <30days   [] Goals require adjustment due to lack of progress  [] Patient is not progressing as expected and requires additional follow up with physician  [] Other    Persisting Functional Limitations/Impairments:  [x]Sitting [x]Standing   [x]Walking []Squatting/bending    []Stairs []ADL's    []Transfers []Reaching  []Housework []Job related tasks  []Driving []Sports/Recreation   [x]Sleeping []Other:    ASSESSMENT:  See above Treatment/Activity Tolerance:  [x] Patient able to complete tx  [] Patient limited by fatique  [] Patient limited by pain  [] Patient limited by other medical complications  [] Other:     Prognosis: [x] Good [] Fair  [] Poor    Patient Requires Follow-up: [x] Yes  [] No    PLAN: PT 1-2x / week for 3 weeks as of 12/8/21. Dawitbrittaney Hampton Continue to progress pt with overall/trunk mobility. Work on strengthening core. Continue to progress pt per PT's POC. Transition to HEP over next few weeks. [x] Continue per plan of care [] Alter current plan (see comments)  [] Plan of care initiated [] Hold pending MD visit [] Discharge    Electronically signed by: Khadijah Melendez PT    Note: If patient does not return for scheduled/ recommended follow up visits, this note will serve as a discharge from care along with most recent update on progress.

## 2021-12-17 ENCOUNTER — HOSPITAL ENCOUNTER (OUTPATIENT)
Dept: PHYSICAL THERAPY | Age: 71
Setting detail: THERAPIES SERIES
Discharge: HOME | End: 2021-12-17
Payer: COMMERCIAL

## 2021-12-17 PROCEDURE — 97110 THERAPEUTIC EXERCISES: CPT

## 2021-12-17 PROCEDURE — 97112 NEUROMUSCULAR REEDUCATION: CPT

## 2021-12-17 NOTE — PLAN OF CARE
[facility]  Phone: [phone]   Fax: [phone]          Physical Therapy Treatment Note/ Progress Report:     Date:  2021    Patient Name:  Wisam Lynn    :  1950  MRN: [id number]  Restrictions/Precautions:    Medical/Treatment Diagnosis Information:  Diagnosis: M54.16 (ICD-10-CM) - Radiculopathy, lumbar region  , M54.50 (ICD-10-CM) - Low back pain, unspecified  Treatment Diagnosis: L sided low back pain, painful SI joint, decreased LLE strength, decreased flexibility, hypomobility of lumbar spine , impaired functional status. Insurance/Certification information:  PT Insurance Information: [id number]  Physician Information:  Referring Practitioner: ISIDRA Wayne CNP  Plan of care signed (Y/N): [x]  Yes-  []  No    Date of Patient follow up with Physician:      Progress Report: [x]  Yes  [x]  No     Date Range for reporting period:  Beginnin21  POC: 21    Progress report due (10 Rx/or 30 days whichever is less): completed 57/3    Recertification due (POC duration/ or 90 days whichever is less):  22    Visit # Insurance Allowable Auth required? Date Range   9 mn - $25 copay []  Yes  [x]  No        Latex Allergy:  [x]NO      []YES  Preferred Language for Healthcare:   [x]English       []other:    Functional Scale:        Date assessed:  VINCENT: raw score =16 ; dysfunction = %    21  VINCENT: raw score =12 ; dysfunction = %                                            21     Pain level:  5/10     SUBJECTIVE:  moderate stiffness today pt reports, HEP is going well. OBJECTIVE:   : B hip abd 4/5 , hip flexion B 4+/5, lumbar ROM - WNL , TrA activation = strong/good+     : Stiffness with PROM ER of L hip, Pain with IR, Stiff ITB band with manual stretch.   : Stiffness with PROM ER of L hip, Pain with IR/ER.  Stiffness with hip flexors as well as IT Band   Increased antalgia entering clinic but this was improved upon leaving. Left SI has decreased mobility compared to Rt with ambulation      RESTRICTIONS/PRECAUTIONS: heart monitor , lumbar laminectomy , hx of CA, cervical fusion . Treatment based classification: flexion + mobilization     Exercises/Interventions:   Therapeutic Exercise (13452)x    Resistance / level Sets / Seconds Reps Notes / Cues   Bike 4.0 4'  11/26   HSS EOT  2x30''   L SKC  11/19   Trunk rotation to the L   11/19   B lateral trunk stretch   Twisting   Over head lean  11/19   Supine SLR @ 20 deg  11/19   Bridging w/ [employer] with TB abd  Bridge on SB   BLUE 2  210  1011/19   Standing TB   B EXT  B ABD Green  ^ 11/19   90/90S. Flossing   11/19   QUAD rocking / cat & camel  Quad prayer stretch in 3 positions. 11/26   [facility]   5'' 10x    SB flexion + side bend roll outs      TrA SLR  1 10    S/L clamshells BLUE 2 10    TG squats  DL  SL 1  1 15  15 12/6 added SL   Seated bilat hip IR w/ TB green 2 10 12/6 added   S/L open books  x10     Leg press - # 3 10                                              Therapeutic Activities (50548)                                                               Neuromuscular Re-ed (27303)   X               TB lateral side steps Green 15' 4 laps 11/19   Pelvic tilts seated on SB  Ant/post, M/L, cw/cww  1 15 ea direction Cues for pelvic disassociation    SB anti rotation press      Prone on HI/LO top dropped down  HIP extension    11/26   SB with  rows / core posture CC 3 pl mid   CC 4 pl high   2  2     10x  10x     12/6 added CC.  Mod tc's for scap ret w/o UT activation   SB TrA marches   2 10    Standing anti rot press CC 2.5 pl 1 10 ea side 12/6 pt stated he needed to rest between sides   SB anti rotation isometric   5'' 10    Half kneeling SB plank  20'' 4    CC PNF chops 3 plates 1 15    Unilateral farmers carry 20# KB 20' 4                                Manual Intervention (56533)        Hip/PROM   Focus on IR with left hip 11/19 Manual traction  B w/ SB   In supine 11/19   manual stretch   IT Band  Hip Flex   11/19   SI mobs to B sacral base,  G1-3      Mobs grade 2 in lumbar extension- prone press ups                                     Modalities: Denied ice, states will ice later tonight     Pt. Education:  -pt educated on diagnosis, prognosis and expectations for rehab  -all pt questions were answered    Home Exercise Program:  Access Code: [email or web address]  URL: [employer]/  Date: 11/12/2021  Prepared by: Ajay Coreas    Exercises  Supine Bridge - 1 x daily - 7 x weekly - 3 sets - 10 reps  Supine Lower Trunk Rotation - 1 x daily - 7 x weekly - 3 sets - 10 reps  Supine Hip Adduction Isometric with Ball - 1 x daily - 7 x weekly - 3 sets - 10 reps  Seated Hamstring Stretch - 1 x daily - 7 x weekly - 3 sets - 10 reps    Access Code: [email or web address]  URL: [employer]/  Date: 12/08/2021  Prepared by: Ajay Favors    Exercises  Swiss Ball March - 1 x daily - 7 x weekly - 3 sets - 10 reps  Seated Thoracic Flexion and Rotation with Swiss Ball - 1 x daily - 7 x weekly - 3 sets - 10 reps  Supine Lower Trunk Rotation with Swiss Ball - 1 x daily - 7 x weekly - 3 sets - 10 reps  Pelvic Circles on Swiss Ball - 1 x daily - 7 x weekly - 3 sets - 10 reps  Prone Plank Elbows on Ball - 1 x daily - 7 x weekly - 3 sets - 10 reps        Therapeutic Exercise and NMR EXR  [x] (97312) Provided verbal/tactile cueing for activities related to strengthening, flexibility, endurance, ROM  for improvements in proximal hip and core control with self care, mobility, lifting and ambulation.  [] (26287) Provided verbal/tactile cueing for activities related to improving balance, coordination, kinesthetic sense, posture, motor skill, proprioception  to assist with core control in self care, mobility, lifting, and ambulation.   [] (43093) Therapist is in constant attendance of 2 or more patients providing skilled therapy interventions, but not providing any significant amount of measurable one-on-one time to either patient, for improvements in LE, proximal hip, and core control in self care, mobility, lifting, ambulation and eccentric single leg control. Therapeutic Activities:    [] (92959 or 86834) Provided verbal/tactile cueing for activities related to improving balance, coordination, kinesthetic sense, posture, motor skill, proprioception and motor activation to allow for proper function  with self care and ADLs  [] (50634) Provided training and instruction to the patient for proper core and proximal hip recruitment and positioning with ambulation re-education     Home Exercise Program:    [] (41111) Reviewed/Progressed HEP activities related to strengthening, flexibility, endurance, ROM of core, proximal hip and LE for functional self-care, mobility, lifting and ambulation   [] (65467) Reviewed/Progressed HEP activities related to improving balance, coordination, kinesthetic sense, posture, motor skill, proprioception of core, proximal hip and LE for self care, mobility, lifting, and ambulation      Manual Treatments:  PROM / STM / Oscillations-Mobs:  G-I, II, III, IV (PA's, Inf., Post.)  [x] (98764) Provided manual therapy to mobilize proximal hip and LS spine soft tissue/joints for the purpose of modulating pain, promoting relaxation,  increasing ROM, reducing/eliminating soft tissue swelling/inflammation/restriction, improving soft tissue extensibility and allowing for proper ROM for normal function with self care, mobility, lifting and ambulation.        Charges:  Timed Code Treatment Minutes: 40   Total Treatment Minutes: 40       [] EVAL - LOW (43327)   [] EVAL - MOD (86632)  [] EVAL - HIGH (37416)  [] RE-EVAL (96564)  [x] YH(49010) x 1    [] Ionto  [x] NMR (02239) x  2    [] Vaso  [] Manual (02015) x  1    [] Ultrasound  [] TA x       [] Mech Traction (35735)  [] GaitTraining x     [] ES (un) (00048):   [] Home Management Training [] ES(attended) Functional Limitations/Impairments:  [x]Sitting [x]Standing   [x]Walking []Squatting/bending    []Stairs []ADL's    []Transfers []Reaching  []Housework []Job related tasks  []Driving []Sports/Recreation   [x]Sleeping []Other:    ASSESSMENT:  Pt with another good visit, post visit stiffness decreased and continuing to improve strength for decreased pain and improved function. Continue to progress treatment and add to HEP for transition to d/c. Treatment/Activity Tolerance:  [x] Patient able to complete tx  [] Patient limited by fatique  [] Patient limited by pain  [] Patient limited by other medical complications  [] Other:     Prognosis: [x] Good [] Fair  [] Poor    Patient Requires Follow-up: [x] Yes  [] No    PLAN: PT 1-2x / week for 3 weeks as of 12/8/21. Cloteal Valdez Continue to progress pt with overall/trunk mobility. Work on strengthening core. Continue to progress pt per PT's POC. Transition to HEP over next few weeks. [x] Continue per plan of care [] Alter current plan (see comments)  [] Plan of care initiated [] Hold pending MD visit [] Discharge    Electronically signed by: Aylin Cooper, PT    Note: If patient does not return for scheduled/ recommended follow up visits, this note will serve as a discharge from care along with most recent update on progress.

## 2021-12-21 ENCOUNTER — HOSPITAL ENCOUNTER (OUTPATIENT)
Dept: PHYSICAL THERAPY | Age: 71
Setting detail: THERAPIES SERIES
Discharge: HOME | End: 2021-12-21
Payer: COMMERCIAL

## 2021-12-21 NOTE — FLOWSHEET NOTE
Physical Therapy  Cancellation/No-show Note  Patient Name:  Jaden Oswald  :  1950   Date:  2021  Cancelled visits to date: 1  No-shows to date: 0    Patient status for today's appointment patient:  [x]  Cancelled   []  Rescheduled appointment  []  No-show     Reason given by patient:  []  Patient ill  []  Conflicting appointment  []  No transportation    []  Conflict with work  []  No reason given  [x]  Other: overslept     Comments:      Phone call information:   []  Phone call made today to patient at _ time at number provided:      []  Patient answered, conversation as follows:    []  Patient did not answer, message left as follows:  []  Phone call not made today  [x]  Phone call not needed - pt contacted us to cancel and provided reason for cancellation.      Electronically signed by:  Aylin Cooper PT

## 2021-12-29 ENCOUNTER — APPOINTMENT (OUTPATIENT)
Dept: PHYSICAL THERAPY | Age: 71
End: 2021-12-29
Payer: COMMERCIAL

## 2021-12-29 ENCOUNTER — HOSPITAL ENCOUNTER (OUTPATIENT)
Dept: PHYSICAL THERAPY | Age: 71
Setting detail: THERAPIES SERIES
Discharge: HOME | End: 2021-12-29
Payer: COMMERCIAL

## 2021-12-29 PROCEDURE — 97530 THERAPEUTIC ACTIVITIES: CPT

## 2021-12-29 PROCEDURE — 97110 THERAPEUTIC EXERCISES: CPT

## 2021-12-29 NOTE — PLAN OF CARE
[facility]  Phone: [phone]   Fax: [phone]          Physical Therapy Treatment Note/ Progress Report:     Date:  2021    Patient Name:  Cecily Arevalo    :  1950  MRN: [id number]  Restrictions/Precautions:    Medical/Treatment Diagnosis Information:  Diagnosis: M54.16 (ICD-10-CM) - Radiculopathy, lumbar region  , M54.50 (ICD-10-CM) - Low back pain, unspecified  Treatment Diagnosis: L sided low back pain, painful SI joint, decreased LLE strength, decreased flexibility, hypomobility of lumbar spine , impaired functional status. Insurance/Certification information:  PT Insurance Information: [employer]  Physician Information:  Referring Practitioner: ISIDRA Serra CNP  Plan of care signed (Y/N): [x]  Yes-  []  No    Date of Patient follow up with Physician:      Progress Report: [x]  Yes  [x]  No     Date Range for reporting period:  Beginnin21  POC: 21    Progress report due (10 Rx/or 30 days whichever is less): completed     Recertification due (POC duration/ or 90 days whichever is less):  22    Visit # Insurance Allowable Auth required? Date Range   10 mn - $25 copay []  Yes  [x]  No        Latex Allergy:  [x]NO      []YES  Preferred Language for Healthcare:   [x]English       []other:    Functional Scale:        Date assessed:  VINCENT: raw score =16 ; dysfunction = %    21  VINCENT: raw score =12 ; dysfunction = %                                            21     Pain level:  0/10     SUBJECTIVE:     Has been feeling better since last session, currently pain-free. Biggest complaint currently is getting up and down from the floor, especially while performing HEP. No other difficulty performing HEP. OBJECTIVE:   :  B hip abd 4/5 , hip flexion B 4+/5, lumbar ROM - WNL , TrA activation = strong/good+     : Stiffness with PROM ER of L hip, Pain with IR, Stiff ITB band with manual stretch.   11/19: Stiffness with PROM ER of L hip, Pain with IR/ER. Stiffness with hip flexors as well as IT Band  11/26 Increased antalgia entering clinic but this was improved upon leaving. Left SI has decreased mobility compared to Rt with ambulation      RESTRICTIONS/PRECAUTIONS: heart monitor , lumbar laminectomy , hx of CA, cervical fusion . Treatment based classification: flexion + mobilization     Exercises/Interventions:   Therapeutic Exercise (22354)x    Resistance / level Sets / Seconds Reps Notes / Cues   Bike  TM  -bwd 4.0    0.9 mph     5'  11/26   IB / HR  HSS EOT  2x30\" / 2x15     L SKC  11/19   Trunk rotation to the L   11/19   B lateral trunk stretch   Twisting   Over head lean  11/19   Supine SLR @ 20 deg  11/19   Bridging w/ Roxanna Cifuentes with TB abd  Bridge on [place]   BLUE 11/19   Standing TB   B EXT  B ABD Green  ^ 11/19   90/90S. Flossing   11/19   QUAD rocking / cat & camel  Quad prayer stretch in 3 positions. 11/26   [facility]      SB flexion + side bend roll outs      TrA SLR     S/L clamshells BLUE    TG squats     -Hip abd DL  SL 2  1  1 15  10 B  10 B 12/6 added SL   Seated bilat hip IR w/ TB green 12/6 added   S/L open books     Leg press - #                                              Therapeutic Activities (17048)              //bars:  Crystal Hockey  Fwd step up to contra LE hip flex   AirEx  4\"    12 B  10 B   -min use of UEs for balance  -No UE support                                             Neuromuscular Re-ed (53787)   X               TB lateral side steps  Monster walks Green  green 10' //bars  10' //bars 3 laps  3 laps 11/19   Pelvic tilts seated on SB  Ant/post, M/L, cw/cww  Cues for pelvic disassociation    SB anti rotation press      Prone on HI/LO top dropped down  HIP extension    11/26   SB with  rows / core posture CC 3 pl mid   CC 4 pl high   12/6 added CC.  Mod tc's for scap ret w/o UT activation   SB TrA marches      Standing anti rot press CC 2.5 pl 12/6 pt stated he needed to rest between sides   SB anti rotation isometric      Half kneeling SB plank     CC PNF chops 3 plates    Unilateral farmers carry 20# KB                                Manual Intervention (01792)        Hip/PROM   Focus on IR with left hip 11/19   Manual traction  B w/ SB   In supine 11/19   manual stretch   IT Band  Hip Flex   11/19   SI mobs to B sacral base,  G1-3      Mobs grade 2 in lumbar extension- prone press ups                                     Modalities:     Pt. Education:  -pt educated on diagnosis, prognosis and expectations for rehab  -all pt questions were answered    Home Exercise Program:  Access Code: [email or web address]  URL: [email or web address]  Date: 11/12/2021  Prepared by: Jose Edurado Cape    Exercises  Supine Bridge - 1 x daily - 7 x weekly - 3 sets - 10 reps  Supine Lower Trunk Rotation - 1 x daily - 7 x weekly - 3 sets - 10 reps  Supine Hip Adduction Isometric with Ball - 1 x daily - 7 x weekly - 3 sets - 10 reps  Seated Hamstring Stretch - 1 x daily - 7 x weekly - 3 sets - 10 reps    Access Code: [email or web address]  URL: [email or web address]  Date: 12/08/2021  Prepared by: Barbaravin Cape    Exercises  Swiss Ball March - 1 x daily - 7 x weekly - 3 sets - 10 reps  Seated Thoracic Flexion and Rotation with Swiss Ball - 1 x daily - 7 x weekly - 3 sets - 10 reps  Supine Lower Trunk Rotation with Swiss Ball - 1 x daily - 7 x weekly - 3 sets - 10 reps  Pelvic Circles on Swiss Ball - 1 x daily - 7 x weekly - 3 sets - 10 reps  Prone Plank Elbows on Ball - 1 x daily - 7 x weekly - 3 sets - 10 reps        Therapeutic Exercise and NMR EXR  [x] (25599) Provided verbal/tactile cueing for activities related to strengthening, flexibility, endurance, ROM  for improvements in proximal hip and core control with self care, mobility, lifting and ambulation.  [] (57390) Provided verbal/tactile cueing for activities related to improving balance, coordination, kinesthetic sense, posture, motor skill, IA(06304) x 2    [] Ionto  [] NMR (30632) x      [] Vaso  [] Manual (19686) x      [] Ultrasound  [x] TA x  1     [] Mech Traction (31230)  [] GaitTraining x     [] ES (un) (39955):   [] Home Management Training [] ES(attended) (62356)             [] Aquatics    [] Group  [] Other    GOALS:  Patient stated goal: decrease pain  [x]? Progressing: []? Met: []? Not Met: []? Adjusted     Therapist goals for Patient:   Short Term Goals: To be achieved in: 2 weeks  1. Independent in HEP and progression per patient tolerance, in order to prevent re-injury. [x]? Progressing: []? Met: []? Not Met: []? Adjusted  2. Patient will have a decrease in pain to facilitate improvement in movement, function, and ADLs as indicated by Functional Deficits. [x]? Progressing: []? Met: []? Not Met: []? Adjusted     Long Term Goals: To be achieved in: 6 weeks  1. Disability index score of 10% or less for the VINCENT to assist with reaching prior level of function. [x]? Progressing: []? Met: []? Not Met: []? Adjusted  2. Patient will demonstrate increased AROM to WNL, good LS mobility, good hip ROM to allow for proper joint functioning as indicated by patients Functional Deficits. []? Progressing: [x]? Met: []? Not Met: []? Adjusted  3. Patient will demonstrate an increase in Strength to grossly 4+/5 good proximal hip and core activation to allow for proper functional mobility as indicated by patients Functional Deficits. [x]? Progressing: []? Met: []? Not Met: []? Adjusted  4. Patient will return to functional activities including ADLs, walking, riding bike without increased symptoms or restriction. [x]? Progressing: []? Met: []? Not Met: []? Adjusted    Overall Progression Towards Functional goals/ Treatment Progress Update:  [x] Patient is progressing as expected towards functional goals listed. [] Progression is slowed due to complexities/Impairments listed. [] Progression has been slowed due to co-morbidities.   [] Plan just implemented, too soon to assess goals progression <30days   [] Goals require adjustment due to lack of progress  [] Patient is not progressing as expected and requires additional follow up with physician  [] Other    Persisting Functional Limitations/Impairments:  [x]Sitting [x]Standing   [x]Walking []Squatting/bending    []Stairs []ADL's    []Transfers []Reaching  []Housework []Job related tasks  []Driving []Sports/Recreation   [x]Sleeping []Other:    ASSESSMENT:     Fatigue levels increased while performing ex's on [employer] gym, pt declined seated rest and was able to complete remainder of treatment without complaint. Encouraged pt to perform partial lunge with kitchen counter for support and correct posture. Continue to progress hip and quad strength to improve functionality at home. Treatment/Activity Tolerance:  [x] Patient able to complete tx  [] Patient limited by fatique  [] Patient limited by pain  [] Patient limited by other medical complications  [] Other:     Prognosis: [x] Good [] Fair  [] Poor    Patient Requires Follow-up: [x] Yes  [] No    PLAN: PT 1-2x / week for 3 weeks as of 12/8/21. Abbi Alvarado Continue to progress pt with overall/trunk mobility. Work on strengthening core. Continue to progress pt per PT's POC. Transition to HEP over next few weeks. [x] Continue per plan of care [] Alter current plan (see comments)  [] Plan of care initiated [] Hold pending MD visit [] Discharge    Electronically signed by: Ania Heck, PT    Note: If patient does not return for scheduled/ recommended follow up visits, this note will serve as a discharge from care along with most recent update on progress.

## 2022-01-05 DIAGNOSIS — G89.4 CHRONIC PAIN SYNDROME: ICD-10-CM

## 2022-01-05 RX ORDER — OXYCODONE HYDROCHLORIDE AND ACETAMINOPHEN 5; 325 MG/1; MG/1
1 TABLET ORAL 2 TIMES DAILY PRN
Qty: 60 TABLET | Refills: 0 | Status: SHIPPED | OUTPATIENT
Start: 2022-01-05 | End: 2022-01-19

## 2022-01-05 NOTE — TELEPHONE ENCOUNTER
Medication and Quantity requested:     oxyCODONE-acetaminophen (PERCOCET) 5-325 MG per tablet    Quantity 60     Last Visit  11/22/21    Pharmacy and phone number updated in [facility]:  Yes [facility]

## 2022-01-05 NOTE — TELEPHONE ENCOUNTER
Medication:   Requested Prescriptions     Pending Prescriptions Disp Refills    oxyCODONE-acetaminophen (PERCOCET) 5-325 MG per tablet 60 tablet 0     Sig: Take 1 tablet by mouth 2 times daily as needed for Pain for up to 30 days. Intended supply: 3 days. Take lowest dose possible to manage pain        Last Filled:  9/28/2021 60 tabs 0 refills     Patient Phone Number: [phone] (home)     Last appt: 11/22/2021   Next appt: Visit date not found    Last [place]ARRS:   RX Monitoring 12/30/2019   Periodic Controlled Substance Monitoring No signs of potential drug abuse or diversion identified.

## 2022-01-07 ENCOUNTER — HOSPITAL ENCOUNTER (OUTPATIENT)
Dept: PHYSICAL THERAPY | Age: 72
Setting detail: THERAPIES SERIES
Discharge: HOME | End: 2022-01-07
Payer: MEDICARE

## 2022-01-07 PROCEDURE — 97530 THERAPEUTIC ACTIVITIES: CPT

## 2022-01-07 PROCEDURE — 97110 THERAPEUTIC EXERCISES: CPT

## 2022-01-07 NOTE — FLOWSHEET NOTE
[facility]  Phone: [phone]   Fax: [phone]    Physical Therapy Re-Certification Plan of Care    Dear  , ISIDRA Monroy CNP    We had the pleasure of treating the following patient for physical therapy services at [facility]. A summary of our findings can be found in the updated assessment below. This includes our plan of care. If you have any questions or concerns regarding these findings, please do not hesitate to contact me at the office phone number checked above. Thank you for the referral.     Physician Signature:________________________________Date:__________________  By signing above (or electronic signature), therapist's plan is approved by physician                                                       Sub-sections:   ;   ;       Overall Response to Treatment:   []Patient is responding well to treatment and improvement is noted with regards  to goals   []Patient should continue to improve in reasonable time if they continue HEP   []Patient has plateaued and is no longer responding to skilled PT intervention    []Patient is getting worse and would benefit from return to referring MD   []Patient unable to adhere to initial POC   [x]Other: pt is having trouble getting  off the floor  as it is very difficult for him, back pain has been better and continues to improve. VINCENT score shows functional improvement over the course of PT. PT adding goal for floor transfers and lifting with transition to HEP and safe d/c. Date range of Visits: 21-22  Total Visits: 11    Recommendation:    [x]Continue PT 1x / wk for 4 weeks.                []Hold PT, pending MD visit           Physical Therapy Treatment Note/ Progress Report:     Date:  2022    Patient Name:  Bonnie Jamison    :  1950  MRN: [id number]  Restrictions/Precautions:    Medical/Treatment Diagnosis Information:  Diagnosis: M54.16 (ICD-10-CM) - Radiculopathy, lumbar region  , M54.50 (ICD-10-CM) - Low back pain, unspecified  Treatment Diagnosis: L sided low back pain, painful SI joint, decreased LLE strength, decreased flexibility, hypomobility of lumbar spine , impaired functional status. Insurance/Certification information:  PT Insurance Information: [employer]  Physician Information:  Referring Practitioner: ISIDRA North CNP  Plan of care signed (Y/N): [x]  Yes-  []  No    Date of Patient follow up with Physician:      Progress Report: [x]  Yes  [x]  No     Date Range for reporting period:  Beginnin21  POC: 21  POC: 22    Progress report due (10 Rx/or 30 days whichever is less):     Recertification due (POC duration/ or 90 days whichever is less): 22    Visit # Insurance Allowable Auth required? Date Range   11 mn - $25 copay []  Yes  [x]  No        Latex Allergy:  [x]NO      []YES  Preferred Language for Healthcare:   [x]English       []other:    Functional Scale:        Date assessed:  VINCENT: raw score =16 ; dysfunction = %    21  VINCENT: raw score =12 ; dysfunction = %                                            21   VINCENT: raw score =10 ; dysfunction = %                                            22      Pain level:  0/10     SUBJECTIVE:   Pt reports very difficult for him to get off of the floor due to back pain and weakness in legs. Would like to be able to be better at that and safely lift. OBJECTIVE:    : B hip abd 4+/5 , hip flexion B 4+/5, lumbar ROM - WNL , TrA activation = strong/good+  : B hip abd 4/5 , hip flexion B 4+/5, lumbar ROM - WNL , TrA activation = strong/good+     : Stiffness with PROM ER of L hip, Pain with IR, Stiff ITB band with manual stretch.   : Stiffness with PROM ER of L hip, Pain with IR/ER. Stiffness with hip flexors as well as IT Band   Increased antalgia entering clinic but this was improved upon leaving.  Left SI has decreased mobility compared to Rt with ambulation      RESTRICTIONS/PRECAUTIONS: heart monitor , lumbar laminectomy , hx of CA, cervical fusion . Treatment based classification: flexion + mobilization     Exercises/Interventions:   Therapeutic Exercise (60804)x    Resistance / level Sets / Seconds Reps Notes / Cues   Bike  TM  -bwd 4.0    0.9 mph     5'  11/26   IB / HR  HSS EOT  2x30\" / 2x15     L SKC  11/19   Trunk rotation to the L   11/19   B lateral trunk stretch   Twisting   Over head lean  11/19   Supine SLR @ 20 deg  11/19   Bridging w/ [place] Fake with TB abd  Bridge on [place]   BLUE 11/19   Standing TB   B EXT  B ABD Green  ^ 11/19   90/90S. Flossing   11/19   QUAD rocking / cat & camel  Quad prayer stretch in 3 positions. 11/26   DKC      SB flexion + side bend roll outs      TrA SLR     S/L clamshells BLUE    TG squats     -Hip abd DL  SL 2  1  1 10  10 B   12/6 added SL   Seated bilat hip IR w/ TB green 12/6 added   S/L open books     Leg press - DL 90# 3 10    Quantum :   Knee ext  HSC   20#  25#   3  3   10  10 1/7 added                                      Therapeutic Activities (01764)              //bars:  Lunge  Fwd step up to contra LE hip flex   AirEx  4\"    12 B  10 B   -min use of UEs for balance  -No UE support          Step ups fwd 8in   1   10      Glider rev lunge  1 10 B                         Neuromuscular Re-ed (37248)   X               TB lateral side steps  Monster walks Green  green 10' //bars  10' //bars 3 laps  3 laps 11/19   Pelvic tilts seated on SB  Ant/post, M/L, cw/cww  Cues for pelvic disassociation    SB anti rotation press      Prone on HI/LO top dropped down  HIP extension    11/26   SB with  rows / core posture CC 3 pl mid   CC 4 pl high   12/6 added CC.  Mod tc's for scap ret w/o UT activation   SB TrA marches      Standing anti rot press CC 2.5 pl 12/6 pt stated he needed to rest between sides   SB anti rotation isometric      Half kneeling SB plank     CC PNF chops 3 plates    Unilateral farmers carry 20# KB                                                            Manual Intervention (14012)        Hip/PROM   Focus on IR with left hip 11/19   Manual traction  B w/ SB   In supine 11/19   manual stretch   IT Band  Hip Flex   11/19   SI mobs to B sacral base,  G1-3      Mobs grade 2 in lumbar extension- prone press ups                                     Modalities:     Pt. Education:  -pt educated on diagnosis, prognosis and expectations for rehab  -all pt questions were answered    Home Exercise Program:  Access Code: [email or web address]  URL: [facility]/  Date: 11/12/2021  Prepared by: Katarzyna Benz    Exercises  Supine Bridge - 1 x daily - 7 x weekly - 3 sets - 10 reps  Supine Lower Trunk Rotation - 1 x daily - 7 x weekly - 3 sets - 10 reps  Supine Hip Adduction Isometric with Ball - 1 x daily - 7 x weekly - 3 sets - 10 reps  Seated Hamstring Stretch - 1 x daily - 7 x weekly - 3 sets - 10 reps    Access Code: [email or web address]  URL: [facility]/  Date: 12/08/2021  Prepared by: Katarzyna Demar    Exercises  Swiss Ball March - 1 x daily - 7 x weekly - 3 sets - 10 reps  Seated Thoracic Flexion and Rotation with Swiss Ball - 1 x daily - 7 x weekly - 3 sets - 10 reps  Supine Lower Trunk Rotation with Swiss Ball - 1 x daily - 7 x weekly - 3 sets - 10 reps  Pelvic Circles on Swiss Ball - 1 x daily - 7 x weekly - 3 sets - 10 reps  Prone Plank Elbows on Ball - 1 x daily - 7 x weekly - 3 sets - 10 reps        Therapeutic Exercise and NMR EXR  [x] (12222) Provided verbal/tactile cueing for activities related to strengthening, flexibility, endurance, ROM  for improvements in proximal hip and core control with self care, mobility, lifting and ambulation.  [] (60056) Provided verbal/tactile cueing for activities related to improving balance, coordination, kinesthetic sense, posture, motor skill, proprioception  to assist with core control in self care, mobility, lifting, and ambulation.   [] (61358) Therapist is in constant attendance of 2 or more patients providing skilled therapy interventions, but not providing any significant amount of measurable one-on-one time to either patient, for improvements in LE, proximal hip, and core control in self care, mobility, lifting, ambulation and eccentric single leg control. Therapeutic Activities:    [] (47280 or 41385) Provided verbal/tactile cueing for activities related to improving balance, coordination, kinesthetic sense, posture, motor skill, proprioception and motor activation to allow for proper function  with self care and ADLs  [] (32924) Provided training and instruction to the patient for proper core and proximal hip recruitment and positioning with ambulation re-education     Home Exercise Program:    [] (21184) Reviewed/Progressed HEP activities related to strengthening, flexibility, endurance, ROM of core, proximal hip and LE for functional self-care, mobility, lifting and ambulation   [] (74431) Reviewed/Progressed HEP activities related to improving balance, coordination, kinesthetic sense, posture, motor skill, proprioception of core, proximal hip and LE for self care, mobility, lifting, and ambulation      Manual Treatments:  PROM / STM / Oscillations-Mobs:  G-I, II, III, IV (PA's, Inf., Post.)  [x] (44832) Provided manual therapy to mobilize proximal hip and LS spine soft tissue/joints for the purpose of modulating pain, promoting relaxation,  increasing ROM, reducing/eliminating soft tissue swelling/inflammation/restriction, improving soft tissue extensibility and allowing for proper ROM for normal function with self care, mobility, lifting and ambulation.        Charges:  Timed Code Treatment Minutes: 44   Total Treatment Minutes: 44       [] EVAL - LOW (30205)   [] EVAL - MOD (37674)  [] EVAL - HIGH (00645)  [] RE-EVAL (91662)  [x] NZ(48573) x 1    [] Ionto  [] NMR (98501) x      [] Vaso  [] Manual (82599) x      [] Ultrasound  [x] TA x  2     [] [facility] Traction (13356)  [] GaitTraining x     [] ES (un) (04883):   [] Home Management Training [] ES(attended) (16047)             [] Aquatics    [] Group  [] Other    GOALS:  Patient stated goal: decrease pain  [x]? Progressing: []? Met: []? Not Met: []? Adjusted     Therapist goals for Patient:   Short Term Goals: To be achieved in: 2 weeks  1. Independent in HEP and progression per patient tolerance, in order to prevent re-injury. [x]? Progressing: []? Met: []? Not Met: []? Adjusted  2. Patient will have a decrease in pain to facilitate improvement in movement, function, and ADLs as indicated by Functional Deficits. [x]? Progressing: []? Met: []? Not Met: []? Adjusted     Long Term Goals: To be achieved in: 6 weeks  1. Disability index score of 10% or less for the VINCENT to assist with reaching prior level of function. [x]? Progressing: []? Met: []? Not Met: []? Adjusted  2. Patient will demonstrate increased AROM to WNL, good LS mobility, good hip ROM to allow for proper joint functioning as indicated by patients Functional Deficits. []? Progressing: [x]? Met: []? Not Met: []? Adjusted  3. Patient will demonstrate an increase in Strength to grossly 5/5 good proximal hip and core activation to allow for proper functional mobility as indicated by patients Functional Deficits. []? Progressing: []? Met: []? Not Met: [x]? Adjusted  4. Patient will return to functional activities including ADLs, walking, riding bike, lifting without increased symptoms or restriction. [x]? Progressing: []? Met: []? Not Met: []? Adjusted  5. Pt will safely perform floor transfer IND with no increase in symptoms or restriction. []? Progressing: []? Met: []? Not Met: []? Adjusted    Overall Progression Towards Functional goals/ Treatment Progress Update:  [x] Patient is progressing as expected towards functional goals listed. [] Progression is slowed due to complexities/Impairments listed.   [] Progression has been slowed due to co-morbidities. [] Plan just implemented, too soon to assess goals progression <30days   [] Goals require adjustment due to lack of progress  [] Patient is not progressing as expected and requires additional follow up with physician  [] Other    Persisting Functional Limitations/Impairments:  []Sitting [x]Standing   [x]Walking []Squatting/bending    []Stairs []ADL's    []Transfers []Reaching  []Housework []Job related tasks  []Driving []Sports/Recreation   [x]Sleeping []Other:    ASSESSMENT:           Treatment/Activity Tolerance:  [x] Patient able to complete tx  [] Patient limited by fatique  [] Patient limited by pain  [] Patient limited by other medical complications  [] Other:     Prognosis: [x] Good [] Fair  [] Poor    Patient Requires Follow-up: [x] Yes  [] No    PLAN: PT 1-2x / week for 4 weeks. BLE strength, floor transfers, lifting . Transition to HEP over next few weeks. [x] Continue per plan of care [] Alter current plan (see comments)  [] Plan of care initiated [] Hold pending MD visit [] Discharge    Electronically signed by: Mu Diggs PT    Note: If patient does not return for scheduled/ recommended follow up visits, this note will serve as a discharge from care along with most recent update on progress.

## 2022-01-08 PROCEDURE — G2066 INTER DEVC REMOTE 30D: HCPCS | Performed by: INTERNAL MEDICINE

## 2022-01-08 PROCEDURE — 93298 REM INTERROG DEV EVAL SCRMS: CPT | Performed by: INTERNAL MEDICINE

## 2022-01-10 ENCOUNTER — NURSE ONLY (OUTPATIENT)
Dept: CARDIOLOGY CLINIC | Age: 72
End: 2022-01-10
Payer: MEDICARE

## 2022-01-10 DIAGNOSIS — I48.0 PAF (PAROXYSMAL ATRIAL FIBRILLATION) (HCC): Chronic | ICD-10-CM

## 2022-01-10 DIAGNOSIS — Z45.09 ENCOUNTER FOR ELECTRONIC ANALYSIS OF REVEAL EVENT RECORDER: ICD-10-CM

## 2022-01-10 NOTE — PROGRESS NOTES
We received a remote transmission from patient's monitor at home. Remote Linq report shows tachy recording. Pt showed no symptoms with this. EP physician to review. We will continue to monitor remotely.

## 2022-01-11 NOTE — PROGRESS NOTES
[address]    2022    Tanisha Perez (:  1950) is a 73 y.o. male is here for follow up and management of CAD and modifiable risk factors. Referring Provider: Marylou Martínez MD    HISTORY: Mr. Landry Dumont has a history of aortic insufficiency (s/p AVR tissue 2015), CAD (nonobstructive by [facility] in ), PAF s/p MAZE and DELGADO ligation with Atriclip (2015), moderate MR, and CHRISTOPHER. 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 Kaykay Mckay),  TIA, transient global amnesia. He was on Coumadin but it was stopped due to bleeding from his mouth and PEG tube. 2018 (2 week) cardiac monitor showed brief SVT and WCT; no a fib. ILR was placed 2019. He developed recurrent A fib and had RFA with re- isolation of PV, roof line, complex atrial fractionated electrogram, inferior-posterior line with PWI, CTI atrial flutter with Dr Leonora Burgos 7/15/20. Echo 2020 showed worsening mitral regurgitation, now moderate to severe. 3/4/21 hospitalization for chest pain and shortness of breath. 3/8/21 LHC showed at least moderate calcification of long, eccentric 80% ostial to prox LAD stenosis. He was seen by Dr. Dario Honeycutt for consideration of LIMA to LAD and significant primary MR. Patient was thought he was high risk for redo cardiac surgery; recommended aggressive treatment of reflux and monitoring symptoms. Today, he denies chest pain. He has noted intermittent palpitations but have resolved. REVIEW OF SYSTEMS:  A complete review of systems has been reviewed and updated today and is negative except as noted in the history of present illness. Prior to Visit Medications    Medication Sig Taking?  Authorizing Provider   dilTIAZem (CARDIZEM CD) 120 MG extended release capsule Take 1 capsule by mouth daily Yes Vikki Cuevas MD   rosuvastatin (CRESTOR) 40 MG tablet Take 1 tablet by mouth daily Yes Vikki Cuevas MD   digoxin (LANOXIN) 125 MCG tablet TAKE 1 TABLET BY MOUTH EVERY DAY Yes ISIDRA Leos CNP   lisinopril (PRINIVIL;ZESTRIL) 2.5 MG tablet TAKE 1/2 TABLET BY MOUTH EVERY DAY Yes ISIDRA Rosario CNP   levothyroxine (SYNTHROID) 112 MCG tablet TAKE 1 TABLET BY MOUTH EVERY DAY Yes Wendy Pritchett MD   pantoprazole (PROTONIX) 40 MG tablet TAKE 1 TABLET BY MOUTH TWICE A DAY BEFORE MEALS Yes Wendy Pritchett MD   aspirin 81 MG EC tablet Take 81 mg by mouth daily Yes Historical Provider, MD   oxyCODONE-acetaminophen (PERCOCET) 5-325 MG per tablet Take 1 tablet by mouth every 4 hours as needed for Pain. Yes Historical Provider, MD   mirtazapine (REMERON) 7.5 MG tablet Take 0.5 tablets by mouth nightly Yes Wendy Pritchett MD   fluticasone (FLOVENT HFA) 110 MCG/ACT inhaler Inhale 2 puffs into the lungs 2 times daily Yes Wendy Pritchett MD   melatonin 10 MG CAPS capsule Take 10 mg by mouth nightly as needed  Yes Historical Provider, MD   docusate sodium (COLACE) 100 MG capsule Take 100 mg by mouth 2 times daily.  Yes Historical Provider, MD   cyclobenzaprine (FLEXERIL) 10 MG tablet   Historical Provider, MD   lisinopril (PRINIVIL;ZESTRIL) 2.5 MG tablet Take 0.5 tablets by mouth daily  ISIDRA Rosario CNP        Allergies   Allergen Reactions    Naproxen Swelling     Lips    Pt does not recognize this being an intolerance    Hydrocodone-Acetaminophen Itching       Past Medical History:   Diagnosis Date    Aortic valve insufficiency     Arthritis     Atrial fibrillation ([facility]     [facility]2017    TONGUE head and neck IN REMISSION    Dysphonia     GERD (gastroesophageal reflux disease)     Hearing loss     Heart disease     Medical history reviewed with no changes     Obstructive apnea does not use CPAP    Oropharyngeal dysphagia     Refusal of blood transfusions as patient is Confucianism     Thyroid disease        Past Surgical History:   Procedure Laterality Date    ANKLE SURGERY      AORTIC VALVE REPLACEMENT  1/28/15    Dr. Andreea Pisano 25mm Mosaic Ultra porcine valve; right and left modified maze procedure with ligation of DELGADO with Atriclip    ATRIAL ABLATION SURGERY      BACK SURGERY      CSP    CARDIAC CATHETERIZATION      HERNIA REPAIR      KNEE SURGERY      Arthroscopy    LUMBAR SPINE SURGERY Right 10/13/2020    RIGHT LUMBAR4-LUMBAR5 MICRO HEMILAMINECTOMY AND DISCECTOMY (51425, 13934) performed by Coby Copeland MD at [address]      collapsed lung due to broken ribs    MANDIBLE FRACTURE SURGERY      NECK SURGERY      Fusion    PAIN MANAGEMENT PROCEDURE Right 2020    RIGHT L4 AND L5 TRANSFORAMINAL EPIDURAL STEROID INJECTION WITH FLUOROSCOPY ([id number],[id number]) performed by Keysha Jo MD at [address] Right 2020    RIGHT L4 AND L5 TRANSFORAMINAL EPIDURAL STEROID INJECTION WITH FLUOROSCOPY ([id number]) performed by Keysha Jo MD at [address]         Social History     Tobacco Use    Smoking status: Former Smoker     Packs/day: 1.00     Years: 40.00     Pack years: 40.00     Types: Cigarettes     Start date: 1975     Quit date: 2015     Years since quittin.9    Smokeless tobacco: Never Used    Tobacco comment: Maintain cessation   Substance Use Topics    Alcohol use: No     Alcohol/week: 0.0 standard drinks     Comment: NA beer        Family History   Problem Relation Age of Onset    Heart Disease Other     High Blood Pressure Neg Hx     High Cholesterol Neg Hx        PHYSICAL EXAMINATION:  Vitals:    22 0934   BP: 108/74   Pulse: 77   SpO2: 96%     Estimated body mass index is 21.95 kg/m² as calculated from the following:    Height as of 21: 5' 10\" (1.778 m). Weight as of 21: 153 lb (69.4 kg). Physical Exam  Constitutional:       Appearance: He is well-developed. He is not diaphoretic. HENT:      Head: Normocephalic and atraumatic.    Eyes: General: No scleral icterus. Extraocular Movements: Extraocular movements intact. Conjunctiva/sclera: Conjunctivae normal.   Neck:      Vascular: No JVD. Cardiovascular:      Rate and Rhythm: Normal rate and regular rhythm. Heart sounds: Murmur heard. No friction rub. No gallop. Comments: 2/6 systolic murmur at LLSB and apex  Pulmonary:      Effort: Pulmonary effort is normal. No respiratory distress. Breath sounds: Normal breath sounds. No wheezing or rales. Abdominal:      General: Bowel sounds are normal.      Palpations: Abdomen is soft. Tenderness: There is no abdominal tenderness. Musculoskeletal:         General: Normal range of motion. Cervical back: Normal range of motion and neck supple. Skin:     General: Skin is warm and dry. Findings: No rash. Neurological:      General: No focal deficit present. Mental Status: He is alert and oriented to person, place, and time. Mental status is at baseline. Psychiatric:         Mood and Affect: Mood normal.         Behavior: Behavior normal.         Thought Content: Thought content normal.         Judgment: Judgment normal.       I have reviewed all pertinent lab results and diagnostic testing.         LABS:  CBC:   Lab Results   Component Value Date    WBC 4.6 11/22/2021    RBC 4.45 11/22/2021    HGB 13.9 11/22/2021    HCT 41.7 11/22/2021    MCV 93.8 11/22/2021    RDW 13.3 11/22/2021     11/22/2021     CMP:   Lab Results   Component Value Date     11/22/2021    K 4.2 11/22/2021     11/22/2021    CO2 26 11/22/2021    BUN 9 11/22/2021    CREATININE 0.6 11/22/2021    GFRAA >60 11/22/2021    GFRAA >60 12/26/2009    AGRATIO 1.4 03/05/2021    LABGLOM >60 11/22/2021    GLUCOSE 91 11/22/2021    PROT 7.1 10/11/2021    CALCIUM 8.9 11/22/2021    BILITOT 1.0 10/11/2021    ALKPHOS 46 10/11/2021    AST 20 10/11/2021    ALT 15 10/11/2021     Lab Results   Component Value Date    CHOL 179 08/13/2021    TRIG 97 08/13/2021    HDL 45 10/11/2021    HDL 52 12/27/2009    LDLCALC 102 10/11/2021       Echo 8/18/21:  Summary   -Normal left ventricle size, wall thickness and systolic function with an   estimated ejection fraction of 60-65%. -No regional wall motion abnormalities are seen.   -Indeterminate diastolic function.   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 -Moderate to severe mitral regurgitation. ( appears stable from previous echo   dated 2/24/2021). ERO 0.21.   -A bioprosthetic artificial aortic valve appears well seated with a maximum   velocity of 2.6m/s and a mean gradient of 16mmHg. -Moderate to severe tricuspid regurgitation.   -Estimated pulmonary artery systolic pressure is mildly elevated at 38 mmHg   assuming a right atrial pressure of 8 mmHg. -The right ventricle is moderately enlarged.   -The right atrium is moderately dilated. Myoview GXT 4/7/21:  Summary    Normal myocardial perfusion.    Normal LV size and systolic function. Cardiac cath 3/8/21:  Left Heart Cath  Dominance: Right       LM: luminal irregularities  LAD: at least moderate calcification of long, eccentric 80% ostial to proximal stenosis ; 30% ostial first diagonal   LCx: 30-40% distal into OM2   RCA: tortuous with luminal irregularities     Did not cross SAVR      Impression/Recommendations:  CTS consult to discuss Redo Sternotomy for LIMA-LAD and significant, primary MR      Limited echo 2/24/21:  Summary   -Limited echocardiogram was performed to evaluate mitral regurgitation.   -Normal left ventricle size and systolic function with an estimated ejection   fraction of 60%. -No regional wall motion abnormalities are seen. -The tips of the mitral leaflet appears to be slightly myxomatous and   minimal prolapse of posterior leaflet. -Moderate to severe anteriorly directed mitral regurgitation (appears   similar when compared to last echocardiogram dated September 2020). ERO=0.25   -A bioprosthetic artificial aortic valve appears well seated with a maximum   velocity of 2.4m/s and a mean gradient of 13mmHg. -Moderate to severe tricuspid regurgitation.   -Estimated pulmonary artery systolic pressure is mildly elevated at 38 mmHg   assuming a right atrial pressure of 8 mmHg. -The right ventricle is mildly enlarged.   -The right atrium is moderately dilated. Echo 9/9/20:  Summary   Normal left ventricle size, wall thickness and systolic function with an   estimated ejection fraction of 60%. No regional wall motion abnormalities   are seen. E/e\"= 10. Moderate to severe mitral regurgitation. A bioprosthetic aortic valve appears well seated with a maximum gradient of   17 mmHg and a mean gradient of 11 mmHg. Aortic valve area of 1.97cm. No evidence of aortic valve regurgitation. The aortic root is mildly dilated. 3.7cm   The ascending aorta is mildly dilated. 4.2cm   The right ventricle is mildly enlarged but normal in function. Moderate to severe tricuspid regurgitation. PASP 34mmHg. The right atrium is mildly dilated. IVC size is normal (<2.1 cm) but collapses < 50% with respiration consistent   with elevated RA pressure (8 mmHg). [facility] myoview 8/26/20:  Summary    -There is a small fixed inferior apical defect that is likely bowel artifact    rather than scar.    -LV function is normal with no regional abnormalities and EF=64%.  -Low risk study.        ECG 8/18/2020:   - NSR. Rate 85, QTc 389     Limited Echo 7/15/2020:   -Limited echocardiogram was performed to rule out pericardial effusion,   status post cardiac ablation today.   -Normal left ventricle size, wall thickness and systolic function with an   estimated ejection fraction of 55%.    -No regional wall motion abnormalities are seen.   -Abnormal (paradoxical) septal motion noted.   -No evidence of any pericardial effusion.   -Mild to moderate mitral regurgitation.   -Moderate tricuspid regurgitation.     Echo 6/16/2020:    -Normal left ventricle size, wall thickness, and systolic function with an   estimated ejection fraction of 60-65%.  -No regional wall motion abnormalities are seen.   -Indeterminate diastolic function.   -E/e'=7   -Moderately severe mitral regurgitation   -The bioprosthetic artificial aortic valve appears well seated   -There is moderate-to-severe tricuspid regurgitation with a RVSP estimation   of 30mmHg.     Echo 6/18/2019:   -Normal left ventricle size, wall thickness, and systolic function with an estimated ejection fraction of 60-65%.  -No regional wall motion abnormalities are seen.   -The bioprosthetic artificial aortic valve appears well seated with a maximum velocity of 2.63m/s and a mean gradient of 16 mmHg. The aortic valve area is estimated at 0.89 cm^2. No significant regurgitation noted. Aortic valve leaflets are thickened   -Thickened mitral valve without evidence of stenosis. There is at least moderate mitral regurgitation noted.   -There is moderate-to-severe tricuspid regurgitation with a RVSP estimation of 38 mmHg.   -Normal diastolic function. Avg. E/e'=7.25      Cardiac CT: 6/17/20  No stenosis at the pulmonary vein origins.         Clip is seen in the left atrial appendage,.  No thrombus seen in this region.         Mildly dilated aorta. .  Post valve replacement changes are noted                MCOT: 9/6/18                            1st degree HB, HT 68                                            PVC burden <1%             PAC burden 1%                                     VT 11 bts at 118           GXT: 10/15  Normal myocardial perfusion.    Normal LV function.      Cath: 2015  Non-obstructive CAD       ASSESSMENT/PLAN:  1. Coronary Artery Disease  - Nonobstructive CAD by [address] [place] [address] 2015  - 8/26/20 Lexiscan myoview - small fixed inferior apical defect likely bowel artifact than scar. No reported reversible ischemia.   EF normal  - 2/24/21 Echo - LVEF mmHg per echo 8/18/21    Plan: Echo in 1 year. 8.  Hyperlipidemia  - Simvastatin stopped in 2015 due to possible interaction with Diltiazem  - Pravastatin 20 mg - no longer taking for unclear reason  - started on Crestor 10 mg 2/17/21  - 3/5/21 LDL- 103  - 8/13/21 - CK - 49, TC- 179, TG- 97, HDL- 45, LDL- 115 >> Crestor increased to 20 mg 8/16/21  - 10/11/21 - TC- 162, TG- 77, HDL- 45, LDL- 102      Plan : Suboptimal. Increase Crestor to 40 mg daily. Fasting lipid profile, LFTs, CK. Plan:  1. Increase Crestor to 40 mg daily. Fasting lipid profile, LFTs, CK.  2.  Change Cardizem CD to 24 hour daily. 3.  Echo in 1 year. 4.  RTC in 1 year. Scribe's attestation: This note was scribed in the presence of Terry Vazquez M.D. by Tiera Vargas RN    Physician Attestation: The scribe's documentation has been prepared under my direction and personally reviewed by me in its entirety. I confirm that the note above accurately reflects all work, treatment, procedures, and medical decision making performed by me. An  electronic signature was used to authenticate this note. Yrn Reddy MD, [facility] [place], [address]

## 2022-01-12 ENCOUNTER — HOSPITAL ENCOUNTER (OUTPATIENT)
Dept: PHYSICAL THERAPY | Age: 72
Setting detail: THERAPIES SERIES
Discharge: HOME | End: 2022-01-12
Payer: MEDICARE

## 2022-01-12 PROCEDURE — 97110 THERAPEUTIC EXERCISES: CPT

## 2022-01-12 PROCEDURE — 97530 THERAPEUTIC ACTIVITIES: CPT

## 2022-01-12 NOTE — FLOWSHEET NOTE
[facility]  Phone: [phone]   Fax: [phone]        Physical Therapy Treatment Note/ Progress Report:     Date:  2022    Patient Name:  Harman Malave    :  1950  MRN: [id number]  Restrictions/Precautions:    Medical/Treatment Diagnosis Information:  Diagnosis: M54.16 (ICD-10-CM) - Radiculopathy, lumbar region  , M54.50 (ICD-10-CM) - Low back pain, unspecified  Treatment Diagnosis: L sided low back pain, painful SI joint, decreased LLE strength, decreased flexibility, hypomobility of lumbar spine , impaired functional status. Insurance/Certification information:  PT Insurance Information: Medicare (change for )  Physician Information:  Referring Practitioner: ISIDRA Asher CNP  Plan of care signed (Y/N): [x]  Yes-  []  No    Date of Patient follow up with Physician:      Progress Report: []  Yes  [x]  No     Date Range for reporting period:  Beginnin21  POC: 21  POC: 22    Progress report due (10 Rx/or 30 days whichever is less):     Recertification due (POC duration/ or 90 days whichever is less): 22    Visit # Insurance Allowable Auth required? Date Range   12 mn []  Yes  [x]  No        Latex Allergy:  [x]NO      []YES  Preferred Language for Healthcare:   [x]English       []other:    Functional Scale:        Date assessed:  VINCENT: raw score =16 ; dysfunction = %    21  VINCENT: raw score =12 ; dysfunction = %                                            21   VINCENT: raw score =10 ; dysfunction = %                                            22      Pain level:  0/10     SUBJECTIVE:    Has been having a little more trouble as of late, reports not doing much though, less activity, which may be contributing to that. Has had to figure out his insurance company as he was dropped by last years insurance. Is covered on Medicare A & B but not for prescriptions. Felt good following last session.   Feels that he would utilize free 30 day [employer] membership upon discharge from PT, reports working out in [employer] several years ago and would do so again, just needs to learn which ex's he should do. OBJECTIVE:    1/7: B hip abd 4+/5 , hip flexion B 4+/5, lumbar ROM - WNL , TrA activation = strong/good+  12/8: B hip abd 4/5 , hip flexion B 4+/5, lumbar ROM - WNL , TrA activation = strong/good+     11/17: Stiffness with PROM ER of L hip, Pain with IR, Stiff ITB band with manual stretch.   11/19: Stiffness with PROM ER of L hip, Pain with IR/ER. Stiffness with hip flexors as well as IT Band  11/26 Increased antalgia entering clinic but this was improved upon leaving. Left SI has decreased mobility compared to Rt with ambulation      RESTRICTIONS/PRECAUTIONS: heart monitor , lumbar laminectomy , hx of CA, cervical fusion . Treatment based classification: flexion + mobilization     Exercises/Interventions:   Therapeutic Exercise (13520)x    Resistance / level Sets / Seconds Reps Notes / Cues   Bike  TM  -bwd 2.0    0.9 mph 7.5'      11/26   IB / HR  HSS EOT  2x30\" / 2x15     L [facility]  11/19   Trunk rotation to the L   11/19   B lateral trunk stretch   Twisting   Over head lean  11/19   Supine SLR @ 20 deg  11/19   Bridging w/ Jc Schwab with TB abd  Bridge on [place]   BLUE 11/19   Standing TB   B EXT  B ABD Green  ^ 11/19   90/90S. Flossing   11/19   QUAD rocking / cat & camel  Quad prayer stretch in 3 positions.    11/26   [facility]      SB flexion + side bend roll outs      TrA SLR     S/L clamshells BLUE    TG squats     -Hip abd DL  SL 12/6 added SL   Seated bilat hip IR w/ TB green 12/6 added   S/L open books     Leg press - DL 90#    Quantum :   Knee ext  HSC   20#  25# 1/7 added          [facility]  Navigated 2 flights of stairs reciprocally    FM  Leg press  Knee flex  Knee ext  Mid row  LPD    Cybex hip abd           100#  40#  10#  10#  30#    50#   x1        1  1  1  1  1    1           15  20  20  15  15    30 Therapeutic Activities (58254)              //bars:  Albert Guzman  Fwd step up to contra LE hip flex   AirEx  4\"   -min use of UEs for balance  -No UE support        Step ups fwd 8in      Glider rev lunge                          Neuromuscular Re-ed (06402)   X               TB lateral side steps  Monster walks Green  green 10' //bars  10' //bars 3 laps  3 laps 11/19   Pelvic tilts seated on SB  Ant/post, M/L, cw/cww  Cues for pelvic disassociation    SB anti rotation press      Prone on HI/LO top dropped down  HIP extension    11/26   SB with  rows / core posture CC 3 pl mid   CC 4 pl high   12/6 added CC. Mod tc's for scap ret w/o UT activation   SB TrA marches      Standing anti rot press CC 2.5 pl 12/6 pt stated he needed to rest between sides   SB anti rotation isometric      Half kneeling SB plank     CC PNF chops 3 plates    Unilateral farmers carry 20# KB                                                            Manual Intervention (56120)        Hip/PROM   Focus on IR with left hip 11/19   Manual traction  B w/ SB   In supine 11/19   manual stretch   IT Band  Hip Flex   11/19   SI mobs to B sacral base,  G1-3      Mobs grade 2 in lumbar extension- prone press ups                                     Modalities:     Pt. Education:  -pt educated on diagnosis, prognosis and expectations for rehab  -all pt questions were answered    Home Exercise Program:  Access Code: [email or web address]  URL: [email or web address]  Date: 11/12/2021  Prepared by: Katarzyna Benz    Exercises  Supine Bridge - 1 x daily - 7 x weekly - 3 sets - 10 reps  Supine Lower Trunk Rotation - 1 x daily - 7 x weekly - 3 sets - 10 reps  Supine Hip Adduction Isometric with Ball - 1 x daily - 7 x weekly - 3 sets - 10 reps  Seated Hamstring Stretch - 1 x daily - 7 x weekly - 3 sets - 10 reps    Access Code: [email or web address]  URL: [email or web address]  Date: 12/08/2021  Prepared by: Katarzyna Benz    Exercises  Swiss Ball March - 1 x daily - 7 x weekly - 3 sets - 10 reps  Seated Thoracic Flexion and Rotation with Swiss Ball - 1 x daily - 7 x weekly - 3 sets - 10 reps  Supine Lower Trunk Rotation with Swiss Ball - 1 x daily - 7 x weekly - 3 sets - 10 reps  Pelvic Circles on Swiss Ball - 1 x daily - 7 x weekly - 3 sets - 10 reps  Prone Plank Elbows on Ball - 1 x daily - 7 x weekly - 3 sets - 10 reps        Therapeutic Exercise and NMR EXR  [x] (06374) Provided verbal/tactile cueing for activities related to strengthening, flexibility, endurance, ROM  for improvements in proximal hip and core control with self care, mobility, lifting and ambulation.  [] (20515) Provided verbal/tactile cueing for activities related to improving balance, coordination, kinesthetic sense, posture, motor skill, proprioception  to assist with core control in self care, mobility, lifting, and ambulation.   [] (85132) Therapist is in constant attendance of 2 or more patients providing skilled therapy interventions, but not providing any significant amount of measurable one-on-one time to either patient, for improvements in LE, proximal hip, and core control in self care, mobility, lifting, ambulation and eccentric single leg control.      Therapeutic Activities:    [] (76622 or 64305) Provided verbal/tactile cueing for activities related to improving balance, coordination, kinesthetic sense, posture, motor skill, proprioception and motor activation to allow for proper function  with self care and ADLs  [] (02009) Provided training and instruction to the patient for proper core and proximal hip recruitment and positioning with ambulation re-education     Home Exercise Program:    [] (97302) Reviewed/Progressed HEP activities related to strengthening, flexibility, endurance, ROM of core, proximal hip and LE for functional self-care, mobility, lifting and ambulation   [] (49248) Reviewed/Progressed HEP activities related to improving balance, coordination, kinesthetic sense, posture, motor skill, proprioception of core, proximal hip and LE for self care, mobility, lifting, and ambulation      Manual Treatments:  PROM / STM / Oscillations-Mobs:  G-I, II, III, IV (PA's, Inf., Post.)  [x] (70902) Provided manual therapy to mobilize proximal hip and LS spine soft tissue/joints for the purpose of modulating pain, promoting relaxation,  increasing ROM, reducing/eliminating soft tissue swelling/inflammation/restriction, improving soft tissue extensibility and allowing for proper ROM for normal function with self care, mobility, lifting and ambulation. Charges:  Timed Code Treatment Minutes: 42   Total Treatment Minutes: 42       [] EVAL - LOW (06490)   [] EVAL - MOD (45450)  [] EVAL - HIGH (22414)  [] RE-EVAL (62081)  [x] DM(27289) x 2    [] Ionto  [] NMR (08082) x      [] Vaso  [] Manual (92393) x      [] Ultrasound  [x] TA x  1     [] Mech Traction (71088)  [] GaitTraining x     [] ES (un) (47665):   [] Home Management Training [] ES(attended) (63230)             [] Aquatics    [] Group  [] Other    GOALS:  Patient stated goal: decrease pain  [x]? Progressing: []? Met: []? Not Met: []? Adjusted     Therapist goals for Patient:   Short Term Goals: To be achieved in: 2 weeks  1. Independent in HEP and progression per patient tolerance, in order to prevent re-injury. [x]? Progressing: []? Met: []? Not Met: []? Adjusted  2. Patient will have a decrease in pain to facilitate improvement in movement, function, and ADLs as indicated by Functional Deficits. [x]? Progressing: []? Met: []? Not Met: []? Adjusted     Long Term Goals: To be achieved in: 6 weeks  1. Disability index score of 10% or less for the VINCENT to assist with reaching prior level of function. [x]? Progressing: []? Met: []? Not Met: []? Adjusted  2. Patient will demonstrate increased AROM to WNL, good LS mobility, good hip ROM to allow for proper joint functioning as indicated by patients Functional Deficits. []? Progressing: [x]?  Met: Transition to HEP over next few weeks. [x] Continue per plan of care [] Alter current plan (see comments)  [] Plan of care initiated [] Hold pending MD visit [] Discharge    Electronically signed by: Jeni Murillo PT    Note: If patient does not return for scheduled/ recommended follow up visits, this note will serve as a discharge from care along with most recent update on progress.

## 2022-01-17 ENCOUNTER — HOSPITAL ENCOUNTER (OUTPATIENT)
Dept: PHYSICAL THERAPY | Age: 72
Setting detail: THERAPIES SERIES
Discharge: HOME | End: 2022-01-17
Payer: MEDICARE

## 2022-01-17 PROCEDURE — 97110 THERAPEUTIC EXERCISES: CPT

## 2022-01-17 NOTE — FLOWSHEET NOTE
[facility]  Phone: [phone]   Fax: [phone]        Physical Therapy Treatment Note/ Progress Report:     Date:  2022    Patient Name:  Bonnie Jamison    :  1950  MRN: [id number]  Restrictions/Precautions:    Medical/Treatment Diagnosis Information:  Diagnosis: M54.16 (ICD-10-CM) - Radiculopathy, lumbar region  , M54.50 (ICD-10-CM) - Low back pain, unspecified  Treatment Diagnosis: L sided low back pain, painful SI joint, decreased LLE strength, decreased flexibility, hypomobility of lumbar spine , impaired functional status. Insurance/Certification information:  PT Insurance Information: Medicare (change for )  Physician Information:  Referring Practitioner: ISIDRA Monroy CNP  Plan of care signed (Y/N): [x]  Yes-  []  No    Date of Patient follow up with Physician:      Progress Report: []  Yes  [x]  No     Date Range for reporting period:  Beginnin21  POC: 21  POC: 22    Progress report due (10 Rx/or 30 days whichever is less):     Recertification due (POC duration/ or 90 days whichever is less): 22    Visit # Insurance Allowable Auth required? Date Range   13 mn []  Yes  [x]  No        Latex Allergy:  [x]NO      []YES  Preferred Language for Healthcare:   [x]English       []other:    Functional Scale:        Date assessed:  VINCENT: raw score =16 ; dysfunction = %    21  VINCENT: raw score =12 ; dysfunction = %                                            21   VINCENT: raw score =10 ; dysfunction = %                                            22      Pain level:  0/10     SUBJECTIVE:    Pt reports he is happy with how he is progressing, cleaned off 3 cars of snow today and felt great. OBJECTIVE:    : B hip abd 4+/5 , hip flexion B 4+/5, lumbar ROM - WNL , TrA activation = strong/good+  :  B hip abd 4/5 , hip flexion B 4+/5, lumbar ROM - WNL , TrA activation = strong/good+     : Stiffness with PROM ER of L hip, Pain with IR, Stiff ITB band with manual stretch.   11/19: Stiffness with PROM ER of L hip, Pain with IR/ER. Stiffness with hip flexors as well as IT Band  11/26 Increased antalgia entering clinic but this was improved upon leaving. Left SI has decreased mobility compared to Rt with ambulation      RESTRICTIONS/PRECAUTIONS: heart monitor , lumbar laminectomy , hx of CA, cervical fusion . Treatment based classification: flexion + mobilization     Exercises/Interventions:   Therapeutic Exercise (91536)x    Resistance / level Sets / Seconds Reps Notes / Cues   Bike  TM  -bwd 2.0    0.9 mph 5'      11/26   IB / HR  HSS EOT  2x30\" / 2x15     L [facility]  11/19   Trunk rotation to the L   11/19   B lateral trunk stretch   Twisting   Over head lean  11/19   Supine SLR @ 20 deg  11/19   Bridging w/ Meredith Santiago with TB abd  Bridge on [place]   BLUE 11/19   Standing TB   B EXT  B ABD Green  ^ 11/19   90/90S. Flossing   11/19   QUAD rocking / cat & camel  Quad prayer stretch in 3 positions.    11/26   [facility]      SB flexion + side bend roll outs      TrA SLR     S/L clamshells BLUE    TG squats     -Hip abd DL  SL 12/6 added SL   Seated bilat hip IR w/ TB green 12/6 added   S/L open books     Leg press - DL 90#    Quantum :   Knee ext  HSC   20#  25# 1/7 added          [facility]  Navigated 2 flights of stairs reciprocally    FM  Leg press  Knee flex  Knee ext  Mid row  LPD    Cybex hip abd           110#  40#  20#  10#  30#    50#   x1        1  1  1  1  1    1           30  30  30  10  15    30    Standing SB ext, SB  x10 ea  Added 1/17   Standing SB PNF   x10 ea  Added 1/17          Therapeutic Activities (80340)              //bars:  Eric Raphael  Fwd step up to contra LE hip flex   AirEx  4\"   -min use of UEs for balance  -No UE support        Step ups fwd 8in      Glider rev lunge                          Neuromuscular Re-ed (55455)   X               TB lateral side steps  Monster walks 11/19   Pelvic tilts seated on SB  Ant/post, M/L, cw/cww Cues for pelvic disassociation    SB anti rotation press     Prone on HI/LO top dropped down  HIP extension 11/26   SB with  rows / core posture   12/6 added CC. Mod tc's for scap ret w/o UT activation   SB TrA marches     Standing anti rot press 12/6 pt stated he needed to rest between sides   SB anti rotation isometric     Half kneeling SB plank    CC PNF chops    Unilateral farmers carry                                                            Manual Intervention (34414)        Hip/PROM   Focus on IR with left hip 11/19   Manual traction  B w/ SB   In supine 11/19   manual stretch   IT Band  Hip Flex   11/19   SI mobs to B sacral base,  G1-3      Mobs grade 2 in lumbar extension- prone press ups                                     Modalities:     Pt. Education:  -pt educated on diagnosis, prognosis and expectations for rehab  -all pt questions were answered    Home Exercise Program:  Access Code: [email or web address]  URL: [email or web address]  Date: 11/12/2021  Prepared by: Oletta Smoke    Exercises  Supine Bridge - 1 x daily - 7 x weekly - 3 sets - 10 reps  Supine Lower Trunk Rotation - 1 x daily - 7 x weekly - 3 sets - 10 reps  Supine Hip Adduction Isometric with Ball - 1 x daily - 7 x weekly - 3 sets - 10 reps  Seated Hamstring Stretch - 1 x daily - 7 x weekly - 3 sets - 10 reps    Access Code: [email or web address]  URL: [email or web address]  Date: 12/08/2021  Prepared by: Oletta Smoke    Exercises  Swiss Ball March - 1 x daily - 7 x weekly - 3 sets - 10 reps  Seated Thoracic Flexion and Rotation with Swiss Ball - 1 x daily - 7 x weekly - 3 sets - 10 reps  Supine Lower Trunk Rotation with Swiss Ball - 1 x daily - 7 x weekly - 3 sets - 10 reps  Pelvic Circles on Swiss Ball - 1 x daily - 7 x weekly - 3 sets - 10 reps  Prone Plank Elbows on Ball - 1 x daily - 7 x weekly - 3 sets - 10 reps        Therapeutic Exercise and NMR EXR  [x] (79399) Provided verbal/tactile cueing for activities related to strengthening, flexibility, endurance, ROM  for improvements in proximal hip and core control with self care, mobility, lifting and ambulation.  [] (84547) Provided verbal/tactile cueing for activities related to improving balance, coordination, kinesthetic sense, posture, motor skill, proprioception  to assist with core control in self care, mobility, lifting, and ambulation.   [] (45380) Therapist is in constant attendance of 2 or more patients providing skilled therapy interventions, but not providing any significant amount of measurable one-on-one time to either patient, for improvements in LE, proximal hip, and core control in self care, mobility, lifting, ambulation and eccentric single leg control.      Therapeutic Activities:    [] (06400 or 01979) Provided verbal/tactile cueing for activities related to improving balance, coordination, kinesthetic sense, posture, motor skill, proprioception and motor activation to allow for proper function  with self care and ADLs  [] (29841) Provided training and instruction to the patient for proper core and proximal hip recruitment and positioning with ambulation re-education     Home Exercise Program:    [] (31673) Reviewed/Progressed HEP activities related to strengthening, flexibility, endurance, ROM of core, proximal hip and LE for functional self-care, mobility, lifting and ambulation   [] (42718) Reviewed/Progressed HEP activities related to improving balance, coordination, kinesthetic sense, posture, motor skill, proprioception of core, proximal hip and LE for self care, mobility, lifting, and ambulation      Manual Treatments:  PROM / STM / Oscillations-Mobs:  G-I, II, III, IV (PA's, Inf., Post.)  [x] (20514) Provided manual therapy to mobilize proximal hip and LS spine soft tissue/joints for the purpose of modulating pain, promoting relaxation,  increasing ROM, reducing/eliminating soft tissue swelling/inflammation/restriction, improving soft tissue extensibility and allowing for proper ROM for normal function with self care, mobility, lifting and ambulation. Charges:  Timed Code Treatment Minutes: 45   Total Treatment Minutes: 45       [] EVAL - LOW (85887)   [] EVAL - MOD (74042)  [] EVAL - HIGH (31634)  [] RE-EVAL (40308)  [x] OH(10649) x 3   [] Ionto  [] NMR (39907) x      [] Vaso  [] Manual (31849) x      [] Ultrasound  [] TA x  1     [] Mech Traction (77861)  [] GaitTraining x     [] ES (un) (48045):   [] Home Management Training [] ES(attended) (91650)             [] Aquatics    [] Group  [] Other    GOALS:  Patient stated goal: decrease pain  [x]? Progressing: []? Met: []? Not Met: []? Adjusted     Therapist goals for Patient:   Short Term Goals: To be achieved in: 2 weeks  1. Independent in HEP and progression per patient tolerance, in order to prevent re-injury. [x]? Progressing: []? Met: []? Not Met: []? Adjusted  2. Patient will have a decrease in pain to facilitate improvement in movement, function, and ADLs as indicated by Functional Deficits. [x]? Progressing: []? Met: []? Not Met: []? Adjusted     Long Term Goals: To be achieved in: 6 weeks  1. Disability index score of 10% or less for the VINCENT to assist with reaching prior level of function. [x]? Progressing: []? Met: []? Not Met: []? Adjusted  2. Patient will demonstrate increased AROM to WNL, good LS mobility, good hip ROM to allow for proper joint functioning as indicated by patients Functional Deficits. []? Progressing: [x]? Met: []? Not Met: []? Adjusted  3. Patient will demonstrate an increase in Strength to grossly 5/5 good proximal hip and core activation to allow for proper functional mobility as indicated by patients Functional Deficits. []? Progressing: []? Met: []? Not Met: [x]? Adjusted  4. Patient will return to functional activities including ADLs, walking, riding bike, lifting without increased symptoms or restriction. [x]? Progressing: []? Met: []? Not Met: []? Adjusted  5. Pt will safely perform floor transfer IND with no increase in symptoms or restriction. []? Progressing: []? Met: []? Not Met: []? Adjusted    Overall Progression Towards Functional goals/ Treatment Progress Update:  [x] Patient is progressing as expected towards functional goals listed. [] Progression is slowed due to complexities/Impairments listed. [] Progression has been slowed due to co-morbidities. [] Plan just implemented, too soon to assess goals progression <30days   [] Goals require adjustment due to lack of progress  [] Patient is not progressing as expected and requires additional follow up with physician  [] Other    Persisting Functional Limitations/Impairments:  []Sitting [x]Standing   [x]Walking []Squatting/bending    []Stairs []ADL's    []Transfers []Reaching  []Housework []Job related tasks  []Driving []Sports/Recreation   [x]Sleeping []Other:    ASSESSMENT:    Pt with [facility] visit again today, increased weights and continued education to pt on how to set up and perform. No issues during visit, at end PT added SB PNF movements that pt enjoyed and he will add to his HEP program. Continue over next 2 visit to transition to HEP/[facility]. Treatment/Activity Tolerance:  [x] Patient able to complete tx  [] Patient limited by fatique  [] Patient limited by pain  [] Patient limited by other medical complications  [] Other:     Prognosis: [x] Good [] Fair  [] Poor    Patient Requires Follow-up: [x] Yes  [] No    PLAN: PT 1-2x / week for 4 weeks. BLE strength, floor transfers, lifting . Transition to HEP over next few weeks. [x] Continue per plan of care [] Alter current plan (see comments)  [] Plan of care initiated [] Hold pending MD visit [] Discharge    Electronically signed by: Jose Delgado, PT    Note: If patient does not return for scheduled/ recommended follow up visits, this note will serve as a discharge from care along with most recent update on progress.

## 2022-01-18 RX ORDER — DIGOXIN 125 MCG
TABLET ORAL
Qty: 30 TABLET | Refills: 3 | Status: SHIPPED | OUTPATIENT
Start: 2022-01-18 | End: 2022-02-14 | Stop reason: SDUPTHER

## 2022-01-18 NOTE — TELEPHONE ENCOUNTER
Last OV:5/25/2021    Last Labs:digoxin-3/4/2021, ekg-5/25/2021    Last Refill:6/7/2021    Next OV: on recall 5/2022

## 2022-01-19 ENCOUNTER — OFFICE VISIT (OUTPATIENT)
Dept: CARDIOLOGY CLINIC | Age: 72
End: 2022-01-19
Payer: MEDICARE

## 2022-01-19 VITALS — DIASTOLIC BLOOD PRESSURE: 74 MMHG | SYSTOLIC BLOOD PRESSURE: 108 MMHG | HEART RATE: 77 BPM | OXYGEN SATURATION: 96 %

## 2022-01-19 DIAGNOSIS — Z45.09 ENCOUNTER FOR ELECTRONIC ANALYSIS OF REVEAL EVENT RECORDER: ICD-10-CM

## 2022-01-19 DIAGNOSIS — I34.0 NONRHEUMATIC MITRAL VALVE REGURGITATION: ICD-10-CM

## 2022-01-19 DIAGNOSIS — E78.2 MIXED HYPERLIPIDEMIA: ICD-10-CM

## 2022-01-19 DIAGNOSIS — G47.33 OBSTRUCTIVE SLEEP APNEA SYNDROME: Chronic | ICD-10-CM

## 2022-01-19 DIAGNOSIS — I36.1 NONRHEUMATIC TRICUSPID VALVE REGURGITATION: ICD-10-CM

## 2022-01-19 DIAGNOSIS — I25.10 CORONARY ARTERY DISEASE INVOLVING NATIVE CORONARY ARTERY OF NATIVE HEART WITHOUT ANGINA PECTORIS: Primary | ICD-10-CM

## 2022-01-19 DIAGNOSIS — Z95.2 S/P AVR: ICD-10-CM

## 2022-01-19 DIAGNOSIS — I48.0 PAF (PAROXYSMAL ATRIAL FIBRILLATION) (HCC): ICD-10-CM

## 2022-01-19 PROCEDURE — 1123F ACP DISCUSS/DSCN MKR DOCD: CPT | Performed by: INTERNAL MEDICINE

## 2022-01-19 PROCEDURE — G8420 CALC BMI NORM PARAMETERS: HCPCS | Performed by: INTERNAL MEDICINE

## 2022-01-19 PROCEDURE — 1036F TOBACCO NON-USER: CPT | Performed by: INTERNAL MEDICINE

## 2022-01-19 PROCEDURE — 99214 OFFICE O/P EST MOD 30 MIN: CPT | Performed by: INTERNAL MEDICINE

## 2022-01-19 PROCEDURE — G8484 FLU IMMUNIZE NO ADMIN: HCPCS | Performed by: INTERNAL MEDICINE

## 2022-01-19 PROCEDURE — 3017F COLORECTAL CA SCREEN DOC REV: CPT | Performed by: INTERNAL MEDICINE

## 2022-01-19 PROCEDURE — 4040F PNEUMOC VAC/ADMIN/RCVD: CPT | Performed by: INTERNAL MEDICINE

## 2022-01-19 PROCEDURE — G8427 DOCREV CUR MEDS BY ELIG CLIN: HCPCS | Performed by: INTERNAL MEDICINE

## 2022-01-19 RX ORDER — DILTIAZEM HYDROCHLORIDE 120 MG/1
120 CAPSULE, COATED, EXTENDED RELEASE ORAL DAILY
Qty: 90 CAPSULE | Refills: 3 | Status: ON HOLD | OUTPATIENT
Start: 2022-01-19 | End: 2022-09-10 | Stop reason: HOSPADM

## 2022-01-19 RX ORDER — ROSUVASTATIN CALCIUM 40 MG/1
40 TABLET, COATED ORAL DAILY
Qty: 90 TABLET | Refills: 3 | Status: SHIPPED | OUTPATIENT
Start: 2022-01-19

## 2022-01-19 NOTE — PATIENT INSTRUCTIONS
1.  Will change Diltiazem ER to 24 hour formulation - 120 mg daily  2. No change to Digoxin and Lisinopril (should be affordable)  3. Bad cholesterol remains elevated. Need to increase Rosuvastatin (Crestor) to 40 mg.  Prescription sent to pharmacy. Can use up 20 mg tablets by taking 2 tablets daily until you finish supply  4. Recheck fasting lipids, AST, ALT, and CK in two months (March 21st or later)   5. Call our office with any concerning cardiac symptoms  6. Consider scheduling echo in 2023. We can do this sooner if you get supplemental insurance with better coverage. Please call us with an update on insurance or if you are having more cardiac symptoms  7. Follow up in one year   .

## 2022-01-24 ENCOUNTER — HOSPITAL ENCOUNTER (OUTPATIENT)
Dept: PHYSICAL THERAPY | Age: 72
Setting detail: THERAPIES SERIES
Discharge: HOME | End: 2022-01-24
Payer: MEDICARE

## 2022-01-24 NOTE — FLOWSHEET NOTE
Physical Therapy  Cancellation/No-show Note  Patient Name:  Tobin Vivar  :  1950   Date:  2022  Cancelled visits to date: 1  No-shows to date: 1    Patient status for today's appointment patient:  []  [facility]   []  Rescheduled appointment  [x]  No-show      Reason given by patient:  []  Patient ill  []  Conflicting appointment  []  No transportation    []  Conflict with work  []  No reason given  []  Other:     Comments:      Phone call information:   []  Phone call made today to patient at _ time at number provided:      []  Patient answered, conversation as follows:    []  Patient did not answer, message left as follows:  [x]  Phone call not made today  []  Phone call not needed - pt contacted us to cancel and provided reason for cancellation.      Electronically signed by:  German Hardin PT

## 2022-01-26 ENCOUNTER — HOSPITAL ENCOUNTER (OUTPATIENT)
Dept: PHYSICAL THERAPY | Age: 72
Setting detail: THERAPIES SERIES
Discharge: HOME | End: 2022-01-26
Payer: MEDICARE

## 2022-01-26 PROCEDURE — 97110 THERAPEUTIC EXERCISES: CPT

## 2022-01-26 NOTE — FLOWSHEET NOTE
[facility]  Phone: [phone]   Fax: [phone]        Physical Therapy Treatment Note/ Progress Report:     Date:  2022    Patient Name:  Yessenia Reyes    :  1950  MRN: [id number]  Restrictions/Precautions:    Medical/Treatment Diagnosis Information:  Diagnosis: M54.16 (ICD-10-CM) - Radiculopathy, lumbar region  , M54.50 (ICD-10-CM) - Low back pain, unspecified  Treatment Diagnosis: L sided low back pain, painful SI joint, decreased LLE strength, decreased flexibility, hypomobility of lumbar spine , impaired functional status. Insurance/Certification information:  PT Insurance Information: Medicare (change for )  Physician Information:  Referring Practitioner: ISIDRA Alfonso CNP  Plan of care signed (Y/N): [x]  Yes-  []  No    Date of Patient follow up with Physician:      Progress Report: []  Yes  [x]  No     Date Range for reporting period:  Beginnin21  POC: 21  POC: 22    Progress report due (10 Rx/or 30 days whichever is less):     Recertification due (POC duration/ or 90 days whichever is less): 22    Visit # Insurance Allowable Auth required? Date Range   14 mn []  Yes  [x]  No        Latex Allergy:  [x]NO      []YES  Preferred Language for Healthcare:   [x]English       []other:    Functional Scale:        Date assessed:  VINCENT: raw score =16 ; dysfunction = %    21  VINCENT: raw score =12 ; dysfunction = %                                            21   VINCENT: raw score =10 ; dysfunction = %                                            22      Pain level:  1/10     SUBJECTIVE:      Having a little more soreness today, did HEP yesterday and may have over done it as he couldn't get up from his chair, so is having just a little more back pain, rib pain may be present too. OBJECTIVE:    : B hip abd 4+/5 , hip flexion B 4+/5, lumbar ROM - WNL , TrA activation = strong/good+  :  B hip abd 4/5 , hip flexion B 4+/5, lumbar ROM - WNL , TrA activation = strong/good+     11/17: Stiffness with PROM ER of L hip, Pain with IR, Stiff ITB band with manual stretch.   11/19: Stiffness with PROM ER of L hip, Pain with IR/ER. Stiffness with hip flexors as well as IT Band  11/26 Increased antalgia entering clinic but this was improved upon leaving. Left SI has decreased mobility compared to Rt with ambulation      RESTRICTIONS/PRECAUTIONS: heart monitor , lumbar laminectomy , hx of CA, cervical fusion . Treatment based classification: flexion + mobilization     Exercises/Interventions:   Therapeutic Exercise (20395)x    Resistance / level Sets / Seconds Reps Notes / Cues   Bike  TM  -bwd  -side stepping 2.0    0.9 mph  0.6 mph       2.5' / 2.5'  11/26   IB / HR  HSS EOT  2x30\" / 2x15     L [facility]  11/19   Trunk rotation to the L   11/19   B lateral trunk stretch   Twisting   Over head lean  11/19   Supine SLR @ 20 deg  11/19   Bridging w/ Janeal Caulk with TB abd  Bridge on [place]   BLUE 11/19   Standing TB   B EXT  B ABD Green  ^ 11/19   90/90S. Flossing   11/19   QUAD rocking / cat & camel  Quad prayer stretch in 3 positions.    11/26   [facility]      SB flexion + side bend roll outs      TrA SLR     S/L clamshells BLUE    TG squats     -Hip abd DL  SL 12/6 added SL   Seated bilat hip IR w/ TB green 12/6 added   S/L open books     Leg press - DL 90#    Quantum :   Knee ext  HSC   20#  25# 1/7 added          [facility]  Navigated 2 flights of stairs reciprocally    FM  Leg press  Knee flex  Knee ext  Mid row  LPD    Cybex hip abd      Cable cross  Push & pull on BOSU    Precor Elliptical           120#  40#  20#  10#  30#    50#  60#      15#    Fwd / bwd   x1        1  1  1      1  1      1    2' / 1'           30  30  30      10  15      10 B    Standing SB ext, SB   Added 1/17   Standing SB PNF    Added 1/17          Therapeutic Activities (92735)              //bars:  Devante Ramirez  Fwd step up to contra LE hip flex   AirEx  4\"   -min use of UEs for balance  -No UE support        Step ups fwd 8in      Glider rev lunge                          Neuromuscular Re-ed (59115)   X               TB lateral side steps  Monster walks 11/19   Pelvic tilts seated on SB  Ant/post, M/L, cw/cww Cues for pelvic disassociation    SB anti rotation press     Prone on HI/LO top dropped down  HIP extension 11/26   SB with  rows / core posture   12/6 added CC. Mod tc's for scap ret w/o UT activation   SB TrA marches     Standing anti rot press 12/6 pt stated he needed to rest between sides   SB anti rotation isometric     Half kneeling SB plank    CC PNF chops    Unilateral farmers carry                                                            Manual Intervention (78154)        Hip/PROM   Focus on IR with left hip 11/19   Manual traction  B w/ SB   In supine 11/19   manual stretch   IT Band  Hip Flex   11/19   SI mobs to B sacral base,  G1-3      Mobs grade 2 in lumbar extension- prone press ups                                     Modalities:     Pt. Education:  -pt educated on diagnosis, prognosis and expectations for rehab  -all pt questions were answered    Home Exercise Program:  Access Code: [email or web address]  URL: [email or web address]  Date: 11/12/2021  Prepared by: Gideon Cohen    Exercises  Supine Bridge - 1 x daily - 7 x weekly - 3 sets - 10 reps  Supine Lower Trunk Rotation - 1 x daily - 7 x weekly - 3 sets - 10 reps  Supine Hip Adduction Isometric with Ball - 1 x daily - 7 x weekly - 3 sets - 10 reps  Seated Hamstring Stretch - 1 x daily - 7 x weekly - 3 sets - 10 reps    Access Code: [email or web address]  URL: [email or web address]  Date: 12/08/2021  Prepared by: Gideon Cohen    Exercises  Swiss Ball March - 1 x daily - 7 x weekly - 3 sets - 10 reps  Seated Thoracic Flexion and Rotation with Swiss Ball - 1 x daily - 7 x weekly - 3 sets - 10 reps  Supine Lower Trunk Rotation with Swiss Ball - 1 x daily - 7 x weekly - 3 sets - 10 reps  Pelvic Circles on [place] - 1 x daily - 7 x weekly - 3 sets - 10 reps  Prone Plank Elbows on Ball - 1 x daily - 7 x weekly - 3 sets - 10 reps        Therapeutic Exercise and NMR EXR  [x] (77892) Provided verbal/tactile cueing for activities related to strengthening, flexibility, endurance, ROM  for improvements in proximal hip and core control with self care, mobility, lifting and ambulation.  [] (23912) Provided verbal/tactile cueing for activities related to improving balance, coordination, kinesthetic sense, posture, motor skill, proprioception  to assist with core control in self care, mobility, lifting, and ambulation.   [] (83796) Therapist is in constant attendance of 2 or more patients providing skilled therapy interventions, but not providing any significant amount of measurable one-on-one time to either patient, for improvements in LE, proximal hip, and core control in self care, mobility, lifting, ambulation and eccentric single leg control.      Therapeutic Activities:    [] (83657 or 83529) Provided verbal/tactile cueing for activities related to improving balance, coordination, kinesthetic sense, posture, motor skill, proprioception and motor activation to allow for proper function  with self care and ADLs  [] (19774) Provided training and instruction to the patient for proper core and proximal hip recruitment and positioning with ambulation re-education     Home Exercise Program:    [] (89557) Reviewed/Progressed HEP activities related to strengthening, flexibility, endurance, ROM of core, proximal hip and LE for functional self-care, mobility, lifting and ambulation   [] (16367) Reviewed/Progressed HEP activities related to improving balance, coordination, kinesthetic sense, posture, motor skill, proprioception of core, proximal hip and LE for self care, mobility, lifting, and ambulation      Manual Treatments:  PROM / STM / Oscillations-Mobs:  G-I, II, III, IV (PA's, Inf., Post.)  [x] (99348) Provided manual therapy to mobilize proximal hip and LS spine soft tissue/joints for the purpose of modulating pain, promoting relaxation,  increasing ROM, reducing/eliminating soft tissue swelling/inflammation/restriction, improving soft tissue extensibility and allowing for proper ROM for normal function with self care, mobility, lifting and ambulation. Charges:  Timed Code Treatment Minutes: 45   Total Treatment Minutes: 45       [] EVAL - LOW (64337)   [] EVAL - MOD (20780)  [] EVAL - HIGH (71670)  [] RE-EVAL (73756)  [x] HJ(52081) x 3   [] Ionto  [] NMR (09228) x      [] Vaso  [] Manual (37972) x      [] Ultrasound  [] TA x  1     [] Mech Traction (73153)  [] GaitTraining x     [] ES (un) (90009):   [] Home Management Training [] ES(attended) (85866)             [] Aquatics    [] Group  [] Other    GOALS:  Patient stated goal: decrease pain  [x]? Progressing: []? Met: []? Not Met: []? Adjusted     Therapist goals for Patient:   Short Term Goals: To be achieved in: 2 weeks  1. Independent in HEP and progression per patient tolerance, in order to prevent re-injury. [x]? Progressing: []? Met: []? Not Met: []? Adjusted  2. Patient will have a decrease in pain to facilitate improvement in movement, function, and ADLs as indicated by Functional Deficits. [x]? Progressing: []? Met: []? Not Met: []? Adjusted     Long Term Goals: To be achieved in: 6 weeks  1. Disability index score of 10% or less for the VINCENT to assist with reaching prior level of function. [x]? Progressing: []? Met: []? Not Met: []? Adjusted  2. Patient will demonstrate increased AROM to WNL, good LS mobility, good hip ROM to allow for proper joint functioning as indicated by patients Functional Deficits. []? Progressing: [x]? Met: []? Not Met: []? Adjusted  3. Patient will demonstrate an increase in Strength to grossly 5/5 good proximal hip and core activation to allow for proper functional mobility as indicated by patients Functional Deficits. []? Progressing: []?  Met: []? Not Met: [x]? Adjusted  4. Patient will return to functional activities including ADLs, walking, riding bike, lifting without increased symptoms or restriction. [x]? Progressing: []? Met: []? Not Met: []? Adjusted  5. Pt will safely perform floor transfer IND with no increase in symptoms or restriction. []? Progressing: []? Met: []? Not Met: []? Adjusted    Overall Progression Towards Functional goals/ Treatment Progress Update:  [x] Patient is progressing as expected towards functional goals listed. [] Progression is slowed due to complexities/Impairments listed. [] Progression has been slowed due to co-morbidities. [] Plan just implemented, too soon to assess goals progression <30days   [] Goals require adjustment due to lack of progress  [] Patient is not progressing as expected and requires additional follow up with physician  [] Other    Persisting Functional Limitations/Impairments:  []Sitting [x]Standing   [x]Walking []Squatting/bending    []Stairs []ADL's    []Transfers []Reaching  []Housework []Job related tasks  []Driving []Sports/Recreation   [x]Sleeping []Other:    ASSESSMENT:    Pt perfromed all machines well, did have difficulty while on BOSU, not sure if he'll do that activity without assist.  Was able to setup machines but required instructions for each part of the machine. Continue to instruct on activities in HP and discharge next session. Encourage group exercise classes, such as Yoga, to further improve exercise compliance after discharge. Treatment/Activity Tolerance:  [x] Patient able to complete tx  [] Patient limited by fatique  [] Patient limited by pain  [] Patient limited by other medical complications  [] Other:     Prognosis: [x] Good [] Fair  [] Poor    Patient Requires Follow-up: [x] Yes  [] No    PLAN: PT 1-2x / week for 4 weeks. BLE strength, floor transfers, lifting . Transition to HEP over next few weeks.    [x] Continue per plan of care [] Alter current plan (see comments)  [] Plan of care initiated [] Hold pending MD visit [] Discharge    Electronically signed by: Royanne Soulier, PT    Note: If patient does not return for scheduled/ recommended follow up visits, this note will serve as a discharge from care along with most recent update on progress.

## 2022-01-31 ENCOUNTER — HOSPITAL ENCOUNTER (OUTPATIENT)
Dept: PHYSICAL THERAPY | Age: 72
Setting detail: THERAPIES SERIES
Discharge: HOME | End: 2022-01-31
Payer: MEDICARE

## 2022-01-31 PROCEDURE — 97110 THERAPEUTIC EXERCISES: CPT

## 2022-01-31 PROCEDURE — 97530 THERAPEUTIC ACTIVITIES: CPT

## 2022-01-31 NOTE — FLOWSHEET NOTE
[facility]  Phone: [phone]   Fax: [phone]     Physical Therapy Discharge Summary    Dear  , ISIDRA Marcus CNP    We had the pleasure of treating the following patient for physical therapy services at [facility]. A summary of our findings can be found in the discharge summary below. If you have any questions or concerns regarding these findings, please do not hesitate to contact me at the office phone number checked above. Thank you for the referral.     Physician Signature:________________________________Date:__________________  By signing above (or electronic signature), therapists plan is approved by physician                                                      Sub-sections:   ;   ;         Overall Response to Treatment:   [x]Patient is responding well to treatment and improvement is noted with regards  to goals   []Patient should continue to improve in reasonable time if they continue HEP   []Patient has plateaued and is no longer responding to skilled PT intervention    []Patient is getting worse and would benefit from return to referring MD   []Patient unable to adhere to initial POC   []Other: Pt has now met all goals, IND with HEP and plans to go to [facility] to continue exercises and maintain strength and progress made. PT progressed pt through low back exercises to improve mobility and strength which pt has now achieved. Pt has improved his function as a result of decreased pain and improved mobility/ strength. Pt to follow up with MD/ PT as needed. Date range of Visits: 21-22  Total Visits: 15    Recommendation:    [x] Discharge to HEP. Follow up with PT or MD PRN.          Physical Therapy Treatment Note/ Progress Report:     Date:  2022    Patient Name:  Leeann Ross    :  1950  MRN: [id number]  Restrictions/Precautions:    Medical/Treatment Diagnosis Information:  Diagnosis: M54.16 (ICD-10-CM) - Radiculopathy, lumbar region  , M54.50 (ICD-10-CM) - Low back pain, unspecified  Treatment Diagnosis: L sided low back pain, painful SI joint, decreased LLE strength, decreased flexibility, hypomobility of lumbar spine , impaired functional status. Insurance/Certification information:  PT Insurance Information: Medicare (change for )  Physician Information:  Referring Practitioner: ISIDRA Barreto CNP  Plan of care signed (Y/N): [x]  Yes-  []  No    Date of Patient follow up with Physician:      Progress Report: []  Yes  [x]  No     Date Range for reporting period:  Beginnin21  POC: 21  POC: 22    Progress report due (10 Rx/or 30 days whichever is less):     Recertification due (POC duration/ or 90 days whichever is less): 22    Visit # Insurance Allowable Auth required? Date Range   15 mn []  Yes  [x]  No        Latex Allergy:  [x]NO      []YES  Preferred Language for Healthcare:   [x]English       []other:    Functional Scale:        Date assessed:  VINCENT: raw score =16 ; dysfunction = %    21  VINCENT: raw score =12 ; dysfunction = %                                            21   VINCENT: raw score =10 ; dysfunction = %                                            22      Pain level:  0/10     SUBJECTIVE:   Pt reports no pain, ready to d/c to HEP. OBJECTIVE:    : B hip abd5 /5 , hip flexion B 5/5, lumbar ROM - WNL , TrA activation = strong/good+   : B hip abd 4+/5 , hip flexion B 4+/5, lumbar ROM - WNL , TrA activation = strong/good+  : B hip abd 4/5 , hip flexion B 4+/5, lumbar ROM - WNL , TrA activation = strong/good+     : Stiffness with PROM ER of L hip, Pain with IR, Stiff ITB band with manual stretch.   : Stiffness with PROM ER of L hip, Pain with IR/ER. Stiffness with hip flexors as well as IT Band   Increased antalgia entering clinic but this was improved upon leaving.  Left SI has decreased mobility compared to Rt with ambulation      RESTRICTIONS/PRECAUTIONS: heart monitor , lumbar laminectomy , hx of CA, cervical fusion . Treatment based classification: flexion + mobilization     Exercises/Interventions:   Therapeutic Exercise (34011)x    Resistance / level Sets / Seconds Reps Notes / Cues   Bike  TM  -bwd  -side stepping 2.0    0.9 mph  0.6 mph       2.5' / 2.5'  11/26   IB / HR  HSS EOT  2x30\" / 2x15     L [facility]  11/19   Trunk rotation to the L   11/19   B lateral trunk stretch   Twisting   Over head lean  11/19   Supine SLR @ 20 deg  11/19   Bridging w/ Trudy Seals with TB abd  Bridge on [place]   BLUE 11/19   Standing TB   B EXT  B ABD Green  ^ 11/19   90/90S. Flossing   11/19   QUAD rocking / cat & camel  Quad prayer stretch in 3 positions.    11/26   [facility]      SB flexion + side bend roll outs      TrA SLR     S/L clamshells BLUE    TG squats     -Hip abd DL  SL 12/6 added SL   Seated bilat hip IR w/ TB green 12/6 added   S/L open books     Leg press - DL 90#    Quantum :   Knee ext  HSC   20#  25# 1/7 added          [facility]  Navigated 2 flights of stairs reciprocally    FM  Leg press  Knee flex  Knee ext  Mid row  LPD    Cybex hip abd      Cable cross  Push & pull on BOSU    Precor Elliptical           120#  40#  20#  10#  30#    50#  60#      15#    Fwd / bwd   x1        1  1  1      1  1      1    2' / 1'           30  30  30          Standing SB ext, SB   Added 1/17   Standing SB PNF    Added 1/17          Therapeutic Activities (84909)              //bars:  Morales Mealing  Fwd step up to contra LE hip flex   AirEx  4\"   -min use of UEs for balance  -No UE support        Step ups fwd 8in      Glider rev lunge                          Neuromuscular Re-ed (28869)   X               TB lateral side steps  Monster walks 11/19   Pelvic tilts seated on SB  Ant/post, M/L, cw/cww Cues for pelvic disassociation    SB anti rotation press     Prone on HI/LO top dropped down  HIP extension 11/26   SB with  rows / core posture   12/6 added CC. Mod tc's for scap ret w/o UT activation   SB TrA marches     Standing anti rot press 12/6 pt stated he needed to rest between sides   SB anti rotation isometric     Half kneeling SB plank    CC PNF chops    Unilateral farmers carry                                                            Manual Intervention (10519)        Hip/PROM   Focus on IR with left hip 11/19   Manual traction  B w/ SB   In supine 11/19   manual stretch   IT Band  Hip Flex   11/19   SI mobs to B sacral base,  G1-3      Mobs grade 2 in lumbar extension- prone press ups                                     Modalities:     Pt. Education:  -pt educated on diagnosis, prognosis and expectations for rehab  -all pt questions were answered    Home Exercise Program:  Access Code: [email or web address]  URL: [email or web address]  Date: 11/12/2021  Prepared by: Jerl Matte    Exercises  Supine Bridge - 1 x daily - 7 x weekly - 3 sets - 10 reps  Supine Lower Trunk Rotation - 1 x daily - 7 x weekly - 3 sets - 10 reps  Supine Hip Adduction Isometric with Ball - 1 x daily - 7 x weekly - 3 sets - 10 reps  Seated Hamstring Stretch - 1 x daily - 7 x weekly - 3 sets - 10 reps    Access Code: [email or web address]  URL: [employer]/  Date: 12/08/2021  Prepared by: Jerl Matte    Exercises  Swiss Ball March - 1 x daily - 7 x weekly - 3 sets - 10 reps  Seated Thoracic Flexion and Rotation with Swiss Ball - 1 x daily - 7 x weekly - 3 sets - 10 reps  Supine Lower Trunk Rotation with Swiss Ball - 1 x daily - 7 x weekly - 3 sets - 10 reps  Pelvic Circles on Swiss Ball - 1 x daily - 7 x weekly - 3 sets - 10 reps  Prone Plank Elbows on Ball - 1 x daily - 7 x weekly - 3 sets - 10 reps        Therapeutic Exercise and NMR EXR  [x] (80625) Provided verbal/tactile cueing for activities related to strengthening, flexibility, endurance, ROM  for improvements in proximal hip and core control with self care, mobility, lifting and ambulation.  [] (38114) Treatment Minutes: 42       [] EVAL - LOW (46674)   [] EVAL - MOD (93029)  [] EVAL - HIGH (22820)  [] RE-EVAL (14159)  [x] PK(17824) x 2   [] Ionto  [] NMR (47596) x      [] Vaso  [] Manual (94758) x      [] Ultrasound  [x] TA x  1     [] Mech Traction (09396)  [] GaitTraining x     [] ES (un) (56139):   [] Home Management Training [] ES(attended) (22782)             [] Aquatics    [] Group  [] Other    GOALS:  Patient stated goal: decrease pain  []? Progressing: [x]? Met: []? Not Met: []? Adjusted     Therapist goals for Patient:   Short Term Goals: To be achieved in: 2 weeks  1. Independent in HEP and progression per patient tolerance, in order to prevent re-injury. []? Progressing: [x]? Met: []? Not Met: []? Adjusted  2. Patient will have a decrease in pain to facilitate improvement in movement, function, and ADLs as indicated by Functional Deficits. []? Progressing: [x]? Met: []? Not Met: []? Adjusted     Long Term Goals: To be achieved in: 6 weeks  1. Disability index score of 10% or less for the VINCENT to assist with reaching prior level of function. []? Progressing: [x]? Met: []? Not Met: []? Adjusted  2. Patient will demonstrate increased AROM to WNL, good LS mobility, good hip ROM to allow for proper joint functioning as indicated by patients Functional Deficits. []? Progressing: [x]? Met: []? Not Met: []? Adjusted  3. Patient will demonstrate an increase in Strength to grossly 5/5 good proximal hip and core activation to allow for proper functional mobility as indicated by patients Functional Deficits. []? Progressing: [x]? Met: []? Not Met: []? Adjusted  4. Patient will return to functional activities including ADLs, walking, riding bike, lifting without increased symptoms or restriction. []? Progressing: [x]? Met: []? Not Met: []? Adjusted  5. Pt will safely perform floor transfer IND with no increase in symptoms or restriction. []? Progressing: [x]? Met: []? Not Met: []?  Adjusted    Overall Progression Towards Functional goals/ Treatment Progress Update:  [x] Patient is progressing as expected towards functional goals listed. [] Progression is slowed due to complexities/Impairments listed. [] Progression has been slowed due to co-morbidities. [] Plan just implemented, too soon to assess goals progression <30days   [] Goals require adjustment due to lack of progress  [] Patient is not progressing as expected and requires additional follow up with physician  [] Other    Persisting Functional Limitations/Impairments:  []Sitting []Standing   []Walking []Squatting/bending    []Stairs []ADL's    []Transfers []Reaching  []Housework []Job related tasks  []Driving []Sports/Recreation   []Sleeping []Other:    ASSESSMENT:      Treatment/Activity Tolerance:  [x] Patient able to complete tx  [] Patient limited by fatique  [] Patient limited by pain  [] Patient limited by other medical complications  [] Other:     Prognosis: [x] Good [] Fair  [] Poor    Patient Requires Follow-up: [] Yes  [x] No    PLAN: PT 1-2x / week for 4 weeks. [] Continue per plan of care [] Alter current plan (see comments)  [] Plan of care initiated [] Hold pending MD visit [x] Discharge    Electronically signed by: Sloane Farrar PT    Note: If patient does not return for scheduled/ recommended follow up visits, this note will serve as a discharge from care along with most recent update on progress.

## 2022-02-03 ENCOUNTER — NURSE TRIAGE (OUTPATIENT)
Dept: OTHER | Facility: CLINIC | Age: 72
End: 2022-02-03

## 2022-02-03 NOTE — TELEPHONE ENCOUNTER
Received call from Jaz Watts at [facility] with [employer]. Subjective: Caller states \"I have heart disease, had 2-3 surgeries. Calling today to report new onset bilateral ankle swelling up 4-5\" on legs, numbness/pain to feet with with movement, pitting edema (worse in the morning), visible red spider veins from groin to knee and from ankle up about 6\" on left leg (new onset). \"     Current Symptoms: bilateral ankle swelling (pitting edema, noted from wearing socks) left medial leg - a red vein swelling, itching from groin to knee and from ankle up about 6\" on left leg. Patient denies any CP, SOB, or difficulty breathing. Onset: 3 days ago; occurred during sleep    Associated Symptoms: chronic difficulty sleeping and nocturia    Pain Severity: 6/10; sharp; constant - worse with movement    Temperature: NA Denies feeling feverish    What has been tried: Elevation    LMP: NA Pregnant: NA    Recommended disposition: Go to ED Now (or PCP triage)    2nd level triage completed with Hinton Nissen, NP; provider recommends patient elevate legs, limit sodium intake and follow up with PCP or cardiology tomorrow for VV or go to Urgent care or ER. Care advice provided, patient verbalizes understanding; denies any other questions or concerns; instructed to call back for any new or worsening symptoms. Writer provided warm transfer to Joshua Brock 12 to schedule appointment. Attention Provider: Thank you for allowing me to participate in the care of your patient. The patient was connected to triage in response to information provided to the ECC/PSC. Please do not respond through this encounter as the response is not directed to a shared pool.     Reason for Disposition   Patient sounds very sick or weak to the triager    Protocols used: [facility]

## 2022-02-04 ENCOUNTER — HOSPITAL ENCOUNTER (EMERGENCY)
Age: 72
Discharge: HOME | End: 2022-02-04
Attending: EMERGENCY MEDICINE
Payer: MEDICARE

## 2022-02-04 ENCOUNTER — TELEPHONE (OUTPATIENT)
Dept: CARDIOLOGY CLINIC | Age: 72
End: 2022-02-04

## 2022-02-04 ENCOUNTER — VIRTUAL VISIT (OUTPATIENT)
Dept: FAMILY MEDICINE CLINIC | Age: 72
End: 2022-02-04
Payer: MEDICARE

## 2022-02-04 ENCOUNTER — TELEPHONE (OUTPATIENT)
Dept: FAMILY MEDICINE CLINIC | Age: 72
End: 2022-02-04

## 2022-02-04 ENCOUNTER — APPOINTMENT (OUTPATIENT)
Dept: GENERAL RADIOLOGY | Age: 72
End: 2022-02-04
Payer: MEDICARE

## 2022-02-04 ENCOUNTER — APPOINTMENT (OUTPATIENT)
Dept: CT IMAGING | Age: 72
End: 2022-02-04
Payer: MEDICARE

## 2022-02-04 VITALS
WEIGHT: 150 LBS | TEMPERATURE: 98.2 F | OXYGEN SATURATION: 97 % | BODY MASS INDEX: 21.47 KG/M2 | SYSTOLIC BLOOD PRESSURE: 116 MMHG | HEART RATE: 67 BPM | RESPIRATION RATE: 16 BRPM | DIASTOLIC BLOOD PRESSURE: 87 MMHG | HEIGHT: 70 IN

## 2022-02-04 DIAGNOSIS — R20.2 PARESTHESIA OF BOTH FEET: ICD-10-CM

## 2022-02-04 DIAGNOSIS — R60.9 PERIPHERAL EDEMA: Primary | ICD-10-CM

## 2022-02-04 DIAGNOSIS — R06.02 SHORTNESS OF BREATH: ICD-10-CM

## 2022-02-04 DIAGNOSIS — R20.2 PARESTHESIA OF FOOT, BILATERAL: ICD-10-CM

## 2022-02-04 LAB
A/G RATIO: 1.6 (ref 1.1–2.2)
ALBUMIN SERPL-MCNC: 4.6 G/DL (ref 3.4–5)
ALP BLD-CCNC: 50 U/L (ref 40–129)
ALT SERPL-CCNC: 12 U/L (ref 10–40)
ANION GAP SERPL CALCULATED.3IONS-SCNC: 13 MMOL/L (ref 3–16)
AST SERPL-CCNC: 20 U/L (ref 15–37)
BASOPHILS ABSOLUTE: 0 K/UL (ref 0–0.2)
BASOPHILS RELATIVE PERCENT: 1 %
BILIRUB SERPL-MCNC: 0.8 MG/DL (ref 0–1)
BUN BLDV-MCNC: 8 MG/DL (ref 7–20)
CALCIUM SERPL-MCNC: 9.5 MG/DL (ref 8.3–10.6)
CHLORIDE BLD-SCNC: 104 MMOL/L (ref 99–110)
CO2: 24 MMOL/L (ref 21–32)
CREAT SERPL-MCNC: <0.5 MG/DL (ref 0.8–1.3)
EOSINOPHILS ABSOLUTE: 0.2 K/UL (ref 0–0.6)
EOSINOPHILS RELATIVE PERCENT: 3.1 %
GFR AFRICAN AMERICAN: >60
GFR NON-AFRICAN AMERICAN: >60
GLUCOSE BLD-MCNC: 93 MG/DL (ref 70–99)
HCT VFR BLD CALC: 41.6 % (ref 40.5–52.5)
HEMOGLOBIN: 14 G/DL (ref 13.5–17.5)
LYMPHOCYTES ABSOLUTE: 1.4 K/UL (ref 1–5.1)
LYMPHOCYTES RELATIVE PERCENT: 26.3 %
MCH RBC QN AUTO: 31.4 PG (ref 26–34)
MCHC RBC AUTO-ENTMCNC: 33.6 G/DL (ref 31–36)
MCV RBC AUTO: 93.3 FL (ref 80–100)
MONOCYTES ABSOLUTE: 0.5 K/UL (ref 0–1.3)
MONOCYTES RELATIVE PERCENT: 9.2 %
NEUTROPHILS ABSOLUTE: 3.1 K/UL (ref 1.7–7.7)
NEUTROPHILS RELATIVE PERCENT: 60.4 %
PDW BLD-RTO: 13.4 % (ref 12.4–15.4)
PLATELET # BLD: 219 K/UL (ref 135–450)
PMV BLD AUTO: 7.3 FL (ref 5–10.5)
POTASSIUM REFLEX MAGNESIUM: 3.9 MMOL/L (ref 3.5–5.1)
PRO-BNP: 89 PG/ML (ref 0–124)
RBC # BLD: 4.46 M/UL (ref 4.2–5.9)
SODIUM BLD-SCNC: 141 MMOL/L (ref 136–145)
TOTAL PROTEIN: 7.4 G/DL (ref 6.4–8.2)
TROPONIN: <0.01 NG/ML
WBC # BLD: 5.2 K/UL (ref 4–11)

## 2022-02-04 PROCEDURE — 3017F COLORECTAL CA SCREEN DOC REV: CPT | Performed by: FAMILY MEDICINE

## 2022-02-04 PROCEDURE — G8484 FLU IMMUNIZE NO ADMIN: HCPCS | Performed by: FAMILY MEDICINE

## 2022-02-04 PROCEDURE — 71260 CT THORAX DX C+: CPT

## 2022-02-04 PROCEDURE — 84484 ASSAY OF TROPONIN QUANT: CPT

## 2022-02-04 PROCEDURE — 36415 COLL VENOUS BLD VENIPUNCTURE: CPT

## 2022-02-04 PROCEDURE — 6370000000 HC RX 637 (ALT 250 FOR IP): Performed by: EMERGENCY MEDICINE

## 2022-02-04 PROCEDURE — 93005 ELECTROCARDIOGRAM TRACING: CPT | Performed by: EMERGENCY MEDICINE

## 2022-02-04 PROCEDURE — G8420 CALC BMI NORM PARAMETERS: HCPCS | Performed by: FAMILY MEDICINE

## 2022-02-04 PROCEDURE — 6360000004 HC RX CONTRAST MEDICATION: Performed by: EMERGENCY MEDICINE

## 2022-02-04 PROCEDURE — 1123F ACP DISCUSS/DSCN MKR DOCD: CPT | Performed by: FAMILY MEDICINE

## 2022-02-04 PROCEDURE — 4040F PNEUMOC VAC/ADMIN/RCVD: CPT | Performed by: FAMILY MEDICINE

## 2022-02-04 PROCEDURE — 99283 EMERGENCY DEPT VISIT LOW MDM: CPT

## 2022-02-04 PROCEDURE — 71045 X-RAY EXAM CHEST 1 VIEW: CPT

## 2022-02-04 PROCEDURE — G8427 DOCREV CUR MEDS BY ELIG CLIN: HCPCS | Performed by: FAMILY MEDICINE

## 2022-02-04 PROCEDURE — 99213 OFFICE O/P EST LOW 20 MIN: CPT | Performed by: FAMILY MEDICINE

## 2022-02-04 PROCEDURE — 83880 ASSAY OF NATRIURETIC PEPTIDE: CPT

## 2022-02-04 PROCEDURE — 85025 COMPLETE CBC W/AUTO DIFF WBC: CPT

## 2022-02-04 PROCEDURE — 80053 COMPREHEN METABOLIC PANEL: CPT

## 2022-02-04 PROCEDURE — 1036F TOBACCO NON-USER: CPT | Performed by: FAMILY MEDICINE

## 2022-02-04 RX ORDER — FUROSEMIDE 40 MG/1
40 TABLET ORAL ONCE
Status: COMPLETED | OUTPATIENT
Start: 2022-02-04 | End: 2022-02-04

## 2022-02-04 RX ORDER — FUROSEMIDE 20 MG/1
TABLET ORAL
Qty: 30 TABLET | Refills: 0 | Status: SHIPPED | OUTPATIENT
Start: 2022-02-04 | End: 2022-02-18 | Stop reason: SDUPTHER

## 2022-02-04 RX ADMIN — FUROSEMIDE 40 MG: 40 TABLET ORAL at 20:31

## 2022-02-04 RX ADMIN — IOPAMIDOL 75 ML: 755 INJECTION, SOLUTION INTRAVENOUS at 18:25
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
## 2022-02-04 NOTE — ED PROVIDER NOTES
[facility] Emergency Department    CHIEF COMPLAINT  Chief Complaint   Patient presents with    Shortness of Breath    Joint Swelling    Fatigue        HISTORY OF PRESENT ILLNESS  Hadley Blanco is a 73 y.o. male  who presents to the ED complaining of shortness of breath today with a several days of bilateral ankle swelling. No chest pain or discomfort. Denies any ankle pain or leg pain or erythema either. No history of blood clots. He does have tingling and numbness in his toes when he moves them around as well. He follows with Dr. Bobby Barone from cardiology. He has a longstanding history of significant valvular heart disease. He has a remote history of AVR (porcine). Last echo as below. No fevers cough abd pain or n/v/d. Last TTE 8/18/21:    Summary   -Normal left ventricle size, wall thickness and systolic function with an   estimated ejection fraction of 60-65%. -No regional wall motion abnormalities are seen.   -Indeterminate diastolic function.   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 -Moderate to severe mitral regurgitation. ( appears stable from previous echo   dated 2/24/2021). ERO 0.21.   -A bioprosthetic artificial aortic valve appears well seated with a maximum   velocity of 2.6m/s and a mean gradient of 16mmHg. -Moderate to severe tricuspid regurgitation.   -Estimated pulmonary artery systolic pressure is mildly elevated at 38 mmHg   assuming a right atrial pressure of 8 mmHg. -The right ventricle is moderately enlarged.   -The right atrium is moderately dilated. No other complaints, modifying factors or associated symptoms. I have reviewed the following from the nursing documentation.     Past Medical History:   Diagnosis Date    Aortic valve insufficiency     Arthritis     Atrial fibrillation ([facility]     Cancer ([facility] 09/2017    TONGUE head and neck IN REMISSION    Dysphonia     GERD (gastroesophageal reflux disease)     Hearing loss     Heart disease     Medical history reviewed with no changes     Obstructive apnea does not use CPAP    Oropharyngeal dysphagia     Refusal of blood transfusions as patient is Jewish     Thyroid disease      Past Surgical History:   Procedure Laterality Date    ANKLE SURGERY      AORTIC VALVE REPLACEMENT  1/28/15    Dr. Reynaldo Giang 25mm Mosaic Ultra porcine valve; right and left modified maze procedure with ligation of DELGADO with Atriclip    ATRIAL ABLATION SURGERY      BACK SURGERY      CSP    CARDIAC CATHETERIZATION      HERNIA REPAIR      KNEE SURGERY      Arthroscopy    LUMBAR SPINE SURGERY Right 10/13/2020    RIGHT LUMBAR4-LUMBAR5 MICRO HEMILAMINECTOMY AND DISCECTOMY (61663, [id number]) performed by Jose L Bradshaw MD at [address]      collapsed lung due to broken ribs    MANDIBLE FRACTURE SURGERY      NECK SURGERY      Fusion    PAIN MANAGEMENT PROCEDURE Right 2020    RIGHT L4 AND L5 TRANSFORAMINAL EPIDURAL STEROID INJECTION WITH FLUOROSCOPY ([id number]) performed by Don Rock MD at [address] Right 2020    RIGHT L4 AND L5 TRANSFORAMINAL EPIDURAL STEROID INJECTION WITH FLUOROSCOPY ([id number]) performed by Don Rock MD at [address]       Family History   Problem Relation Age of Onset    Heart Disease Other     High Blood Pressure Neg Hx     High Cholesterol Neg Hx      Social History     Socioeconomic History    Marital status:      Spouse name: Not on file    Number of children: 1    Years of education: Not on file    Highest education level: Not on file   Occupational History    Occupation: [employer]   Tobacco Use    Smoking status: Former Smoker     Packs/day: 1.00     Years: 40.00     Pack years: 40.00     Types: Cigarettes     Start date: 1975     Quit date: 2015     Years since quittin.0    Smokeless tobacco: Never Used    Tobacco comment: Maintain cessation   Vaping Use    Vaping Use: Never used   Substance and Sexual Activity    Alcohol use: No     Alcohol/week: 0.0 standard drinks     Comment: NA beer    Drug use: No    Sexual activity: Not Currently     Partners: Female   Other Topics Concern    Not on file   Social History Narrative    Not on file     Social Determinants of Health     Financial Resource Strain: Low Risk     Difficulty of Paying Living Expenses: Not hard at all   Food Insecurity: No Food Insecurity    Worried About Running Out of Food in the Last Year: Never true    Regis of Food in the Last Year: Never true   Transportation Needs:     Lack of Transportation (Medical): Not on file    Lack of Transportation (Non-Medical): Not on file   Physical Activity:     Days of Exercise per Week: Not on file    Minutes of Exercise per Session: Not on file   Stress:     Feeling of Stress : Not on file   Social Connections:     Frequency of Communication with Friends and Family: Not on file    Frequency of Social Gatherings with Friends and Family: Not on file    Attends [employer] Services: Not on file    Active Member of [address] [place] [address] or Organizations: Not on file    Attends Club or Organization Meetings: Not on file    Marital Status: Not on file   Intimate Partner Violence:     Fear of Current or Ex-Partner: Not on file    Emotionally Abused: Not on file    Physically Abused: Not on file    Sexually Abused: Not on file   Housing Stability:     Unable to Pay for Housing in the Last Year: Not on file    Number of [place] in the Last Year: Not on file    Unstable Housing in the Last Year: Not on file     No current facility-administered medications for this encounter. Current Outpatient Medications   Medication Sig Dispense Refill    diphenhydrAMINE (SOMINEX) 25 MG tablet Diphenhydramine Oral     take 1 tablet by mouth daily   active      furosemide (LASIX) 20 MG tablet t1 tab by mouth daily.  Call cardiology if swelling in legs worsens or shortness of breath occurs. 30 tablet 0    Cholecalciferol 50 MCG (2000 UT) TABS Take 2,000 Units by mouth daily      dilTIAZem (CARDIZEM CD) 120 MG extended release capsule Take 1 capsule by mouth daily 90 capsule 3    rosuvastatin (CRESTOR) 40 MG tablet Take 1 tablet by mouth daily 90 tablet 3    digoxin (LANOXIN) 125 MCG tablet TAKE 1 TABLET BY MOUTH EVERY DAY 30 tablet 3    lisinopril (PRINIVIL;ZESTRIL) 2.5 MG tablet TAKE 1/2 TABLET BY MOUTH EVERY DAY 45 tablet 1    levothyroxine (SYNTHROID) 112 MCG tablet TAKE 1 TABLET BY MOUTH EVERY DAY 90 tablet 3    pantoprazole (PROTONIX) 40 MG tablet TAKE 1 TABLET BY MOUTH TWICE A DAY BEFORE MEALS 180 tablet 3    cyclobenzaprine (FLEXERIL) 10 MG tablet  (Patient not taking: Reported on 1/19/2022)      aspirin 81 MG EC tablet Take 81 mg by mouth daily      oxyCODONE-acetaminophen (PERCOCET) 5-325 MG per tablet Take 1 tablet by mouth every 4 hours as needed for Pain.  mirtazapine (REMERON) 7.5 MG tablet Take 0.5 tablets by mouth nightly 90 tablet 3    fluticasone (FLOVENT HFA) 110 MCG/ACT inhaler Inhale 2 puffs into the lungs 2 times daily 12 g 3    melatonin 10 MG CAPS capsule Take 10 mg by mouth nightly as needed       docusate sodium (COLACE) 100 MG capsule Take 100 mg by mouth 2 times daily. Allergies   Allergen Reactions    Naproxen Swelling     Lips    Pt does not recognize this being an intolerance    Hydrocodone-Acetaminophen Itching       REVIEW OF SYSTEMS  10 systems reviewed, pertinent positives per HPI otherwise noted to be negative. PHYSICAL EXAM  BP (!) 119/95   Pulse 68   Temp 98.2 °F (36.8 °C) (Oral)   Resp 8   Ht 5' 10\" (1.778 m)   Wt 150 lb (68 kg)   SpO2 99%   BMI 21.52 kg/m²    GENERAL APPEARANCE: Awake and alert. Cooperative. No distress. HENT: Normocephalic. Atraumatic. Mucous membranes are dry. NECK: Supple. EYES: PERRL. EOM's grossly intact. HEART/CHEST: RRR.  No murmurs. No chest wall tenderness. LUNGS: Respirations unlabored. Clear lungs, CTAB. Good air exchange. Speaking comfortably in full sentences. ABDOMEN: No tenderness. Soft. Non-distended. No masses. No organomegaly. No guarding or rebound. Normal bowel sounds throughout. MUSCULOSKELETAL: 2+ L ankle, 1+ R ankle lower extremity edema. Compartments soft. No deformity. No tenderness in the extremities. All extremities neurovascularly intact. SKIN: Warm and dry. No acute rashes. NEUROLOGICAL: Alert and oriented. CN's 2-12 intact. No gross facial drooping. Strength 5/5, sensation intact. 2 plus DTR's in knees bilaterally. Gait normal.  PSYCHIATRIC: Normal mood and affect. LABS  I have reviewed all labs for this visit.    Results for orders placed or performed during the hospital encounter of 02/04/22   CBC auto differential   Result Value Ref Range    WBC 5.2 4.0 - 11.0 K/uL    RBC 4.46 4.20 - 5.90 M/uL    Hemoglobin 14.0 13.5 - 17.5 g/dL    Hematocrit 41.6 40.5 - 52.5 %    MCV 93.3 80.0 - 100.0 fL    MCH 31.4 26.0 - 34.0 pg    MCHC 33.6 31.0 - 36.0 g/dL    RDW 13.4 12.4 - 15.4 %    Platelets 343 085 - 716 K/uL    MPV 7.3 5.0 - 10.5 fL    Neutrophils % 60.4 %    Lymphocytes % 26.3 %    Monocytes % 9.2 %    Eosinophils % 3.1 %    Basophils % 1.0 %    Neutrophils Absolute 3.1 1.7 - 7.7 K/uL    Lymphocytes Absolute 1.4 1.0 - 5.1 K/uL    Monocytes Absolute 0.5 0.0 - 1.3 K/uL    Eosinophils Absolute 0.2 0.0 - 0.6 K/uL    Basophils Absolute 0.0 0.0 - 0.2 K/uL   Comprehensive Metabolic Panel w/ Reflex to MG   Result Value Ref Range    Sodium 141 136 - 145 mmol/L    Potassium reflex Magnesium 3.9 3.5 - 5.1 mmol/L    Chloride 104 99 - 110 mmol/L    CO2 24 21 - 32 mmol/L    Anion Gap 13 3 - 16    Glucose 93 70 - 99 mg/dL    BUN 8 7 - 20 mg/dL    CREATININE <0.5 (L) 0.8 - 1.3 mg/dL    GFR Non-African American >60 >60    GFR African American >60 >60    Calcium 9.5 8.3 - 10.6 mg/dL    Total Protein 7.4 6.4 - 8.2 g/dL Albumin 4.6 3.4 - 5.0 g/dL    Albumin/Globulin Ratio 1.6 1.1 - 2.2    Total Bilirubin 0.8 0.0 - 1.0 mg/dL    Alkaline Phosphatase 50 40 - 129 U/L    ALT 12 10 - 40 U/L    AST 20 15 - 37 U/L   Troponin   Result Value Ref Range    Troponin <0.01 <0.01 ng/mL   Brain Natriuretic Peptide   Result Value Ref Range    Pro-BNP 89 0 - 124 pg/mL   EKG 12 Lead   Result Value Ref Range    Ventricular Rate 71 BPM    Atrial Rate 71 BPM    P-R Interval 196 ms    QRS Duration 76 ms    Q-T Interval 380 ms    QTc Calculation (Bazett) 412 ms    P Axis 46 degrees    R Axis -4 degrees    T Axis 15 degrees    Diagnosis Normal sinus rhythmNormal ECG      The 12 lead EKG was interpreted by me as follows:  Rate: normal with a rate of 71  Rhythm: sinus  Axis: normal  Intervals: normal MN, narrow QRS, normal QTc  ST segments: no ST elevations or depressions  T waves: no abnormal inversions  Non-specific T wave changes: not present  Prior EKG comparison: EKG dated 3/7/21 is not significantly different    RADIOLOGY    XR CHEST PORTABLE    Result Date: 2/4/2022  EXAM: XR Chest, 1 View EXAM DATE/TIME: 2/4/2022 5:36 pm CLINICAL HISTORY: ORDERING SYSTEM PROVIDED  swelling in legs, SOB, suspect edema  TECHNOLOGIST PROVIDED HISTORY:  Reason for exam:->swelling in legs, SOB, suspect edema Reason for Exam: swelling in legs, SOB, suspect edema TECHNIQUE: Frontal view of the chest. COMPARISON: 03/04/2021 FINDINGS: Lungs:  Artifact from zippers are noted over the lower neck and left lung apex. Pleural space:  No acute findings. No pneumothorax. Heart:  No acute findings. Median sternotomy suggests prior cardiac surgery/procedure. Mediastinum:  No acute findings. Bones/joints:  See above. Tubes, lines and devices:  Electronic device overlies the left hilum as noted on the prior study. Left atrial exclusion device appears to be in place.      No acute findings in the chest.     CT CHEST PULMONARY EMBOLISM W CONTRAST    Result Date: 2/4/2022  EXAMINATION: CTA OF THE CHEST 2/4/2022 6:32 pm TECHNIQUE: CTA of the chest was performed after the administration of intravenous contrast.  Multiplanar reformatted images are provided for review. MIP images are provided for review. Dose modulation, iterative reconstruction, and/or weight based adjustment of the mA/kV was utilized to reduce the radiation dose to as low as reasonably achievable. COMPARISON: None. HISTORY: ORDERING SYSTEM PROVIDED HISTORY: +sob peripheral edema, BNP low TECHNOLOGIST PROVIDED HISTORY: Reason for exam:->+sob peripheral edema, BNP low Decision Support Exception - unselect if not a suspected or confirmed emergency medical condition->Emergency Medical Condition (MA) FINDINGS: Pulmonary Arteries: Pulmonary arteries are adequately opacified for evaluation. No evidence of intraluminal filling defect to suggest pulmonary embolism. Main pulmonary artery is normal in caliber. Mediastinum: No evidence of mediastinal lymphadenopathy. The heart and pericardium demonstrate no acute abnormality. There is no acute abnormality of the thoracic aorta. Lungs/pleura: The lungs are without acute process. No focal consolidation or pulmonary edema. No evidence of pleural effusion or pneumothorax. Upper Abdomen: Limited images of the upper abdomen are unremarkable. Soft Tissues/Bones: No acute bone or soft tissue abnormality. No evidence of pulmonary embolism or acute pulmonary abnormality. ED COURSE/MDM  Patient seen and evaluated. Old records reviewed. Labs and imaging reviewed and results discussed with patient.       After initial evaluation, differential diagnostic considerations included: acute coronary syndrome, pulmonary embolism, COPD/asthma, pneumonia, sepsis, pericardial tamponade, pneumothorax, CHF, thoracic aortic dissection, anxiety    The patient's ED workup was notable for some peripheral edema although apparently is worse in the morning and is generally unremarkable on exam.  He also has a clear lung exam, with chest x-ray and subsequent CT showing no evidence of a PE, infiltrate, edema or effusions. He is not hypoxic with ambulation and vital signs are overall quite reassuring in the ED. He was given a dose of Lasix here in his primary care did prescribe him Lasix for home. I recommend he follow-up with his PCP and his cardiologist but at this point there is no negation for admission. His BNP is only 89, troponin negative and he has had no chest pain to suggest anginal etiology. During the patient's ED course, the patient was given:  Medications   iopamidol (ISOVUE-370) 76 % injection 75 mL (75 mLs IntraVENous Given 2/4/22 1825)   furosemide (LASIX) tablet 40 mg (40 mg Oral Given 2/4/22 2031)        CLINICAL IMPRESSION  1. Peripheral edema    2. Shortness of breath        Blood pressure (!) 119/95, pulse 68, temperature 98.2 °F (36.8 °C), temperature source Oral, resp. rate 8, height 5' 10\" (1.778 m), weight 150 lb (68 kg), SpO2 99 %. DISPOSITION  Judee Baumgarten was discharged to home in stable condition. I have discussed the findings of today's workup with the patient and addressed the patient's questions and concerns. Important warning signs as well as new or worsening symptoms which would necessitate immediate return to the ED were discussed. The plan is to discharge from the ED at this time, and the patient is in stable condition. The patient acknowledged understanding is agreeable with this plan.     Follow-up with:  Joann Fuentes MD  [place] [address]  [address] [address] [address]  [phone]    Schedule an appointment as soon as possible for a visit in 1 week  For symptom re-evaluation    Dr. Saleem Blinks from cardiology    Call in 1 day  For symptom re-evaluation    [facility] Emergency Department  [address] [place] [address]  [phone]  Go to   If symptoms worsen      DISCLAIMER: This chart was created using Dragon dictation software. Efforts were made by me to ensure accuracy, however some errors may be present due to limitations of this technology and occasionally words are not transcribed correctly.         Mendoza Covarrubias MD  02/04/22 2033

## 2022-02-04 NOTE — TELEPHONE ENCOUNTER
Spoke to patient. He was seen by Dr. Lina Rogers last month and did not report issues with shortness of breath and lower extremity edema. Told patient that I agree with Dr. Isaiah Montiel recommendation of going to the ER for evaluation as his symptoms have changed. Told patient the evaluation of symptoms should be done today with ER visit and not delayed until next week for an office visit. Patient voiced understanding and states he will get transportation to the ER. He did not want to call 911.

## 2022-02-04 NOTE — TELEPHONE ENCOUNTER
----- Message from Lo Eleonora sent at 2/3/2022  6:48 PM EST -----  Subject: Appointment Request    Reason for Call: Urgent Return from RN Triage    QUESTIONS  Type of Appointment? Established Patient  Reason for appointment request? No appointments available during search  Additional Information for Provider? Patient was transferred from NT to   schedule urgent appointment tomorrow none are populating. Requesting a   call back to get Pt in as soon as possible, He refused to see other   providers. ---------------------------------------------------------------------------  --------------  Stefany BOWENS  What is the best way for the office to contact you? OK to leave message on   voicemail  Preferred Call Back Phone Number? [phone]  ---------------------------------------------------------------------------  --------------  SCRIPT ANSWERS  Patient needs to be seen today? No  Patient needs to be seen today or tomorrow? Yes   Patient needs to be seen within 3 days? No  Patient needs to be seen within 5 days? No  Patient can be seen for a routine visit? No  Nurse Name? Yesenia  Have you been diagnosed with, awaiting test results for, or told that you   are suspected of having COVID-19 (Coronavirus)? (If patient has tested   negative or was tested as a requirement for work, school, or travel and   not based on symptoms, answer no)? No  Within the past two weeks have you developed any of the following symptoms   (answer no if symptoms have been present longer than 2 weeks or began   more than 2 weeks ago)? Fever or Chills, Cough, Shortness of breath or   difficulty breathing, Loss of taste or smell, Sore throat, Nasal   congestion, Sneezing or runny nose, Fatigue or generalized body aches   (answer no if pain is specific to a body part e.g. back pain), Diarrhea,   Headache? No  Have you had close contact with someone with COVID-19 in the last 14 days?    No  (Service Expert  click yes below to proceed with [employer]ual   Scheduling)?  Yes

## 2022-02-04 NOTE — ED NOTES
Patient ambulated around the ED with SPO2 monitor. Patient HR remained 75-77 bpm, with a SPO2 reading of 97-98%.       Syed Peterson RN  02/04/22 2522

## 2022-02-05 LAB
EKG ATRIAL RATE: 71 BPM
EKG DIAGNOSIS: NORMAL
EKG P AXIS: 46 DEGREES
EKG P-R INTERVAL: 196 MS
EKG Q-T INTERVAL: 380 MS
EKG QRS DURATION: 76 MS
EKG QTC CALCULATION (BAZETT): 412 MS
EKG R AXIS: -4 DEGREES
EKG T AXIS: 15 DEGREES
EKG VENTRICULAR RATE: 71 BPM

## 2022-02-05 PROCEDURE — 93010 ELECTROCARDIOGRAM REPORT: CPT | Performed by: INTERNAL MEDICINE

## 2022-02-07 ENCOUNTER — CARE COORDINATION (OUTPATIENT)
Dept: CARE COORDINATION | Age: 72
End: 2022-02-07

## 2022-02-07 ENCOUNTER — OFFICE VISIT (OUTPATIENT)
Dept: FAMILY MEDICINE CLINIC | Age: 72
End: 2022-02-07
Payer: MEDICARE

## 2022-02-07 ENCOUNTER — TELEPHONE (OUTPATIENT)
Dept: CARDIOLOGY CLINIC | Age: 72
End: 2022-02-07

## 2022-02-07 VITALS
SYSTOLIC BLOOD PRESSURE: 108 MMHG | HEIGHT: 70 IN | DIASTOLIC BLOOD PRESSURE: 60 MMHG | WEIGHT: 157 LBS | HEART RATE: 73 BPM | BODY MASS INDEX: 22.48 KG/M2 | OXYGEN SATURATION: 95 %

## 2022-02-07 DIAGNOSIS — R60.9 PERIPHERAL EDEMA: Primary | ICD-10-CM

## 2022-02-07 DIAGNOSIS — I48.0 PAF (PAROXYSMAL ATRIAL FIBRILLATION) (HCC): ICD-10-CM

## 2022-02-07 DIAGNOSIS — G89.3 CHRONIC PAIN DUE TO NEOPLASM: ICD-10-CM

## 2022-02-07 DIAGNOSIS — E03.9 ACQUIRED HYPOTHYROIDISM: ICD-10-CM

## 2022-02-07 DIAGNOSIS — F41.9 ANXIETY: ICD-10-CM

## 2022-02-07 PROBLEM — E78.5 HYPERLIPIDEMIA: Status: ACTIVE | Noted: 2020-07-22

## 2022-02-07 PROBLEM — I25.9 ISCHEMIC HEART DISEASE, CHRONIC: Status: ACTIVE | Noted: 2020-07-22

## 2022-02-07 PROBLEM — G47.30 SLEEP APNEA: Status: ACTIVE | Noted: 2020-07-22

## 2022-02-07 PROBLEM — M54.16 RADICULOPATHY, LUMBAR REGION: Status: ACTIVE | Noted: 2021-11-01

## 2022-02-07 PROBLEM — I10 HYPERTENSION: Status: ACTIVE | Noted: 2020-07-22

## 2022-02-07 PROCEDURE — 1123F ACP DISCUSS/DSCN MKR DOCD: CPT | Performed by: FAMILY MEDICINE

## 2022-02-07 PROCEDURE — 4040F PNEUMOC VAC/ADMIN/RCVD: CPT | Performed by: FAMILY MEDICINE

## 2022-02-07 PROCEDURE — G8427 DOCREV CUR MEDS BY ELIG CLIN: HCPCS | Performed by: FAMILY MEDICINE

## 2022-02-07 PROCEDURE — 3017F COLORECTAL CA SCREEN DOC REV: CPT | Performed by: FAMILY MEDICINE

## 2022-02-07 PROCEDURE — G8484 FLU IMMUNIZE NO ADMIN: HCPCS | Performed by: FAMILY MEDICINE

## 2022-02-07 PROCEDURE — 1036F TOBACCO NON-USER: CPT | Performed by: FAMILY MEDICINE

## 2022-02-07 PROCEDURE — G8420 CALC BMI NORM PARAMETERS: HCPCS | Performed by: FAMILY MEDICINE

## 2022-02-07 PROCEDURE — 99214 OFFICE O/P EST MOD 30 MIN: CPT | Performed by: FAMILY MEDICINE

## 2022-02-07 NOTE — TELEPHONE ENCOUNTER
Recommend scheduling patient with Lidia NP as he has seen TS-NP before and we typically schedule patients coming in for HFU with NP. Thanks!

## 2022-02-07 NOTE — TELEPHONE ENCOUNTER
Pt calling requesting an apt with [facility]. Pt states that he was recently in [facility] ER 02/04. Pt was admitted for SOB, joint swelling and fatigue. Should patient get placed with NP? [facility] does not have any openings until 04/04.

## 2022-02-07 NOTE — PROGRESS NOTES
Chief Complaint: Follow-Up from Hospital ([facility])       HPI:  Yessenia Reyes is a 73 y.o. male here for the hospital follow-up. He was seen in the ER on 2/4/2022 for swelling in his bilateral ankle with erythema. Denies any shortness of breath or chest pain. He had a whole blood work which was normal including the BNP and thyroid. His EKG in the ER was normal    Now currently he states is pedal edema has resolved. Denies any shortness of breath or wheezing or orthopnea or cough. He has chronic pain for which he has been taking Percocet 5/325 as needed for the pain  The chronic pain is in his lower back due to herniated disc s/p surgery    He has a history of atrial fibrillation with pacemaker currently not on any anticoagulants due to history of bleeding. 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 HENT: Negative  Respiratory: Negative for cough, chest tightness, shortness of breath and wheezing. Cardiovascular: Negative for chest pain, palpitations and leg swelling. Gastrointestinal: Was constipated currently resolved after taking the Colace  Genitourinary: Negative for difficulty urinating, flank pain, frequency, hematuria and urgency. Musculoskeletal: Negative for gait problem, joint swelling and myalgias. Skin: Negative for color change, pallor, rash and wound. Neurological: Negative for dizziness, tremors, seizures, syncope, facial asymmetry, speech difficulty, weakness, light-headedness, numbness and headaches.    Psychiatric/Behavioral: Negative    Patient's problem list, medications, allergies, past medical, surgical, social and family histories were reviewed and updated as appropriate. Current Outpatient Medications   Medication Sig Dispense Refill    diphenhydrAMINE (SOMINEX) 25 MG tablet Diphenhydramine Oral     take 1 tablet by mouth daily   active (Patient not taking: Reported on 2/7/2022)      furosemide (LASIX) 20 MG tablet t1 tab by mouth daily. Call cardiology if swelling in legs worsens or shortness of breath occurs. 30 tablet 0    Cholecalciferol 50 MCG (2000 UT) TABS Take 2,000 Units by mouth daily      dilTIAZem (CARDIZEM CD) 120 MG extended release capsule Take 1 capsule by mouth daily 90 capsule 3    rosuvastatin (CRESTOR) 40 MG tablet Take 1 tablet by mouth daily 90 tablet 3    digoxin (LANOXIN) 125 MCG tablet TAKE 1 TABLET BY MOUTH EVERY DAY 30 tablet 3    lisinopril (PRINIVIL;ZESTRIL) 2.5 MG tablet TAKE 1/2 TABLET BY MOUTH EVERY DAY 45 tablet 1    levothyroxine (SYNTHROID) 112 MCG tablet TAKE 1 TABLET BY MOUTH EVERY DAY 90 tablet 3    pantoprazole (PROTONIX) 40 MG tablet TAKE 1 TABLET BY MOUTH TWICE A DAY BEFORE MEALS 180 tablet 3    cyclobenzaprine (FLEXERIL) 10 MG tablet  (Patient not taking: Reported on 2/7/2022)      aspirin 81 MG EC tablet Take 81 mg by mouth daily      oxyCODONE-acetaminophen (PERCOCET) 5-325 MG per tablet Take 1 tablet by mouth every 4 hours as needed for Pain.  mirtazapine (REMERON) 7.5 MG tablet Take 0.5 tablets by mouth nightly 90 tablet 3    fluticasone (FLOVENT HFA) 110 MCG/ACT inhaler Inhale 2 puffs into the lungs 2 times daily (Patient taking differently: Inhale 2 puffs into the lungs 2 times daily Patient said is only using as needed d/t cost) 12 g 3    melatonin 10 MG CAPS capsule Take 10 mg by mouth nightly as needed       docusate sodium (COLACE) 100 MG capsule Take 100 mg by mouth 2 times daily. No current facility-administered medications for this visit.        Social History     Tobacco Use  Smoking status: Former Smoker     Packs/day: 1.00     Years: 40.00     Pack years: 40.00     Types: Cigarettes     Start date: 1975     Quit date: 2015     Years since quittin.0    Smokeless tobacco: Never Used    Tobacco comment: Maintain cessation   Substance Use Topics    Alcohol use: No     Alcohol/week: 0.0 standard drinks     Comment: NA beer        Objective:     Vitals:    22 1340   BP: 108/60   Site: Right Upper Arm   Position: Sitting   Cuff Size: Medium Adult   Pulse: 73   SpO2: 95%   Weight: 157 lb (71.2 kg)   Height: 5' 10\" (1.778 m)     Body mass index is 22.53 kg/m². Wt Readings from Last 3 Encounters:   22 157 lb (71.2 kg)   22 150 lb (68 kg)   21 153 lb (69.4 kg)     BP Readings from Last 3 Encounters:   22 108/60   22 116/87   22 108/74       Physical exam:  Constitutional: he is oriented to person, place, and time. he appears well-developed and well-nourished. No distress. Neck: Normal range of motion. No JVD present. No tracheal deviation present. No thyromegaly present. Cardiovascular: Normal rate, irregular rhythm, normal heart sounds and intact distal pulses. No murmur heard. Pulmonary/Chest: Effort normal and breath sounds normal. No stridor. No respiratory distress. he has no wheezes. he has no rales. heexhibits no tenderness. Abdominal: Soft. Bowel sounds are normal. he exhibits no distension and no mass. There is no tenderness. There is no rebound and no guarding. Musculoskeletal: Normal range of motion. he exhibits no edema, tenderness or deformity. Lymphadenopathy:     he has no cervical adenopathy. Neurological:he is alert and oriented to person, place, and time. he has gross neurological exam normal with normal strength and normal gait  Skin: Skin is warm and dry. No rash noted. he is not diaphoretic. No erythema. No pallor. Psychiatric: he has a normal mood and affect.  his   behavior is normal. Assessment/Plan:   1. Peripheral edema  Currently none  His blood work was normal  Including the BNP    2. PAF (paroxysmal atrial fibrillation) (HCC)  Currently stable    3. Anxiety  Currently taking Remeron which is helping    4. Acquired hypothyroidism  Stable continue the Synthroid 112 mcg daily    5.   Chronic pain  On Percocet 5/325           Brad Copeland MD  2/7/2022 6:46 PM

## 2022-02-08 RX ORDER — FLUTICASONE PROPIONATE 220 UG/1
2 AEROSOL, METERED RESPIRATORY (INHALATION) 2 TIMES DAILY
Qty: 1 EACH | Refills: 5 | Status: SHIPPED | OUTPATIENT
Start: 2022-02-08 | End: 2022-02-11

## 2022-02-08 NOTE — CARE COORDINATION
Ambulatory Care Coordination Note  2/7/2022  CM Risk Score: 2  Charlson 10 Year Mortality Risk Score: 100%     ACC: Aguila Thorne, RN    Summary Note: ACM placed call to f/u on recent ED visit, introduce patient to care coordination and explain role of ACM. Patient agreeable to working with ACM to better manage patient's chronic illness. Reviewed discharge instructions regarding medication, follow up PCP, and S/S of when to return to the ED. Patient was discharged with no new orders. Patient had f/u appointment with his PCP today 2/7/22. ACM and patient called patients cardiologist (Dr. Darshan Quijano) office at [phone] and spoke to Kodak Pickens. Kodak Pickens scheduled patient an appointment with Lidia CHENG on 2/11 at 10:30am. Patient said he has mild SOB with exertion or when he talks which is his baseline. Patient denies having any more swelling or edema. Patient said he is taking all his medications as prescribed except for his Flovent. Patient said he does his Flovent as needed d/t the cost. Patient said he does not use the spacer for his Flovent because it is broke. ACM will send a message to PCP to see if patient can get a new order for a spacer to use with his Flovent. ACM will talk to patient about possibly seeing if he can get assistance through [facility] [facility] to help with cost of Flovent, or other possible pharmacy resources such as [address], or [facility] at next contact. ACM having COPD Zone Tool mailed to patient. Will review and answer any questions. Patient is independent with ADLs except managing his finances. Patient said his daughter manages his finances. Patient said he lives in [place]. Patient denies having any other questions are concerns currently. Patient has ACM's contact information and is encouraged to call with any questions or concerns.     Plan   Review Pharmacy Resources for Flovent with patient   Complete Enrollment SDOH, General   Review and answer questions on COPD Zone Tool  Needs/concerns    [facility]  [phone]  [address]  [address] [facility] Primary Care     Ambulatory Care Coordination Assessment    Care Coordination Protocol  Program Enrollment: Complex Care  Referral from Primary Care Provider: No  Week 1 - Initial Assessment     Do you have all of your prescriptions and are they filled?: Yes  Barriers to medication adherence: None  Are you able to afford your medications?: Yes  How often do you have trouble taking your medications the way you have been told to take them?: I always take them as prescribed. Do you have Home O2 Therapy?: No      Ability to seek help/take action for Emergent Urgent situations i.e. fire, crime, inclement weather or health crisis. : Independent  Ability to ambulate to restroom: Independent  Ability handle personal hygeine needs (bathing/dressing/grooming): Independent  Ability to manage Medications: Independent  Ability to prepare Food Preparation: Independent  Ability to maintain home (clean home, laundry):  Independent  Ability to drive and/or has transportation: Independent  Ability to do shopping: Independent  Ability to manage finances: Dependent  Is patient able to live independently?: Yes     Current Housing: Independent Living/Senior Housing        Per the Fall Risk Screening, did the patient have 2 or more falls or 1 fall with injury in the past year?: No     Frequent urination at night?: Yes  Do you use rails/bars?: No  Do you have a non-slip tub mat?: Yes     Are you experiencing loss of meaning?: No  Are you experiencing loss of hope and peace?: No     Thinking about your patient's physical health needs, are there any symptoms or problems (risk indicators) you are unsure about that require further investigation?: Mild vague physical symptoms or problems; but do not impact on daily life or are not of concern to patient   Are the patients physical health problems impacting on their mental well-being?: No identified areas of concern   Are there any problems with your patients lifestyle behaviors (alcohol, drugs, diet, exercise) that are impacting on physical or mental well-being?: Some mild concern of potential negative impact on well-being   Do you have any other concerns about your patients mental well-being? How would you rate their severity and impact on the patient?: No identified areas of concern   How would you rate their home environment in terms of safety and stability (including domestic violence, insecure housing, neighbor harassment)?: Consistently safe, supportive, stable, no identified problems   How do daily activities impact on the patient's well-being? (include current or anticipated unemployment, work, caregiving, access to transportation or other): No identified problems or perceived positive benefits   How would you rate their financial resources (including ability to afford all required medical care)?: Financially secure, some resource challenges   How wells does the patient now understand their health and well-being (symptoms, signs or risk factors) and what they need to do to manage their health?: Reasonable to good understanding and already engages in managing health or is willing to undertake better management   How well do you think your patient can engage in healthcare discussions? (Barriers include language, deafness, aphasia, alcohol or drug problems, learning difficulties, concentration): Clear and open communication, no identified barriers   Do other services need to be involved to help this patient?: Other care/services not required at this time   Are current services involved with this patient well-coordinated? (Include coordination with other services you are now recommendation):  All required care/services in place and well-coordinated   Suggested Interventions and Community Resources   Medication Assistance Program: In Process   Zone Management Tools: In Process                  Prior to Admission medications    Medication Sig Start Date End Date Taking? Authorizing Provider   furosemide (LASIX) 20 MG tablet t1 tab by mouth daily. Call cardiology if swelling in legs worsens or shortness of breath occurs. 2/4/22  Yes Eunice Diaz MD   Cholecalciferol 50 MCG (2000 UT) TABS Take 2,000 Units by mouth daily   Yes Historical Provider, MD   dilTIAZem (CARDIZEM CD) 120 MG extended release capsule Take 1 capsule by mouth daily 1/19/22  Yes Yuliana Baker MD   rosuvastatin (CRESTOR) 40 MG tablet Take 1 tablet by mouth daily 1/19/22  Yes Yuliana Baker MD   digoxin (LANOXIN) 125 MCG tablet TAKE 1 TABLET BY MOUTH EVERY DAY 1/18/22  Yes ISIDRA Dewey CNP   lisinopril (PRINIVIL;ZESTRIL) 2.5 MG tablet TAKE 1/2 TABLET BY MOUTH EVERY DAY 12/7/21  Yes ISIDRA Cabrera CNP   levothyroxine (SYNTHROID) 112 MCG tablet TAKE 1 TABLET BY MOUTH EVERY DAY 12/6/21  Yes Pennie Vazquez MD   pantoprazole (PROTONIX) 40 MG tablet TAKE 1 TABLET BY MOUTH TWICE A DAY BEFORE MEALS 12/6/21  Yes Pennie Vazquez MD   aspirin 81 MG EC tablet Take 81 mg by mouth daily   Yes Historical Provider, MD   oxyCODONE-acetaminophen (PERCOCET) 5-325 MG per tablet Take 1 tablet by mouth every 4 hours as needed for Pain. Yes Historical Provider, MD   mirtazapine (REMERON) 7.5 MG tablet Take 0.5 tablets by mouth nightly 11/22/21  Yes Pennie Vazquez MD   fluticasone (FLOVENT HFA) 110 MCG/ACT inhaler Inhale 2 puffs into the lungs 2 times daily  Patient taking differently: Inhale 2 puffs into the lungs 2 times daily Patient said is only using as needed d/t cost 9/28/21 9/28/22 Yes Pennie Vazquez MD   docusate sodium (COLACE) 100 MG capsule Take 100 mg by mouth 2 times daily.    Yes Historical Provider, MD   diphenhydrAMINE (SOMINEX) 25 MG tablet Diphenhydramine Oral     take 1 tablet by mouth daily   active  Patient not

## 2022-02-09 ENCOUNTER — CARE COORDINATION (OUTPATIENT)
Dept: CARE COORDINATION | Age: 72
End: 2022-02-09

## 2022-02-09 NOTE — CARE COORDINATION
Outreach attempt to f/u on CC needs,concerns, and medications. Patient was unable to reach, ACM left HIPAA compliant message with contact information.       Plan   Review Pharmacy Resources for Flovent with patient   Complete Enrollment SDOH, General   Review and answer questions on COPD Zone Tool  Needs/concerns    [facility]  [phone] [address]  Family Medicine  [address] [place] [address] Primary [facility] [place]

## 2022-02-09 NOTE — CARE COORDINATION
Ambulatory Care Coordination Note  2/9/2022  CM Risk Score: 2  Charlson 10 Year Mortality Risk Score: 100%     ACC: Santana Saleh RN    Summary Note: Incoming call from patient d/t VMM left from [facility]. [facility] provided patient with the number to needy meds @ [phone] so that patient can call and see if he can get help with the cost of his Flovent d/t being so expensive. [facility] educated patient that when he calls needy meds let them know the reason for the call. If he needs additional assistance to please reach back out to [employer]. Patient denies having any other questions are concerns currently. Patient has [facility]'s contact information and is encouraged to call with any questions or concerns. Plan   Review Pharmacy Resources for Flovent with patient   Complete Enrollment SDOH, General   Review and answer questions on COPD Zone Tool  Needs/concerns     Santana Saleh RN  Ambulatory Care Manager  [phone] [address] [facility] Family [facility]  [facility] Primary Care  Critical access hospital Primary Care     Care Coordination Interventions    Program Enrollment: Complex Care  Referral from Primary Care Provider: No  Suggested Interventions and Community Resources  Medication Assistance Program: In Process (Comment: Will talk to patient about pharmacy resources including [facility]. )  Zone Management Tools: In Process (Comment: ACM having COPD Zone Tool mailed to patient)         Goals Addressed                 This Visit's Progress     Conditions and Symptoms        I will notify my provider of any barriers to my plan of care. I will follow my Zone Management tool to seek urgent or emergent care. I will notify my provider of any symptoms that indicate a worsening of my condition. Barriers: Not able to afford [address] for overcoming my barriers: I will reach out to my Providers and or my ACM with questions or concerns regarding my Plan of Care.  I will notify ACM or provider if not able to afford my medication   Confidence: 9/10  Anticipated Goal Completion Date: 5/7/22 2/7  Patient said he does his Flovent as needed d/t the cost. Patient said he does not use the spacer for his Flovent because it is broke. [facility] will send a message to PCP to see if patient can get a new order for a spacer to use with his Flovent. Patient had f/u appointment with his PCP today 2/7/22. AC will talk to patient about possibly seeing if he can get assistance through [employer] to help with cost of Flovent, or other possible pharmacy resources such as [address] [facility] [address], or [facility] at next contact. AC having COPD Zone Tool mailed to patient. Will review and answer any questions. Patient is independent with ADLs except managing his finances. Patient said his daughter manages his finances. Patient said he lives in [place] [facility]. ACM having COPD Zone Tool mailed to patient. Will review and answer any questions. 2/9  M provided patient with the number to needy meds @ [phone] so that patient can call and see if he can get help with the cost of his Flovent d/t being so expensive. [facility] educated patient that when he calls needy meds let them know the reason for the call. If he needs additional assistance to please reach back out to [facility]. Prior to Admission medications    Medication Sig Start Date End Date Taking? Authorizing Provider   fluticasone (FLOVENT HFA) 220 MCG/ACT inhaler Inhale 2 puffs into the lungs 2 times daily 2/8/22 3/10/22  Wanda Joseph MD   diphenhydrAMINE (SOMINEX) 25 MG tablet Diphenhydramine Oral     take 1 tablet by mouth daily   active  Patient not taking: Reported on 2/7/2022    Historical Provider, MD   furosemide (LASIX) 20 MG tablet t1 tab by mouth daily. Call cardiology if swelling in legs worsens or shortness of breath occurs.  2/4/22   Jaimee Schuler MD   Cholecalciferol 50 MCG (2000 UT) TABS Take 2,000 Units by mouth daily    Historical Provider, MD   dilTIAZem (CARDIZEM CD) 120 MG extended release capsule Take 1 capsule by mouth daily 1/19/22   Terry Vazquez MD   rosuvastatin (CRESTOR) 40 MG tablet Take 1 tablet by mouth daily 1/19/22   Terry Vazquez MD   digoxin (LANOXIN) 125 MCG tablet TAKE 1 TABLET BY MOUTH EVERY DAY 1/18/22   ISIDRA Gr CNP   lisinopril (PRINIVIL;ZESTRIL) 2.5 MG tablet TAKE 1/2 TABLET BY MOUTH EVERY DAY 12/7/21   ISIDRA Osorio CNP   levothyroxine (SYNTHROID) 112 MCG tablet TAKE 1 TABLET BY MOUTH EVERY DAY 12/6/21   Luiza Christian MD   pantoprazole (PROTONIX) 40 MG tablet TAKE 1 TABLET BY MOUTH TWICE A DAY BEFORE MEALS 12/6/21   Luiza Christian MD   cyclobenzaprine (FLEXERIL) 10 MG tablet  11/1/21   Historical Provider, MD   aspirin 81 MG EC tablet Take 81 mg by mouth daily    Historical Provider, MD   oxyCODONE-acetaminophen (PERCOCET) 5-325 MG per tablet Take 1 tablet by mouth every 4 hours as needed for Pain. Historical Provider, MD   mirtazapine (REMERON) 7.5 MG tablet Take 0.5 tablets by mouth nightly 11/22/21   Luiza Christian MD   fluticasone (FLOVENT HFA) 110 MCG/ACT inhaler Inhale 2 puffs into the lungs 2 times daily  Patient taking differently: Inhale 2 puffs into the lungs 2 times daily Patient said is only using as needed d/t cost 9/28/21 9/28/22  Luiza Christian MD   melatonin 10 MG CAPS capsule Take 10 mg by mouth nightly as needed     Historical Provider, MD   lisinopril (PRINIVIL;ZESTRIL) 2.5 MG tablet Take 0.5 tablets by mouth daily 3/22/21   ISIDRA Osorio CNP   docusate sodium (COLACE) 100 MG capsule Take 100 mg by mouth 2 times daily.     Historical Provider, MD       Future Appointments   Date Time Provider Vikki Caal   2/11/2022 10:30 AM ISIDRA Osorio CNP FF Cardio [facility]   2/14/2022  8:00 AM SCHEDULE, [facility] TRANSMISSION FF Cardio [facility]   3/21/2022  8:00 AM SCHEDULE, [place] [facility] TRANSMISSION FF Cardio [facility]   4/25/2022  8:00 AM SCHEDULE, [facility] TRANSMISSION FF Cardio [facility]   8/8/2022  8:00 AM SCHEDULE, [place] REMOTE TRANSMISSION FF Cardio [facility]   9/12/2022  8:00 AM SCHEDULE, [place] REMOTE TRANSMISSION FF Cardio [facility]   10/17/2022  8:00 AM SCHEDULE, [place] REMOTE TRANSMISSION FF Cardio [facility]   11/21/2022  8:00 AM SCHEDULE, [place] REMOTE TRANSMISSION FF Cardio [facility]

## 2022-02-11 ENCOUNTER — OFFICE VISIT (OUTPATIENT)
Dept: CARDIOLOGY CLINIC | Age: 72
End: 2022-02-11
Payer: MEDICARE

## 2022-02-11 ENCOUNTER — TELEPHONE (OUTPATIENT)
Dept: CARDIOLOGY CLINIC | Age: 72
End: 2022-02-11

## 2022-02-11 VITALS
WEIGHT: 162.3 LBS | OXYGEN SATURATION: 95 % | HEIGHT: 70 IN | DIASTOLIC BLOOD PRESSURE: 68 MMHG | HEART RATE: 77 BPM | SYSTOLIC BLOOD PRESSURE: 98 MMHG | BODY MASS INDEX: 23.24 KG/M2

## 2022-02-11 DIAGNOSIS — I48.0 PAF (PAROXYSMAL ATRIAL FIBRILLATION) (HCC): ICD-10-CM

## 2022-02-11 DIAGNOSIS — I34.0 NONRHEUMATIC MITRAL VALVE REGURGITATION: ICD-10-CM

## 2022-02-11 DIAGNOSIS — I25.118 CORONARY ARTERY DISEASE OF NATIVE HEART WITH STABLE ANGINA PECTORIS, UNSPECIFIED VESSEL OR LESION TYPE (HCC): ICD-10-CM

## 2022-02-11 DIAGNOSIS — R60.0 LOCALIZED EDEMA: Primary | ICD-10-CM

## 2022-02-11 PROCEDURE — 3017F COLORECTAL CA SCREEN DOC REV: CPT | Performed by: NURSE PRACTITIONER

## 2022-02-11 PROCEDURE — 99214 OFFICE O/P EST MOD 30 MIN: CPT | Performed by: NURSE PRACTITIONER

## 2022-02-11 PROCEDURE — 4040F PNEUMOC VAC/ADMIN/RCVD: CPT | Performed by: NURSE PRACTITIONER

## 2022-02-11 PROCEDURE — 1123F ACP DISCUSS/DSCN MKR DOCD: CPT | Performed by: NURSE PRACTITIONER

## 2022-02-11 PROCEDURE — G8427 DOCREV CUR MEDS BY ELIG CLIN: HCPCS | Performed by: NURSE PRACTITIONER

## 2022-02-11 PROCEDURE — G8420 CALC BMI NORM PARAMETERS: HCPCS | Performed by: NURSE PRACTITIONER

## 2022-02-11 PROCEDURE — G8484 FLU IMMUNIZE NO ADMIN: HCPCS | Performed by: NURSE PRACTITIONER

## 2022-02-11 PROCEDURE — 1036F TOBACCO NON-USER: CPT | Performed by: NURSE PRACTITIONER

## 2022-02-11 NOTE — PROGRESS NOTES
associated symptoms. It lasted 2-3 minutes. He sent a transmission in. These symptoms are improving over the last few days. With regard to medication therapy the patient has been compliant with prescribed regimen. They have tolerated therapy to date. Past Medical History:   Diagnosis Date    Aortic valve insufficiency     Arthritis     Atrial fibrillation ([facility]     Cancer ([facility] 2017    TONGUE head and neck IN REMISSION    Dysphonia     GERD (gastroesophageal reflux disease)     Hearing loss     Heart disease     Medical history reviewed with no changes     Obstructive apnea does not use CPAP    Oropharyngeal dysphagia     Refusal of blood transfusions as patient is Rastafari     Thyroid disease      Social History:    Social History     Tobacco Use   Smoking Status Former Smoker    Packs/day: 1.00    Years: 40.00    Pack years: 40.00    Types: Cigarettes    Start date: 1975   [employer] Quit date: 2015    Years since quittin.0   Smokeless Tobacco Never Used   Tobacco Comment    Maintain cessation     Current Medications:  Current Outpatient Medications   Medication Sig Dispense Refill    furosemide (LASIX) 20 MG tablet t1 tab by mouth daily. Call cardiology if swelling in legs worsens or shortness of breath occurs.  30 tablet 0    Cholecalciferol 50 MCG ( UT) TABS Take 2,000 Units by mouth daily      dilTIAZem (CARDIZEM CD) 120 MG extended release capsule Take 1 capsule by mouth daily 90 capsule 3    rosuvastatin (CRESTOR) 40 MG tablet Take 1 tablet by mouth daily 90 tablet 3    digoxin (LANOXIN) 125 MCG tablet TAKE 1 TABLET BY MOUTH EVERY DAY 30 tablet 3    lisinopril (PRINIVIL;ZESTRIL) 2.5 MG tablet TAKE 1/2 TABLET BY MOUTH EVERY DAY 45 tablet 1    levothyroxine (SYNTHROID) 112 MCG tablet TAKE 1 TABLET BY MOUTH EVERY DAY 90 tablet 3    pantoprazole (PROTONIX) 40 MG tablet TAKE 1 TABLET BY MOUTH TWICE A DAY BEFORE MEALS 180 tablet 3    aspirin 81 MG EC tablet Take 81 mg by mouth daily      oxyCODONE-acetaminophen (PERCOCET) 5-325 MG per tablet Take 1 tablet by mouth every 4 hours as needed for Pain.  mirtazapine (REMERON) 7.5 MG tablet Take 0.5 tablets by mouth nightly 90 tablet 3    fluticasone (FLOVENT HFA) 110 MCG/ACT inhaler Inhale 2 puffs into the lungs 2 times daily (Patient taking differently: Inhale 2 puffs into the lungs 2 times daily Patient said is only using as needed d/t cost) 12 g 3    melatonin 10 MG CAPS capsule Take 10 mg by mouth nightly as needed       docusate sodium (COLACE) 100 MG capsule Take 100 mg by mouth 2 times daily.  diphenhydrAMINE (SOMINEX) 25 MG tablet Diphenhydramine Oral     take 1 tablet by mouth daily   active (Patient not taking: Reported on 2/7/2022)      cyclobenzaprine (FLEXERIL) 10 MG tablet  (Patient not taking: Reported on 2/7/2022)       No current facility-administered medications for this visit. REVIEW OF SYSTEMS:   CONSTITUTIONAL: No major weight gain or loss, fatigue, weakness, night sweats or fever. There's been no change in energy level, sleep pattern, or activity level. HEENT: No new vision difficulties or ringing in the ears. RESPIRATORY: No new SOB, PND, orthopnea or cough. CARDIOVASCULAR: See HPI  GI: No nausea, vomiting, diarrhea, constipation, abdominal pain or changes in bowel habits. : No urinary frequency, urgency, incontinence hematuria or dysuria. SKIN: No cyanosis or skin lesions. MUSCULOSKELETAL: No new muscle or joint pain. NEUROLOGICAL: No syncope or TIA-like symptoms.   PSYCHIATRIC: No anxiety, pain, insomnia or depression    Objective:   PHYSICAL EXAM:       Vitals:    02/11/22 1052 02/11/22 1056 02/11/22 1130   BP: 90/60 100/60 98/68   Site: Right Upper Arm Right Upper Arm    Position: Sitting Standing    Cuff Size: Medium Adult Medium Adult    Pulse: 77     SpO2: 95%     Weight: 162 lb 4.8 oz (73.6 kg)     Height: 5' 10\" (1.778 m)          VITALS:  /60 (Site: Right Upper Arm, Position: Standing, Cuff Size: Medium Adult)   Pulse 77   Ht 5' 10\" (1.778 m)   Wt 162 lb 4.8 oz (73.6 kg)   SpO2 95%   BMI 23.29 kg/m²     CONSTITUTIONAL: Cooperative, no apparent distress, and appears well nourished / developed  NEUROLOGIC:  Awake and orientated to person, place and time. PSYCH: Calm affect. SKIN: Warm and dry. HEENT: Sclera non-icteric, normocephalic, neck supple, no elevation of JVP, normal carotid pulses with no bruits and thyroid normal size. LUNGS:  No increased work of breathing and clear to auscultation, no crackles or wheezing. CARDIOVASCULAR:  Regular rate 72 and rhythm with no murmurs, gallops, rubs, or abnormal heart sounds, normal PMI. The apical impulses not displaced. Heart tones are crisp and normal                                                                                            Cervical veins are not engorged                 JVP less than 8 cm H2O                                                                              The carotid upstroke is normal in amplitude and contour without delay or bruit    ABDOMEN:  Normal bowel sounds, non-distended and non-tender to palpation   EXT: No edema, no calf tenderness. Pulses are present bilaterally.     DATA:    Lab Results   Component Value Date    ALT 12 02/04/2022    AST 20 02/04/2022    ALKPHOS 50 02/04/2022    BILITOT 0.8 02/04/2022     Lab Results   Component Value Date    CREATININE <0.5 (L) 02/04/2022    BUN 8 02/04/2022     02/04/2022    K 3.9 02/04/2022     02/04/2022    CO2 24 02/04/2022     Lab Results   Component Value Date    TSH 2.735 05/21/2020    W1JWBKA 2.12 05/21/2020     Lab Results   Component Value Date    WBC 5.2 02/04/2022    HGB 14.0 02/04/2022    HCT 41.6 02/04/2022    MCV 93.3 02/04/2022     02/04/2022     No components found for: CHLPL  Lab Results   Component Value Date    TRIG 97 08/13/2021    TRIG 82 03/05/2021    TRIG 85 02/23/2021     Lab Results   Component Value Date    HDL 45 10/11/2021    HDL 45 08/13/2021    HDL 52 03/05/2021     Lab Results   Component Value Date    LDLCALC 102 (H) 10/11/2021    LDLCALC 115 (H) 08/13/2021    LDLCALC 103 (H) 03/05/2021     Lab Results   Component Value Date    LABVLDL 15 10/11/2021    LABVLDL 19 08/13/2021    LABVLDL 16 03/05/2021     Radiology Review:  Pertinent images / reports were reviewed as a part of this visit and reveals the following:    Cardiac cath: 3/8/21:  Dominance: Right    LM: luminal irregularities  LAD: at least moderate calcification of long, eccentric 80% ostial to proximal stenosis ; 30% ostial first diagonal   LCx: 30-40% distal into OM2   RCA: tortuous with luminal irregularities     TTE 8/18/21:    Summary   -Normal left ventricle size, wall thickness and systolic function with an   estimated ejection fraction of 60-65%.  -No regional wall motion abnormalities are seen.   -Indeterminate diastolic function.   -The mitral valve is suggestive of some myxomatous change and minimal   prolapse.   -Moderate to severe mitral regurgitation. ( appears stable from previous echo   dated 2/24/2021). ERO 0.21.   -A bioprosthetic artificial aortic valve appears well seated with a maximum   velocity of 2.6m/s and a mean gradient of 16mmHg.   -Moderate to severe tricuspid regurgitation.   -Estimated pulmonary artery systolic pressure is mildly elevated at 38 mmHg   assuming a right atrial pressure of 8 mmHg.   -The right ventricle is moderately enlarged.   -The right atrium is moderately dilated. Paceart transmission: 1/10/22  Remote Linq report shows tachy recording    Assessment:      Diagnosis Orders   1. Localized edema   ~episode of volume overload improved with lasix. Has dietary indiscretion with salty food items  ~hx mitral & tricuspid regurg    2.  Coronary artery disease of native heart with stable angina pectoris, unspecified vessel or lesion type ([address]   ~stable : denies angina  ~LAD 80% by cath '21 : medically managed : high risk surgery    3. Nonrheumatic mitral valve regurgitation   ~mod-severe MR by echo Aug '21 comparable to previous study    4. PAF (paroxysmal atrial fibrillation) (HCC)   ~AP regular  ~episode of waking with a fast HB : transmission sent in on 2/9 (results unremarkable. No AF detected)  ~s/p RFA atrial flutter July '20  ~s/p MAZE & DELGADO ligation '15  ~ASA / dig / diltiazem   ~off AC after successful DELGADO ligation confirmed by CHIKIS. Hx GIB      Plan:  Change lasix to prn swelling    F/U in 3-4 months       I have addressed the patient's cardiac risk factors and adjusted pharmacologic treatment as needed. In addition, I have reinforced the need for patient directed risk factor modification. Further evaluation will be based upon the patient's clinical course and testing results. All questions and concerns were addressed to the patient. Alternatives to  treatment were discussed. The patient  currently  is not smoking. The risks related to smoking were reviewed with the patient. Recommend maintaining a smoke-free lifestyle. Antiplatelet therapy has been recommended / prescribed for this patient. Education conducted on adverse reactions including bleeding was discussed. The patient verbalizes understanding. Pt is not on a BB : neg MI  Pt is on an ace-i/ARB  Pt is on a statin      Saturated fat/ANIVAL diet discussed  Exercise program discussed    Thank you for allowing to us to participate in the care of Melida Griffith.       [facility]  Documentation of today's visit sent to PCP

## 2022-02-14 ENCOUNTER — NURSE ONLY (OUTPATIENT)
Dept: CARDIOLOGY CLINIC | Age: 72
End: 2022-02-14
Payer: MEDICARE

## 2022-02-14 ENCOUNTER — TELEPHONE (OUTPATIENT)
Dept: CARDIOLOGY CLINIC | Age: 72
End: 2022-02-14

## 2022-02-14 DIAGNOSIS — Z45.09 ENCOUNTER FOR ELECTRONIC ANALYSIS OF REVEAL EVENT RECORDER: ICD-10-CM

## 2022-02-14 DIAGNOSIS — I48.0 PAF (PAROXYSMAL ATRIAL FIBRILLATION) (HCC): Chronic | ICD-10-CM

## 2022-02-14 PROCEDURE — 93298 REM INTERROG DEV EVAL SCRMS: CPT | Performed by: INTERNAL MEDICINE

## 2022-02-14 PROCEDURE — G2066 INTER DEVC REMOTE 30D: HCPCS | Performed by: INTERNAL MEDICINE

## 2022-02-14 NOTE — TELEPHONE ENCOUNTER
Received refill request for Digoxin from [address]     Last ov: 02/11/2022 NPTS    Last Refill: 01/18/2022 #30 w/ 3 refills    Next appointment: 06/08/2022 [facility]

## 2022-02-14 NOTE — PROGRESS NOTES
We received a remote transmission from patient's monitor at home. Remote Linq report shows no arrhythmias. Pt does have known AF. Pt not on anti coags. He has had a Maze procedure. EP physician to review. We will continue to monitor remotely.

## 2022-02-14 NOTE — TELEPHONE ENCOUNTER
Pt had episode on 2/10 at 12:30am and he recorded it on his loop recorder. He talked to Lidia at his appt on 2/11 and she was going to have someone call him. He was wanting to know if someone could read the transmission and call him. Please advise. Thank you. He is a [facility] patient.

## 2022-02-14 NOTE — TELEPHONE ENCOUNTER
Called and spoke with Patient. Let him know we did have a symptom recording on 2/9/2022 at 11:30 pm. He stated that might be the episode he was referring to. Episode shows SR with 1st degree HB at the rate of about 78 bpm. He stated it woke him from his sleep. He felt his heart racing and it felt like it was pounding really fast out of his chest. He denies having any CP, SOB, dizziness or light headedness with this episodes. I let him know if Dr. Charlotte Maldonado wanted any changes someone would give him a call back.  Thanks

## 2022-02-14 NOTE — TELEPHONE ENCOUNTER
Medication Refill    Medication needing refilled:  digoxin (LANOXIN) 125 MCG - Please send to new [address]  listed below.       Dosage of the medication:    How are you taking this medication (QD, BID, TID, QID, PRN):    30 or 90 day supply called in: 90 day supply    When will you run out of your medication:    Which Pharmacy are we sending the medication to?: [facility], [address], [id number] [place] [address],[address] [id number]   Phone:  [phone]  Fax:  [phone]

## 2022-02-15 ENCOUNTER — CARE COORDINATION (OUTPATIENT)
Dept: CARE COORDINATION | Age: 72
End: 2022-02-15

## 2022-02-15 SDOH — SOCIAL STABILITY: SOCIAL NETWORK: HOW OFTEN DO YOU GET TOGETHER WITH FRIENDS OR RELATIVES?: ONCE A WEEK

## 2022-02-15 SDOH — HEALTH STABILITY: PHYSICAL HEALTH: ON AVERAGE, HOW MANY MINUTES DO YOU ENGAGE IN EXERCISE AT THIS LEVEL?: 150+ MIN

## 2022-02-15 SDOH — ECONOMIC STABILITY: TRANSPORTATION INSECURITY
IN THE PAST 12 MONTHS, HAS THE LACK OF TRANSPORTATION KEPT YOU FROM MEDICAL APPOINTMENTS OR FROM GETTING MEDICATIONS?: NO

## 2022-02-15 SDOH — SOCIAL STABILITY: SOCIAL NETWORK: HOW OFTEN DO YOU ATTENT MEETINGS OF THE CLUB OR ORGANIZATION YOU BELONG TO?: MORE THAN 4 TIMES PER YEAR

## 2022-02-15 SDOH — ECONOMIC STABILITY: HOUSING INSECURITY
IN THE LAST 12 MONTHS, WAS THERE A TIME WHEN YOU DID NOT HAVE A STEADY PLACE TO SLEEP OR SLEPT IN A SHELTER (INCLUDING NOW)?: NO

## 2022-02-15 SDOH — HEALTH STABILITY: MENTAL HEALTH: HOW OFTEN DO YOU HAVE A DRINK CONTAINING ALCOHOL?: 4 OR MORE TIMES A WEEK

## 2022-02-15 SDOH — HEALTH STABILITY: PHYSICAL HEALTH: ON AVERAGE, HOW MANY DAYS PER WEEK DO YOU ENGAGE IN MODERATE TO STRENUOUS EXERCISE (LIKE A BRISK WALK)?: 1 DAY

## 2022-02-15 SDOH — SOCIAL STABILITY: SOCIAL NETWORK: HOW OFTEN DO YOU ATTEND CHURCH OR RELIGIOUS SERVICES?: MORE THAN 4 TIMES PER YEAR

## 2022-02-15 SDOH — ECONOMIC STABILITY: TRANSPORTATION INSECURITY
IN THE PAST 12 MONTHS, HAS LACK OF TRANSPORTATION KEPT YOU FROM MEETINGS, WORK, OR FROM GETTING THINGS NEEDED FOR DAILY LIVING?: NO

## 2022-02-15 SDOH — HEALTH STABILITY: MENTAL HEALTH
STRESS IS WHEN SOMEONE FEELS TENSE, NERVOUS, ANXIOUS, OR CAN'T SLEEP AT NIGHT BECAUSE THEIR MIND IS TROUBLED. HOW STRESSED ARE YOU?: VERY MUCH

## 2022-02-15 SDOH — SOCIAL STABILITY: SOCIAL NETWORK
DO YOU BELONG TO ANY CLUBS OR ORGANIZATIONS SUCH AS CHURCH GROUPS UNIONS, FRATERNAL OR ATHLETIC GROUPS, OR SCHOOL GROUPS?: YES

## 2022-02-15 SDOH — ECONOMIC STABILITY: INCOME INSECURITY: IN THE LAST 12 MONTHS, WAS THERE A TIME WHEN YOU WERE NOT ABLE TO PAY THE MORTGAGE OR RENT ON TIME?: NO

## 2022-02-15 SDOH — SOCIAL STABILITY: SOCIAL NETWORK
IN A TYPICAL WEEK, HOW MANY TIMES DO YOU TALK ON THE PHONE WITH FAMILY, FRIENDS, OR NEIGHBORS?: MORE THAN THREE TIMES A WEEK

## 2022-02-15 SDOH — ECONOMIC STABILITY: HOUSING INSECURITY: IN THE LAST 12 MONTHS, HOW MANY PLACES HAVE YOU LIVED?: 2

## 2022-02-15 SDOH — HEALTH STABILITY: MENTAL HEALTH: HOW MANY STANDARD DRINKS CONTAINING ALCOHOL DO YOU HAVE ON A TYPICAL DAY?: 1 OR 2

## 2022-02-15 SDOH — ECONOMIC STABILITY: FOOD INSECURITY: WITHIN THE PAST 12 MONTHS, THE FOOD YOU BOUGHT JUST DIDN'T LAST AND YOU DIDN'T HAVE MONEY TO GET MORE.: NEVER TRUE

## 2022-02-15 SDOH — SOCIAL STABILITY: SOCIAL NETWORK: ARE YOU MARRIED, WIDOWED, DIVORCED, SEPARATED, NEVER MARRIED, OR LIVING WITH A PARTNER?: DIVORCED

## 2022-02-15 SDOH — ECONOMIC STABILITY: INCOME INSECURITY: HOW HARD IS IT FOR YOU TO PAY FOR THE VERY BASICS LIKE FOOD, HOUSING, MEDICAL CARE, AND HEATING?: SOMEWHAT HARD

## 2022-02-15 SDOH — ECONOMIC STABILITY: FOOD INSECURITY: WITHIN THE PAST 12 MONTHS, YOU WORRIED THAT YOUR FOOD WOULD RUN OUT BEFORE YOU GOT MONEY TO BUY MORE.: NEVER TRUE

## 2022-02-15 NOTE — CARE COORDINATION
Ambulatory Care Coordination Note  2/15/2022  CM Risk Score: 2  Charlson 10 Year Mortality Risk Score: 100%     ACC: Justice Mai RN    Summary Note: Outbound call made to patient to follow up on CC needs and concerns. AC completed SDOH and COPD assessments with patient. AC emailed patient assistant fund form to patient with his permission for possible help to pay for his Flovent. Patient said he is only using when needed d/t not being able to afford it. Patient said [facility] told him today that it is 448 dollars with the [employer] card. Patient said he will have his daughter help him fill out the form and take it to his provider to fill out as well. Patient said he has not received the COPD zone tool in the mail. Patient agreed to review with [facility] and ask questions. Patient denies having any other questions are concerns currently. Patient has [facility]'s contact information and is encouraged to call with any questions or concerns. Plan   Review Pharmacy Resources for Flovent with patient   Review and answer questions on COPD Zone Tool  Needs/concerns    [facility]  [phone]  [address] [place] [address]  Family Medicine  [address] [place] [address] Primary Care         Care Coordination Interventions    Program Enrollment: Complex Care  Referral from Primary Care Provider: No  Suggested Interventions and Community Resources  Medication Assistance Program: In Process (Comment: AC emailed patient assistant fund form to patient with his permission for possible help pay for his Flovent.  )  Zone Management Tools: In Process (Comment: ACM having COPD Zone Tool mailed to patient)         Goals Addressed                 This Visit's Progress     Conditions and Symptoms        I will notify my provider of any barriers to my plan of care. I will follow my Zone Management tool to seek urgent or emergent care.   I will notify my provider of any symptoms that indicate in the mail. Patient agreed to review with AC and ask questions. Prior to Admission medications    Medication Sig Start Date End Date Taking? Authorizing Provider   diphenhydrAMINE (SOMINEX) 25 MG tablet Diphenhydramine Oral     take 1 tablet by mouth daily   active  Patient not taking: Reported on 2/7/2022    Historical Provider, MD   furosemide (LASIX) 20 MG tablet t1 tab by mouth daily. Call cardiology if swelling in legs worsens or shortness of breath occurs. 2/4/22   Eunice Diaz MD   Cholecalciferol 50 MCG (2000 UT) TABS Take 2,000 Units by mouth daily    Historical Provider, MD   dilTIAZem (CARDIZEM CD) 120 MG extended release capsule Take 1 capsule by mouth daily 1/19/22   Yuliana Baker MD   rosuvastatin (CRESTOR) 40 MG tablet Take 1 tablet by mouth daily 1/19/22   Yuliana Baker MD   digoxin (LANOXIN) 125 MCG tablet TAKE 1 TABLET BY MOUTH EVERY DAY 1/18/22   ISIDRA Dewey - CNP   lisinopril (PRINIVIL;ZESTRIL) 2.5 MG tablet TAKE 1/2 TABLET BY MOUTH EVERY DAY 12/7/21   ISIDRA Cabrera - CNP   levothyroxine (SYNTHROID) 112 MCG tablet TAKE 1 TABLET BY MOUTH EVERY DAY 12/6/21   Pennie Vazquez MD   pantoprazole (PROTONIX) 40 MG tablet TAKE 1 TABLET BY MOUTH TWICE A DAY BEFORE MEALS 12/6/21   Pennie Vazquez MD   cyclobenzaprine (FLEXERIL) 10 MG tablet  11/1/21   Historical Provider, MD   aspirin 81 MG EC tablet Take 81 mg by mouth daily    Historical Provider, MD   oxyCODONE-acetaminophen (PERCOCET) 5-325 MG per tablet Take 1 tablet by mouth every 4 hours as needed for Pain.     Historical Provider, MD   mirtazapine (REMERON) 7.5 MG tablet Take 0.5 tablets by mouth nightly 11/22/21   Pennie Vazquez MD   fluticasone (FLOVENT HFA) 110 MCG/ACT inhaler Inhale 2 puffs into the lungs 2 times daily  Patient taking differently: Inhale 2 puffs into the lungs 2 times daily Patient said is only using as needed d/t cost 9/28/21 9/28/22  Pennie Vazquez MD   melatonin 10 MG CAPS capsule Take 10 mg by mouth nightly as needed     Historical Provider, MD   lisinopril (PRINIVIL;ZESTRIL) 2.5 MG tablet Take 0.5 tablets by mouth daily 3/22/21   ISIDRA Mendez CNP   docusate sodium (COLACE) 100 MG capsule Take 100 mg by mouth 2 times daily.     Historical Provider, MD       Future Appointments   Date Time Provider Vikki Afua   3/21/2022  8:00 AM SCHEDULE, Leroy REMOTE TRANSMISSION [facility]   4/25/2022  8:00 AM SCHEDULE, Leroy REMOTE TRANSMISSION [facility]   6/8/2022  3:45 PM Christa Ortiz MD [facility]   8/8/2022  8:00 AM SCHEDULE, Leroy REMOTE TRANSMISSION [facility]   9/12/2022  8:00 AM SCHEDULE, Leroy REMOTE TRANSMISSION [facility]   10/17/2022  8:00 AM SCHEDULE, Leroy REMOTE TRANSMISSION [facility]   11/21/2022  8:00 AM SCHEDULE, Leroy REMOTE TRANSMISSION [facility]      and   COPD Assessment    Does the patient understand envrionmental exposure?: Yes  Is the patient able to verbalize Rescue vs. Long Acting medications?: No  Does the patient have a nebulizer?: No  Does the patient use a space with inhaled medications?: No     No patient-reported symptoms         Symptoms:  None: Yes      Symptom course: stable  Increase use of rapid acting/rescue inhaled medications?: No  Have you had a recent diagnosis of pneumonia either by PCP or at a hospital?: No

## 2022-02-16 DIAGNOSIS — F41.9 ANXIETY: ICD-10-CM

## 2022-02-16 RX ORDER — MIRTAZAPINE 7.5 MG/1
TABLET, FILM COATED ORAL
Qty: 90 TABLET | Refills: 3 | Status: SHIPPED | OUTPATIENT
Start: 2022-02-16 | End: 2022-10-04

## 2022-02-17 RX ORDER — DIGOXIN 125 MCG
TABLET ORAL
Qty: 30 TABLET | Refills: 3 | Status: SHIPPED | OUTPATIENT
Start: 2022-02-17 | End: 2022-09-19

## 2022-02-17 NOTE — TELEPHONE ENCOUNTER
Spoke to patient and told him he was prescribed Diltiazem ER (CD) 120 mg daily at 1/19/22 office visit with Dr. Radha Reddy. Patient said he never got a call from the pharmacy that medication was ready to . I confirmed that prescription is on file and pharmacy staff said that would get prescription ready for patient. Patient informed and verbalized understanding.

## 2022-02-17 NOTE — TELEPHONE ENCOUNTER
Spoke with the patient and advised him of the message below . Pt is requesting a higher dose of Diltiazem as he states that this is cheaper . Pt states that he would rather take 120 mg daily not 60 mg by mouth twice daily.

## 2022-02-18 DIAGNOSIS — R60.9 PERIPHERAL EDEMA: ICD-10-CM

## 2022-02-18 RX ORDER — FUROSEMIDE 20 MG/1
TABLET ORAL
Qty: 90 TABLET | Refills: 0 | Status: SHIPPED | OUTPATIENT
Start: 2022-02-18 | End: 2022-08-11

## 2022-02-18 NOTE — TELEPHONE ENCOUNTER
Last OV 2/7/2022   Next OV Visit date not found    Requested Prescriptions     Pending Prescriptions Disp Refills    furosemide (LASIX) 20 MG tablet 90 tablet 0     Sig: t1 tab by mouth daily. Call cardiology if swelling in legs worsens or shortness of breath occurs.     last fill 2/4/22

## 2022-02-21 RX ORDER — LISINOPRIL 2.5 MG/1
TABLET ORAL
Qty: 45 TABLET | Refills: 1 | Status: ON HOLD | OUTPATIENT
Start: 2022-02-21 | End: 2022-08-11 | Stop reason: HOSPADM

## 2022-02-22 ENCOUNTER — TELEPHONE (OUTPATIENT)
Dept: CARDIOLOGY CLINIC | Age: 72
End: 2022-02-22

## 2022-02-22 RX ORDER — PANTOPRAZOLE SODIUM 40 MG/1
TABLET, DELAYED RELEASE ORAL
Qty: 180 TABLET | Refills: 3 | Status: SHIPPED | OUTPATIENT
Start: 2022-02-22 | End: 2022-02-28 | Stop reason: SDUPTHER

## 2022-02-22 NOTE — TELEPHONE ENCOUNTER
Medication Refill    Medication needing refilled:  pantoprazole (PROTONIX) 40 MG-  PT IS OUT OF MED AND HAS NEW PHARM LISTED BELOW      Dosage of the medication:    How are you taking this medication (QD, BID, TID, QID, PRN):1 TABLET BY MOUTH TWICE A DAY BEFORE MEALS    30 or 90 day supply called in: 90  Day supply    When will you run out of your medication:    Which Pharmacy are we sending the medication to?: [address] [place] [address], [address] [address]   Phone:  [phone]  Fax:  [phone]

## 2022-02-22 NOTE — TELEPHONE ENCOUNTER
Last OV 2/7/2022   Next OV Visit date not found    Requested Prescriptions     Pending Prescriptions Disp Refills    pantoprazole (PROTONIX) 40 MG tablet 180 tablet 3     Sig: TAKE 1 TABLET BY MOUTH TWICE A DAY BEFORE MEALS    last fill 12/2021  Pt would like sent to another pharmacy pended

## 2022-02-25 NOTE — TELEPHONE ENCOUNTER
Medication:   Requested Prescriptions      No prescriptions requested or ordered in this encounter        Last Filled:  New pharmacy    Patient Phone Number: [phone] (home)     Last appt: 2/7/2022   Next appt: Visit date not found    Last OARRS:   RX Monitoring 12/30/2019   Periodic Controlled Substance Monitoring No signs of potential drug abuse or diversion identified.

## 2022-02-28 RX ORDER — PANTOPRAZOLE SODIUM 40 MG/1
TABLET, DELAYED RELEASE ORAL
Qty: 180 TABLET | Refills: 3 | Status: SHIPPED | OUTPATIENT
Start: 2022-02-28

## 2022-03-11 ENCOUNTER — CARE COORDINATION (OUTPATIENT)
Dept: CARE COORDINATION | Age: 72
End: 2022-03-11

## 2022-03-11 DIAGNOSIS — G89.4 CHRONIC PAIN SYNDROME: ICD-10-CM

## 2022-03-11 RX ORDER — OXYCODONE HYDROCHLORIDE AND ACETAMINOPHEN 5; 325 MG/1; MG/1
1 TABLET ORAL 2 TIMES DAILY PRN
Qty: 60 TABLET | Refills: 0 | Status: SHIPPED | OUTPATIENT
Start: 2022-03-11 | End: 2022-05-19 | Stop reason: SDUPTHER

## 2022-03-11 RX ORDER — OXYCODONE HYDROCHLORIDE AND ACETAMINOPHEN 5; 325 MG/1; MG/1
1 TABLET ORAL 2 TIMES DAILY PRN
Qty: 60 TABLET | Refills: 0 | Status: CANCELLED | OUTPATIENT
Start: 2022-03-11 | End: 2022-04-10

## 2022-03-11 NOTE — TELEPHONE ENCOUNTER
Last OV 2/7/2022   Next OV Visit date not found    Requested Prescriptions     Pending Prescriptions Disp Refills    oxyCODONE-acetaminophen (PERCOCET) 5-325 MG per tablet 60 tablet 0     Sig: Take 1 tablet by mouth 2 times daily as needed for Pain for up to 30 days. Intended supply: 3 days.  Take lowest dose possible to manage pain    last fill 1/5  Medication pended

## 2022-03-11 NOTE — CARE COORDINATION
Outbound call made to patient to follow up on CC needs and concerns. Patient said he is getting ready to ready to walk out the door. Patient confirmed he has not received the COPD zone tool, or Flovent patient assistance fund application mailed to him. AC said I will have it mailed out again. Patient agreed for [facility] to call back at a later date. Patient denies having any other questions are concerns currently. Patient given [facility]'s contact information and is encouraged to call with any questions or concerns.     Plan   Review Pharmacy Resources for Flovent with patient   Review and answer questions on COPD Zone Tool  Needs/concerns      [facility]  [phone] [address]  Family Medicine  [address] [place] [address] Primary Care

## 2022-03-20 PROCEDURE — 93298 REM INTERROG DEV EVAL SCRMS: CPT | Performed by: INTERNAL MEDICINE

## 2022-03-20 PROCEDURE — G2066 INTER DEVC REMOTE 30D: HCPCS | Performed by: INTERNAL MEDICINE

## 2022-03-21 ENCOUNTER — TELEMEDICINE (OUTPATIENT)
Dept: FAMILY MEDICINE CLINIC | Age: 72
End: 2022-03-21
Payer: MEDICARE

## 2022-03-21 ENCOUNTER — TELEPHONE (OUTPATIENT)
Dept: FAMILY MEDICINE CLINIC | Age: 72
End: 2022-03-21

## 2022-03-21 ENCOUNTER — NURSE ONLY (OUTPATIENT)
Dept: CARDIOLOGY CLINIC | Age: 72
End: 2022-03-21
Payer: MEDICARE

## 2022-03-21 DIAGNOSIS — Z45.09 ENCOUNTER FOR ELECTRONIC ANALYSIS OF REVEAL EVENT RECORDER: ICD-10-CM

## 2022-03-21 DIAGNOSIS — U07.1 COVID-19: Primary | ICD-10-CM

## 2022-03-21 DIAGNOSIS — I48.0 PAF (PAROXYSMAL ATRIAL FIBRILLATION) (HCC): Chronic | ICD-10-CM

## 2022-03-21 PROCEDURE — 99442 PR PHYS/QHP TELEPHONE EVALUATION 11-20 MIN: CPT | Performed by: FAMILY MEDICINE

## 2022-03-21 NOTE — PROGRESS NOTES
We received a remote transmission from patient's monitor at home. Remote Linq report shows no arrhythmias. Pt has known AF. Pt not on anti coags. He has had a Maze procedure. EP physician to review. We will continue to monitor remotely.

## 2022-03-21 NOTE — TELEPHONE ENCOUNTER
Patient tested positive with at-home COVID test, requesting advice. Feeling dehydrated, having dizzy spells, weak and tired, disoriented, breathing okay.

## 2022-03-23 NOTE — PROGRESS NOTES
Hadley Blanco is a 73 y.o. male evaluated via telephone on 3/21/2022 for Concern For COVID-19 (disoriented, dizzy, headache, tired, less frequent urination, loss of taste and smell )  . Documentation:  He has been feeling sick for almost a week and hence he checked home rapid Covid testing which turned out to be positive. The complains of feeling dizzy but his blood pressure has been stable. He has been hydrating himself but he reports to have no appetite. He skipped his blood pressure medication in the afternoon. He lives by himself. He denies any shortness of breath or wheezing. He has been using inhaler. Impression Covid  Advised him to get the pulse ox and asked him to monitor his oxygen saturation. He is not qualified to will be treated by oral medication paxlovid. Advised for good hydration and monitor his blood pressure    Total Time: 15 min    Hadley Blanco was evaluated through a synchronous (real-time) audio encounter. Patient identification was verified at the start of the visit. He (or guardian if applicable) is aware that this is a billable service, which includes applicable co-pays. This visit was conducted with the patient's (and/or legal guardian's) verbal consent. He has not had a related appointment within my department in the past 7 days or scheduled within the next 24 hours. The patient was located in a state where the provider was licensed to provide care.     Note: not billable if this call serves to triage the patient into an appointment for the relevant concern    Kiah Root MD

## 2022-03-24 ENCOUNTER — CARE COORDINATION (OUTPATIENT)
Dept: CARE COORDINATION | Age: 72
End: 2022-03-24

## 2022-03-24 NOTE — CARE COORDINATION
Outbound call made to patient to follow up d/t VMM left about being positive for COVID and o2 meter reading below 70. Patient answered phone and said it was a false alarm about his o2 readings. Patient said that he was reading the pulse and not the o2. Patient said he just got off the phone with the PCP office. Patient said his o2 is running between 90 and 91%. ACM educated patient this if his o2 drops below 90% and does not go back up he needs to go to the ED immediately, patient voiced understanding. ACM educated patient on s/s to return to ED for ex: SOB that is persistent or gets worse, constant chest pain or pressure, leg or calf pain, swelling in legs, new or worsening of symptoms, patient voiced understanding. Patient said he is drinking plenty of fluids. Patient denies having any other questions are concerns currently. Patient given [facility]'s contact information and is encouraged to call with any questions or concerns.      Plan   Review Pharmacy Resources for Flovent with patient   Review and answer questions on COPD Zone Tool  Needs/concerns       [facility]  [phone] [address]  Family Medicine  [address] [place] [address] Primary [facility]

## 2022-03-24 NOTE — CARE COORDINATION
Outbound call made to patient to follow up d/t VMM left about being positive for COVID and o2 meter reading below 70. Patient was unable to reach, ACM left HIPAA compliant message with contact information.       Plan  Will attempt to call back in a couple minutes     [facility]  [phone]  [address]  [address] [place] [address] Primary [facility]

## 2022-03-28 ENCOUNTER — TELEPHONE (OUTPATIENT)
Dept: CARDIOLOGY CLINIC | Age: 72
End: 2022-03-28

## 2022-03-28 DIAGNOSIS — I25.118 CORONARY ARTERY DISEASE OF NATIVE HEART WITH STABLE ANGINA PECTORIS, UNSPECIFIED VESSEL OR LESION TYPE (HCC): Primary | ICD-10-CM

## 2022-03-28 NOTE — TELEPHONE ENCOUNTER
Please call and have Patient send over a manual remote transmission. No new episodes noted since 2/24/2022 but states Additional episodes may be available. Request a remote interrogation to fill in additional information. Thank you.

## 2022-03-28 NOTE — TELEPHONE ENCOUNTER
Called patient and [facility] for patient to return call to office for message below.

## 2022-03-28 NOTE — TELEPHONE ENCOUNTER
Rahul Melgar called in stating that yesterday 03.27 he had severe throbbing in his left arm and heart as well as chest pain and shortness of breath. It only lasted up to two minutes but he stated it was so bad he almost called 911.      Rahul Melgar can be reached at [phone]

## 2022-03-29 NOTE — TELEPHONE ENCOUNTER
We received remote transmission from patient's monitor at home. Current ECG shows sinus with 1st degree HB an PVC  Tachy episodes noted but no episodes recorded on 3/27/2022. EP will review. See interrogation under cardiology tab in the [address] [facility] [address] field for more details. Please advise.  Thanks

## 2022-03-29 NOTE — TELEPHONE ENCOUNTER
No significant arrhythmia to explain his symptoms. If they were to recur, recommend possible ischemic testing per NPTS/KJC recommendations given his known CAD.     Matthew Gregory, APRN-CNP

## 2022-03-30 NOTE — TELEPHONE ENCOUNTER
Called pt to scheduled Farrel Limb order needs to be entered into Epic, pt also stated he tested positive for COVID last week retested again Monday and he is still positive while talking to the pt he was very short of breathe.     Pls advise jess you

## 2022-03-31 NOTE — TELEPHONE ENCOUNTER
Spoke to patient. States his 3/27/22 episodes of severe chest pain, left arm pain, and shortness of breath occurred at rest and lasted only two minutes. He has not had recurrence severe pain. He has felt very fatigued since his COVID diagnosis. He will have a COVID test today (3rd test) at [place]. According to Dr. Anna Fonseca - if patient has another positive test, he wants patient to speak to PCP (Dr. Horace Oleary) to see if she thinks patient just has a persistent positive test but is not contagious, or if she thinks he has lingering COVID illness. Patient voiced understanding and will let us know the results after testing.

## 2022-04-01 ENCOUNTER — TELEPHONE (OUTPATIENT)
Dept: FAMILY MEDICINE CLINIC | Age: 72
End: 2022-04-01

## 2022-04-01 ENCOUNTER — TELEPHONE (OUTPATIENT)
Dept: CARDIOLOGY CLINIC | Age: 72
End: 2022-04-01

## 2022-04-01 ENCOUNTER — NURSE TRIAGE (OUTPATIENT)
Dept: OTHER | Facility: CLINIC | Age: 72
End: 2022-04-01

## 2022-04-01 DIAGNOSIS — R07.9 CHEST PAIN, UNSPECIFIED TYPE: ICD-10-CM

## 2022-04-01 DIAGNOSIS — R06.02 SHORTNESS OF BREATH: ICD-10-CM

## 2022-04-01 DIAGNOSIS — I25.118 CORONARY ARTERY DISEASE OF NATIVE HEART WITH STABLE ANGINA PECTORIS, UNSPECIFIED VESSEL OR LESION TYPE (HCC): Primary | ICD-10-CM

## 2022-04-01 NOTE — TELEPHONE ENCOUNTER
Patient wants to move up June 2022 appt. Told patient once he undergoes Lexiscan myoview (negative COVID test or deemed not to be contagious by PCP), Dr. Elisabeth Morales can determine if patient's appointment should be moved up.     No results available for COVID PCR test.

## 2022-04-01 NOTE — TELEPHONE ENCOUNTER
No need to keep rechecking as it can remain positive for a few weeks  Just quarantine per CDC guidelines:     Over the counter treatments that may help with symptoms:   Vitamin C 500 mg twice a day along with Quercetin 250-500mg twice a day  B complex vitamin daily  Melatonin 6mg at night  Vitamin D3 2211-2105 IU daily  Be sure to monitor your oxygen saturation at home with a pulse oximeter which can be purchased online at [employer] for around $15 or at a medical supply store. Go to the ER if  <90%  Drink plenty of fluids  Tylenol as needed for pain, fever or headache (unless you have been told in the past to avoid tylenol based products)  Resting and sleeping prone (on your stomach) may help breathing  Mucinex or dextromethorphan as needed for cough    When to seek emergency care: If trouble breathing worsens  Persistent pain/pressure in your chest  Confusion  Blue lips/face  Inability to stay awake  Oxygen saturation <90% consistently    CDC quarantine/isolation guidelines:       If you Test Positive for COVID-19 (Isolate):  Everyone, regardless of vaccination status. Stay home for 5 days. If you have no symptoms or your symptoms are resolving after 5 days, you can leave your house. Continue to wear a mask around others for 5 additional days. If you have a fever, continue to stay home until your fever resolves. If You Were Exposed to [address] [place] with COVID-19 (Quarantine): If you:   Have been boosted  OR  Completed the primary series of Pfizer or Moderna vaccine within the last 6 months  OR  Completed the Primary series of J&J vaccine within the last 2 months    Wear a mask around others for 10 days  Test on day 5, if possible  If you develop symptoms, get a test and stay home. If you:   Completed the primary series of Pfizer or Moderna vaccine over 6 months ago and are not boosted   OR  Completed the primary series of J&J over 2 months ago and are not boosted  OR  Are unvaccinated    Stay home for 5 days. After that, continue to wear a mask around others for 5 additional days. If you can't quarantine you must wear a mask for 10 days. Test on day 5 if possible.

## 2022-04-01 NOTE — TELEPHONE ENCOUNTER
Spoke to patient. He has not gotten the results back from his COVID test yet (could take up to 72 hours). If test is still positive, we have asked patient to see Dr. Gayathri Pineda to determine if he is still contagious or just has lingering positive test.  Once Janel Montanez is done and Dr. Laney Runner reviews the results, he can determine if Tracey appointment should be moved up. Patient voiced understanding.

## 2022-04-01 NOTE — TELEPHONE ENCOUNTER
Unable to complete call, phone would not allow through. Sending mychart message as patient was concerned.

## 2022-04-01 NOTE — TELEPHONE ENCOUNTER
Pt has COVID would like to move his appt up from 06.08 to sooner with [facility] if possible.     Please advise

## 2022-04-01 NOTE — TELEPHONE ENCOUNTER
----- Message from Juancarlos Nieto sent at 4/1/2022  1:17 PM EDT -----  Subject: Message to Provider    QUESTIONS  Information for Provider? Took another covid test was positive. hasn't   gone away. Have new heart issues . Electrical problems. Doesn't know what   he needs to do or can do for covid.  ---------------------------------------------------------------------------  --------------  CALL BACK INFO  What is the best way for the office to contact you? OK to leave message on   voicemail  Preferred Call Back Phone Number? [phone]  ---------------------------------------------------------------------------  --------------  SCRIPT ANSWERS  Relationship to Patient?  Self

## 2022-04-01 NOTE — TELEPHONE ENCOUNTER
Received call from [facility] at [facility] with Red Flag Complaint. Subjective: Caller states \"he has some problems with his heart and tested positive twice for covid. Tested last this past Saturday, 10 days before that he also tested positive. Yesterday he was retested at [facility] with a \"better test\" Toes, left knee get numb, left shoulder locks, little finger on left hand locks up, recent left arm pain\"     Current Symptoms: SOB,(mild exertion), weak, no strength, dizzy spells, disorientation    Onset: 2 weeks ago; gradual    Associated Symptoms: reduced appetite    Pain Severity: 7/10 when he gets up; nerve pain/jolt; constant discomfort, pain comes and goes    Temperature: denies fever n/a    What has been tried: nothing    LMP: NA Pregnant: NA    Recommended disposition: See in Office Today, was told UCC/ED if no appointments    Care advice provided, patient verbalizes understanding; denies any other questions or concerns; instructed to call back for any new or worsening symptoms. Patient/Caller agrees with recommended disposition; writer provided warm transfer to [employer] at [facility] for appointment scheduling     Attention Provider: Thank you for allowing me to participate in the care of your patient. The patient was connected to triage in response to information provided to the ECC/PSC. Please do not respond through this encounter as the response is not directed to a shared pool.     Reason for Disposition   Brief confusion (now gone)    Protocols used: CONFUSION - DELIRIUM-ADULT-OH

## 2022-04-01 NOTE — TELEPHONE ENCOUNTER
Please advise. Cardiology is aware of heart problems, asking if this could just be a persistent positive. Has tested positive 3 times this week.

## 2022-04-04 ENCOUNTER — TELEMEDICINE (OUTPATIENT)
Dept: FAMILY MEDICINE CLINIC | Age: 72
End: 2022-04-04
Payer: MEDICARE

## 2022-04-04 DIAGNOSIS — G93.32 POST-COVID CHRONIC FATIGUE: ICD-10-CM

## 2022-04-04 DIAGNOSIS — I10 ESSENTIAL HYPERTENSION: Primary | ICD-10-CM

## 2022-04-04 DIAGNOSIS — U09.9 POST-COVID CHRONIC FATIGUE: ICD-10-CM

## 2022-04-04 PROCEDURE — 99442 PR PHYS/QHP TELEPHONE EVALUATION 11-20 MIN: CPT | Performed by: FAMILY MEDICINE

## 2022-04-04 ASSESSMENT — PATIENT HEALTH QUESTIONNAIRE - PHQ9
SUM OF ALL RESPONSES TO PHQ9 QUESTIONS 1 & 2: 0
SUM OF ALL RESPONSES TO PHQ QUESTIONS 1-9: 0
SUM OF ALL RESPONSES TO PHQ QUESTIONS 1-9: 0
1. LITTLE INTEREST OR PLEASURE IN DOING THINGS: 0
2. FEELING DOWN, DEPRESSED OR HOPELESS: 0
SUM OF ALL RESPONSES TO PHQ QUESTIONS 1-9: 0
SUM OF ALL RESPONSES TO PHQ QUESTIONS 1-9: 0

## 2022-04-06 ENCOUNTER — HOSPITAL ENCOUNTER (OUTPATIENT)
Dept: NON INVASIVE DIAGNOSTICS | Age: 72
Discharge: HOME | End: 2022-04-06
Payer: MEDICARE

## 2022-04-06 DIAGNOSIS — R06.02 SHORTNESS OF BREATH: ICD-10-CM

## 2022-04-06 DIAGNOSIS — I25.118 CORONARY ARTERY DISEASE OF NATIVE HEART WITH STABLE ANGINA PECTORIS, UNSPECIFIED VESSEL OR LESION TYPE (HCC): ICD-10-CM

## 2022-04-06 DIAGNOSIS — R07.9 CHEST PAIN, UNSPECIFIED TYPE: ICD-10-CM

## 2022-04-06 LAB
LV EF: 71 %
LVEF MODALITY: NORMAL

## 2022-04-06 PROCEDURE — 3430000000 HC RX DIAGNOSTIC RADIOPHARMACEUTICAL: Performed by: INTERNAL MEDICINE

## 2022-04-06 PROCEDURE — 6360000002 HC RX W HCPCS: Performed by: INTERNAL MEDICINE

## 2022-04-06 PROCEDURE — 78452 HT MUSCLE IMAGE SPECT MULT: CPT | Performed by: INTERNAL MEDICINE

## 2022-04-06 PROCEDURE — A9502 TC99M TETROFOSMIN: HCPCS | Performed by: INTERNAL MEDICINE

## 2022-04-06 PROCEDURE — 93017 CV STRESS TEST TRACING ONLY: CPT | Performed by: INTERNAL MEDICINE

## 2022-04-06 RX ADMIN — TETROFOSMIN 10 MILLICURIE: 1.38 INJECTION, POWDER, LYOPHILIZED, FOR SOLUTION INTRAVENOUS at 08:29

## 2022-04-06 RX ADMIN — REGADENOSON 0.4 MG: 0.08 INJECTION, SOLUTION INTRAVENOUS at 09:29

## 2022-04-06 RX ADMIN — TETROFOSMIN 30 MILLICURIE: 1.38 INJECTION, POWDER, LYOPHILIZED, FOR SOLUTION INTRAVENOUS at 09:30

## 2022-04-07 ENCOUNTER — CARE COORDINATION (OUTPATIENT)
Dept: CARE COORDINATION | Age: 72
End: 2022-04-07

## 2022-04-07 NOTE — CARE COORDINATION
Ambulatory Care Coordination Note  4/7/2022  CM Risk Score: 2  Charlson 10 Year Mortality Risk Score: 100%     ACC: Myriam Pina RN    Summary Note: Outbound call made to patient to follow up on pharmacy resources and COPD zone tool. Patient said that he is over [place] and feels much better. Patient said he did receive the pharmacy resources for Flovent and the COPD zone tool. Patient verbalized understanding about when to contact the provider or seek immediate medical attention referring to COPD exasperation. Patient said he had a stress test performed and the clinician that performed the test spoke with patient. [facility] encouraged patient to call his cardiologist if he has any questions regarding the stress test, patient voiced understanding. Patient denies having any other questions are concerns currently. Patient given [facility]'s contact information and is encouraged to call with any questions or concerns. Plan   Graduate patient is no needs or concerns     [facility]  [phone]  [address] Primary Care         Care Coordination Interventions    Program Enrollment: Complex Care  Referral from Primary Care Provider: No  Suggested Interventions and Community Resources  Medication Assistance Program: Completed (Comment:  Patient said he did receive the pharmacy resources for [facility] 4/7/22)  Zone Management Tools: Completed (Comment: Patient received the COPD zone tool. 4/7/22)         Goals Addressed                 This Visit's Progress     Conditions and Symptoms   On track     I will notify my provider of any barriers to my plan of care. I will follow my Zone Management tool to seek urgent or emergent care. I will notify my provider of any symptoms that indicate a worsening of my condition.     Barriers: Not able to afford [address] [place] [address] for overcoming my barriers: I will reach out to my Providers and or my ACM with questions or concerns regarding my Plan of Care. I will notify ACM or provider if not able to afford my medication   Confidence: 9/10  Anticipated Goal Completion Date: 5/7/22 2/7  Patient said he does his Flovent as needed d/t the cost. Patient said he does not use the spacer for his Flovent because it is broke. AC will send a message to PCP to see if patient can get a new order for a spacer to use with his Flovent. Patient had f/u appointment with his PCP today 2/7/22. ACM will talk to patient about possibly seeing if he can get assistance through [employer] to help with cost of Flovent, or other possible pharmacy resources such as [address], or [employer] at next contact. ACM having COPD Zone Tool mailed to patient. Will review and answer any questions. Patient is independent with ADLs except managing his finances. Patient said his daughter manages his finances. Patient said he lives in hospitals. ACM having COPD Zone Tool mailed to patient. Will review and answer any questions. 2/9  M provided patient with the number to [employer] @ [phone] so that patient can call and see if he can get help with the cost of his Flovent d/t being so expensive. AC educated patient that when he calls [employer] let them know the reason for the call. If he needs additional assistance to please reach back out to [facility]. 2/15  AC emailed patient assistant fund form to patient with his permission for possible help pay for his Flovent. Patient said he is only using when needed d/t not being able to afford it. Patient said [facility] told him today that it is 448 dollars with the [employer] card. Patient said he will have his daughter help him fill out the form and take it to his provider to fill out as well. Patient said he has not received the COPD zone tool in the mail. Patient agreed to review with AC and ask questions.      4/7  Patient said he did receive the pharmacy resources for Flovent and the COPD zone tool. Patient verbalized understanding about when to contact the provider or seek immediate medical attention referring to COPD exasperation. Patient said he had a stress test performed and the clinician that performed the test spoke with patient. AC encouraged patient to call his cardiologist if he has any questions regarding the stress test, patient voiced understanding. Prior to Admission medications    Medication Sig Start Date End Date Taking? Authorizing Provider   oxyCODONE-acetaminophen (PERCOCET) 5-325 MG per tablet Take 1 tablet by mouth 2 times daily as needed for Pain for up to 30 days. Intended supply: 3 days. Take lowest dose possible to manage pain 3/11/22 4/10/22  Lizzy Jacinto MD   pantoprazole (PROTONIX) 40 MG tablet TAKE 1 TABLET BY MOUTH TWICE A DAY BEFORE MEALS 2/28/22   Zuleyka Mandujano MD   lisinopril (PRINIVIL;ZESTRIL) 2.5 MG tablet TAKE 1/2 TABLET BY MOUTH EVERY DAY 2/21/22   ISIDRA Brody - CNP   furosemide (LASIX) 20 MG tablet t1 tab by mouth daily. Call cardiology if swelling in legs worsens or shortness of breath occurs.  2/18/22   Zuleyka Mandujano MD   digoxin (LANOXIN) 125 MCG tablet TAKE 1 TABLET BY MOUTH EVERY DAY 2/17/22   Miguel Duncan MD   mirtazapine (REMERON) 7.5 MG tablet TAKE 1 TABLET BY MOUTH EVERY DAY AT NIGHT 2/16/22   Reed Correa MD   diphenhydrAMINE (SOMINEX) 25 MG tablet Diphenhydramine Oral     take 1 tablet by mouth daily   active    Historical Provider, MD   Cholecalciferol 50 MCG (2000 UT) TABS Take 2,000 Units by mouth daily    Historical Provider, MD   dilTIAZem (CARDIZEM CD) 120 MG extended release capsule Take 1 capsule by mouth daily 1/19/22   Miguel Duncan MD   rosuvastatin (CRESTOR) 40 MG tablet Take 1 tablet by mouth daily 1/19/22   Miguel Duncan MD   levothyroxine (SYNTHROID) 112 MCG tablet TAKE 1 TABLET BY MOUTH EVERY DAY 12/6/21   Zuleyka Mandujano MD   cyclobenzaprine (FLEXERIL) 10 MG tablet  11/1/21   Historical Provider, MD   aspirin 81 MG EC tablet Take 81 mg by mouth daily    Historical Provider, MD   fluticasone (FLOVENT HFA) 110 MCG/ACT inhaler Inhale 2 puffs into the lungs 2 times daily  Patient taking differently: Inhale 2 puffs into the lungs 2 times daily Patient said is only using as needed d/t cost 9/28/21 9/28/22  Meeta Manzano MD   melatonin 10 MG CAPS capsule Take 10 mg by mouth nightly as needed     Historical Provider, MD   lisinopril (PRINIVIL;ZESTRIL) 2.5 MG tablet Take 0.5 tablets by mouth daily 3/22/21   Renetta Pugatingham, APRN - CNP   docusate sodium (COLACE) 100 MG capsule Take 100 mg by mouth 2 times daily.     Historical Provider, MD       Future Appointments   Date Time Provider Vikki Caal   4/25/2022  8:00 AM SCHEDULE, [place] REMOTE TRANSMISSION FF [facility]   6/8/2022  3:45 PM Jazmin García MD FF [facility]   8/8/2022  8:00 AM SCHEDULE, [place] REMOTE TRANSMISSION [facility]   9/12/2022  8:00 AM SCHEDULE, [place] REMOTE TRANSMISSION [facility]   10/17/2022  8:00 AM SCHEDULE, [place] REMOTE TRANSMISSION [facility]   11/21/2022  8:00 AM SCHEDULE, [facility] TRANSMISSION FF Card[facility]

## 2022-04-25 ENCOUNTER — NURSE ONLY (OUTPATIENT)
Dept: CARDIOLOGY CLINIC | Age: 72
End: 2022-04-25
Payer: MEDICARE

## 2022-04-25 DIAGNOSIS — I48.0 PAF (PAROXYSMAL ATRIAL FIBRILLATION) (HCC): Chronic | ICD-10-CM

## 2022-04-25 DIAGNOSIS — Z45.09 ENCOUNTER FOR ELECTRONIC ANALYSIS OF REVEAL EVENT RECORDER: ICD-10-CM

## 2022-04-25 NOTE — PROGRESS NOTES
We received a remote transmission from patient's monitor at home. Remote Linq report shows ST. Pt showed no symptoms. EP physician to review. We will continue to monitor remotely.

## 2022-04-27 PROCEDURE — 93298 REM INTERROG DEV EVAL SCRMS: CPT | Performed by: INTERNAL MEDICINE

## 2022-04-27 PROCEDURE — G2066 INTER DEVC REMOTE 30D: HCPCS | Performed by: INTERNAL MEDICINE

## 2022-05-05 ENCOUNTER — CARE COORDINATION (OUTPATIENT)
Dept: CARE COORDINATION | Age: 72
End: 2022-05-05

## 2022-05-05 NOTE — CARE COORDINATION
Outbound call made to patient to f/u on CC needs and concerns. Patient said everything is going well and denies any needs or concerns currently. Patient has ACMs contact information and is encouraged to call with any questions or concerns. No further outreach scheduled with this ACM d/t all goals met and has no current needs or questions.     Erika Flores RN  Ambulatory Care Manager  [phone]  [address]  [address] [place] [address] Primary Care     I agree with the Care Coordinator's Plan of Care

## 2022-05-06 NOTE — TELEPHONE ENCOUNTER
Please review plan of care. Respond in quick note using the smart phrase . ccplanofcare and route back to sender.    It will not allow me to do

## 2022-05-18 DIAGNOSIS — G89.4 CHRONIC PAIN SYNDROME: ICD-10-CM

## 2022-05-18 NOTE — TELEPHONE ENCOUNTER
Medication:   Requested Prescriptions     Pending Prescriptions Disp Refills    oxyCODONE-acetaminophen (PERCOCET) 5-325 MG per tablet 60 tablet 0     Sig: Take 1 tablet by mouth 2 times daily as needed for Pain for up to 30 days. Intended supply: 3 days. Take lowest dose possible to manage pain     Last Filled:  4.10.22 #60 refills 0  Last appt: 4/4/2022   Next appt: Visit date not found    Last OARRS:   RX Monitoring 12/30/2019   Periodic Controlled Substance Monitoring No signs of potential drug abuse or diversion identified.

## 2022-05-19 RX ORDER — OXYCODONE HYDROCHLORIDE AND ACETAMINOPHEN 5; 325 MG/1; MG/1
1 TABLET ORAL 2 TIMES DAILY PRN
Qty: 60 TABLET | Refills: 0 | Status: SHIPPED | OUTPATIENT
Start: 2022-05-19 | End: 2022-06-18

## 2022-05-29 PROCEDURE — 93298 REM INTERROG DEV EVAL SCRMS: CPT | Performed by: INTERNAL MEDICINE

## 2022-05-29 PROCEDURE — G2066 INTER DEVC REMOTE 30D: HCPCS | Performed by: INTERNAL MEDICINE

## 2022-05-31 ENCOUNTER — NURSE ONLY (OUTPATIENT)
Dept: CARDIOLOGY CLINIC | Age: 72
End: 2022-05-31
Payer: MEDICARE

## 2022-05-31 DIAGNOSIS — Z45.09 ENCOUNTER FOR ELECTRONIC ANALYSIS OF REVEAL EVENT RECORDER: ICD-10-CM

## 2022-05-31 DIAGNOSIS — I48.0 PAF (PAROXYSMAL ATRIAL FIBRILLATION) (HCC): Chronic | ICD-10-CM

## 2022-06-08 ENCOUNTER — OFFICE VISIT (OUTPATIENT)
Dept: CARDIOLOGY CLINIC | Age: 72
End: 2022-06-08
Payer: MEDICARE

## 2022-06-08 VITALS
SYSTOLIC BLOOD PRESSURE: 90 MMHG | BODY MASS INDEX: 22.98 KG/M2 | WEIGHT: 160.5 LBS | DIASTOLIC BLOOD PRESSURE: 60 MMHG | HEIGHT: 70 IN | HEART RATE: 71 BPM | OXYGEN SATURATION: 91 %

## 2022-06-08 DIAGNOSIS — Z45.09 ENCOUNTER FOR LOOP RECORDER CHECK: ICD-10-CM

## 2022-06-08 DIAGNOSIS — I48.0 PAF (PAROXYSMAL ATRIAL FIBRILLATION) (HCC): ICD-10-CM

## 2022-06-08 DIAGNOSIS — G47.33 OBSTRUCTIVE SLEEP APNEA SYNDROME: ICD-10-CM

## 2022-06-08 DIAGNOSIS — I25.10 CORONARY ARTERY DISEASE DUE TO LIPID RICH PLAQUE: Primary | ICD-10-CM

## 2022-06-08 DIAGNOSIS — E78.2 MIXED HYPERLIPIDEMIA: ICD-10-CM

## 2022-06-08 DIAGNOSIS — I36.1 NONRHEUMATIC TRICUSPID VALVE REGURGITATION: ICD-10-CM

## 2022-06-08 DIAGNOSIS — I34.0 NONRHEUMATIC MITRAL VALVE REGURGITATION: ICD-10-CM

## 2022-06-08 DIAGNOSIS — Z95.2 S/P AVR: ICD-10-CM

## 2022-06-08 DIAGNOSIS — I25.83 CORONARY ARTERY DISEASE DUE TO LIPID RICH PLAQUE: Primary | ICD-10-CM

## 2022-06-08 PROCEDURE — 1036F TOBACCO NON-USER: CPT | Performed by: INTERNAL MEDICINE

## 2022-06-08 PROCEDURE — 3017F COLORECTAL CA SCREEN DOC REV: CPT | Performed by: INTERNAL MEDICINE

## 2022-06-08 PROCEDURE — G8427 DOCREV CUR MEDS BY ELIG CLIN: HCPCS | Performed by: INTERNAL MEDICINE

## 2022-06-08 PROCEDURE — G8420 CALC BMI NORM PARAMETERS: HCPCS | Performed by: INTERNAL MEDICINE

## 2022-06-08 PROCEDURE — 99215 OFFICE O/P EST HI 40 MIN: CPT | Performed by: INTERNAL MEDICINE

## 2022-06-08 PROCEDURE — 1123F ACP DISCUSS/DSCN MKR DOCD: CPT | Performed by: INTERNAL MEDICINE

## 2022-06-08 NOTE — PATIENT INSTRUCTIONS
1.  No change in medications  2. No blood work needed soon  3. Call our office with any concerning cardiac symptoms  4.   Dr. Miesha Lutz will call you to discuss plan for testing

## 2022-06-08 NOTE — PROGRESS NOTES
[address]    2022    Cecily Arevalo (:  1950) is a 81 y.o. male is here for follow up and management of CAD and modifiable risk factors. Referring Provider: Celeste Jacob MD    HISTORY: Mr. Soumya Ortega has a history of aortic insufficiency (s/p AVR tissue 2015), CAD (nonobstructive by [facility] in ), PAF s/p MAZE and DELGADO ligation with Atriclip (2015), moderate MR, and CHRISTOPHER. 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 Forrest Barron), TIA, transient global amnesia. He was on Coumadin but it was stopped due to bleeding from his mouth and PEG tube. 2018 (2 week) cardiac monitor showed brief SVT and WCT; no a fib. ILR was placed 2019. He developed recurrent A fib and had RFA with re- isolation of PV, roof line, complex atrial fractionated electrogram, inferior-posterior line with PWI, CTI atrial flutter with Dr Burak Power 7/15/20. Echo 2020 showed worsening mitral regurgitation, now moderate to severe. 3/4/21 hospitalization for chest pain and shortness of breath. 3/8/21 LHC showed at least moderate calcification of long, eccentric 80% ostial to prox LAD stenosis. He was seen by Dr. Tera Hardin for consideration of LIMA to LAD and significant primary MR. Patient was thought he was high risk for redo cardiac surgery; recommended aggressive treatment of reflux and monitoring symptoms. Today, he denies chest pain. He does note shortness of breath but seems to have worsened in the past month. He is wondering whether his allergies are contributing. Has noted dysphagia. REVIEW OF SYSTEMS:  A complete review of systems has been reviewed and updated today and is negative except as noted in the history of present illness. Prior to Visit Medications    Medication Sig Taking?  Authorizing Provider   pantoprazole (PROTONIX) 40 MG tablet TAKE 1 TABLET BY MOUTH TWICE A DAY BEFORE MEALS Yes Celeste Jacob MD lisinopril (PRINIVIL;ZESTRIL) 2.5 MG tablet TAKE 1/2 TABLET BY MOUTH EVERY DAY Yes ISIDRA Raymond CNP   digoxin (LANOXIN) 125 MCG tablet TAKE 1 TABLET BY MOUTH EVERY DAY Yes Mami Rodriguez MD   mirtazapine (REMERON) 7.5 MG tablet TAKE 1 TABLET BY MOUTH EVERY DAY AT NIGHT Yes Sammie Duran MD   Cholecalciferol 50 MCG (2000 UT) TABS Take 2,000 Units by mouth daily Yes Historical Provider, MD   dilTIAZem (CARDIZEM CD) 120 MG extended release capsule Take 1 capsule by mouth daily Yes Mami Rodriguez MD   rosuvastatin (CRESTOR) 40 MG tablet Take 1 tablet by mouth daily Yes Mami Rodriguez MD   aspirin 81 MG EC tablet Take 81 mg by mouth daily Yes Historical Provider, MD   docusate sodium (COLACE) 100 MG capsule Take 100 mg by mouth 2 times daily. Yes Historical Provider, MD   levothyroxine (SYNTHROID) 112 MCG tablet Take 1 tablet by mouth once daily  Joseph Dykes MD   furosemide (LASIX) 20 MG tablet t1 tab by mouth daily. Call cardiology if swelling in legs worsens or shortness of breath occurs.   Patient not taking: Reported on 6/8/2022  Canelo Shaikh MD   cyclobenzaprine (FLEXERIL) 10 MG tablet   Historical Provider, MD   fluticasone (FLOVENT HFA) 110 MCG/ACT inhaler Inhale 2 puffs into the lungs 2 times daily  Patient not taking: Reported on 6/8/2022  Canelo Shaikh MD   lisinopril (PRINIVIL;ZESTRIL) 2.5 MG tablet Take 0.5 tablets by mouth daily  Marisa Lex, APRN - CNP        Allergies   Allergen Reactions    Naproxen Swelling     Lips    Pt does not recognize this being an intolerance    Acetaminophen Itching    Hydrocodone Itching    Hydrocodone-Acetaminophen Itching       Past Medical History:   Diagnosis Date    Aortic valve insufficiency     Arthritis     [facility]     Cancer ([facility] 09/2017    TONGUE head and neck IN REMISSION    Dysphonia     GERD (gastroesophageal reflux disease)     Hearing loss     Heart disease     Medical history reviewed with no changes     Obstructive apnea does not use CPAP    Oropharyngeal dysphagia     Refusal of blood transfusions as patient is Nondenominational     Thyroid disease        Past Surgical History:   Procedure Laterality Date    ANKLE SURGERY      AORTIC VALVE REPLACEMENT  1/28/15    Dr. Semaj Mas 25mm Mosaic Ultra porcine valve; right and left modified maze procedure with ligation of DELGADO with Atriclip    ATRIAL ABLATION SURGERY      BACK SURGERY      CSP    CARDIAC CATHETERIZATION      HERNIA REPAIR      KNEE SURGERY      Arthroscopy    LUMBAR SPINE SURGERY Right 10/13/2020    RIGHT LUMBAR4-LUMBAR5 MICRO HEMILAMINECTOMY AND DISCECTOMY (29057, [id number]) performed by Martin Garcia MD at [facility]      collapsed lung due to broken ribs    MANDIBLE FRACTURE SURGERY      NECK SURGERY      Fusion    PAIN MANAGEMENT PROCEDURE Right 2020    RIGHT L4 AND L5 TRANSFORAMINAL EPIDURAL STEROID INJECTION WITH FLUOROSCOPY ([id number]) performed by Kenneth Wilde MD at [address] [place] [address] Right 2020    RIGHT L4 AND L5 TRANSFORAMINAL EPIDURAL STEROID INJECTION WITH FLUOROSCOPY ([id number]) performed by Kenneth Wilde MD at [address] [place] [address] History     Tobacco Use    Smoking status: Former     Packs/day: 1.00     Years: 40.00     Pack years: 40.00     Types: Cigarettes     Start date: 1975     Quit date: 2015     Years since quittin.4    Smokeless tobacco: Never    Tobacco comments:     Maintain cessation   Substance Use Topics    Alcohol use: No     Alcohol/week: 0.0 standard drinks     Comment: NA beer        Family History   Problem Relation Age of Onset    Heart Disease Other     High Blood Pressure Neg Hx     High Cholesterol Neg Hx        PHYSICAL EXAMINATION:  Vitals:    22 1615   BP: 90/60   Pulse: 71   SpO2: 91%   Weight: 160 lb 8 oz (72.8 kg)   Height: 5' 10\" (1.778 m)     Estimated body mass index is 23.03 kg/m² as calculated from the following:    Height as of this encounter: 5' 10\" (1.778 m). Weight as of this encounter: 160 lb 8 oz (72.8 kg). Physical Exam  Constitutional:       Appearance: He is well-developed. He is not diaphoretic. HENT:      Head: Normocephalic and atraumatic. Eyes:      General: No scleral icterus. Extraocular Movements: Extraocular movements intact. Conjunctiva/sclera: Conjunctivae normal.   Neck:      Vascular: No JVD. Cardiovascular:      Rate and Rhythm: Normal rate and regular rhythm. Heart sounds: Murmur heard. No friction rub. No gallop. Comments: 2/6 systolic murmur at LLSB and apex  Pulmonary:      Effort: Pulmonary effort is normal. No respiratory distress. Breath sounds: Normal breath sounds. No wheezing or rales. Abdominal:      General: Bowel sounds are normal.      Palpations: Abdomen is soft. Tenderness: There is no abdominal tenderness. Musculoskeletal:         General: Normal range of motion. Cervical back: Normal range of motion and neck supple. Skin:     General: Skin is warm and dry. Findings: No rash. Neurological:      General: No focal deficit present. Mental Status: He is alert and oriented to person, place, and time. Mental status is at baseline. Psychiatric:         Mood and Affect: Mood normal.         Behavior: Behavior normal.         Thought Content: Thought content normal.         Judgment: Judgment normal.     I have reviewed all pertinent lab results and diagnostic testing.         LABS:  CBC:   Lab Results   Component Value Date/Time    WBC 5.2 02/04/2022 04:36 PM    RBC 4.46 02/04/2022 04:36 PM    HGB 14.0 02/04/2022 04:36 PM    HCT 41.6 02/04/2022 04:36 PM    MCV 93.3 02/04/2022 04:36 PM    RDW 13.4 02/04/2022 04:36 PM     02/04/2022 04:36 PM     CMP:   Lab Results   Component Value Date/Time     02/04/2022 04:36 PM    K 3.9 02/04/2022 04:36 PM     02/04/2022 04:36 PM    CO2 24 02/04/2022 04:36 PM    BUN 8 02/04/2022 04:36 PM    CREATININE <0.5 02/04/2022 04:36 PM    GFRAA >60 02/04/2022 04:36 PM    GFRAA >60 12/26/2009 10:17 PM    AGRATIO 1.6 02/04/2022 04:36 PM    LABGLOM >60 02/04/2022 04:36 PM    GLUCOSE 93 02/04/2022 04:36 PM    PROT 7.4 02/04/2022 04:36 PM    CALCIUM 9.5 02/04/2022 04:36 PM    BILITOT 0.8 02/04/2022 04:36 PM    ALKPHOS 50 02/04/2022 04:36 PM    AST 20 02/04/2022 04:36 PM    ALT 12 02/04/2022 04:36 PM     Lab Results   Component Value Date/Time    CHOL 179 08/13/2021 09:06 AM    TRIG 97 08/13/2021 09:06 AM    HDL 45 10/11/2021 01:08 PM    HDL 52 12/27/2009 06:38 AM    LDLCALC 102 10/11/2021 01:08 PM     [employer] myoview 4/6/22:  Summary    The overall quality of the study is good. There is subdiaphragmatic    attenuation. Left ventricular cavity size is mildly dilated. The right ventricle is    normal in size. Spect imaging demonstrates a small area of mildly decreased perfusion in the    apex. The defect is present at rest and stress without associated wall    motion abnormality, consistent with artifact. Sum stress score of 2. No visual TID. Calculated TID of 1.09. Left ventricular ejection fraction is normal at 71%. Sensitivity of testing for ischemia is reduced on diltiazem. Myocardial perfusion is normal. Low-moderate risk scan. CT chest / PE with contrast 2/4/22 : no evidence of PE or acute pulmonary abnormality    Echo 8/18/21:  Summary   -Normal left ventricle size, wall thickness and systolic function with an   estimated ejection fraction of 60-65%. -No regional wall motion abnormalities are seen.   -Indeterminate diastolic function.   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 -Moderate to severe mitral regurgitation. ( appears stable from previous echo   dated 2/24/2021). ERO 0.21.   -A bioprosthetic artificial aortic valve appears well seated with a maximum   velocity of 2.6m/s and a mean gradient of 16mmHg. -Moderate to severe tricuspid regurgitation.   -Estimated pulmonary artery systolic pressure is mildly elevated at 38 mmHg   assuming a right atrial pressure of 8 mmHg. -The right ventricle is moderately enlarged.   -The right atrium is moderately dilated. Myoview GXT 4/7/21:  Summary    Normal myocardial perfusion. Normal LV size and systolic function. Cardiac cath 3/8/21:  Left Heart Cath  Dominance: Right       LM: luminal irregularities  LAD: at least moderate calcification of long, eccentric 80% ostial to proximal stenosis ; 30% ostial first diagonal   LCx: 30-40% distal into OM2   RCA: tortuous with luminal irregularities     Did not cross SAVR      Impression/Recommendations:  CTS consult to discuss Redo Sternotomy for LIMA-LAD and significant, primary MR      Limited echo 2/24/21:  Summary   -Limited echocardiogram was performed to evaluate mitral regurgitation.   -Normal left ventricle size and systolic function with an estimated ejection   fraction of 60%. -No regional wall motion abnormalities are seen. -The tips of the mitral leaflet appears to be slightly myxomatous and   minimal prolapse of posterior leaflet. -Moderate to severe anteriorly directed mitral regurgitation (appears   similar when compared to last echocardiogram dated September 2020). ERO=0.25   -A bioprosthetic artificial aortic valve appears well seated with a maximum   velocity of 2.4m/s and a mean gradient of 13mmHg. -Moderate to severe tricuspid regurgitation.   -Estimated pulmonary artery systolic pressure is mildly elevated at 38 mmHg   assuming a right atrial pressure of 8 mmHg. -The right ventricle is mildly enlarged.   -The right atrium is moderately dilated. Echo 9/9/20:  Summary   Normal left ventricle size, wall thickness and systolic function with an   estimated ejection fraction of 60%. No regional wall motion abnormalities   are seen. E/e\"= 10.    Moderate to severe mitral regurgitation. A bioprosthetic aortic valve appears well seated with a maximum gradient of   17 mmHg and a mean gradient of 11 mmHg. Aortic valve area of 1.97cm. No evidence of aortic valve regurgitation. The aortic root is mildly dilated. 3.7cm   The ascending aorta is mildly dilated. 4.2cm   The right ventricle is mildly enlarged but normal in function. Moderate to severe tricuspid regurgitation. PASP 34mmHg. The right atrium is mildly dilated. IVC size is normal (<2.1 cm) but collapses < 50% with respiration consistent   with elevated RA pressure (8 mmHg). [employer] myoview 8/26/20:  Summary    -There is a small fixed inferior apical defect that is likely bowel artifact    rather than scar. -LV function is normal with no regional abnormalities and EF=64%. -Low risk study. ECG 8/18/2020:   - NSR. Rate 85, QTc 389     Limited Echo 7/15/2020:   -Limited echocardiogram was performed to rule out pericardial effusion,   status post cardiac ablation today.   -Normal left ventricle size, wall thickness and systolic function with an   estimated ejection fraction of 55%.   -No regional wall motion abnormalities are seen.   -Abnormal (paradoxical) septal motion noted.   -No evidence of any pericardial effusion.   -Mild to moderate mitral regurgitation.   -Moderate tricuspid regurgitation. Echo 6/16/2020:    -Normal left ventricle size, wall thickness, and systolic function with an   estimated ejection fraction of 60-65%. -No regional wall motion abnormalities are seen.   -Indeterminate diastolic function. -E/e'=7   -Moderately severe mitral regurgitation   -The bioprosthetic artificial aortic valve appears well seated   -There is moderate-to-severe tricuspid regurgitation with a RVSP estimation   of 30mmHg. Echo 6/18/2019:   -Normal left ventricle size, wall thickness, and systolic function with an estimated ejection fraction of 60-65%.    -No regional wall motion assessment of LAD. 2. Paroxysmal Atrial Fib  3. Implantable Loop Recorder  -  S/p MAZE and DELGADO ligation with Atriclip 2015 (Dr Werner Sosa)  - S/p RFCA with re-isolation of PV, roof line with PWI, CTI atrial flutter (7/15/20 Dr Mandy Tim)  - on Diltiazem ER 60 mg bid, Digoxin 125 mcg  - High [id number] VASc score, but warfarin stopped 9/2020 due to hx of GI bleed and is s/p DELGADO ligation  (verified by CHIKIS)  - ILR implant to monitor AF burden   - 5/29/22 loop interrogation -  no AF, but had 15 episodes of AT    Plan: Currently not on anticoagulation due to history of GI bleed. Left atrial appendage was ligated and still occluded by CHIKIS. 4. S/p Porcine Aortic Valve Replacement (1/2015) for aortic insufficiency  - 9/9/20 Echo - bioprosthetic AVR appears well seated, max gradient 17 mmHg, mean gradient 11 mmHg. TULIO 1.97 cm2, No AI   - 2/24/21 Echo -  well seated bioprosthetic AVR, mean gradient 13 mmHg, no AI  - 8/18/21 Echo - well seated AVR with mean gradient 16 mmHg, no AI    Plan: Valve previously functioning well. Needs an updated echo. 5. Mitral Regurgitation  - Moderate per echo 6/2019  - Moderate to severe per echo 6/2020  - Mild to moderate per limited echo 7/15/2020.    - Moderate to severe MR per echo 9/9/20  - 2/24/21 Echo - moderate to severe anteriorly directed MR (similar to 9/2020). MV with myxomatous change and minimal prolapse, moderate to severe MR   - thought to be high risk for re-do sternotomy  - 3/2021 - Lisinopril 1.25 mg qpm added for afterload reduction  - 8/18/21 Echo - moderate to severe MR (stable from 2/2021 echo). Myxomatous change and minimal prolapse    Plan: Repeat 2D echo with Doppler. Consider cardiac MRI. CHIKIS would likely need the assistance of GI as well. 6. Obstructive Sleep Apnea  - intolerant to CPAP mask    Plan: Intolerant to CPAP mask. Possible inspire candidate? 7.   Tricuspid regurgitation  - mod to severe TR with RVSP 34 mmHg per echo 9/2020  - mod to severe TR with PASP 38 mmHg per echo 2/24/21  - moderate to severe TR with PASP 38 mmHg per echo 8/18/21    Plan: Previously moderate to severe. Needs updated 2D echo with Doppler. 8.  Hyperlipidemia  - Simvastatin stopped in 2015 due to possible interaction with Diltiazem  - Pravastatin 20 mg - no longer taking for unclear reason  - started on Crestor 10 mg 2/17/21  - 3/5/21 LDL- 103  - 8/13/21 - CK - 49, TC- 179, TG- 97, HDL- 45, LDL- 115 >> Crestor increased to 20 mg 8/16/21  - 10/11/21 - TC- 162, TG- 77, HDL- 45, LDL- 102. Rosuvastatin increased to 40 mg daily at 1/2022 office visit   - repeat labs not drawn      Plan : Currently on Crestor 40 mg daily. Needs fasting lipid profile and LFTs. Plan:  1. Complex situation. Given history of GI bleed and previous cardiac surgery, he would be high risk for redo cardiac surgery or PCI. Myoview stress test does not suggest ischemia although angiographically the proximal LAD appears significant. CHIKIS likely at increased risk and would need the help of GI service to perform EGD prior to CHIKIS (combined procedure). 2.  Definitive assessment of LAD would likely need to occur with cardiac cath with FFR assessment. 3  Consider cardiac MRI and/or CHIKIS. 4.  Needs fasting lipid profile and LFTs. An  electronic signature was used to authenticate this note. Bryan Fisher MD, [facility]    Scribe's attestation: This note was scribed in the presence of Augusto Carmen M.D. by Black Bhatt RN    Physician Attestation: The scribe's documentation has been prepared under my direction and personally reviewed by me in its entirety. I confirm that the note above accurately reflects all work, treatment, procedures, and medical decision making performed by me.

## 2022-06-21 ENCOUNTER — TELEPHONE (OUTPATIENT)
Dept: FAMILY MEDICINE CLINIC | Age: 72
End: 2022-06-21

## 2022-06-21 NOTE — TELEPHONE ENCOUNTER
Patient is requesting a inhaler called Zuleyka. He seen this advertise on television. Also  Zyrtec for allergies would like new prescriptions. Patient states that he wants to make sure he can take these 2 medications since he is a heart patient.       Please advise

## 2022-06-22 NOTE — TELEPHONE ENCOUNTER
[address]  PHYSICAL THERAPY  [x] DAILY NOTE (LAND) [] DAILY NOTE (AQUATIC ) [] PROGRESS NOTE [] DISCHARGE NOTE    [x] [facility] [place]   [] [place] [address]    [] [facility]   [] Amie Ramirez    Date: 2022  Patient Name:  Gianna Zuñiga  : 1969  MRN: [id number]  CSN: [id number]    Referring Practitioner KATHLEEN Jewell -*   Diagnosis Cervical spinal stenosis (M48.02)  degenerative lumbar spinal stenosis (m48.061)  cervical radiculopathy (M54.12)  trochanteric bursitis of R hip (M70.61)   Treatment Diagnosis Pain in neck, pain in back, pain in R hip, decreased cervical ROM, decreased shoulder ROM, decreased strength in UE, decreased strength in LE, decreased core strength, poor posture    Date of Evaluation 22    Additional Pertinent History HTN, bipolar, diabetes, anxiety, arthritis       Functional Outcome Measure Used Modified Oswestry    Functional Outcome Score 28/50 (22)       Insurance: Primary: Payor: Daniel Thompson /  /  / ,   Secondary:    Authorization Information: PRE CERTIFICATION REQUIRED: NO  INSURANCE THERAPY BENEFIT:  NO VISIT LIMIT   AQUATIC THERAPY COVERED: YES  MODALITIES COVERED:  YES   Visit # 5, 5/10 for progress note   Visits Allowed: No visit limit   Recertification Date: 3/86/99   Physician Follow-Up:    Physician Orders:    History of Present Illness: Patient reports that he has had low back pain for years. Patient reports that he woke up one day lst month and he would barely walk at all with worsening of back and hip pain. Patient began to use crutches. Patient does have numbness in L LE. Patient reports that he also was diagnosed with R hip bursitis. Patient reports that his R hip is doing better now. Patient reports that his L arm also went numb. Patient had worsening of neck and arm pain. Patient has been seeing the chiropractor and he has had some of his sensation return.  Patient reports Last OV 2/7/2022   Next OV Visit date not found    Requested Prescriptions     Pending Prescriptions Disp Refills    mirtazapine (REMERON) 7.5 MG tablet [Pharmacy Med Name: MIRTAZAPINE 7.5 MG TABLET] 90 tablet 3     Sig: TAKE 1 TABLET BY MOUTH EVERY DAY AT NIGHT    last fill 11/21  pended that he has had this before with his arms and PT did improve symptoms. Patient has not had any recent imaging. Patient has not had any surgeries to neck, hip or back. Patient reports pain with sleeping, standing, walking, sitting, gripping, driving. Patient is dropping things out of L hand. SUBJECTIVE: Patient reporting neck pain 4-5/10 N/T 6-7/10. LBP 4/10. N/T 5/10. TREATMENT   Precautions:    Pain: Neck and L arm 5/10 pain radiating pain down left side to fingers  Lower Back 5/10 pain     X in shaded column indicates activity completed today   Modalities Parameters/  Location  Notes                     Manual Therapy Time/Technique  Notes   Mechanical Traction (Cervical)  12 minutes 18# max, 9# min. Intermittent. 60 on 20 off. Rope speed 100%. 20 deg angle of traction unit  5 minutes for set up  x          Exercise/Intervention   Notes   Supine chin tuck 3x 5 sec  Did have increase in neck pain and arm pain, continued to hold after 3 reps   Supine cervical rotation 10x  X    Shoulder flexion and abduction AAROM with wand 10x      Gentle upper trap/levator stretches 3x 10 sec  X    SNAG retraction, extension 4-6  X Pt feeling like it increased tingling today   Seated cervical retraction with therapist overpressure  2x10  x Decreased tingling   Seated cervical retraction by patient       Hook lying gentle abdominal bracing 10x 3 sec  X Bolster behind knees   Abdominal bracing with SAQ  10x5s  x           Use of towel in pillow for cervical support       Towel roll in seated for lumbar support       Education on posture correction and awareness         Specific Interventions Next Treatment: NECK: chin tucks, upper trap/levator/pec stretching, SNAG retraction/extension/rotation, shoulder flexion and abduction AAROM, scapular strengthening, manual cervical distraction, modalities as needed.  BACK: prone for centralization of symptoms, core strength, hip strength, HS/IT band/piriformis, hip flexor stretching, modalities as needed. Focus on neck/L arm more. Activity/Treatment Tolerance:  [x]  Patient tolerated treatment well  []  Patient limited by fatigue  []  Patient limited by pain   []  Patient limited by medical complications  []  Other:     Assessment: Patient ambulating with slow antalgic gait during session. Did feel like traction had helped to decrease radicular symptoms today. Neck pain 4/10 and tingling 4/10 with patient reporting that if felt better following retractions. Goals    Patient Goal: to improve pain and numbness in arms    Short Term Goals: 4 weeks  1. Patient will report decrease in pain to 5-6/10 at most to allow ease of ADL's and household tasks. 2. Patient will improve B cervical lateral flexion AROM to 35 degrees to allow decreased pain with household tasks. 3. Patient will improve L cervical rotation AROM to 70 degrees to allow decreased pain with turning head during driving. 4. Patient will improve B shoulder and elbow strength to 5/5 to allow ease of lifting. 5. Patient will improve trunk AROM to WFL's to allow ease of bending and lifting tasks. 6. Patient will improve B hip strength to 5/5 to allow ease of walking and daily tasks. 7. Patient will perform B SLR for 15 seconds to improve core strength needed for ease of standing tasks. 8. Patient will improve 90/90 hamstring length to 30 degrees B to allow decreased pain with standing/walking. Long Term Goals: 8 weeks  1. Patient will improve Modified Oswestry score from 28/50 to 18/50 to allow decrease in disability and improved functional mobility. 2. Patient will be independent with HEP in order to prevent re-injury and improve functional abilities. Patient Education:   [x]  HEP/Education Completed: hand out for cervical retractions   Shoutlet Access Code:  []  No new Education completed  []  Reviewed Prior HEP      [x]  Patient verbalized and/or demonstrated understanding of education provided.   []  Patient unable to verbalize and/or demonstrate understanding of education provided. Will continue education. []  Barriers to learning:     PLAN:  Treatment Recommendations: Strengthening, Range of Motion, Manual Therapy - Soft Tissue Mobilization, Manual Therapy - Joint Manipulation, Pain Management, Home Exercise Program, Patient Education, Integrative Dry Needling, Aquatics and Modalities      Plan to see patient 2 times per week for 8 weeks to address the treatment planned outlined above.   [x]  Continue with current plan of care  []  Modify plan of care as follows:    []  Hold pending physician visit  []  Discharge    Time In 0950   Time Out 1023   Timed Code Minutes: 16 min   Total Treatment Time: 33 min     Electronically Signed by: Maria Teresa Prakash, PTA

## 2022-06-24 NOTE — TELEPHONE ENCOUNTER
LMOM advising patient not to take breztri inhaler as it will increase heart rate, per providers note

## 2022-07-05 ENCOUNTER — NURSE ONLY (OUTPATIENT)
Dept: CARDIOLOGY CLINIC | Age: 72
End: 2022-07-05
Payer: MEDICARE

## 2022-07-05 DIAGNOSIS — I48.0 PAF (PAROXYSMAL ATRIAL FIBRILLATION) (HCC): Chronic | ICD-10-CM

## 2022-07-05 DIAGNOSIS — Z45.09 ENCOUNTER FOR ELECTRONIC ANALYSIS OF REVEAL EVENT RECORDER: ICD-10-CM

## 2022-07-05 PROCEDURE — 93298 REM INTERROG DEV EVAL SCRMS: CPT | Performed by: INTERNAL MEDICINE

## 2022-07-05 PROCEDURE — G2066 INTER DEVC REMOTE 30D: HCPCS | Performed by: INTERNAL MEDICINE

## 2022-07-05 NOTE — PROGRESS NOTES
We received a remote transmission from patient's monitor at home. Remote Linq report shows no arrhythmias. EP physician to review. We will continue to monitor remotely. End of 31-day monitoring period 7-5-22.

## 2022-07-15 DIAGNOSIS — E03.9 ACQUIRED HYPOTHYROIDISM: ICD-10-CM

## 2022-07-15 RX ORDER — LEVOTHYROXINE SODIUM 112 UG/1
TABLET ORAL
Qty: 90 TABLET | Refills: 0 | Status: SHIPPED | OUTPATIENT
Start: 2022-07-15 | End: 2022-07-28 | Stop reason: SDUPTHER

## 2022-07-15 NOTE — TELEPHONE ENCOUNTER
Last OV 4/4/2022   Next OV Visit date not found    Requested Prescriptions     Pending Prescriptions Disp Refills    levothyroxine (SYNTHROID) 112 MCG tablet [Pharmacy Med Name: Levothyroxine Sodium 112 MCG Oral Tablet] 90 tablet 0     Sig: Take 1 tablet by mouth once daily      Last fill 12/3/21  pended

## 2022-07-22 ENCOUNTER — OFFICE VISIT (OUTPATIENT)
Dept: PULMONOLOGY | Age: 72
End: 2022-07-22
Payer: MEDICARE

## 2022-07-22 VITALS
HEART RATE: 76 BPM | BODY MASS INDEX: 23.19 KG/M2 | SYSTOLIC BLOOD PRESSURE: 98 MMHG | OXYGEN SATURATION: 95 % | HEIGHT: 70 IN | WEIGHT: 162 LBS | DIASTOLIC BLOOD PRESSURE: 64 MMHG

## 2022-07-22 DIAGNOSIS — R06.09 DOE (DYSPNEA ON EXERTION): Primary | ICD-10-CM

## 2022-07-22 PROCEDURE — 99203 OFFICE O/P NEW LOW 30 MIN: CPT | Performed by: INTERNAL MEDICINE

## 2022-07-22 ASSESSMENT — ENCOUNTER SYMPTOMS
DIARRHEA: 0
WHEEZING: 0
APNEA: 0
BACK PAIN: 0
CONSTIPATION: 0
BLOOD IN STOOL: 0
SORE THROAT: 0
SHORTNESS OF BREATH: 1
RHINORRHEA: 0
ABDOMINAL DISTENTION: 0
ANAL BLEEDING: 0
ABDOMINAL PAIN: 0
SINUS PRESSURE: 0
CHEST TIGHTNESS: 0
CHOKING: 0
VOICE CHANGE: 0
COUGH: 0
STRIDOR: 0

## 2022-07-22 NOTE — PROGRESS NOTES
Terence Bueno    YOB: 1950     Date of Service:  2022     Chief Complaint   Patient presents with    New Patient     REF BY DR Henrry Benavides       COPD         HPI patient referred for consultation by ISIDRA Michel for evaluation of COPD. Patient states that he was originally diagnosed with COPD in , has been on Flovent 110 mcg, 2 puffs twice daily until this year. States that his part D plan  and hence he has been unable to afford inhalers. He states that since then, his dyspnea has worsened with any form of activity. Denies any cough. States that he has chest pain on and off related to his history of coronary artery disease/valvular heart disease. No orthopnea but has some pedal edema with skin discoloration of the ankles that he has noticed. 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 He also has a history of severe CHRISTOPHER with AHI of 36, noncompliant with CPAP. Former smoker, quit in -1 pack/day for 40 years. Allergies   Allergen Reactions    Naproxen Swelling     Lips    Pt does not recognize this being an intolerance    Acetaminophen Itching    Hydrocodone Itching    Hydrocodone-Acetaminophen Itching     Outpatient Medications Marked as Taking for the 22 encounter (Office Visit) with Luna Martinez MD   Medication Sig Dispense Refill    levothyroxine (SYNTHROID) 112 MCG tablet Take 1 tablet by mouth once daily 90 tablet 0    pantoprazole (PROTONIX) 40 MG tablet TAKE 1 TABLET BY MOUTH TWICE A DAY BEFORE MEALS 180 tablet 3    lisinopril (PRINIVIL;ZESTRIL) 2.5 MG tablet TAKE 1/2 TABLET BY MOUTH EVERY DAY 45 tablet 1    furosemide (LASIX) 20 MG tablet t1 tab by mouth daily. Call cardiology if swelling in legs worsens or shortness of breath occurs. 90 tablet 0    digoxin (LANOXIN) 125 MCG tablet TAKE 1 TABLET BY MOUTH EVERY DAY 30 tablet 3    mirtazapine (REMERON) 7.5 MG tablet TAKE 1 TABLET BY MOUTH EVERY DAY AT NIGHT 90 tablet 3    Cholecalciferol 50 MCG (2000 UT) TABS Take 2,000 Units by mouth daily      dilTIAZem (CARDIZEM CD) 120 MG extended release capsule Take 1 capsule by mouth daily 90 capsule 3    rosuvastatin (CRESTOR) 40 MG tablet Take 1 tablet by mouth daily 90 tablet 3    cyclobenzaprine (FLEXERIL) 10 MG tablet       aspirin 81 MG EC tablet Take 81 mg by mouth daily      docusate sodium (COLACE) 100 MG capsule Take 100 mg by mouth 2 times daily.          Immunization History   Administered Date(s) Administered    COVID-19, PFIZER PURPLE top, DILUTE for use, (age 15 y+), 30mcg/0.3mL 03/11/2021, 04/08/2021, 11/01/2021    Influenza Virus Vaccine 01/25/2015, 10/30/2015    Influenza, High Dose (Fluzone 65 yrs and older) 08/27/2018, 11/01/2018    Influenza, High-dose, Quadv, 65 yrs +, IM (Fluzone) 08/21/2021    Influenza, Quadv, adjuvanted, 65 yrs +, IM, PF (Fluad) 11/16/2020    Pneumococcal Conjugate 13-valent ([id number]) 06/28/2019    Pneumococcal Polysaccharide ([id number]) 06/07/2015    Tdap (Boostrix, Adacel) 09/07/2018       Past Medical History:   Diagnosis Date    Aortic valve insufficiency     Arthritis     Atrial fibrillation (HCC)     [facility] 09[facility]2017    TONGUE head and neck IN REMISSION    Dysphonia     GERD (gastroesophageal reflux disease)     Hearing loss     Heart disease     Medical history reviewed with no changes     Obstructive apnea does not use CPAP    Oropharyngeal dysphagia     Refusal of blood transfusions as patient is Cheondoism     Thyroid disease      Past Surgical History:   Procedure Laterality Date    ANKLE SURGERY      AORTIC VALVE REPLACEMENT  1/28/15    Dr. Joe Kimble 25mm Mosaic Ultra porcine valve; right and left modified maze procedure with ligation of DELGADO with Atriclip    ATRIAL ABLATION SURGERY      BACK SURGERY      CSP    CARDIAC CATHETERIZATION      HERNIA REPAIR      KNEE SURGERY      Arthroscopy    LUMBAR SPINE SURGERY Right 10/13/2020    RIGHT LUMBAR4-LUMBAR5 MICRO HEMILAMINECTOMY AND DISCECTOMY (84061, 47934) performed by Suzie Carpenter MD at [facility]      collapsed lung due to broken ribs    MANDIBLE FRACTURE SURGERY      NECK SURGERY      Fusion    PAIN MANAGEMENT PROCEDURE Right 6/22/2020    RIGHT L4 AND L5 TRANSFORAMINAL EPIDURAL STEROID INJECTION WITH FLUOROSCOPY ([id number],[id number]) performed by Didi Matthews MD at [address] Right 7/8/2020    RIGHT L4 AND L5 TRANSFORAMINAL EPIDURAL STEROID INJECTION WITH FLUOROSCOPY ([id number],00612) performed by Didi Matthews MD at [address] [place] [address]       Family History   Problem Relation Age of Onset    Heart Disease Other     High Blood Pressure Neg Hx     High Cholesterol Neg Hx        Review of Systems:  Review of Systems   Constitutional:  Positive for fatigue. Negative for activity change, appetite change and fever. HENT:  Negative for congestion, ear discharge, ear pain, postnasal drip, rhinorrhea, sinus pressure, sneezing, sore throat, tinnitus and voice change. Respiratory:  Positive for shortness of breath. Negative for apnea, cough, choking, chest tightness, wheezing and stridor. Cardiovascular:  Positive for leg swelling. Negative for chest pain and palpitations. Gastrointestinal:  Negative for abdominal distention, abdominal pain, anal bleeding, blood in stool, constipation and diarrhea. Musculoskeletal:  Negative for arthralgias, back pain and gait problem. Skin:  Negative for pallor and rash. Allergic/Immunologic: Negative for environmental allergies. Neurological:  Negative for dizziness, tremors, seizures, syncope, speech difficulty, weakness, light-headedness, numbness and headaches. Hematological:  Negative for adenopathy. Does not bruise/bleed easily. Psychiatric/Behavioral:  Negative for sleep disturbance. Vitals:    07/22/22 0852   BP: 98/64   Pulse: 76   SpO2: 95%   Weight: 162 lb (73.5 kg)   Height: 5' 10\" (1.778 m)     Patient-Reported Vitals 3/21/2022   Patient-Reported Systolic 379   Patient-Reported Diastolic 82   Patient-Reported Temperature pt mentioned 78 and 102.4 but doesnt remember which one he thinks it was 78      Body mass index is 23.24 kg/m². Wt Readings from Last 3 Encounters:   07/22/22 162 lb (73.5 kg)   06/08/22 160 lb 8 oz (72.8 kg)   02/11/22 162 lb 4.8 oz (73.6 kg)     BP Readings from Last 3 Encounters:   07/22/22 98/64   06/08/22 90/60   02/11/22 98/68         Physical Exam  Constitutional:       General: He is not in acute distress. Appearance: He is well-developed. He is not ill-appearing or diaphoretic. HENT:      Mouth/Throat:      Pharynx: No oropharyngeal exudate. Cardiovascular:      Rate and Rhythm: Normal rate and regular rhythm. Heart sounds: Normal heart sounds. No murmur heard. No friction rub. Pulmonary:      Effort: No respiratory distress. Breath sounds: Normal breath sounds. No wheezing, rhonchi or rales. Chest:      Chest wall: No tenderness. Abdominal:      General: There is no distension. Palpations: There is no mass. Tenderness: There is no abdominal tenderness. There is no guarding or rebound. Musculoskeletal:         General: No swelling. Skin:     Coloration: Skin is not pale. Findings: No erythema or rash. Neurological:      Mental Status: He is alert and oriented to person, place, and time. Cranial Nerves: No cranial nerve deficit. Motor: No abnormal muscle tone.       Coordination: Coordination normal.      Deep Tendon Reflexes: Reflexes normal.           Health Maintenance   Topic Date Due    Shingles vaccine (1 of 2) Never done    Pneumococcal 65+ years Vaccine (3 - PPSV23 or PCV20) 06/28/2020    Annual Wellness Visit (AWV)  11/17/2021 COVID-19 Vaccine (4 - Booster for Pfizer series) 02/01/2022    Flu vaccine (1) 09/01/2022    Depression Screen  04/04/2023    DTaP/Tdap/Td vaccine (2 - Td or Tdap) 09/07/2028    Hepatitis A vaccine  Aged Out    Hepatitis B vaccine  Aged Out    Hib vaccine  Aged Out    Meningococcal (ACWY) vaccine  Aged Out          Assessment/Plan:    Reviewed patient's PFT performed in March 2021, no significant obstruction, FEV1 of 3.19 L [94% predicted]. However patient states that he had symptom benefit with use of Flovent inhaler. No prior history of asthma or allergies. We will request for patient assistance with Spiriva Respimat 2.5 mcg, 2 puffs daily along with albuterol inhaler as needed. Today we have provided patient with samples for Spiriva Respimat. Patient has history of moderate MR, also status post AVR for aortic regurgitation. Recent stress test suggested no clear evidence of ischemia, LVEF 71%. Patient follows with Dr. Peyton Martinez in about 3 months (around 10/22/2022).

## 2022-07-25 ENCOUNTER — TELEPHONE (OUTPATIENT)
Dept: CARDIOLOGY CLINIC | Age: 72
End: 2022-07-25

## 2022-07-25 DIAGNOSIS — I99.9 UNSPECIFIED DISORDER OF CIRCULATORY SYSTEM: Primary | ICD-10-CM

## 2022-07-25 DIAGNOSIS — R09.89 OTHER SPECIFIED SYMPTOMS AND SIGNS INVOLVING THE CIRCULATORY AND RESPIRATORY SYSTEMS: ICD-10-CM

## 2022-07-26 DIAGNOSIS — I34.0 MITRAL VALVE INSUFFICIENCY, UNSPECIFIED ETIOLOGY: Primary | ICD-10-CM

## 2022-07-26 DIAGNOSIS — Z01.812 PRE-PROCEDURAL LABORATORY EXAMINATION: ICD-10-CM

## 2022-07-26 DIAGNOSIS — Z95.2 S/P AVR: ICD-10-CM

## 2022-07-26 DIAGNOSIS — F40.240 CLAUSTROPHOBIA: ICD-10-CM

## 2022-07-26 DIAGNOSIS — R13.10 DYSPHAGIA, UNSPECIFIED TYPE: ICD-10-CM

## 2022-07-26 DIAGNOSIS — I07.1 TRICUSPID VALVE INSUFFICIENCY, UNSPECIFIED ETIOLOGY: ICD-10-CM

## 2022-07-26 RX ORDER — DIAZEPAM 5 MG/1
TABLET ORAL
Qty: 1 TABLET | Refills: 0 | Status: SHIPPED | OUTPATIENT
Start: 2022-08-26 | End: 2022-08-15

## 2022-07-27 ENCOUNTER — HOSPITAL ENCOUNTER (OUTPATIENT)
Age: 72
Discharge: HOME | End: 2022-07-27
Payer: MEDICARE

## 2022-07-27 LAB
A/G RATIO: 1.9 (ref 1.1–2.2)
ALBUMIN SERPL-MCNC: 4.4 G/DL (ref 3.4–5)
ALP BLD-CCNC: 44 U/L (ref 40–129)
ALT SERPL-CCNC: 13 U/L (ref 10–40)
ANION GAP SERPL CALCULATED.3IONS-SCNC: 9 MMOL/L (ref 3–16)
AST SERPL-CCNC: 17 U/L (ref 15–37)
BILIRUB SERPL-MCNC: 0.7 MG/DL (ref 0–1)
BUN BLDV-MCNC: 8 MG/DL (ref 7–20)
CALCIUM SERPL-MCNC: 9.3 MG/DL (ref 8.3–10.6)
CHLORIDE BLD-SCNC: 103 MMOL/L (ref 99–110)
CHOLESTEROL, TOTAL: 146 MG/DL (ref 0–199)
CO2: 28 MMOL/L (ref 21–32)
CREAT SERPL-MCNC: 0.6 MG/DL (ref 0.8–1.3)
FOLATE: 9.54 NG/ML (ref 4.78–24.2)
GFR AFRICAN AMERICAN: >60
GFR NON-AFRICAN AMERICAN: >60
GLUCOSE BLD-MCNC: 98 MG/DL (ref 70–99)
HDLC SERPL-MCNC: 43 MG/DL (ref 40–60)
LDL CHOLESTEROL CALCULATED: 84 MG/DL
POTASSIUM SERPL-SCNC: 4.6 MMOL/L (ref 3.5–5.1)
SODIUM BLD-SCNC: 140 MMOL/L (ref 136–145)
T4 FREE: 2.4 NG/DL (ref 0.9–1.8)
TOTAL CK: 52 U/L (ref 39–308)
TOTAL PROTEIN: 6.7 G/DL (ref 6.4–8.2)
TRIGL SERPL-MCNC: 95 MG/DL (ref 0–150)
TSH REFLEX: 5.2 UIU/ML (ref 0.27–4.2)
VITAMIN B-12: 203 PG/ML (ref 211–911)
VLDLC SERPL CALC-MCNC: 19 MG/DL

## 2022-07-27 PROCEDURE — 82550 ASSAY OF CK (CPK): CPT

## 2022-07-27 PROCEDURE — 82746 ASSAY OF FOLIC ACID SERUM: CPT

## 2022-07-27 PROCEDURE — 80061 LIPID PANEL: CPT

## 2022-07-27 PROCEDURE — 84439 ASSAY OF FREE THYROXINE: CPT

## 2022-07-27 PROCEDURE — 36415 COLL VENOUS BLD VENIPUNCTURE: CPT

## 2022-07-27 PROCEDURE — 84443 ASSAY THYROID STIM HORMONE: CPT

## 2022-07-27 PROCEDURE — 82607 VITAMIN B-12: CPT

## 2022-07-27 PROCEDURE — 80053 COMPREHEN METABOLIC PANEL: CPT

## 2022-07-27 NOTE — TELEPHONE ENCOUNTER
Order placed for lower extremity arterial segmental pressures with ppg (after clarifying order with Dr. Eloisa Adaem). Please call patient and help him get scheduled for procedure.

## 2022-07-28 ENCOUNTER — TELEPHONE (OUTPATIENT)
Dept: CARDIOLOGY CLINIC | Age: 72
End: 2022-07-28

## 2022-07-28 DIAGNOSIS — E03.9 ACQUIRED HYPOTHYROIDISM: ICD-10-CM

## 2022-07-28 RX ORDER — LEVOTHYROXINE SODIUM 0.12 MG/1
TABLET ORAL
Qty: 30 TABLET | Refills: 2 | Status: SHIPPED | OUTPATIENT
Start: 2022-07-28 | End: 2022-10-31

## 2022-07-28 RX ORDER — UBIDECARENONE 75 MG
100 CAPSULE ORAL DAILY
Qty: 30 TABLET | Refills: 3 | Status: SHIPPED | OUTPATIENT
Start: 2022-07-28 | End: 2023-07-28

## 2022-07-28 NOTE — TELEPHONE ENCOUNTER
Patient called in requesting rx for Valium to be sent to [address] [place] [address] as he states this is closer to his house.

## 2022-07-28 NOTE — TELEPHONE ENCOUNTER
Called and spoke with patient. Informed him of information below. offered to transfer the call. He wanted to call himself.  Call d/c

## 2022-07-29 ENCOUNTER — HOSPITAL ENCOUNTER (OUTPATIENT)
Age: 72
Discharge: HOME | End: 2022-07-29
Payer: MEDICARE

## 2022-07-29 DIAGNOSIS — Z01.812 PRE-PROCEDURAL LABORATORY EXAMINATION: ICD-10-CM

## 2022-07-29 LAB
CREAT SERPL-MCNC: 0.6 MG/DL (ref 0.8–1.3)
GFR AFRICAN AMERICAN: >60
GFR NON-AFRICAN AMERICAN: >60

## 2022-07-29 PROCEDURE — 82565 ASSAY OF CREATININE: CPT

## 2022-07-29 PROCEDURE — 36415 COLL VENOUS BLD VENIPUNCTURE: CPT

## 2022-07-29 NOTE — TELEPHONE ENCOUNTER
Spoke to patient. He did not remember that I told him Valium could not be sent electronically to pharmacy, but that he would have to  the printed prescription from our office (7/25/22 telephone call). Also reminded patient that BMP needed to be drawn before 8/26/22 Cardiac MRI at [facility]. Envelope with a GI consult for his reference, BMP order, and signed prescription for Valium is up at our office  ready for patient to . Patient voiced understanding of information discussed.

## 2022-07-29 NOTE — TELEPHONE ENCOUNTER
Spoke to Patient. [facility] did not have an opening for arterial studied until Oct.  He is scheduled for arterial segmental pressures at [facility] in September. Instructed patient to call our office if he notices worsening symptoms in left leg (reason for arterial studies was color change in left leg). Patient voiced understanding of information discussed.

## 2022-07-29 NOTE — PROGRESS NOTES
Pt HR in 110s-130s. Spoke to [employer] Tori, PO Cardizem 30 mg ordered. Parameters to hold if pt SBP < 110. Pt , spoke to [place] [employer] Tori and he OK'd giving Cardizem. Med given, will monitor. all other ROS negative except as per HPI

## 2022-08-02 ENCOUNTER — TELEPHONE (OUTPATIENT)
Dept: FAMILY MEDICINE CLINIC | Age: 72
End: 2022-08-02

## 2022-08-02 NOTE — TELEPHONE ENCOUNTER
Patient wants to know if it is safe to take [place]met[place] OTC for his prostrate, with his other medications, please advise

## 2022-08-03 ENCOUNTER — TELEPHONE (OUTPATIENT)
Dept: CARDIOLOGY CLINIC | Age: 72
End: 2022-08-03

## 2022-08-03 NOTE — TELEPHONE ENCOUNTER
Spoke with the patient and advised him of the results below per [facility]. Patient voiced understanding .  Call complete

## 2022-08-07 NOTE — PROGRESS NOTES
We received a remote transmission from patient's monitor at home. Remote Linq report shows no arrhythmias. EP physician to review. We will continue to monitor remotely. Symptom recordings show NSR. Implanted for palpitations. End of 31-day monitoring period 8-8-22.

## 2022-08-08 ENCOUNTER — HOSPITAL ENCOUNTER (INPATIENT)
Age: 72
LOS: 3 days | Discharge: HOME | DRG: 287 | End: 2022-08-11
Attending: EMERGENCY MEDICINE | Admitting: INTERNAL MEDICINE
Payer: MEDICARE

## 2022-08-08 ENCOUNTER — TELEPHONE (OUTPATIENT)
Dept: CARDIOLOGY CLINIC | Age: 72
End: 2022-08-08

## 2022-08-08 ENCOUNTER — APPOINTMENT (OUTPATIENT)
Dept: GENERAL RADIOLOGY | Age: 72
DRG: 287 | End: 2022-08-08
Payer: MEDICARE

## 2022-08-08 ENCOUNTER — NURSE ONLY (OUTPATIENT)
Dept: CARDIOLOGY CLINIC | Age: 72
End: 2022-08-08
Payer: MEDICARE

## 2022-08-08 DIAGNOSIS — Z45.09 ENCOUNTER FOR ELECTRONIC ANALYSIS OF REVEAL EVENT RECORDER: ICD-10-CM

## 2022-08-08 DIAGNOSIS — R07.9 INTERMITTENT CHEST PAIN: Primary | ICD-10-CM

## 2022-08-08 DIAGNOSIS — I48.0 PAF (PAROXYSMAL ATRIAL FIBRILLATION) (HCC): Primary | Chronic | ICD-10-CM

## 2022-08-08 DIAGNOSIS — R00.2 PALPITATIONS: ICD-10-CM

## 2022-08-08 LAB
A/G RATIO: 1.8 (ref 1.1–2.2)
ALBUMIN SERPL-MCNC: 4.3 G/DL (ref 3.4–5)
ALP BLD-CCNC: 48 U/L (ref 40–129)
ALT SERPL-CCNC: 15 U/L (ref 10–40)
ANION GAP SERPL CALCULATED.3IONS-SCNC: 9 MMOL/L (ref 3–16)
AST SERPL-CCNC: 21 U/L (ref 15–37)
BASOPHILS ABSOLUTE: 0.1 K/UL (ref 0–0.2)
BASOPHILS RELATIVE PERCENT: 1.2 %
BILIRUB SERPL-MCNC: 0.8 MG/DL (ref 0–1)
BUN BLDV-MCNC: 9 MG/DL (ref 7–20)
CALCIUM SERPL-MCNC: 9.1 MG/DL (ref 8.3–10.6)
CHLORIDE BLD-SCNC: 104 MMOL/L (ref 99–110)
CO2: 27 MMOL/L (ref 21–32)
CREAT SERPL-MCNC: 0.6 MG/DL (ref 0.8–1.3)
EOSINOPHILS ABSOLUTE: 0.1 K/UL (ref 0–0.6)
EOSINOPHILS RELATIVE PERCENT: 2.5 %
GFR AFRICAN AMERICAN: >60
GFR NON-AFRICAN AMERICAN: >60
GLUCOSE BLD-MCNC: 88 MG/DL (ref 70–99)
HCT VFR BLD CALC: 40.2 % (ref 40.5–52.5)
HEMOGLOBIN: 13.5 G/DL (ref 13.5–17.5)
LYMPHOCYTES ABSOLUTE: 1.2 K/UL (ref 1–5.1)
LYMPHOCYTES RELATIVE PERCENT: 23.3 %
MCH RBC QN AUTO: 31.4 PG (ref 26–34)
MCHC RBC AUTO-ENTMCNC: 33.4 G/DL (ref 31–36)
MCV RBC AUTO: 93.9 FL (ref 80–100)
MONOCYTES ABSOLUTE: 0.4 K/UL (ref 0–1.3)
MONOCYTES RELATIVE PERCENT: 8.4 %
NEUTROPHILS ABSOLUTE: 3.3 K/UL (ref 1.7–7.7)
NEUTROPHILS RELATIVE PERCENT: 64.6 %
PDW BLD-RTO: 13 % (ref 12.4–15.4)
PLATELET # BLD: 204 K/UL (ref 135–450)
PMV BLD AUTO: 7 FL (ref 5–10.5)
POTASSIUM REFLEX MAGNESIUM: 4.3 MMOL/L (ref 3.5–5.1)
PRO-BNP: 76 PG/ML (ref 0–124)
RBC # BLD: 4.28 M/UL (ref 4.2–5.9)
SODIUM BLD-SCNC: 140 MMOL/L (ref 136–145)
TOTAL PROTEIN: 6.7 G/DL (ref 6.4–8.2)
TROPONIN: <0.01 NG/ML
TROPONIN: <0.01 NG/ML
TSH REFLEX: 3.85 UIU/ML (ref 0.27–4.2)
WBC # BLD: 5.2 K/UL (ref 4–11)

## 2022-08-08 PROCEDURE — 36415 COLL VENOUS BLD VENIPUNCTURE: CPT

## 2022-08-08 PROCEDURE — 1200000000 HC SEMI PRIVATE

## 2022-08-08 PROCEDURE — 84443 ASSAY THYROID STIM HORMONE: CPT

## 2022-08-08 PROCEDURE — 71045 X-RAY EXAM CHEST 1 VIEW: CPT

## 2022-08-08 PROCEDURE — 85025 COMPLETE CBC W/AUTO DIFF WBC: CPT

## 2022-08-08 PROCEDURE — 93005 ELECTROCARDIOGRAM TRACING: CPT | Performed by: EMERGENCY MEDICINE

## 2022-08-08 PROCEDURE — 80053 COMPREHEN METABOLIC PANEL: CPT

## 2022-08-08 PROCEDURE — 83880 ASSAY OF NATRIURETIC PEPTIDE: CPT

## 2022-08-08 PROCEDURE — 2580000003 HC RX 258: Performed by: INTERNAL MEDICINE

## 2022-08-08 PROCEDURE — 84484 ASSAY OF TROPONIN QUANT: CPT

## 2022-08-08 PROCEDURE — 99285 EMERGENCY DEPT VISIT HI MDM: CPT

## 2022-08-08 PROCEDURE — 6370000000 HC RX 637 (ALT 250 FOR IP): Performed by: INTERNAL MEDICINE

## 2022-08-08 PROCEDURE — G2066 INTER DEVC REMOTE 30D: HCPCS | Performed by: INTERNAL MEDICINE

## 2022-08-08 PROCEDURE — 93298 REM INTERROG DEV EVAL SCRMS: CPT | Performed by: INTERNAL MEDICINE

## 2022-08-08 RX ORDER — POLYETHYLENE GLYCOL 3350 17 G/17G
17 POWDER, FOR SOLUTION ORAL DAILY PRN
Status: DISCONTINUED | OUTPATIENT
Start: 2022-08-08 | End: 2022-08-11 | Stop reason: HOSPADM

## 2022-08-08 RX ORDER — OXYCODONE HYDROCHLORIDE AND ACETAMINOPHEN 5; 325 MG/1; MG/1
1 TABLET ORAL ONCE
Status: COMPLETED | OUTPATIENT
Start: 2022-08-09 | End: 2022-08-09

## 2022-08-08 RX ORDER — FUROSEMIDE 20 MG/1
10 TABLET ORAL DAILY
Status: DISCONTINUED | OUTPATIENT
Start: 2022-08-09 | End: 2022-08-10

## 2022-08-08 RX ORDER — SODIUM CHLORIDE 9 MG/ML
INJECTION, SOLUTION INTRAVENOUS PRN
Status: DISCONTINUED | OUTPATIENT
Start: 2022-08-08 | End: 2022-08-09 | Stop reason: SDUPTHER

## 2022-08-08 RX ORDER — LEVOTHYROXINE SODIUM 0.12 MG/1
125 TABLET ORAL DAILY
Status: DISCONTINUED | OUTPATIENT
Start: 2022-08-09 | End: 2022-08-11 | Stop reason: HOSPADM

## 2022-08-08 RX ORDER — ROSUVASTATIN CALCIUM 20 MG/1
40 TABLET, COATED ORAL NIGHTLY
Status: DISCONTINUED | OUTPATIENT
Start: 2022-08-08 | End: 2022-08-11 | Stop reason: HOSPADM

## 2022-08-08 RX ORDER — SODIUM CHLORIDE 9 MG/ML
INJECTION, SOLUTION INTRAVENOUS CONTINUOUS
Status: DISCONTINUED | OUTPATIENT
Start: 2022-08-09 | End: 2022-08-09

## 2022-08-08 RX ORDER — LISINOPRIL 5 MG/1
1.25 TABLET ORAL DAILY
Status: DISCONTINUED | OUTPATIENT
Start: 2022-08-09 | End: 2022-08-08

## 2022-08-08 RX ORDER — SODIUM CHLORIDE 0.9 % (FLUSH) 0.9 %
10 SYRINGE (ML) INJECTION EVERY 12 HOURS SCHEDULED
Status: DISCONTINUED | OUTPATIENT
Start: 2022-08-08 | End: 2022-08-09 | Stop reason: SDUPTHER

## 2022-08-08 RX ORDER — ASPIRIN 81 MG/1
81 TABLET ORAL DAILY
Status: DISCONTINUED | OUTPATIENT
Start: 2022-08-09 | End: 2022-08-11 | Stop reason: HOSPADM

## 2022-08-08 RX ORDER — PANTOPRAZOLE SODIUM 40 MG/1
40 TABLET, DELAYED RELEASE ORAL
Status: DISCONTINUED | OUTPATIENT
Start: 2022-08-09 | End: 2022-08-11 | Stop reason: HOSPADM

## 2022-08-08 RX ORDER — PANTOPRAZOLE SODIUM 40 MG/1
40 TABLET, DELAYED RELEASE ORAL ONCE
Status: COMPLETED | OUTPATIENT
Start: 2022-08-09 | End: 2022-08-08

## 2022-08-08 RX ORDER — DILTIAZEM HYDROCHLORIDE 120 MG/1
120 CAPSULE, COATED, EXTENDED RELEASE ORAL DAILY
Status: DISCONTINUED | OUTPATIENT
Start: 2022-08-09 | End: 2022-08-11 | Stop reason: HOSPADM

## 2022-08-08 RX ORDER — SODIUM CHLORIDE 0.9 % (FLUSH) 0.9 %
10 SYRINGE (ML) INJECTION PRN
Status: DISCONTINUED | OUTPATIENT
Start: 2022-08-08 | End: 2022-08-09 | Stop reason: SDUPTHER

## 2022-08-08 RX ORDER — ACETAMINOPHEN 650 MG/1
650 SUPPOSITORY RECTAL EVERY 4 HOURS PRN
Status: DISCONTINUED | OUTPATIENT
Start: 2022-08-08 | End: 2022-08-11 | Stop reason: HOSPADM

## 2022-08-08 RX ORDER — LISINOPRIL 5 MG/1
1.25 TABLET ORAL DAILY
Status: DISCONTINUED | OUTPATIENT
Start: 2022-08-09 | End: 2022-08-11

## 2022-08-08 RX ORDER — MIRTAZAPINE 15 MG/1
7.5 TABLET, FILM COATED ORAL NIGHTLY
Status: DISCONTINUED | OUTPATIENT
Start: 2022-08-08 | End: 2022-08-11 | Stop reason: HOSPADM

## 2022-08-08 RX ORDER — ENOXAPARIN SODIUM 100 MG/ML
40 INJECTION SUBCUTANEOUS DAILY
Status: DISCONTINUED | OUTPATIENT
Start: 2022-08-09 | End: 2022-08-11 | Stop reason: HOSPADM

## 2022-08-08 RX ORDER — ACETAMINOPHEN 325 MG/1
650 TABLET ORAL EVERY 4 HOURS PRN
Status: DISCONTINUED | OUTPATIENT
Start: 2022-08-08 | End: 2022-08-11 | Stop reason: HOSPADM

## 2022-08-08 RX ORDER — OXYCODONE HYDROCHLORIDE AND ACETAMINOPHEN 325; 5 MG/5ML; MG/5ML
SOLUTION ORAL 2 TIMES DAILY
COMMUNITY
End: 2022-08-15 | Stop reason: SDUPTHER

## 2022-08-08 RX ORDER — ONDANSETRON 2 MG/ML
4 INJECTION INTRAMUSCULAR; INTRAVENOUS EVERY 4 HOURS PRN
Status: DISCONTINUED | OUTPATIENT
Start: 2022-08-08 | End: 2022-08-11 | Stop reason: HOSPADM

## 2022-08-08 RX ORDER — DOCUSATE SODIUM 100 MG/1
100 CAPSULE, LIQUID FILLED ORAL 2 TIMES DAILY
Status: DISCONTINUED | OUTPATIENT
Start: 2022-08-08 | End: 2022-08-11 | Stop reason: HOSPADM

## 2022-08-08 RX ADMIN — SODIUM CHLORIDE: 9 INJECTION, SOLUTION INTRAVENOUS at 23:53

## 2022-08-08 RX ADMIN — ROSUVASTATIN 40 MG: 20 TABLET, FILM COATED ORAL at 23:52

## 2022-08-08 RX ADMIN — DOCUSATE SODIUM 100 MG: 100 CAPSULE, LIQUID FILLED ORAL at 23:51

## 2022-08-08 RX ADMIN — PANTOPRAZOLE SODIUM 40 MG: 40 TABLET, DELAYED RELEASE ORAL at 23:52

## 2022-08-08 RX ADMIN — MIRTAZAPINE 7.5 MG: 15 TABLET, FILM COATED ORAL at 23:52

## 2022-08-08 ASSESSMENT — PAIN - FUNCTIONAL ASSESSMENT: PAIN_FUNCTIONAL_ASSESSMENT: NONE - DENIES PAIN

## 2022-08-08 ASSESSMENT — LIFESTYLE VARIABLES
HOW MANY STANDARD DRINKS CONTAINING ALCOHOL DO YOU HAVE ON A TYPICAL DAY: 1 OR 2
HOW OFTEN DO YOU HAVE A DRINK CONTAINING ALCOHOL: 2-3 TIMES A WEEK

## 2022-08-08 NOTE — TELEPHONE ENCOUNTER
Pt called stating he has been having rapid heart beats the last three days and he feels nausea and sick.     Pls advise thank you

## 2022-08-08 NOTE — TELEPHONE ENCOUNTER
Spoke to patient as he was driving to the ER. He has had recurrent heart pounding and racing for the past three days which tends to occur in the evening or during the night. Episode overnight woke him up from sleep. Over a 30 minute period of time, he had three episodes of fast heart rate, associated with shortness of breath (chronic symptom) and chest discomfort which lasts 3-4 minutes before resolving. Reviewed loop recorder transmission including symptomatic episodes with Nikki device tech. She could see that patient had a single PVC shortly before patient triggered the loop recorder which may correlate with his symptoms. Also discussed again with Dr. Darry Frankel who recommends that patient be evaluated in ER because of his complaints of chest pain and nausea and complex medical history.   Reiterated Dr. Carlin Matta recommendation for ER evaluation and patient is agreeable to ER eval.

## 2022-08-08 NOTE — ED PROVIDER NOTES
[facility] Emergency Department    Jose M Patel MD, am the primary clinician of record. CHIEF COMPLAINT  Chief Complaint   Patient presents with    Chest Pain     Arrives ambulatory w c/o L sided chest pain x2 days. Pt states he woke up multiple times last night due to his heart racing. Pt was told to come in by cardiologist. Pt is scheduled for cardiac surgeries at the end of the month- extensive cardiac hx per pt. Denies being on thinners after cardiac ablation years ago. Denies radiation of pain. Endorses dull heavy pressure. Pt is temporarily relieved when he rubs the area. VSS. Pt endorses internal heart monitor. HISTORY OF PRESENT ILLNESS  Bethanne Cabot is a 81 y.o. male  who presents to the ED complaining of starting to feel badly yesterday but this morning woke up restless with nausea and palpitations. Has a heart monitor. Says the episode today lasted 3 minutes or so in particular. Has been intermittent for 3 days. He says the sx woke up him from sleep. According to note from cardiology office:    \"Reviewed loop recorder transmission including symptomatic episodes with [employer] device tech. She could see that patient had a single PVC shortly before patient triggered the loop recorder which may correlate with his symptoms. \"    History of AVR, now has mitral and tricuspid valve disease and plans to have those replaced soon as well. Cardiologist is Dr. Peyton Crews and also has seen EP before. Currently asymptomatic but he has ongoing frequent episodes of this discomfort and SOB. He is not anticoagulated. No leg swelling currently, no abd pain or vomiting or diarrhea. No fevers, chills, or significant coughing.       \"Cardiac cath: 3/8/21:  Dominance: Right    LM: luminal irregularities  LAD: at least moderate calcification of long, eccentric 80% ostial to proximal stenosis ; 30% ostial first diagonal   LCx: 30-40% distal into OM2   RCA: tortuous with luminal irregularities \"      No other complaints, modifying factors or associated symptoms. I have reviewed the following from the nursing documentation.     Past Medical History:   Diagnosis Date    Aortic valve insufficiency     Arthritis     Atrial fibrillation ([facility]     Cancer ([facility] 09/2017    TONGUE head and neck IN REMISSION    Dysphonia     GERD (gastroesophageal reflux disease)     Hearing loss     Heart disease     Medical history reviewed with no changes     Obstructive apnea does not use CPAP    Oropharyngeal dysphagia     Refusal of blood transfusions as patient is [employer]     Thyroid disease      Past Surgical History:   Procedure Laterality Date    ANKLE SURGERY      AORTIC VALVE REPLACEMENT  1/28/15    Dr. Semaj Mas 25mm Mosaic Ultra porcine valve; right and left modified maze procedure with ligation of DELGADO with Atriclip    [facility]      Arthroscopy    LUMBAR SPINE SURGERY Right 10/13/2020    RIGHT LUMBAR4-LUMBAR5 MICRO HEMILAMINECTOMY AND DISCECTOMY ([id number], [id number]) performed by Martin Garcia MD at [facility]      collapsed lung due to broken ribs    MANDIBLE FRACTURE SURGERY      NECK SURGERY      Fusion    PAIN MANAGEMENT PROCEDURE Right 6/22/2020    RIGHT L4 AND L5 TRANSFORAMINAL EPIDURAL STEROID INJECTION WITH FLUOROSCOPY (34891,[id number]) performed by Kenneth Wilde MD at [address] [place] [address] Right 7/8/2020    RIGHT L4 AND L5 TRANSFORAMINAL EPIDURAL STEROID INJECTION WITH FLUOROSCOPY (33571,22760) performed by Kenneth Wilde MD at [address] [place] [address]       Family History   Problem Relation Age of Onset    Heart Disease Other     High Blood Pressure Neg Hx     High Cholesterol Neg Hx      Social History     Socioeconomic History    Marital status:      Spouse name: Not on file    Number of children: 1    Years of education: Not on file    Highest education level: Not on file   Occupational History    Occupation: [employer]   Tobacco Use    Smoking status: Former     Packs/day: 1.00     Years: 40.00     Pack years: 40.00     Types: Cigarettes     Start date: 1975     Quit date: 2015     Years since quittin.5    Smokeless tobacco: Never    Tobacco comments:     Maintain cessation   Vaping Use    Vaping Use: Never used   Substance and Sexual Activity    Alcohol use: No     Alcohol/week: 0.0 standard drinks     Comment: NA beer    Drug use: No    Sexual activity: Not Currently     Partners: Female   Other Topics Concern    Not on file   Social History Narrative    Not on file     Social Determinants of Health     Financial Resource Strain: Medium Risk    Difficulty of Paying Living Expenses: Somewhat hard   Food Insecurity: No Food Insecurity    Worried About Running Out of Food in the Last Year: Never true    Ran Out of Food in the Last Year: Never true   Transportation Needs: No Transportation Needs    Lack of Transportation (Medical): No    Lack of Transportation (Non-Medical): No   Physical Activity: Sufficiently Active    Days of Exercise per Week: 1 day    Minutes of Exercise per Session: 150+ min   Stress: Stress Concern Present    Feeling of Stress : Very much   Social Connections: Moderately Integrated    Frequency of Communication with Friends and Family: More than three times a week    Frequency of Social Gatherings with Friends and Family: Once a week    Attends Yarsani Services: More than 4 times per year    Active Member of [employer] or Organizations: Yes    Attends Club or Organization Meetings: More than 4 times per year    Marital Status:    Intimate Partner Violence: Not on file   Housing Stability: [address] to Pay for Housing in the Last Year: No    Number of [place] in the Last Year: 2    Unstable Housing in the Last Year: No     No current facility-administered medications for this encounter. Current Outpatient Medications   Medication Sig Dispense Refill    levothyroxine (SYNTHROID) 125 MCG tablet Take 1 tablet by mouth once dailyTake 1 tablet by mouth once daily 30 tablet 2    vitamin B-12 (CYANOCOBALAMIN) 100 MCG tablet Take 1 tablet by mouth in the morning. 30 tablet 3    [START ON 8/26/2022] diazePAM (VALIUM) 5 MG tablet One tablet by mouth with small sip of water, one hour before scheduled procedure 1 tablet 0    pantoprazole (PROTONIX) 40 MG tablet TAKE 1 TABLET BY MOUTH TWICE A DAY BEFORE MEALS 180 tablet 3    lisinopril (PRINIVIL;ZESTRIL) 2.5 MG tablet TAKE 1/2 TABLET BY MOUTH EVERY DAY 45 tablet 1    furosemide (LASIX) 20 MG tablet t1 tab by mouth daily. Call cardiology if swelling in legs worsens or shortness of breath occurs. 90 tablet 0    digoxin (LANOXIN) 125 MCG tablet TAKE 1 TABLET BY MOUTH EVERY DAY 30 tablet 3    mirtazapine (REMERON) 7.5 MG tablet TAKE 1 TABLET BY MOUTH EVERY DAY AT NIGHT 90 tablet 3    Cholecalciferol 50 MCG (2000 UT) TABS Take 2,000 Units by mouth daily      dilTIAZem (CARDIZEM CD) 120 MG extended release capsule Take 1 capsule by mouth daily 90 capsule 3    rosuvastatin (CRESTOR) 40 MG tablet Take 1 tablet by mouth daily 90 tablet 3    cyclobenzaprine (FLEXERIL) 10 MG tablet       aspirin 81 MG EC tablet Take 81 mg by mouth daily      docusate sodium (COLACE) 100 MG capsule Take 100 mg by mouth 2 times daily. Allergies   Allergen Reactions    Naproxen Swelling     Lips    Pt does not recognize this being an intolerance    Acetaminophen Itching    Hydrocodone Itching    Hydrocodone-Acetaminophen Itching       REVIEW OF SYSTEMS  10 systems reviewed, pertinent positives per HPI otherwise noted to be negative. PHYSICAL EXAM  /72   Pulse 73   Temp 98.6 °F (37 °C) (Oral)   Resp 16   Ht 5' 11\" (1.803 m)   Wt 160 lb (72.6 kg)   SpO2 96%   BMI 22.32 kg/m²    GENERAL APPEARANCE: Awake and alert. Cooperative. No distress. HENT: Normocephalic. Atraumatic. Mucous membranes are dry. NECK: Supple. EYES: PERRL. EOM's grossly intact. HEART/CHEST: RRR. No murmurs. No chest wall tenderness. LUNGS: Respirations unlabored. CTAB. Good air exchange. Speaking comfortably in full sentences. ABDOMEN: No tenderness. Soft. Non-distended. No masses. No organomegaly. No guarding or rebound. Normal bowel sounds throughout. MUSCULOSKELETAL: No extremity edema. Compartments soft. No deformity. No tenderness in the extremities. All extremities neurovascularly intact. SKIN: Warm and dry. No acute rashes. NEUROLOGICAL: Alert and oriented. CN's 2-12 intact. No gross facial drooping. Strength 5/5, sensation intact. 2 plus DTR's in knees bilaterally. Gait normal.  PSYCHIATRIC: Normal mood and affect. LABS  I have reviewed all labs for this visit.    Results for orders placed or performed during the hospital encounter of 08/08/22   CBC with Auto Differential   Result Value Ref Range    WBC 5.2 4.0 - 11.0 K/uL    RBC 4.28 4.20 - 5.90 M/uL    Hemoglobin 13.5 13.5 - 17.5 g/dL    Hematocrit 40.2 (L) 40.5 - 52.5 %    MCV 93.9 80.0 - 100.0 fL    MCH 31.4 26.0 - 34.0 pg    MCHC 33.4 31.0 - 36.0 g/dL    RDW 13.0 12.4 - 15.4 %    Platelets 193 512 - 667 K/uL    MPV 7.0 5.0 - 10.5 fL    Neutrophils % 64.6 %    Lymphocytes % 23.3 %    Monocytes % 8.4 %    Eosinophils % 2.5 %    Basophils % 1.2 %    Neutrophils Absolute 3.3 1.7 - 7.7 K/uL    Lymphocytes Absolute 1.2 1.0 - 5.1 K/uL    Monocytes Absolute 0.4 0.0 - 1.3 K/uL    Eosinophils Absolute 0.1 0.0 - 0.6 K/uL    Basophils Absolute 0.1 0.0 - 0.2 K/uL   Comprehensive Metabolic Panel w/ Reflex to MG   Result Value Ref Range    Sodium 140 136 - 145 mmol/L    Potassium reflex Magnesium 4.3 3.5 - 5.1 mmol/L    Chloride 104 99 - 110 mmol/L    CO2 27 21 - 32 mmol/L    Anion Gap 9 3 - 16    Glucose 88 70 - 99 mg/dL    BUN 9 7 - 20 mg/dL    Creatinine 0.6 (L) 0.8 - 1.3 mg/dL    GFR Non-African American >60 >60    GFR  American >60 >60    Calcium 9.1 8.3 - 10.6 mg/dL    Total Protein 6.7 6.4 - 8.2 g/dL    Albumin 4.3 3.4 - 5.0 g/dL    Albumin/Globulin Ratio 1.8 1.1 - 2.2    Total Bilirubin 0.8 0.0 - 1.0 mg/dL    Alkaline Phosphatase 48 40 - 129 U/L    ALT 15 10 - 40 U/L    AST 21 15 - 37 U/L   Troponin   Result Value Ref Range    Troponin <0.01 <0.01 ng/mL   Brain Natriuretic Peptide   Result Value Ref Range    Pro-BNP 76 0 - 124 pg/mL   EKG 12 Lead   Result Value Ref Range    Ventricular Rate 80 BPM    Atrial Rate 80 BPM    P-R Interval 204 ms    QRS Duration 82 ms    Q-T Interval 364 ms    QTc Calculation (Bazett) 419 ms    P Axis 62 degrees    R Axis -13 degrees    T Axis 24 degrees    Diagnosis Normal sinus rhythmNormal ECG        The 12 lead EKG was interpreted by me as follows:  Rate: tachycardia with a rate of 80  Rhythm: sinus  Axis: normal  Intervals: normal NV, narrow QRS, normal QTc  ST segments: no ST elevations or depressions  T waves: no abnormal inversions  Non-specific T wave changes: not present  Prior EKG comparison: EKG dated 2/4/22 is not significantly different    RADIOLOGY    XR CHEST PORTABLE    Result Date: 8/8/2022  EXAMINATION: ONE XRAY VIEW OF THE CHEST 8/8/2022 3:29 pm COMPARISON: 02/04/2022 HISTORY: ORDERING SYSTEM PROVIDED HISTORY: intermittent cp sob FINDINGS: Mediastinum: The cardiomediastinal silhouette is unchanged. Median sternotomy wires. Loop recorder. Lungs: The lungs are clear. Pleura: No pleural effusion. No pneumothorax. Bones and soft tissues: No acute osseous abnormality. No acute cardiopulmonary abnormality. ED COURSE/MDM  Patient seen and evaluated. Old records reviewed. Labs and imaging reviewed and results discussed with patient.       After initial evaluation, differential diagnostic considerations included: acute coronary syndrome, pulmonary embolism, COPD/asthma, pneumonia, musculoskeletal, reflux/PUD/gastritis, pneumothorax, CHF, thoracic aortic dissection, anxiety    The patient's ED workup was notable for intermittent chest discomfort, palpitations, with loop recorder not showing significant dysrhythmia, only a PVC according to cardiology notes. His labs here are unremarkable, EKG is unremarkable as well, negative troponin. He has already taken aspirin today. He was asymptomatic on my evaluation of him. H/o CAD but no stents. Dr. Danny Macario from cardiology was consulted about the patient's ED history, physical, workup, and course so far. Recommendations from this consultant included admit for ongoing cardiac evaluation. Is this patient to be included in the SEP-1 Core Measure? No   Exclusion criteria - the patient is NOT to be included for SEP-1 Core Measure due to: Infection is not suspected    CLINICAL IMPRESSION  1. Intermittent chest pain    2. Palpitations        Blood pressure 102/72, pulse 73, temperature 98.6 °F (37 °C), temperature source Oral, resp. rate 16, height 5' 11\" (1.803 m), weight 160 lb (72.6 kg), SpO2 96 %. DISPOSITION  Cecily Mercado was admitted in fair condition. The plan is to admit to the hospital at this time under the hospitalist service. Hospitalist accepted the patient and will take over the patient's care. DISCLAIMER: This chart was created using Dragon dictation software. Efforts were made by me to ensure accuracy, however some errors may be present due to limitations of this technology and occasionally words are not transcribed correctly.         Caro Parker MD  08/08/22 2100

## 2022-08-09 PROBLEM — I25.10 CAD (CORONARY ARTERY DISEASE): Status: ACTIVE | Noted: 2022-08-09

## 2022-08-09 PROBLEM — R07.89 CHEST PRESSURE: Status: ACTIVE | Noted: 2022-08-09

## 2022-08-09 LAB
ANION GAP SERPL CALCULATED.3IONS-SCNC: 4 MMOL/L (ref 3–16)
BASOPHILS ABSOLUTE: 0.1 K/UL (ref 0–0.2)
BASOPHILS RELATIVE PERCENT: 0.9 %
BUN BLDV-MCNC: 11 MG/DL (ref 7–20)
CALCIUM SERPL-MCNC: 9.1 MG/DL (ref 8.3–10.6)
CHLORIDE BLD-SCNC: 106 MMOL/L (ref 99–110)
CO2: 32 MMOL/L (ref 21–32)
CREAT SERPL-MCNC: 0.6 MG/DL (ref 0.8–1.3)
EKG ATRIAL RATE: 80 BPM
EKG DIAGNOSIS: NORMAL
EKG P AXIS: 62 DEGREES
EKG P-R INTERVAL: 204 MS
EKG Q-T INTERVAL: 364 MS
EKG QRS DURATION: 82 MS
EKG QTC CALCULATION (BAZETT): 419 MS
EKG R AXIS: -13 DEGREES
EKG T AXIS: 24 DEGREES
EKG VENTRICULAR RATE: 80 BPM
EOSINOPHILS ABSOLUTE: 0.2 K/UL (ref 0–0.6)
EOSINOPHILS RELATIVE PERCENT: 2.8 %
GFR AFRICAN AMERICAN: >60
GFR NON-AFRICAN AMERICAN: >60
GLUCOSE BLD-MCNC: 100 MG/DL (ref 70–99)
GLUCOSE BLD-MCNC: 103 MG/DL (ref 70–99)
GLUCOSE BLD-MCNC: 89 MG/DL (ref 70–99)
GLUCOSE BLD-MCNC: 89 MG/DL (ref 70–99)
GLUCOSE BLD-MCNC: 92 MG/DL (ref 70–99)
HCT VFR BLD CALC: 39.9 % (ref 40.5–52.5)
HEMOGLOBIN: 13.2 G/DL (ref 13.5–17.5)
LYMPHOCYTES ABSOLUTE: 1.3 K/UL (ref 1–5.1)
LYMPHOCYTES RELATIVE PERCENT: 24.5 %
MCH RBC QN AUTO: 30.8 PG (ref 26–34)
MCHC RBC AUTO-ENTMCNC: 33.1 G/DL (ref 31–36)
MCV RBC AUTO: 93.2 FL (ref 80–100)
MONOCYTES ABSOLUTE: 0.6 K/UL (ref 0–1.3)
MONOCYTES RELATIVE PERCENT: 10.5 %
NEUTROPHILS ABSOLUTE: 3.3 K/UL (ref 1.7–7.7)
NEUTROPHILS RELATIVE PERCENT: 61.3 %
PDW BLD-RTO: 13.5 % (ref 12.4–15.4)
PERFORMED ON: ABNORMAL
PERFORMED ON: NORMAL
PLATELET # BLD: 204 K/UL (ref 135–450)
PMV BLD AUTO: 7 FL (ref 5–10.5)
POC ACT LR: 185 SEC
POC ACT LR: 202 SEC
POTASSIUM REFLEX MAGNESIUM: 3.9 MMOL/L (ref 3.5–5.1)
RBC # BLD: 4.28 M/UL (ref 4.2–5.9)
SODIUM BLD-SCNC: 142 MMOL/L (ref 136–145)
T4 FREE: 2.1 NG/DL (ref 0.9–1.8)
TROPONIN: <0.01 NG/ML
TSH SERPL DL<=0.05 MIU/L-ACNC: 10.5 UIU/ML (ref 0.27–4.2)
WBC # BLD: 5.4 K/UL (ref 4–11)

## 2022-08-09 PROCEDURE — 6370000000 HC RX 637 (ALT 250 FOR IP): Performed by: INTERNAL MEDICINE

## 2022-08-09 PROCEDURE — 93010 ELECTROCARDIOGRAM REPORT: CPT | Performed by: INTERNAL MEDICINE

## 2022-08-09 PROCEDURE — B2111ZZ FLUOROSCOPY OF MULTIPLE CORONARY ARTERIES USING LOW OSMOLAR CONTRAST: ICD-10-PCS | Performed by: INTERNAL MEDICINE

## 2022-08-09 PROCEDURE — 94760 N-INVAS EAR/PLS OXIMETRY 1: CPT

## 2022-08-09 PROCEDURE — 85347 COAGULATION TIME ACTIVATED: CPT

## 2022-08-09 PROCEDURE — 2580000003 HC RX 258

## 2022-08-09 PROCEDURE — 84443 ASSAY THYROID STIM HORMONE: CPT

## 2022-08-09 PROCEDURE — 80048 BASIC METABOLIC PNL TOTAL CA: CPT

## 2022-08-09 PROCEDURE — 6370000000 HC RX 637 (ALT 250 FOR IP): Performed by: PHYSICIAN ASSISTANT

## 2022-08-09 PROCEDURE — 6360000004 HC RX CONTRAST MEDICATION: Performed by: INTERNAL MEDICINE

## 2022-08-09 PROCEDURE — 93571 IV DOP VEL&/PRESS C FLO 1ST: CPT

## 2022-08-09 PROCEDURE — C1769 GUIDE WIRE: HCPCS

## 2022-08-09 PROCEDURE — 99223 1ST HOSP IP/OBS HIGH 75: CPT | Performed by: INTERNAL MEDICINE

## 2022-08-09 PROCEDURE — 84439 ASSAY OF FREE THYROXINE: CPT

## 2022-08-09 PROCEDURE — 36415 COLL VENOUS BLD VENIPUNCTURE: CPT

## 2022-08-09 PROCEDURE — 93455 CORONARY ART/GRFT ANGIO S&I: CPT | Performed by: INTERNAL MEDICINE

## 2022-08-09 PROCEDURE — 4A033BC MEASUREMENT OF ARTERIAL PRESSURE, CORONARY, PERCUTANEOUS APPROACH: ICD-10-PCS | Performed by: INTERNAL MEDICINE

## 2022-08-09 PROCEDURE — 99153 MOD SED SAME PHYS/QHP EA: CPT

## 2022-08-09 PROCEDURE — 99152 MOD SED SAME PHYS/QHP 5/>YRS: CPT | Performed by: INTERNAL MEDICINE

## 2022-08-09 PROCEDURE — 94640 AIRWAY INHALATION TREATMENT: CPT

## 2022-08-09 PROCEDURE — 6370000000 HC RX 637 (ALT 250 FOR IP): Performed by: NURSE PRACTITIONER

## 2022-08-09 PROCEDURE — 93455 CORONARY ART/GRFT ANGIO S&I: CPT

## 2022-08-09 PROCEDURE — C1887 CATHETER, GUIDING: HCPCS

## 2022-08-09 PROCEDURE — 2580000003 HC RX 258: Performed by: INTERNAL MEDICINE

## 2022-08-09 PROCEDURE — 4A023N7 MEASUREMENT OF CARDIAC SAMPLING AND PRESSURE, LEFT HEART, PERCUTANEOUS APPROACH: ICD-10-PCS | Performed by: INTERNAL MEDICINE

## 2022-08-09 PROCEDURE — 6360000002 HC RX W HCPCS

## 2022-08-09 PROCEDURE — C1760 CLOSURE DEV, VASC: HCPCS

## 2022-08-09 PROCEDURE — 2500000003 HC RX 250 WO HCPCS

## 2022-08-09 PROCEDURE — 93571 IV DOP VEL&/PRESS C FLO 1ST: CPT | Performed by: INTERNAL MEDICINE

## 2022-08-09 PROCEDURE — 2709999900 HC NON-CHARGEABLE SUPPLY

## 2022-08-09 PROCEDURE — 99152 MOD SED SAME PHYS/QHP 5/>YRS: CPT

## 2022-08-09 PROCEDURE — 1200000000 HC SEMI PRIVATE

## 2022-08-09 PROCEDURE — 85025 COMPLETE CBC W/AUTO DIFF WBC: CPT

## 2022-08-09 PROCEDURE — C1894 INTRO/SHEATH, NON-LASER: HCPCS

## 2022-08-09 PROCEDURE — 84484 ASSAY OF TROPONIN QUANT: CPT

## 2022-08-09 RX ORDER — LORAZEPAM 1 MG/1
1 TABLET ORAL
Status: COMPLETED | OUTPATIENT
Start: 2022-08-09 | End: 2022-08-10

## 2022-08-09 RX ORDER — SODIUM CHLORIDE 0.9 % (FLUSH) 0.9 %
5-40 SYRINGE (ML) INJECTION PRN
Status: DISCONTINUED | OUTPATIENT
Start: 2022-08-09 | End: 2022-08-11 | Stop reason: HOSPADM

## 2022-08-09 RX ORDER — UBIDECARENONE 75 MG
100 CAPSULE ORAL DAILY
Status: DISCONTINUED | OUTPATIENT
Start: 2022-08-09 | End: 2022-08-11 | Stop reason: HOSPADM

## 2022-08-09 RX ORDER — FLUTICASONE PROPIONATE 110 UG/1
1 AEROSOL, METERED RESPIRATORY (INHALATION) 2 TIMES DAILY
Status: DISCONTINUED | OUTPATIENT
Start: 2022-08-09 | End: 2022-08-11 | Stop reason: HOSPADM

## 2022-08-09 RX ORDER — FLUTICASONE PROPIONATE 50 MCG
1 SPRAY, SUSPENSION (ML) NASAL DAILY
Status: DISCONTINUED | OUTPATIENT
Start: 2022-08-09 | End: 2022-08-10

## 2022-08-09 RX ORDER — SODIUM CHLORIDE 9 MG/ML
INJECTION, SOLUTION INTRAVENOUS PRN
Status: DISCONTINUED | OUTPATIENT
Start: 2022-08-09 | End: 2022-08-11 | Stop reason: HOSPADM

## 2022-08-09 RX ORDER — ISOSORBIDE MONONITRATE 30 MG/1
30 TABLET, EXTENDED RELEASE ORAL DAILY
Status: DISCONTINUED | OUTPATIENT
Start: 2022-08-09 | End: 2022-08-10

## 2022-08-09 RX ORDER — OXYCODONE HCL 20 MG/ML
5 CONCENTRATE, ORAL ORAL EVERY 12 HOURS
Status: DISCONTINUED | OUTPATIENT
Start: 2022-08-09 | End: 2022-08-11 | Stop reason: HOSPADM

## 2022-08-09 RX ORDER — SODIUM CHLORIDE 0.9 % (FLUSH) 0.9 %
5-40 SYRINGE (ML) INJECTION EVERY 12 HOURS SCHEDULED
Status: DISCONTINUED | OUTPATIENT
Start: 2022-08-09 | End: 2022-08-11 | Stop reason: HOSPADM

## 2022-08-09 RX ADMIN — LISINOPRIL 1.25 MG: 5 TABLET ORAL at 08:22

## 2022-08-09 RX ADMIN — DOCUSATE SODIUM 100 MG: 100 CAPSULE, LIQUID FILLED ORAL at 22:50

## 2022-08-09 RX ADMIN — SODIUM CHLORIDE, PRESERVATIVE FREE 10 ML: 5 INJECTION INTRAVENOUS at 00:13

## 2022-08-09 RX ADMIN — OXYCODONE HYDROCHLORIDE 5 MG: 100 SOLUTION ORAL at 10:51

## 2022-08-09 RX ADMIN — PANTOPRAZOLE SODIUM 40 MG: 40 TABLET, DELAYED RELEASE ORAL at 15:51

## 2022-08-09 RX ADMIN — DOCUSATE SODIUM 100 MG: 100 CAPSULE, LIQUID FILLED ORAL at 08:23

## 2022-08-09 RX ADMIN — MIRTAZAPINE 7.5 MG: 15 TABLET, FILM COATED ORAL at 22:52

## 2022-08-09 RX ADMIN — ROSUVASTATIN 40 MG: 20 TABLET, FILM COATED ORAL at 22:50

## 2022-08-09 RX ADMIN — PANTOPRAZOLE SODIUM 40 MG: 40 TABLET, DELAYED RELEASE ORAL at 06:20

## 2022-08-09 RX ADMIN — LORAZEPAM 1 MG: 1 TABLET ORAL at 15:50

## 2022-08-09 RX ADMIN — Medication 100 MCG: at 11:48

## 2022-08-09 RX ADMIN — DILTIAZEM HYDROCHLORIDE 120 MG: 120 CAPSULE, COATED, EXTENDED RELEASE ORAL at 08:23

## 2022-08-09 RX ADMIN — ISOSORBIDE MONONITRATE 30 MG: 30 TABLET, EXTENDED RELEASE ORAL at 22:50

## 2022-08-09 RX ADMIN — OXYCODONE HYDROCHLORIDE 5 MG: 100 SOLUTION ORAL at 22:51

## 2022-08-09 RX ADMIN — OXYCODONE AND ACETAMINOPHEN 1 TABLET: 5; 325 TABLET ORAL at 00:06

## 2022-08-09 RX ADMIN — IOPAMIDOL 146 ML: 755 INJECTION, SOLUTION INTRAVENOUS at 15:11

## 2022-08-09 RX ADMIN — ASPIRIN 81 MG: 81 TABLET, COATED ORAL at 08:23

## 2022-08-09 RX ADMIN — Medication 1 PUFF: at 23:20

## 2022-08-09 RX ADMIN — LEVOTHYROXINE SODIUM 125 MCG: 0.12 TABLET ORAL at 06:20

## 2022-08-09 ASSESSMENT — PAIN - FUNCTIONAL ASSESSMENT: PAIN_FUNCTIONAL_ASSESSMENT: ACTIVITIES ARE NOT PREVENTED

## 2022-08-09 ASSESSMENT — PAIN SCALES - GENERAL
PAINLEVEL_OUTOF10: 2
PAINLEVEL_OUTOF10: 4
PAINLEVEL_OUTOF10: 2
PAINLEVEL_OUTOF10: 5

## 2022-08-09 ASSESSMENT — PAIN DESCRIPTION - LOCATION
LOCATION: GROIN
LOCATION: CHEST

## 2022-08-09 ASSESSMENT — PAIN DESCRIPTION - ORIENTATION
ORIENTATION: RIGHT
ORIENTATION: LEFT

## 2022-08-09 ASSESSMENT — PAIN DESCRIPTION - DESCRIPTORS: DESCRIPTORS: ACHING;DISCOMFORT

## 2022-08-09 NOTE — PLAN OF CARE
Problem: Discharge Planning  Goal: Discharge to home or other facility with appropriate resources  Outcome: Progressing     Problem: Respiratory - Adult  Goal: Achieves optimal ventilation and oxygenation  Outcome: Progressing     Problem: Cardiovascular - Adult  Goal: Maintains optimal cardiac output and hemodynamic stability  Outcome: Progressing

## 2022-08-09 NOTE — PROGRESS NOTES
[facility] PROGRESS NOTE    8/9/2022 9:43 AM        Name: Grady Gonzales . Admitted: 8/8/2022  Primary Care Provider: Neisha Almazan MD (Tel: [phone])      Chief Complaint: Chest tightness, palpitations    Subjective:  Resting in bed. Offers no complaints at present. Denies chest pain, shortness of breath, palpitations, lightheadedness. No significant arrhythmia on telemetry. NPO for possible LHC today. Reviewed interval ancillary notes    Current Medications  rosuvastatin (CRESTOR) tablet 40 mg, Nightly  pantoprazole (PROTONIX) tablet 40 mg, BID AC  mirtazapine (REMERON) tablet 7.5 mg, Nightly  lisinopril (PRINIVIL;ZESTRIL) tablet 1.25 mg, Daily  levothyroxine (SYNTHROID) tablet 125 mcg, Daily  furosemide (LASIX) tablet 10 mg, Daily  docusate sodium (COLACE) capsule 100 mg, BID  dilTIAZem (CARDIZEM CD) extended release capsule 120 mg, Daily  aspirin EC tablet 81 mg, Daily  sodium chloride flush 0.9 % injection 10 mL, 2 times per day  sodium chloride flush 0.9 % injection 10 mL, PRN  0.9 % sodium chloride infusion, PRN  enoxaparin (LOVENOX) injection 40 mg, Daily  ondansetron (ZOFRAN) injection 4 mg, Q4H PRN  polyethylene glycol (GLYCOLAX) packet 17 g, Daily PRN  acetaminophen (TYLENOL) tablet 650 mg, Q4H PRN   Or  acetaminophen (TYLENOL) suppository 650 mg, Q4H PRN  0.9 % sodium chloride infusion, Continuous        Objective:  /78   Pulse 70   Temp 98.5 °F (36.9 °C) (Oral)   Resp 18   Ht 5' 11\" (1.803 m)   Wt 160 lb 14.4 oz (73 kg)   SpO2 95%   BMI 22.44 kg/m²   No intake or output data in the 24 hours ending 08/09/22 0943   Wt Readings from Last 3 Encounters:   08/09/22 160 lb 14.4 oz (73 kg)   07/22/22 162 lb (73.5 kg)   06/08/22 160 lb 8 oz (72.8 kg)       General appearance:  Appears comfortable  Eyes: Sclera clear. Pupils equal.  ENT: Moist oral mucosa.  Trachea midline, no adenopathy. Cardiovascular: Regular rhythm, normal S1, S2. No murmur. No edema in lower extremities  Respiratory: Not using accessory muscles. Good inspiratory effort. Clear to auscultation bilaterally, no wheeze or crackles. GI: Abdomen soft, no tenderness, not distended, normal bowel sounds  Musculoskeletal: No cyanosis in digits, neck supple  Neurology: CN 2-12 grossly intact. No speech or motor deficits  Psych: Normal affect. Alert and oriented in time, place and person  Skin: Warm, dry, normal turgor    Labs and Tests:  CBC:   Recent Labs     08/08/22  1543 08/09/22  0315   WBC 5.2 5.4   HGB 13.5 13.2*    204     BMP:    Recent Labs     08/08/22  1543 08/09/22  0315    142   K 4.3 3.9    106   CO2 27 32   BUN 9 11   CREATININE 0.6* 0.6*   GLUCOSE 88 103*     Hepatic:   Recent Labs     08/08/22  1543   AST 21   ALT 15   BILITOT 0.8   ALKPHOS 48       CXR 8/8/2022:  No acute cardiopulmonary abnormality. 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 *       Assessment & Plan:   Chest discomfort / CAD. Troponin negative x 3. No acute changes on EKG. Fixed apical defect on stress test 4/2022 believed consistent with artifact. Cardiology consult pending. Continue asa. PAF. Hx ablation and maze procedure. Presently in sinus rhythm. Not on AC secondary to DELGADO clip. Continue diltiazem. Hypothyroidism. TSH results pending. HTN. Controlled. Continue lisinopril.        Diet: Diet NPO Exceptions are: Ice Chips, Sips of Water with Meds  Code:Full Code  DVT PPX: enoxaparin      ISIDRA Estevez - CNP   8/9/2022 9:43 AM

## 2022-08-09 NOTE — PRE SEDATION
Pre-Op Note/Sedation Assessment    Betty Clark  1950  [id number]  10:49 AM    Planned Procedure: Cardiac Catheterization Procedure  Post Procedure Plan: Return to same level of care  Consent: I have discussed with the patient and/or the patient representative the indication, alternatives, and the possible risks and/or complications of the planned procedure and the anesthesia methods. The patient and/or patient representative appear to understand and agree to proceed. Chief Complaint:   Chest Pain/Pressure      Indications for Cath Procedure:  Presentation:  Worsening Angina  2. Anginal Classification within 2 weeks:  CCS IV - Inability to perform any activity without angina or angina at rest, i.e., severe limitation  3. Angina Symptoms Assessment:  Typical Chest Pain  4. Heart Failure Class within last 2 weeks:  No symptoms  5. Cardiovascular Instability:  No    Prior Ischemic Workup/Eval:  Pre-Procedural Medications: Yes: ACE/ARB/ARNI, Aspirin, Ca Channel Blockers, and STATIN  2. Stress Test Completed? Yes:  Stress or Imaging Studies Performed (within ANY time period):   Type:  Stress Nuclear  Results:  Negative Extent of Ischemia:  Intermediate    Does Patient need surgery? Cath Valve Surgery:  No    Pre-Procedure Medical History:  Vital Signs:  /78   Pulse 70   Temp 98.5 °F (36.9 °C) (Oral)   Resp 18   Ht 5' 11\" (1.803 m)   Wt 160 lb 14.4 oz (73 kg)   SpO2 95%   BMI 22.44 kg/m²     Allergies:     Allergies   Allergen Reactions    Naproxen Swelling     Lips    Pt does not recognize this being an intolerance    Acetaminophen Itching    Hydrocodone Itching    Hydrocodone-Acetaminophen Itching     Medications:    Current Facility-Administered Medications   Medication Dose Route Frequency Provider Last Rate Last Admin    fluticasone (FLONASE) 50 MCG/ACT nasal spray 1 spray  1 spray Each Nostril Daily ISIDRA Chaudhari - CNP        vitamin B-12 (CYANOCOBALAMIN) tablet 100 mcg  100 mcg Oral Daily ISIDRA Traore - CNP        oxyCODONE (Sherri Feller) 100 MG/5ML concentrated solution 5 mg  5 mg Oral Q12H ISIDRA Traore CNP        rosuvastatin (CRESTOR) tablet 40 mg  40 mg Oral Nightly Henrique Dorantes MD   40 mg at 08/08/22 2352    pantoprazole (PROTONIX) tablet 40 mg  40 mg Oral BID AC Henrique Dorantes MD   40 mg at 08/09/22 0398    mirtazapine (REMERON) tablet 7.5 mg  7.5 mg Oral Nightly Henrique Dorantes MD   7.5 mg at 08/08/22 2352    lisinopril (PRINIVIL;ZESTRIL) tablet 1.25 mg  1.25 mg Oral Daily Henrique Dorantes MD   1.25 mg at 08/09/22 2894    levothyroxine (SYNTHROID) tablet 125 mcg  125 mcg Oral Daily Henrique Dorantes MD   125 mcg at 08/09/22 3217    furosemide (LASIX) tablet 10 mg  10 mg Oral Daily Henrique Dorantes MD        docusate sodium (COLACE) capsule 100 mg  100 mg Oral BID Henrique Dorantes MD   100 mg at 08/09/22 9386    dilTIAZem (CARDIZEM CD) extended release capsule 120 mg  120 mg Oral Daily Henrique Dorantes MD   120 mg at 08/09/22 5683    aspirin EC tablet 81 mg  81 mg Oral Daily Henrique Dorantes MD   81 mg at 08/09/22 3721    sodium chloride flush 0.9 % injection 10 mL  10 mL IntraVENous 2 times per day Henrique Dorantes MD   10 mL at 08/09/22 0013    sodium chloride flush 0.9 % injection 10 mL  10 mL IntraVENous PRN Henrique Dorantes MD        0.9 % sodium chloride infusion   IntraVENous PRN Henrique Dorantes MD        enoxaparin (LOVENOX) injection 40 mg  40 mg SubCUTAneous Daily Henrique Dorantes MD        ondansetron [facility]) injection 4 mg  4 mg IntraVENous Q4H PRN Henrique Dorantes MD        polyethylene glycol (GLYCOLAX) packet 17 g  17 g Oral Daily PRN Henrique Dorantes MD        acetaminophen (TYLENOL) tablet 650 mg  650 mg Oral Q4H PRN Henrique Dorantes MD        Or    acetaminophen (TYLENOL) suppository 650 mg  650 mg Rectal Q4H PRN Henrique Dorantes MD           Past Medical History:    Past Medical History:   Diagnosis Date    Aortic breathe deeply and cough freely  Circulation:  2 - BP+/- 20mmHg of normal  Consciousness:  2 - Fully awake  Oxygen Saturation (color):  2 - Able to maintain oxygen saturation >92% on room air    Sedation/Anesthesia Plan:  Guard the patient's safety and welfare. Minimize physical discomfort and pain. Minimize negative psychological responses to treatment by providing sedation and analgesia and maximize the potential amnesia. Patient to meet pre-procedure discharge plan.     Medication Planned:  midazolam intravenously and fentanyl intravenously    Patient is an appropriate candidate for plan of sedation:   yes      Quin Plunkett DO, [facility]  Interventional Cardiology     o: [phone]  [address] [place] [address]., [facility], [address] [place] [address]

## 2022-08-09 NOTE — PROGRESS NOTES
Pt returned from cath lab, right groin dressing dry and intact, site unremarkable. Patient is aware of restrictions post angiogram. Asking for sedative, will notify MD. Left PP +. Denies needs will monitor.

## 2022-08-09 NOTE — CONSULTS
Cardiovascular Consultation     Attending Physician: Manav Ramos MD    PATIENT: Criss Heath  : 1950  MRN: [id number]    Reason for Consultation:   Chief Complaint   Patient presents with    Chest Pain     Arrives ambulatory w c/o L sided chest pain x2 days. Pt states he woke up multiple times last night due to his heart racing. Pt was told to come in by cardiologist. Pt is scheduled for cardiac surgeries at the end of the month- extensive cardiac hx per pt. Denies being on thinners after cardiac ablation years ago. Denies radiation of pain. Endorses dull heavy pressure. Pt is temporarily relieved when he rubs the area. VSS. Pt endorses internal heart monitor. History of present illness:   Mr. Criss Heath is a 81 y.o. male patient, well-known to Dr. Miguel Hankins for history of coronary artery disease and valvular heart disease, presented as instructed when episodes of chest pain and heart racing increased in intensity and frequency. He additionally follows with Dr. Trinidad Lawrence of Electrophysiology, and was seen on admission. Implantable loop recorder interrogation was unremarkable as well as EKG on arrival and telemetry monitor since here. He has had 2 episodes of recurrent substernal chest pain radiating to his left. He describes them as a dull squeezing pressure lasting up to 3 minutes with palpitations. No diaphoresis vomiting syncope or shortness of breath. He states that at times he can rub his left chest for relief. Compliant with medications without adverse effects. He was in the midst of outpatient work-up and pursuit of second opinion for Redo CABG with mitral valve repair\replacement.     Medical History:      Diagnosis Date    Aortic valve insufficiency     Arthritis     Atrial fibrillation ([facility]     Cancer ([facility] 2017    TONGUE head and neck IN REMISSION    Dysphonia     GERD (gastroesophageal reflux disease)     Hearing loss     Heart disease     Medical history reviewed with no changes     Obstructive apnea does not use CPAP    Oropharyngeal dysphagia     Refusal of blood transfusions as patient is Oriental orthodox     Thyroid disease        Surgical History:      Procedure Laterality Date    ANKLE SURGERY      AORTIC VALVE REPLACEMENT  1/28/15    Dr. Semaj Lund 25mm Mosaic Ultra porcine valve; right and left modified maze procedure with ligation of DELGADO with Atriclip    ATRIAL ABLATION SURGERY      BACK SURGERY      CSP    CARDIAC CATHETERIZATION      HERNIA REPAIR      KNEE SURGERY      Arthroscopy    LUMBAR SPINE SURGERY Right 10/13/2020    RIGHT LUMBAR4-LUMBAR5 MICRO HEMILAMINECTOMY AND DISCECTOMY (10589, [id number]) performed by Paulette Mauricio MD at [facility]      collapsed lung due to broken ribs    MANDIBLE FRACTURE SURGERY      NECK SURGERY      Fusion    PAIN MANAGEMENT PROCEDURE Right 2020    RIGHT L4 AND L5 TRANSFORAMINAL EPIDURAL STEROID INJECTION WITH FLUOROSCOPY ([id number],[id number]) performed by Garrett Zheng MD at [address] [place] [address] Right 2020    RIGHT L4 AND L5 TRANSFORAMINAL EPIDURAL STEROID INJECTION WITH FLUOROSCOPY ([id number]) performed by Garrett Zheng MD at [address] [place] [address] History:  Social History     Socioeconomic History    Marital status:      Spouse name: Not on file    Number of children: 1    Years of education: Not on file    Highest education level: Not on file   Occupational History    Occupation: [employer]   Tobacco Use    Smoking status: Former     Packs/day: 1.00     Years: 40.00     Pack years: 40.00     Types: Cigarettes     Start date: 1975     Quit date: 2015     Years since quittin.5    Smokeless tobacco: Never    Tobacco comments:     Maintain cessation   Vaping Use    Vaping Use: Never used   Substance and Sexual Activity    Alcohol use: No     Alcohol/week: 0.0 standard drinks     Comment: NA beer    Drug use:  No Daily  levothyroxine (SYNTHROID) tablet 125 mcg, Daily  furosemide (LASIX) tablet 10 mg, Daily  docusate sodium (COLACE) capsule 100 mg, BID  dilTIAZem (CARDIZEM CD) extended release capsule 120 mg, Daily  aspirin EC tablet 81 mg, Daily  sodium chloride flush 0.9 % injection 10 mL, 2 times per day  sodium chloride flush 0.9 % injection 10 mL, PRN  0.9 % sodium chloride infusion, PRN  enoxaparin (LOVENOX) injection 40 mg, Daily  ondansetron (ZOFRAN) injection 4 mg, Q4H PRN  polyethylene glycol (GLYCOLAX) packet 17 g, Daily PRN  acetaminophen (TYLENOL) tablet 650 mg, Q4H PRN   Or  acetaminophen (TYLENOL) suppository 650 mg, Q4H PRN      Allergies:  Naproxen, Acetaminophen, Hydrocodone, and Hydrocodone-acetaminophen     Review of Systems:   [x]Full ROS obtained and negative except as mentioned in HPI    Physical Examination:    /76   Pulse 70   Temp 97.3 °F (36.3 °C) (Oral)   Resp 16   Ht 5' 11\" (1.803 m)   Wt 160 lb 14.4 oz (73 kg)   SpO2 95%   BMI 22.44 kg/m²   Wt Readings from Last 3 Encounters:   08/09/22 160 lb 14.4 oz (73 kg)   07/22/22 162 lb (73.5 kg)   06/08/22 160 lb 8 oz (72.8 kg)       GENERAL: Well developed, well nourished, no acute distress  NEUROLOGICAL: Alert and oriented x3  PSYCH: Normal mood and affect   SKIN: Warm and dry, without lesions  HEENT: Normocephalic, atraumatic, Sclera non-icteric, mucous membranes moist  NECK: supple, JVP normal, thyroid not enlarged   CAROTID: Normal upstroke, no bruits  CARDIAC: Normal PMI, regular rate and rhythm, normal S1S2, no murmur, rub  RESPIRATORY: Normal respiratory effort, clear to auscultation bilaterally  EXTREMITIES: No cyanosis, clubbing or edema, palpable pulses bilaterally   MUSCULOSKELETAL: No joint swelling or tenderness, no chest wall tenderness  GASTROINTESTINAL:  soft, non-tender, no bruit    Labs:  Lab Review   Lab Results   Component Value Date/Time     08/09/2022 03:15 AM    K 3.9 08/09/2022 03:15 AM     08/09/2022 03:15 AM    CO2 32 08/09/2022 03:15 AM    BUN 11 08/09/2022 03:15 AM    CREATININE 0.6 08/09/2022 03:15 AM    GLUCOSE 103 08/09/2022 03:15 AM    CALCIUM 9.1 08/09/2022 03:15 AM     Lab Results   Component Value Date/Time    CKTOTAL 52 07/27/2022 10:01 AM    CKMB 0.83 12/27/2009 01:55 PM    TROPONINI <0.01 08/09/2022 03:15 AM     Lab Results   Component Value Date/Time    WBC 5.4 08/09/2022 03:15 AM    HGB 13.2 08/09/2022 03:15 AM    HCT 39.9 08/09/2022 03:15 AM    MCV 93.2 08/09/2022 03:15 AM     08/09/2022 03:15 AM     Lab Results   Component Value Date/Time    CHOL 146 07/27/2022 10:01 AM    TRIG 95 07/27/2022 10:01 AM    HDL 43 07/27/2022 10:01 AM    HDL 52 12/27/2009 06:38 AM       Imaging:  I have reviewed the below testing personally:    EKG:    NSR    Echo 8/18/21:  Summary   -Normal left ventricle size, wall thickness and systolic function with an   estimated ejection fraction of 60-65%. -No regional wall motion abnormalities are seen.   -Indeterminate diastolic function.   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 -Moderate to severe mitral regurgitation. ( appears stable from previous echo   dated 2/24/2021). ERO 0.21.   -A bioprosthetic artificial aortic valve appears well seated with a maximum   velocity of 2.6m/s and a mean gradient of 16mmHg. -Moderate to severe tricuspid regurgitation.   -Estimated pulmonary artery systolic pressure is mildly elevated at 38 mmHg   assuming a right atrial pressure of 8 mmHg. -The right ventricle is moderately enlarged.   -The right atrium is moderately dilated. STRESS TEST:   4/2022  [facility]    Summary    The overall quality of the study is good. There is subdiaphragmatic    attenuation. Left ventricular cavity size is mildly dilated. The right ventricle is    normal in size. Spect imaging demonstrates a small area of mildly decreased perfusion in the    apex.  The defect is present at rest and stress without associated wall    motion abnormality, consistent with artifact. Sum stress score of 2. No visual TID. Calculated TID of 1.09. Left ventricular ejection fraction is normal at 71%. Sensitivity of testing for ischemia is reduced on diltiazem. Myocardial perfusion is normal. Low-moderate risk scan. CATH:   3/2021  Left Heart Cath  Dominance: Right       LM: luminal irregularities  LAD: at least moderate calcification of long, eccentric 80% ostial to proximal stenosis ; 30% ostial first diagonal   LCx: 30-40% distal into OM2   RCA: tortuous with luminal irregularities       Impression/Recommendations    Mr. Zaid Wood is a 81 y.o. male patient of Dr. Gracie Alarcon:     Chest pain   CAD: 75-80% proximal LAD managed medically following CTS consult, normal perfusion by Myoview 2021  MR, moderate to severe by 8/2021 TTE  Hx. Bioprosthetic AVR 2015, normal functioning by 8/2021 TTE  PAF: MAZE/DELGADO ligation 2015, atrial flutter ablation 2020; low burden by ILR, off AC  Hx. Oral cavity bleeding  Hx. Squamous cell carcinoma of tongue, chemo/radiation completed 2018   Hyperlipidemia   CHRISTOPHER  GERD  COPD/Former tobacco  [employer]       Mr. Lizbeth Mckeon was recently seen in outpatient follow up by primary interventionalist with plans to seek second opinion regarding significant LAD disease/MR. He was felt to be too high risk for Redo sternotomy with CTS consult here in March 2021 and has had normal perfusion on follow up SPECT MPI. There was discussion of cardiac MRI vs. CHIKIS (that would likely need the assistance of GI due to significant neck radiation) to further characterize/quantify MR. Since, he presented with increased chest episodes in the absence of activity on ILR.          Risks, benefits, goals, and alternatives of left and right heart catheterization with LIMA angiography discussed with patient; including stroke, heart attack, kidney damage, death, paralysis, disability, damage to nerves/arteries/veins. All questions answered and informed consent obtained. Thank you for allowing me to participate in the care of your patient. Please do not hesitate to call. Isaiah Monreal DO, [facility]  Interventional Cardiology     o: [phone]  [facility] [employer] [facility] [address], [address]      NOTE:  This report was transcribed using voice recognition software. Every effort was made to ensure accuracy; however, inadvertent computerized transcription errors may be present.

## 2022-08-09 NOTE — OP NOTE
Cardiac Catheterization     Procedure: LHC, FFR-LAD, Selective KRISTIE angiography   Complications: None  Medications: Procedural sedation with minimal conscious sedation (1.5 Versed/75 Fentanyl)  An independent trained observer pushed medications at my direction. We monitored the patient's level of consciousness and vital signs/physiologic status throughout the procedure duration (see start and stop times in log). Estimated Blood Loss: Less than 20 mL  Specimens: were not obtained  Access: 6 Fr x 65 cm Destination sheath Right CFA  Closure: Mynx  Contrast: 146 cc  Start time: 4705  Stop time: 8482  Fluoro time: 25.2  Findings:     Left Heart Cath  Dominance: Right       LM: no significant disease  LAD: moderately calcified vessel with eccentric 80% proximal stenosis   LCx: 30% into OM2   RCA: minimal luminal irregularities     6 Fr XB 4.0 Guide  BMW wire  Acist     DFR=0.92  FFR=0.75 (1:25 minutes of Adenosine)    LIMA - large caliber, free of disease throughout its course    Impression/Recommendations:  Significant, stable proximal LAD disease. Cardiac MRI scheduled as outpatient for 8/26/22. Refer to Dr. Ana M Cisse. AL planning for tomorrow.        Margie Marroquin DO, [address] [facility]  Interventional Cardiology     o: [phone]  [facility] ([place], [place])., [address], [address] [facility]

## 2022-08-09 NOTE — H&P
Hospital Medicine  History and Physical    PCP: Rosalba Root MD  Patient Name: Gris Villa    Date of Service: Pt seen/examined on 8/8/22 and admitted to Inpatient with expected LOS greater than two midnights due to medical therapy    CHIEF COMPLAINT:  Pt c/o intermittent chest tightness and palpitations  HISTORY OF PRESENT ILLNESS: Pt is an 81y.o. 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 He presents to the ER for evaluation following a 2-day history of left-sided chest tightness and palpitations. He has felt as though his heart has been racing and it has been waking him from sleep. He is scheduled to have cardiac surgeries at the end of the month and was instructed to come to the emergency room by his cardiologist.  He has a home cardiology monitor at home which was interrogated and had no arrhythmias. At the times that he was having symptoms he was in normal sinus rhythm. He has a history of AVR, and now has mitral and tricuspid valve disease and plans to have those replaced soon. Cardiologist is Dr. Sofia Klein and also has seen EP before. At the time that I see him he is symptom-free. He is being admitted for further evaluation and treatment. Associated signs and symptoms do not include typical chest pain, shortness of breath, lightheaded, dizziness, diaphoresis, edema, orthopnea, paroxysmal nocturnal dyspnea, fever or chills. No recent medication changes.       Past Medical History:        Diagnosis Date    Aortic valve insufficiency     Arthritis     Atrial fibrillation ([facility]     Cancer ([facility] 09/2017    TONGUE head and neck IN REMISSION    Dysphonia     GERD (gastroesophageal reflux disease)     Hearing loss     Heart disease     Medical history reviewed with no changes     Obstructive apnea does not use CPAP    Oropharyngeal dysphagia     Refusal of blood transfusions as patient is Tenriism     Thyroid disease        Past Surgical History:        Procedure Laterality Date    ANKLE SURGERY      AORTIC VALVE REPLACEMENT  1/28/15    Dr. Tommy Machado 25mm Mosaic Ultra porcine valve; right and left modified maze procedure with ligation of DELGADO with Atriclip    ATRIAL ABLATION SURGERY      BACK SURGERY      CSP    CARDIAC CATHETERIZATION      HERNIA REPAIR      KNEE SURGERY      Arthroscopy    LUMBAR SPINE SURGERY Right 10/13/2020    RIGHT LUMBAR4-LUMBAR5 MICRO HEMILAMINECTOMY AND DISCECTOMY ([id number], [id number]) performed by Pravin Cheng MD at . [facility]      collapsed lung due to broken ribs    MANDIBLE FRACTURE SURGERY      NECK SURGERY      Fusion    PAIN MANAGEMENT PROCEDURE Right 6/22/2020    RIGHT L4 AND L5 TRANSFORAMINAL EPIDURAL STEROID INJECTION WITH FLUOROSCOPY ([id number]) performed by Leonides Jin MD at [address] [facility] Right 7/8/2020    RIGHT L4 AND L5 TRANSFORAMINAL EPIDURAL STEROID INJECTION WITH FLUOROSCOPY ([id number]) performed by Leonides Jin MD at [address] [facility]         Allergies:  Naproxen, Acetaminophen, Hydrocodone, and Hydrocodone-acetaminophen    Medications Prior to Admission:    Prior to Admission medications    Medication Sig Start Date End Date Taking? Authorizing Provider   oxyCODONE-acetaminophen (ROXICET) 5-325 MG/5ML solution Take by mouth 2 times daily.    Yes Historical Provider, MD   levothyroxine (SYNTHROID) 125 MCG tablet Take 1 tablet by mouth once dailyTake 1 tablet by mouth once daily 7/28/22   Sumeet Wang MD   vitamin B-12 (CYANOCOBALAMIN) 100 MCG tablet Take 1 tablet by mouth in the morning. 7/28/22 7/28/23  Sumeet Wang MD   diazePAM (VALIUM) 5 MG tablet One tablet by mouth with small sip of water, one hour before scheduled procedure 8/26/22 8/27/22  Chelly Ziegler MD   pantoprazole (PROTONIX) 40 MG tablet TAKE 1 TABLET BY MOUTH TWICE A DAY BEFORE MEALS 2/28/22   Lamonte Moya MD   lisinopril (PRINIVIL;ZESTRIL) 2.5 MG tablet TAKE 1/2 TABLET BY MOUTH EVERY DAY 2/21/22   ISIDRA Laurent CNP   furosemide (LASIX) 20 MG tablet t1 tab by mouth daily. Call cardiology if swelling in legs worsens or shortness of breath occurs. 2/18/22   Lamonte Moya MD   digoxin (LANOXIN) 125 MCG tablet TAKE 1 TABLET BY MOUTH EVERY DAY 2/17/22   Elizabeth Bennett MD   mirtazapine (REMERON) 7.5 MG tablet TAKE 1 TABLET BY MOUTH EVERY DAY AT NIGHT 2/16/22   Jese Sexton MD   Cholecalciferol 50 MCG (2000 UT) TABS Take 2,000 Units by mouth daily    Historical Provider, MD   dilTIAZem (CARDIZEM CD) 120 MG extended release capsule Take 1 capsule by mouth daily 1/19/22   Elizabeth Bennett MD   rosuvastatin (CRESTOR) 40 MG tablet Take 1 tablet by mouth daily 1/19/22   Elizabeth Bennett MD   cyclobenzaprine (FLEXERIL) 10 MG tablet  11/1/21   Historical Provider, MD   aspirin 81 MG EC tablet Take 81 mg by mouth daily    Historical Provider, MD   lisinopril (PRINIVIL;ZESTRIL) 2.5 MG tablet Take 0.5 tablets by mouth daily 3/22/21   ISIDRA Laurent CNP   docusate sodium (COLACE) 100 MG capsule Take 100 mg by mouth 2 times daily. Historical Provider, MD       Family History:       Problem Relation Age of Onset    Heart Disease Other     High Blood Pressure Neg Hx     High Cholesterol Neg Hx      Social History:   TOBACCO:   reports that he quit smoking about 7 years ago. His smoking use included cigarettes. He started smoking about 47 years ago. He has a 40.00 pack-year smoking history. He has never used smokeless tobacco.  ETOH:   reports no history of alcohol use.   OCCUPATION:      REVIEW OF SYSTEMS:  A full review of systems was performed and is negative except for that which appears in the HPI    Physical Exam:    Vitals: /77   Pulse 71   Temp 98.2 °F (36.8 °C) (Oral)   Resp 15   Ht 5' 11\" (1.803 m)   Wt 160 lb 14.4 oz (73 kg)   SpO2 98%   BMI 22.44 kg/m²   General appearance: WD/WN 81y.o. year-old male who is alert, appears stated age and is cooperative  HEENT: Head: Normocephalic, no lesions, without obvious abnormality. Eye: Normal external eye, conjunctiva, lids cornea, PEERL. Ears: Normal external ears. Non-tender. Nose: Normal external nose, mucus membranes and septum. Pharynx: Dental Hygiene adequate. Normal buccal mucosa. Normal pharynx. Neck: no adenopathy, no carotid bruit, no JVD, supple, symmetrical, trachea midline and thyroid not enlarged, symmetric, no tenderness/mass/nodules  Lungs: clear to auscultation bilaterally and no use of accessory muscles  Heart: regular rate and rhythm, S1, S2 normal, no murmur, click, rub or gallop and normal apical impulse  Abdomen: soft, non-tender; bowel sounds normal; no masses, no organomegaly  Extremities: extremities atraumatic, no cyanosis or edema and Homans sign is negative, no sign of DVT. Capillary Refill: Acceptable < 3 seconds   Peripheral Pulses: +3 easily felt, not easily obliterated with pressures   Skin: Skin color, texture, turgor normal. No rashes or lesions on exposed skin  Neurologic: Neurovascularly intact without any focal sensory/motor deficits. Cranial nerves: II-XII intact, grossly non-focal. Gait was not tested.   Mental Status: Alert and oriented, thought content appropriate, normal insight        CBC:   Recent Labs     08/08/22  1543 08/09/22  0315   WBC 5.2 5.4   HGB 13.5 13.2*    204     BMP:    Recent Labs     08/08/22  1543 08/09/22  0315    142   K 4.3 3.9    106   CO2 27 32   BUN 9 11   CREATININE 0.6* 0.6*   GLUCOSE 88 103*     Troponin:   Recent Labs     08/08/22  1543 08/08/22  2249 08/09/22  0315   TROPONINI <0.01 <0.01 <0.01     PT/INR:  No results found for: PTINR  U/A:    Lab Results   Component Value Date/Time    LEUKOCYTESUR Negative 01/27/2015 05:10 PM    SPECGRAV 1.015 01/27/2015 05:10 PM    UROBILINOGEN 2.0 01/27/2015 05:10 PM BILIRUBINUR Negative 01/27/2015 05:10 PM    BLOODU Negative 01/27/2015 05:10 PM    GLUCOSEU Negative 01/27/2015 05:10 PM    PROTEINU Negative 01/27/2015 05:10 PM         RAD:   I have independently reviewed and interpreted the imaging studies below and based my recommendations to the patient on those findings. XR CHEST PORTABLE    Result Date: 8/8/2022  EXAMINATION: ONE XRAY VIEW OF THE CHEST 8/8/2022 3:29 pm COMPARISON: 02/04/2022 HISTORY: ORDERING SYSTEM PROVIDED HISTORY: intermittent cp sob FINDINGS: Mediastinum: The cardiomediastinal silhouette is unchanged. Median sternotomy wires. Loop recorder. Lungs: The lungs are clear. Pleura: No pleural effusion. No pneumothorax. Bones and soft tissues: No acute osseous abnormality. No acute cardiopulmonary abnormality. EKG:   Read by ER in the absence of a Cardiologist shows  Rate: tachycardia with a rate of 80  Rhythm: sinus  Axis: normal  Intervals: normal KY, narrow QRS, normal QTc  ST segments: no ST elevations or depressions  T waves: no abnormal inversions  Non-specific T wave changes: not present  Prior EKG comparison: EKG dated 2/4/22 is not significantly different      Assessment:   Principal Problem:    Palpitations  Active Problems:    Chest pressure    CAD (coronary artery disease)    PAF (paroxysmal atrial fibrillation) (HCC)    Essential hypertension    Squamous 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)    Refusal of blood transfusions as patient is Caodaism    Chronic obstructive pulmonary disease (HCC)    Metastatic squamous cell carcinoma (HCC)    Acquired hypothyroidism    Hyperlipidemia  Resolved Problems:    * No resolved hospital problems. *      Plan:       Palpitations, chest tightness, chest pressure - Pt is wearing a heart monitor and was in NSR when he experienced these symptoms. Cardiology asked that he be admitted and will see him in the morning. Monitor on telemetry.  He has a Nuclear study on 04/06/2022 which was reassuring. CAD (coronary artery disease) - as above; continue statin and aspirin    PAF (paroxysmal atrial fibrillation) ([facility] - controlled. Continue Cardizem and monitor    Chronic obstructive pulmonary disease ([facility] -without acute exacerbation. Monitor and provide breathing treatments as indicated. Acquired hypothyroidism - stable; continue Levothyroxine. Will check Thyroid Function Tests    Essential (primary) hypertension - continue home meds and monitor blood pressure    Hyperlipidemia - No current evidence of Rhabdomyolysis or other adverse effects. Continue statin therapy while in the hospital    Squamous cell carcinoma of head and neck ([facility] Metastatic squamous cell carcinoma (HCC) - per outpatient management    Refusal of blood transfusions as patient is Quaker         Code Status: Full Code  Diet: Diet NPO Exceptions are: Ice Chips, Sips of Water with Meds  DVT Prophylaxis: Lovenox    (Advanced care planning has been discussed with patient and/or responsible family member and is reflected in the code status.  Further orders associated with this have been entered if appropriate)    Disposition: Anticipate that patient will remain in the hospital for 2 or more midnights depending on further evaluation and clinical course     Please note that over 50 minutes was spent in evaluating the patient, review of records and results, discussion with staff/family, etc.      Ronel Leon MD

## 2022-08-09 NOTE — ED NOTES
Report given to [facility] RN on 3A, all questions answered during the handoff report, and Pt transport to 3A via stretcher.      Adilson Roman RN  08/08/22 4914

## 2022-08-09 NOTE — CONSULTS
[address]   Electrophysiology Consultation   Date: 8/9/2022  Reason for Consultation: Chest pain, palpitation    Consult Requesting Physician: Serafin Lund MD   Chief Complaint   Patient presents with    Chest Pain     Arrives ambulatory w c/o L sided chest pain x2 days. Pt states he woke up multiple times last night due to his heart racing. Pt was told to come in by cardiologist. Pt is scheduled for cardiac surgeries at the end of the month- extensive cardiac hx per pt. Denies being on thinners after cardiac ablation years ago. Denies radiation of pain. Endorses dull heavy pressure. Pt is temporarily relieved when he rubs the area. VSS. Pt endorses internal heart monitor. CC: Chest pain   HPI: Cecily Mercado is a 81 y.o. male who has presented with chest pressure. He has had 3 episodes of chest pressure associated with palpitation in the last week. Patient states that he woke up multiple times and felt chest pressure associated with heart racing. He initially called cardiology office and sent loop recorder interrogation which did not show any sustained arrhythmia. He came to the hospital with complaint of chest pressure and palpitation. This morning around 6:20 AM he had the same chest pressure and feeling palpitation. He was on telemetry and there was no arrhythmia at the time of his palpitation. .    Patient has a complex past medical history significant for paroxysmal atrial fibrillation, aortic valve insufficiency, SCC oral cancer. In 2015 he underwent AVR with porcine valve, right/left modified Maze procedure and DELGADO ligation with atrial clip. He had symptomatic atrial fibrillation and underwent reisolation of pulmonary veins and CTI flutter and PWI on 7/15/2020. He he has had a loop recorder implanted for monitoring of his arrhythmia. Patient has had recurrent episodes of what he describes as chest pressure and palpitation.   Repeated interrogation of the loop recorder did not show any arrhythmia at the time of his chest discomfort. On 3/8/2021 he underwent a left heart catheterization and found to have significant LAD disease and was referred for redo sternotomy and mitral regurgitation. However he was deemed to be high risk for the procedure at the time. Patient states that he woke up this morning with the same chest pressure and palpitation. Assessment:   Chest discomfort  CAD  History of PAF   S/p maze and DELGADO ligation with atrial clip in 2015   S/p PVI, PWI, atrial flutter ablation in 2020  Moderate to severe mitral regurgitation  Severe tricuspid regurgitation  CHRISTOPHER    Plan:   Interrogation of the loop recorder did not show any sustained arrhythmia. He also had chest discomfort and palpitation while on telemetry with no arrhythmia associated with his symptoms. He has had occasional PVCs on telemetry which is not symptomatic. It appears that his main symptoms is chest pressure and he has significant coronary artery disease and valve regurgitation. Discussed with interventional cardiology for further evaluation, possible cardiac catheterization. He has history of paroxysmal atrial fibrillation in the past and maze procedure + endocardial ablation. His loop recorder showed low burden AT/AF. No need for anticoagulation since he has DELGADO occlusion with atrial clip. Continue aggressive medical therapy for coronary artery disease. No further EP recommendation at this time. Will have interventional cardiology for assessment and follow-up  Discussed with nursing staff.      Active Hospital Problems    Diagnosis Date Noted    Chest pressure [R07.89] 08/09/2022     Priority: Medium    CAD (coronary artery disease) [I25.10] 08/09/2022     Priority: Medium    Palpitations [R00.2] 08/08/2022     Priority: Medium    Acquired hypothyroidism [E03.9] 02/07/2022    Hyperlipidemia [E78.5] 07/22/2020    Metastatic squamous cell carcinoma ([facility] [C79.9] 02/18/2020 Chronic obstructive pulmonary disease ([facility] [J44.9] 2019    Squamous cell carcinoma of head and neck ([facility] [C76.0] 2017    Refusal of blood transfusions as patient is Catholic [Z53.1] 2017    Essential hypertension [I10]     PAF (paroxysmal atrial fibrillation) ([facility] [I48.0] 2015       Diagnostic studies:     ECG: Sinus rhythm  Troponin: Negative  CXR: Normal    I independently reviewed the cardiac diagnostic studies, ECG and relevant imaging studies. Lab Results   Component Value Date    LVEF 71 2022    LVEFMODE Echo 2019     Lab Results   Component Value Date    TSH 10.50 (H) 2022       Physical Examination:  Vitals:    22 0817   BP: 108/78   Pulse: 70   Resp: 18   Temp: 98.5 °F (36.9 °C)   SpO2: 95%      No intake/output data recorded. Wt Readings from Last 3 Encounters:   22 160 lb 14.4 oz (73 kg)   22 162 lb (73.5 kg)   22 160 lb 8 oz (72.8 kg)     Temp  Av.4 °F (36.9 °C)  Min: 98.2 °F (36.8 °C)  Max: 98.6 °F (37 °C)  Pulse  Av.1  Min: 69  Max: 80  BP  Min: 102/72  Max: 152/104  SpO2  Av.2 %  Min: 91 %  Max: 98 %  No intake or output data in the 24 hours ending 22 1001      I independently reviewed all cardiac tracing from cardiac telemetry. Constitutional: Oriented. No distress. Head: Normocephalic and atraumatic. Mouth/Throat: Oropharynx is clear and moist.   Eyes: Conjunctivae normal. EOM are normal.   Neck: Neck supple. No JVD present. Cardiovascular: Normal rate, regular rhythm, S1&S2. Pulmonary/Chest: Bilateral respiratory sounds. No rhonchi. Abdominal: Soft. No tenderness. Musculoskeletal: No tenderness. No edema    Lymphadenopathy: Has no cervical adenopathy. Neurological: Alert and oriented. Follows command, No Gross deficit   Skin: Skin is warm, No rash noted.    Psychiatric: Has a normal behavior       Scheduled Meds:   rosuvastatin  40 mg Oral Nightly    pantoprazole  40 mg Oral BID AC    mirtazapine  7.5 mg Oral Nightly    lisinopril  1.25 mg Oral Daily    levothyroxine  125 mcg Oral Daily    furosemide  10 mg Oral Daily    docusate sodium  100 mg Oral BID    dilTIAZem  120 mg Oral Daily    aspirin  81 mg Oral Daily    sodium chloride flush  10 mL IntraVENous 2 times per day    enoxaparin  40 mg SubCUTAneous Daily     Continuous Infusions:   sodium chloride      sodium chloride 75 mL/hr at 08/08/22 2353     PRN Meds:.sodium chloride flush, sodium chloride, ondansetron, polyethylene glycol, acetaminophen **OR** acetaminophen     Review of System:  [x] Full ROS obtained and negative except as mentioned in HPI    Prior to Admission medications    Medication Sig Start Date End Date Taking? Authorizing Provider   oxyCODONE-acetaminophen (ROXICET) 5-325 MG/5ML solution Take by mouth 2 times daily. Yes Historical Provider, MD   levothyroxine (SYNTHROID) 125 MCG tablet Take 1 tablet by mouth once dailyTake 1 tablet by mouth once daily 7/28/22   Rafael Cho MD   vitamin B-12 (CYANOCOBALAMIN) 100 MCG tablet Take 1 tablet by mouth in the morning. 7/28/22 7/28/23  Rafael Cho MD   diazePAM (VALIUM) 5 MG tablet One tablet by mouth with small sip of water, one hour before scheduled procedure 8/26/22 8/27/22  Juan David Roach MD   pantoprazole (PROTONIX) 40 MG tablet TAKE 1 TABLET BY MOUTH TWICE A DAY BEFORE MEALS 2/28/22   Leroy Albright MD   lisinopril (PRINIVIL;ZESTRIL) 2.5 MG tablet TAKE 1/2 TABLET BY MOUTH EVERY DAY 2/21/22   ISIDRA Cross - CNP   furosemide (LASIX) 20 MG tablet t1 tab by mouth daily. Call cardiology if swelling in legs worsens or shortness of breath occurs.  2/18/22   Leroy Albright MD   digoxin (LANOXIN) 125 MCG tablet TAKE 1 TABLET BY MOUTH EVERY DAY 2/17/22   Juan David Roach MD   mirtazapine (REMERON) 7.5 MG tablet TAKE 1 TABLET BY MOUTH EVERY DAY AT NIGHT 2/16/22   Rafael Cho MD   Cholecalciferol 50 MCG (2000 UT) TABS Take 2,000 Units PROCEDURE Right 7/8/2020    RIGHT L4 AND L5 TRANSFORAMINAL EPIDURAL STEROID INJECTION WITH FLUOROSCOPY ([id number]) performed by Kate Clay MD at [facility]   Allergen Reactions    Naproxen Swelling     Lips    Pt does not recognize this being an intolerance    Acetaminophen Itching    Hydrocodone Itching    Hydrocodone-Acetaminophen Itching       Social History:  Reviewed. reports that he quit smoking about 7 years ago. His smoking use included cigarettes. He started smoking about 47 years ago. He has a 40.00 pack-year smoking history. He has never used smokeless tobacco. He reports that he does not drink alcohol and does not use drugs. Family History:  Reviewed. Reviewed. No family history of SCD. Relevant and available labs, and cardiovascular diagnostics reviewed. Reviewed. Recent Labs     08/08/22  1543 08/09/22  0315    142   K 4.3 3.9    106   CO2 27 32   BUN 9 11   CREATININE 0.6* 0.6*     Recent Labs     08/08/22  1543 08/09/22  0315   WBC 5.2 5.4   HGB 13.5 13.2*   HCT 40.2* 39.9*   MCV 93.9 93.2    204     Estimated Creatinine Clearance: 118 mL/min (A) (based on SCr of 0.6 mg/dL (L)). Lab Results   Component Value Date/Time    BNP <15 12/26/2009 10:17 PM       I independently reviewed all cardiac tracing from cardiac telemetry. I independently reviewed relevant and available cardiac diagnostic tests ECG, CXR, Echo, Stress test, Device interrogation, Holter, CT scan. Outside medical records via Care everywhere reviewed and summarized in H&P above. Complex medical condition with multiple medical problems affecting prognosis and outcome of EP interventions  Severe exacerbation of underlying medical condition requiring hospitalization and at risk of decompensation. All questions and concerns were addressed to the patient/family. Alternatives to my treatment were discussed.  I have discussed the above stated plan and the patient verbalized understanding and agreed with the plan. NOTE: This report was transcribed using voice recognition software. Every effort was made to ensure accuracy, however, inadvertent computerized transcription errors may be present.      Hardy Rodrigues MD, MPH  [place] [address]   Office: [phone]  Fax: [phone]

## 2022-08-10 LAB
ANION GAP SERPL CALCULATED.3IONS-SCNC: 10 MMOL/L (ref 3–16)
BASOPHILS ABSOLUTE: 0 K/UL (ref 0–0.2)
BASOPHILS RELATIVE PERCENT: 0.5 %
BUN BLDV-MCNC: 11 MG/DL (ref 7–20)
CALCIUM SERPL-MCNC: 8.8 MG/DL (ref 8.3–10.6)
CHLORIDE BLD-SCNC: 103 MMOL/L (ref 99–110)
CO2: 24 MMOL/L (ref 21–32)
CREAT SERPL-MCNC: 0.6 MG/DL (ref 0.8–1.3)
EOSINOPHILS ABSOLUTE: 0.1 K/UL (ref 0–0.6)
EOSINOPHILS RELATIVE PERCENT: 2.1 %
GFR AFRICAN AMERICAN: >60
GFR NON-AFRICAN AMERICAN: >60
GLUCOSE BLD-MCNC: 109 MG/DL (ref 70–99)
GLUCOSE BLD-MCNC: 91 MG/DL (ref 70–99)
GLUCOSE BLD-MCNC: 97 MG/DL (ref 70–99)
GLUCOSE BLD-MCNC: 99 MG/DL (ref 70–99)
HCT VFR BLD CALC: 38.5 % (ref 40.5–52.5)
HEMOGLOBIN: 12.7 G/DL (ref 13.5–17.5)
LYMPHOCYTES ABSOLUTE: 1.1 K/UL (ref 1–5.1)
LYMPHOCYTES RELATIVE PERCENT: 15.5 %
MCH RBC QN AUTO: 30.4 PG (ref 26–34)
MCHC RBC AUTO-ENTMCNC: 32.9 G/DL (ref 31–36)
MCV RBC AUTO: 92.6 FL (ref 80–100)
MONOCYTES ABSOLUTE: 0.6 K/UL (ref 0–1.3)
MONOCYTES RELATIVE PERCENT: 9.1 %
NEUTROPHILS ABSOLUTE: 5 K/UL (ref 1.7–7.7)
NEUTROPHILS RELATIVE PERCENT: 72.8 %
PDW BLD-RTO: 13.2 % (ref 12.4–15.4)
PERFORMED ON: ABNORMAL
PERFORMED ON: NORMAL
PERFORMED ON: NORMAL
PLATELET # BLD: 200 K/UL (ref 135–450)
PMV BLD AUTO: 7.7 FL (ref 5–10.5)
POTASSIUM SERPL-SCNC: 3.7 MMOL/L (ref 3.5–5.1)
RBC # BLD: 4.16 M/UL (ref 4.2–5.9)
SODIUM BLD-SCNC: 137 MMOL/L (ref 136–145)
WBC # BLD: 6.9 K/UL (ref 4–11)

## 2022-08-10 PROCEDURE — 1200000000 HC SEMI PRIVATE

## 2022-08-10 PROCEDURE — 6370000000 HC RX 637 (ALT 250 FOR IP): Performed by: INTERNAL MEDICINE

## 2022-08-10 PROCEDURE — 99233 SBSQ HOSP IP/OBS HIGH 50: CPT | Performed by: INTERNAL MEDICINE

## 2022-08-10 PROCEDURE — 2580000003 HC RX 258: Performed by: INTERNAL MEDICINE

## 2022-08-10 PROCEDURE — 2580000003 HC RX 258: Performed by: NURSE PRACTITIONER

## 2022-08-10 PROCEDURE — 85025 COMPLETE CBC W/AUTO DIFF WBC: CPT

## 2022-08-10 PROCEDURE — 80048 BASIC METABOLIC PNL TOTAL CA: CPT

## 2022-08-10 PROCEDURE — 36415 COLL VENOUS BLD VENIPUNCTURE: CPT

## 2022-08-10 PROCEDURE — 6370000000 HC RX 637 (ALT 250 FOR IP): Performed by: NURSE PRACTITIONER

## 2022-08-10 PROCEDURE — 94640 AIRWAY INHALATION TREATMENT: CPT

## 2022-08-10 PROCEDURE — 94761 N-INVAS EAR/PLS OXIMETRY MLT: CPT

## 2022-08-10 RX ORDER — SODIUM CHLORIDE 9 MG/ML
INJECTION, SOLUTION INTRAVENOUS CONTINUOUS
Status: DISCONTINUED | OUTPATIENT
Start: 2022-08-10 | End: 2022-08-10

## 2022-08-10 RX ADMIN — ASPIRIN 81 MG: 81 TABLET, COATED ORAL at 08:48

## 2022-08-10 RX ADMIN — DOCUSATE SODIUM 100 MG: 100 CAPSULE, LIQUID FILLED ORAL at 20:35

## 2022-08-10 RX ADMIN — DOCUSATE SODIUM 100 MG: 100 CAPSULE, LIQUID FILLED ORAL at 08:48

## 2022-08-10 RX ADMIN — SODIUM CHLORIDE, PRESERVATIVE FREE 10 ML: 5 INJECTION INTRAVENOUS at 08:54

## 2022-08-10 RX ADMIN — MIRTAZAPINE 7.5 MG: 15 TABLET, FILM COATED ORAL at 20:35

## 2022-08-10 RX ADMIN — ROSUVASTATIN 40 MG: 20 TABLET, FILM COATED ORAL at 20:35

## 2022-08-10 RX ADMIN — LEVOTHYROXINE SODIUM 125 MCG: 0.12 TABLET ORAL at 06:13

## 2022-08-10 RX ADMIN — PANTOPRAZOLE SODIUM 40 MG: 40 TABLET, DELAYED RELEASE ORAL at 16:24

## 2022-08-10 RX ADMIN — PANTOPRAZOLE SODIUM 40 MG: 40 TABLET, DELAYED RELEASE ORAL at 06:13

## 2022-08-10 RX ADMIN — SODIUM CHLORIDE: 9 INJECTION, SOLUTION INTRAVENOUS at 10:52

## 2022-08-10 RX ADMIN — Medication 100 MCG: at 12:50

## 2022-08-10 RX ADMIN — OXYCODONE HYDROCHLORIDE 5 MG: 100 SOLUTION ORAL at 08:48

## 2022-08-10 RX ADMIN — LORAZEPAM 1 MG: 1 TABLET ORAL at 11:33

## 2022-08-10 RX ADMIN — OXYCODONE HYDROCHLORIDE 5 MG: 100 SOLUTION ORAL at 20:36

## 2022-08-10 RX ADMIN — Medication 1 PUFF: at 19:22

## 2022-08-10 RX ADMIN — FUROSEMIDE 10 MG: 20 TABLET ORAL at 08:48

## 2022-08-10 ASSESSMENT — PAIN SCALES - GENERAL
PAINLEVEL_OUTOF10: 0
PAINLEVEL_OUTOF10: 3

## 2022-08-10 ASSESSMENT — PAIN DESCRIPTION - LOCATION: LOCATION: CHEST

## 2022-08-10 NOTE — SIGNIFICANT EVENT
Received perfect Serve from nursing that patient fell in room and landed on buttocks. No apparent injury. Spoke with patient. He was trying to pull chair forward but chair did not move and he fell backwards landing on buttocks. He says he just missed landing in other chair. He denies striking head, he denies pain or injury. Patient examined, no obvious abrasions, hematoma or injury. Has full ROM. Ambulated in room without pain. No apparent injury. Encouraged not to get up by self. Expresses understanding.

## 2022-08-10 NOTE — CARE COORDINATION
Discharge Planning Assessment  Readmission score  10%  Patient's chart reviewed for discharge planning needs; Admitted from home, A&O, independent and it appears that patient has minimal needs for discharge at this time. Discussed with patients nurse and requested that case management be notified if discharge needs are identified. Patient has active insurance with Medicare    Dr Raquel Dumont is listed as the PCP    Case management will continue to follow progress and update discharge plan as needed.

## 2022-08-10 NOTE — PROGRESS NOTES
Cardiovascular Progress Note      Chief Complaint:   Chief Complaint   Patient presents with    Chest Pain     Arrives ambulatory w c/o L sided chest pain x2 days. Pt states he woke up multiple times last night due to his heart racing. Pt was told to come in by cardiologist. Pt is scheduled for cardiac surgeries at the end of the month- extensive cardiac hx per pt. Denies being on thinners after cardiac ablation years ago. Denies radiation of pain. Endorses dull heavy pressure. Pt is temporarily relieved when he rubs the area. VSS. Pt endorses internal heart monitor. Impression/Recommendations:    Mr. Jose Kinsey is a 81 y.o. male patient of Dr. Serrato Day:     CAD: 80% proximal LAD - FFR significant 8/9/2022 Angiogram  MR, moderate to severe by 8/2021 TTE  Hx. Bioprosthetic AVR 2015, normal functioning by 8/2021 TTE  PAF: MAZE/DELGADO ligation 2015, atrial flutter ablation 2020; low burden by ILR, off AC  Hx. Oral cavity bleeding  Hx. Squamous cell carcinoma of tongue, chemo/radiation completed 2018   Hyperlipidemia   CHRISTOPHER  GERD  COPD/Former tobacco  Christian        Mr. Luis Gutierrez was recently seen in outpatient follow up by primary interventionalist with plans to seek second surgical opinion regarding LAD disease/MR. He was felt to be too high risk for Redo sternotomy with CTS consult here in March 2021. Updated C yesterday with positive FFR evaluation of LAD. Stable angina, controlled. Cardiac MR 8/26 to further evaluate MR. Appointment with Dr. Jett Louis being arranged. OK to discharge home once BP stabilizes. Discontinue trial of Imdur as well as scheduled Lasix. Encouraged PO intake. Continue ASA, Crestor, Diltiazem. Interval History:  First dose of Imdur last night. No event overnight. Hypotensive early to mid morning. Right groin c/d/i no hematoma. Bloodwork stable. BP responded to fluids. Mr. Luis Gutierrez states that he has barely touched dinner or breakfast- minimal PO intake. Admits to not taking Lasix at home. Denies chest pain.      8/9/22  Left Heart Cath  Dominance: Right       LM: no significant disease  LAD: moderately calcified vessel with eccentric 80% proximal stenosis  LCx: 30% into OM2   RCA: minimal luminal irregularities     6 Fr XB 4.0 Guide  BMW wire  Acist     DFR=0.92  FFR=0.75 (1:25 minutes of Adenosine)     LIMA - large caliber, free of disease throughout its course    Medications:  vitamin B-12 (CYANOCOBALAMIN) tablet 100 mcg, Daily  oxyCODONE (ROXICODONE INTENSOL) 100 MG/5ML concentrated solution 5 mg, Q12H  sodium chloride flush 0.9 % injection 5-40 mL, 2 times per day  sodium chloride flush 0.9 % injection 5-40 mL, PRN  0.9 % sodium chloride infusion, PRN  fluticasone (FLOVENT HFA) 110 MCG/ACT inhaler 1 puff, BID  rosuvastatin (CRESTOR) tablet 40 mg, Nightly  pantoprazole (PROTONIX) tablet 40 mg, BID AC  mirtazapine (REMERON) tablet 7.5 mg, Nightly  lisinopril (PRINIVIL;ZESTRIL) tablet 1.25 mg, Daily  levothyroxine (SYNTHROID) tablet 125 mcg, Daily  docusate sodium (COLACE) capsule 100 mg, BID  dilTIAZem (CARDIZEM CD) extended release capsule 120 mg, Daily  aspirin EC tablet 81 mg, Daily  enoxaparin (LOVENOX) injection 40 mg, Daily  ondansetron (ZOFRAN) injection 4 mg, Q4H PRN  polyethylene glycol (GLYCOLAX) packet 17 g, Daily PRN  acetaminophen (TYLENOL) tablet 650 mg, Q4H PRN   Or  acetaminophen (TYLENOL) suppository 650 mg, Q4H PRN        I/O:     Intake/Output Summary (Last 24 hours) at 8/10/2022 1655  Last data filed at 8/10/2022 1256  Gross per 24 hour   Intake 600 ml   Output --   Net 600 ml       Physical Exam:    /73   Pulse 78   Temp 97.7 °F (36.5 °C) (Oral)   Resp 16   Ht 5' 11\" (1.803 m)   Wt 160 lb 14.4 oz (73 kg)   SpO2 93%   BMI 22.44 kg/m²   Wt Readings from Last 3 Encounters:   08/09/22 160 lb 14.4 oz (73 kg)   07/22/22 162 lb (73.5 kg)   06/08/22 160 lb 8 oz (72.8 kg)       GENERAL: Well developed, well nourished, no acute distress  NEUROLOGICAL: Alert and oriented x3  PSYCH: Normal mood and affect   SKIN: Warm and dry, without lesions  HEENT: Normocephalic, atraumatic, Sclera non-icteric, mucous membranes moist  NECK: supple, JVP normal, thyroid not enlarged   CAROTID: Normal upstroke, no bruits  CARDIAC: Normal PMI, regular rate and rhythm, normal S1S2, no murmur, rub  RESPIRATORY: Normal respiratory effort, clear to auscultation bilaterally  EXTREMITIES: No cyanosis, clubbing or edema, palpable pulses bilaterally   MUSCULOSKELETAL: No joint swelling or tenderness, no chest wall tenderness  GASTROINTESTINAL:  soft, non-tender, no bruit    Data Review:    CBC:   Recent Labs     08/08/22  1543 08/09/22  0315 08/10/22  0445   WBC 5.2 5.4 6.9   HGB 13.5 13.2* 12.7*   HCT 40.2* 39.9* 38.5*   MCV 93.9 93.2 92.6    204 200     BMP:   Recent Labs     08/08/22  1543 08/09/22  0315 08/10/22  0445    142 137   K 4.3 3.9 3.7    106 103   CO2 27 32 24   BUN 9 11 11   CREATININE 0.6* 0.6* 0.6*   GFRAA >60 >60 >60     LFTS:   Recent Labs     08/08/22  1543   ALT 15   AST 21   ALKPHOS 48   PROT 6.7   AGRATIO 1.8   BILITOT 0.8     Cardiac Enzymes:   Recent Labs     08/08/22  1543 08/08/22  2249 08/09/22  0315   TROPONINI <0.01 <0.01 <0.01       Teodoro Pérez DO, [facility]  Interventional Cardiology     o: [phone]  [address] [facility], [address]      NOTE:  This report was transcribed using voice recognition software. Every effort was made to ensure accuracy; however, inadvertent computerized transcription errors may be present.

## 2022-08-10 NOTE — PROGRESS NOTES
[facility] PROGRESS NOTE    8/10/2022 10:16 AM        Name: Wisam Lynn . Admitted: 8/8/2022  Primary Care Provider: Mack Castillo MD (Tel: [phone])      Chief Complaint: Chest tightness, palpitations    Subjective:  Up in bedside chair. Says he is not feeling well this am, c/o feeling weak and lightheaded. He is hypotensive with systolic BP in 15U. Denies chest pain, shortness of breath.      Reviewed interval ancillary notes    Current Medications  fluticasone (FLONASE) 50 MCG/ACT nasal spray 1 spray, Daily  vitamin B-12 (CYANOCOBALAMIN) tablet 100 mcg, Daily  oxyCODONE (ROXICODONE INTENSOL) 100 MG/5ML concentrated solution 5 mg, Q12H  sodium chloride flush 0.9 % injection 5-40 mL, 2 times per day  sodium chloride flush 0.9 % injection 5-40 mL, PRN  0.9 % sodium chloride infusion, PRN  LORazepam (ATIVAN) tablet 1 mg, Q3H PRN  isosorbide mononitrate (IMDUR) extended release tablet 30 mg, Daily  fluticasone (FLOVENT HFA) 110 MCG/ACT inhaler 1 puff, BID  rosuvastatin (CRESTOR) tablet 40 mg, Nightly  pantoprazole (PROTONIX) tablet 40 mg, BID AC  mirtazapine (REMERON) tablet 7.5 mg, Nightly  lisinopril (PRINIVIL;ZESTRIL) tablet 1.25 mg, Daily  levothyroxine (SYNTHROID) tablet 125 mcg, Daily  furosemide (LASIX) tablet 10 mg, Daily  docusate sodium (COLACE) capsule 100 mg, BID  dilTIAZem (CARDIZEM CD) extended release capsule 120 mg, Daily  aspirin EC tablet 81 mg, Daily  enoxaparin (LOVENOX) injection 40 mg, Daily  ondansetron (ZOFRAN) injection 4 mg, Q4H PRN  polyethylene glycol (GLYCOLAX) packet 17 g, Daily PRN  acetaminophen (TYLENOL) tablet 650 mg, Q4H PRN   Or  acetaminophen (TYLENOL) suppository 650 mg, Q4H PRN      Objective:  BP (!) 91/56   Pulse 99   Temp 98.1 °F (36.7 °C) (Oral)   Resp 16   Ht 5' 11\" (1.803 m)   Wt 160 lb 14.4 oz (73 kg)   SpO2 92%   BMI 22.44 kg/m²     Intake/Output Summary (Last 24 hours) at 8/10/2022 1016  Last data filed at 8/10/2022 3923  Gross per 24 hour   Intake 360 ml   Output 500 ml   Net -140 ml      Wt Readings from Last 3 Encounters:   08/09/22 160 lb 14.4 oz (73 kg)   07/22/22 162 lb (73.5 kg)   06/08/22 160 lb 8 oz (72.8 kg)     General:  Awake, alert, oriented in NAD  Skin:  Warm and dry. No unusual bruising or rash  Neck:  Supple. No JVD appreciated  Chest:  Normal effort. Clear to auscultation, no wheezes/rhonchi/rales  Cardiovascular:  RRR, normal S1/S2, no murmur/gallop/rub  Abdomen:  Soft, nontender, +bowel sounds  Extremities:  No edema  Neurological: No focal deficits  Psychological: Normal mood and affect    Labs and Tests:  CBC:   Recent Labs     08/08/22  1543 08/09/22  0315 08/10/22  0445   WBC 5.2 5.4 6.9   HGB 13.5 13.2* 12.7*    204 200     BMP:    Recent Labs     08/08/22  1543 08/09/22  0315 08/10/22  0445    142 137   K 4.3 3.9 3.7    106 103   CO2 27 32 24   BUN 9 11 11   CREATININE 0.6* 0.6* 0.6*   GLUCOSE 88 103* 97     Hepatic:   Recent Labs     08/08/22  1543   AST 21   ALT 15   BILITOT 0.8   ALKPHOS 48       CXR 8/8/2022:  No acute cardiopulmonary abnormality. [facility] 8/9/2022:  Dominance: Right       LM: no significant disease  LAD: moderately calcified vessel with eccentric 80% proximal stenosis  LCx: 30% into OM2   RCA: minimal luminal irregularities      DFR=0.92  FFR=0.75 (1:25 minutes of Adenosine)     LIMA - large caliber, free of disease throughout its course     Impression/Recommendations:  Significant, stable proximal LAD disease. Cardiac MRI scheduled as outpatient for 8/26/22. Refer to Dr. Ganga Pedro. DC planning for tomorrow.        Problem List  Principal Problem:    Palpitations  Active Problems:    Chest pressure    CAD (coronary artery disease)    PAF (paroxysmal atrial fibrillation) (HCC)    Essential hypertension    Squamous 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)    Refusal of blood transfusions as patient is [employer]    Chronic obstructive pulmonary disease ([facility]    Metastatic squamous cell carcinoma (HCC)    Intermittent chest pain    Acquired hypothyroidism    Hyperlipidemia  Resolved Problems:    * No resolved hospital problems. *       Assessment & Plan:   Chest discomfort / CAD. Troponin negative x 3. No acute changes on EKG. Fixed apical defect on stress test 4/2022 believed consistent with artifact. Underwent LHC 8/9 which showed significant stable proximal LAD disease. He was started on Imdur and received a dose last night, held this am for hypotension. Continue asa. PAF. Hx ablation and maze procedure. Presently in sinus rhythm. Not on AC secondary to DELGADO clip. Continue diltiazem. Hypothyroidism. TSH 10.50, but free T4 2.1. Continue current levothyroxine dose, recommend recheck levels in 6 weeks. HTN / hypotension. BP actually on low side, 60K systolic and symptomatic. Lisinopril held this am. He appears dry, will give gentle fluid bolus. Diet: ADULT DIET; Regular; 4 carb choices (60 gm/meal);  Low Fat/Low Chol/High Fiber/2 gm Na  Code:Full Code  DVT PPX: enoxaparin      ISIDRA Santos - CNP   8/10/2022 10:16 AM

## 2022-08-11 ENCOUNTER — TELEPHONE (OUTPATIENT)
Dept: FAMILY MEDICINE CLINIC | Age: 72
End: 2022-08-11

## 2022-08-11 VITALS
TEMPERATURE: 97.3 F | DIASTOLIC BLOOD PRESSURE: 73 MMHG | WEIGHT: 160.9 LBS | OXYGEN SATURATION: 94 % | RESPIRATION RATE: 16 BRPM | BODY MASS INDEX: 22.52 KG/M2 | HEART RATE: 85 BPM | HEIGHT: 71 IN | SYSTOLIC BLOOD PRESSURE: 108 MMHG

## 2022-08-11 LAB
ANION GAP SERPL CALCULATED.3IONS-SCNC: 8 MMOL/L (ref 3–16)
BASOPHILS ABSOLUTE: 0 K/UL (ref 0–0.2)
BASOPHILS RELATIVE PERCENT: 0.6 %
BUN BLDV-MCNC: 8 MG/DL (ref 7–20)
CALCIUM SERPL-MCNC: 8.5 MG/DL (ref 8.3–10.6)
CHLORIDE BLD-SCNC: 103 MMOL/L (ref 99–110)
CO2: 26 MMOL/L (ref 21–32)
CREAT SERPL-MCNC: <0.5 MG/DL (ref 0.8–1.3)
EOSINOPHILS ABSOLUTE: 0.2 K/UL (ref 0–0.6)
EOSINOPHILS RELATIVE PERCENT: 2.4 %
GFR AFRICAN AMERICAN: >60
GFR NON-AFRICAN AMERICAN: >60
GLUCOSE BLD-MCNC: 97 MG/DL (ref 70–99)
GLUCOSE BLD-MCNC: 98 MG/DL (ref 70–99)
HCT VFR BLD CALC: 37.4 % (ref 40.5–52.5)
HEMOGLOBIN: 12.5 G/DL (ref 13.5–17.5)
LYMPHOCYTES ABSOLUTE: 1.3 K/UL (ref 1–5.1)
LYMPHOCYTES RELATIVE PERCENT: 19.3 %
MCH RBC QN AUTO: 30.8 PG (ref 26–34)
MCHC RBC AUTO-ENTMCNC: 33.4 G/DL (ref 31–36)
MCV RBC AUTO: 92.2 FL (ref 80–100)
MONOCYTES ABSOLUTE: 0.6 K/UL (ref 0–1.3)
MONOCYTES RELATIVE PERCENT: 8.4 %
NEUTROPHILS ABSOLUTE: 4.8 K/UL (ref 1.7–7.7)
NEUTROPHILS RELATIVE PERCENT: 69.3 %
PDW BLD-RTO: 13.2 % (ref 12.4–15.4)
PERFORMED ON: NORMAL
PLATELET # BLD: 185 K/UL (ref 135–450)
PLATELET SLIDE REVIEW: ADEQUATE
PMV BLD AUTO: 7.5 FL (ref 5–10.5)
POTASSIUM REFLEX MAGNESIUM: 3.9 MMOL/L (ref 3.5–5.1)
RBC # BLD: 4.06 M/UL (ref 4.2–5.9)
SLIDE REVIEW: ABNORMAL
SODIUM BLD-SCNC: 137 MMOL/L (ref 136–145)
WBC # BLD: 6.9 K/UL (ref 4–11)

## 2022-08-11 PROCEDURE — 80048 BASIC METABOLIC PNL TOTAL CA: CPT

## 2022-08-11 PROCEDURE — 36415 COLL VENOUS BLD VENIPUNCTURE: CPT

## 2022-08-11 PROCEDURE — 6370000000 HC RX 637 (ALT 250 FOR IP): Performed by: NURSE PRACTITIONER

## 2022-08-11 PROCEDURE — 99232 SBSQ HOSP IP/OBS MODERATE 35: CPT | Performed by: INTERNAL MEDICINE

## 2022-08-11 PROCEDURE — 6370000000 HC RX 637 (ALT 250 FOR IP): Performed by: INTERNAL MEDICINE

## 2022-08-11 PROCEDURE — 85025 COMPLETE CBC W/AUTO DIFF WBC: CPT

## 2022-08-11 PROCEDURE — 94640 AIRWAY INHALATION TREATMENT: CPT

## 2022-08-11 PROCEDURE — 94761 N-INVAS EAR/PLS OXIMETRY MLT: CPT

## 2022-08-11 RX ORDER — FLUTICASONE PROPIONATE 110 UG/1
1 AEROSOL, METERED RESPIRATORY (INHALATION) 2 TIMES DAILY
Qty: 12 G | Refills: 3 | COMMUNITY
Start: 2022-08-11 | End: 2022-10-19

## 2022-08-11 RX ADMIN — DOCUSATE SODIUM 100 MG: 100 CAPSULE, LIQUID FILLED ORAL at 10:15

## 2022-08-11 RX ADMIN — PANTOPRAZOLE SODIUM 40 MG: 40 TABLET, DELAYED RELEASE ORAL at 06:19

## 2022-08-11 RX ADMIN — ASPIRIN 81 MG: 81 TABLET, COATED ORAL at 10:15

## 2022-08-11 RX ADMIN — LEVOTHYROXINE SODIUM 125 MCG: 0.12 TABLET ORAL at 06:19

## 2022-08-11 RX ADMIN — OXYCODONE HYDROCHLORIDE 5 MG: 100 SOLUTION ORAL at 10:15

## 2022-08-11 RX ADMIN — DILTIAZEM HYDROCHLORIDE 120 MG: 120 CAPSULE, COATED, EXTENDED RELEASE ORAL at 10:15

## 2022-08-11 RX ADMIN — Medication 100 MCG: at 10:15

## 2022-08-11 RX ADMIN — Medication 1 PUFF: at 09:54

## 2022-08-11 ASSESSMENT — PAIN SCALES - GENERAL: PAINLEVEL_OUTOF10: 3

## 2022-08-11 ASSESSMENT — PAIN DESCRIPTION - LOCATION: LOCATION: BACK

## 2022-08-11 NOTE — PROGRESS NOTES
Cardiovascular Progress Note      Chief Complaint:   Chief Complaint   Patient presents with    Chest Pain     Arrives ambulatory w c/o L sided chest pain x2 days. Pt states he woke up multiple times last night due to his heart racing. Pt was told to come in by cardiologist. Pt is scheduled for cardiac surgeries at the end of the month- extensive cardiac hx per pt. Denies being on thinners after cardiac ablation years ago. Denies radiation of pain. Endorses dull heavy pressure. Pt is temporarily relieved when he rubs the area. VSS. Pt endorses internal heart monitor. Impression/Recommendations:    Mr. Marcus Nam is a 81 y.o. male patient of Dr. Lincoln Brood:     CAD: 80% proximal LAD - FFR significant 8/9/2022 Angiogram  MR, moderate to severe by TTE  Hx. Bioprosthetic AVR 2015, normal functioning by TTE  PAF: MAZE/DELGADO ligation 2015, atrial flutter ablation 2020; low burden by ILR, off AC  Hx. Oral cavity bleeding  Hx. Squamous cell carcinoma of tongue, chemo/radiation completed 2018   Hyperlipidemia   CHRISTOPHER  GERD  COPD/Former tobacco  Nondenominational        Mr. Giovany Quijano was recently seen in outpatient follow up by primary interventionalist with plans to seek second surgical opinion regarding LAD disease/MR. He was felt to be too high risk for Redo sternotomy with CTS consult here in March 2021. Updated [facility] this admission with positive FFR evaluation of LAD. Stable angina, now controlled. Cardiac MR 8/26 to further evaluate MRMani Appointment with Dr. Ivan Graham being arranged. OK to discharge home today. Discontinue trial of Imdur as well as scheduled Lasix. Encouraged PO intake. Continue ASA, Crestor, Diltiazem. Restart low dose ACEI as tolerated as outpatient. Interval History:  No events overnight. Blood pressures stabilized. Walking in room this morning \"feeling great\" and ready to go home. No chest pain, dizziness. Right groin c/d/i no hematoma.      8/9/22  Left Heart Cath  Dominance: Right       LM: no significant disease  LAD: moderately calcified vessel with eccentric 80% proximal stenosis  LCx: 30% into OM2   RCA: minimal luminal irregularities     6 Fr XB 4.0 Guide  BMW wire  Acist     DFR=0.92  FFR=0.75 (1:25 minutes of Adenosine)     LIMA - large caliber, free of disease throughout its course    Medications:  vitamin B-12 (CYANOCOBALAMIN) tablet 100 mcg, Daily  oxyCODONE (ROXICODONE INTENSOL) 100 MG/5ML concentrated solution 5 mg, Q12H  sodium chloride flush 0.9 % injection 5-40 mL, 2 times per day  sodium chloride flush 0.9 % injection 5-40 mL, PRN  0.9 % sodium chloride infusion, PRN  fluticasone (FLOVENT HFA) 110 MCG/ACT inhaler 1 puff, BID  rosuvastatin (CRESTOR) tablet 40 mg, Nightly  pantoprazole (PROTONIX) tablet 40 mg, BID AC  mirtazapine (REMERON) tablet 7.5 mg, Nightly  levothyroxine (SYNTHROID) tablet 125 mcg, Daily  docusate sodium (COLACE) capsule 100 mg, BID  dilTIAZem (CARDIZEM CD) extended release capsule 120 mg, Daily  aspirin EC tablet 81 mg, Daily  enoxaparin (LOVENOX) injection 40 mg, Daily  ondansetron (ZOFRAN) injection 4 mg, Q4H PRN  polyethylene glycol (GLYCOLAX) packet 17 g, Daily PRN  acetaminophen (TYLENOL) tablet 650 mg, Q4H PRN   Or  acetaminophen (TYLENOL) suppository 650 mg, Q4H PRN      I/O:     Intake/Output Summary (Last 24 hours) at 8/11/2022 1155  Last data filed at 8/11/2022 1017  Gross per 24 hour   Intake 720 ml   Output --   Net 720 ml       Physical Exam:    /73   Pulse 85   Temp 97.3 °F (36.3 °C) (Oral)   Resp 16   Ht 5' 11\" (1.803 m)   Wt 160 lb 14.4 oz (73 kg)   SpO2 94%   BMI 22.44 kg/m²   Wt Readings from Last 3 Encounters:   08/11/22 160 lb 14.4 oz (73 kg)   07/22/22 162 lb (73.5 kg)   06/08/22 160 lb 8 oz (72.8 kg)       GENERAL: Well developed, well nourished, no acute distress  NEUROLOGICAL: Alert and oriented x3  PSYCH: Normal mood and affect   SKIN: Warm and dry, without lesions  HEENT: Normocephalic, atraumatic, Sclera non-icteric, mucous membranes moist  NECK: supple, JVP normal, thyroid not enlarged   CAROTID: Normal upstroke, no bruits  CARDIAC: Normal PMI, regular rate and rhythm, normal S1S2, no murmur, rub  RESPIRATORY: Normal respiratory effort, clear to auscultation bilaterally  EXTREMITIES: No cyanosis, clubbing or edema, palpable pulses bilaterally   MUSCULOSKELETAL: No joint swelling or tenderness, no chest wall tenderness  GASTROINTESTINAL:  soft, non-tender, no bruit    Data Review:    CBC:   Recent Labs     08/09/22  0315 08/10/22  0445 08/11/22  0625   WBC 5.4 6.9 6.9   HGB 13.2* 12.7* 12.5*   HCT 39.9* 38.5* 37.4*   MCV 93.2 92.6 92.2    200 185     BMP:   Recent Labs     08/09/22  0315 08/10/22  0445 08/11/22  0625    137 137   K 3.9 3.7 3.9    103 103   CO2 32 24 26   BUN 11 11 8   CREATININE 0.6* 0.6* <0.5*   GFRAA >60 >60 >60     LFTS:   Recent Labs     08/08/22  1543   ALT 15   AST 21   ALKPHOS 48   PROT 6.7   AGRATIO 1.8   BILITOT 0.8     Cardiac Enzymes:   Recent Labs     08/08/22  1543 08/08/22  2249 08/09/22  0315   TROPONINI <0.01 <0.01 <0.01       Kristine Cullen DO, [facility] [place]  Interventional Cardiology     o: [phone]  [address] [place] [address] [address] [facility], [address]      NOTE:  This report was transcribed using voice recognition software. Every effort was made to ensure accuracy; however, inadvertent computerized transcription errors may be present.

## 2022-08-11 NOTE — TELEPHONE ENCOUNTER
Patient requesting a refill of. ..     oxyCODONE-acetaminophen (ROXICET) 5-325 MG/5ML solution        Sig - Route:  Take by mouth 2 times daily. - Oral

## 2022-08-11 NOTE — DISCHARGE SUMMARY
[address] [facility] DISCHARGE SUMMARY    Patient Demographics    Patient. Feliciano Azevedo  Date of Birth. 1950  MRN. [id number]     Primary care provider. Deidre Gongora MD  (Tel: [phone])    Admit date: 8/8/2022    Discharge date (blank if same as Note Date): Note Date: 8/11/2022     Reason for Hospitalization. Chief Complaint   Patient presents with    Chest Pain     Arrives ambulatory w c/o L sided chest pain x2 days. Pt states he woke up multiple times last night due to his heart racing. Pt was told to come in by cardiologist. Pt is scheduled for cardiac surgeries at the end of the month- extensive cardiac hx per pt. Denies being on thinners after cardiac ablation years ago. Denies radiation of pain. Endorses dull heavy pressure. Pt is temporarily relieved when he rubs the area. VSS. Pt endorses internal heart monitor. Significant Findings. 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 *       Problems and results from this hospitalization that need follow up. Hypothyroidism. TSH 10.50, free T4 2.1. Continued current dose levothyroxine. Recommend recheck in 6 weeks. Cardiac MRI as scheduled 8/26/2022 to further evaluate MR. Appointment with Dr Rula Cuenca being arranged per cardiology to seek second opinion regarding LAD disease and MR. .     Significant test results and incidental findings.   CXR 8/8/2022:  No acute cardiopulmonary abnormality. Invasive procedures and treatments. [facility] 8/9/2022:  Dominance: Right       LM: no significant disease  LAD: moderately calcified vessel with eccentric 80% proximal stenosis  LCx: 30% into OM2   RCA: minimal luminal irregularities      DFR=0.92  FFR=0.75 (1:25 minutes of Adenosine)     LIMA - large caliber, free of disease throughout its course     Impression/Recommendations:  Significant, stable proximal LAD disease. Cardiac MRI scheduled as outpatient for 8/26/22. Refer to Dr. Ramos Deal. DC planning for tomorrow. [facility]. Patient is a 82 yo male with hx CAD, valvular heart disease. Presented to ER with chest tightness and palpitations described as heart racing. He was treated for following:    Chest discomfort / CAD. Troponin negative x 3. No acute changes on EKG. Fixed apical defect on stress test 4/2022 believed consistent with artifact. Underwent [facility] 8/9 which showed significant stable proximal LAD disease. He was started on Imdur but subsequently discontinued secondary to symptomatic hypotension. Continued asa. PAF. Hx ablation and maze procedure. Presently in sinus rhythm. Not on AC secondary to DELGADO clip. Continued diltiazem. Hypothyroidism. TSH 10.50, but free T4 2.1. Continued current levothyroxine dose, recommend recheck levels in 6 weeks. HTN / hypotension. Started on Imdur but subsequently was hypotensive. Received IV fluids (500 ml). BP improved, no evidence of orthostatic hypotension, sitting /73, HR 80; standing 121/83, HR 88. Denied lightheadedness or dizziness, gait steady in room. Denise Manriquez he feels fine, anxious for DC home. Reviewed DC plans, follow up and medications. Expresses understanding. Questions answered. Consults. IP CONSULT TO CARDIOLOGY  IP CONSULT TO HOSPITALIST  IP CONSULT TO CARDIOLOGY  IP CONSULT TO SPIRITUAL SERVICES    Physical examination on discharge day.    /66   Pulse 85   Temp 97.8 °F (36.6 °C) (Oral)   Resp 17   Ht 5' 11\" (1.803 m)   Wt 160 lb 14.4 oz (73 kg)   SpO2 94%   BMI 22.44 kg/m²   General appearance. Alert. Looks comfortable. HEENT. Sclera clear. Moist mucus membranes. Cardiovascular. Regular rate and rhythm, normal S1, S2. No murmur. Respiratory. Not using accessory muscles. Clear to auscultation bilaterally, no wheeze. Gastrointestinal. Abdomen soft, non-tender, not distended, normal bowel sounds  Neurology. Facial symmetry. No speech deficits. Moving all extremities equally. Extremities. No edema in lower extremities. Skin. Warm, dry, normal turgor    Condition at time of discharge stable    Medication instructions provided to patient at discharge.      Medication List        START taking these medications      fluticasone 110 MCG/ACT inhaler  Commonly known as: FLOVENT HFA            CONTINUE taking these medications      aspirin 81 MG EC tablet     Cholecalciferol 50 MCG (2000 UT) Tabs     cyclobenzaprine 10 MG tablet  Commonly known as: FLEXERIL     diazePAM 5 MG tablet  Commonly known as: Valium  One tablet by mouth with small sip of water, one hour before scheduled procedure  Start taking on: August 26, 2022     digoxin 125 MCG tablet  Commonly known as: LANOXIN  TAKE 1 TABLET BY MOUTH EVERY DAY     dilTIAZem 120 MG extended release capsule  Commonly known as: CARDIZEM CD  Take 1 capsule by mouth daily     docusate sodium 100 MG capsule  Commonly known as: COLACE     levothyroxine 125 MCG tablet  Commonly known as: SYNTHROID  Take 1 tablet by mouth once dailyTake 1 tablet by mouth once daily     mirtazapine 7.5 MG tablet  Commonly known as: REMERON  TAKE 1 TABLET BY MOUTH EVERY DAY AT NIGHT     oxyCODONE-acetaminophen 5-325 MG/5ML solution  Commonly known as: ROXICET     pantoprazole 40 MG tablet  Commonly known as: PROTONIX  TAKE 1 TABLET BY MOUTH TWICE A DAY BEFORE MEALS     rosuvastatin 40 MG tablet  Commonly known as: CRESTOR  Take 1 tablet by mouth daily     vitamin B-12 100 MCG tablet  Commonly known as: CYANOCOBALAMIN  Take 1 tablet by mouth in the morning. STOP taking these medications      furosemide 20 MG tablet  Commonly known as: LASIX     lisinopril 2.5 MG tablet  Commonly known as: PRINIVIL;ZESTRIL              Discharge recommendations given to patient. Follow Up. PCP in 1 week, cardiology office to arrange follow up  Disposition. home  Activity. activity as tolerated  Diet: ADULT DIET; Regular; 4 carb choices (60 gm/meal); Low Fat/Low Chol/High Fiber/2 gm Na      Spent > 30 minutes in discharge process.     Signed:  ISIDRA Richards Caro, CNP     8/11/2022 8:29 AM

## 2022-08-11 NOTE — PLAN OF CARE
Problem: Discharge Planning  Goal: Discharge to home or other facility with appropriate resources  Outcome: Progressing     Problem: Safety - Adult  Goal: Free from fall injury  Outcome: Progressing     Problem: Respiratory - Adult  Goal: Achieves optimal ventilation and oxygenation  Outcome: Progressing     Problem: Cardiovascular - Adult  Goal: Maintains optimal cardiac output and hemodynamic stability  Outcome: Progressing     Problem: Cardiovascular - Adult  Goal: Absence of cardiac dysrhythmias or at baseline  Outcome: Progressing     Problem: Pain  Goal: Verbalizes/displays adequate comfort level or baseline comfort level  Outcome: Progressing

## 2022-08-11 NOTE — PROGRESS NOTES
Data- discharge order received, pt verbalized agreement to discharge, disposition to previous residence, no needs for HHC/DME. Action- discharge instructions prepared and given to pt, pt verbalized understanding. Medication information packet given r/t NEW and/or CHANGED prescriptions emphasizing name/purpose/side effects, pt verbalized understanding. Discharge instruction summary: Diet- cardiac, 4 carbs, Activity- as tolerated, Primary Care Physician as follows: Jessica [facility] [phone] f/u appointment to be scheduled by pt, prescription medications filled at pt's pharmacy of choice. Inpatient surgical procedure precautions reviewed: groin site restrictions s/p cath. 1. WEIGHT: Admit Weight: 160 lb (72.6 kg) (08/08/22 1533)        Today  Weight: 160 lb 14.4 oz (73 kg) (08/11/22 0615)       2. O2 SAT.: SpO2: 94 % (08/11/22 1000)    Response- Pt belongings gathered, IV removed. Disposition is home (no HHC/DME needs), transported with belongings, taken to lobby via w/c w/ staff, no complications.

## 2022-08-11 NOTE — PROGRESS NOTES
Nurse placed consult request for patient for emotional distress.  visited, but patient was recently discharged.

## 2022-08-12 ENCOUNTER — TELEPHONE (OUTPATIENT)
Dept: CARDIOLOGY CLINIC | Age: 72
End: 2022-08-12

## 2022-08-12 ENCOUNTER — CARE COORDINATION (OUTPATIENT)
Dept: CASE MANAGEMENT | Age: 72
End: 2022-08-12

## 2022-08-12 DIAGNOSIS — I25.83 CORONARY ARTERY DISEASE DUE TO LIPID RICH PLAQUE: ICD-10-CM

## 2022-08-12 DIAGNOSIS — I07.1 TRICUSPID VALVE INSUFFICIENCY, UNSPECIFIED ETIOLOGY: ICD-10-CM

## 2022-08-12 DIAGNOSIS — I25.10 CORONARY ARTERY DISEASE DUE TO LIPID RICH PLAQUE: ICD-10-CM

## 2022-08-12 DIAGNOSIS — I34.0 MITRAL VALVE INSUFFICIENCY, UNSPECIFIED ETIOLOGY: Primary | ICD-10-CM

## 2022-08-12 DIAGNOSIS — I48.0 PAF (PAROXYSMAL ATRIAL FIBRILLATION) (HCC): Primary | ICD-10-CM

## 2022-08-12 DIAGNOSIS — I34.0 NONRHEUMATIC MITRAL VALVE REGURGITATION: ICD-10-CM

## 2022-08-12 PROCEDURE — 1111F DSCHRG MED/CURRENT MED MERGE: CPT | Performed by: FAMILY MEDICINE

## 2022-08-12 NOTE — CARE COORDINATION
[facility] Transitions Initial Follow Up Call    Call within 2 business days of discharge: yes  Patient: Blu Medina Patient : 1950   MRN: [id number]  Reason for Admission:   Discharge Date: 22 RARS: Readmission Risk Score: 8.9      Last Discharge [facility]       Date Complaint Diagnosis Description Type Department Provider    22 Chest Pain Intermittent chest pain . .. ED to Hosp-Admission (Discharged) (ADMITTED) FZ 3A Veena Keating MD; Cade Fearing. .. Spoke with: Blu Medina    Non-face-to-face services provided:  Obtained and reviewed discharge summary and/or continuity of care documents  [id number] completed    Transitions of Care Initial Call    Was this an external facility discharge? No Discharge Facility:     Challenges to be reviewed by the provider   Additional needs identified to be addressed with provider: No  none             Method of communication with provider : none    Advance Care Planning:   Does patient have an Advance Directive: reviewed and current. Care Transition Nurse contacted the patient by telephone to perform post hospital discharge assessment. Verified name and  with patient as identifiers. Provided introduction to self, and explanation of the CTN role. CTN reviewed discharge instructions, medical action plan and red flags with patient who verbalized understanding. Patient given an opportunity to ask questions and does not have any further questions or concerns at this time. Were discharge instructions available to patient? Yes. Reviewed appropriate site of care based on symptoms and resources available to patient including: When to call 911. The patient agrees to contact the PCP office for questions related to their healthcare. Medication reconciliation was performed with patient, who verbalizes understanding of administration of home medications. Advised obtaining a 90-day supply of all daily and as-needed medications. Was patient discharged with a pulse oximeter? no    Pt states doing well, no issues or concerns. Denies SOB, CP, palpitations. Dressing in groin CDI. F/U appts listed below. Agreed to more CTC f/u calls. CTN provided contact information. Plan for follow-up call in 5-7 days based on severity of symptoms and risk factors.   Plan for next call: self management-CP, Afib    Care Transitions 24 Hour Call    Do you have a copy of your discharge instructions?: Yes  Do you have all of your prescriptions and are they filled?: Yes  Have you been contacted by a [address]?: No  Have you scheduled your follow up appointment?: Yes  How are you going to get to your appointment?: Car - family or friend to transport  Patient DME: Straight cane  Do you have support at home?: Alone  Do you feel like you have everything you need to keep you well at home?: Yes  Are you an active caregiver in your home?: No  Care Transitions Interventions  No Identified Needs         Follow Up  Future Appointments   Date Time Provider Vikki Caal   8/15/2022  3:00 PM La Nugent MD [facility] [place] - [facility]   8/26/2022  7:00 AM [facility] MRI RM 2 [facility] MRI [facility]   9/12/2022  8:00 AM SCHEDULE, [place] REMOTE TRANSMISSION FF Cardio [facility]   9/28/2022  9:00 AM SCHEDULE, [facility] VASCULAR RM 1 [facility] VAS [facility]   10/17/2022  8:00 AM SCHEDULE, [place] REMOTE TRANSMISSION FF Cardio [facility]   10/18/2022  8:45 AM Magda Nolasco MD PULM & CC [facility]   11/21/2022  8:00 AM SCHEDULE, [place] REMOTE TRANSMISSION FF Cardio [facility]   12/26/2022 11:00 AM SCHEDULE, [place] REMOTE TRANSMISSION FF Cardio [facility]   1/30/2023 11:00 AM SCHEDULE, [place] REMOTE TRANSMISSION FF Cardio [facility]   3/6/2023 11:00 AM SCHEDULE, [place] REMOTE TRANSMISSION FF Cardio [facility]   4/10/2023 11:00 AM SCHEDULE, [place] REMOTE TRANSMISSION FF Cardio [facility]   5/15/2023 11:00 AM SCHEDULE, [place] REMOTE TRANSMISSION FF Cardio [facility]   6/19/2023 11:00 AM SCHEDULE, [facility] TRANSMISSION FF Cardio [facility]   7/24/2023 11:00 AM SCHEDULE, [place] REMOTE TRANSMISSION FF Cardio [facility]   8/28/2023 11:00 AM SCHEDULE, [place] REMOTE TRANSMISSION FF Cardio [facility]   10/2/2023 11:00 AM SCHEDULE, [place] REMOTE TRANSMISSION FF Cardio [facility]   11/6/2023 11:00 AM SCHEDULE, [place] REMOTE TRANSMISSION FF Cardio [facility]   12/11/2023 11:00 AM SCHEDULE, [place] REMOTE TRANSMISSION FF Cardio [facility]       [place] RN

## 2022-08-15 ENCOUNTER — OFFICE VISIT (OUTPATIENT)
Dept: FAMILY MEDICINE CLINIC | Age: 72
End: 2022-08-15
Payer: MEDICARE

## 2022-08-15 VITALS
TEMPERATURE: 98.4 F | HEIGHT: 68 IN | OXYGEN SATURATION: 96 % | RESPIRATION RATE: 16 BRPM | HEART RATE: 81 BPM | DIASTOLIC BLOOD PRESSURE: 72 MMHG | SYSTOLIC BLOOD PRESSURE: 115 MMHG | BODY MASS INDEX: 24.01 KG/M2 | WEIGHT: 158.4 LBS

## 2022-08-15 DIAGNOSIS — I48.0 PAF (PAROXYSMAL ATRIAL FIBRILLATION) (HCC): ICD-10-CM

## 2022-08-15 DIAGNOSIS — E03.9 ACQUIRED HYPOTHYROIDISM: ICD-10-CM

## 2022-08-15 DIAGNOSIS — Z09 HOSPITAL DISCHARGE FOLLOW-UP: Primary | ICD-10-CM

## 2022-08-15 DIAGNOSIS — I74.3 THROMBOSIS OF RIGHT FEMORAL ARTERY (HCC): ICD-10-CM

## 2022-08-15 DIAGNOSIS — G89.4 CHRONIC PAIN SYNDROME: ICD-10-CM

## 2022-08-15 PROCEDURE — 1111F DSCHRG MED/CURRENT MED MERGE: CPT | Performed by: FAMILY MEDICINE

## 2022-08-15 PROCEDURE — 99495 TRANSJ CARE MGMT MOD F2F 14D: CPT | Performed by: FAMILY MEDICINE

## 2022-08-15 RX ORDER — OXYCODONE HYDROCHLORIDE AND ACETAMINOPHEN 325; 5 MG/5ML; MG/5ML
5 SOLUTION ORAL 2 TIMES DAILY
Qty: 300 ML | Refills: 0 | Status: CANCELLED | OUTPATIENT
Start: 2022-08-15 | End: 2022-09-14

## 2022-08-15 RX ORDER — OXYCODONE HYDROCHLORIDE AND ACETAMINOPHEN 325; 5 MG/5ML; MG/5ML
5 SOLUTION ORAL 2 TIMES DAILY PRN
Qty: 300 ML | Refills: 0 | Status: SHIPPED | OUTPATIENT
Start: 2022-08-15 | End: 2022-08-17

## 2022-08-15 ASSESSMENT — ENCOUNTER SYMPTOMS
CHEST TIGHTNESS: 0
GASTROINTESTINAL NEGATIVE: 1
COUGH: 0
WHEEZING: 0
SHORTNESS OF BREATH: 0

## 2022-08-15 NOTE — TELEPHONE ENCOUNTER
Spoke to [facility] in Dr. Zayra Trevizo office. I told her that CTS referral was in [facility]. Informed her that patient is schedule for Cardiac MRI at [facility] on 8/26/22, but patient also has echo scheduled at 4/24/22 at [facility] location. Will have tests pushed through [facility] to [facility] once tests are completed.

## 2022-08-15 NOTE — PROGRESS NOTES
Post-Discharge Transitional Care Follow Up      Bethanne Cabot   YOB: 1950    Date of Office Visit:  8/15/2022  Date of Hospital Admission: 8/8/22  Date of Hospital Discharge: 8/11/22  Readmission Risk Score (high >=14%. Medium >=10%):Readmission Risk Score: 8.9      Care management risk score Rising risk (score 2-5) and Complex Care (Scores >=6): No Risk Score On File     Non face to face  following discharge, date last encounter closed (first attempt may have been earlier): *No documented post hospital discharge outreach found in the last 14 days     Call initiated 2 business days of discharge: *No response recorded in the last 14 days     Hospital discharge follow-up  Schedule follow-up visit with the cardiologist  For CABG and possible mitral valve repair  Scheduled for echocardiogram for now  Chronic pain syndrome  Refill the Norco  Thrombosis of right femoral artery (HCC)  Concerns of clot  -     VL DUP LOWER EXTREMITY ARTERIES RIGHT; Future  PAF (paroxysmal atrial fibrillation) (HCC)  Rate controlled continue Cardizem and digoxin  Not on blood thinners  Acquired hypothyroidism  Increase the dose of Synthroid to 125 mcg daily    Medical Decision Making: moderate complexity         Subjective:   HPI    Inpatient course: Discharge summary reviewed-he was admitted for the chest pain. He had no acute changes noted on the EKG and had a negative troponin. He had left heart cath on 8/9 which showed stable of paroxysmal LAD disease. He also has sever MR and yet to see cardiologist and planning for the surgery. He also has a history of paroxysmal atrial fibrillation. Currently he is on diltiazem 120 mg extended release daily and digoxin 125 mcg daily and not on blood thinners due to risk of bleeding. His blood pressure tends to run low.   Currently his blood pressure is stable  Denies any dizziness    He has a history of hypothyroidism his TSH levels were high and hence during hospitalization the  Synthroid was increased to 125 mcg daily    He has chronic pain in his lower back due to disc degeneration and he is on Norco 5/325 as needed twice daily. However he prefers liquid form. He has been having pain and swelling at his right groin. He has been having tenderness to touch. 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)    Chronic pain due to neoplasm    Refusal of blood transfusions as patient is L[employer]    S/P AVR    Sensorineural hearing loss (SNHL) of both ears    Chronic obstructive pulmonary disease ([address]    History of tobacco abuse    Mixed hyperlipidemia    Osteoarthritis of cervical spine    Primary osteoarthritis of both knees    Metastatic squamous cell carcinoma (HCC)    Cervical stenosis of spine    Encounter for loop recorder check    Coronary artery disease of native heart with stable angina pectoris, unspecified vessel or lesion type ([facility])    SOB (shortness of breath) on exertion    Mitral regurgitation    Tricuspid valve insufficiency    Intermittent chest pain    Anxiety    Acquired hypothyroidism    Ischemic heart disease, chronic    Hypertension    Hyperlipidemia    Sleep apnea    Radiculopathy, lumbar region    Palpitations    Chest pressure    CAD (coronary artery disease)       Medications listed as ordered at the time of discharge from hospital     Medication List            Accurate as of August 15, 2022  5:18 PM. If you have any questions, ask your nurse or doctor. CHANGE how you take these medications      oxyCODONE-acetaminophen 5-325 MG/5ML solution  Commonly known as: ROXICET  Take 5 mLs by mouth 2 times daily as needed for Pain for up to 30 days.   What changed:   how much to take  when to take this  reasons to take this  Changed by: Edi Baeza MD            CONTINUE taking these medications      aspirin 81 MG EC tablet     Cholecalciferol 50 MCG (2000 UT) Tabs     digoxin 125 MCG tablet  Commonly known as: LANOXIN  TAKE 1 TABLET BY MOUTH EVERY DAY     dilTIAZem 120 MG extended release capsule  Commonly known as: CARDIZEM CD  Take 1 capsule by mouth daily     docusate sodium 100 MG capsule  Commonly known as: COLACE     fluticasone 110 MCG/ACT inhaler  Commonly known as: FLOVENT HFA     levothyroxine 125 MCG tablet  Commonly known as: SYNTHROID  Take 1 tablet by mouth once dailyTake 1 tablet by mouth once daily     mirtazapine 7.5 MG tablet  Commonly known as: REMERON  TAKE 1 TABLET BY MOUTH EVERY DAY AT NIGHT     pantoprazole 40 MG tablet  Commonly known as: PROTONIX  TAKE 1 TABLET BY MOUTH TWICE A DAY BEFORE MEALS     rosuvastatin 40 MG tablet  Commonly known as: CRESTOR  Take 1 tablet by mouth daily     vitamin B-12 100 MCG tablet  Commonly known as: CYANOCOBALAMIN  Take 1 tablet by mouth in the morning. Where to Get Your Medications        These medications were sent to [address] [place] [address], [facility] [address]      Phone: [phone]   oxyCODONE-acetaminophen 5-325 MG/5ML solution          Medications marked \"taking\" at this time  Outpatient Medications Marked as Taking for the 8/15/22 encounter (Office Visit) with Edi Baeza MD   Medication Sig Dispense Refill    oxyCODONE-acetaminophen (ROXICET) 5-325 MG/5ML solution Take 5 mLs by mouth 2 times daily as needed for Pain for up to 30 days. 300 mL 0    fluticasone (FLOVENT HFA) 110 MCG/ACT inhaler Inhale 1 puff into the lungs in the morning and 1 puff in the evening.  12 g 3    levothyroxine (SYNTHROID) 125 MCG tablet Take 1 tablet by mouth once dailyTake 1 tablet by mouth once daily 30 tablet 2    vitamin B-12 (CYANOCOBALAMIN) 100 MCG tablet Take 1 tablet by electronic signature was used to authenticate this note.   --Devan Perry MD

## 2022-08-15 NOTE — TELEPHONE ENCOUNTER
Date Visit Type Department Provider    8/15/2022  3:00  [address], MD   Appointment Notes:    Discharged 8/11/22     Can discuss at appointment today. No further action is needed at this time. Thank you!

## 2022-08-16 ENCOUNTER — OFFICE VISIT (OUTPATIENT)
Dept: CARDIOLOGY CLINIC | Age: 72
End: 2022-08-16
Payer: MEDICARE

## 2022-08-16 ENCOUNTER — HOSPITAL ENCOUNTER (OUTPATIENT)
Dept: VASCULAR LAB | Age: 72
Discharge: HOME | End: 2022-08-16
Payer: MEDICARE

## 2022-08-16 VITALS
SYSTOLIC BLOOD PRESSURE: 98 MMHG | OXYGEN SATURATION: 91 % | WEIGHT: 161 LBS | HEIGHT: 71 IN | DIASTOLIC BLOOD PRESSURE: 62 MMHG | BODY MASS INDEX: 22.54 KG/M2 | HEART RATE: 81 BPM

## 2022-08-16 DIAGNOSIS — I74.3 THROMBOSIS OF RIGHT FEMORAL ARTERY (HCC): ICD-10-CM

## 2022-08-16 DIAGNOSIS — I34.0 NONRHEUMATIC MITRAL VALVE REGURGITATION: ICD-10-CM

## 2022-08-16 DIAGNOSIS — I25.118 CORONARY ARTERY DISEASE OF NATIVE HEART WITH STABLE ANGINA PECTORIS, UNSPECIFIED VESSEL OR LESION TYPE (HCC): Primary | ICD-10-CM

## 2022-08-16 DIAGNOSIS — I35.0 NONRHEUMATIC AORTIC VALVE STENOSIS: ICD-10-CM

## 2022-08-16 DIAGNOSIS — I25.9 CHRONIC ISCHEMIC HEART DISEASE: ICD-10-CM

## 2022-08-16 DIAGNOSIS — I48.0 PAF (PAROXYSMAL ATRIAL FIBRILLATION) (HCC): ICD-10-CM

## 2022-08-16 PROCEDURE — G8427 DOCREV CUR MEDS BY ELIG CLIN: HCPCS | Performed by: NURSE PRACTITIONER

## 2022-08-16 PROCEDURE — 1111F DSCHRG MED/CURRENT MED MERGE: CPT | Performed by: NURSE PRACTITIONER

## 2022-08-16 PROCEDURE — 1036F TOBACCO NON-USER: CPT | Performed by: NURSE PRACTITIONER

## 2022-08-16 PROCEDURE — 93926 LOWER EXTREMITY STUDY: CPT

## 2022-08-16 PROCEDURE — G8420 CALC BMI NORM PARAMETERS: HCPCS | Performed by: NURSE PRACTITIONER

## 2022-08-16 PROCEDURE — 99214 OFFICE O/P EST MOD 30 MIN: CPT | Performed by: NURSE PRACTITIONER

## 2022-08-16 PROCEDURE — 1123F ACP DISCUSS/DSCN MKR DOCD: CPT | Performed by: NURSE PRACTITIONER

## 2022-08-16 PROCEDURE — 3017F COLORECTAL CA SCREEN DOC REV: CPT | Performed by: NURSE PRACTITIONER

## 2022-08-16 NOTE — PROGRESS NOTES
disease. He was started on Imdur but subsequently discontinued secondary to symptomatic hypotension. Continued asa. PAF. Hx ablation and maze procedure. Presently in sinus rhythm. Not on AC secondary to DELGADO clip. Continued diltiazem. Hypothyroidism. TSH 10.50, but free T4 2.1. Continued current levothyroxine dose, recommend recheck levels in 6 weeks. HTN / hypotension. Started on Imdur but subsequently was hypotensive. Received IV fluids (500 ml). BP improved, no evidence of orthostatic hypotension, sitting /73, HR 80; standing 121/83, HR 88. Denied lightheadedness or dizziness, gait steady in room. Sarahann Scripture he feels fine, anxious for DC home. Reviewed DC plans, follow up and medications. Expresses understanding. Questions answered. Jessica Lyles is 81 y.o. male who presents today for a routine follow up after a recent hospitalization related to the above mentioned issues. He recalls feeling tired and achy  Subjective:   Since the time of discharge, the patient admits their symptoms have improved. His procedure site is real sore. He had an US done earlier today for r/o clot (negative). He continues to have CP off/on. His Lt arm went numb last night. His pain comes on irregardless of activity. It last up to 5 minutes at the longest. Its not bad, just there: +4-5/10     There is SOB every once in awhile. He felt SOB coming in from the parking lot today (had to walk an incline). He has his inhalers back which he's been without for about a year. He has a little swelling in his ankles. The patient is not experiencing palpitations but his heart races. He was told that it wasn't from his heart rate and probably muscle spasms. These symptoms are improving over the last many days. With regard to medication therapy the patient has been compliant with prescribed regimen. They have tolerated therapy to date.      Past Medical History:   Diagnosis Date    Aortic valve insufficiency Arthritis     Atrial fibrillation ([facility]     Cancer ([facility]    TONGUE head and neck IN REMISSION    Dysphonia     GERD (gastroesophageal reflux disease)     Hearing loss     Heart disease     Medical history reviewed with no changes     Obstructive apnea does not use CPAP    Oropharyngeal dysphagia     Refusal of blood transfusions as patient is [employer]     Thyroid disease      Social History:    Social History     Tobacco Use   Smoking Status Former    Packs/day: 1.00    Years: 40.00    Pack years: 40.00    Types: Cigarettes    Start date: 1975    Quit date: 2015    Years since quittin.5   Smokeless Tobacco Never   Tobacco Comments    Maintain cessation     Current Medications:  Current Outpatient Medications   Medication Sig Dispense Refill    oxyCODONE-acetaminophen (ROXICET) 5-325 MG/5ML solution Take 5 mLs by mouth 2 times daily as needed for Pain for up to 30 days. 300 mL 0    fluticasone (FLOVENT HFA) 110 MCG/ACT inhaler Inhale 1 puff into the lungs in the morning and 1 puff in the evening. 12 g 3    levothyroxine (SYNTHROID) 125 MCG tablet Take 1 tablet by mouth once dailyTake 1 tablet by mouth once daily 30 tablet 2    vitamin B-12 (CYANOCOBALAMIN) 100 MCG tablet Take 1 tablet by mouth in the morning. 30 tablet 3    pantoprazole (PROTONIX) 40 MG tablet TAKE 1 TABLET BY MOUTH TWICE A DAY BEFORE MEALS 180 tablet 3    digoxin (LANOXIN) 125 MCG tablet TAKE 1 TABLET BY MOUTH EVERY DAY 30 tablet 3    mirtazapine (REMERON) 7.5 MG tablet TAKE 1 TABLET BY MOUTH EVERY DAY AT NIGHT 90 tablet 3    Cholecalciferol 50 MCG ( UT) TABS Take 2,000 Units by mouth daily      dilTIAZem (CARDIZEM CD) 120 MG extended release capsule Take 1 capsule by mouth daily 90 capsule 3    rosuvastatin (CRESTOR) 40 MG tablet Take 1 tablet by mouth daily 90 tablet 3    aspirin 81 MG EC tablet Take 81 mg by mouth daily      docusate sodium (COLACE) 100 MG capsule Take 100 mg by mouth 2 times daily.        No current facility-administered medications for this visit. REVIEW OF SYSTEMS:   CONSTITUTIONAL: No major weight gain or loss, fatigue, weakness, night sweats or fever. There's been no change in energy level, sleep pattern, or activity level. HEENT: No new vision difficulties or ringing in the ears. RESPIRATORY: No new SOB, PND, orthopnea or cough. CARDIOVASCULAR: See HPI  GI: No nausea, vomiting, diarrhea, constipation, abdominal pain or changes in bowel habits. : No urinary frequency, urgency, incontinence hematuria or dysuria. SKIN: No cyanosis or skin lesions. MUSCULOSKELETAL: No new muscle or joint pain. NEUROLOGICAL: No syncope or TIA-like symptoms. PSYCHIATRIC: No anxiety, pain, insomnia or depression    Objective:   PHYSICAL EXAM:        Vitals:    08/16/22 1411 08/16/22 1436   BP: 90/60 98/62   Site: Right Upper Arm Right Upper Arm   Position: Sitting    Cuff Size: Medium Adult Medium Adult   Pulse: 81    SpO2: 91%    Weight: 161 lb (73 kg)    Height: 5' 10.5\" (1.791 m)        VITALS:  BP 90/60 (Site: Right Upper Arm, Position: Sitting, Cuff Size: Medium Adult)   Pulse 81   Ht 5' 10.5\" (1.791 m)   Wt 161 lb (73 kg)   SpO2 91%   BMI 22.77 kg/m²     CONSTITUTIONAL: Cooperative, no apparent distress, and appears well nourished / developed  NEUROLOGIC:  Awake and orientated to person, place and time. PSYCH: Calm affect. SKIN: Warm and dry: Rt groin unremarkable. HEENT: Sclera non-icteric, normocephalic, neck supple, no elevation of JVP, normal carotid pulses with no bruits and thyroid normal size. LUNGS:  No increased work of breathing and clear to auscultation, no crackles or wheezing. CARDIOVASCULAR:  Regular rate and rhythm with no murmurs, gallops, rubs, or abnormal heart sounds, normal PMI. The apical impulses not displaced.                              Heart tones are crisp and normal                                                                                          Cervical veins are not engorged                 JVP less than 8 cm H2O                                                                              The carotid upstroke is normal in amplitude and contour without delay or bruit    ABDOMEN:  Normal bowel sounds, non-distended and non-tender to palpation   EXT: No edema, no calf tenderness. Pulses are present bilaterally. DATA:    Lab Results   Component Value Date    ALT 15 08/08/2022    AST 21 08/08/2022    ALKPHOS 48 08/08/2022    BILITOT 0.8 08/08/2022     Lab Results   Component Value Date    CREATININE <0.5 (L) 08/11/2022    BUN 8 08/11/2022     08/11/2022    K 3.9 08/11/2022     08/11/2022    CO2 26 08/11/2022     Lab Results   Component Value Date    TSH 10.50 (H) 08/09/2022    D2VJCIL 2.12 05/21/2020     Lab Results   Component Value Date    WBC 6.9 08/11/2022    HGB 12.5 (L) 08/11/2022    HCT 37.4 (L) 08/11/2022    MCV 92.2 08/11/2022     08/11/2022     No components found for: CHLPL  Lab Results   Component Value Date    TRIG 95 07/27/2022    TRIG 97 08/13/2021    TRIG 82 03/05/2021     Lab Results   Component Value Date    HDL 43 07/27/2022    HDL 45 10/11/2021    HDL 45 08/13/2021     Lab Results   Component Value Date    LDLCALC 84 07/27/2022    LDLCALC 102 (H) 10/11/2021    LDLCALC 115 (H) 08/13/2021     Lab Results   Component Value Date    LABVLDL 19 07/27/2022    LABVLDL 15 10/11/2021    LABVLDL 19 08/13/2021     Radiology Review:  Pertinent images / reports were reviewed as a part of this visit and reveals the following:    Last Echo: 8/18/22  Summary   -Normal left ventricle size, wall thickness and systolic function with an   estimated ejection fraction of 60-65%. -No regional wall motion abnormalities are seen.   -Indeterminate diastolic function.   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 -Moderate to severe mitral regurgitation. ( appears stable from previous echo   dated 2/24/2021). ERO 0.21.   -A bioprosthetic artificial aortic valve appears well seated with a maximum   velocity of 2.6m/s and a mean gradient of 16mmHg. -Moderate to severe tricuspid regurgitation.   -Estimated pulmonary artery systolic pressure is mildly elevated at 38 mmHg   assuming a right atrial pressure of 8 mmHg. -The right ventricle is moderately enlarged.   -The right atrium is moderately dilated. RLE arterial dop: 8/16/22: preliminary  Tech Comments   Right   No evidence of pseudoaneurysm of the right common femoral artery. Color flow duplex imaging of the right common femoral, superficial femoral and   deep femoral artery demonstrate normal multiphasic flow. The right common femoral vein is fully compressible with normal spontaneous,   phasic flow and augmentation. Right inguinal lymph node measuring 1.5 x 0.4 x 1.3 cm.     8/9/22:Left Heart Cath  Dominance: Right       LM: no significant disease  LAD: moderately calcified vessel with eccentric 80% proximal stenosis   LCx: 30% into OM2   RCA: minimal luminal irregularities     DFR=0.92  FFR=0.75 (1:25 minutes of Adenosine)     LIMA - large caliber, free of disease throughout its course     Impression/Recommendations:  Significant, stable proximal LAD disease. Cardiac MRI scheduled as outpatient for 8/26/22 to further eval MR  Refer to Dr. Khang Pinto. Assessment:      Diagnosis Orders   1. Coronary artery disease of native heart with stable angina pectoris, unspecified vessel or lesion type ([address]   ~continues to have intermittent CP +4-5/10  ~intolerance with nitrates d/t hypotension   ~prox-LAD at 80% by cath > referred to VS  ~hx of LAD stenosis '21 deemed high risk  ~ASA / statin        2. Nonrheumatic mitral valve regurgitation   ~mod-severe on echo from Aug '22 comparable to echo one year earlier  ~cardiac MRI scheduled for 8/26 to further eval  ~neg to exam for volume overload. C/O SOB on inclines.  Also with hx of COPD               3. PAF (paroxysmal atrial fibrillation) (HCC)   ~stable : denies palpitations  ~AP regular  ~hx modified MAXE with DELGADO ligation & clip '15  ~hx PVI, PWI atrial flutter ablation '20  ~ASA alone ; no need for [facility] post-clip (along with hx of bleeding from mouth & PEG)  ~digoxin / diltiazem for rate control  ~s/p ILR : unremarkable for arrhythmia with last interrogation 8/8/22       4. Nonrheumatic aortic valve stenosis   ~s/p AVR '15  ~ASA           Patient  is stable since hospital discharge. Plan:  Continue current regimen   ~cardiac MRI 8/26   ~f/u  [facility] [place] as planned [facility] assisting with arrangements b/w test and appt)   F/U in four weeks      I have addressed the patient's cardiac risk factors and adjusted pharmacologic treatment as needed. In addition, I have reinforced the need for patient directed risk factor modification. Further evaluation will be based upon the patient's clinical course and testing results. All questions and concerns were addressed to the patient Alternatives to  treatment were discussed. The patient  currently  is not smoking. The risks related to smoking were reviewed with the patient. Recommend maintaining a smoke-free lifestyle. Dual Antiplatelet therapy has been recommended / prescribed for this patient. Education conducted on adverse reactions including bleeding was discussed. The patient verbalizes understanding. ~no need for [facility] after DELGADO occl with atrial clip (per EP)    Pt is not on a BBv : neg MI / CM  Pt is not on an ace-i/ARB : neg CHF  Pt is on a statin      Saturated fat diet discussed  Exercise program discussed    Thank you for allowing to us to participate in the care of Antonio Martines       [facility]  Documentation of today's visit sent to PCP

## 2022-08-16 NOTE — TELEPHONE ENCOUNTER
Cardiac MRI moved up to 8/22/22 as Dr. Neisha Chen office offered patient an appointment on 8/26/22. Echo to be done on 8/24/22. Test images to be pushed to Dr. Keyshawn Olivera through [facility].

## 2022-08-17 ENCOUNTER — TELEPHONE (OUTPATIENT)
Dept: FAMILY MEDICINE CLINIC | Age: 72
End: 2022-08-17

## 2022-08-17 DIAGNOSIS — G89.4 CHRONIC PAIN SYNDROME: Primary | ICD-10-CM

## 2022-08-17 RX ORDER — OXYCODONE HYDROCHLORIDE AND ACETAMINOPHEN 5; 325 MG/1; MG/1
1 TABLET ORAL 2 TIMES DAILY PRN
Qty: 60 TABLET | Refills: 0 | Status: SHIPPED | OUTPATIENT
Start: 2022-08-17 | End: 2022-09-16

## 2022-08-17 NOTE — TELEPHONE ENCOUNTER
Patient's pharmacy informed him that he will need to pay $250 more for a oxyCODONE-acetaminophen solution than he would for tablets. Patient would like to switch to oxyCODONE-acetaminophen tablets now and would like a call back confirming the change.

## 2022-08-19 ENCOUNTER — CARE COORDINATION (OUTPATIENT)
Dept: CASE MANAGEMENT | Age: 72
End: 2022-08-19

## 2022-08-19 NOTE — TELEPHONE ENCOUNTER
Message sent to echo tech at [facility] to have echo images (8/24/22) pushed through [facility] to [facility] / Dr. Swetha Yun. [phone] to push cardiac MRI through [facility] scheduled on 8/22/22. Spoke to staff in cath lab to push angiograms from 3/8/21 and 8/9/22 through [facility].

## 2022-08-19 NOTE — CARE COORDINATION
[facility] Transitions Follow Up Call    2022    Patient: Cristal Sales  Patient : 1950   MRN: [id number]  Reason for Admission:   Discharge Date: 22 RARS: Readmission Risk Score: 8.9         Spoke with: [id number] Transitions Subsequent and Final Call    Subsequent and Final Calls  Do you have any ongoing symptoms?: Yes  Onset of Patient-reported symptoms: Other  Patient-reported symptoms: Chest Pain, Other  Interventions for patient-reported symptoms: Other  Have your medications changed?: No  Do you have any questions related to your medications?: No  Do you currently have any active services?: No  Do you have any needs or concerns that I can assist you with?: No  Identified Barriers: None  Care Transitions Interventions  No Identified Needs  Other Interventions:           Pt states doing the same, Afib and chest pain from time to time. He is preparing for bypass and mitral valve surgery in September at [address] declined  the need for any further f/u CTC calls.     Follow Up  Future Appointments   Date Time Provider Vikki Caal   2022  4:00 PM [facility] MRI RM 2 [facility] MRI [employer]   2022  9:30 AM SCHEDULE, [facility] [place] CARDIO ECHO [place] CARDIO MMA   2022  8:00 AM SCHEDULE, [place] REMOTE TRANSMISSION FF Cardio MMA   2022  9:00 AM SCHEDULE, [facility] VASCULAR RM 1 [facility] VAS [employer] HOD   2022 10:00 AM ISIDRA Vyas - CNP FF Cardio MMA   10/17/2022  8:00 AM SCHEDULE, [place] REMOTE TRANSMISSION FF Cardio MMA   10/18/2022  8:45 AM Alyssia Randhawa MD PULM & CC MMA   2022  8:00 AM SCHEDULE, [place] REMOTE TRANSMISSION FF Cardio MMA   2022 11:00 AM SCHEDULE, [place] REMOTE TRANSMISSION FF Cardio MMA   2023 11:00 AM SCHEDULE, [place] REMOTE TRANSMISSION FF Cardio MMA   3/6/2023 11:00 AM SCHEDULE, [place] REMOTE TRANSMISSION FF Cardio MMA   4/10/2023 11:00 AM SCHEDULE, [place] REMOTE TRANSMISSION FF Cardio MMA   5/15/2023 11:00 AM SCHEDULE, [place] REMOTE TRANSMISSION FF Cardio MMA   6/19/2023 11:00 AM SCHEDULE, [place] REMOTE TRANSMISSION FF Cardio MMA   7/24/2023 11:00 AM SCHEDULE, [place] REMOTE TRANSMISSION FF Cardio MMA   8/28/2023 11:00 AM SCHEDULE, [place] REMOTE TRANSMISSION FF Cardio MMA   10/2/2023 11:00 AM SCHEDULE, [place] REMOTE TRANSMISSION FF Cardio MMA   11/6/2023 11:00 AM SCHEDULE, [place] REMOTE TRANSMISSION FF Cardio MMA   12/11/2023 11:00 AM SCHEDULE, [place] REMOTE TRANSMISSION FF Cardio MMA       Rocky Graf RN

## 2022-08-22 ENCOUNTER — HOSPITAL ENCOUNTER (OUTPATIENT)
Dept: MRI IMAGING | Age: 72
Discharge: HOME | End: 2022-08-22
Payer: MEDICARE

## 2022-08-22 ENCOUNTER — TELEPHONE (OUTPATIENT)
Dept: CARDIOLOGY CLINIC | Age: 72
End: 2022-08-22

## 2022-08-22 ENCOUNTER — NURSE ONLY (OUTPATIENT)
Dept: CARDIOLOGY CLINIC | Age: 72
End: 2022-08-22

## 2022-08-22 DIAGNOSIS — Z95.2 S/P AVR: ICD-10-CM

## 2022-08-22 DIAGNOSIS — I07.1 TRICUSPID VALVE INSUFFICIENCY, UNSPECIFIED ETIOLOGY: ICD-10-CM

## 2022-08-22 DIAGNOSIS — I34.0 MITRAL VALVE INSUFFICIENCY, UNSPECIFIED ETIOLOGY: ICD-10-CM

## 2022-08-22 PROCEDURE — 75565 CARD MRI VELOC FLOW MAPPING: CPT

## 2022-08-22 PROCEDURE — A9585 GADOBUTROL INJECTION: HCPCS | Performed by: INTERNAL MEDICINE

## 2022-08-22 PROCEDURE — 6360000004 HC RX CONTRAST MEDICATION: Performed by: INTERNAL MEDICINE

## 2022-08-22 RX ORDER — NITROGLYCERIN 0.4 MG/1
0.4 TABLET SUBLINGUAL EVERY 5 MIN PRN
Qty: 25 TABLET | Refills: 3 | Status: ON HOLD | OUTPATIENT
Start: 2022-08-22 | End: 2022-09-10 | Stop reason: SDUPTHER

## 2022-08-22 RX ADMIN — GADOBUTROL 11 ML: 604.72 INJECTION INTRAVENOUS at 16:15

## 2022-08-22 NOTE — PROGRESS NOTES
Mr. Ingrid Marx reports an episode of chest pain started last night at [address] while laying in bed. Pain kept him awake and he had another \"big one at 0400 AM.\" Lasted 5-6 minutes, went away on its own. He admits he debated going to ER, but did not. [place] disoriented at the time. No more episodes this AM. Did not check his BP this am.      ILR checked. No episodes noted on his device that correlate with his symptoms. Patient recently had Angiogram with Dr. Lizzy Yadav. EP [facility] Darling spoke with Patient and sent message to Dr. Lizzy Yadav Nurse. She has already spoken with Patient. Note From  [facility] [address] nurse:  Discussed with [facility] as Yandy Reyes, will order Nitro PRN for chest pain. Samir Olguin to make sure he is sitting down if he needs to take Nitro. Has cardiac MRI scheduled today along with echocardiogram in a few days. Appointment with Dr. Gerber Mcneal schedule for this week as well.

## 2022-08-22 NOTE — TELEPHONE ENCOUNTER
Patient had 3 occurrences with his device last night starting at 7:30pm and ending around 4:30am.  Please call patient and advise.  enrique

## 2022-08-22 NOTE — TELEPHONE ENCOUNTER
Mr. Pradeep De Santiago reports an episode of chest pain started last night at 1 while laying in bed. Pain kept him awake and he had another \"big one at 0400 AM.\" Lasted 5-6 minutes, went away on its own. He admits he debated going to ER, but did not. [place] disoriented at the time. No more episodes this AM. Did not check his BP this am.     Discussed with [facility] as July Ayala, will order Nitro PRN for chest pain. [place] to make sure he is sitting down if he needs to take Nitro. Has cardiac MRI scheduled today along with echocardiogram in a few days. Appointment with Dr. Jacque Shen schedule for this week as well.

## 2022-08-22 NOTE — TELEPHONE ENCOUNTER
Spoke with the patient and was advised that he stopped in the office . Pt states nothing further needed at this time .  Closing encounter

## 2022-08-22 NOTE — PROGRESS NOTES
Pablo Roy was in for a device check today. He complains of grabbing chest pain last night for several hours that woke him from his sleep. No arrythmias or tachycardia on his device to account for chest pain. He is asking for his angiogram results. Spoke with [facility]- updated regarding above. She will call and speak with patient.

## 2022-08-23 ENCOUNTER — TELEPHONE (OUTPATIENT)
Dept: FAMILY MEDICINE CLINIC | Age: 72
End: 2022-08-23

## 2022-08-23 LAB
LV EF: 62 %
LVEF MODALITY: NORMAL

## 2022-08-23 PROCEDURE — 75561 CARDIAC MRI FOR MORPH W/DYE: CPT | Performed by: INTERNAL MEDICINE

## 2022-08-23 PROCEDURE — 75565 CARD MRI VELOC FLOW MAPPING: CPT | Performed by: INTERNAL MEDICINE

## 2022-08-23 NOTE — LETTER
[facility] [address]  [address] [facility]  Phone: [phone]  Fax: [phone]    Lamonte Moya MD         August 24, 2022     Patient: Jose Kinsey   YOB: 1950   Date of Visit: 8/23/2022       To Whom It May Concern: It is my medical opinion that Ruddy Cam requires a disability parking placard for the following reasons:  He has limited walking ability due to an orthopedic condition. Duration of need: 6 years    If you have any questions or concerns, please don't hesitate to call.     Sincerely,          Lamonte Moya MD

## 2022-08-23 NOTE — LETTER
[facility] [address]  [address] [facility]  Phone: [phone]  Fax: [phone]    Charmaine Bertrand MD         August 24, 2022     Patient: Keara Torres   YOB: 1950   Date of Visit: 8/23/2022       To Whom It May Concern: It is my medical opinion that Eugene Miller requires a disability parking placard for the following reasons:  He cannot walk without assistance from another person or the use of an assistance device (cane, crutch, prosthetic device, wheelchair, etc.). Duration of need: 5 years    If you have any questions or concerns, please don't hesitate to call.     Sincerely,        Charmaine Bertrand MD

## 2022-08-23 NOTE — TELEPHONE ENCOUNTER
Patient is requesting a handicap plaquard for his car. He states he is in bad shape and is having a hard time walking.         Please advise        Please call when its available for

## 2022-08-24 ENCOUNTER — PROCEDURE VISIT (OUTPATIENT)
Dept: CARDIOLOGY CLINIC | Age: 72
End: 2022-08-24
Payer: MEDICARE

## 2022-08-24 DIAGNOSIS — I34.0 NONRHEUMATIC MITRAL VALVE REGURGITATION: ICD-10-CM

## 2022-08-24 DIAGNOSIS — I07.1 TRICUSPID VALVE INSUFFICIENCY, UNSPECIFIED ETIOLOGY: ICD-10-CM

## 2022-08-24 LAB
LV EF: 58 %
LVEF MODALITY: NORMAL

## 2022-08-24 PROCEDURE — 93306 TTE W/DOPPLER COMPLETE: CPT | Performed by: INTERNAL MEDICINE

## 2022-08-24 PROCEDURE — 93356 MYOCRD STRAIN IMG SPCKL TRCK: CPT | Performed by: INTERNAL MEDICINE

## 2022-08-26 ENCOUNTER — TELEPHONE (OUTPATIENT)
Dept: FAMILY MEDICINE CLINIC | Age: 72
End: 2022-08-26

## 2022-08-26 NOTE — TELEPHONE ENCOUNTER
Patient concerned as he may have been exposed to COVID through his ex-wife. He is not experiencing any symptoms but worries as he has a serious heart condition. Patient will be testing at home.

## 2022-08-29 ENCOUNTER — TELEPHONE (OUTPATIENT)
Dept: CARDIOLOGY CLINIC | Age: 72
End: 2022-08-29

## 2022-08-29 NOTE — TELEPHONE ENCOUNTER
Pt called  to discuss what is his appt for on 09/28 at [facility]  Please call to discuss.  Thank you

## 2022-08-30 NOTE — TELEPHONE ENCOUNTER
Discussed with Dr. Kacey Devine who recommended setting patient up for LHC, possible PCI of LAD. Will call patient tomorrow to discuss.

## 2022-08-30 NOTE — TELEPHONE ENCOUNTER
Left voice mail message that I was returning patient's call. Patient has vascular study scheduled at 9/28/22 at 9 am [facility].  Also has appt with Lidia CHENG at 10 am [facility] office. Will discuss with Dr. Valeria Thomas.

## 2022-08-30 NOTE — TELEPHONE ENCOUNTER
Explained reason for arterial segmental pressures of lower extremities (recommended by Dr. Teresa Payne during 7/25/22 telephone encounter). Jessica Harvey NP appointment from 9/28/22 to 9/30/22 at 10 am to avoid conflict with vascular study (patient said it is difficult to have two appts same day). Patient very frustrated with his condition. States Dr. Cayla Kerr turned him down for surgery. Patient also said that Dr. Cayla Kerr said he needed another ablation (do not see discussion of ablation in office note). Reminded patient that Dr. Cayla Kerr suggested referral to structural heart clinic at [facility] and explained that there may be other procedures for mitral regurgitation that can be done percutaneously). Patient states he uses nitroglycerin sublingual tabs daily and sometimes he has several episodes of chest discomfort in one day. Will discuss with Dr. Teresa Payne.

## 2022-08-30 NOTE — RESULT ENCOUNTER NOTE
Cardiac MRI suggests moderate leakiness involving the mitral and tricuspid valves. Aortic valve appears to be functioning well. Should be able to be managed medically.

## 2022-08-31 DIAGNOSIS — I25.83 CORONARY ARTERY DISEASE DUE TO LIPID RICH PLAQUE: ICD-10-CM

## 2022-08-31 DIAGNOSIS — I25.10 CORONARY ARTERY DISEASE DUE TO LIPID RICH PLAQUE: ICD-10-CM

## 2022-08-31 DIAGNOSIS — I20.0 PROGRESSIVE ANGINA (HCC): Primary | ICD-10-CM

## 2022-08-31 NOTE — TELEPHONE ENCOUNTER
Spoke to patient and discussed scheduling angiogram with patient including procedure expectations ; pre-procedure instruction including no food or drink for 8 hours before procedure except enough water with am med, patient does not need to hold meds prior to procedure, reviewed allergy list with patient ; post procedure recovery including duration of bed rest, lifting and driving restrictions etc.  All patient questions addressed and answered. Patient voice understanding of all information discussed. Procedure  Beti will call patient to schedule.

## 2022-09-09 ENCOUNTER — HOSPITAL ENCOUNTER (OUTPATIENT)
Dept: CARDIAC CATH/INVASIVE PROCEDURES | Age: 72
Setting detail: OBSERVATION
Discharge: HOME | End: 2022-09-10
Attending: INTERNAL MEDICINE | Admitting: INTERNAL MEDICINE
Payer: MEDICARE

## 2022-09-09 ENCOUNTER — APPOINTMENT (OUTPATIENT)
Dept: CT IMAGING | Age: 72
End: 2022-09-09
Attending: INTERNAL MEDICINE
Payer: MEDICARE

## 2022-09-09 ENCOUNTER — TELEPHONE (OUTPATIENT)
Dept: CARDIOLOGY CLINIC | Age: 72
End: 2022-09-09

## 2022-09-09 DIAGNOSIS — I25.83 CORONARY ARTERY DISEASE DUE TO LIPID RICH PLAQUE: ICD-10-CM

## 2022-09-09 DIAGNOSIS — I20.0 PROGRESSIVE ANGINA (HCC): ICD-10-CM

## 2022-09-09 DIAGNOSIS — I25.10 CORONARY ARTERY DISEASE DUE TO LIPID RICH PLAQUE: ICD-10-CM

## 2022-09-09 LAB
ANION GAP SERPL CALCULATED.3IONS-SCNC: 9 MMOL/L (ref 3–16)
BUN BLDV-MCNC: 8 MG/DL (ref 7–20)
CALCIUM SERPL-MCNC: 9.3 MG/DL (ref 8.3–10.6)
CHLORIDE BLD-SCNC: 105 MMOL/L (ref 99–110)
CO2: 26 MMOL/L (ref 21–32)
CREAT SERPL-MCNC: 0.5 MG/DL (ref 0.8–1.3)
EKG ATRIAL RATE: 90 BPM
EKG DIAGNOSIS: NORMAL
EKG P AXIS: 74 DEGREES
EKG P-R INTERVAL: 218 MS
EKG Q-T INTERVAL: 354 MS
EKG QRS DURATION: 76 MS
EKG QTC CALCULATION (BAZETT): 433 MS
EKG R AXIS: -13 DEGREES
EKG T AXIS: 10 DEGREES
EKG VENTRICULAR RATE: 90 BPM
GFR AFRICAN AMERICAN: >60
GFR NON-AFRICAN AMERICAN: >60
GLUCOSE BLD-MCNC: 104 MG/DL (ref 70–99)
HCT VFR BLD CALC: 39.5 % (ref 40.5–52.5)
HEMOGLOBIN: 13.2 G/DL (ref 13.5–17.5)
MCH RBC QN AUTO: 31.2 PG (ref 26–34)
MCHC RBC AUTO-ENTMCNC: 33.5 G/DL (ref 31–36)
MCV RBC AUTO: 93 FL (ref 80–100)
PDW BLD-RTO: 13.2 % (ref 12.4–15.4)
PLATELET # BLD: 187 K/UL (ref 135–450)
PMV BLD AUTO: 7.1 FL (ref 5–10.5)
POC ACT LR: 246 SEC
POC ACT LR: 284 SEC
POC ACT LR: 305 SEC
POTASSIUM SERPL-SCNC: 3.9 MMOL/L (ref 3.5–5.1)
RBC # BLD: 4.24 M/UL (ref 4.2–5.9)
SODIUM BLD-SCNC: 140 MMOL/L (ref 136–145)
WBC # BLD: 5.6 K/UL (ref 4–11)

## 2022-09-09 PROCEDURE — C1725 CATH, TRANSLUMIN NON-LASER: HCPCS

## 2022-09-09 PROCEDURE — 70450 CT HEAD/BRAIN W/O DYE: CPT

## 2022-09-09 PROCEDURE — 94640 AIRWAY INHALATION TREATMENT: CPT

## 2022-09-09 PROCEDURE — 99153 MOD SED SAME PHYS/QHP EA: CPT

## 2022-09-09 PROCEDURE — G0378 HOSPITAL OBSERVATION PER HR: HCPCS

## 2022-09-09 PROCEDURE — 99152 MOD SED SAME PHYS/QHP 5/>YRS: CPT

## 2022-09-09 PROCEDURE — 93010 ELECTROCARDIOGRAM REPORT: CPT | Performed by: INTERNAL MEDICINE

## 2022-09-09 PROCEDURE — 92978 ENDOLUMINL IVUS OCT C 1ST: CPT

## 2022-09-09 PROCEDURE — 6360000004 HC RX CONTRAST MEDICATION: Performed by: INTERNAL MEDICINE

## 2022-09-09 PROCEDURE — 93005 ELECTROCARDIOGRAM TRACING: CPT | Performed by: INTERNAL MEDICINE

## 2022-09-09 PROCEDURE — 6360000002 HC RX W HCPCS: Performed by: INTERNAL MEDICINE

## 2022-09-09 PROCEDURE — 85027 COMPLETE CBC AUTOMATED: CPT

## 2022-09-09 PROCEDURE — C1769 GUIDE WIRE: HCPCS

## 2022-09-09 PROCEDURE — 6370000000 HC RX 637 (ALT 250 FOR IP): Performed by: INTERNAL MEDICINE

## 2022-09-09 PROCEDURE — 80048 BASIC METABOLIC PNL TOTAL CA: CPT

## 2022-09-09 PROCEDURE — C1874 STENT, COATED/COV W/DEL SYS: HCPCS

## 2022-09-09 PROCEDURE — 85347 COAGULATION TIME ACTIVATED: CPT

## 2022-09-09 PROCEDURE — 99152 MOD SED SAME PHYS/QHP 5/>YRS: CPT | Performed by: INTERNAL MEDICINE

## 2022-09-09 PROCEDURE — C1753 CATH, INTRAVAS ULTRASOUND: HCPCS

## 2022-09-09 PROCEDURE — 92978 ENDOLUMINL IVUS OCT C 1ST: CPT | Performed by: INTERNAL MEDICINE

## 2022-09-09 PROCEDURE — C1887 CATHETER, GUIDING: HCPCS

## 2022-09-09 PROCEDURE — 6360000002 HC RX W HCPCS

## 2022-09-09 PROCEDURE — 2580000003 HC RX 258: Performed by: INTERNAL MEDICINE

## 2022-09-09 PROCEDURE — 94761 N-INVAS EAR/PLS OXIMETRY MLT: CPT

## 2022-09-09 PROCEDURE — 2000000000 HC ICU R&B

## 2022-09-09 PROCEDURE — C1894 INTRO/SHEATH, NON-LASER: HCPCS

## 2022-09-09 PROCEDURE — 2709999900 HC NON-CHARGEABLE SUPPLY

## 2022-09-09 PROCEDURE — C9600 PERC DRUG-EL COR STENT SING: HCPCS

## 2022-09-09 PROCEDURE — 2500000003 HC RX 250 WO HCPCS

## 2022-09-09 PROCEDURE — 36415 COLL VENOUS BLD VENIPUNCTURE: CPT

## 2022-09-09 PROCEDURE — 92928 PRQ TCAT PLMT NTRAC ST 1 LES: CPT | Performed by: INTERNAL MEDICINE

## 2022-09-09 RX ORDER — SODIUM CHLORIDE 9 MG/ML
INJECTION, SOLUTION INTRAVENOUS PRN
Status: DISCONTINUED | OUTPATIENT
Start: 2022-09-09 | End: 2022-09-10 | Stop reason: HOSPADM

## 2022-09-09 RX ORDER — VITAMIN B COMPLEX
2000 TABLET ORAL DAILY
Status: DISCONTINUED | OUTPATIENT
Start: 2022-09-10 | End: 2022-09-10 | Stop reason: HOSPADM

## 2022-09-09 RX ORDER — PANTOPRAZOLE SODIUM 40 MG/1
40 TABLET, DELAYED RELEASE ORAL
Status: DISCONTINUED | OUTPATIENT
Start: 2022-09-10 | End: 2022-09-10 | Stop reason: HOSPADM

## 2022-09-09 RX ORDER — SODIUM CHLORIDE 0.9 % (FLUSH) 0.9 %
5-40 SYRINGE (ML) INJECTION PRN
Status: DISCONTINUED | OUTPATIENT
Start: 2022-09-09 | End: 2022-09-10 | Stop reason: HOSPADM

## 2022-09-09 RX ORDER — DOCUSATE SODIUM 100 MG/1
100 CAPSULE, LIQUID FILLED ORAL 2 TIMES DAILY
Status: DISCONTINUED | OUTPATIENT
Start: 2022-09-09 | End: 2022-09-10 | Stop reason: HOSPADM

## 2022-09-09 RX ORDER — FLUTICASONE PROPIONATE 110 UG/1
1 AEROSOL, METERED RESPIRATORY (INHALATION) 2 TIMES DAILY
Status: DISCONTINUED | OUTPATIENT
Start: 2022-09-09 | End: 2022-09-10 | Stop reason: HOSPADM

## 2022-09-09 RX ORDER — SODIUM CHLORIDE 9 MG/ML
INJECTION, SOLUTION INTRAVENOUS CONTINUOUS
Status: ACTIVE | OUTPATIENT
Start: 2022-09-09 | End: 2022-09-10

## 2022-09-09 RX ORDER — UBIDECARENONE 75 MG
100 CAPSULE ORAL DAILY
Status: DISCONTINUED | OUTPATIENT
Start: 2022-09-10 | End: 2022-09-10 | Stop reason: HOSPADM

## 2022-09-09 RX ORDER — MIRTAZAPINE 15 MG/1
7.5 TABLET, FILM COATED ORAL NIGHTLY
Status: DISCONTINUED | OUTPATIENT
Start: 2022-09-09 | End: 2022-09-10 | Stop reason: HOSPADM

## 2022-09-09 RX ORDER — SODIUM CHLORIDE 0.9 % (FLUSH) 0.9 %
5-40 SYRINGE (ML) INJECTION EVERY 12 HOURS SCHEDULED
Status: DISCONTINUED | OUTPATIENT
Start: 2022-09-09 | End: 2022-09-10 | Stop reason: HOSPADM

## 2022-09-09 RX ORDER — LEVOTHYROXINE SODIUM 0.12 MG/1
125 TABLET ORAL DAILY
Status: DISCONTINUED | OUTPATIENT
Start: 2022-09-10 | End: 2022-09-10 | Stop reason: HOSPADM

## 2022-09-09 RX ORDER — NITROGLYCERIN 0.4 MG/1
0.4 TABLET SUBLINGUAL EVERY 5 MIN PRN
Status: DISCONTINUED | OUTPATIENT
Start: 2022-09-09 | End: 2022-09-10 | Stop reason: HOSPADM

## 2022-09-09 RX ORDER — ASPIRIN 81 MG/1
81 TABLET ORAL DAILY
Status: DISCONTINUED | OUTPATIENT
Start: 2022-09-10 | End: 2022-09-10 | Stop reason: HOSPADM

## 2022-09-09 RX ORDER — DILTIAZEM HYDROCHLORIDE 120 MG/1
120 CAPSULE, COATED, EXTENDED RELEASE ORAL DAILY
Status: DISCONTINUED | OUTPATIENT
Start: 2022-09-10 | End: 2022-09-10 | Stop reason: HOSPADM

## 2022-09-09 RX ORDER — EPTIFIBATIDE 0.75 MG/ML
2 INJECTION, SOLUTION INTRAVENOUS CONTINUOUS
Status: DISCONTINUED | OUTPATIENT
Start: 2022-09-09 | End: 2022-09-10 | Stop reason: HOSPADM

## 2022-09-09 RX ORDER — ACETAMINOPHEN 325 MG/1
650 TABLET ORAL EVERY 4 HOURS PRN
Status: DISCONTINUED | OUTPATIENT
Start: 2022-09-09 | End: 2022-09-10 | Stop reason: HOSPADM

## 2022-09-09 RX ORDER — OXYCODONE HYDROCHLORIDE AND ACETAMINOPHEN 5; 325 MG/1; MG/1
1 TABLET ORAL 2 TIMES DAILY PRN
Status: DISCONTINUED | OUTPATIENT
Start: 2022-09-09 | End: 2022-09-10 | Stop reason: HOSPADM

## 2022-09-09 RX ORDER — ROSUVASTATIN CALCIUM 40 MG/1
40 TABLET, COATED ORAL DAILY
Status: DISCONTINUED | OUTPATIENT
Start: 2022-09-10 | End: 2022-09-10 | Stop reason: HOSPADM

## 2022-09-09 RX ORDER — ONDANSETRON 2 MG/ML
4 INJECTION INTRAMUSCULAR; INTRAVENOUS EVERY 6 HOURS PRN
Status: DISCONTINUED | OUTPATIENT
Start: 2022-09-09 | End: 2022-09-10 | Stop reason: HOSPADM

## 2022-09-09 RX ORDER — DIGOXIN 125 MCG
125 TABLET ORAL DAILY
Status: DISCONTINUED | OUTPATIENT
Start: 2022-09-10 | End: 2022-09-10 | Stop reason: HOSPADM

## 2022-09-09 RX ADMIN — OXYCODONE AND ACETAMINOPHEN 1 TABLET: 5; 325 TABLET ORAL at 20:14

## 2022-09-09 RX ADMIN — Medication 1 PUFF: at 20:06

## 2022-09-09 RX ADMIN — METOPROLOL TARTRATE 12.5 MG: 25 TABLET, FILM COATED ORAL at 15:28

## 2022-09-09 RX ADMIN — IOPAMIDOL 100 ML: 755 INJECTION, SOLUTION INTRAVENOUS at 12:12

## 2022-09-09 RX ADMIN — Medication 10 ML: at 20:16

## 2022-09-09 RX ADMIN — MIRTAZAPINE 7.5 MG: 15 TABLET, FILM COATED ORAL at 20:13

## 2022-09-09 RX ADMIN — EPTIFIBATIDE 2 MCG/KG/MIN: 75 INJECTION INTRAVENOUS at 14:58

## 2022-09-09 RX ADMIN — SODIUM CHLORIDE: 9 INJECTION, SOLUTION INTRAVENOUS at 13:44

## 2022-09-09 RX ADMIN — METOPROLOL TARTRATE 12.5 MG: 25 TABLET, FILM COATED ORAL at 20:14

## 2022-09-09 RX ADMIN — DOCUSATE SODIUM 100 MG: 100 CAPSULE, LIQUID FILLED ORAL at 20:14

## 2022-09-09 ASSESSMENT — PAIN SCALES - GENERAL
PAINLEVEL_OUTOF10: 5
PAINLEVEL_OUTOF10: 0
PAINLEVEL_OUTOF10: 0

## 2022-09-09 ASSESSMENT — PAIN DESCRIPTION - LOCATION: LOCATION: BACK

## 2022-09-09 NOTE — PRE SEDATION
Brief Pre-Op Note/Sedation Assessment      Israel Jason  1950  [id number]  10:49 AM    Planned Procedure: Cardiac Catheterization Procedure  Post Procedure Plan: Return to same level of care  Consent: I have discussed with the patient and/or the patient representative the indication, alternatives, and the possible risks and/or complications of the planned procedure and the anesthesia methods. The patient and/or patient representative appear to understand and agree to proceed. Chief Complaint:   Chest Pain/Pressure  Anginal Equivalent  Dyspnea on Exertion      Indications for Cath Procedure:  Presentation:  Worsening Angina  2. Anginal Classification within 2 weeks:  CCS III - Symptoms with everyday living activities, i.e., moderate limitation CCS IV - Inability to perform any activity without angina or angina at rest, i.e., severe limitation  3. Angina Symptoms Assessment:  Typical Chest Pain  4. Heart Failure Class within last 2 weeks:  No symptoms  5. Cardiovascular Instability:  No    Prior Ischemic Workup/Eval:  Pre-Procedural Medications: Yes: ACE/ARB/ARNI, Aspirin, Beta Blockers, Ca Channel Blockers, and STATIN  2. Stress Test Completed? Yes:  Stress or Imaging Studies Performed (within ANY time period):   Type:  Stress Nuclear  Results:  Negative Extent of Ischemia:  Intermediate    Does Patient need surgery? Cath Valve Surgery:  No    Pre-Procedure Medical History:  Vital Signs:  /85   Pulse 90   Resp 16   Ht 5' 10\" (1.778 m)   Wt 160 lb (72.6 kg)   BMI 22.96 kg/m²     Allergies:     Allergies   Allergen Reactions    Naproxen Swelling     Lips  Pt does not recognize this being an intolerance    Hydrocodone Itching     Medications:    Current Facility-Administered Medications   Medication Dose Route Frequency Provider Last Rate Last Admin    sodium chloride flush 0.9 % injection 5-40 mL  5-40 mL IntraVENous PRN Fan Clancy MD           Past Medical History:    Past Medical History:   Diagnosis Date    Aortic valve insufficiency     Arthritis     Atrial fibrillation ([facility] [address]     Cancer ([facility] [address] 09/2017    TONGUE head and neck IN REMISSION    Dysphonia     Encounter for imaging to screen for metal prior to MRI 08/22/2022    MRI conditional Medtronic Linq loop recorder model#LNQ11 Reveal LINQ implanted 4/22/19. Normal Mode. 1.5T or 3.0T. Download data prior to MRI. Follow all other Medtronic guidelines. Pt currently follows     GERD (gastroesophageal reflux disease)     Hearing loss     Heart disease     Medical history reviewed with no changes     Obstructive apnea does not use CPAP    Oropharyngeal dysphagia     Refusal of blood transfusions as patient is Catholic     Thyroid disease        Surgical History:    Past Surgical History:   Procedure Laterality Date    ANKLE SURGERY      AORTIC VALVE REPLACEMENT  1/28/15    Dr. Tera Hardin 25mm Mosaic Ultra porcine valve; right and left modified maze procedure with ligation of DELGADO with Atriclip    ATRIAL ABLATION SURGERY      BACK SURGERY      CSP    CARDIAC CATHETERIZATION      HERNIA REPAIR      KNEE SURGERY      Arthroscopy    LUMBAR SPINE SURGERY Right 10/13/2020    RIGHT LUMBAR4-LUMBAR5 MICRO HEMILAMINECTOMY AND DISCECTOMY ([id number], [id number]) performed by Benito Skinner MD at [facility]      collapsed lung due to broken ribs    MANDIBLE FRACTURE SURGERY      NECK SURGERY      Fusion    PAIN MANAGEMENT PROCEDURE Right 6/22/2020    RIGHT L4 AND L5 TRANSFORAMINAL EPIDURAL STEROID INJECTION WITH FLUOROSCOPY ([id number]) performed by Esequiel Murillo MD at [address] [place] [address] Right 7/8/2020    RIGHT L4 AND L5 TRANSFORAMINAL EPIDURAL STEROID INJECTION WITH FLUOROSCOPY ([id number]) performed by Esequiel Murillo MD at [facility]:  Pre-Sedation Documentation and Exam:  I have assessed the patient and reviewed the H&P on the chart.     Prior History of Anesthesia Complications:   none    Modified Mallampati:  II (soft palate, uvula, fauces visible)    ASA Classification:  Class 3 - A patient with severe systemic disease that limits activity but is not incapacitating    Darrell Scale: Activity:  2 - Able to move 4 extremities voluntarily on command  Respiration:  2 - Able to breathe deeply and cough freely  Circulation:  2 - BP+/- 20mmHg of normal  Consciousness:  2 - Fully awake  Oxygen Saturation (color):  2 - Able to maintain oxygen saturation >92% on room air    Sedation/Anesthesia Plan:  Guard the patient's safety and welfare. Minimize physical discomfort and pain. Minimize negative psychological responses to treatment by providing sedation and analgesia and maximize the potential amnesia. Patient to meet pre-procedure discharge plan.     Medication Planned:  midazolam intravenously and fentanyl intravenously    Patient is an appropriate candidate for plan of sedation:   yes      Electronically signed by Mami Rodriguez MD on 9/9/2022 at 10:49 AM

## 2022-09-09 NOTE — H&P
[address]     Outpatient Follow Up Note    CHIEF COMPLAINT / HPI: Hospital Follow Up secondary to status post coronary angiogram    Hospital record has been reviewed  Hospital Course progressed as follows per discharge summary:   Arrives ambulatory w c/o L sided chest pain x2 days. Pt states he woke up multiple times last night due to his heart racing. Pt was told to come in by cardiologist. Pt is scheduled for cardiac surgeries at the end of the month- extensive cardiac hx per pt. Denies being on thinners after cardiac ablation years ago. Denies radiation of pain. Endorses dull heavy pressure. Pt is temporarily relieved when he rubs the area. VSS. Pt endorses internal heart monitor. Problems and results from this hospitalization that need follow up. Hypothyroidism. TSH 10.50, free T4 2.1. Continued current dose levothyroxine. Recommend recheck in 6 weeks. Cardiac MRI as scheduled 8/26/2022 to further evaluate MR. Appointment with Dr Aristeo Hinkle being arranged per cardiology to seek second opinion regarding LAD disease and MR. .        Invasive procedures and treatments. [facility] 8/9/2022:  Dominance: Right       LM: no significant disease  LAD: moderately calcified vessel with eccentric 80% proximal stenosis  LCx: 30% into OM2   RCA: minimal luminal irregularities      DFR=0.92  FFR=0.75 (1:25 minutes of Adenosine)     Impression/Recommendations:  Significant, stable proximal LAD disease. Cardiac MRI scheduled as outpatient for 8/26/22. Refer to Dr. Aristeo Hinkle. [facility] Course. Patient is a 80 yo male with hx CAD, valvular heart disease. Presented to ER with chest tightness and palpitations described as heart racing. He was treated for following:     Chest discomfort / CAD. Troponin negative x 3. No acute changes on EKG. Fixed apical defect on stress test 4/2022 believed consistent with artifact.    Underwent C 8/9 which showed significant stable proximal LAD disease. He was started on Imdur but subsequently discontinued secondary to symptomatic hypotension. Continued asa. PAF. Hx ablation and maze procedure. Presently in sinus rhythm. Not on AC secondary to DELGADO clip. Continued diltiazem. Hypothyroidism. TSH 10.50, but free T4 2.1. Continued current levothyroxine dose, recommend recheck levels in 6 weeks. HTN / hypotension. Started on Imdur but subsequently was hypotensive. Received IV fluids (500 ml). BP improved, no evidence of orthostatic hypotension, sitting /73, HR 80; standing 121/83, HR 88. Denied lightheadedness or dizziness, gait steady in room. [place] he feels fine, anxious for DC home. Reviewed DC plans, follow up and medications. Expresses understanding. Questions answered. Antonio Harden is 81 y.o. male who presents today for a routine follow up after a recent hospitalization related to the above mentioned issues. He recalls feeling tired and achy  Subjective:   Since the time of discharge, the patient admits their symptoms have improved. His procedure site is real sore. He had an US done earlier today for r/o clot (negative). He continues to have CP off/on. His Lt arm went numb last night. His pain comes on irregardless of activity. It last up to 5 minutes at the longest. Its not bad, just there: +4-5/10     There is SOB every once in awhile. He felt SOB coming in from the parking lot today (had to walk an incline). He has his inhalers back which he's been without for about a year. He has a little swelling in his ankles. The patient is not experiencing palpitations but his heart races. He was told that it wasn't from his heart rate and probably muscle spasms. These symptoms are improving over the last many days. With regard to medication therapy the patient has been compliant with prescribed regimen. They have tolerated therapy to date.      Past Medical History:   Diagnosis Date    Aortic valve insufficiency Arthritis     Atrial fibrillation ([facility] [address]     Cancer ([facility] [address] 2017    TONGUE head and neck IN REMISSION    Dysphonia     Encounter for imaging to screen for metal prior to MRI 2022    MRI conditional Medtronic Linq loop recorder model#LNQ11 Reveal LINQ implanted 19. Normal Mode. 1.5T or 3.0T. Download data prior to MRI. Follow all other Medtronic guidelines. Pt currently follows     GERD (gastroesophageal reflux disease)     Hearing loss     Heart disease     Medical history reviewed with no changes     Obstructive apnea does not use CPAP    Oropharyngeal dysphagia     Refusal of blood transfusions as patient is Anabaptist     Thyroid disease      Social History:    Social History     Tobacco Use   Smoking Status Former    Packs/day: 1.00    Years: 40.00    Pack years: 40.00    Types: Cigarettes    Start date: 1975    Quit date: 2015    Years since quittin.6   Smokeless Tobacco Never   Tobacco Comments    Maintain cessation     Current Medications:  Current Facility-Administered Medications   Medication Dose Route Frequency Provider Last Rate Last Admin    sodium chloride flush 0.9 % injection 5-40 mL  5-40 mL IntraVENous PRN Adilene Chris MD         REVIEW OF SYSTEMS:   CONSTITUTIONAL: No major weight gain or loss, fatigue, weakness, night sweats or fever. There's been no change in energy level, sleep pattern, or activity level. HEENT: No new vision difficulties or ringing in the ears. RESPIRATORY: No new SOB, PND, orthopnea or cough. CARDIOVASCULAR: See HPI  GI: No nausea, vomiting, diarrhea, constipation, abdominal pain or changes in bowel habits. : No urinary frequency, urgency, incontinence hematuria or dysuria. SKIN: No cyanosis or skin lesions. MUSCULOSKELETAL: No new muscle or joint pain. NEUROLOGICAL: No syncope or TIA-like symptoms.   PSYCHIATRIC: No anxiety, pain, insomnia or depression    Objective:   PHYSICAL EXAM:        Vitals:    22 0847 BP: 116/85   Pulse: 90   Resp: 16   Weight: 160 lb (72.6 kg)   Height: 5' 10\" (1.778 m)       VITALS:  /85   Pulse 90   Resp 16   Ht 5' 10\" (1.778 m)   Wt 160 lb (72.6 kg)   BMI 22.96 kg/m²     CONSTITUTIONAL: Cooperative, no apparent distress, and appears well nourished / developed  NEUROLOGIC:  Awake and orientated to person, place and time. PSYCH: Calm affect. SKIN: Warm and dry: Rt groin unremarkable. HEENT: Sclera non-icteric, normocephalic, neck supple, no elevation of JVP, normal carotid pulses with no bruits and thyroid normal size. LUNGS:  No increased work of breathing and clear to auscultation, no crackles or wheezing. CARDIOVASCULAR:  Regular rate and rhythm with no murmurs, gallops, rubs, or abnormal heart sounds, normal PMI. The apical impulses not displaced. Heart tones are crisp and normal                                                                                          Cervical veins are not engorged                 JVP less than 8 cm H2O                                                                              The carotid upstroke is normal in amplitude and contour without delay or bruit    ABDOMEN:  Normal bowel sounds, non-distended and non-tender to palpation   EXT: No edema, no calf tenderness. Pulses are present bilaterally.     DATA:    Lab Results   Component Value Date    ALT 15 08/08/2022    AST 21 08/08/2022    ALKPHOS 48 08/08/2022    BILITOT 0.8 08/08/2022     Lab Results   Component Value Date    CREATININE 0.5 (L) 09/09/2022    BUN 8 09/09/2022     09/09/2022    K 3.9 09/09/2022     09/09/2022    CO2 26 09/09/2022     Lab Results   Component Value Date    TSH 10.50 (H) 08/09/2022    Z7TMARN 2.12 05/21/2020     Lab Results   Component Value Date    WBC 5.6 09/09/2022    HGB 13.2 (L) 09/09/2022    HCT 39.5 (L) 09/09/2022    MCV 93.0 09/09/2022     09/09/2022     No components found for: CHLPL  Lab Results Component Value Date    TRIG 95 07/27/2022    TRIG 97 08/13/2021    TRIG 82 03/05/2021     Lab Results   Component Value Date    HDL 43 07/27/2022    HDL 45 10/11/2021    HDL 45 08/13/2021     Lab Results   Component Value Date    LDLCALC 84 07/27/2022    LDLCALC 102 (H) 10/11/2021    LDLCALC 115 (H) 08/13/2021     Lab Results   Component Value Date    LABVLDL 19 07/27/2022    LABVLDL 15 10/11/2021    LABVLDL 19 08/13/2021     Radiology Review:  Pertinent images / reports were reviewed as a part of this visit and reveals the following:    Last Echo: 8/18/22  Summary   -Normal left ventricle size, wall thickness and systolic function with an   estimated ejection fraction of 60-65%. -No regional wall motion abnormalities are seen.   -Indeterminate diastolic function.   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 -Moderate to severe mitral regurgitation. ( appears stable from previous echo   dated 2/24/2021). ERO 0.21.   -A bioprosthetic artificial aortic valve appears well seated with a maximum   velocity of 2.6m/s and a mean gradient of 16mmHg. -Moderate to severe tricuspid regurgitation.   -Estimated pulmonary artery systolic pressure is mildly elevated at 38 mmHg   assuming a right atrial pressure of 8 mmHg. -The right ventricle is moderately enlarged.   -The right atrium is moderately dilated. RLE arterial dop: 8/16/22: preliminary  Tech Comments   Right   No evidence of pseudoaneurysm of the right common femoral artery. Color flow duplex imaging of the right common femoral, superficial femoral and   deep femoral artery demonstrate normal multiphasic flow. The right common femoral vein is fully compressible with normal spontaneous,   phasic flow and augmentation.    Right inguinal lymph node measuring 1.5 x 0.4 x 1.3 cm.     8/9/22:Left Heart Cath  Dominance: Right       LM: no significant disease  LAD: moderately calcified vessel with eccentric 80% proximal stenosis   LCx: 30% into OM2   RCA: minimal luminal irregularities     DFR=0.92  FFR=0.75 (1:25 minutes of Adenosine)     LIMA - large caliber, free of disease throughout its course     Impression/Recommendations:  Significant, stable proximal LAD disease. Cardiac MRI scheduled as outpatient for 8/26/22 to further eval MR  Refer to Dr. Enma Garcia. Assessment:      Diagnosis Orders   1. Coronary artery disease of native heart with stable angina pectoris, unspecified vessel or lesion type ([address]   ~continues to have intermittent CP +4-5/10  ~intolerance with nitrates d/t hypotension   ~prox-LAD at 80% by cath > referred to VS  ~hx of LAD stenosis '21 deemed high risk  ~ASA / statin        2. Nonrheumatic mitral valve regurgitation   ~mod-severe on echo from Aug '22 comparable to echo one year earlier  ~cardiac MRI scheduled for 8/26 to further eval  ~neg to exam for volume overload. C/O SOB on inclines. Also with hx of COPD               3. PAF (paroxysmal atrial fibrillation) (HCC)   ~stable : denies palpitations  ~AP regular  ~hx modified MAXE with DELGADO ligation & clip '15  ~hx PVI, PWI atrial flutter ablation '20  ~ASA alone ; no need for [facility] post-clip (along with hx of bleeding from mouth & PEG)  ~digoxin / diltiazem for rate control  ~s/p ILR : unremarkable for arrhythmia with last interrogation 8/8/22       4. Nonrheumatic aortic valve stenosis   ~s/p AVR '15  ~ASA           Patient  is stable since hospital discharge. Plan:  Continue current regimen   ~cardiac MRI 8/26   ~f/u  [facility] as planned [facility] assisting with arrangements b/w test and appt)   F/U in four weeks      I have addressed the patient's cardiac risk factors and adjusted pharmacologic treatment as needed. In addition, I have reinforced the need for patient directed risk factor modification. Further evaluation will be based upon the patient's clinical course and testing results.     All questions and concerns were addressed to the patient Alternatives to  treatment were discussed. The patient  currently  is not smoking. The risks related to smoking were reviewed with the patient. Recommend maintaining a smoke-free lifestyle. Dual Antiplatelet therapy has been recommended / prescribed for this patient. Education conducted on adverse reactions including bleeding was discussed. The patient verbalizes understanding. ~no need for Maria Fernanda Golder after DELGADO occl with atrial clip (per EP)    Pt is not on a BBv : neg MI / CM  Pt is not on an ace-i/ARB : neg CHF  Pt is on a statin      Saturated fat diet discussed  Exercise program discussed    Thank you for allowing to us to participate in the care of Zoya Lombardo. [facility]  Documentation of today's visit sent to PCP     Patient seen and examined. H&P reviewed. Changes to H&P include cardiac cath with possible PCI of LAD. Patient was evaluated by CV surgery and thought to be a high risk CV surgical candidate. Pb aHider MD

## 2022-09-09 NOTE — TELEPHONE ENCOUNTER
Spoke with the patients daughter and advised her to contact RN on the floor as patient is IP at this time . She voiced understanding.  Call complete

## 2022-09-09 NOTE — BRIEF OP NOTE
Cardiac Cath 9/9/2022:  Access: Right RA  Ultrasound: Ultrasound guidance used to determine after mentioned artery patency, size (> 2 mm), anatomic variations and ideal puncture location. Real-time ultrasound utilized concurrent with vascular needle entry into the artery. Images permanently recorded and reported in the patient chart. Hemostasis: TR band    Moderate Sedation:  Start time: 1056  Stop time: 1209  3.5 mg versed   125 mcg fentanyl   An independent trained observer pushed medications at my direction. We monitored the patient's level of consciousness and vital signs/physiologic status throughout the procedure duration (see start and start times above). Bleeding risk: Low  LVEDP: - mmHg  AO: 72/16 mmHg  Estimated blood loss: Less than 20 mL  Contrast: 100 mL  Fluoroscopy time: 13.0 min. Anatomy:   PCI: IVUS guided PCI of the LAD with 2 overlapping Synergy stents 3.0 mm x 32 mm with mid to ostial stenoses reduced from 80% to 0% mid and 90% to 0% ostial.  Postdilatation carried out with 3.5 mm NC balloon and 4.0 mm NC balloon. Note: Patient did have significant hypotension noted during intervention of ostium of LAD. Impression:  1. Critical stenosis of the LAD with successful IVUS relation x2. Plan:  1. DAPT for minimum of 6 months. 2.  Add beta-blocker if blood pressure tolerates. 3.  No ACE inhibitor/ARB given normal LV systolic function.

## 2022-09-09 NOTE — TELEPHONE ENCOUNTER
Hanna Petersen, daughter called wanting to see how things went with the Pt procedure. Please call to discuss.   Thank you

## 2022-09-09 NOTE — PROGRESS NOTES
Patient states that he has a significant headache. He denies frequent occurrence of headaches and does not note headaches with nitroglycerin typically. Per staff he was noted to have headache prior to procedure.   Due to recent procedure including blood thinners, would proceed with CT of the head without contrast.

## 2022-09-10 VITALS
DIASTOLIC BLOOD PRESSURE: 68 MMHG | HEIGHT: 70 IN | RESPIRATION RATE: 16 BRPM | TEMPERATURE: 97.6 F | WEIGHT: 158 LBS | HEART RATE: 76 BPM | BODY MASS INDEX: 22.62 KG/M2 | OXYGEN SATURATION: 96 % | SYSTOLIC BLOOD PRESSURE: 94 MMHG

## 2022-09-10 LAB
ANION GAP SERPL CALCULATED.3IONS-SCNC: 9 MMOL/L (ref 3–16)
BUN BLDV-MCNC: 6 MG/DL (ref 7–20)
CALCIUM SERPL-MCNC: 9.2 MG/DL (ref 8.3–10.6)
CHLORIDE BLD-SCNC: 107 MMOL/L (ref 99–110)
CO2: 24 MMOL/L (ref 21–32)
CREAT SERPL-MCNC: <0.5 MG/DL (ref 0.8–1.3)
EKG ATRIAL RATE: 68 BPM
EKG DIAGNOSIS: NORMAL
EKG Q-T INTERVAL: 378 MS
EKG QRS DURATION: 80 MS
EKG QTC CALCULATION (BAZETT): 405 MS
EKG R AXIS: -23 DEGREES
EKG T AXIS: -12 DEGREES
EKG VENTRICULAR RATE: 69 BPM
GFR AFRICAN AMERICAN: >60
GFR NON-AFRICAN AMERICAN: >60
GLUCOSE BLD-MCNC: 107 MG/DL (ref 70–99)
HCT VFR BLD CALC: 38.1 % (ref 40.5–52.5)
HEMOGLOBIN: 12.7 G/DL (ref 13.5–17.5)
MCH RBC QN AUTO: 30.7 PG (ref 26–34)
MCHC RBC AUTO-ENTMCNC: 33.4 G/DL (ref 31–36)
MCV RBC AUTO: 92 FL (ref 80–100)
PDW BLD-RTO: 13.2 % (ref 12.4–15.4)
PLATELET # BLD: 189 K/UL (ref 135–450)
PMV BLD AUTO: 7 FL (ref 5–10.5)
POTASSIUM SERPL-SCNC: 3.9 MMOL/L (ref 3.5–5.1)
RBC # BLD: 4.14 M/UL (ref 4.2–5.9)
SODIUM BLD-SCNC: 140 MMOL/L (ref 136–145)
WBC # BLD: 4.9 K/UL (ref 4–11)

## 2022-09-10 PROCEDURE — 93010 ELECTROCARDIOGRAM REPORT: CPT | Performed by: INTERNAL MEDICINE

## 2022-09-10 PROCEDURE — 6370000000 HC RX 637 (ALT 250 FOR IP): Performed by: INTERNAL MEDICINE

## 2022-09-10 PROCEDURE — 94640 AIRWAY INHALATION TREATMENT: CPT

## 2022-09-10 PROCEDURE — G0378 HOSPITAL OBSERVATION PER HR: HCPCS

## 2022-09-10 PROCEDURE — 2580000003 HC RX 258: Performed by: INTERNAL MEDICINE

## 2022-09-10 PROCEDURE — 80048 BASIC METABOLIC PNL TOTAL CA: CPT

## 2022-09-10 PROCEDURE — 85027 COMPLETE CBC AUTOMATED: CPT

## 2022-09-10 PROCEDURE — 94760 N-INVAS EAR/PLS OXIMETRY 1: CPT

## 2022-09-10 PROCEDURE — 99217 PR OBSERVATION CARE DISCHARGE MANAGEMENT: CPT | Performed by: INTERNAL MEDICINE

## 2022-09-10 RX ORDER — CLOPIDOGREL BISULFATE 75 MG/1
75 TABLET ORAL DAILY
Status: DISCONTINUED | OUTPATIENT
Start: 2022-09-10 | End: 2022-09-10 | Stop reason: HOSPADM

## 2022-09-10 RX ORDER — CLOPIDOGREL BISULFATE 75 MG/1
75 TABLET ORAL DAILY
Qty: 90 TABLET | Refills: 3 | Status: SHIPPED | OUTPATIENT
Start: 2022-09-10

## 2022-09-10 RX ORDER — NITROGLYCERIN 0.4 MG/1
0.4 TABLET SUBLINGUAL EVERY 5 MIN PRN
Qty: 25 TABLET | Refills: 3 | Status: SHIPPED | OUTPATIENT
Start: 2022-09-10

## 2022-09-10 RX ADMIN — ROSUVASTATIN 40 MG: 40 TABLET, FILM COATED ORAL at 09:11

## 2022-09-10 RX ADMIN — Medication 10 ML: at 09:12

## 2022-09-10 RX ADMIN — ASPIRIN 81 MG: 81 TABLET, COATED ORAL at 09:11

## 2022-09-10 RX ADMIN — Medication 2000 UNITS: at 09:11

## 2022-09-10 RX ADMIN — Medication 100 MCG: at 09:11

## 2022-09-10 RX ADMIN — DIGOXIN 125 MCG: 125 TABLET ORAL at 09:11

## 2022-09-10 RX ADMIN — CLOPIDOGREL BISULFATE 75 MG: 75 TABLET ORAL at 09:11

## 2022-09-10 RX ADMIN — PANTOPRAZOLE SODIUM 40 MG: 40 TABLET, DELAYED RELEASE ORAL at 07:44

## 2022-09-10 RX ADMIN — DOCUSATE SODIUM 100 MG: 100 CAPSULE, LIQUID FILLED ORAL at 09:11

## 2022-09-10 RX ADMIN — Medication 1 PUFF: at 09:32

## 2022-09-10 RX ADMIN — LEVOTHYROXINE SODIUM 125 MCG: 0.12 TABLET ORAL at 07:44

## 2022-09-10 NOTE — DISCHARGE SUMMARY
[address]                     [address] [facility], [address] [facility]                               DISCHARGE SUMMARY    PATIENT NAME: Gabriel Doss                     :        1950  MED REC NO:   [id number]                          ROOM:       2097  ACCOUNT NO:   [de-identified]                           ADMIT DATE: 2022  PROVIDER:     Mervat Hartley MD                  DISCHARGE DATE:    Please see history and physical for details of an admission. HOSPITAL COURSE:  The patient underwent same-day cardiac  catheterization. The patient underwent PCI of his left anterior  descending with two overlapping Synergy stents 3 x 32 mid ostial  stenosis reduced from 80-0% mid and 90-0% on the ostial LAD, post  dilatation with 3.5-mm noncompliant balloon and a 4-mm noncompliant  balloon. Hypotension occurred that resolved. The patient is doing fine  in his course afterwards, observed overnight. He did have considerable  headache, so CT scan was obtained, which was unremarkable. He has been  advised on low-fat, low-cholesterol diet. He is on aspirin 81 mg daily,  nitroglycerine sublingual p.r.n., Flovent as needed, Remeron nightly,  Crestor 40 mg daily, Lanoxin 0.125 daily, Cardizem CD will be stopped. He will take Lopressor 12.5 mg b.i.d., Plavix 75 mg daily, Synthroid 125  mcg daily, Protonix 20 mg b.i.d. He will be seen in followup in one  week's time. It is noted in the hospital course that his glucose 107,  creatinine less than 0.5, CBC within normal limits. Please note that 35 minutes of time was spent performing this discharge  summary.     Condition on discharge : Good  Discharge diagnosis_ Coronary artery disease with acute coronary syndrome s/p Percutaneous coronary intervention      Thelma Martinez MD    D: 09/10/2022 10:31:17       T: 09/10/2022 11:12:24     [email or web address]  Job#: [id number]     Doc#: [id number]    CC:

## 2022-09-10 NOTE — PROGRESS NOTES
No antiplatelet ordered, spoke with Dr. Simran Baeza see [facility] for new orders. Spoke with Jeffery Giraldo in pharmacy to verify Plavix bolus was given in CCL.

## 2022-09-10 NOTE — CARE COORDINATION
Patient discharged 9/10/2022 to home   All discharge needs met per case management     Aubree Bonilla RN, BSN  [phone]

## 2022-09-10 NOTE — DISCHARGE INSTRUCTIONS
Post Angiogram/ Intervention Discharge Instructions    Activity:  - You may drive in [place] unless instructed by your doctor  - Resume normal activity in one week  - Avoid lifting, pushing, or pulling more than 10 lbs. For one week. (A gallon of milk is 7 lbs)  -  Limit stair climbing to once a day for two weeks. - You may walk at an easy pace on ground level. -  Plan rest periods during the day. -  Avoid tension and stress. Learn to manage your stress. -  Avoid work that increases muscle tension.       (straining with bowel movements, moving furniture)    Wound Care:  -  You may shower, but no bathtubs, pools, or hot tubs for one week. -  Inspect area daily. Normal observations:  Soreness and tenderness that may last for       one week, mild pink to red oozing from incision site for up to 24 hrs after discharge,          bruising that could last up to two weeks. -  Clean with soap and water. NO lotion or powder. Dry area thoroughly. Apply                   band aide for 48 hrs.     Nutrition:   -  Low fat, low salt diet (guidelines for Heart Healthy eating)  -  Limit caffeine to 1-2 cups per day (coffee, tea, chocolate, soda)  -  Eat high fiber to avoid constipation and straining during bowel movements (fresh       veggies and fruit, whole grain)  - Limit alcohol to two servings a day ( 8 oz beer, 1 oz liquor, 4 oz wine)    Call your Doctor if you have:  -  Increased shortness of breath, weakness, or increased fatigue  -  A fast or a slow heartbeat  -  Problems or questions with your medications  -  Bleeding that is not stopped after holding pressure for 10 min  -  Feeling lightheaded or dizzy  -  Increased swelling or bruising of the groin or leg  -  Unusual pain, numbness or tingling at the groin or down the leg  -  Any signs of infection ( redness, yellow drainage, swelling, pain, fever)  -  Unusual swelling of lower legs/feet, chest discomfort, unusual weakness or fatigue

## 2022-09-12 ENCOUNTER — HOSPITAL ENCOUNTER (OUTPATIENT)
Age: 72
Setting detail: OBSERVATION
Discharge: HOME | End: 2022-09-13
Attending: EMERGENCY MEDICINE | Admitting: INTERNAL MEDICINE
Payer: MEDICARE

## 2022-09-12 ENCOUNTER — NURSE ONLY (OUTPATIENT)
Dept: CARDIOLOGY CLINIC | Age: 72
End: 2022-09-12
Payer: MEDICARE

## 2022-09-12 ENCOUNTER — CARE COORDINATION (OUTPATIENT)
Dept: CASE MANAGEMENT | Age: 72
End: 2022-09-12

## 2022-09-12 ENCOUNTER — APPOINTMENT (OUTPATIENT)
Dept: GENERAL RADIOLOGY | Age: 72
End: 2022-09-12
Payer: MEDICARE

## 2022-09-12 ENCOUNTER — TELEPHONE (OUTPATIENT)
Dept: CARDIOLOGY CLINIC | Age: 72
End: 2022-09-12

## 2022-09-12 DIAGNOSIS — Z45.09 ENCOUNTER FOR ELECTRONIC ANALYSIS OF REVEAL EVENT RECORDER: ICD-10-CM

## 2022-09-12 DIAGNOSIS — I20.9 ANGINA PECTORIS (HCC): Primary | ICD-10-CM

## 2022-09-12 DIAGNOSIS — I10 ESSENTIAL HYPERTENSION: Primary | ICD-10-CM

## 2022-09-12 DIAGNOSIS — I48.0 PAF (PAROXYSMAL ATRIAL FIBRILLATION) (HCC): Primary | Chronic | ICD-10-CM

## 2022-09-12 PROBLEM — R07.9 CHEST PAIN: Status: ACTIVE | Noted: 2022-09-12

## 2022-09-12 LAB
A/G RATIO: 1.5 (ref 1.1–2.2)
ALBUMIN SERPL-MCNC: 4.3 G/DL (ref 3.4–5)
ALP BLD-CCNC: 51 U/L (ref 40–129)
ALT SERPL-CCNC: 12 U/L (ref 10–40)
ANION GAP SERPL CALCULATED.3IONS-SCNC: 11 MMOL/L (ref 3–16)
AST SERPL-CCNC: 19 U/L (ref 15–37)
BASOPHILS ABSOLUTE: 0.1 K/UL (ref 0–0.2)
BASOPHILS RELATIVE PERCENT: 1.5 %
BILIRUB SERPL-MCNC: 0.6 MG/DL (ref 0–1)
BUN BLDV-MCNC: 12 MG/DL (ref 7–20)
CALCIUM SERPL-MCNC: 9.5 MG/DL (ref 8.3–10.6)
CHLORIDE BLD-SCNC: 103 MMOL/L (ref 99–110)
CO2: 26 MMOL/L (ref 21–32)
CREAT SERPL-MCNC: 0.5 MG/DL (ref 0.8–1.3)
EOSINOPHILS ABSOLUTE: 0.2 K/UL (ref 0–0.6)
EOSINOPHILS RELATIVE PERCENT: 2.8 %
GFR AFRICAN AMERICAN: >60
GFR NON-AFRICAN AMERICAN: >60
GLUCOSE BLD-MCNC: 92 MG/DL (ref 70–99)
HCT VFR BLD CALC: 41.4 % (ref 40.5–52.5)
HEMOGLOBIN: 13.9 G/DL (ref 13.5–17.5)
INR BLD: 0.97 (ref 0.87–1.14)
LYMPHOCYTES ABSOLUTE: 1.4 K/UL (ref 1–5.1)
LYMPHOCYTES RELATIVE PERCENT: 25.7 %
MCH RBC QN AUTO: 31.1 PG (ref 26–34)
MCHC RBC AUTO-ENTMCNC: 33.5 G/DL (ref 31–36)
MCV RBC AUTO: 92.9 FL (ref 80–100)
MONOCYTES ABSOLUTE: 0.5 K/UL (ref 0–1.3)
MONOCYTES RELATIVE PERCENT: 8.8 %
NEUTROPHILS ABSOLUTE: 3.3 K/UL (ref 1.7–7.7)
NEUTROPHILS RELATIVE PERCENT: 61.2 %
PDW BLD-RTO: 13.5 % (ref 12.4–15.4)
PLATELET # BLD: 249 K/UL (ref 135–450)
PMV BLD AUTO: 7.6 FL (ref 5–10.5)
POTASSIUM REFLEX MAGNESIUM: 4.1 MMOL/L (ref 3.5–5.1)
PRO-BNP: 101 PG/ML (ref 0–124)
PROTHROMBIN TIME: 12.8 SEC (ref 11.7–14.5)
RBC # BLD: 4.46 M/UL (ref 4.2–5.9)
SODIUM BLD-SCNC: 140 MMOL/L (ref 136–145)
TOTAL PROTEIN: 7.2 G/DL (ref 6.4–8.2)
TROPONIN: <0.01 NG/ML
WBC # BLD: 5.4 K/UL (ref 4–11)

## 2022-09-12 PROCEDURE — 85610 PROTHROMBIN TIME: CPT

## 2022-09-12 PROCEDURE — 80053 COMPREHEN METABOLIC PANEL: CPT

## 2022-09-12 PROCEDURE — G2066 INTER DEVC REMOTE 30D: HCPCS | Performed by: INTERNAL MEDICINE

## 2022-09-12 PROCEDURE — G0378 HOSPITAL OBSERVATION PER HR: HCPCS

## 2022-09-12 PROCEDURE — 93005 ELECTROCARDIOGRAM TRACING: CPT | Performed by: EMERGENCY MEDICINE

## 2022-09-12 PROCEDURE — 85025 COMPLETE CBC W/AUTO DIFF WBC: CPT

## 2022-09-12 PROCEDURE — 36415 COLL VENOUS BLD VENIPUNCTURE: CPT

## 2022-09-12 PROCEDURE — 99285 EMERGENCY DEPT VISIT HI MDM: CPT

## 2022-09-12 PROCEDURE — 6370000000 HC RX 637 (ALT 250 FOR IP): Performed by: INTERNAL MEDICINE

## 2022-09-12 PROCEDURE — 93298 REM INTERROG DEV EVAL SCRMS: CPT | Performed by: INTERNAL MEDICINE

## 2022-09-12 PROCEDURE — 71045 X-RAY EXAM CHEST 1 VIEW: CPT

## 2022-09-12 PROCEDURE — 83880 ASSAY OF NATRIURETIC PEPTIDE: CPT

## 2022-09-12 PROCEDURE — 84484 ASSAY OF TROPONIN QUANT: CPT

## 2022-09-12 PROCEDURE — 1111F DSCHRG MED/CURRENT MED MERGE: CPT | Performed by: FAMILY MEDICINE

## 2022-09-12 PROCEDURE — 2580000003 HC RX 258: Performed by: INTERNAL MEDICINE

## 2022-09-12 RX ORDER — ACETAMINOPHEN 650 MG/1
650 SUPPOSITORY RECTAL EVERY 6 HOURS PRN
Status: DISCONTINUED | OUTPATIENT
Start: 2022-09-12 | End: 2022-09-13 | Stop reason: HOSPADM

## 2022-09-12 RX ORDER — ONDANSETRON 2 MG/ML
4 INJECTION INTRAMUSCULAR; INTRAVENOUS EVERY 6 HOURS PRN
Status: DISCONTINUED | OUTPATIENT
Start: 2022-09-12 | End: 2022-09-13 | Stop reason: HOSPADM

## 2022-09-12 RX ORDER — DIGOXIN 125 MCG
125 TABLET ORAL DAILY
Status: DISCONTINUED | OUTPATIENT
Start: 2022-09-13 | End: 2022-09-13 | Stop reason: HOSPADM

## 2022-09-12 RX ORDER — ROSUVASTATIN CALCIUM 10 MG/1
40 TABLET, COATED ORAL DAILY
Status: DISCONTINUED | OUTPATIENT
Start: 2022-09-13 | End: 2022-09-13 | Stop reason: HOSPADM

## 2022-09-12 RX ORDER — POTASSIUM CHLORIDE 7.45 MG/ML
10 INJECTION INTRAVENOUS PRN
Status: DISCONTINUED | OUTPATIENT
Start: 2022-09-12 | End: 2022-09-13 | Stop reason: HOSPADM

## 2022-09-12 RX ORDER — DOCUSATE SODIUM 100 MG/1
100 CAPSULE, LIQUID FILLED ORAL 2 TIMES DAILY
Status: DISCONTINUED | OUTPATIENT
Start: 2022-09-12 | End: 2022-09-13 | Stop reason: HOSPADM

## 2022-09-12 RX ORDER — ENOXAPARIN SODIUM 100 MG/ML
40 INJECTION SUBCUTANEOUS DAILY
Status: DISCONTINUED | OUTPATIENT
Start: 2022-09-13 | End: 2022-09-13 | Stop reason: HOSPADM

## 2022-09-12 RX ORDER — LEVOTHYROXINE SODIUM 0.12 MG/1
125 TABLET ORAL DAILY
Status: DISCONTINUED | OUTPATIENT
Start: 2022-09-13 | End: 2022-09-13 | Stop reason: HOSPADM

## 2022-09-12 RX ORDER — MIRTAZAPINE 15 MG/1
7.5 TABLET, FILM COATED ORAL NIGHTLY
Status: DISCONTINUED | OUTPATIENT
Start: 2022-09-12 | End: 2022-09-13 | Stop reason: HOSPADM

## 2022-09-12 RX ORDER — MAGNESIUM SULFATE IN WATER 40 MG/ML
2000 INJECTION, SOLUTION INTRAVENOUS PRN
Status: DISCONTINUED | OUTPATIENT
Start: 2022-09-12 | End: 2022-09-13 | Stop reason: HOSPADM

## 2022-09-12 RX ORDER — SODIUM CHLORIDE 0.9 % (FLUSH) 0.9 %
10 SYRINGE (ML) INJECTION PRN
Status: DISCONTINUED | OUTPATIENT
Start: 2022-09-12 | End: 2022-09-13 | Stop reason: HOSPADM

## 2022-09-12 RX ORDER — FLUTICASONE PROPIONATE 110 UG/1
1 AEROSOL, METERED RESPIRATORY (INHALATION) 2 TIMES DAILY
Status: DISCONTINUED | OUTPATIENT
Start: 2022-09-12 | End: 2022-09-12 | Stop reason: ALTCHOICE

## 2022-09-12 RX ORDER — ASPIRIN 81 MG/1
81 TABLET, CHEWABLE ORAL DAILY
Status: DISCONTINUED | OUTPATIENT
Start: 2022-09-13 | End: 2022-09-13 | Stop reason: HOSPADM

## 2022-09-12 RX ORDER — PANTOPRAZOLE SODIUM 40 MG/1
40 TABLET, DELAYED RELEASE ORAL
Status: DISCONTINUED | OUTPATIENT
Start: 2022-09-13 | End: 2022-09-13 | Stop reason: HOSPADM

## 2022-09-12 RX ORDER — CLOPIDOGREL BISULFATE 75 MG/1
75 TABLET ORAL DAILY
Status: DISCONTINUED | OUTPATIENT
Start: 2022-09-13 | End: 2022-09-13 | Stop reason: HOSPADM

## 2022-09-12 RX ORDER — PROMETHAZINE HYDROCHLORIDE 25 MG/1
12.5 TABLET ORAL EVERY 6 HOURS PRN
Status: DISCONTINUED | OUTPATIENT
Start: 2022-09-12 | End: 2022-09-13 | Stop reason: HOSPADM

## 2022-09-12 RX ORDER — SODIUM CHLORIDE 9 MG/ML
INJECTION, SOLUTION INTRAVENOUS PRN
Status: DISCONTINUED | OUTPATIENT
Start: 2022-09-12 | End: 2022-09-13 | Stop reason: HOSPADM

## 2022-09-12 RX ORDER — ACETAMINOPHEN 325 MG/1
650 TABLET ORAL EVERY 6 HOURS PRN
Status: DISCONTINUED | OUTPATIENT
Start: 2022-09-12 | End: 2022-09-13 | Stop reason: HOSPADM

## 2022-09-12 RX ORDER — OXYCODONE HYDROCHLORIDE AND ACETAMINOPHEN 5; 325 MG/1; MG/1
1 TABLET ORAL 2 TIMES DAILY PRN
Status: DISCONTINUED | OUTPATIENT
Start: 2022-09-12 | End: 2022-09-13 | Stop reason: HOSPADM

## 2022-09-12 RX ORDER — SODIUM CHLORIDE 0.9 % (FLUSH) 0.9 %
10 SYRINGE (ML) INJECTION EVERY 12 HOURS SCHEDULED
Status: DISCONTINUED | OUTPATIENT
Start: 2022-09-12 | End: 2022-09-13 | Stop reason: HOSPADM

## 2022-09-12 RX ADMIN — METOPROLOL TARTRATE 12.5 MG: 25 TABLET, FILM COATED ORAL at 23:24

## 2022-09-12 RX ADMIN — Medication 10 ML: at 23:44

## 2022-09-12 RX ADMIN — DOCUSATE SODIUM 100 MG: 100 CAPSULE, LIQUID FILLED ORAL at 23:24

## 2022-09-12 RX ADMIN — MIRTAZAPINE 7.5 MG: 15 TABLET, FILM COATED ORAL at 23:23

## 2022-09-12 RX ADMIN — OXYCODONE AND ACETAMINOPHEN 1 TABLET: 5; 325 TABLET ORAL at 23:23

## 2022-09-12 ASSESSMENT — PAIN DESCRIPTION - DESCRIPTORS: DESCRIPTORS: DISCOMFORT

## 2022-09-12 ASSESSMENT — PAIN SCALES - GENERAL
PAINLEVEL_OUTOF10: 5
PAINLEVEL_OUTOF10: 5

## 2022-09-12 ASSESSMENT — PAIN - FUNCTIONAL ASSESSMENT: PAIN_FUNCTIONAL_ASSESSMENT: NONE - DENIES PAIN

## 2022-09-12 ASSESSMENT — HEART SCORE: ECG: 0

## 2022-09-12 ASSESSMENT — PAIN DESCRIPTION - LOCATION: LOCATION: CHEST;BACK

## 2022-09-12 NOTE — TELEPHONE ENCOUNTER
Received a care coordination message from [facility] regarding patient's symptoms of shortness of breath and nonexertional chest discomfort. Reviewed message with Dr. Richie Doe. Although etiology of patient's shortness of breath may be multifactorial and patient is having random chest pain (not exacerbated by exertion), he had critical stenosis of LAD and underwent RAJEEV placement x 2 on 9/9/22. Spoke to patient and discussed the importance of ER evaluation to trend troponins and get ECG to look for changes from prior tracing. Patient admits he does not want to go to the ER, he will follow the recommendations of Dr. Richie Doe and go to the ER.

## 2022-09-12 NOTE — ED PROVIDER NOTES
[facility] Emergency Department    Ruthie Pelayo MD, am the primary clinician of record. CHIEF COMPLAINT  Chief Complaint   Patient presents with    Chest Pain     Pt reports chest pain started today along with pressure. Pt has hx of stents. Pt took a nitro @ home with relief. Denies any symptoms @ this time but states cardiologist told pt to come to ER. HISTORY OF PRESENT ILLNESS  Jaden Oswald is a 81 y.o. male  who presents to the ED complaining of left-sided squeezing chest discomfort unprovoked today, similar to his previous anginal equivalent cardiac pain. He recently had stenting done and follows with Dr. Ynes De La Rosa, he was in touch with them regarding his symptoms today and advised to come to the ED so he presents today. He felt short of breath at the time as well. He has no leg swelling or belly pain compared to baseline. He denies any fevers chills or coughing. He has taken aspirin today. He took nitroglycerin tablets at home x2 and after the second dose his symptoms completely resolved. He is asymptomatic at this time. No other complaints, modifying factors or associated symptoms. I have reviewed the following from the nursing documentation. Past Medical History:   Diagnosis Date    Aortic valve insufficiency     Arthritis     Atrial fibrillation ([facility]     Cancer ([facility] 09/2017    TONGUE head and neck IN REMISSION    Dysphonia     Encounter for imaging to screen for metal prior to MRI 08/22/2022    MRI conditional Medtronic Linq loop recorder model#[id number] Reveal LINQ implanted 4/22/19. Normal Mode. 1.5T or 3.0T. Download data prior to MRI. Follow all other Medtronic guidelines.  Pt currently follows     GERD (gastroesophageal reflux disease)     Hearing loss     Heart disease     Medical history reviewed with no changes     Obstructive apnea does not use CPAP    Oropharyngeal dysphagia     Refusal of blood transfusions as patient is [employer]     Thyroid disease      Past Surgical History:   Procedure Laterality Date    ANKLE SURGERY      AORTIC VALVE REPLACEMENT  1/28/15    Dr. Nilsa Salinas 25mm Mosaic Ultra porcine valve; right and left modified maze procedure with ligation of DELGADO with [id number] Bhumika Mcnair      Arthroscopy    LUMBAR SPINE SURGERY Right 10/13/2020    RIGHT LUMBAR4-LUMBAR5 MICRO HEMILAMINECTOMY AND DISCECTOMY ([id number], [id number]) performed by Michael Ren MD at [facility]      collapsed lung due to broken ribs    MANDIBLE FRACTURE SURGERY      NECK SURGERY      Fusion    PAIN MANAGEMENT PROCEDURE Right 2020    RIGHT L4 AND L5 TRANSFORAMINAL EPIDURAL STEROID INJECTION WITH FLUOROSCOPY (99999,25323) performed by Kodak Marcano MD at [facility] Right 2020    RIGHT L4 AND L5 TRANSFORAMINAL EPIDURAL STEROID INJECTION WITH FLUOROSCOPY (61570,43457) performed by Kodak Marcano MD at [address] [place] [address]       Family History   Problem Relation Age of Onset    Heart Disease Other     High Blood Pressure Neg Hx     High Cholesterol Neg Hx      Social History     Socioeconomic History    Marital status:      Spouse name: Not on file    Number of children: 1    Years of education: Not on file    Highest education level: Not on file   Occupational History    Occupation: [employer]   Tobacco Use    Smoking status: Former     Packs/day: 1.00     Years: 40.00     Pack years: 40.00     Types: Cigarettes     Start date: 1975     Quit date: 2015     Years since quittin.6    Smokeless tobacco: Never    Tobacco comments:     Maintain cessation   Vaping Use    Vaping Use: Never used   Substance and Sexual Activity    Alcohol use: No     Alcohol/week: 0.0 standard drinks     Comment: NA beer    Drug use: No    Sexual activity: Not Currently     Partners: Female   Other fluticasone (FLOVENT HFA) 110 MCG/ACT inhaler Inhale 1 puff into the lungs in the morning and 1 puff in the evening. 12 g 3    levothyroxine (SYNTHROID) 125 MCG tablet Take 1 tablet by mouth once dailyTake 1 tablet by mouth once daily 30 tablet 2    vitamin B-12 (CYANOCOBALAMIN) 100 MCG tablet Take 1 tablet by mouth in the morning. 30 tablet 3    pantoprazole (PROTONIX) 40 MG tablet TAKE 1 TABLET BY MOUTH TWICE A DAY BEFORE MEALS 180 tablet 3    digoxin (LANOXIN) 125 MCG tablet TAKE 1 TABLET BY MOUTH EVERY DAY 30 tablet 3    mirtazapine (REMERON) 7.5 MG tablet TAKE 1 TABLET BY MOUTH EVERY DAY AT NIGHT 90 tablet 3    Cholecalciferol 50 MCG (2000 UT) TABS Take 2,000 Units by mouth daily      rosuvastatin (CRESTOR) 40 MG tablet Take 1 tablet by mouth daily 90 tablet 3    aspirin 81 MG EC tablet Take 81 mg by mouth daily      docusate sodium (COLACE) 100 MG capsule Take 100 mg by mouth 2 times daily. Allergies   Allergen Reactions    Naproxen Swelling     Lips  Pt does not recognize this being an intolerance    Hydrocodone Itching       REVIEW OF SYSTEMS  10 systems reviewed, pertinent positives per HPI otherwise noted to be negative. PHYSICAL EXAM  /78   Pulse 75   Temp 98.2 °F (36.8 °C) (Oral)   Resp 18   SpO2 98%    GENERAL APPEARANCE: Awake and alert. Cooperative. No distress. HENT: Normocephalic. Atraumatic. Mucous membranes are dry. NECK: Supple. EYES: PERRL. EOM's grossly intact. HEART/CHEST: RRR. No murmurs. No chest wall tenderness. LUNGS: Respirations unlabored. CTAB. Good air exchange. Speaking comfortably in full sentences. ABDOMEN: No tenderness. Soft. Non-distended. No masses. No organomegaly. No guarding or rebound. Normal bowel sounds throughout. MUSCULOSKELETAL: No extremity edema. Compartments soft. No deformity. No tenderness in the extremities. All extremities neurovascularly intact. SKIN: Warm and dry. No acute rashes. NEUROLOGICAL: Alert and oriented.  CN's 2-12 intact. No gross facial drooping. Strength 5/5, sensation intact. 2 plus DTR's in knees bilaterally. Gait normal.  PSYCHIATRIC: Normal mood and affect. LABS  I have reviewed all labs for this visit.    Results for orders placed or performed during the hospital encounter of 09/12/22   CBC with Auto Differential   Result Value Ref Range    WBC 5.4 4.0 - 11.0 K/uL    RBC 4.46 4.20 - 5.90 M/uL    Hemoglobin 13.9 13.5 - 17.5 g/dL    Hematocrit 41.4 40.5 - 52.5 %    MCV 92.9 80.0 - 100.0 fL    MCH 31.1 26.0 - 34.0 pg    MCHC 33.5 31.0 - 36.0 g/dL    RDW 13.5 12.4 - 15.4 %    Platelets 969 399 - 656 K/uL    MPV 7.6 5.0 - 10.5 fL    Neutrophils % 61.2 %    Lymphocytes % 25.7 %    Monocytes % 8.8 %    Eosinophils % 2.8 %    Basophils % 1.5 %    Neutrophils Absolute 3.3 1.7 - 7.7 K/uL    Lymphocytes Absolute 1.4 1.0 - 5.1 K/uL    Monocytes Absolute 0.5 0.0 - 1.3 K/uL    Eosinophils Absolute 0.2 0.0 - 0.6 K/uL    Basophils Absolute 0.1 0.0 - 0.2 K/uL   Comprehensive Metabolic Panel w/ Reflex to MG   Result Value Ref Range    Sodium 140 136 - 145 mmol/L    Potassium reflex Magnesium 4.1 3.5 - 5.1 mmol/L    Chloride 103 99 - 110 mmol/L    CO2 26 21 - 32 mmol/L    Anion Gap 11 3 - 16    Glucose 92 70 - 99 mg/dL    BUN 12 7 - 20 mg/dL    Creatinine 0.5 (L) 0.8 - 1.3 mg/dL    GFR Non-African American >60 >60    GFR African American >60 >60    Calcium 9.5 8.3 - 10.6 mg/dL    Total Protein 7.2 6.4 - 8.2 g/dL    Albumin 4.3 3.4 - 5.0 g/dL    Albumin/Globulin Ratio 1.5 1.1 - 2.2    Total Bilirubin 0.6 0.0 - 1.0 mg/dL    Alkaline Phosphatase 51 40 - 129 U/L    ALT 12 10 - 40 U/L    AST 19 15 - 37 U/L   Protime-INR   Result Value Ref Range    Protime 12.8 11.7 - 14.5 sec    INR 0.97 0.87 - 1.14   Troponin   Result Value Ref Range    Troponin <0.01 <0.01 ng/mL   Brain Natriuretic Peptide   Result Value Ref Range    Pro- 0 - 124 pg/mL   EKG 12 Lead   Result Value Ref Range    Ventricular Rate 75 BPM    Atrial Rate 75 BPM    P-R Interval 208 ms    QRS Duration 76 ms    Q-T Interval 368 ms    QTc Calculation (Bazett) 410 ms    P Axis 31 degrees    R Axis -19 degrees    T Axis 7 degrees    Diagnosis Normal sinus rhythm        The 12 lead EKG was interpreted by me as follows:  Rate: normal with a rate of 75  Rhythm: sinus  Axis: normal  Intervals: normal MA, narrow QRS, normal QTc  ST segments: no ST elevations or depressions  T waves: no abnormal inversions  Non-specific T wave changes: not present  Prior EKG comparison: EKG dated 9/9/22 is not significantly different    RADIOLOGY    XR CHEST PORTABLE    Result Date: 9/12/2022  EXAMINATION: ONE XRAY VIEW OF THE CHEST 9/12/2022 6:40 pm COMPARISON: August 8, 2022 HISTORY: ORDERING SYSTEM PROVIDED HISTORY: cp TECHNOLOGIST PROVIDED HISTORY: Reason for exam:->cp Reason for Exam: chest pain, initial evaluation, acute chest pain, nontraumatic FINDINGS: Stable post sternotomy changes. The cardiomediastinal silhouette is normal in size. The lungs are clear. No pleural effusion or pneumothorax is present. No acute cardiopulmonary process. ED COURSE/MDM  Patient seen and evaluated. Old records reviewed. Labs and imaging reviewed and results discussed with patient. After initial evaluation, differential diagnostic considerations included: acute coronary syndrome, pulmonary embolism, COPD/asthma, pneumonia, musculoskeletal, reflux/PUD/gastritis, pneumothorax, CHF, thoracic aortic dissection, anxiety    The patient's ED workup was notable for concern for anginal discomfort, similar to previous. He had a heart cath 3 days ago with stents placed. His vitals are reassuring here.   He did take 2 nitroglycerin tablets at home with resolution of his pain and is asymptomatic here, his EKG is unremarkable and his troponin is negative, however his heart score clearly landed him in the intermediate risk category so Kristin Gann from cardiology was consulted about the patient's ED history, physical, workup, and course so far. Recommendations from this consultant included admit for ongoing cardiac evaluation, serial troponins at minimum. HEART SCORE:    History: Highly Suspicious  ECG: Normal  Patient Age: > 65 years  *Risk factors for Atherosclerotic disease: Coronary Artery Disease  Risk Factors: > 3 Risk factors or history of atherosclerotic disease*  Troponin: < 1X normal limit  Heart Score Total: 6      Heart score: 6. This falls under the following category: Score of 4-6, which indicates low/moderate risk for major adverse cardiac event and supports observation with repeated troponins and/or non-invasive testing        Is this patient to be included in the SEP-1 Core Measure? No   Exclusion criteria - the patient is NOT to be included for SEP-1 Core Measure due to: Infection is not suspected    CLINICAL IMPRESSION  1. Angina pectoris (HCC)        Blood pressure 114/78, pulse 75, temperature 98.2 °F (36.8 °C), temperature source Oral, resp. rate 18, SpO2 98 %. DISPOSITION  Betsy Scott was admitted in stable condition. The plan is to admit to the hospital at this time under the hospitalist service. Hospitalist accepted the patient and will take over the patient's care. DISCLAIMER: This chart was created using Dragon dictation software. Efforts were made by me to ensure accuracy, however some errors may be present due to limitations of this technology and occasionally words are not transcribed correctly.         Henrique Hand MD  09/12/22 1949

## 2022-09-12 NOTE — PROGRESS NOTES
We received a remote transmission from patient's monitor at home. Remote Linq report shows symptom recordings showing NSR. Noted ST. EP physician to review. We will continue to monitor remotely. Implanted for palpitations. End of 31-day monitoring period 9-12-22.

## 2022-09-13 VITALS
DIASTOLIC BLOOD PRESSURE: 85 MMHG | HEART RATE: 73 BPM | TEMPERATURE: 97.9 F | RESPIRATION RATE: 16 BRPM | SYSTOLIC BLOOD PRESSURE: 122 MMHG | HEIGHT: 70 IN | WEIGHT: 162.6 LBS | OXYGEN SATURATION: 94 % | BODY MASS INDEX: 23.28 KG/M2

## 2022-09-13 DIAGNOSIS — I25.83 CORONARY ARTERY DISEASE DUE TO LIPID RICH PLAQUE: Primary | ICD-10-CM

## 2022-09-13 DIAGNOSIS — I25.10 CORONARY ARTERY DISEASE DUE TO LIPID RICH PLAQUE: Primary | ICD-10-CM

## 2022-09-13 LAB
A/G RATIO: 1.5 (ref 1.1–2.2)
ALBUMIN SERPL-MCNC: 3.8 G/DL (ref 3.4–5)
ALP BLD-CCNC: 43 U/L (ref 40–129)
ALT SERPL-CCNC: 9 U/L (ref 10–40)
ANION GAP SERPL CALCULATED.3IONS-SCNC: 8 MMOL/L (ref 3–16)
AST SERPL-CCNC: 14 U/L (ref 15–37)
BASOPHILS ABSOLUTE: 0.1 K/UL (ref 0–0.2)
BASOPHILS RELATIVE PERCENT: 1 %
BILIRUB SERPL-MCNC: 0.6 MG/DL (ref 0–1)
BUN BLDV-MCNC: 10 MG/DL (ref 7–20)
CALCIUM SERPL-MCNC: 8.8 MG/DL (ref 8.3–10.6)
CHLORIDE BLD-SCNC: 105 MMOL/L (ref 99–110)
CO2: 26 MMOL/L (ref 21–32)
CREAT SERPL-MCNC: 0.5 MG/DL (ref 0.8–1.3)
EKG ATRIAL RATE: 75 BPM
EKG DIAGNOSIS: NORMAL
EKG P AXIS: 31 DEGREES
EKG P-R INTERVAL: 208 MS
EKG Q-T INTERVAL: 368 MS
EKG QRS DURATION: 76 MS
EKG QTC CALCULATION (BAZETT): 410 MS
EKG R AXIS: -19 DEGREES
EKG T AXIS: 7 DEGREES
EKG VENTRICULAR RATE: 75 BPM
EOSINOPHILS ABSOLUTE: 0.2 K/UL (ref 0–0.6)
EOSINOPHILS RELATIVE PERCENT: 3.1 %
GFR AFRICAN AMERICAN: >60
GFR NON-AFRICAN AMERICAN: >60
GLUCOSE BLD-MCNC: 119 MG/DL (ref 70–99)
HCT VFR BLD CALC: 38.1 % (ref 40.5–52.5)
HEMOGLOBIN: 12.5 G/DL (ref 13.5–17.5)
LYMPHOCYTES ABSOLUTE: 1.1 K/UL (ref 1–5.1)
LYMPHOCYTES RELATIVE PERCENT: 21 %
MAGNESIUM: 2.2 MG/DL (ref 1.8–2.4)
MCH RBC QN AUTO: 30.4 PG (ref 26–34)
MCHC RBC AUTO-ENTMCNC: 32.8 G/DL (ref 31–36)
MCV RBC AUTO: 92.6 FL (ref 80–100)
MONOCYTES ABSOLUTE: 0.5 K/UL (ref 0–1.3)
MONOCYTES RELATIVE PERCENT: 8.5 %
NEUTROPHILS ABSOLUTE: 3.6 K/UL (ref 1.7–7.7)
NEUTROPHILS RELATIVE PERCENT: 66.4 %
PDW BLD-RTO: 13.6 % (ref 12.4–15.4)
PLATELET # BLD: 214 K/UL (ref 135–450)
PMV BLD AUTO: 6.8 FL (ref 5–10.5)
POTASSIUM REFLEX MAGNESIUM: 3.4 MMOL/L (ref 3.5–5.1)
RBC # BLD: 4.12 M/UL (ref 4.2–5.9)
SODIUM BLD-SCNC: 139 MMOL/L (ref 136–145)
TOTAL PROTEIN: 6.3 G/DL (ref 6.4–8.2)
TROPONIN: <0.01 NG/ML
WBC # BLD: 5.4 K/UL (ref 4–11)

## 2022-09-13 PROCEDURE — 83735 ASSAY OF MAGNESIUM: CPT

## 2022-09-13 PROCEDURE — 6370000000 HC RX 637 (ALT 250 FOR IP): Performed by: INTERNAL MEDICINE

## 2022-09-13 PROCEDURE — 85025 COMPLETE CBC W/AUTO DIFF WBC: CPT

## 2022-09-13 PROCEDURE — 93010 ELECTROCARDIOGRAM REPORT: CPT | Performed by: INTERNAL MEDICINE

## 2022-09-13 PROCEDURE — 99215 OFFICE O/P EST HI 40 MIN: CPT | Performed by: INTERNAL MEDICINE

## 2022-09-13 PROCEDURE — 2580000003 HC RX 258: Performed by: INTERNAL MEDICINE

## 2022-09-13 PROCEDURE — 84484 ASSAY OF TROPONIN QUANT: CPT

## 2022-09-13 PROCEDURE — 80053 COMPREHEN METABOLIC PANEL: CPT

## 2022-09-13 PROCEDURE — G0378 HOSPITAL OBSERVATION PER HR: HCPCS

## 2022-09-13 RX ORDER — NITROGLYCERIN 0.4 MG/1
TABLET SUBLINGUAL
Status: DISPENSED
Start: 2022-09-13 | End: 2022-09-13

## 2022-09-13 RX ADMIN — DIGOXIN 125 MCG: 125 TABLET ORAL at 11:55

## 2022-09-13 RX ADMIN — ASPIRIN 81 MG 81 MG: 81 TABLET ORAL at 11:55

## 2022-09-13 RX ADMIN — Medication 10 ML: at 08:31

## 2022-09-13 RX ADMIN — OXYCODONE AND ACETAMINOPHEN 1 TABLET: 5; 325 TABLET ORAL at 12:07

## 2022-09-13 RX ADMIN — CLOPIDOGREL BISULFATE 75 MG: 75 TABLET ORAL at 11:55

## 2022-09-13 RX ADMIN — ROSUVASTATIN 40 MG: 10 TABLET, FILM COATED ORAL at 11:54

## 2022-09-13 RX ADMIN — PANTOPRAZOLE SODIUM 40 MG: 40 TABLET, DELAYED RELEASE ORAL at 12:08

## 2022-09-13 RX ADMIN — METOPROLOL TARTRATE 12.5 MG: 25 TABLET, FILM COATED ORAL at 11:55

## 2022-09-13 ASSESSMENT — PAIN DESCRIPTION - LOCATION
LOCATION: BACK
LOCATION: BACK

## 2022-09-13 ASSESSMENT — PAIN SCALES - GENERAL
PAINLEVEL_OUTOF10: 4
PAINLEVEL_OUTOF10: 4

## 2022-09-13 ASSESSMENT — PAIN DESCRIPTION - DESCRIPTORS: DESCRIPTORS: DISCOMFORT

## 2022-09-13 NOTE — PROGRESS NOTES
Data- discharge order received, pt verbalized agreement to discharge, disposition to previous residence, no needs for HHC/DME. Action- discharge instructions prepared and given to patient, pt verbalized understanding. Medication information packet given r/t NEW and/or CHANGED prescriptions emphasizing name/purpose/side effects, pt verbalized understanding. Discharge instruction summary: Diet- general, Activity- up as tolerated, Primary Care Physician as follows: Mack Castillo [phone] f/u appointment in one week, immunizations reviewed, prescription medications filled pharmacy of choice. CHF Education reviewed. Pt/ Family has had a total of 60 minutes CHF education this admission encounter. 1. WEIGHT: Admit Weight: 162 lb 9.6 oz (73.8 kg) (09/13/22 0306)        Today  Weight: 162 lb 9.6 oz (73.8 kg) (09/13/22 0306)       2. O2 SAT.: SpO2: 94 % (09/13/22 1145)    Response- Pt belongings gathered, IV removed. Disposition is home (no HHC/DME needs), transported with family, taken to lobby via w/c w/ staff, no complications.

## 2022-09-13 NOTE — DISCHARGE SUMMARY
[facility]    Patient Demographics    Chetan. Marcus Nam  Date of Birth. 1950  MRN. [id number]     Primary care provider. Lucia Elaine MD  (Tel: [phone])    Admit date: 9/12/2022    Discharge date 9/13/2022  Note Date: 9/13/2022     Reason for Hospitalization. Chief Complaint   Patient presents with    Chest Pain     Pt reports chest pain started today along with pressure. Pt has hx of stents. Pt took a nitro @ home with relief. Denies any symptoms @ this time but states cardiologist told pt to come to ER. Significant Findings. Principal Problem:    Chest pain  Resolved Problems:    * No resolved hospital problems. *       Problems and results from this hospitalization that need follow up. Significant test results and incidental findings. Invasive procedures and treatments. [facility] Course. The patient was admitted with left sided reproducible chest pain. He has known CAD with recently stent placement of LAD on 9/9/22. He was admitted and evaluated by cardiology, Dr Hafsa Menendez. He had normal EKG and negative troponin levels. Pain was likely MS in nature and the patient will try OTC antiinflammatory medications. The patient was feeling better and was comfortable with d/c home. Consults. IP CONSULT TO CARDIOLOGY  IP CONSULT TO HOSPITALIST    Physical examination on discharge day. /85   Pulse 73   Temp 97.9 °F (36.6 °C) (Oral)   Resp 16   Ht 5' 10\" (1.778 m)   Wt 162 lb 9.6 oz (73.8 kg)   SpO2 94%   BMI 23.33 kg/m²   General appearance. Alert. Looks comfortable. HEENT. Sclera clear. Moist mucus membranes. Cardiovascular. Regular rate and rhythm, normal S1, S2. No murmur. Respiratory. Not using accessory muscles. Clear to auscultation bilaterally, no wheeze.   Gastrointestinal. Abdomen soft, non-tender, not distended, normal bowel sounds  Neurology. Facial symmetry. No speech deficits. Moving all extremities equally. Extremities. No edema in lower extremities. Skin. Warm, dry, normal turgor    Condition at time of discharge stable     Medication instructions provided to patient at discharge. Medication List        CONTINUE taking these medications      aspirin 81 MG EC tablet  Notes to patient: Use: to thin your blood to reduce risk of blood clots, heart attack, and stroke. Side effects: bleeding     Cholecalciferol 50 MCG (2000 UT) Tabs  Notes to patient:  fat-soluble vitamin that helps your body absorb calcium and phosphorus. clopidogrel 75 MG tablet  Commonly known as: Plavix  Take 1 tablet by mouth daily  Notes to patient: Uses: It can prevent stroke, heart attack, and other heart problems.   Side effects: bleeding, bruising, itching     digoxin 125 MCG tablet  Commonly known as: LANOXIN  TAKE 1 TABLET BY MOUTH EVERY DAY  Notes to patient: Digoxin (Lanoxin)  Use: maintain normal hearth rhythm; decrease heart failure symptoms  Side effects: dizziness, upset stomach, diarrhea; vision changes     docusate sodium 100 MG capsule  Commonly known as: COLACE  Notes to patient: Docusate Sodium (Colace)  Use: stool softener, prevents or treats constipation  Side effects: usually none     fluticasone 110 MCG/ACT inhaler  Commonly known as: FLOVENT HFA  Notes to patient: Steroid inhaler, preventing asthma attacks  Side effects: yeast infection of the mouth; rinse after each use to prevent this      levothyroxine 125 MCG tablet  Commonly known as: SYNTHROID  Take 1 tablet by mouth once dailyTake 1 tablet by mouth once daily  Notes to patient: Use: increases thyroid stimulating hormone  Side effects: weight loss, nervousness   For best results: take on an empty stomach, 30 minutes prior to eating     metoprolol tartrate 25 MG tablet  Commonly known as: LOPRESSOR  Take 0.5 tablets by mouth 2 times daily  Notes to patient: Use: decreases the workload of the heart to allow heart to pump easier. Helps lower heart rate. Side effects: dizziness, fatigue     mirtazapine 7.5 MG tablet  Commonly known as: REMERON  TAKE 1 TABLET BY MOUTH EVERY DAY AT NIGHT  Notes to patient: Use: is an antidepressant used to treat depression/anxietyu insomnia and also used as an appetite stimulant  Side effects: Up set stomach, constipation, drowsiness, dry mouth and generalized weakness. nitroGLYCERIN 0.4 MG SL tablet  Commonly known as: Nitrostat  Place 1 tablet under the tongue every 5 minutes as needed for Chest pain  Notes to patient: Use: treat and prevent chest pain; dilates the vessels to improve circulation  Side effects: headache, low blood pressure     oxyCODONE-acetaminophen 5-325 MG per tablet  Commonly known as: Percocet  Take 1 tablet by mouth 2 times daily as needed for Pain for up to 30 days. Intended supply: 5 days. Take lowest dose possible to manage pain  Notes to patient: Narcotic for pain management  Side effects: may cause drowsiness, constipation, impaired judgement  Nausea, vomiting     pantoprazole 40 MG tablet  Commonly known as: PROTONIX  TAKE 1 TABLET BY MOUTH TWICE A DAY BEFORE MEALS  Notes to patient: Pantoprozole/ Protonix  Use: reduces stomach acid, protects the digestive tract  Side effects: headache or diarrhea     rosuvastatin 40 MG tablet  Commonly known as: CRESTOR  Take 1 tablet by mouth daily  Notes to patient: Use: lower bad cholesterol  Side effects: headache, muscle pain, constipation, diarrhea     vitamin B-12 100 MCG tablet  Commonly known as: CYANOCOBALAMIN  Take 1 tablet by mouth in the morning.   Notes to patient: Dietary/vitamin supplement  Side effects: discolored urine             STOP taking these medications   ( pt was NOT taking these medications )     dilTIAZem 120 MG extended release capsule  Commonly known as: CARDIZEM CD     furosemide 20 MG tablet  Commonly known as: LASIX Discharge recommendations given to patient. Follow Up. in 1 week   Disposition. home  Activity. activity as tolerated  Diet: ADULT DIET; Regular      Spent 35 minutes in discharge process.     Signed:  ISIDRA Clark CNP     9/13/2022 2:26 PM

## 2022-09-13 NOTE — CONSULTS
[address] [facility]  [phone]        Reason for Consultation/Chief Complaint:     Chest Pain   Pt reports chest pain started today along with pressure. Pt has hx of stents. Pt took a nitro @ home with relief. Denies any symptoms @ this time but states cardiologist told pt to come to ER. History of Present Illness:  Betty Clark is a 81 y.o. patient who presented to the hospital with complaints of left-sided chest discomfort. Worse with certain movements and also with deep breathing. Recent stenting of LAD 9/9/22. He took [employer] before symptoms resolved. Troponin neg, Ekg unremarkable. He reports he was playing with his grandson when he began to feel shortness of breath and chest pain, he is unsure if he \"overdid it. \"     Pérez Holiday has a history of CAD, PAF s/p MAZE and DELGADO ligation and Atriclip (1/2015), aortic insufficiency (s/p AVR tissue 1/2015), and moderate MR. Past Medical History:   has a past medical history of Aortic valve insufficiency, Arthritis, Atrial fibrillation ([address] Cancer ([facility] [address] Dysphonia, Encounter for imaging to screen for metal prior to MRI, GERD (gastroesophageal reflux disease), Hearing loss, Heart disease, Medical history reviewed with no changes, Obstructive apnea, Oropharyngeal dysphagia, Refusal of blood transfusions as patient is Islam, and Thyroid disease. Surgical History:   has a past surgical history that includes Lung surgery; knee surgery; Neck surgery; hernia repair; Aortic valve replacement (1/28/15); Cardiac catheterization; Tonsillectomy and adenoidectomy; Mandible fracture surgery; Ankle surgery; back surgery; Pain management procedure (Right, 6/22/2020); Pain management procedure (Right, 7/8/2020); Atrial ablation surgery; and Lumbar spine surgery (Right, 10/13/2020). Social History:   reports that he quit smoking about 7 years ago. His smoking use included cigarettes. He started smoking about 47 years ago.  He has a 40.00 pack-year smoking history. He has never used smokeless tobacco. He reports that he does not drink alcohol and does not use drugs. Family History:  family history includes Heart Disease in an other family member. Home Medications:  Were reviewed and are listed in nursing record. and/or listed below  Prior to Admission medications    Medication Sig Start Date End Date Taking? Authorizing Provider   clopidogrel (PLAVIX) 75 MG tablet Take 1 tablet by mouth daily 9/10/22   Jennifer Urbina MD   metoprolol tartrate (LOPRESSOR) 25 MG tablet Take 0.5 tablets by mouth 2 times daily 9/10/22   Jennifer Urbina MD   nitroGLYCERIN (NITROSTAT) 0.4 MG SL tablet Place 1 tablet under the tongue every 5 minutes as needed for Chest pain 9/10/22   Jennifer Urbina MD   oxyCODONE-acetaminophen (PERCOCET) 5-325 MG per tablet Take 1 tablet by mouth 2 times daily as needed for Pain for up to 30 days. Intended supply: 5 days. Take lowest dose possible to manage pain 8/17/22 9/16/22  Celeste Jacob MD   fluticasone (FLOVENT HFA) 110 MCG/ACT inhaler Inhale 1 puff into the lungs in the morning and 1 puff in the evening. 8/11/22   ISIDRA De Luna - CNP   levothyroxine (SYNTHROID) 125 MCG tablet Take 1 tablet by mouth once dailyTake 1 tablet by mouth once daily 7/28/22   Dariana Gamboa MD   vitamin B-12 (CYANOCOBALAMIN) 100 MCG tablet Take 1 tablet by mouth in the morning. 7/28/22 7/28/23  Dariana Gamboa MD   pantoprazole (PROTONIX) 40 MG tablet TAKE 1 TABLET BY MOUTH TWICE A DAY BEFORE MEALS 2/28/22   Celeste Jacob MD   furosemide (LASIX) 20 MG tablet t1 tab by mouth daily. Call cardiology if swelling in legs worsens or shortness of breath occurs.  2/18/22 8/11/22  Celeste Jacob MD   digoxin (LANOXIN) 125 MCG tablet TAKE 1 TABLET BY MOUTH EVERY DAY 2/17/22   Doris Rios MD   mirtazapine (REMERON) 7.5 MG tablet TAKE 1 TABLET BY MOUTH EVERY DAY AT NIGHT 2/16/22   Dariana Gamboa MD   Cholecalciferol 50 MCG (2000 UT) TABS Take 2,000 Units by mouth daily    Historical Provider, MD   rosuvastatin (CRESTOR) 40 MG tablet Take 1 tablet by mouth daily 1/19/22   Adilene Chris MD   dilTIAZem (CARDIZEM CD) 120 MG extended release capsule Take 1 capsule by mouth daily 1/19/22 9/10/22  Adilene Chris MD   aspirin 81 MG EC tablet Take 81 mg by mouth daily    Historical Provider, MD   lisinopril (PRINIVIL;ZESTRIL) 2.5 MG tablet Take 0.5 tablets by mouth daily 3/22/21   Claudia [facility], APRN - CNP   docusate sodium (COLACE) 100 MG capsule Take 100 mg by mouth 2 times daily. Historical Provider, MD        Allergies:  Naproxen and Hydrocodone     Review of Systems:   A complete review of systems has been reviewed and updated today and is negative except as noted in the history of present illness.       Physical Examination:    Vitals:    09/13/22 1145   BP: 122/85   Pulse: 73   Resp: 16   Temp: 97.9 °F (36.6 °C)   SpO2: 94%    Weight: 162 lb 9.6 oz (73.8 kg)         General Appearance:  Alert, cooperative, no distress, appears stated age   Head:  Normocephalic, without obvious abnormality, atraumatic   Eyes:  EOMI, conjunctiva/corneas clear       Nose: Nares normal   Throat: Lips normal   Neck: Supple, symmetrical, trachea midline,  no carotid bruit or JVD       Lungs:   Clear to auscultation bilaterally, respirations unlabored   Chest Wall:  ++ Tenderness to palpation left chest   Heart:  Regular rate and rhythm, S1, S2 normal, no significant murmur, rub or gallop   Abdomen:   Soft, non-tender, bowel sounds active all four quadrants,  no masses, no organomegaly           Extremities: Extremities normal, atraumatic, no cyanosis or edema   Pulses: 2+ and symmetric   Skin: Skin color, texture, turgor normal, no rashes or lesions   Pysch: Normal mood and affect   Neurologic: Normal gross motor and sensory exam.         EKG:  I have reviewed EKG with the following interpretation:  Impression:    12-SEP-2022 18:44:27 [facility] pulmonary artery systolic pressure is mildly elevated at 39 mmHg  assuming a right atrial pressure of 8 mmHg. Right heart structures appear moderately dilated. The ascending aorta is dilated measuring 4.2 cm at largest diameter. [facility] 8/9/22 Lexi Calvert)   Impression/Recommendations:  Significant, stable proximal LAD disease. Cardiac MRI scheduled as outpatient for 8/26/22. Refer to Dr. Andrea Vincent. MI planning for tomorrow. Stress 4/6/22  Summary  The overall quality of the study is good. There is subdiaphragmatic attenuation. Left ventricular cavity size is mildly dilated. The right ventricle is normal in size. Spect imaging demonstrates a small area of mildly decreased perfusion in the apex. The defect is present at rest and stress without  associated wall motion abnormality, consistent with artifact. Sum stress score of 2. No visual TID. Calculated TID of 1.09. Left ventricular ejection fraction is normal at 71%. Sensitivity of testing for ischemia is reduced on diltiazem. Myocardial perfusion is normal. Low-moderate risk scan. [facility]   Cardiac cath 3/8/21: Lexi Calvert)  Left Heart Cath  Dominance: Right       LM: luminal irregularities  LAD: at least moderate calcification of long, eccentric 80% ostial to proximal stenosis ; 30% ostial first diagonal   LCx: 30-40% distal into OM2   RCA: tortuous with luminal irregularities     Did not cross SAVR      Impression/Recommendations:  CTS consult to discuss Redo Sternotomy for LIMA-LAD and significant, primary MR       Assessment/Plan:     Chest pain  - troponin negative  - recent LAD stenting 9/9/22  - costochondritis in nature     Plan:  No further ischemic work up needed at this time. Can try short course of antiinflammatory. CAD  - IVUS guided PCI to LAD 9/9/22   -Normal troponin and ECG. No further ischemic work up needed at this time.     Mitral / tricuspid regurgitation  - moderate to severe on echo 8/24/22  - moderate by cardiac MRI 8/23/22    -overall Moderate. Stable. Aortic insufficiency  - s/ p AVR tissue  2015  Stable     Mr Giovany Quijano is stable from a cardiac standpoint. Chest pain is non-cardiac. OK for discharge. Thank you for allowing us to participate in the care of Marcus Nam. Further evaluation will be based upon the patient's clinical course and testing results. All questions and concerns were addressed to the patient/family. Alternatives to my treatment were discussed. The note was completed using EMR. Every effort was made to ensure accuracy; however, inadvertent computerized transcription errors may be present.     Renate Florez M.D.

## 2022-09-13 NOTE — H&P
Hospital Medicine History & Physical      PCP: Isidro Duenas MD    Date of Admission: 9/12/2022    Date of Service: Pt seen/examined on 9/12/2022      Pt seen/examined face to face on and admitted as observation with expected LOS less than two midnights but that can change depending on respose to medical therapy and clinical progress. Chief Complaint:    Chief Complaint   Patient presents with    Chest Pain     Pt reports chest pain started today along with pressure. Pt has hx of stents. Pt took a nitro @ home with relief. Denies any symptoms @ this time but states cardiologist told pt to come to ER. History Of Present Illness:      81 y.o. male who presented to [facility] with past medical history of A. fib, GERD, CAD severe status post left anterior descending with 2 overlapping Synergy stents presented to the ED with chief complaint of chest pain. Patient was recently discharged on 09/10/2022 for chest pain. Patient reports that chest pain started today left-sided reported that he was sitting down not doing anything and then it started all of a sudden. No known alleviating exacerbating factor. Patient does report that the nitro sublingual did help a little bit. Patient admits being compliant with his aspirin and Plavix at home. Patient denies smoking drinking or drugs      Past Medical History:          Diagnosis Date    Aortic valve insufficiency     Arthritis     Atrial fibrillation ([address]     Cancer ([facility] [address] 09/2017    TONGUE head and neck IN REMISSION    Dysphonia     Encounter for imaging to screen for metal prior to MRI 08/22/2022    MRI conditional Medtronic Linq loop recorder model#[id number] Reveal LINQ implanted 4/22/19. Normal Mode. 1.5T or 3.0T. Download data prior to MRI. Follow all other Medtronic guidelines.  Pt currently follows     GERD (gastroesophageal reflux disease)     Hearing loss     Heart disease     Medical history reviewed with no changes Obstructive apnea does not use CPAP    Oropharyngeal dysphagia     Refusal of blood transfusions as patient is Yarsanism     Thyroid disease        Past Surgical History:          Procedure Laterality Date    ANKLE SURGERY      AORTIC VALVE REPLACEMENT  1/28/15    Dr. Alex Trivedi 25mm Mosaic Ultra porcine valve; right and left modified maze procedure with ligation of DELGADO with Atriclip    ATRIAL ABLATION SURGERY      BACK SURGERY      CSP    CARDIAC CATHETERIZATION      HERNIA REPAIR      KNEE SURGERY      Arthroscopy    LUMBAR SPINE SURGERY Right 10/13/2020    RIGHT LUMBAR4-LUMBAR5 MICRO HEMILAMINECTOMY AND DISCECTOMY ([id number], [id number]) performed by Radha Vincent MD at [facility]      collapsed lung due to broken ribs    MANDIBLE FRACTURE SURGERY      NECK SURGERY      Fusion    PAIN MANAGEMENT PROCEDURE Right 6/22/2020    RIGHT L4 AND L5 TRANSFORAMINAL EPIDURAL STEROID INJECTION WITH FLUOROSCOPY ([id number]) performed by Kate Clay MD at [facility] Right 7/8/2020    RIGHT L4 AND L5 TRANSFORAMINAL EPIDURAL STEROID INJECTION WITH FLUOROSCOPY ([id number]) performed by Kate Clay MD at [address] [place] [address]         Medications Prior to Admission:      Prior to Admission medications    Medication Sig Start Date End Date Taking? Authorizing Provider   clopidogrel (PLAVIX) 75 MG tablet Take 1 tablet by mouth daily 9/10/22   Gladis Andres MD   metoprolol tartrate (LOPRESSOR) 25 MG tablet Take 0.5 tablets by mouth 2 times daily 9/10/22   Gladis Andres MD   nitroGLYCERIN (NITROSTAT) 0.4 MG SL tablet Place 1 tablet under the tongue every 5 minutes as needed for Chest pain 9/10/22   Gladis Andres MD   oxyCODONE-acetaminophen (PERCOCET) 5-325 MG per tablet Take 1 tablet by mouth 2 times daily as needed for Pain for up to 30 days. Intended supply: 5 days.  Take lowest dose possible to manage pain 8/17/22 9/16/22  Isidro Duenas MD   fluticasone (FLOVENT HFA) 110 MCG/ACT inhaler Inhale 1 puff into the lungs in the morning and 1 puff in the evening. 8/11/22   ISIDRA Zayas CNP   levothyroxine (SYNTHROID) 125 MCG tablet Take 1 tablet by mouth once dailyTake 1 tablet by mouth once daily 7/28/22   Ashleigh Cazares MD   vitamin B-12 (CYANOCOBALAMIN) 100 MCG tablet Take 1 tablet by mouth in the morning. 7/28/22 7/28/23  Ashleigh Cazares MD   pantoprazole (PROTONIX) 40 MG tablet TAKE 1 TABLET BY MOUTH TWICE A DAY BEFORE MEALS 2/28/22   Alexandre Obrien MD   furosemide (LASIX) 20 MG tablet t1 tab by mouth daily. Call cardiology if swelling in legs worsens or shortness of breath occurs. 2/18/22 8/11/22  Alexandre Obrien MD   digoxin (LANOXIN) 125 MCG tablet TAKE 1 TABLET BY MOUTH EVERY DAY 2/17/22   Sylvia Mauricio MD   mirtazapine (REMERON) 7.5 MG tablet TAKE 1 TABLET BY MOUTH EVERY DAY AT NIGHT 2/16/22   Ashleigh Cazares MD   Cholecalciferol 50 MCG (2000 UT) TABS Take 2,000 Units by mouth daily    Historical Provider, MD   rosuvastatin (CRESTOR) 40 MG tablet Take 1 tablet by mouth daily 1/19/22   Sylvia Mauricio MD   dilTIAZem (CARDIZEM CD) 120 MG extended release capsule Take 1 capsule by mouth daily 1/19/22 9/10/22  Sylvia Mauricio MD   aspirin 81 MG EC tablet Take 81 mg by mouth daily    Historical Provider, MD   lisinopril (PRINIVIL;ZESTRIL) 2.5 MG tablet Take 0.5 tablets by mouth daily 3/22/21   White , APRN - CNP   docusate sodium (COLACE) 100 MG capsule Take 100 mg by mouth 2 times daily. Historical Provider, MD       Allergies:  Naproxen and Hydrocodone    Social History:          TOBACCO:   reports that he quit smoking about 7 years ago. His smoking use included cigarettes. He started smoking about 47 years ago. He has a 40.00 pack-year smoking history. He has never used smokeless tobacco.  ETOH:   reports no history of alcohol use.   E-cigarette/Vaping       Questions Responses    E-cigarette/Vaping Use Never User    Start Date     Passive Exposure No    Quit Date     Counseling Given No    Comments               Family History:      Family History reviewed with patient, and positive for heart disease paternally      Problem Relation Age of Onset    Heart Disease Other     High Blood Pressure Neg Hx     High Cholesterol Neg Hx        REVIEW OF SYSTEMS:     Constitutional:  No Fever, No Chills, No Night Sweats  ENT/Mouth:  No Nasal Congestion,  No Hoarseness, No new mouth lesion  Eyes:  No Eye Pain, No Redness, No Discharge  Cardiovascular: + Chest Pain, No Orthopnea, No Palpitations  Respiratory:  No Cough, No Sputum, No Dyspnea  Gastrointestinal: No Vomiting, No Diarrhea, No abdominal pain  Genitourinary: No Urinary Frequency, No Hematuria, No Urinary pain  Musculoskeletal:  No worsening Arthralgias, No worsening Myalgias  Skin:  No new Skin Lesions, No new skin rash  Neuro:  No new weakness, No new numbness. Psych:  No suicial ideation, No Violence ideation    PHYSICAL EXAM PERFORMED:    /69   Pulse 67   Temp 98 °F (36.7 °C) (Oral)   Resp 15   SpO2 93%     General appearance:  mild acute distress, appears older than stated age  HEENT:   atraumatic, sclera anicteric, Conjunctivae clear. Neck: Supple,Trachea midline, no goiter  Respiratory:minimal accessory muscle usage, Normal respiratory effort. Clear to auscultation, bilaterally without wheezing  Cardiovascular:  Regular rate and rhythm, capillary refill 2 seconds  Abdomen: Soft, non-tender, non-distended with normal bowel sounds. Musculoskeletal:  No clubbing, cyanosis. trace edema LE bilaterally. Skin: turgor normal.  No new rashes or lesions. Neurologic: Alert and oriented x4, no new focal sensory/motor deficits.      Labs:     Recent Labs     09/10/22  0510 09/12/22 1840 09/13/22 0218   WBC 4.9 5.4 5.4   HGB 12.7* 13.9 12.5*   HCT 38.1* 41.4 38.1*    249 214     Recent Labs     09/10/22  0510 09/12/22  1840 09/13/22  0218    140 139   K 3.9 4.1 3.4*    103 105   CO2 24 26 26   BUN 6* 12 10   CREATININE <0.5* 0.5* 0.5*   CALCIUM 9.2 9.5 8.8     Recent Labs     09/12/22  1840 09/13/22  0218   AST 19 14*   ALT 12 9*   BILITOT 0.6 0.6   ALKPHOS 51 43     Recent Labs     09/12/22  1840   INR 0.97     Recent Labs     09/12/22  1840 09/13/22  0218   TROPONINI <0.01 <0.01       Urinalysis:      Lab Results   Component Value Date/Time    NITRU Negative 01/27/2015 05:10 PM    BLOODU Negative 01/27/2015 05:10 PM    SPECGRAV 1.015 01/27/2015 05:10 PM    GLUCOSEU Negative 01/27/2015 05:10 PM       Radiology:     CXR: I have reviewed the CXR with the following interpretation:   No acute process  EKG:  I have reviewed the EKG with the following interpretation:   Normal sinus rhythm  XR CHEST PORTABLE   Preliminary Result   No acute cardiopulmonary process. ASSESSMENT AND PLAN:    Active Hospital Problems    Diagnosis Date Noted    Chest pain [R07.9] 09/12/2022     Priority: Medium     Atypical chest pain:  Troponin trend  Cardiology consulted, much appreciated    Hypokalemia: Replaced    Paroxysmal Atrial fibrilliation: unspecified and clinically unable to determine etiology. Controlled on home medication. Not on anticoagulation, will defer to cardiology  [id number] Score for Atrial Fibrillation Stroke Risk   Risk   Factors  Component Value   C CHF No 0   H HTN Yes 1   A2 Age >= 76 No,  (71 y.o.) 0   D DM No 0   S2 Prior Stroke/TIA Yes 2   V Vascular Disease Yes 1   A Age 74-69 Yes,  (71 y.o.) 1   Sc Sex male 0    [id number]  Score  5   Score last updated 9/13/22 9:55 AM EDT    Click here for a link to the UpToDate guideline \"Atrial Fibrillation: Anticoagulation therapy to prevent embolization    Disclaimer: Risk Score calculation is dependent on accuracy of patient problem list and past encounter diagnosis.      Asthma: Not in acute exacerbation, continue inhalers  Hypothyroidism: Continue home medication  Hyperlipidemia: Controlled on home Statin.  Outpatient PCP follow up post-discharge  Constipation: Continue home medication  GERD: Continue home medication    Diet: NPO except meds ordered    DVT Prophylaxis: lovenox    Dispo:     Expected LOS less than two [address], DO

## 2022-09-14 ENCOUNTER — CARE COORDINATION (OUTPATIENT)
Dept: CASE MANAGEMENT | Age: 72
End: 2022-09-14

## 2022-09-14 DIAGNOSIS — I25.83 CORONARY ARTERY DISEASE DUE TO LIPID RICH PLAQUE: Primary | ICD-10-CM

## 2022-09-14 DIAGNOSIS — I25.10 CORONARY ARTERY DISEASE DUE TO LIPID RICH PLAQUE: Primary | ICD-10-CM

## 2022-09-14 PROCEDURE — 1111F DSCHRG MED/CURRENT MED MERGE: CPT | Performed by: FAMILY MEDICINE

## 2022-09-14 NOTE — CARE COORDINATION
[facility] Transitions Initial Follow Up Call:    Patient will follow up with cardiology 22. He will call PCP to schedule follow up appointment. Patient reports that he is doing well. He is out at the grocery, denies any SOB, chest pain. His insertion site at wrist is healing well, without redness, swelling and/or drainage. Discussed discharge instructions and reviewed medications, 1111F completed. CTN also reviewed discontinued medications, cardizem & Lasix. CTN will continue with outreach follow up calls. Call within 2 business days of discharge: Yes    Patient: Cecily Mercado Patient : 1950   MRN: [id number]  Reason for Admission: cardiac stents x 2  Discharge Date: 22 RARS: Readmission Risk Score: 12.8      Last Discharge [facility]       Date Complaint Diagnosis Description Type Department Provider    22 Chest Pain Angina pectoris [facility]) ED to Hosp-Admission (Discharged) (ADMITTED) FZ 3A Kelley Maddox MD; Luigi Saxena Spoke with: Cecily Mercado      Transitions of Care Initial Call    Was this an external facility discharge? No Discharge Facility:     Challenges to be reviewed by the provider   Additional needs identified to be addressed with provider: No  none             Method of communication with provider : none    Advance Care Planning:   Does patient have an Advance Directive: reviewed and current. Care Transition Nurse contacted the patient by telephone to perform post hospital discharge assessment. Verified name and  with patient as identifiers. Provided introduction to self, and explanation of the CTN role. CTN reviewed discharge instructions, medical action plan and red flags with patient who verbalized understanding. Patient given an opportunity to ask questions and does not have any further questions or concerns at this time. Were discharge instructions available to patient? Yes.  Reviewed appropriate site of care based on symptoms and resources available to patient including: PCP  Urgent care clinics  When to call 911. The patient agrees to contact the PCP office for questions related to their healthcare. Medication reconciliation was performed with patient, who verbalizes understanding of administration of home medications. Advised obtaining a 90-day supply of all daily and as-needed medications. Was patient discharged with a pulse oximeter? no    CTN provided contact information. Plan for follow-up call in 5-7 days based on severity of symptoms and risk factors. Plan for next call: symptom management-.  self management-.  follow up appointment-.         Non-face-to-face services provided:  Obtained and reviewed discharge summary and/or continuity of care documents    Care Transitions 24 Hour Call    Do you have a copy of your discharge instructions?: Yes  Do you have all of your prescriptions and are they filled?: Yes  Have you been contacted by a [address]?: No  Have you scheduled your follow up appointment?: No  Patient DME: Straight cane  Do you have support at home?: Alone  Do you feel like you have everything you need to keep you well at home?: Yes  Are you an active caregiver in your home?: No  Care Transitions Interventions         Follow Up  Future Appointments   Date Time Provider Vikki Caal   9/28/2022  9:00 AM SCHEDULE, [facility] VASCULAR RM 1 [facility]   9/30/2022 10:00 AM ISIDRA Powell - CNP FF Cardio [facility]   10/17/2022  8:00 AM SCHEDULE, [facility] REMOTE TRANSMISSION FF Cardio MMA   10/18/2022  8:45 AM Cortes Bowens MD PULM & CC [facility]   11/21/2022  8:00 AM SCHEDULE, [employer] REMOTE TRANSMISSION FF Cardio MMA   12/26/2022 11:00 AM SCHEDULE, [facility] TRANSMISSION FF Cardio MMA   1/30/2023 11:00 AM SCHEDULE, [employer] REMOTE TRANSMISSION FF Cardio MMA   3/6/2023 11:00 AM SCHEDULE, [facility] TRANSMISSION FF Cardio MMA   4/10/2023 11:00 AM SCHEDULE, [employer] REMOTE TRANSMISSION FF Cardio MMA   5/15/2023 11:00 AM SCHEDULE, [employer] REMOTE TRANSMISSION FF Cardio MMA   6/19/2023 11:00 AM SCHEDULE, [employer] REMOTE TRANSMISSION FF Cardio MMA   7/24/2023 11:00 AM SCHEDULE, [employer] REMOTE TRANSMISSION FF Cardio MMA   8/28/2023 11:00 AM SCHEDULE, [employer] REMOTE TRANSMISSION FF Cardio MMA   10/2/2023 11:00 AM SCHEDULE, [employer] REMOTE TRANSMISSION FF Cardio [facility]   11/6/2023 11:00 AM SCHEDULE, [employer] REMOTE TRANSMISSION FF Cardio [facility]   12/11/2023 11:00 AM SCHEDULE, [employer] REMOTE TRANSMISSION FF Cardio [facility]       Martyn Schwab, RN

## 2022-09-19 RX ORDER — DIGOXIN 125 MCG
TABLET ORAL
Qty: 30 TABLET | Refills: 5 | Status: SHIPPED | OUTPATIENT
Start: 2022-09-19

## 2022-09-19 NOTE — TELEPHONE ENCOUNTER
Received refill request for Digoxin from [facility] : 8/16/22 NPTS    Last EKG : 9/12/22     Last Refill : 2/17/22 30 w3 refills

## 2022-09-21 ENCOUNTER — CARE COORDINATION (OUTPATIENT)
Dept: CASE MANAGEMENT | Age: 72
End: 2022-09-21

## 2022-09-21 NOTE — CARE COORDINATION
[facility] Transitions Follow Up Call    2022    Patient: Melissa Black  Patient : 1950   MRN: [id number]  Reason for Admission: Chest pain  Discharge Date: 22 RARS: Readmission Risk Score: 12.8    Spoke with: Melissa Black who reports that he is doing good. Patient denies cp, sob, cough, dizziness, headache, n/v, diarrhea, abdominal pains, fever, or chills. Patient report that appetite and fluid intake is good and denies any problems with bowel or bladder. Patient is taking all medications as ordered. Patient reports that he feels good. The stent placement puncture sites look good no c/o. Denies any needs at this time. Patient instructed to continue to monitor s/s, reporting any that may present to MD immediately for early intervention. Agreeable to f/u calls. Care Transitions Follow Up Call    Needs to be reviewed by the provider   Additional needs identified to be addressed with provider: No  none             Method of communication with provider : none      LPN Care Coordinator contacted the patient by telephone to follow up after admission on  and  . Verified name and  with patient as identifiers. Addressed changes since last contact: none  Discussed follow-up appointments. If no appointment was previously scheduled, appointment scheduling offered: Yes. Is follow up appointment scheduled within 7 days of discharge? Yes. LPN CC reviewed discharge instructions, medical action plan and red flags with patient and discussed any barriers to care and/or understanding of plan of care after discharge. Discussed appropriate site of care based on symptoms and resources available to patient including: PCP  Specialist  When to call 911. The patient agrees to contact the PCP office for questions related to their healthcare.      Patients top risk factors for readmission: medical condition-CAD, STENT, AFIB  Interventions to address risk factors: Assessment and support for treatment adherence and medication management-;      Non-[facility] follow up appointment(s): KATELYN    LPN CC provided contact information for future needs. Plan for follow-up call in 5-7 days based on severity of symptoms and risk factors. Plan for next call: symptom management-.          Care Transitions Subsequent and Final Call    Subsequent and Final Calls  Do you have any ongoing symptoms?: No  Have your medications changed?: No  Do you have any questions related to your medications?: No  Do you currently have any active services?: No  Do you have any needs or concerns that I can assist you with?: No  Identified Barriers: None  Care Transitions Interventions  No Identified Needs  Other Interventions:              Follow Up  Future Appointments   Date Time Provider Vikki Caal   9/28/2022  9:00 AM SCHEDULE, [facility] VASCULAR RM 1 [facility] VAS Scientologist HOD   9/30/2022 10:00 AM ISIDRA Macedo - CNP FF Cardio MMA   10/17/2022  8:00 AM SCHEDULE, [place] REMOTE TRANSMISSION FF Cardio MMA   10/18/2022  8:45 AM Devin Palomo MD PULM & CC MMA   11/21/2022  8:00 AM SCHEDULE, [place] REMOTE TRANSMISSION FF Cardio MMA   12/26/2022 11:00 AM SCHEDULE, [place] REMOTE TRANSMISSION FF Cardio MMA   1/30/2023 11:00 AM SCHEDULE, [place] REMOTE TRANSMISSION FF Cardio MMA   3/6/2023 11:00 AM SCHEDULE, [place] REMOTE TRANSMISSION FF Cardio MMA   4/10/2023 11:00 AM SCHEDULE, [place] REMOTE TRANSMISSION FF Cardio MMA   5/15/2023 11:00 AM SCHEDULE, [place] REMOTE TRANSMISSION FF Cardio MMA   6/19/2023 11:00 AM SCHEDULE, [place] REMOTE TRANSMISSION FF Cardio MMA   7/24/2023 11:00 AM SCHEDULE, [place] REMOTE TRANSMISSION FF Cardio MMA   8/28/2023 11:00 AM SCHEDULE, [place] REMOTE TRANSMISSION FF Cardio MMA   10/2/2023 11:00 AM SCHEDULE, [place] REMOTE TRANSMISSION FF Cardio MMA   11/6/2023 11:00 AM SCHEDULE, [place] REMOTE TRANSMISSION FF Cardio MMA   12/11/2023 11:00 AM SCHEDULE, [facility] TRANSMISSION FF Cardio VIBHA Leonard, AMARISN

## 2022-09-23 ENCOUNTER — OFFICE VISIT (OUTPATIENT)
Dept: FAMILY MEDICINE CLINIC | Age: 72
End: 2022-09-23
Payer: MEDICARE

## 2022-09-23 VITALS
BODY MASS INDEX: 23.19 KG/M2 | DIASTOLIC BLOOD PRESSURE: 60 MMHG | OXYGEN SATURATION: 96 % | WEIGHT: 162 LBS | HEART RATE: 76 BPM | TEMPERATURE: 97.8 F | HEIGHT: 70 IN | SYSTOLIC BLOOD PRESSURE: 122 MMHG

## 2022-09-23 DIAGNOSIS — I10 ESSENTIAL HYPERTENSION: Chronic | ICD-10-CM

## 2022-09-23 DIAGNOSIS — J41.0 SIMPLE CHRONIC BRONCHITIS (HCC): ICD-10-CM

## 2022-09-23 DIAGNOSIS — I25.9 CHRONIC ISCHEMIC HEART DISEASE: Chronic | ICD-10-CM

## 2022-09-23 DIAGNOSIS — J06.9 UPPER RESPIRATORY TRACT INFECTION, UNSPECIFIED TYPE: Primary | ICD-10-CM

## 2022-09-23 DIAGNOSIS — I48.0 PAF (PAROXYSMAL ATRIAL FIBRILLATION) (HCC): Chronic | ICD-10-CM

## 2022-09-23 LAB
INFLUENZA A ANTIBODY: NORMAL
INFLUENZA B ANTIBODY: NORMAL
Lab: NORMAL
QC PASS/FAIL: NORMAL
SARS-COV-2 RDRP RESP QL NAA+PROBE: NEGATIVE

## 2022-09-23 PROCEDURE — 3017F COLORECTAL CA SCREEN DOC REV: CPT | Performed by: FAMILY MEDICINE

## 2022-09-23 PROCEDURE — 1123F ACP DISCUSS/DSCN MKR DOCD: CPT | Performed by: FAMILY MEDICINE

## 2022-09-23 PROCEDURE — G8420 CALC BMI NORM PARAMETERS: HCPCS | Performed by: FAMILY MEDICINE

## 2022-09-23 PROCEDURE — 99214 OFFICE O/P EST MOD 30 MIN: CPT | Performed by: FAMILY MEDICINE

## 2022-09-23 PROCEDURE — 1036F TOBACCO NON-USER: CPT | Performed by: FAMILY MEDICINE

## 2022-09-23 PROCEDURE — 87635 SARS-COV-2 COVID-19 AMP PRB: CPT | Performed by: FAMILY MEDICINE

## 2022-09-23 PROCEDURE — 87804 INFLUENZA ASSAY W/OPTIC: CPT | Performed by: FAMILY MEDICINE

## 2022-09-23 PROCEDURE — 3023F SPIROM DOC REV: CPT | Performed by: FAMILY MEDICINE

## 2022-09-23 PROCEDURE — G8427 DOCREV CUR MEDS BY ELIG CLIN: HCPCS | Performed by: FAMILY MEDICINE

## 2022-09-23 RX ORDER — DOXYCYCLINE HYCLATE 100 MG/1
100 CAPSULE ORAL 2 TIMES DAILY
Qty: 20 CAPSULE | Refills: 0 | Status: SHIPPED | OUTPATIENT
Start: 2022-09-23 | End: 2022-10-03

## 2022-09-23 RX ORDER — OXYCODONE AND ACETAMINOPHEN 10; 325 MG/1; MG/1
1 TABLET ORAL EVERY 4 HOURS PRN
COMMUNITY

## 2022-09-23 NOTE — PROGRESS NOTES
Criss Heath   YOB: 1950    Date of Visit:  9/23/2022    Allergies   Allergen Reactions    Naproxen Swelling     Lips  Pt does not recognize this being an intolerance    Hydrocodone Itching     Outpatient Medications Marked as Taking for the 9/23/22 encounter (Office Visit) with Ignacio aRi MD   Medication Sig Dispense Refill    oxyCODONE-acetaminophen (PERCOCET)  MG per tablet Take 1 tablet by mouth every 4 hours as needed for Pain. doxycycline hyclate (VIBRAMYCIN) 100 MG capsule Take 1 capsule by mouth 2 times daily for 10 days 20 capsule 0    digoxin (LANOXIN) 125 MCG tablet Take 1 tablet by mouth once daily 30 tablet 5    clopidogrel (PLAVIX) 75 MG tablet Take 1 tablet by mouth daily 90 tablet 3    metoprolol tartrate (LOPRESSOR) 25 MG tablet Take 0.5 tablets by mouth 2 times daily 60 tablet 3    nitroGLYCERIN (NITROSTAT) 0.4 MG SL tablet Place 1 tablet under the tongue every 5 minutes as needed for Chest pain 25 tablet 3    fluticasone (FLOVENT HFA) 110 MCG/ACT inhaler Inhale 1 puff into the lungs in the morning and 1 puff in the evening. 12 g 3    levothyroxine (SYNTHROID) 125 MCG tablet Take 1 tablet by mouth once dailyTake 1 tablet by mouth once daily 30 tablet 2    vitamin B-12 (CYANOCOBALAMIN) 100 MCG tablet Take 1 tablet by mouth in the morning. 30 tablet 3    pantoprazole (PROTONIX) 40 MG tablet TAKE 1 TABLET BY MOUTH TWICE A DAY BEFORE MEALS 180 tablet 3    mirtazapine (REMERON) 7.5 MG tablet TAKE 1 TABLET BY MOUTH EVERY DAY AT NIGHT 90 tablet 3    Cholecalciferol 50 MCG (2000 UT) TABS Take 2,000 Units by mouth daily      rosuvastatin (CRESTOR) 40 MG tablet Take 1 tablet by mouth daily 90 tablet 3    aspirin 81 MG EC tablet Take 81 mg by mouth daily      docusate sodium (COLACE) 100 MG capsule Take 100 mg by mouth 2 times daily.            Vitals:    09/23/22 1317   BP: 122/60   Site: Left Upper Arm   Position: Sitting   Cuff Size: Small Adult   Pulse: 76   Temp: 97.8 °F (36.6 °C)   TempSrc: Oral   SpO2: 96%   Weight: 162 lb (73.5 kg)   Height: 5' 10\" (1.778 m)     Body mass index is 23.24 kg/m². Wt Readings from Last 3 Encounters:   09/23/22 162 lb (73.5 kg)   09/13/22 162 lb 9.6 oz (73.8 kg)   09/10/22 158 lb (71.7 kg)     BP Readings from Last 3 Encounters:   09/23/22 122/60   09/13/22 122/85   09/10/22 94/68        Chief Complaint   Patient presents with    Headache    Fatigue    Insomnia     Dizziness- has heart problems       HPI  The patient has a history of atrial fibrillation (he is not taking blood thinners due to the risk of bleeding and refusal of blood products given Alevism preference) and severe MR. He sees cardiology regularly. He was admitted last month for chest pain. Left heart cath showed paroxysmal LAD. He presents today as he isn't feeling well today. He is having a mild headache and fatigue and has mild dizziness. His appetite is decreased. Urinated 4 times in an hour which is not normal for him. His HR went up to 85.  + congestion. + rhinorrhea. No fevers. No cough. He is awaiting for an ablation as had recurrent atrial fibrillation. Had a negative home covid test. Just got a flu shot- 2 weeks ago. No shortness of breath. No chest pain. 5 weeks ago cardiology cut his walks and biking down. Not allowed to exercise. Reports he doesn't felt that bad- his symptoms today are milder then they have been in the last month or two. Chronic pain: The patient is on Norco as needed twice daily. COPD: The patient is seeing pulmonology. Lives alone in a senior community.      Social History     Socioeconomic History    Marital status:      Spouse name: Not on file    Number of children: 1    Years of education: Not on file    Highest education level: Not on file   Occupational History    Occupation: [employer]   Tobacco Use    Smoking status: Former     Packs/day: 1.00     Years: 40.00     Pack years: 40.00     Types: Cigarettes     Start date: 1975     Quit date: 2015     Years since quittin.6    Smokeless tobacco: Never    Tobacco comments:     Maintain cessation   Vaping Use    Vaping Use: Never used   Substance and Sexual Activity    Alcohol use: No     Alcohol/week: 0.0 standard drinks     Comment: NA beer    Drug use: No    Sexual activity: Not Currently     Partners: Female   Other Topics Concern    Not on file   Social History Narrative    Not on file     Social Determinants of Health     Financial Resource Strain: Medium Risk    Difficulty of Paying Living Expenses: Somewhat hard   Food Insecurity: No Food Insecurity    Worried About Running Out of Food in the Last Year: Never true    Ran Out of Food in the Last Year: Never true   Transportation Needs: No Transportation Needs    Lack of Transportation (Medical): No    Lack of Transportation (Non-Medical): No   Physical Activity: Sufficiently Active    Days of Exercise per Week: 1 day    Minutes of Exercise per Session: 150+ min   Stress: Stress Concern Present    Feeling of Stress : Very much   Social Connections: Moderately Integrated    Frequency of Communication with Friends and Family: More than three times a week    Frequency of Social Gatherings with Friends and Family: Once a week    Attends [employer] Services: More than 4 times per year    Active Member of [address] [place] [address] [employer] or Organizations: Yes    Attends Club or Organization Meetings: More than 4 times per year    Marital Status:    Intimate Partner Violence: Not on file   Housing Stability: [address] to Pay for Housing in the Last Year: No    Number of [place] in the Last Year: 2    Unstable Housing in the Last Year: No         Review of Systems  As documented in the HPI. Currently no complaints of CP or ERIN. Physical Exam  Constitutional:       General: He is not in acute distress. Appearance: He is well-developed. HENT:      Head: Normocephalic and atraumatic.       Right Ear: Tympanic membrane, ear canal and external ear normal. There is no impacted cerumen. Left Ear: Tympanic membrane, ear canal and external ear normal. There is no impacted cerumen. Nose: Nose normal.      Mouth/Throat:      Mouth: Mucous membranes are moist.      Pharynx: No oropharyngeal exudate or posterior oropharyngeal erythema. Eyes:      General:         Right eye: No discharge. Left eye: No discharge. Conjunctiva/sclera: Conjunctivae normal.   Neck:      Thyroid: No thyromegaly. Trachea: No tracheal deviation. Cardiovascular:      Rate and Rhythm: Normal rate and regular rhythm. Heart sounds: Normal heart sounds. No murmur heard. Pulmonary:      Effort: Pulmonary effort is normal. No respiratory distress. Breath sounds: Normal breath sounds. No stridor. Musculoskeletal:      Cervical back: Neck supple. No rigidity. No muscular tenderness. Lymphadenopathy:      Cervical: No cervical adenopathy. Skin:     General: Skin is warm and dry. Findings: No rash. Neurological:      General: No focal deficit present. Mental Status: He is alert and oriented to person, place, and time. Psychiatric:         Behavior: Behavior normal.         Assessment/Plan     1. Upper respiratory tract infection, unspecified type  Supportive care as discussed. The patient clinically appears stable. Negative flu and COVID test today. Patient is to monitor symptoms. If he is not improving or worsening symptoms doxycycline for him to take given his multiple comorbidities. Indications for seeking emergent care discussed. - POCT COVID-19 Rapid, NAAT  - COVID-19; Future  - POCT Influenza A/B  - COVID-19  - doxycycline hyclate (VIBRAMYCIN) 100 MG capsule; Take 1 capsule by mouth 2 times daily for 10 days  Dispense: 20 capsule; Refill: 0    2. PAF (paroxysmal atrial fibrillation) (HCC)  Stable. Continue current regimen. Continue f/u with cardiology.      3. Chronic ischemic heart disease  Stable. Continue current regimen. Continue f/u with cardiology. 4. Essential hypertension  Stable. Continue current regimen. 5. Simple chronic bronchitis (HCC)  Stable. Continue current regimen. Breathing is at baseline. Discussed medications with patient, who voiced understanding of their use and indications. All questions answered. No follow-ups on file. Documentation was done using voice recognition dragon software. Efforts were made to ensure accuracy; however, inadvertent, unintentional computerized transcription errors may be present. --Rabia Arango MD on 9/23/2022    An electronic signature was used to authenticate this note.

## 2022-09-24 LAB — SARS-COV-2, PCR: NOT DETECTED

## 2022-09-28 ENCOUNTER — HOSPITAL ENCOUNTER (OUTPATIENT)
Dept: VASCULAR LAB | Age: 72
Discharge: HOME | End: 2022-09-28
Payer: MEDICARE

## 2022-09-28 DIAGNOSIS — R09.89 OTHER SPECIFIED SYMPTOMS AND SIGNS INVOLVING THE CIRCULATORY AND RESPIRATORY SYSTEMS: ICD-10-CM

## 2022-09-28 DIAGNOSIS — I99.9 UNSPECIFIED DISORDER OF CIRCULATORY SYSTEM: ICD-10-CM

## 2022-09-28 PROCEDURE — 93923 UPR/LXTR ART STDY 3+ LVLS: CPT

## 2022-09-29 ENCOUNTER — CARE COORDINATION (OUTPATIENT)
Dept: CASE MANAGEMENT | Age: 72
End: 2022-09-29

## 2022-09-29 NOTE — CARE COORDINATION
[facility] Transitions Follow Up Call    2022    Patient: Terence Bueno  Patient : 1950   MRN: [id number]  Reason for Admission: s/p cardiac stents x 2  Discharge Date: 22 RARS: Readmission Risk Score: 12.8         Spoke with: NA    Attempted to reach patient via phone for transition call. VM left stating purpose of call along with my contact information requesting a return call. Alysa Nur  [facility]  Care Transitions  [phone]    Care Transitions Subsequent and Final Call    Subsequent and Final Calls  Care Transitions Interventions  Other Interventions:              Follow Up  Future Appointments   Date Time Provider Vikki Caal   2022 10:00 AM ISIDRA Armando - CNP FF Cardio MMA   10/17/2022  8:00 AM SCHEDULE, [place] REMOTE TRANSMISSION FF Cardio MMA   10/18/2022  8:45 AM Luna Martinez MD PULM & CC MMA   2022  8:00 AM SCHEDULE, [place] REMOTE TRANSMISSION FF Cardio MMA   2022 11:00 AM SCHEDULE, [place] REMOTE TRANSMISSION FF Cardio MMA   2023 11:00 AM SCHEDULE, [place] REMOTE TRANSMISSION FF Cardio MMA   3/6/2023 11:00 AM SCHEDULE, [place] REMOTE TRANSMISSION FF Cardio MMA   4/10/2023 11:00 AM SCHEDULE, [place] REMOTE TRANSMISSION FF Cardio MMA   5/15/2023 11:00 AM SCHEDULE, [place] REMOTE TRANSMISSION FF Cardio MMA   2023 11:00 AM SCHEDULE, [place] REMOTE TRANSMISSION FF Cardio MMA   2023 11:00 AM SCHEDULE, [place] REMOTE TRANSMISSION FF Cardio MMA   2023 11:00 AM SCHEDULE, [place] REMOTE TRANSMISSION FF Cardio MMA   10/2/2023 11:00 AM SCHEDULE, [facility] REMOTE TRANSMISSION FF Cardio MMA   2023 11:00 AM SCHEDULE, [facility] REMOTE TRANSMISSION FF Cardio MMA   2023 11:00 AM SCHEDULE, [facility] REMOTE TRANSMISSION FF Cardio MMA       Eloise Eduardo LPN

## 2022-09-30 ENCOUNTER — OFFICE VISIT (OUTPATIENT)
Dept: CARDIOLOGY CLINIC | Age: 72
End: 2022-09-30
Payer: MEDICARE

## 2022-09-30 VITALS
HEART RATE: 68 BPM | OXYGEN SATURATION: 98 % | SYSTOLIC BLOOD PRESSURE: 104 MMHG | WEIGHT: 164.7 LBS | BODY MASS INDEX: 23.63 KG/M2 | DIASTOLIC BLOOD PRESSURE: 62 MMHG

## 2022-09-30 DIAGNOSIS — I34.0 NONRHEUMATIC MITRAL VALVE REGURGITATION: ICD-10-CM

## 2022-09-30 DIAGNOSIS — I25.83 CORONARY ARTERY DISEASE DUE TO LIPID RICH PLAQUE: Primary | ICD-10-CM

## 2022-09-30 DIAGNOSIS — I48.0 PAF (PAROXYSMAL ATRIAL FIBRILLATION) (HCC): ICD-10-CM

## 2022-09-30 DIAGNOSIS — I10 PRIMARY HYPERTENSION: ICD-10-CM

## 2022-09-30 DIAGNOSIS — E78.2 MIXED HYPERLIPIDEMIA: ICD-10-CM

## 2022-09-30 DIAGNOSIS — I25.10 CORONARY ARTERY DISEASE DUE TO LIPID RICH PLAQUE: Primary | ICD-10-CM

## 2022-09-30 PROCEDURE — 3017F COLORECTAL CA SCREEN DOC REV: CPT | Performed by: NURSE PRACTITIONER

## 2022-09-30 PROCEDURE — G8420 CALC BMI NORM PARAMETERS: HCPCS | Performed by: NURSE PRACTITIONER

## 2022-09-30 PROCEDURE — G8427 DOCREV CUR MEDS BY ELIG CLIN: HCPCS | Performed by: NURSE PRACTITIONER

## 2022-09-30 PROCEDURE — 1036F TOBACCO NON-USER: CPT | Performed by: NURSE PRACTITIONER

## 2022-09-30 PROCEDURE — 99214 OFFICE O/P EST MOD 30 MIN: CPT | Performed by: NURSE PRACTITIONER

## 2022-09-30 PROCEDURE — 1123F ACP DISCUSS/DSCN MKR DOCD: CPT | Performed by: NURSE PRACTITIONER

## 2022-09-30 NOTE — PROGRESS NOTES
[facility]     Outpatient Follow Up Note    CHIEF COMPLAINT / HPI: Hospital Follow Up secondary to status post coronary angioplasty    Hospital record has been reviewed  Hospital Course progressed as follows per discharge summary:     9/9 - 9/10/22  Patient was evaluated by CV surgery and thought to be a high risk CV surgical candidate  ~underwent PCI of his left anterior descending with two overlapping Synergy stents 3 x 32 mid ostial  stenosis reduced from 80-0% mid and 90-0% on the ostial LAD, post dilatation with 3.5-mm noncompliant balloon and a 4-mm noncompliant balloon. Hypotension occurred that resolved    9/12- 9/13/22  Chest Pain        Pt reports chest pain started today along with pressure. Pt has hx of stents. Pt took a nitro @ home with relief. Denies any symptoms @ this time but states cardiologist told pt to come to ER. The patient was admitted with left sided reproducible chest pain. He has known CAD with recently stent placement of LAD on 9/9/22. He was admitted and evaluated by cardiology, Dr Duglas Amato. He had normal EKG and negative troponin levels. Pain was likely MS in nature and the patient will try OTC antiinflammatory medications. The patient was feeling better and was comfortable with d/c home       Mcnealveronique Almaraz is 81 y.o. male who presents today for a routine follow up after a recent hospitalization related to the above mentioned issues. Subjective:   Since the time of discharge, the patient admits their symptoms have improved. He was feeling pretty sick all the time and his LLE would go numb. Since having had his stent, he's felt better. He's not tired as much as he had been. He still has no appetite and has to make himself eat. He still has chest discomfort of a constant duration. Its most bothersome when he's laying down. His heart feels like its racing and knots up. It feels like his AF described as a flub flub.    He just now starting to be able to sleep.    There is no SOB/MARTINEZ. He sleeps with 2 pillows for comfort and 1 b/w his knees d/t previous back surgery. He c/o dizziness but nowhere near what it was. His BP is good. He has no swelling. His wt is down 7# / 2 weeks    These symptoms are improving over the last many days. With regard to medication therapy the patient has been compliant with prescribed regimen. They have tolerated therapy to date. Past Medical History:   Diagnosis Date    Aortic valve insufficiency     Arthritis     Atrial fibrillation ([facility] [address]     Cancer ([facility] [address] 2017    TONGUE head and neck IN REMISSION    Dysphonia     Encounter for imaging to screen for metal prior to MRI 2022    MRI conditional Medtronic Linq loop recorder model#[id number] Reveal LINQ implanted 19. Normal Mode. 1.5T or 3.0T. Download data prior to MRI. Follow all other [employer] guidelines.  Pt currently follows     GERD (gastroesophageal reflux disease)     Hearing loss     Heart disease     Medical history reviewed with no changes     Obstructive apnea does not use CPAP    Oropharyngeal dysphagia     Refusal of blood transfusions as patient is Christianity     Thyroid disease      Social History:    Social History     Tobacco Use   Smoking Status Former    Packs/day: 1.00    Years: 40.00    Pack years: 40.00    Types: Cigarettes    Start date: 1975    Quit date: 2015    Years since quittin.6   Smokeless Tobacco Never   Tobacco Comments    Maintain cessation     Current Medications:  Current Outpatient Medications   Medication Sig Dispense Refill    oxyCODONE-acetaminophen (PERCOCET)  MG per tablet Take 1 tablet by mouth every 4 hours as needed for Pain.      digoxin (LANOXIN) 125 MCG tablet Take 1 tablet by mouth once daily 30 tablet 5    clopidogrel (PLAVIX) 75 MG tablet Take 1 tablet by mouth daily 90 tablet 3    metoprolol tartrate (LOPRESSOR) 25 MG tablet Take 0.5 tablets by mouth 2 times daily 60 tablet 3 nitroGLYCERIN (NITROSTAT) 0.4 MG SL tablet Place 1 tablet under the tongue every 5 minutes as needed for Chest pain 25 tablet 3    fluticasone (FLOVENT HFA) 110 MCG/ACT inhaler Inhale 1 puff into the lungs in the morning and 1 puff in the evening. 12 g 3    levothyroxine (SYNTHROID) 125 MCG tablet Take 1 tablet by mouth once dailyTake 1 tablet by mouth once daily 30 tablet 2    vitamin B-12 (CYANOCOBALAMIN) 100 MCG tablet Take 1 tablet by mouth in the morning. 30 tablet 3    pantoprazole (PROTONIX) 40 MG tablet TAKE 1 TABLET BY MOUTH TWICE A DAY BEFORE MEALS 180 tablet 3    mirtazapine (REMERON) 7.5 MG tablet TAKE 1 TABLET BY MOUTH EVERY DAY AT NIGHT 90 tablet 3    Cholecalciferol 50 MCG (2000 UT) TABS Take 2,000 Units by mouth daily      rosuvastatin (CRESTOR) 40 MG tablet Take 1 tablet by mouth daily 90 tablet 3    aspirin 81 MG EC tablet Take 81 mg by mouth daily      docusate sodium (COLACE) 100 MG capsule Take 100 mg by mouth 2 times daily. doxycycline hyclate (VIBRAMYCIN) 100 MG capsule Take 1 capsule by mouth 2 times daily for 10 days (Patient not taking: Reported on 9/30/2022) 20 capsule 0     No current facility-administered medications for this visit. REVIEW OF SYSTEMS:   CONSTITUTIONAL: + weight  loss, + fatigue, weakness, night sweats or fever. There's been no change in energy level, sleep pattern, or activity level. HEENT: No new vision difficulties or ringing in the ears. RESPIRATORY: No new SOB, PND, orthopnea or cough. CARDIOVASCULAR: See HPI  GI: No nausea, vomiting, diarrhea, constipation, abdominal pain or changes in bowel habits. : No urinary frequency, urgency, incontinence hematuria or dysuria. SKIN: No cyanosis or skin lesions. MUSCULOSKELETAL: No new muscle or joint pain. NEUROLOGICAL: No syncope or TIA-like symptoms.   PSYCHIATRIC: No anxiety, pain, insomnia or depression    Objective:   PHYSICAL EXAM:       Vitals:    09/30/22 1008 09/30/22 1035   BP: 102/64 104/62 Site: Left Upper Arm Right Upper Arm   Position: Sitting    Cuff Size: Medium Adult Medium Adult   Pulse: 68    SpO2: 98%    Weight: 164 lb 11.2 oz (74.7 kg)         VITALS:  /64 (Site: Left Upper Arm, Position: Sitting, Cuff Size: Medium Adult)   Pulse 68   Wt 164 lb 11.2 oz (74.7 kg)   SpO2 98%   BMI 23.63 kg/m²     CONSTITUTIONAL: Cooperative, no apparent distress, and appears well nourished / developed  NEUROLOGIC:  Awake and orientated to person, place and time. PSYCH: Calm affect. SKIN: Warm and dry: Rt groin unremarkable. HEENT: Sclera non-icteric, normocephalic, neck supple, no elevation of JVP, normal carotid pulses with no bruits and thyroid normal size. LUNGS:  No increased work of breathing and clear to auscultation, no crackles or wheezing. CARDIOVASCULAR:  Regular rate 80 and rhythm with no murmurs, gallops, rubs, or abnormal heart sounds, normal PMI. The apical impulses not displaced. Heart tones are crisp and normal                                                                                          Cervical veins are not engorged                 JVP less than 8 cm H2O                                                                              The carotid upstroke is normal in amplitude and contour without delay or bruit    ABDOMEN:  Normal bowel sounds, non-distended and non-tender to palpation   EXT: No edema, no calf tenderness. Pulses are present bilaterally.     DATA:    Lab Results   Component Value Date    ALT 9 (L) 09/13/2022    AST 14 (L) 09/13/2022    ALKPHOS 43 09/13/2022    BILITOT 0.6 09/13/2022     Lab Results   Component Value Date    CREATININE 0.5 (L) 09/13/2022    BUN 10 09/13/2022     09/13/2022    K 3.4 (L) 09/13/2022     09/13/2022    CO2 26 09/13/2022     Lab Results   Component Value Date    TSH 10.50 (H) 08/09/2022    D0DGHRC 2.12 05/21/2020     Lab Results   Component Value Date    WBC 5.4 09/13/2022    HGB 12.5 (L) 2022    HCT 38.1 (L) 2022    MCV 92.6 2022     2022     No components found for: CHLPL  Lab Results   Component Value Date    TRIG 95 2022    TRIG 97 2021    TRIG 82 2021     Lab Results   Component Value Date    HDL 43 2022    HDL 45 10/11/2021    HDL 45 2021     Lab Results   Component Value Date    LDLCALC 84 2022    LDLCALC 102 (H) 10/11/2021    LDLCALC 115 (H) 2021     Lab Results   Component Value Date    LABVLDL 19 2022    LABVLDL 15 10/11/2021    LABVLDL 19 2021     Radiology Review:  Pertinent images / reports were reviewed as a part of this visit and reveals the followin/9/22:Left Heart Cath  Dominance: Right    LM: no significant disease  LAD: moderately calcified vessel with eccentric 80% proximal stenosis   LCx: 30% into OM2   RCA: minimal luminal irregularities     DFR=0.92  FFR=0.75 (1:25 minutes of Adenosine)   LIMA - large caliber, free of disease throughout its course    Echo: 22  Summary   -Normal left ventricle size, wall thickness and systolic function with an   estimated ejection fraction of 60-65%. -No regional wall motion abnormalities are seen.   -Indeterminate diastolic function.   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 -Moderate to severe mitral regurgitation. ( appears stable from previous echo   dated 2021). ERO 0.21.   -A bioprosthetic artificial aortic valve appears well seated with a maximum   velocity of 2.6m/s and a mean gradient of 16mmHg. -Moderate to severe tricuspid regurgitation.   -Estimated pulmonary artery systolic pressure is mildly elevated at 38 mmHg   assuming a right atrial pressure of 8 mmHg. -The right ventricle is moderately enlarged.   -The right atrium is moderately dilated. Cardiac MRI : 22:  CONCLUSIONS       Overall there is normal LV and RV size and systolic function.     Normal functioning bioprosthetic aortic valve   Moderate mitral and tricuspid regurgitation.        -Normal left ventricular size and systolic function with a calculated ejection fraction of 62% by Shields's method.   -Normal right ventricular size and systolic function with a calculated ejection fraction of 61% by Shields's method.   -No myocardial edema by T2w imaging/mapping. (Normal myocardium/skeletal ratio - normal < 1.9). -No significant intracardiac shunt. Calculated Qp:Qs:1 (Phase contrast velocity encoding Ao/Pa)   -Normal myocardial resting perfusion imaging.   -Bioprosthetic aortic valve noted. There is trivial aortic regurgitation with a regurgitant fraction of 6%. -Moderate mitral regurgitation with a calculated regurgitant fraction of 30%. -Moderate tricuspid regurgitation with a calculated regurgitant fraction of 35%.   -On delayed enhancement imaging, there is no abnormal hyperenhancement to suggest myocardial scar/inflammation/infiltration. Echo: 8/24/22:  Summary   -Normal left ventricle size, wall thickness, and systolic function with an   estimated ejection fraction of 55-60%. -No regional wall motion abnormalities are seen.   -Normal diastolic function. E/e\"=9.75.   -Global longitudinal strain -18.9% ( normal). -There is posterior mitral annular calcification.   -Moderate mitral regurgitation. ERO 0.21 cm2.   -Findings are stable when compared to previous study dated 8/18/2021.   -A bioprosthetic artificial aortic valve appears well seated with a maximum   velocity of 2.4m/s and a mean gradient of 12mmHg. -Moderate to severe tricuspid regurgitation.   -Estimated pulmonary artery systolic pressure is mildly elevated at 39 mmHg   assuming a right atrial pressure of 8 mmHg. -Right heart structures appear moderately dilated. -The ascending aorta is dilated measuring 4.2 cm at largest diameter.      Last Angiogram: 9/9/22  LVEDP: - mmHg  AO: 72/16 mmHg    Anatomy:   PCI: IVUS guided PCI of the LAD with 2 overlapping Synergy stents 3.0 mm x 32 mm with mid to ostial stenoses reduced from 80% to 0% mid and 90% to 0% ostial.  Postdilatation carried out with 3.5 mm NC balloon and 4.0 mm NC balloon. Note: Patient did have significant hypotension noted during intervention of ostium of LAD. Impression:  1. Critical stenosis of the LAD with successful IVUS relation x2. Plan:  1. DAPT for minimum of 6 months. 2.  Add beta-blocker if blood pressure tolerates. 3.  No ACE inhibitor/ARB given normal LV systolic function. Assessment:      Diagnosis Orders   1. Coronary artery disease due to lipid rich plaque   ~improved s/p PTCA LAD Sept '22  ~continues to have atypical discomfort  ~DAPT       2. Nonrheumatic mitral valve regurgitation   ~mod to severe on echo Aug '22  ~mod on cardiac MRI Aug '22       3. Primary hypertension   ~controllled        4. Mixed hyperlipidemia   ~controlled on crestor       5. PAF (paroxysmal atrial fibrillation) (HCC)   ~denies palpitations   ~monitored via ILR : no AF noted with last interrogation  ~dilt changed to metoprolol   ~remains on digoxin  ~hx ablation '20  ~hx MAZE & DELGADO clip '15  ~ASA alone ; no need for [facility] post-clip (hx of bleeding from mouth & PEG d/t supratherapeutic  INR '16)          aortic valve replaced in 2015 and that biologic valve continues to function well; had an extensive recovery after that surgery and did quite poorly with it. Shortly after that he was diagnosed with squamous cell cancer of the tongue and had chemo and radiation and that also was quite difficult on him. Patient  is not stable since hospital discharge. Plan: Continue present management    CR phase II as planned   F/U in 6-8 weeks    I have addressed the patient's cardiac risk factors and adjusted pharmacologic treatment as needed. In addition, I have reinforced the need for patient directed risk factor modification.     Further evaluation will be based upon the patient's clinical course and testing results. All questions and concerns were addressed to the patient. Alternatives to  treatment were discussed. The patient  currently  is not smoking. The risks related to smoking were reviewed with the patient. Recommend maintaining a smoke-free lifestyle. Daily weight, low sodium diet were discussed. Patient instructed to call the office with a weight gain: > 3 # over night or 5# in one week; swelling, SOB/orthopnea/PND    Dual Antiplatelet therapy has been recommended / prescribed for this patient. Education conducted on adverse reactions including bleeding was discussed. The patient verbalizes understanding. Pt is on a BB  Pt is not on an ace-i/ARB : normal LVEF  Pt is on a statin      Saturated fat diet discussed  Exercise program discussed    Thank you for allowing to us to participate in the care of Heidi Bower.       [address]  Documentation of today's visit sent to PCP

## 2022-10-03 ENCOUNTER — TELEPHONE (OUTPATIENT)
Dept: CARDIOLOGY CLINIC | Age: 72
End: 2022-10-03

## 2022-10-03 NOTE — TELEPHONE ENCOUNTER
Study impression:  Summary  Normal right FABI at rest.  Nondiagnostic left FABI due to vessel noncompressibility. Normal bilateral lower extremity arterial study. Per Dr. Eugene Mar : study shows there is no significant blockage in your lower extremities bilaterally. Continue present management     Left voice mail message asking for a call back from patient to discuss results of LE arterial study.

## 2022-10-03 NOTE — TELEPHONE ENCOUNTER
Patient returned call, he was informed of message below. Call transferred to Mrs Marcus Lomeli for questions.

## 2022-10-04 ENCOUNTER — OFFICE VISIT (OUTPATIENT)
Dept: FAMILY MEDICINE CLINIC | Age: 72
End: 2022-10-04
Payer: MEDICARE

## 2022-10-04 ENCOUNTER — CARE COORDINATION (OUTPATIENT)
Dept: CASE MANAGEMENT | Age: 72
End: 2022-10-04

## 2022-10-04 VITALS
SYSTOLIC BLOOD PRESSURE: 106 MMHG | HEIGHT: 71 IN | DIASTOLIC BLOOD PRESSURE: 60 MMHG | OXYGEN SATURATION: 95 % | HEART RATE: 90 BPM | BODY MASS INDEX: 22.43 KG/M2 | TEMPERATURE: 98.1 F | WEIGHT: 160.2 LBS

## 2022-10-04 DIAGNOSIS — R13.10 DYSPHAGIA, UNSPECIFIED TYPE: Primary | ICD-10-CM

## 2022-10-04 DIAGNOSIS — C02.9 SQUAMOUS CELL CANCER OF TONGUE (HCC): ICD-10-CM

## 2022-10-04 DIAGNOSIS — M54.50 CHRONIC MIDLINE LOW BACK PAIN WITHOUT SCIATICA: ICD-10-CM

## 2022-10-04 DIAGNOSIS — G89.29 CHRONIC MIDLINE LOW BACK PAIN WITHOUT SCIATICA: ICD-10-CM

## 2022-10-04 DIAGNOSIS — F41.9 ANXIETY: ICD-10-CM

## 2022-10-04 PROCEDURE — 1036F TOBACCO NON-USER: CPT | Performed by: FAMILY MEDICINE

## 2022-10-04 PROCEDURE — G8427 DOCREV CUR MEDS BY ELIG CLIN: HCPCS | Performed by: FAMILY MEDICINE

## 2022-10-04 PROCEDURE — 3017F COLORECTAL CA SCREEN DOC REV: CPT | Performed by: FAMILY MEDICINE

## 2022-10-04 PROCEDURE — 99214 OFFICE O/P EST MOD 30 MIN: CPT | Performed by: FAMILY MEDICINE

## 2022-10-04 PROCEDURE — G8420 CALC BMI NORM PARAMETERS: HCPCS | Performed by: FAMILY MEDICINE

## 2022-10-04 PROCEDURE — 1123F ACP DISCUSS/DSCN MKR DOCD: CPT | Performed by: FAMILY MEDICINE

## 2022-10-04 PROCEDURE — G8484 FLU IMMUNIZE NO ADMIN: HCPCS | Performed by: FAMILY MEDICINE

## 2022-10-04 RX ORDER — MIRTAZAPINE 15 MG/1
15 TABLET, FILM COATED ORAL NIGHTLY
Qty: 30 TABLET | Refills: 3 | Status: SHIPPED | OUTPATIENT
Start: 2022-10-04 | End: 2022-11-03

## 2022-10-04 NOTE — PROGRESS NOTES
Chief Complaint: Chest Pain (Cardiologist said not heart, doesn't show anything on echo, and EKG, pain feels worse when laying down, center of chest), Other (Weak appetite,  2 weeks), and Spasms       HPI:  Kiana Yap is a 81 y.o. male here with ongoing pain in his left side of the chest.  He complains as cramping type of pain which usually happens when he is resting. He recently had PCI had  2 stents. He also has a history of moderate tricuspid and mitral regurgitation. he has history of paroxysmal atrial fibrillation. Currently on metoprolol and digoxin. He is on Plavix 75 mg daily . Recently evaluated for his chest pain was thought secondary to musculoskeletal.    He denies having any symptoms of GERD but had history of radiation in the past.  Recently has been having difficulty swallowing. He complains of poor appetite. Denies any nausea or vomiting. He has ongoing pain in his lower back. Has been taking Norco 10/325 as needed for pain. He does not exceed half a pill twice a day. He has been taking Remeron 7.5 mg for anxiety and sleep  His mood is doing better being on Remeron. He has history of COPD and has been taking his inhalers. He has a history of hypothyroidism and recent TSH was normal  He has been taking Synthroid 125 mcg daily he has future    He has history of squamous cell carcinoma of the tongue s/p radiation and chemotherapy and follows up with Dr. Alphonse Alicea. ROS:  Constitutional: Negative   HENT: Negative   Respiratory: Negative   Cardiovascular: As mentioned above  Gastrointestinal: Negative    Genitourinary: Negative   Musculoskeletal: Negative   Skin: Negative for color change, pallor, rash and wound. Neurological: Negative  Psychiatric/Behavioral: As mentioned above. Pa that only with the socialtient's problem list, medications, allergies, past medical, surgical, social and family histories were reviewed and updated as appropriate.      Current Outpatient Medications Medication Sig Dispense Refill    mirtazapine (REMERON) 15 MG tablet Take 1 tablet by mouth nightly 30 tablet 3    oxyCODONE-acetaminophen (PERCOCET)  MG per tablet Take 1 tablet by mouth every 4 hours as needed for Pain.      digoxin (LANOXIN) 125 MCG tablet Take 1 tablet by mouth once daily 30 tablet 5    clopidogrel (PLAVIX) 75 MG tablet Take 1 tablet by mouth daily 90 tablet 3    metoprolol tartrate (LOPRESSOR) 25 MG tablet Take 0.5 tablets by mouth 2 times daily 60 tablet 3    nitroGLYCERIN (NITROSTAT) 0.4 MG SL tablet Place 1 tablet under the tongue every 5 minutes as needed for Chest pain 25 tablet 3    levothyroxine (SYNTHROID) 125 MCG tablet Take 1 tablet by mouth once dailyTake 1 tablet by mouth once daily 30 tablet 2    vitamin B-12 (CYANOCOBALAMIN) 100 MCG tablet Take 1 tablet by mouth in the morning. 30 tablet 3    pantoprazole (PROTONIX) 40 MG tablet TAKE 1 TABLET BY MOUTH TWICE A DAY BEFORE MEALS 180 tablet 3    Cholecalciferol 50 MCG (2000 UT) TABS Take 2,000 Units by mouth daily      rosuvastatin (CRESTOR) 40 MG tablet Take 1 tablet by mouth daily 90 tablet 3    fluticasone (FLOVENT HFA) 110 MCG/ACT inhaler Inhale 1 puff into the lungs in the morning and 1 puff in the evening. (Patient not taking: Reported on 2022) 12 g 3     No current facility-administered medications for this visit.        Social History     Tobacco Use    Smoking status: Former     Packs/day: 1.00     Years: 40.00     Pack years: 40.00     Types: Cigarettes     Start date: 1975     Quit date: 2015     Years since quittin.7    Smokeless tobacco: Never    Tobacco comments:     Maintain cessation   Substance Use Topics    Alcohol use: No     Alcohol/week: 0.0 standard drinks     Comment: KATELYN reyes        Objective:     Vitals:    10/04/22 1339   BP: 106/60   Site: Right Upper Arm   Position: Sitting   Cuff Size: Small Adult   Pulse: 90   Temp: 98.1 °F (36.7 °C)   TempSrc: Temporal   SpO2: 95%   Weight: 160 lb 3.2 oz (72.7 kg)   Height: 5' 10.5\" (1.791 m)     Body mass index is 22.66 kg/m². Wt Readings from Last 3 Encounters:   10/04/22 160 lb 3.2 oz (72.7 kg)   09/30/22 164 lb 11.2 oz (74.7 kg)   09/23/22 162 lb (73.5 kg)     BP Readings from Last 3 Encounters:   10/04/22 106/60   09/30/22 104/62   09/23/22 122/60       Physical exam:  Constitutional: he is oriented to person, place, and time. he appears well-developed and well-nourished. No distress. Neck: Normal range of motion. No JVD present. No tracheal deviation present. No thyromegaly present. Cardiovascular: Normal rate, regular rhythm  Pulmonary/Chest: Effort normal and breath sounds normal. No stridor. No respiratory distress. he has no wheezes. he has no rales. heexhibits no tenderness. Abdominal: Soft. Bowel sounds are normal. he exhibits no distension and no mass. There is no tenderness. There is no rebound and no guarding. Musculoskeletal: pain in lower back pain and normal strength in our lower extermity  Neurological:he is alert and oriented to person, place, and time. he has gross neurological exam normal with normal strength and normal gait  Skin: Skin is warm and dry. No rash noted. he is not diaphoretic. No erythema. No pallor. Psychiatric: he has a normal mood and affect. his   behavior is normal.      Assessment/Plan:   1. Dysphagia, unspecified type  - AFL - Mayra Soto MD, Gastroenterology, [facility]    2. Anxiety  We will increase the dose  - mirtazapine (REMERON) 15 MG tablet; Take 1 tablet by mouth nightly  Dispense: 30 tablet;  Refill: 3     3 squamous cell carcinoma of the tongue  Currently in remission  Follows up with Dr. Heaven Oliveira    4 chronic lower back pain  Norco 10/325  Twice daily as needed he can only go and get them by think I will be late      Jorge Chandler MD  10/4/2022 4:00 PM

## 2022-10-04 NOTE — CARE COORDINATION
[facility] Transitions Follow Up Call    10/4/2022    Patient: Heidi Bower  Patient : 1950   MRN: <[id number]>  Reason for Admission: cardiac stents x2  Discharge Date: 22 RARS: Readmission Risk Score: 12.8         Spoke with: Emilee Cabezas  Patient reported he went to his PCP today. He denies fever,chills, cough, dizziness, abdominal pain, nvd, palpitations or swelling. He has sob and cp (not heart related). Appetite is poor. He talked to doctor about it. Fluid intake is good. No bladder or bowel problems. No questions or needs. Will continue to follow. Care Transitions Follow Up Call    Needs to be reviewed by the provider   Additional needs identified to be addressed with provider: No  none             Method of communication with provider : none      Care Transition Nurse (CTN) contacted the patient by telephone to follow up after admission on 2022. Verified name and  with patient as identifiers. Addressed changes since last contact: none  Discussed follow-up appointments. If no appointment was previously scheduled, appointment scheduling offered: na.   Is follow up appointment scheduled within 7 days of discharge? No.    Advance Care Planning:   Does patient have an Advance Directive: reviewed and current. CTN reviewed discharge instructions, medical action plan and red flags with patient and discussed any barriers to care and/or understanding of plan of care after discharge. Discussed appropriate site of care based on symptoms and resources available to patient including: PCP  Specialist  Urgent care clinics. The patient agrees to contact the PCP office for questions related to their healthcare.      Patients top risk factors for readmission: ineffective coping  medical condition-copd, afib  Interventions to address risk factors: Education of patient/family/caregiver/guardian to support self-management-       Non-[facility] follow up appointment(s):     CTN provided contact information for future needs. Plan for follow-up call in 5-7 days based on severity of symptoms and risk factors. Plan for next call: symptom management-   self management-           Care Transitions Subsequent and Final Call    Subsequent and Final Calls  Do you have any ongoing symptoms?: Yes  Onset of Patient-reported symptoms: In the past 7 days  Patient-reported symptoms: Shortness of Breath  Interventions for patient-reported symptoms: Notified PCP/Physician  Have your medications changed?: No  Do you have any questions related to your medications?: No  Do you currently have any active services?: No  Do you have any needs or concerns that I can assist you with?: No  Identified Barriers: None  Care Transitions Interventions  Other Interventions:              Follow Up  Future Appointments   Date Time Provider Vikki Caal   10/17/2022  8:00 AM SCHEDULE, [place] REMOTE TRANSMISSION FF Cardio MMA   10/18/2022  8:45 AM Nga Duran MD PULM & CC MMA   11/11/2022  9:00 AM ISIDRA Fagan - CNP FF Cardio MMA   11/21/2022  8:00 AM SCHEDULE, [place] REMOTE TRANSMISSION FF Cardio MMA   12/26/2022 11:00 AM SCHEDULE, [place] REMOTE TRANSMISSION FF Cardio MMA   1/30/2023 11:00 AM SCHEDULE, [place] REMOTE TRANSMISSION FF Cardio MMA   3/6/2023 11:00 AM SCHEDULE, [place] REMOTE TRANSMISSION FF Cardio MMA   4/10/2023 11:00 AM SCHEDULE, [place] REMOTE TRANSMISSION FF Cardio MMA   5/15/2023 11:00 AM SCHEDULE, [place] REMOTE TRANSMISSION FF Cardio MMA   6/19/2023 11:00 AM SCHEDULE, [place] REMOTE TRANSMISSION FF Cardio MMA   7/24/2023 11:00 AM SCHEDULE, [place] REMOTE TRANSMISSION FF Cardio MMA   8/28/2023 11:00 AM SCHEDULE, [place] REMOTE TRANSMISSION FF Cardio MMA   10/2/2023 11:00 AM SCHEDULE, [place] REMOTE TRANSMISSION FF Cardio MMA   11/6/2023 11:00 AM SCHEDULE, [place] REMOTE TRANSMISSION FF Cardio MMA   12/11/2023 11:00 AM SCHEDULE, [place] REMOTE TRANSMISSION FF Cardio Raul Long LPN

## 2022-10-10 ENCOUNTER — CARE COORDINATION (OUTPATIENT)
Dept: CASE MANAGEMENT | Age: 72
End: 2022-10-10

## 2022-10-11 ENCOUNTER — CARE COORDINATION (OUTPATIENT)
Dept: CASE MANAGEMENT | Age: 72
End: 2022-10-11

## 2022-10-11 NOTE — CARE COORDINATION
Patient returned CTN call, left VM. CTN attempted return call to patient today, no answer. CTN left VM with contact information, will resolve episode and remain available.

## 2022-10-17 ENCOUNTER — NURSE ONLY (OUTPATIENT)
Dept: CARDIOLOGY CLINIC | Age: 72
End: 2022-10-17
Payer: MEDICARE

## 2022-10-17 ENCOUNTER — TELEPHONE (OUTPATIENT)
Dept: CARDIOLOGY CLINIC | Age: 72
End: 2022-10-17

## 2022-10-17 DIAGNOSIS — Z45.09 ENCOUNTER FOR ELECTRONIC ANALYSIS OF REVEAL EVENT RECORDER: ICD-10-CM

## 2022-10-17 DIAGNOSIS — I48.0 PAF (PAROXYSMAL ATRIAL FIBRILLATION) (HCC): Primary | Chronic | ICD-10-CM

## 2022-10-17 PROCEDURE — G2066 INTER DEVC REMOTE 30D: HCPCS | Performed by: INTERNAL MEDICINE

## 2022-10-17 PROCEDURE — 93298 REM INTERROG DEV EVAL SCRMS: CPT | Performed by: INTERNAL MEDICINE

## 2022-10-17 NOTE — PROGRESS NOTES
We received a remote transmission from patient's monitor at home. Remote Linq report shows symptom recordings showing NSR and NSR with ectopy. Noted ST. Pt has reached the DEMETRIUS. Office staff notified. EP physician to review. We will continue to monitor remotely. Implanted for palpitations.     End of 31-day monitoring period 10-17-22

## 2022-10-17 NOTE — TELEPHONE ENCOUNTER
----- Message from Alejandro Manriquez sent at 10/17/2022  8:49 AM EDT -----  Kirill Todd is DEMETRIUS

## 2022-10-18 ENCOUNTER — TELEPHONE (OUTPATIENT)
Dept: FAMILY MEDICINE CLINIC | Age: 72
End: 2022-10-18

## 2022-10-18 ENCOUNTER — HOSPITAL ENCOUNTER (OUTPATIENT)
Dept: CARDIAC REHAB | Age: 72
Setting detail: THERAPIES SERIES
Discharge: HOME | End: 2022-10-18
Payer: MEDICARE

## 2022-10-18 VITALS
HEIGHT: 70 IN | SYSTOLIC BLOOD PRESSURE: 118 MMHG | WEIGHT: 166 LBS | DIASTOLIC BLOOD PRESSURE: 72 MMHG | RESPIRATION RATE: 18 BRPM | BODY MASS INDEX: 23.77 KG/M2

## 2022-10-18 PROCEDURE — 93798 PHYS/QHP OP CAR RHAB W/ECG: CPT

## 2022-10-18 ASSESSMENT — PATIENT HEALTH QUESTIONNAIRE - PHQ9
7. TROUBLE CONCENTRATING ON THINGS, SUCH AS READING THE NEWSPAPER OR WATCHING TELEVISION: 1
6. FEELING BAD ABOUT YOURSELF - OR THAT YOU ARE A FAILURE OR HAVE LET YOURSELF OR YOUR FAMILY DOWN: 0
SUM OF ALL RESPONSES TO PHQ QUESTIONS 1-9: 13
SUM OF ALL RESPONSES TO PHQ QUESTIONS 1-9: 13
3. TROUBLE FALLING OR STAYING ASLEEP: 1
5. POOR APPETITE OR OVEREATING: 3
SUM OF ALL RESPONSES TO PHQ9 QUESTIONS 1 & 2: 4
9. THOUGHTS THAT YOU WOULD BE BETTER OFF DEAD, OR OF HURTING YOURSELF: 0
1. LITTLE INTEREST OR PLEASURE IN DOING THINGS: 3
2. FEELING DOWN, DEPRESSED OR HOPELESS: 1
SUM OF ALL RESPONSES TO PHQ QUESTIONS 1-9: 13
4. FEELING TIRED OR HAVING LITTLE ENERGY: 3
10. IF YOU CHECKED OFF ANY PROBLEMS, HOW DIFFICULT HAVE THESE PROBLEMS MADE IT FOR YOU TO DO YOUR WORK, TAKE CARE OF THINGS AT HOME, OR GET ALONG WITH OTHER PEOPLE: 0
8. MOVING OR SPEAKING SO SLOWLY THAT OTHER PEOPLE COULD HAVE NOTICED. OR THE OPPOSITE, BEING SO FIGETY OR RESTLESS THAT YOU HAVE BEEN MOVING AROUND A LOT MORE THAN USUAL: 1
SUM OF ALL RESPONSES TO PHQ QUESTIONS 1-9: 13

## 2022-10-18 NOTE — TELEPHONE ENCOUNTER
I attempted to schedule patient for a follow up after receiving fax from [facility] with his depression phq-9 attached.  [facility] & sent [facility] [employer] message advising patient to contact our office to schedule an appointment -per Dr. Ruthie Louis request

## 2022-10-18 NOTE — CARDIO/PULMONARY
[facility] Cardiac Rehabilitation Initial Evaluation    Deyanira Lemus       1950     [id number]    Share medical information:  Yes Lilian Max (daughter) [phone]    Cardiac History    CABG  PTCA Stent  Valve Replacement    Physical Assessment     General Appearance   Height:  5' 10\" (177.8 cm)  Weight:  166 lb (75.3 kg)   BMI:  23.9  Skin color:         Cardiovascular Assessment  BP Sittin/72  Sittin/78 (Left arm)  Standin/80 (Standing Left arm)  Heart rate:       68  Heart sounds: Exceptions to WDL Regular S1, S2    Respiratory Assessment  Resp rate: 18                  SpO2:     Quality/Effort:      Sleep Apnea:  Yes  CPAP  No  Oxygen  No     Sleeping Habits:  sleeps 6 hours a night     Edema:  No      Orthopedic/Exercise Limitations:  No    Pain:   Do you have pain?:  yes  constant pain all day from arthritis and previous cancer        Fall Risk Assessment     History of falling with or without injury: No  Use of ambulatory aid: Yes  Difficulty walking/impaired gait: No  Numbness in feet: No  Vision changes: No  Dizziness: Yes  Shortness of breath: Yes  Current medications include but not limited to: Anticoagulant, ACE, ARB, Beta  Blocker  Other fall risk : No  Outpatient fall risk intervention strategies: None of these strategies apply    Abuse / Neglect  Physical/behavioral signs of abuse/neglect   No    Do you feel safe at home   Yes    Advanced Directives  Patient has Advanced Directives:  Yes  Patient given Advanced Directive pack:  No    Vaccinations  Influenza (annual):  Yes  Pneumonia:  Yes  Covid: 2 vaccines and boosters      Pt here for first session of Cardiac Rehab. Reviewed and discussed insurance benefits, pt v/u. Reviewed Cardiac Rehab Routine and RPE scale, pt v/u. Developed individual treatment plan and goals set with patient; pt in agreement with plan and no further questions at this time.  Performed six minute walk test.Next visit scheduled for 10/24/22 at 10 am.    Tawana Be RN  10/18/2022

## 2022-10-19 ENCOUNTER — OFFICE VISIT (OUTPATIENT)
Dept: PULMONOLOGY | Age: 72
End: 2022-10-19
Payer: MEDICARE

## 2022-10-19 ENCOUNTER — HOSPITAL ENCOUNTER (OUTPATIENT)
Age: 72
Discharge: HOME | End: 2022-10-19
Payer: MEDICARE

## 2022-10-19 ENCOUNTER — HOSPITAL ENCOUNTER (OUTPATIENT)
Dept: CARDIAC REHAB | Age: 72
Setting detail: THERAPIES SERIES
End: 2022-10-19
Payer: MEDICARE

## 2022-10-19 VITALS
SYSTOLIC BLOOD PRESSURE: 138 MMHG | OXYGEN SATURATION: 95 % | WEIGHT: 169 LBS | DIASTOLIC BLOOD PRESSURE: 67 MMHG | HEART RATE: 68 BPM | BODY MASS INDEX: 24.25 KG/M2

## 2022-10-19 DIAGNOSIS — R07.2 PRECORDIAL PAIN: Primary | ICD-10-CM

## 2022-10-19 DIAGNOSIS — I25.118 CORONARY ARTERY DISEASE OF NATIVE HEART WITH STABLE ANGINA PECTORIS, UNSPECIFIED VESSEL OR LESION TYPE (HCC): ICD-10-CM

## 2022-10-19 DIAGNOSIS — R06.09 DOE (DYSPNEA ON EXERTION): ICD-10-CM

## 2022-10-19 DIAGNOSIS — R07.2 PRECORDIAL PAIN: ICD-10-CM

## 2022-10-19 LAB
EKG ATRIAL RATE: 78 BPM
EKG DIAGNOSIS: NORMAL
EKG P AXIS: 76 DEGREES
EKG P-R INTERVAL: 228 MS
EKG Q-T INTERVAL: 384 MS
EKG QRS DURATION: 80 MS
EKG QTC CALCULATION (BAZETT): 437 MS
EKG R AXIS: -13 DEGREES
EKG T AXIS: 14 DEGREES
EKG VENTRICULAR RATE: 78 BPM

## 2022-10-19 PROCEDURE — 1036F TOBACCO NON-USER: CPT | Performed by: INTERNAL MEDICINE

## 2022-10-19 PROCEDURE — G8484 FLU IMMUNIZE NO ADMIN: HCPCS | Performed by: INTERNAL MEDICINE

## 2022-10-19 PROCEDURE — 99214 OFFICE O/P EST MOD 30 MIN: CPT | Performed by: INTERNAL MEDICINE

## 2022-10-19 PROCEDURE — 93010 ELECTROCARDIOGRAM REPORT: CPT | Performed by: INTERNAL MEDICINE

## 2022-10-19 PROCEDURE — G8427 DOCREV CUR MEDS BY ELIG CLIN: HCPCS | Performed by: INTERNAL MEDICINE

## 2022-10-19 PROCEDURE — G8420 CALC BMI NORM PARAMETERS: HCPCS | Performed by: INTERNAL MEDICINE

## 2022-10-19 PROCEDURE — 93005 ELECTROCARDIOGRAM TRACING: CPT

## 2022-10-19 PROCEDURE — 3017F COLORECTAL CA SCREEN DOC REV: CPT | Performed by: INTERNAL MEDICINE

## 2022-10-19 PROCEDURE — 1123F ACP DISCUSS/DSCN MKR DOCD: CPT | Performed by: INTERNAL MEDICINE

## 2022-10-19 ASSESSMENT — ENCOUNTER SYMPTOMS
CONSTIPATION: 0
ABDOMINAL DISTENTION: 0
CHOKING: 0
BACK PAIN: 0
SINUS PRESSURE: 0
VOICE CHANGE: 0
ANAL BLEEDING: 0
STRIDOR: 0
COUGH: 0
RHINORRHEA: 0
BLOOD IN STOOL: 0
CHEST TIGHTNESS: 0
APNEA: 0
WHEEZING: 0
ABDOMINAL PAIN: 0
SORE THROAT: 0
DIARRHEA: 0
SHORTNESS OF BREATH: 1

## 2022-10-19 NOTE — PROGRESS NOTES
Hugh Johnson    YOB: 1950     Date of Service:  10/19/2022     Chief Complaint   Patient presents with    Shortness of Breath     3 month         HPI he was hospitalized between 8/8 and 8/11 for evaluation of chest pain, status post [facility] on 8/9, stable proximal LAD disease and did not require any intervention. Patient is currently following up but Jersixto Frees for tricuspid valve repair. Patient also has severe MR, not thought to be a candidate for redo CABG due to him being [employer] and risks associated with surgery. Patient feels that his dyspnea has improved with use of Spiriva, but overall still has dyspnea with exertion. Denies any cough or phlegm. Has intermittent chest pain-precordial and moves to the left side of his chest, which he tells me is mostly chronic in nature.     Allergies   Allergen Reactions    Naproxen Swelling     Lips  Pt does not recognize this being an intolerance    Hydrocodone Itching     Outpatient Medications Marked as Taking for the 10/19/22 encounter (Office Visit) with Emeli Kinney MD   Medication Sig Dispense Refill    tiotropium (SPIRIVA RESPIMAT) 2.5 MCG/ACT AERS inhaler Inhale 2 puffs into the lungs daily 1 each 0       Immunization History   Administered Date(s) Administered    COVID-19, PFIZER Bivalent BOOSTER, (age 12y+), IM, 30 mcg/0.3 mL dose 10/11/2022    COVID-19, PFIZER PURPLE top, DILUTE for use, (age 15 y+), 30mcg/0.3mL 03/11/2021, 04/08/2021, 11/01/2021    Influenza Virus Vaccine 01/25/2015, 10/30/2015    Influenza, FLUAD, (age 72 y+), Adjuvanted, 0.5mL 11/16/2020    Influenza, FLUZONE (age 72 y+), High Dose, 0.7mL 08/21/2021    Influenza, High Dose (Fluzone 65 yrs and older) 08/27/2018, 11/01/2018    Pneumococcal Conjugate 13-valent ([id number]) 06/28/2019    Pneumococcal Polysaccharide ([id number]) 06/07/2015    Tdap (Boostrix, Adacel) 09/07/2018       Past Medical History:   Diagnosis Date    Aortic valve insufficiency     Arthritis     Atrial fibrillation ([facility] [address]     Cancer ([facility] [address] 09/2017    TONGUE head and neck IN REMISSION    Dysphonia     Encounter for imaging to screen for metal prior to MRI 08/22/2022    MRI conditional Medtronic Linq loop recorder model#LNQ11 Reveal LINQ implanted 4/22/19. Normal Mode. 1.5T or 3.0T. Download data prior to MRI. Follow all other Medtronic guidelines.  Pt currently follows     GERD (gastroesophageal reflux disease)     Hearing loss     Heart disease     Hyperlipidemia     Medical history reviewed with no changes     Obstructive apnea does not use CPAP    Oropharyngeal dysphagia     Refusal of blood transfusions as patient is Syn[employer]     Thyroid disease      Past Surgical History:   Procedure Laterality Date    ANKLE SURGERY      AORTIC VALVE REPLACEMENT  01/28/2015    Dr. Ilan Bell 25mm Mosaic Ultra porcine valve; right and left modified maze procedure with ligation of DELGADO with Atriclip    ATRIAL ABLATION [facility]      Arthroscopy    LUMBAR SPINE SURGERY Right 10/13/2020    RIGHT LUMBAR4-LUMBAR5 MICRO HEMILAMINECTOMY AND DISCECTOMY (48349, [id number]) performed by Des Garcia MD at [place] [address]      collapsed lung due to broken ribs    MANDIBLE FRACTURE SURGERY      NECK SURGERY      Fusion    PAIN MANAGEMENT PROCEDURE Right 06/22/2020    RIGHT L4 AND L5 TRANSFORAMINAL EPIDURAL STEROID INJECTION WITH FLUOROSCOPY (04561,[id number]) performed by Iris Garcia MD at [facility] Right 07/08/2020    RIGHT L4 AND L5 TRANSFORAMINAL EPIDURAL STEROID INJECTION WITH FLUOROSCOPY (37251,73547) performed by Iris Garcia MD at [facility]       Family History   Problem Relation Age of Onset    Heart Disease Other     High Blood Pressure Neg Hx     High Cholesterol Neg Hx        Review of Systems:  Review of Systems   Constitutional:  Negative for activity change, appetite change, fatigue and fever. HENT:  Negative for congestion, ear discharge, ear pain, postnasal drip, rhinorrhea, sinus pressure, sneezing, sore throat, tinnitus and voice change. Respiratory:  Positive for shortness of breath. Negative for apnea, cough, choking, chest tightness, wheezing and stridor. Cardiovascular:  Positive for chest pain. Negative for palpitations and leg swelling. Gastrointestinal:  Negative for abdominal distention, abdominal pain, anal bleeding, blood in stool, constipation and diarrhea. Musculoskeletal:  Negative for arthralgias, back pain and gait problem. Skin:  Negative for pallor and rash. Allergic/Immunologic: Negative for environmental allergies. Neurological:  Negative for dizziness, tremors, seizures, syncope, speech difficulty, weakness, light-headedness, numbness and headaches. Hematological:  Negative for adenopathy. Does not bruise/bleed easily. Psychiatric/Behavioral:  Negative for sleep disturbance. Vitals:    10/19/22 0855   BP: 138/67   Pulse: 68   SpO2: 95%   Weight: 169 lb (76.7 kg)     Patient-Reported Vitals 3/21/2022   Patient-Reported Systolic 297   Patient-Reported Diastolic 82   Patient-Reported Temperature pt mentioned 78 and 102.4 but doesnt remember which one he thinks it was 78      Body mass index is 24.25 kg/m². Wt Readings from Last 3 Encounters:   10/19/22 169 lb (76.7 kg)   10/18/22 166 lb (75.3 kg)   10/04/22 160 lb 3.2 oz (72.7 kg)     BP Readings from Last 3 Encounters:   10/19/22 138/67   10/18/22 118/72   10/04/22 106/60         Physical Exam  Constitutional:       General: He is not in acute distress. Appearance: He is well-developed. He is not ill-appearing or diaphoretic. HENT:      Mouth/Throat:      Pharynx: No oropharyngeal exudate. Cardiovascular:      Rate and Rhythm: Normal rate and regular rhythm. Heart sounds: Normal heart sounds.  No murmur heard. No friction rub. Pulmonary:      Effort: No respiratory distress. Breath sounds: Normal breath sounds. No wheezing, rhonchi or rales. Chest:      Chest wall: No tenderness. Abdominal:      General: There is no distension. Palpations: There is no mass. Tenderness: There is no abdominal tenderness. There is no guarding or rebound. Musculoskeletal:         General: No swelling or tenderness. Skin:     Coloration: Skin is not pale. Findings: No erythema or rash. Neurological:      Mental Status: He is alert and oriented to person, place, and time. Cranial Nerves: No cranial nerve deficit. Motor: No abnormal muscle tone. Coordination: Coordination normal.      Deep Tendon Reflexes: Reflexes normal.           Health Maintenance   Topic Date Due    Shingles vaccine (1 of 2) Never done    Pneumococcal 65+ years Vaccine (3 - PPSV23 or PCV20) 06/28/2020    Annual Wellness Visit (AWV)  11/17/2021    COVID-19 Vaccine (5 - Booster for Pfizer series) 02/11/2023    Depression Monitoring  10/18/2023    DTaP/Tdap/Td vaccine (2 - Td or Tdap) 09/07/2028    Flu vaccine  Completed    Hepatitis A vaccine  Aged Out    Hib vaccine  Aged Out    Meningococcal (ACWY) vaccine  Aged Out          Assessment/Plan:     Diagnosis Orders   1. Precordial pain  EKG 12 Lead      2. PAD, associated with angina  3. MARTINEZ    Patient has no significant obstructive airway disease based on PFT done in March 2021, would like to continue with Spiriva Respimat 2.5 mcg, 2 puffs daily-currently using samples from our office due to poor affordability. I agreed with continuing inhaler therapy until at least after his anticipated cardiac surgery next month. We might consider repeating complete PFT study following this and discontinuing inhalers if not necessary. Discussed plan with the patient. Recurrent angina, recent LHC showed stable proximal LAD lesion-thought to be high risk for redo CABG. Patient also has moderate AR, severe TR-currently awaiting tricuspid valve repair and paroxysmal atrial fibrillation status post maze procedure and atrial clipping. History of tissue AVR in 2015 for aortic regurgitation. Patient has significant smoking history-quit in 2015.  40-pack-year history. Previous CTA chest obtained in Feb 2022 showed no significant lung nodules or adenopathy. Pt would benefit from annual LDCT. In view of ongoing intermittent chest pain which is chronic and likely to be related to angina, we will obtain a twelve-lead EKG today. Return in about 2 months (around 12/19/2022).

## 2022-10-20 NOTE — TELEPHONE ENCOUNTER
Pt is scheduled with RMM and device ck 1/11/2023, pt could not come in before that because they had to many appts this year

## 2022-10-21 ENCOUNTER — APPOINTMENT (OUTPATIENT)
Dept: CARDIAC REHAB | Age: 72
End: 2022-10-21
Payer: MEDICARE

## 2022-10-24 ENCOUNTER — APPOINTMENT (OUTPATIENT)
Dept: CARDIAC REHAB | Age: 72
End: 2022-10-24
Payer: MEDICARE

## 2022-10-24 ENCOUNTER — HOSPITAL ENCOUNTER (OUTPATIENT)
Dept: CARDIAC REHAB | Age: 72
Setting detail: THERAPIES SERIES
Discharge: HOME | End: 2022-10-24
Payer: MEDICARE

## 2022-10-24 PROCEDURE — 93798 PHYS/QHP OP CAR RHAB W/ECG: CPT

## 2022-10-26 ENCOUNTER — HOSPITAL ENCOUNTER (OUTPATIENT)
Dept: CARDIAC REHAB | Age: 72
Setting detail: THERAPIES SERIES
Discharge: HOME | End: 2022-10-26
Payer: MEDICARE

## 2022-10-26 PROCEDURE — 93798 PHYS/QHP OP CAR RHAB W/ECG: CPT

## 2022-10-28 ENCOUNTER — HOSPITAL ENCOUNTER (OUTPATIENT)
Dept: CARDIAC REHAB | Age: 72
Setting detail: THERAPIES SERIES
Discharge: HOME | End: 2022-10-28
Payer: MEDICARE

## 2022-10-28 PROCEDURE — 93798 PHYS/QHP OP CAR RHAB W/ECG: CPT

## 2022-10-31 ENCOUNTER — HOSPITAL ENCOUNTER (OUTPATIENT)
Dept: CARDIAC REHAB | Age: 72
Setting detail: THERAPIES SERIES
Discharge: HOME | End: 2022-10-31
Payer: MEDICARE

## 2022-10-31 DIAGNOSIS — E03.9 ACQUIRED HYPOTHYROIDISM: ICD-10-CM

## 2022-10-31 PROCEDURE — 93798 PHYS/QHP OP CAR RHAB W/ECG: CPT

## 2022-10-31 RX ORDER — LEVOTHYROXINE SODIUM 0.12 MG/1
TABLET ORAL
Qty: 30 TABLET | Refills: 5 | Status: SHIPPED | OUTPATIENT
Start: 2022-10-31

## 2022-10-31 NOTE — TELEPHONE ENCOUNTER
Medication:   Requested Prescriptions     Pending Prescriptions Disp Refills    levothyroxine (SYNTHROID) 125 MCG tablet [Pharmacy Med Name: Levothyroxine Sodium 125 MCG Oral Tablet] 30 tablet 0     Sig: Take 1 tablet by mouth once daily        Last Filled:  7/28/2022 30 tabs 2 refills     Patient Phone Number: [phone] (home)     Last appt: 10/4/2022   Next appt: 11/3/2022    Last OARRS:   RX Monitoring 12/30/2019   Periodic Controlled Substance Monitoring No signs of potential drug abuse or diversion identified.

## 2022-11-02 ENCOUNTER — HOSPITAL ENCOUNTER (OUTPATIENT)
Dept: CARDIAC REHAB | Age: 72
Setting detail: THERAPIES SERIES
Discharge: HOME | End: 2022-11-02
Payer: MEDICARE

## 2022-11-02 PROCEDURE — 93798 PHYS/QHP OP CAR RHAB W/ECG: CPT

## 2022-11-03 ENCOUNTER — OFFICE VISIT (OUTPATIENT)
Dept: FAMILY MEDICINE CLINIC | Age: 72
End: 2022-11-03
Payer: MEDICARE

## 2022-11-03 VITALS
SYSTOLIC BLOOD PRESSURE: 109 MMHG | TEMPERATURE: 97.4 F | BODY MASS INDEX: 24.34 KG/M2 | OXYGEN SATURATION: 96 % | HEART RATE: 76 BPM | RESPIRATION RATE: 16 BRPM | DIASTOLIC BLOOD PRESSURE: 71 MMHG | WEIGHT: 169.6 LBS

## 2022-11-03 DIAGNOSIS — F41.9 ANXIETY: Primary | ICD-10-CM

## 2022-11-03 DIAGNOSIS — M54.16 RADICULOPATHY, LUMBAR REGION: ICD-10-CM

## 2022-11-03 DIAGNOSIS — R13.10 DYSPHAGIA, UNSPECIFIED TYPE: ICD-10-CM

## 2022-11-03 DIAGNOSIS — E03.9 ACQUIRED HYPOTHYROIDISM: ICD-10-CM

## 2022-11-03 PROBLEM — C79.9 SECONDARY MALIGNANT NEOPLASM OF UNSPECIFIED SITE (CODE) (HCC): Status: ACTIVE | Noted: 2022-11-03

## 2022-11-03 PROCEDURE — 3017F COLORECTAL CA SCREEN DOC REV: CPT | Performed by: FAMILY MEDICINE

## 2022-11-03 PROCEDURE — G8484 FLU IMMUNIZE NO ADMIN: HCPCS | Performed by: FAMILY MEDICINE

## 2022-11-03 PROCEDURE — G8427 DOCREV CUR MEDS BY ELIG CLIN: HCPCS | Performed by: FAMILY MEDICINE

## 2022-11-03 PROCEDURE — 3074F SYST BP LT 130 MM HG: CPT | Performed by: FAMILY MEDICINE

## 2022-11-03 PROCEDURE — 3078F DIAST BP <80 MM HG: CPT | Performed by: FAMILY MEDICINE

## 2022-11-03 PROCEDURE — 1123F ACP DISCUSS/DSCN MKR DOCD: CPT | Performed by: FAMILY MEDICINE

## 2022-11-03 PROCEDURE — 1036F TOBACCO NON-USER: CPT | Performed by: FAMILY MEDICINE

## 2022-11-03 PROCEDURE — G8420 CALC BMI NORM PARAMETERS: HCPCS | Performed by: FAMILY MEDICINE

## 2022-11-03 PROCEDURE — 99214 OFFICE O/P EST MOD 30 MIN: CPT | Performed by: FAMILY MEDICINE

## 2022-11-03 RX ORDER — MIRTAZAPINE 7.5 MG/1
7.5 TABLET, FILM COATED ORAL NIGHTLY
COMMUNITY
Start: 2022-11-03

## 2022-11-03 RX ORDER — HYDROXYZINE PAMOATE 25 MG/1
25 CAPSULE ORAL 3 TIMES DAILY PRN
Qty: 30 CAPSULE | Refills: 0 | Status: SHIPPED | OUTPATIENT
Start: 2022-11-03 | End: 2022-12-03

## 2022-11-03 NOTE — PROGRESS NOTES
Chief Complaint: Depression (Follow up from questionnaire )       HPI:  Munira Swartz is a 81 y.o. male here for the follow-up of anxiety and depression. He has been taking Remeron 15 mg daily which affected his sleep so he went back to 7.5 mg daily  Still continues to have at times anxiety. He has been evaluated for palpitations. All his EKGs have been stable. It was attribute it to his anxiety. He also has been having difficulty swallowing at times. He had history of squamous cell carcinoma of the tongue currently in remission. He had chemo and radiotherapy. Currently having issues with swallowing. His palpitation always happens after eating. He has been on Prilosec. He has hypothyroidism  Currently his symptoms are stable    Also has chronic back pain due to lumbar disc degeneration at multiple levels. Takes Michael Elle as needed for the pain      ROS:  Constitutional: Negative for appetite change, fatigue, fever and unexpected weight change. HENT: Negative    Respiratory: Negative for cough, chest tightness, shortness of breath and wheezing. Cardiovascular: Palpitations  Gastrointestinal: As mentioned above  Genitourinary: Negative for difficulty urinating, flank pain, frequency, hematuria and urgency. Skin: Negative for color change, pallor, rash and wound. Neurological: Negative   Psychiatric/Behavioral: As mentioned above    Patient's problem list, medications, allergies, past medical, surgical, social and family histories were reviewed and updated as appropriate.      Current Outpatient Medications   Medication Sig Dispense Refill    hydrOXYzine pamoate (VISTARIL) 25 MG capsule Take 1 capsule by mouth 3 times daily as needed for Anxiety 30 capsule 0    mirtazapine (REMERON) 7.5 MG tablet Take 1 tablet by mouth nightly      levothyroxine (SYNTHROID) 125 MCG tablet Take 1 tablet by mouth once daily 30 tablet 5    tiotropium (SPIRIVA RESPIMAT) 2.5 MCG/ACT AERS inhaler Inhale 2 puffs into the lungs daily 1 each 0    oxyCODONE-acetaminophen (PERCOCET)  MG per tablet Take 1 tablet by mouth every 4 hours as needed for Pain.      digoxin (LANOXIN) 125 MCG tablet Take 1 tablet by mouth once daily 30 tablet 5    clopidogrel (PLAVIX) 75 MG tablet Take 1 tablet by mouth daily 90 tablet 3    metoprolol tartrate (LOPRESSOR) 25 MG tablet Take 0.5 tablets by mouth 2 times daily 60 tablet 3    nitroGLYCERIN (NITROSTAT) 0.4 MG SL tablet Place 1 tablet under the tongue every 5 minutes as needed for Chest pain 25 tablet 3    vitamin B-12 (CYANOCOBALAMIN) 100 MCG tablet Take 1 tablet by mouth in the morning. 30 tablet 3    pantoprazole (PROTONIX) 40 MG tablet TAKE 1 TABLET BY MOUTH TWICE A DAY BEFORE MEALS 180 tablet 3    Cholecalciferol 50 MCG (2000 UT) TABS Take 2,000 Units by mouth daily      rosuvastatin (CRESTOR) 40 MG tablet Take 1 tablet by mouth daily 90 tablet 3     No current facility-administered medications for this visit. Social History     Tobacco Use    Smoking status: Former     Packs/day: 1.00     Years: 40.00     Pack years: 40.00     Types: Cigarettes     Start date: 1975     Quit date: 2015     Years since quittin.7    Smokeless tobacco: Never    Tobacco comments:     Maintain cessation   Substance Use Topics    Alcohol use: No     Alcohol/week: 0.0 standard drinks     Comment: KATELYN reyes        Objective:     Vitals:    22 1029   BP: 109/71   Pulse: 76   Resp: 16   Temp: 97.4 °F (36.3 °C)   TempSrc: Tympanic   SpO2: 96%   Weight: 169 lb 9.6 oz (76.9 kg)     Body mass index is 24.34 kg/m². Wt Readings from Last 3 Encounters:   22 169 lb 9.6 oz (76.9 kg)   10/19/22 169 lb (76.7 kg)   10/18/22 166 lb (75.3 kg)     BP Readings from Last 3 Encounters:   22 109/71   10/19/22 138/67   10/18/22 118/72       Physical exam:  Constitutional: he is oriented to person, place, and time. he appears well-developed and well-nourished. No distress.    Neck: Normal range of motion. No JVD present. No tracheal deviation present. No thyromegaly present. Cardiovascular: Normal rate, regular rhythm, normal heart sounds and intact distal pulses. No murmur heard. Pulmonary/Chest: Effort normal and breath sounds normal. No stridor. No respiratory distress. he has no wheezes. he has no rales. heexhibits no tenderness. Abdominal: Soft. Bowel sounds are normal. he exhibits no distension and no mass. There is no tenderness. There is no rebound and no guarding. Musculoskeletal: Normal spine exam  At times discomfort in the lumbar lower spine but normal gait and strength in lower extremities  Neurological:he is alert and oriented to person, place, and time. he has gross neurological exam normal with normal strength and normal gait  Skin: Skin is warm and dry. No rash noted. he is not diaphoretic. No erythema. No pallor. Psychiatric: he has a normal mood and affect. his   behavior is normal.      Assessment/Plan:   1. Anxiety  We will start him on Vistaril  - hydrOXYzine pamoate (VISTARIL) 25 MG capsule; Take 1 capsule by mouth 3 times daily as needed for Anxiety  Dispense: 30 capsule; Refill: 0  - mirtazapine (REMERON) 7.5 MG tablet; Take 1 tablet by mouth nightly    2. Dysphagia, unspecified type  - [facility] - Edgar Tai MD, Gastroenterology, [address] [place] [address]     3. Acquired hypothyroidism  Stable  Continue the Synthroid 125 mcg daily    4.  Radiculopathy, lumbar region  On Norco for pain as needed      Denise Vasquez MD  11/3/2022 4:23 PM

## 2022-11-04 ENCOUNTER — HOSPITAL ENCOUNTER (OUTPATIENT)
Dept: CARDIAC REHAB | Age: 72
Setting detail: THERAPIES SERIES
Discharge: HOME | End: 2022-11-04
Payer: MEDICARE

## 2022-11-04 PROCEDURE — 93798 PHYS/QHP OP CAR RHAB W/ECG: CPT

## 2022-11-07 ENCOUNTER — HOSPITAL ENCOUNTER (OUTPATIENT)
Dept: CARDIAC REHAB | Age: 72
Setting detail: THERAPIES SERIES
Discharge: HOME | End: 2022-11-07
Payer: MEDICARE

## 2022-11-07 PROCEDURE — 93798 PHYS/QHP OP CAR RHAB W/ECG: CPT

## 2022-11-08 ENCOUNTER — HOSPITAL ENCOUNTER (OUTPATIENT)
Age: 72
Discharge: HOME | End: 2022-11-08
Payer: MEDICARE

## 2022-11-08 LAB
ALBUMIN SERPL-MCNC: 4.6 G/DL (ref 3.4–5)
ANION GAP SERPL CALCULATED.3IONS-SCNC: 9 MMOL/L (ref 3–16)
BUN BLDV-MCNC: 9 MG/DL (ref 7–20)
CALCIUM SERPL-MCNC: 9.5 MG/DL (ref 8.3–10.6)
CHLORIDE BLD-SCNC: 103 MMOL/L (ref 99–110)
CHOLESTEROL, TOTAL: 168 MG/DL (ref 0–199)
CO2: 28 MMOL/L (ref 21–32)
CREAT SERPL-MCNC: 0.5 MG/DL (ref 0.8–1.3)
GFR SERPL CREATININE-BSD FRML MDRD: >60 ML/MIN/{1.73_M2}
GLUCOSE BLD-MCNC: 86 MG/DL (ref 70–99)
HCT VFR BLD CALC: 39.8 % (ref 40.5–52.5)
HDLC SERPL-MCNC: 40 MG/DL (ref 40–60)
HEMOGLOBIN: 13.9 G/DL (ref 13.5–17.5)
LDL CHOLESTEROL CALCULATED: 100 MG/DL
MCH RBC QN AUTO: 32 PG (ref 26–34)
MCHC RBC AUTO-ENTMCNC: 35 G/DL (ref 31–36)
MCV RBC AUTO: 91.5 FL (ref 80–100)
PDW BLD-RTO: 13.3 % (ref 12.4–15.4)
PLATELET # BLD: 193 K/UL (ref 135–450)
PMV BLD AUTO: 8 FL (ref 5–10.5)
POTASSIUM SERPL-SCNC: 4.3 MMOL/L (ref 3.5–5.1)
RBC # BLD: 4.35 M/UL (ref 4.2–5.9)
SODIUM BLD-SCNC: 140 MMOL/L (ref 136–145)
TRIGL SERPL-MCNC: 138 MG/DL (ref 0–150)
VLDLC SERPL CALC-MCNC: 28 MG/DL
WBC # BLD: 5 K/UL (ref 4–11)

## 2022-11-08 PROCEDURE — 85027 COMPLETE CBC AUTOMATED: CPT

## 2022-11-08 PROCEDURE — 36415 COLL VENOUS BLD VENIPUNCTURE: CPT

## 2022-11-08 PROCEDURE — 80061 LIPID PANEL: CPT

## 2022-11-08 PROCEDURE — 80048 BASIC METABOLIC PNL TOTAL CA: CPT

## 2022-11-08 PROCEDURE — 82040 ASSAY OF SERUM ALBUMIN: CPT

## 2022-11-09 ENCOUNTER — HOSPITAL ENCOUNTER (OUTPATIENT)
Dept: CARDIAC REHAB | Age: 72
Setting detail: THERAPIES SERIES
Discharge: HOME | End: 2022-11-09
Payer: MEDICARE

## 2022-11-09 PROCEDURE — 93798 PHYS/QHP OP CAR RHAB W/ECG: CPT

## 2022-11-10 ENCOUNTER — TELEPHONE (OUTPATIENT)
Dept: CARDIOLOGY CLINIC | Age: 72
End: 2022-11-10

## 2022-11-10 NOTE — TELEPHONE ENCOUNTER
CARDIAC CLEARANCE REQUEST    What type of procedure are you having: EGD    Are you taking any blood thinners: Plavix    When is your procedure scheduled for: 1/19/23    What physician is performing your procedure: Dr. [facility]    Phone Number:[phone]    Fax number to send the letter: [phone] to fax form.     enrique

## 2022-11-11 ENCOUNTER — HOSPITAL ENCOUNTER (OUTPATIENT)
Dept: CARDIAC REHAB | Age: 72
Setting detail: THERAPIES SERIES
Discharge: HOME | End: 2022-11-11
Payer: MEDICARE

## 2022-11-11 ENCOUNTER — OFFICE VISIT (OUTPATIENT)
Dept: CARDIOLOGY CLINIC | Age: 72
End: 2022-11-11
Payer: MEDICARE

## 2022-11-11 VITALS
BODY MASS INDEX: 23.84 KG/M2 | DIASTOLIC BLOOD PRESSURE: 62 MMHG | OXYGEN SATURATION: 95 % | HEIGHT: 70 IN | HEART RATE: 76 BPM | SYSTOLIC BLOOD PRESSURE: 100 MMHG | WEIGHT: 166.5 LBS

## 2022-11-11 DIAGNOSIS — I25.10 CORONARY ARTERY DISEASE INVOLVING NATIVE CORONARY ARTERY OF NATIVE HEART WITHOUT ANGINA PECTORIS: Primary | ICD-10-CM

## 2022-11-11 DIAGNOSIS — I34.0 NONRHEUMATIC MITRAL VALVE REGURGITATION: ICD-10-CM

## 2022-11-11 DIAGNOSIS — E78.2 MIXED HYPERLIPIDEMIA: ICD-10-CM

## 2022-11-11 DIAGNOSIS — I48.0 PAF (PAROXYSMAL ATRIAL FIBRILLATION) (HCC): ICD-10-CM

## 2022-11-11 PROCEDURE — 3074F SYST BP LT 130 MM HG: CPT | Performed by: NURSE PRACTITIONER

## 2022-11-11 PROCEDURE — G8420 CALC BMI NORM PARAMETERS: HCPCS | Performed by: NURSE PRACTITIONER

## 2022-11-11 PROCEDURE — 3017F COLORECTAL CA SCREEN DOC REV: CPT | Performed by: NURSE PRACTITIONER

## 2022-11-11 PROCEDURE — G8427 DOCREV CUR MEDS BY ELIG CLIN: HCPCS | Performed by: NURSE PRACTITIONER

## 2022-11-11 PROCEDURE — G8484 FLU IMMUNIZE NO ADMIN: HCPCS | Performed by: NURSE PRACTITIONER

## 2022-11-11 PROCEDURE — 93798 PHYS/QHP OP CAR RHAB W/ECG: CPT

## 2022-11-11 PROCEDURE — 1036F TOBACCO NON-USER: CPT | Performed by: NURSE PRACTITIONER

## 2022-11-11 PROCEDURE — 99214 OFFICE O/P EST MOD 30 MIN: CPT | Performed by: NURSE PRACTITIONER

## 2022-11-11 PROCEDURE — 1123F ACP DISCUSS/DSCN MKR DOCD: CPT | Performed by: NURSE PRACTITIONER

## 2022-11-11 PROCEDURE — 3078F DIAST BP <80 MM HG: CPT | Performed by: NURSE PRACTITIONER

## 2022-11-11 RX ORDER — ASPIRIN 81 MG/1
81 TABLET ORAL DAILY
COMMUNITY

## 2022-11-11 NOTE — PROGRESS NOTES
[address]     Outpatient Follow Up Note    Emma Miller is 81 y.o. male who presents today with a history of aortic insuff s/p AVR with MAZE & DELGADO clip '15; mitral regurg, CM/EF 30% (by cardiac MRI), CAD s/p PTCA LAD Sept '22, HTN, PAF s/p ablation '20 and hyperlipidemia. 10/13/22 :Seen/eval by Dr. Belén Edward: referred for consideration for transcatheter options for management of his mitral and tricuspid valve. 1. Mitral regurgitation: Presumed functional MR and waxes and wanes between moderate to severe and moderate. Does depend upon volume status. Cardiac MRI with moderate MR although transthoracic study in July revealed moderate to severe. He is symptomatic and likely would qualify for commercial transcatheter edge to edge repair. He is not a candidate for research trials of transcatheter mitral valve replacement given that his inability to receive blood transfusions from his [employer] zaida is an exclusion. This will also excluded from all tricuspid valve trials. It is possible that the if the MR is moderate to severe, he could have commercial transcatheter edge to edge repair with off-label use of drive-by tricuspid valve clipping. He is interested in pursuing this and I have recommended transesophageal ECHO to assess for suitability for tricuspid valve clipping as well as assessment of his MR prior to making final decision. He is comfortable with this plan. 2. Tricuspid regurgitation: Moderate to severe to severe by transthoracic and transesophageal ECHO although cardiac MRI revealed only moderate TR in August 2022. Recommend right heart catheterization as well as transesophageal echo for assessment both for severe as well as suitability for tricuspid valve clipping in an off-label fashion is comfortable with this plan. Recommend right heart catheterization. 3. Status post bioprosthetic aortic valve replacement: Functioning appropriately.   PLAN  · Schedule CHIKIS and RHC scheduled for 11/6    CHIEF COMPLAINT / HPI:  Follow Up secondary to valvular disease and CAD    Subjective:   He denies significant chest pain. He still feels the squeezing sensation in his chest. The first of the year he'll have a scope to see if its coming from his stomach. There is SOB/MARTINEZ. He walked over from the [employer] parking lot, up the hill to the hospital front doors then was SOB. The patient denies orthopnea/PND. The patient had swelling in his legs last night that is gone this morning. The patients weight is stable 159-62# . The patient is experiencing palpitations which causes worry (only one episode and a lot better/less in frequency). The second time at [facility] he'd gotten dizzy getting off the bicycle. He used the treadmill yesterday with an increased elevation and did fine    These symptoms show no change since the last OV. With regard to medication therapy the patient has been compliant with prescribed regimen. They have tolerated therapy to date. Past Medical History:   Diagnosis Date    Aortic valve insufficiency     Arthritis     Atrial fibrillation ([address]     Cancer ([facility] [address] 09/2017    TONGUE head and neck IN REMISSION    Dysphonia     Encounter for imaging to screen for metal prior to MRI 08/22/2022    MRI conditional Medtronic Linq loop recorder model#[id number] Reveal LINQ implanted 4/22/19. Normal Mode. 1.5T or 3.0T. Download data prior to MRI. Follow all other Medtronic guidelines.  Pt currently follows     GERD (gastroesophageal reflux disease)     Hearing loss     Heart disease     Hyperlipidemia     Medical history reviewed with no changes     Obstructive apnea does not use CPAP    Oropharyngeal dysphagia     Refusal of blood transfusions as patient is Catholic     Thyroid disease      Social History:    Social History     Tobacco Use   Smoking Status Former    Packs/day: 1.00    Years: 40.00    Pack years: 40.00    Types: Cigarettes    Start date: 1/1/1975    Quit date: 1/22/2015 Years since quittin.8   Smokeless Tobacco Never   Tobacco Comments    Maintain cessation     Current Medications:  Current Outpatient Medications   Medication Sig Dispense Refill    aspirin 81 MG EC tablet Take 81 mg by mouth daily      hydrOXYzine pamoate (VISTARIL) 25 MG capsule Take 1 capsule by mouth 3 times daily as needed for Anxiety 30 capsule 0    mirtazapine (REMERON) 7.5 MG tablet Take 1 tablet by mouth nightly      levothyroxine (SYNTHROID) 125 MCG tablet Take 1 tablet by mouth once daily 30 tablet 5    tiotropium (SPIRIVA RESPIMAT) 2.5 MCG/ACT AERS inhaler Inhale 2 puffs into the lungs daily 1 each 0    oxyCODONE-acetaminophen (PERCOCET)  MG per tablet Take 0.5 tablets by mouth every 4 hours as needed for Pain.      digoxin (LANOXIN) 125 MCG tablet Take 1 tablet by mouth once daily 30 tablet 5    clopidogrel (PLAVIX) 75 MG tablet Take 1 tablet by mouth daily 90 tablet 3    metoprolol tartrate (LOPRESSOR) 25 MG tablet Take 0.5 tablets by mouth 2 times daily 60 tablet 3    vitamin B-12 (CYANOCOBALAMIN) 100 MCG tablet Take 1 tablet by mouth in the morning. 30 tablet 3    pantoprazole (PROTONIX) 40 MG tablet TAKE 1 TABLET BY MOUTH TWICE A DAY BEFORE MEALS 180 tablet 3    Cholecalciferol 50 MCG (2000 UT) TABS Take 2,000 Units by mouth daily      rosuvastatin (CRESTOR) 40 MG tablet Take 1 tablet by mouth daily 90 tablet 3    nitroGLYCERIN (NITROSTAT) 0.4 MG SL tablet Place 1 tablet under the tongue every 5 minutes as needed for Chest pain (Patient not taking: Reported on 2022) 25 tablet 3     No current facility-administered medications for this visit. REVIEW OF SYSTEMS:    CONSTITUTIONAL: No major weight gain or loss, fatigue, weakness, night sweats or fever. HEENT: No new vision difficulties or ringing in the ears. RESPIRATORY: No new SOB, PND, orthopnea or cough. CARDIOVASCULAR: See HPI  GI: No nausea, vomiting, diarrhea, constipation, abdominal pain or changes in bowel habits.   : No urinary frequency, urgency, incontinence hematuria or dysuria. SKIN: No cyanosis or skin lesions. MUSCULOSKELETAL: No new muscle or joint pain. NEUROLOGICAL: No syncope or TIA-like symptoms. PSYCHIATRIC: No anxiety, pain, insomnia or depression    Objective:   PHYSICAL EXAM:        Vitals:    11/11/22 0901 11/11/22 0923   BP: 104/60 100/62   Site: Right Upper Arm Left Upper Arm   Position: Sitting    Cuff Size: Medium Adult Medium Adult   Pulse: 76    SpO2: 95%    Weight: 166 lb 8 oz (75.5 kg)    Height: 5' 10\" (1.778 m)        VITALS:  There were no vitals taken for this visit. CONSTITUTIONAL: Cooperative, no apparent distress, and appears well nourished / developed  NEUROLOGIC:  Awake and orientated to person, place and time. PSYCH: Calm affect. SKIN: Warm and dry. HEENT: Sclera non-icteric, normocephalic, neck supple, no elevation of JVP, normal carotid pulses with no bruits and thyroid normal size. LUNGS:  No increased work of breathing and clear to auscultation, no crackles or wheezing  CARDIOVASCULAR:  Regular rate 72 and rhythm with + murmurs, gallops, rubs, or abnormal heart sounds, normal PMI. The apical impulses not displaced  JVP less than 8 cm H2O  Heart tones are crisp and normal  Cervical veins are not engorged  The carotid upstroke is normal in amplitude and contour without delay or bruit  JVP is not elevated  ABDOMEN:  Normal bowel sounds, non-distended and non-tender to palpation  EXT: trace LLE edema, no calf tenderness. Pulses are present bilaterally.     DATA:    Lab Results   Component Value Date    ALT 9 (L) 09/13/2022    AST 14 (L) 09/13/2022    ALKPHOS 43 09/13/2022    BILITOT 0.6 09/13/2022     Lab Results   Component Value Date    CREATININE 0.5 (L) 11/08/2022    BUN 9 11/08/2022     11/08/2022    K 4.3 11/08/2022     11/08/2022    CO2 28 11/08/2022     Lab Results   Component Value Date    TSH 10.50 (H) 08/09/2022    X2ZPOYR 2.12 05/21/2020     Lab Results   Component Value Date    WBC 5.0 2022    HGB 13.9 2022    HCT 39.8 (L) 2022    MCV 91.5 2022     2022     No components found for: CHLPL  Lab Results   Component Value Date    TRIG 138 2022    TRIG 95 2022    TRIG 97 2021     Lab Results   Component Value Date    HDL 40 2022    HDL 43 2022    HDL 45 10/11/2021     Lab Results   Component Value Date    LDLCALC 100 (H) 2022    LDLCALC 84 2022    LDLCALC 102 (H) 10/11/2021     Lab Results   Component Value Date    LABVLDL 28 2022    LABVLDL 19 2022    LABVLDL 15 10/11/2021         Radiology Review:  Pertinent images / reports were reviewed as a part of this visit and reveals the followin/9/22:Left Heart Cath  Dominance: Right    LM: no significant disease  LAD: moderately calcified vessel with eccentric 80% proximal stenosis   LCx: 30% into OM2   RCA: minimal luminal irregularities     DFR=0.92  FFR=0.75 (1:25 minutes of Adenosine)   LIMA - large caliber, free of disease throughout its course     Echo: 22  Summary   -Normal left ventricle size, wall thickness and systolic function with an   estimated ejection fraction of 60-65%. -No regional wall motion abnormalities are seen.   -Indeterminate diastolic function.   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 -Moderate to severe mitral regurgitation. ( appears stable from previous echo   dated 2021). ERO 0.21.   -A bioprosthetic artificial aortic valve appears well seated with a maximum   velocity of 2.6m/s and a mean gradient of 16mmHg. -Moderate to severe tricuspid regurgitation.   -Estimated pulmonary artery systolic pressure is mildly elevated at 38 mmHg   assuming a right atrial pressure of 8 mmHg. -The right ventricle is moderately enlarged.   -The right atrium is moderately dilated.      Cardiac MRI : 22:  CONCLUSIONS       Overall there is normal LV and RV size and systolic function. Normal functioning bioprosthetic aortic valve   Moderate mitral and tricuspid regurgitation.        -Normal left ventricular size and systolic function with a calculated ejection fraction of 62% by Shields's method.   -Normal right ventricular size and systolic function with a calculated ejection fraction of 61% by Shields's method.   -No myocardial edema by T2w imaging/mapping. (Normal myocardium/skeletal ratio - normal < 1.9). -No significant intracardiac shunt. Calculated Qp:Qs:1 (Phase contrast velocity encoding Ao/Pa)   -Normal myocardial resting perfusion imaging.   -Bioprosthetic aortic valve noted. There is trivial aortic regurgitation with a regurgitant fraction of 6%. -Moderate mitral regurgitation with a calculated regurgitant fraction of 30%. -Moderate tricuspid regurgitation with a calculated regurgitant fraction of 35%.   -On delayed enhancement imaging, there is no abnormal hyperenhancement to suggest myocardial scar/inflammation/infiltration. Echo: 8/24/22:  Summary   -Normal left ventricle size, wall thickness, and systolic function with an   estimated ejection fraction of 55-60%. -No regional wall motion abnormalities are seen.   -Normal diastolic function. E/e\"=9.75.   -Global longitudinal strain -18.9% ( normal). -There is posterior mitral annular calcification.   -Moderate mitral regurgitation. ERO 0.21 cm2.   -Findings are stable when compared to previous study dated 8/18/2021.   -A bioprosthetic artificial aortic valve appears well seated with a maximum   velocity of 2.4m/s and a mean gradient of 12mmHg. -Moderate to severe tricuspid regurgitation.   -Estimated pulmonary artery systolic pressure is mildly elevated at 39 mmHg   assuming a right atrial pressure of 8 mmHg. -Right heart structures appear moderately dilated. -The ascending aorta is dilated measuring 4.2 cm at largest diameter.      Last Angiogram: 9/9/22  LVEDP: - mmHg  AO: 72/16 mmHg Anatomy:   PCI: IVUS guided PCI of the LAD with 2 overlapping Synergy stents 3.0 mm x 32 mm with mid to ostial stenoses reduced from 80% to 0% mid and 90% to 0% ostial.  Postdilatation carried out with 3.5 mm NC balloon and 4.0 mm NC balloon. Note: Patient did have significant hypotension noted during intervention of ostium of LAD. Impression:  1. Critical stenosis of the LAD with successful IVUS relation x2. Plan:  1. DAPT for minimum of 6 months. 2.  Add beta-blocker if blood pressure tolerates. 3.  No ACE inhibitor/ARB given normal LV systolic function. Assessment:      Diagnosis Orders   1. Coronary artery disease involving native coronary artery of native heart without angina pectoris   ~stable : denies angina. Continues to have atypical discomfort suspected GI in origin. EGD scheduled in January  ~s/p PTCA LAD Sept '22  ~DAPT / BB / statin   ~participating in cardiac rehab II       2. Nonrheumatic mitral valve regurgitation   ~unchanged at mod-severe on echo ; mod by cardiac MRI  ~seen/eval by Dr. Ron Osgood : CHIKIS with Rt heart cath scheduled for 11/16               3. Mixed hyperlipidemia   ~controlled   ~crestor high intensity         4. PAF (paroxysmal atrial fibrillation) (HCC)   ~denies palpitations   ~monitored via ILR : no AF noted with last interrogation; recorder reaching end of service  ~metoprolol / digoxin  ~hx ablation '20  ~hx MAZE & DELGADO clip '15  ~ASA alone ; no need for AC post-clip (hx of bleeding from mouth & PEG d/t supratherapeutic  INR '16)        I had the opportunity to review the clinical symptoms and presentation of Yue Yao. Plan:     Continue present management   F/U in 4 weeks making a post-procedure visit    Overall the patient is stable from CV standpoint    I have addresed the patient's cardiac risk factors and adjusted pharmacologic treatment as needed. In addition, I have reinforced the need for patient directed risk factor modification.     Further evaluation will be based upon the patient's clinical course and testing results. All questions and concerns were addressed to the patient. Alternatives to my treatment were discussed. The patient is not currently smoking. The risks related to smoking were reviewed with the patient. Recommend maintaining a smoke-free lifestyle. Patient is on a beta-blocker  Patient is on an ace-i/ARB  Patient is on a statin    Dual Antiplatelet therapy / anti-coagulation has been recommended / prescribed for this patient. Education conducted on adverse reactions including bleeding was discussed. Angiotension inhibitor/angiotension receptor blocker has been prescribed / recommended for congestive heart failure. Daily weight, low sodium diet were discussed. Patient instructed to call the office with a weight gain: > 3 # over night or 5# in one week; swelling, SOB/orthopnea/PND    The patient verbalizes understanding not to stop medications without discussing with us. Discussed exercise: 30-60 minutes 7 days/week  Discussed Low saturated fat/ANIVAL diet. Thank you for allowing to us to participate in the care of William Sumner.     ISIDRA Grace    Documentation of today's visit sent to PCP

## 2022-11-11 NOTE — TELEPHONE ENCOUNTER
Per c, will need to continue plavix given recent stenting. Will get paperwork completed next week and discuss with patient.

## 2022-11-14 ENCOUNTER — HOSPITAL ENCOUNTER (OUTPATIENT)
Dept: CARDIAC REHAB | Age: 72
Setting detail: THERAPIES SERIES
Discharge: HOME | End: 2022-11-14
Payer: MEDICARE

## 2022-11-14 PROCEDURE — 93798 PHYS/QHP OP CAR RHAB W/ECG: CPT

## 2022-11-14 NOTE — TELEPHONE ENCOUNTER
Please let patient know that Dr Carlos Clarke recommends that he NOT hold his aspirin or plavix at this time. He is cleared for his EGD with those recommendations. Clearance letter to be faxed.

## 2022-11-14 NOTE — TELEPHONE ENCOUNTER
Cardiac clearance letter faxed to dr office and pt notified to continue his Plavix and ASA with verbal understanding from pt and fax confirmation successful.

## 2022-11-16 ENCOUNTER — APPOINTMENT (OUTPATIENT)
Dept: CARDIAC REHAB | Age: 72
End: 2022-11-16
Payer: MEDICARE

## 2022-11-16 DIAGNOSIS — G89.4 CHRONIC PAIN SYNDROME: ICD-10-CM

## 2022-11-16 RX ORDER — OXYCODONE HYDROCHLORIDE AND ACETAMINOPHEN 5; 325 MG/1; MG/1
1 TABLET ORAL 2 TIMES DAILY PRN
Qty: 60 TABLET | Refills: 0 | Status: SHIPPED | OUTPATIENT
Start: 2022-11-16 | End: 2022-11-21 | Stop reason: SDUPTHER

## 2022-11-16 NOTE — TELEPHONE ENCOUNTER
Medication:   Requested Prescriptions      No prescriptions requested or ordered in this encounter        Last Filled:      Patient Phone Number: [phone] (home)     Last appt: 11/3/2022   Next appt: Visit date not found    Last OARRS:   RX Monitoring 12/30/2019   Periodic Controlled Substance Monitoring No signs of potential drug abuse or diversion identified.

## 2022-11-18 ENCOUNTER — HOSPITAL ENCOUNTER (OUTPATIENT)
Dept: CARDIAC REHAB | Age: 72
Setting detail: THERAPIES SERIES
Discharge: HOME | End: 2022-11-18
Payer: MEDICARE

## 2022-11-18 ENCOUNTER — TELEPHONE (OUTPATIENT)
Dept: FAMILY MEDICINE CLINIC | Age: 72
End: 2022-11-18

## 2022-11-18 DIAGNOSIS — G89.4 CHRONIC PAIN SYNDROME: ICD-10-CM

## 2022-11-18 RX ORDER — OXYCODONE HYDROCHLORIDE AND ACETAMINOPHEN 5; 325 MG/1; MG/1
1 TABLET ORAL 2 TIMES DAILY PRN
Qty: 60 TABLET | Refills: 0 | Status: CANCELLED | OUTPATIENT
Start: 2022-11-18 | End: 2022-12-18

## 2022-11-18 NOTE — TELEPHONE ENCOUNTER
Pharmacy is out of stock of medication. ..  oxyCODONE-acetaminophen (PERCOCET) 5-325 MG per tablet 60 tablet 0 11/16/2022 12/16/2022    Sig - Route: Take 1 tablet by mouth 2 times daily as needed for Pain for up to 30 days. Intended supply: 5 days. Take lowest dose possible to manage pain - Oral      Patient requested it be sent to an alternative pharmacy. ...   [facility]/PHARMACY #[id number] Flores - [address]

## 2022-11-21 DIAGNOSIS — G89.4 CHRONIC PAIN SYNDROME: ICD-10-CM

## 2022-11-21 RX ORDER — OXYCODONE HYDROCHLORIDE AND ACETAMINOPHEN 5; 325 MG/1; MG/1
1 TABLET ORAL 2 TIMES DAILY PRN
Qty: 60 TABLET | Refills: 0 | Status: SHIPPED | OUTPATIENT
Start: 2022-11-21 | End: 2022-12-21

## 2022-11-21 NOTE — TELEPHONE ENCOUNTER
Medication:   Requested Prescriptions      No prescriptions requested or ordered in this encounter        Last Filled:      Patient Phone Number: [phone] (home)     Last appt: 11/3/2022   Next appt: Visit date not found    Last OARRS:   RX Monitoring 12/30/2019   Periodic Controlled Substance Monitoring No signs of potential drug abuse or diversion identified.

## 2022-11-21 NOTE — TELEPHONE ENCOUNTER
Patient called and stated that [facility] informed him this prescription could not be filled due to it being a controlled medication. Patient would like to know what the next best option would be. He is requesting a call back. Please advise.

## 2022-11-21 NOTE — TELEPHONE ENCOUNTER
- Lamin Polo called asking if rx can be sent to [facility] due to that pharmacy having rx in stock.

## 2022-11-23 ENCOUNTER — HOSPITAL ENCOUNTER (OUTPATIENT)
Dept: CARDIAC REHAB | Age: 72
Setting detail: THERAPIES SERIES
Discharge: HOME | End: 2022-11-23
Payer: MEDICARE

## 2022-11-23 NOTE — PROGRESS NOTES
Disregard last note.  Pt okay to return to cardiac rehab on Monday 11/28/22 per 0219 [facility] cardiologist.

## 2022-11-30 ENCOUNTER — HOSPITAL ENCOUNTER (OUTPATIENT)
Dept: CARDIAC REHAB | Age: 72
Setting detail: THERAPIES SERIES
Discharge: HOME | End: 2022-11-30
Payer: MEDICARE

## 2022-11-30 PROCEDURE — 93798 PHYS/QHP OP CAR RHAB W/ECG: CPT

## 2022-11-30 RX ORDER — UBIDECARENONE 75 MG
100 CAPSULE ORAL DAILY
Qty: 30 TABLET | Refills: 11 | Status: SHIPPED | OUTPATIENT
Start: 2022-11-30 | End: 2023-11-30

## 2022-11-30 NOTE — TELEPHONE ENCOUNTER
Medication:   Requested Prescriptions     Pending Prescriptions Disp Refills    vitamin B-12 (CYANOCOBALAMIN) 100 MCG tablet [Pharmacy Med Name: Vitamin B-12 100 MCG Oral Tablet] 30 tablet 0     Sig: TAKE 1 TABLET BY MOUTH IN THE MORNING        Last Filled:  7/28/2022 30 tabs 3 refills     Patient Phone Number: [phone] (home)     Last appt: 11/3/2022   Next appt: Visit date not found    Last OARRS:   RX Monitoring 12/30/2019   Periodic Controlled Substance Monitoring No signs of potential drug abuse or diversion identified.

## 2022-12-02 ENCOUNTER — HOSPITAL ENCOUNTER (OUTPATIENT)
Dept: CARDIAC REHAB | Age: 72
Setting detail: THERAPIES SERIES
Discharge: HOME | End: 2022-12-02
Payer: MEDICARE

## 2022-12-02 PROCEDURE — 93798 PHYS/QHP OP CAR RHAB W/ECG: CPT

## 2022-12-05 ENCOUNTER — HOSPITAL ENCOUNTER (OUTPATIENT)
Dept: CARDIAC REHAB | Age: 72
Setting detail: THERAPIES SERIES
Discharge: HOME | End: 2022-12-05
Payer: MEDICARE

## 2022-12-05 PROCEDURE — 93798 PHYS/QHP OP CAR RHAB W/ECG: CPT

## 2022-12-07 ENCOUNTER — HOSPITAL ENCOUNTER (OUTPATIENT)
Dept: CARDIAC REHAB | Age: 72
Setting detail: THERAPIES SERIES
End: 2022-12-07
Payer: MEDICARE

## 2022-12-09 ENCOUNTER — HOSPITAL ENCOUNTER (OUTPATIENT)
Dept: CARDIAC REHAB | Age: 72
Setting detail: THERAPIES SERIES
Discharge: HOME | End: 2022-12-09
Payer: MEDICARE

## 2022-12-09 PROCEDURE — 93798 PHYS/QHP OP CAR RHAB W/ECG: CPT

## 2022-12-12 ENCOUNTER — HOSPITAL ENCOUNTER (OUTPATIENT)
Dept: CARDIAC REHAB | Age: 72
Setting detail: THERAPIES SERIES
Discharge: HOME | End: 2022-12-12
Payer: MEDICARE

## 2022-12-12 PROCEDURE — 93798 PHYS/QHP OP CAR RHAB W/ECG: CPT

## 2022-12-14 ENCOUNTER — OFFICE VISIT (OUTPATIENT)
Dept: CARDIOLOGY CLINIC | Age: 72
End: 2022-12-14
Payer: MEDICARE

## 2022-12-14 ENCOUNTER — NURSE ONLY (OUTPATIENT)
Dept: CARDIOLOGY CLINIC | Age: 72
End: 2022-12-14
Payer: MEDICARE

## 2022-12-14 ENCOUNTER — HOSPITAL ENCOUNTER (OUTPATIENT)
Dept: CARDIAC REHAB | Age: 72
Setting detail: THERAPIES SERIES
End: 2022-12-14
Payer: MEDICARE

## 2022-12-14 ENCOUNTER — TELEPHONE (OUTPATIENT)
Dept: CARDIOLOGY CLINIC | Age: 72
End: 2022-12-14

## 2022-12-14 DIAGNOSIS — I49.9 IRREGULAR HEART BEAT: Primary | ICD-10-CM

## 2022-12-14 DIAGNOSIS — I34.0 NONRHEUMATIC MITRAL VALVE REGURGITATION: ICD-10-CM

## 2022-12-14 DIAGNOSIS — I48.0 PAF (PAROXYSMAL ATRIAL FIBRILLATION) (HCC): Primary | ICD-10-CM

## 2022-12-14 PROCEDURE — G8420 CALC BMI NORM PARAMETERS: HCPCS | Performed by: INTERNAL MEDICINE

## 2022-12-14 PROCEDURE — 1036F TOBACCO NON-USER: CPT | Performed by: INTERNAL MEDICINE

## 2022-12-14 PROCEDURE — G8428 CUR MEDS NOT DOCUMENT: HCPCS | Performed by: INTERNAL MEDICINE

## 2022-12-14 PROCEDURE — 1123F ACP DISCUSS/DSCN MKR DOCD: CPT | Performed by: INTERNAL MEDICINE

## 2022-12-14 PROCEDURE — G8484 FLU IMMUNIZE NO ADMIN: HCPCS | Performed by: INTERNAL MEDICINE

## 2022-12-14 PROCEDURE — 93000 ELECTROCARDIOGRAM COMPLETE: CPT | Performed by: INTERNAL MEDICINE

## 2022-12-14 PROCEDURE — 3017F COLORECTAL CA SCREEN DOC REV: CPT | Performed by: INTERNAL MEDICINE

## 2022-12-14 PROCEDURE — 99214 OFFICE O/P EST MOD 30 MIN: CPT | Performed by: INTERNAL MEDICINE

## 2022-12-14 NOTE — PROGRESS NOTES
[facility]   Electrophysiology Follow up   Date: 12/14/2022  I had the privilege of visiting Emma Miller in the office. CC: I am not feeling well   HPI: Emma Miller is a 81 y.o. male with a complex past medical history of atrial fibrillation, CAD, CHRISTOPHER, aortic valve insufficiency, and oral squamous cell cancer. In 2015, he underwent AVR with porcine valve, right/left modified maze procedure with DELGADO ligation with Atriclip (1/28/15,  [facility]). He was on coumadin, but this was stopped due to bleeding from his mouth and PEG tube after radiation for his oral cancer. S/p ILR implant (4/22/19). He developed recurrent symptomatic AF. S/p RFCA with re-isolation of PV, roof line, complex atrial fractionated electogram, inferior-posterior line with PWI, CTI atrial flutter (7/15/20)    Had recurrent chest pain. [facility] with LAD stenosis and had PCI to LAD on 9/9/2022 (Dr. Radha Aparicio)     He also noted to have moderate to severe MR and severe TR. He was referred to [facility] and was considered very high risk for open heart surgery, due to re-do operation and also he declines blood transfusion on religous ground. He is going to cardiac rehab. Today he woke up and did not feel well. Went to cardiac rehab and found to be tachycardic. He was sent to cardiology office today to be seen urgently due to tachycardia. Patient states that on Saturday he did not feel well. He had some palpitation. Today he also feels some palpitation. Assessment and plan:   - Persistent atrial fibrillation:    Duration of atrial fibrillation is not known. He presented to cardiac rehab today and found to be in atrial fibrillation. He does have history of paroxysmal atrial fibrillation however his AT/AF burden was 0.1% by his loop recorder interrogation last time.      Unfortunately he has severe valvular disease including severe TR and moderate to severe MR and has seen CT surgery not a good candidate for redo surgery. ECG today with atrial fibrillation and mild tachycardia   Increase metoprolol to 25 mg twice daily     We discussed treatment options including rhythm control with cardioversion and possible ablation versus antiarrhythmic therapy with amiodarone    Opted for cardioversion. S/p MAZE and DELGADO ligation with Atriclip (2015)    S/p RFCA with re-isolation of PV, roof line, complex atrial fractionated electogram, inferior-posterior line with PWI, CTI atrial flutter (7/15/20)    Off oral AC due to successful DELGADO ligation verified by CHIKIS     -Implantable Loop Recorder  S/p ILR insertion on 4/2019  The CIED was interrogated and programmed and I supervised and reviewed all the data. All findings and changes are in device interrogation sheat and reflect my personal interpretation and changes and is scanned to Epic    The battery is depleted. He has had recurrent atrial arrhythmia and we discussed removal of loop recorder versus removal and replacement for continuation of monitoring. Opted for replacement of loop recorder.    -Aortic Insufficiency               S/p AVR porcine valve right/left modified maze procedure with DELGADO ligation with Atriclip (1/28/15, Dr. Claire Wilkerson)    - Moderate to severe MR & moderate to severe TR:   He has seen cardiothoracic surgery at [facility] and was found to be high risk for redo operation.      -CAD  S/p LAD stent on 9/9/2022  Followed by Dr. Torey Phoenix    -CHRISTOPHER  Stable: Uses CPAP  Encourage to use CPAP to prevent long term effects of untreated CHRISTOPHER         Patient Active Problem List    Diagnosis Date Noted    S/P AVR     Secondary malignant neoplasm of unspecified site (CODE) ([facility] [address] 11/03/2022    Chest pain 09/12/2022    Accelerating angina ([facility] [address] 08/09/2022    CAD (coronary artery disease) 08/09/2022    Palpitations 08/08/2022    Anxiety 02/07/2022    Acquired hypothyroidism 02/07/2022    Radiculopathy, lumbar region 11/01/2021    Intermittent chest pain 03/04/2021    Coronary artery disease of native heart with stable angina pectoris, unspecified vessel or lesion type ([address] 2020    SOB (shortness of breath) on exertion 2020    Mitral regurgitation 2020    Tricuspid valve insufficiency 2020    Encounter for loop recorder check 2020    Progressive angina ([address] 2020    Hypertension 2020    Hyperlipidemia 2020    Sleep apnea 2020    Cervical stenosis of spine 2020    Metastatic squamous cell carcinoma 2020    Chronic obstructive pulmonary disease ([address] 2019    Sensorineural hearing loss (SNHL) of both ears 2019    History of tobacco abuse 2018    Squamous cell carcinoma of head and neck ([address] 2017    Chronic pain due to neoplasm 2017    Refusal of blood transfusions as patient is [place] 2017    Osteoarthritis of cervical spine 2016    Primary osteoarthritis of both knees 2016    Obstructive sleep apnea syndrome     Transient ischemic attack (TIA)     Chronic ischemic heart disease     Essential hypertension     Mixed hyperlipidemia 2015    PAF (paroxysmal atrial fibrillation) ([address] 2015     Diagnostic studies:     EC22  SR, first degree AV block       [facility] 3/8/2021  Dominance: Right       LM: luminal irregularities  LAD: at least moderate calcification of long, eccentric 80% ostial to proximal stenosis ; 30% ostial first diagonal   LCx: 30-40% distal into OM2   RCA: tortuous with luminal irregularities     Did not cross SAVR      Impression/Recommendations:  CTS consult to discuss Redo Sternotomy for LIMA-LAD and significant, primary MR     Limited echo 2021   -Limited echocardiogram was performed to evaluate mitral regurgitation.   -Normal left ventricle size and systolic function with an estimated ejection   fraction of 60%. -No regional wall motion abnormalities are seen.    -The tips of the mitral leaflet appears to be slightly myxomatous and   minimal prolapse of posterior leaflet. -Moderate to severe anteriorly directed mitral regurgitation (appears   similar when compared to last echocardiogram dated September 2020). ERO=0.25   -A bioprosthetic artificial aortic valve appears well seated with a maximum   velocity of 2.4m/s and a mean gradient of 13mmHg. -Moderate to severe tricuspid regurgitation.   -Estimated pulmonary artery systolic pressure is mildly elevated at 38 mmHg   assuming a right atrial pressure of 8 mmHg. -The right ventricle is mildly enlarged.   -The right atrium is moderately dilated. Echo: 9/9/2020  Normal left ventricle size, wall thickness and systolic function with an estimated ejection fraction of 60%. No regional wall motion abnormalities are seen. E/e\"= 10. Moderate to severe mitral regurgitation. A bioprosthetic aortic valve appears well seated with a maximum gradient of   17 mmHg and a mean gradient of 11 mmHg. Aortic valve area of 1.97cm. No evidence of aortic valve regurgitation. The aortic root is mildly dilated. 3.7cm   The ascending aorta is mildly dilated. 4.2cm   The right ventricle is mildly enlarged but normal in function. Moderate to severe tricuspid regurgitation. PASP 34mmHg. The right atrium is mildly dilated. IVC size is normal (<2.1 cm) but collapses < 50% with respiration consistent   with elevated RA pressure (8 mmHg). Limited Echo: 7/15/2020   -Limited echocardiogram was performed to rule out pericardial effusion,   status post cardiac ablation today.   -Normal left ventricle size, wall thickness and systolic function with an   estimated ejection fraction of 55%.   -No regional wall motion abnormalities are seen.   -Abnormal (paradoxical) septal motion noted.   -No evidence of any pericardial effusion. Echo: 6/16/2020   -Normal left ventricle size, wall thickness, and systolic function with an   estimated ejection fraction of 60-65%.    -No regional wall motion abnormalities are seen.   -Indeterminate diastolic function. -E/e'=7   -Moderately severe mitral regurgitation   -The bioprosthetic artificial aortic valve appears well seated   -There is moderate-to-severe tricuspid regurgitation with a RVSP estimation of 30mmHg. Lexiscan: 8/26/2020  -There is a small fixed inferior apical defect that is likely bowel artifact    rather than scar. -LV function is normal with no regional abnormalities and EF=64%. -Low risk study. Cath: 2015  Non-obstructive CAD     MCOT: 9/2018                            1st degree HB, HT 68                                            PVC burden <1%             PAC burden 1%                                     VT 11 bts at 118        Cardiac CT: 6/20  No stenosis at the pulmonary vein origins. Clip is seen in the left atrial appendage,. No thrombus seen in this region. Mildly dilated aorta. .  Post valve replacement changes are noted       I independently reviewed the cardiac diagnostic studies, ECG and relevant imaging studies. Lab Results   Component Value Date    LVEF 58 08/24/2022    LVEFMODE Echo 01/28/2019     Lab Results   Component Value Date    TSH 10.50 (H) 08/09/2022         Physical Examination:    BP: 110/70 HR: 113 bpm      Wt Readings from Last 3 Encounters:   11/11/22 166 lb 8 oz (75.5 kg)   11/03/22 169 lb 9.6 oz (76.9 kg)   10/19/22 169 lb (76.7 kg)       Constitutional: Oriented. No distress. Head: Normocephalic and atraumatic. Mouth/Throat: Oropharynx is clear and moist.   Eyes: Conjunctivae normal. EOM are normal.   Neck: Neck supple. No JVD present. Cardiovascular: Tachycardic rate, Irregular rhythm, Y6&K2.  + systolic murmur   Pulmonary/Chest: Bilateral respiratory sounds. No rhonchi. Abdominal: Soft. No tenderness. Musculoskeletal: No tenderness. No edema    Lymphadenopathy: Has no cervical adenopathy. Neurological: Alert and oriented.  Follows command, No Gross deficit   Skin: Skin is warm, No rash noted. Psychiatric: Has a normal behavior     Review of System:  [x] Full ROS obtained and negative except as mentioned in HPI    Prior to Admission medications    Medication Sig Start Date End Date Taking? Authorizing Provider   vitamin B-12 (CYANOCOBALAMIN) 100 MCG tablet TAKE 1 TABLET BY MOUTH IN THE MORNING 11/30/22 11/30/23  Deidre Melgar MD   oxyCODONE-acetaminophen (PERCOCET) 5-325 MG per tablet Take 1 tablet by mouth 2 times daily as needed for Pain for up to 30 days. Intended supply: 5 days. Take lowest dose possible to manage pain 11/21/22 12/21/22  Chiquita Amaya MD   aspirin 81 MG EC tablet Take 81 mg by mouth daily    Historical Provider, MD   mirtazapine (REMERON) 7.5 MG tablet Take 1 tablet by mouth nightly 11/3/22   Chiquita Amaya MD   levothyroxine (SYNTHROID) 125 MCG tablet Take 1 tablet by mouth once daily 10/31/22   Chiquita Amaya MD   tiotropium (SPIRIVA RESPIMAT) 2.5 MCG/ACT AERS inhaler Inhale 2 puffs into the lungs daily 10/19/22   Mishel Gibbons MD   oxyCODONE-acetaminophen (PERCOCET)  MG per tablet Take 0.5 tablets by mouth every 4 hours as needed for Pain. Historical Provider, MD   digoxin (LANOXIN) 125 MCG tablet Take 1 tablet by mouth once daily 9/19/22   ISIDRA Trevino - CNP   clopidogrel (PLAVIX) 75 MG tablet Take 1 tablet by mouth daily 9/10/22   Cristel Michel MD   metoprolol tartrate (LOPRESSOR) 25 MG tablet Take 0.5 tablets by mouth 2 times daily 9/10/22   Cristel Michel MD   nitroGLYCERIN (NITROSTAT) 0.4 MG SL tablet Place 1 tablet under the tongue every 5 minutes as needed for Chest pain  Patient not taking: Reported on 11/11/2022 9/10/22   Cristel Michel MD   pantoprazole (PROTONIX) 40 MG tablet TAKE 1 TABLET BY MOUTH TWICE A DAY BEFORE MEALS 2/28/22   Chiquita Amaya MD   furosemide (LASIX) 20 MG tablet t1 tab by mouth daily. Call cardiology if swelling in legs worsens or shortness of breath occurs.  2/18/22 8/11/22 Catherine Mckeon MD   Cholecalciferol 50 MCG (2000 UT) TABS Take 2,000 Units by mouth daily    Historical Provider, MD   rosuvastatin (CRESTOR) 40 MG tablet Take 1 tablet by mouth daily 1/19/22   Raymond Rose MD   dilTIAZem (CARDIZEM CD) 120 MG extended release capsule Take 1 capsule by mouth daily 1/19/22 9/10/22  Raymond Rose MD   lisinopril (PRINIVIL;ZESTRIL) 2.5 MG tablet Take 0.5 tablets by mouth daily 3/22/21   Monica Taveras APRN - CNP       Allergies   Allergen Reactions    Naproxen Swelling     Lips  Pt does not recognize this being an intolerance    Hydrocodone Itching       Social History:  Reviewed. reports that he quit smoking about 7 years ago. His smoking use included cigarettes. He started smoking about 47 years ago. He has a 40.00 pack-year smoking history. He has never used smokeless tobacco. He reports that he does not drink alcohol and does not use drugs. Family History:  Reviewed. Reviewed. No family history of SCD. Relevant and available labs, and cardiovascular diagnostics reviewed. Reviewed. I independently reviewed relevant and available cardiac diagnostic tests ECG, CXR, Echo, Stress test, Device interrogation, Holter, CT scan. Complex medical condition with multiple medical problems affecting prognosis and outcome of EP interventions    All questions and concerns were addressed to the patient/family. Alternatives to my treatment were discussed. I have discussed the above stated plan and the patient verbalized understanding and agreed with the plan. NOTE: This report was transcribed using voice recognition software. Every effort was made to ensure accuracy, however, inadvertent computerized transcription errors may be present.      Loren Garcia MD, MPH  [facility]   Office: [phone]  Fax: [phone]

## 2022-12-14 NOTE — TELEPHONE ENCOUNTER
Patient is to be scheduled for cardiac rehab on Monday, Wednesday and Friday. Next session is Friday 12/16. Patient is to have a cardioversion in the next week. Is it okay for patient to exercise before cardioversion? Please call and advise.   enrique

## 2022-12-14 NOTE — TELEPHONE ENCOUNTER
Hold on cardiac rehab until cardioversion and then he may return after that.      Dragan Potter, APRN-CNP

## 2022-12-16 ENCOUNTER — HOSPITAL ENCOUNTER (OUTPATIENT)
Dept: CARDIAC REHAB | Age: 72
Setting detail: THERAPIES SERIES
Discharge: HOME | End: 2022-12-16
Payer: MEDICARE

## 2022-12-19 ENCOUNTER — APPOINTMENT (OUTPATIENT)
Dept: CARDIAC REHAB | Age: 72
End: 2022-12-19
Payer: MEDICARE

## 2022-12-20 ENCOUNTER — HOSPITAL ENCOUNTER (OUTPATIENT)
Dept: CARDIAC CATH/INVASIVE PROCEDURES | Age: 72
Discharge: HOME | End: 2022-12-20
Attending: INTERNAL MEDICINE | Admitting: INTERNAL MEDICINE
Payer: MEDICARE

## 2022-12-20 LAB
EKG ATRIAL RATE: 68 BPM
EKG DIAGNOSIS: NORMAL
EKG P AXIS: 68 DEGREES
EKG P-R INTERVAL: 234 MS
EKG Q-T INTERVAL: 398 MS
EKG QRS DURATION: 78 MS
EKG QTC CALCULATION (BAZETT): 423 MS
EKG R AXIS: -11 DEGREES
EKG T AXIS: 4 DEGREES
EKG VENTRICULAR RATE: 68 BPM

## 2022-12-20 PROCEDURE — 93010 ELECTROCARDIOGRAM REPORT: CPT | Performed by: INTERNAL MEDICINE

## 2022-12-20 PROCEDURE — 99211 OFF/OP EST MAY X REQ PHY/QHP: CPT

## 2022-12-20 PROCEDURE — 93005 ELECTROCARDIOGRAM TRACING: CPT | Performed by: INTERNAL MEDICINE

## 2022-12-20 RX ORDER — SODIUM CHLORIDE 0.9 % (FLUSH) 0.9 %
5-40 SYRINGE (ML) INJECTION PRN
Status: DISCONTINUED | OUTPATIENT
Start: 2022-12-20 | End: 2022-12-20 | Stop reason: HOSPADM

## 2022-12-21 ENCOUNTER — TELEPHONE (OUTPATIENT)
Dept: CARDIOLOGY CLINIC | Age: 72
End: 2022-12-21

## 2022-12-21 DIAGNOSIS — I25.10 CORONARY ARTERY DISEASE DUE TO LIPID RICH PLAQUE: Primary | ICD-10-CM

## 2022-12-21 DIAGNOSIS — I25.83 CORONARY ARTERY DISEASE DUE TO LIPID RICH PLAQUE: Primary | ICD-10-CM

## 2022-12-21 NOTE — TELEPHONE ENCOUNTER
Pt called stating that pt needs to a new order so they can start back doing cardiac rehab.     Jay Mullins  [phone]

## 2022-12-22 ENCOUNTER — HOSPITAL ENCOUNTER (OUTPATIENT)
Dept: CARDIAC REHAB | Age: 72
Setting detail: THERAPIES SERIES
Discharge: HOME | End: 2022-12-21
Payer: MEDICARE

## 2022-12-23 ENCOUNTER — HOSPITAL ENCOUNTER (OUTPATIENT)
Dept: CARDIAC REHAB | Age: 72
Setting detail: THERAPIES SERIES
End: 2022-12-23
Payer: MEDICARE

## 2022-12-27 ENCOUNTER — OFFICE VISIT (OUTPATIENT)
Dept: PULMONOLOGY | Age: 72
End: 2022-12-27
Payer: MEDICARE

## 2022-12-27 VITALS
OXYGEN SATURATION: 93 % | HEART RATE: 75 BPM | WEIGHT: 168 LBS | HEIGHT: 70 IN | DIASTOLIC BLOOD PRESSURE: 70 MMHG | BODY MASS INDEX: 24.05 KG/M2 | SYSTOLIC BLOOD PRESSURE: 100 MMHG

## 2022-12-27 DIAGNOSIS — Z87.891 PERSONAL HISTORY OF TOBACCO USE: Primary | ICD-10-CM

## 2022-12-27 DIAGNOSIS — R06.09 DOE (DYSPNEA ON EXERTION): ICD-10-CM

## 2022-12-27 PROCEDURE — G8420 CALC BMI NORM PARAMETERS: HCPCS | Performed by: INTERNAL MEDICINE

## 2022-12-27 PROCEDURE — 99213 OFFICE O/P EST LOW 20 MIN: CPT | Performed by: INTERNAL MEDICINE

## 2022-12-27 PROCEDURE — G0296 VISIT TO DETERM LDCT ELIG: HCPCS | Performed by: INTERNAL MEDICINE

## 2022-12-27 PROCEDURE — 3017F COLORECTAL CA SCREEN DOC REV: CPT | Performed by: INTERNAL MEDICINE

## 2022-12-27 PROCEDURE — 1036F TOBACCO NON-USER: CPT | Performed by: INTERNAL MEDICINE

## 2022-12-27 PROCEDURE — 3078F DIAST BP <80 MM HG: CPT | Performed by: INTERNAL MEDICINE

## 2022-12-27 PROCEDURE — G8484 FLU IMMUNIZE NO ADMIN: HCPCS | Performed by: INTERNAL MEDICINE

## 2022-12-27 PROCEDURE — 3074F SYST BP LT 130 MM HG: CPT | Performed by: INTERNAL MEDICINE

## 2022-12-27 PROCEDURE — 1123F ACP DISCUSS/DSCN MKR DOCD: CPT | Performed by: INTERNAL MEDICINE

## 2022-12-27 PROCEDURE — G8427 DOCREV CUR MEDS BY ELIG CLIN: HCPCS | Performed by: INTERNAL MEDICINE

## 2022-12-27 RX ORDER — METOPROLOL TARTRATE 50 MG/1
50 TABLET, FILM COATED ORAL 2 TIMES DAILY
COMMUNITY

## 2022-12-27 ASSESSMENT — ENCOUNTER SYMPTOMS
SORE THROAT: 0
WHEEZING: 0
CHOKING: 0
CONSTIPATION: 0
BLOOD IN STOOL: 0
APNEA: 0
ABDOMINAL DISTENTION: 0
SINUS PRESSURE: 0
STRIDOR: 0
CHEST TIGHTNESS: 0
DIARRHEA: 0
VOICE CHANGE: 0
RHINORRHEA: 0
BACK PAIN: 0
SHORTNESS OF BREATH: 1
ABDOMINAL PAIN: 0
COUGH: 0
ANAL BLEEDING: 0

## 2022-12-27 NOTE — PROGRESS NOTES
Dariusz Hazel    YOB: 1950     Date of Service:  12/27/2022     Chief Complaint   Patient presents with    Follow-up     2M         HPI patient states that he still has increased fatigue and shortness of breath. Continues on Spiriva Respimat 2.5 mcg daily-unable to afford it, only using samples. Underwent CHIKIS at [facility], not found to have significant MR per patient report. Hence surgery for mitral valve repair has currently been held.     Allergies   Allergen Reactions    Naproxen Swelling     Lips  Pt does not recognize this being an intolerance    Hydrocodone Itching     Outpatient Medications Marked as Taking for the 12/27/22 encounter (Office Visit) with Dell Murphy MD   Medication Sig Dispense Refill    metoprolol tartrate (LOPRESSOR) 50 MG tablet Take 50 mg by mouth 2 times daily      tiotropium (SPIRIVA RESPIMAT) 2.5 MCG/ACT AERS inhaler Inhale 2 puffs into the lungs daily 1 each 3    vitamin B-12 (CYANOCOBALAMIN) 100 MCG tablet TAKE 1 TABLET BY MOUTH IN THE MORNING 30 tablet 11    aspirin 81 MG EC tablet Take 81 mg by mouth daily      mirtazapine (REMERON) 7.5 MG tablet Take 7.5 mg by mouth nightly HALF TAB      levothyroxine (SYNTHROID) 125 MCG tablet Take 1 tablet by mouth once daily 30 tablet 5    oxyCODONE-acetaminophen (PERCOCET)  MG per tablet Take 0.5 tablets by mouth every 4 hours as needed for Pain.      digoxin (LANOXIN) 125 MCG tablet Take 1 tablet by mouth once daily 30 tablet 5    clopidogrel (PLAVIX) 75 MG tablet Take 1 tablet by mouth daily 90 tablet 3    nitroGLYCERIN (NITROSTAT) 0.4 MG SL tablet Place 1 tablet under the tongue every 5 minutes as needed for Chest pain 25 tablet 3    pantoprazole (PROTONIX) 40 MG tablet TAKE 1 TABLET BY MOUTH TWICE A DAY BEFORE MEALS 180 tablet 3    Cholecalciferol 50 MCG (2000 UT) TABS Take 2,000 Units by mouth daily      rosuvastatin (CRESTOR) 40 MG tablet Take 1 tablet by mouth daily 90 tablet 3 Immunization History   Administered Date(s) Administered    COVID-19, PFIZER Bivalent BOOSTER, DO NOT Dilute, (age 12y+), IM, 30 mcg/0.3 mL 10/11/2022    COVID-19, PFIZER PURPLE top, DILUTE for use, (age 15 y+), 30mcg/0.3mL 03/11/2021, 04/08/2021, 11/01/2021    Influenza Virus Vaccine 01/25/2015, 10/30/2015    Influenza, FLUAD, (age 72 y+), Adjuvanted, 0.5mL 11/16/2020    Influenza, FLUZONE (age 72 y+), High Dose, 0.7mL 08/21/2021    Influenza, High Dose (Fluzone 65 yrs and older) 08/27/2018, 11/01/2018    Pneumococcal Conjugate 13-valent ([id number]) 06/28/2019    Pneumococcal Polysaccharide ([id number]) 06/07/2015    Tdap (Boostrix, Adacel) 09/07/2018       Past Medical History:   Diagnosis Date    Aortic valve insufficiency     Arthritis     Atrial fibrillation ([address]     Cancer ([facility] [address] 09/2017    TONGUE head and neck IN REMISSION    Dysphonia     Encounter for imaging to screen for metal prior to MRI 08/22/2022    MRI conditional Medtronic Linq loop recorder model#[id number] Reveal LINQ implanted 4/22/19. Normal Mode. 1.5T or 3.0T. Download data prior to MRI. Follow all other Medtronic guidelines.  Pt currently follows     GERD (gastroesophageal reflux disease)     Hearing loss     Heart disease     Hyperlipidemia     Medical history reviewed with no changes     Obstructive apnea does not use CPAP    Oropharyngeal dysphagia     Refusal of blood transfusions as patient is Pentec[employer]al     Thyroid disease      Past Surgical History:   Procedure Laterality Date    ANKLE SURGERY      AORTIC VALVE REPLACEMENT  01/28/2015    Dr. Taras Schaefer 25mm Mosaic Ultra porcine valve; right and left modified maze procedure with ligation of DELGADO with Atriclip    ATRIAL ABLATION [address]      KNEE SURGERY      Arthroscopy    LUMBAR SPINE SURGERY Right 10/13/2020    RIGHT LUMBAR4-LUMBAR5 MICRO HEMILAMINECTOMY AND DISCECTOMY (41316, [id number]) performed by Lisa Orta MD at . [facility]      collapsed lung due to broken ribs    MANDIBLE FRACTURE SURGERY      NECK SURGERY      Fusion    PAIN MANAGEMENT PROCEDURE Right 06/22/2020    RIGHT L4 AND L5 TRANSFORAMINAL EPIDURAL STEROID INJECTION WITH FLUOROSCOPY ([id number]) performed by Reba Castro MD at [address] Right 07/08/2020    RIGHT L4 AND L5 TRANSFORAMINAL EPIDURAL STEROID INJECTION WITH FLUOROSCOPY ([id number]) performed by Reba Castro MD at [address] [facility]       Family History   Problem Relation Age of Onset    Heart Disease Other     High Blood Pressure Neg Hx     High Cholesterol Neg Hx        Review of Systems:  Review of Systems   Constitutional:  Positive for fatigue. Negative for activity change, appetite change and fever. HENT:  Negative for congestion, ear discharge, ear pain, postnasal drip, rhinorrhea, sinus pressure, sneezing, sore throat, tinnitus and voice change. Respiratory:  Positive for shortness of breath. Negative for apnea, cough, choking, chest tightness, wheezing and stridor. Cardiovascular:  Positive for chest pain. Negative for palpitations and leg swelling. Gastrointestinal:  Negative for abdominal distention, abdominal pain, anal bleeding, blood in stool, constipation and diarrhea. Musculoskeletal:  Negative for arthralgias, back pain and gait problem. Skin:  Negative for pallor and rash. Allergic/Immunologic: Negative for environmental allergies. Neurological:  Negative for dizziness, tremors, seizures, syncope, speech difficulty, weakness, light-headedness, numbness and headaches. Hematological:  Negative for adenopathy. Does not bruise/bleed easily. Psychiatric/Behavioral:  Negative for sleep disturbance.       Vitals:    12/27/22 1139   BP: 100/70   Pulse: 75   SpO2: 93%   Weight: 168 lb (76.2 kg)   Height: 5' 10\" (1.778 m)     Patient-Reported Vitals 3/21/2022 Patient-Reported Systolic 667   Patient-Reported Diastolic 82   Patient-Reported Temperature pt mentioned 78 and 102.4 but doesnt remember which one he thinks it was 78      Body mass index is 24.11 kg/m². Wt Readings from Last 3 Encounters:   12/27/22 168 lb (76.2 kg)   11/11/22 166 lb 8 oz (75.5 kg)   11/03/22 169 lb 9.6 oz (76.9 kg)     BP Readings from Last 3 Encounters:   12/27/22 100/70   11/11/22 100/62   11/03/22 109/71         Physical Exam  Constitutional:       General: He is not in acute distress. Appearance: He is well-developed. He is not ill-appearing or diaphoretic. HENT:      Mouth/Throat:      Pharynx: No oropharyngeal exudate. Cardiovascular:      Rate and Rhythm: Normal rate and regular rhythm. Heart sounds: Normal heart sounds. No murmur heard. No friction rub. Pulmonary:      Effort: No respiratory distress. Breath sounds: Normal breath sounds. No wheezing, rhonchi or rales. Chest:      Chest wall: No tenderness. Abdominal:      General: There is no distension. Palpations: There is no mass. Tenderness: There is no abdominal tenderness. There is no guarding or rebound. Musculoskeletal:         General: No swelling or tenderness. Skin:     Coloration: Skin is not pale. Findings: No erythema or rash. Neurological:      Mental Status: He is alert and oriented to person, place, and time. Cranial Nerves: No cranial nerve deficit. Motor: No abnormal muscle tone.       Coordination: Coordination normal.      Deep Tendon Reflexes: Reflexes normal.           Health Maintenance   Topic Date Due    Shingles vaccine (1 of 2) Never done    Pneumococcal 65+ years Vaccine (3 - PPSV23 if available, else PCV20) 06/28/2020    Annual Wellness Visit (AWV)  11/17/2021    Depression Monitoring  10/18/2023    DTaP/Tdap/Td vaccine (2 - Td or Tdap) 09/07/2028    Flu vaccine  Completed    COVID-19 Vaccine  Completed    Hepatitis A vaccine  Aged Out    Hib vaccine  Aged Out    Meningococcal (ACWY) vaccine  Aged Out          Assessment/Plan:    Prior PFT from March 2021 showed no significant obstructive airway disease. Symptomatic improvement however noted with use of Spiriva Respimat 2.5 mcg, 2 puffs daily-Will continue with Spiriva Respimat. Patient has poor affordability and samples have been provided. He states that he will have insurance after the first of the year. Dyspnea possibly related to patient's significant cardiac history-history of AV in 2015 for aortic regurgitation, noted to have severe TR but no significant MR on recent CHIKIS with mild pulmonary hypertension. Surgery for valvular repair is currently on hold. Patient is due to see Dr. Santiago Carlos to discuss further. Former smoker, quit in 2015. Will benefit from LDCT which will be organized for February 2023. Return in about 3 months (around 3/27/2023).

## 2022-12-27 NOTE — PROGRESS NOTES
Discussed with the patient the current USPSTF guidelines released March 9, 2021 for screening for lung cancer. For adults aged 48 to [de-identified] years who have a 20 pack-year smoking history and currently smoke or have quit within the past 15 years the grade B recommendation is to:  Screen for lung cancer with low-dose computed tomography (LDCT) every year. Stop screening once a person has not smoked for 15 years or has a health problem that limits life expectancy or the ability to have lung surgery. The patient  reports that he quit smoking about 7 years ago. His smoking use included cigarettes. He started smoking about 48 years ago. He has a 40.00 pack-year smoking history. He has never used smokeless tobacco.. Discussed with patient the risks and benefits of screening, including over-diagnosis, false positive rate, and total radiation exposure. The patient currently exhibits no signs or symptoms suggestive of lung cancer. Discussed with patient the importance of compliance with yearly annual lung cancer screenings and willingness to undergo diagnosis and treatment if screening scan is positive. In addition, the patient was counseled regarding the importance of remaining smoke free and/or total smoking cessation.     Also reviewed the following if the patient has Medicare that as of February 10, 2022, Medicare only covers LDCT screening in patients aged 51-72 with at least a 20 pack-year smoking history who currently smoke or have quit in the last 15 years

## 2022-12-30 ENCOUNTER — HOSPITAL ENCOUNTER (OUTPATIENT)
Dept: CARDIAC REHAB | Age: 72
Setting detail: THERAPIES SERIES
End: 2022-12-30
Payer: MEDICARE

## 2023-01-05 NOTE — TELEPHONE ENCOUNTER
Called eva and gave him the results of his echo and that he can hold his coumadin Inhaler is prescribed from Dr. Sage Hatch.     Below is hand specialist for trigger finger    [address], [place]   [address] [place] [address] Drive, [facility] Chaya Naik    Phone: [phone]

## 2023-01-09 ENCOUNTER — TELEPHONE (OUTPATIENT)
Dept: FAMILY MEDICINE CLINIC | Age: 73
End: 2023-01-09

## 2023-01-09 ENCOUNTER — TELEPHONE (OUTPATIENT)
Dept: CARDIOLOGY CLINIC | Age: 73
End: 2023-01-09

## 2023-01-09 NOTE — TELEPHONE ENCOUNTER
Spoke to patient and he does not want to go to ER and he is waiting to get the call back from surgeons  Advised to see me in office tomorrow at 8:40

## 2023-01-09 NOTE — TELEPHONE ENCOUNTER
Patient had mitro valve surgery on 1/6/22 it was done at [facility]. He states that they went through is groin on both sides. He states on the right long it red, puffy, sore and bright red dot in the middle of it.     I mentioned to the patient he needs to contact the surgeon     He was told to get in touch with his PCP if he had any problems

## 2023-01-09 NOTE — TELEPHONE ENCOUNTER
Pt had heart Surgery at [facility] and they changed his meds on discharge. He called to inform you of the changes. And to get questions answered and your opinion of the changes. Pt states they did not explain anything at discharge. Please advise pt. Stop Stool Softener and use Mirolax but bottles says use for 7 days. Can Pt take it daily.    (D/C) mirtazapine (REMERON) 7.5 MG tablet    metoprolol tartrate (LOPRESSOR) 50 MG tablet go down to half pill a day    (D/C) nitroGLYCERIN (NITROSTAT) 0.4 MG SL tablet     pantoprazole (PROTONIX) 40 MG tablet   Go down to one a day    (D/C) rosuvastatin (CRESTOR) 40 MG tablet     (D/C) oxyCODONE-acetaminophen (PERCOCET)  MG per tablet   Replace with Tylenol.

## 2023-01-16 NOTE — TELEPHONE ENCOUNTER
This is his d/c paperwork after his Mitral clip: everything looks good, but he stated in his message that he was to d/c his rosuvastatin and he should continue taking it daily. It is also listed below as a medication to continue. CONTINUE these medications which have NOT CHANGED   Details   aspirin 81 mg Tablet, Delayed Release (E.C.) Take 81 mg by mouth daily. Cholecalciferol, Vitamin D3, 50 mcg (2,000 unit) Tablet Take 2,000 Units by mouth daily. clopidogreL (PLAVIX) 75 mg tablet Take 75 mg by mouth daily. cyanocobalamin 100 mcg Tablet Take 100 mcg by mouth daily. digoxin (LANOXIN) 0.125 mg tablet Take 0.125 mg by mouth daily. docusate sodium (COLACE) 100 mg capsule Take 100 mg by mouth 2 times daily. fluticasone propionate (FLOVENT HFA) 110 mcg/Puff inhaler Take 1 Puff by inhalation 2 times daily as needed. levothyroxine (SYNTHROID) 125 mcg tablet Take 125 mcg by mouth daily. metoprolol tartrate (LOPRESSOR) 25 mg tablet Take 12.5 mg by mouth 2 times daily. mirtazapine (REMERON) 7.5 mg tablet Take 1.5 mg by mouth nightly at bedtime. nitroglycerin (NITROSTAT) 0.4 mg SL tablet Place 0.4 mg under tongue. oxyCODONE-acetaminophen (PERCOCET) 5-325 mg per tablet Oxycodone-Acetaminophen Oral 5 mg-325 mg take 1 tablet by mouth PRN active     pantoprazole (PROTONIX) 40 mg Tablet, Delayed Release (E.C.) Take 1 Tablet by mouth 2 times daily. rosuvastatin (CRESTOR) 40 mg Tablet Take 40 mg by mouth daily.

## 2023-01-16 NOTE — TELEPHONE ENCOUNTER
Called pt and went over list with him with verbal understanding from pt. Pt also wanted to know if he needed to kept appt with [facility] on1-25-23. Talked with  RN and she stated pt could talk to Dr Candy Mcbride during appt and address him bout when he needs to see cardio dr Maynor Perkins appt with [facility] cancelled with verbal understanding from pt.

## 2023-01-18 ENCOUNTER — ANESTHESIA EVENT (OUTPATIENT)
Dept: ENDOSCOPY | Age: 73
End: 2023-01-18
Payer: MEDICARE

## 2023-01-19 ENCOUNTER — HOSPITAL ENCOUNTER (OUTPATIENT)
Age: 73
Setting detail: OUTPATIENT SURGERY
Discharge: HOME | End: 2023-01-19
Attending: INTERNAL MEDICINE | Admitting: INTERNAL MEDICINE
Payer: MEDICARE

## 2023-01-19 ENCOUNTER — ANESTHESIA (OUTPATIENT)
Dept: ENDOSCOPY | Age: 73
End: 2023-01-19
Payer: MEDICARE

## 2023-01-19 VITALS
SYSTOLIC BLOOD PRESSURE: 104 MMHG | HEART RATE: 65 BPM | DIASTOLIC BLOOD PRESSURE: 81 MMHG | RESPIRATION RATE: 16 BRPM | HEIGHT: 71 IN | BODY MASS INDEX: 21 KG/M2 | WEIGHT: 150 LBS | TEMPERATURE: 97.2 F | OXYGEN SATURATION: 98 %

## 2023-01-19 PROCEDURE — 3700000001 HC ADD 15 MINUTES (ANESTHESIA): Performed by: INTERNAL MEDICINE

## 2023-01-19 PROCEDURE — 2500000003 HC RX 250 WO HCPCS: Performed by: NURSE ANESTHETIST, CERTIFIED REGISTERED

## 2023-01-19 PROCEDURE — 3700000000 HC ANESTHESIA ATTENDED CARE: Performed by: INTERNAL MEDICINE

## 2023-01-19 PROCEDURE — 7100000010 HC PHASE II RECOVERY - FIRST 15 MIN: Performed by: INTERNAL MEDICINE

## 2023-01-19 PROCEDURE — 6360000002 HC RX W HCPCS: Performed by: NURSE ANESTHETIST, CERTIFIED REGISTERED

## 2023-01-19 PROCEDURE — 2709999900 HC NON-CHARGEABLE SUPPLY: Performed by: INTERNAL MEDICINE

## 2023-01-19 PROCEDURE — 3609017100 HC EGD: Performed by: INTERNAL MEDICINE

## 2023-01-19 PROCEDURE — 2580000003 HC RX 258: Performed by: NURSE ANESTHETIST, CERTIFIED REGISTERED

## 2023-01-19 PROCEDURE — 7100000011 HC PHASE II RECOVERY - ADDTL 15 MIN: Performed by: INTERNAL MEDICINE

## 2023-01-19 RX ORDER — SODIUM CHLORIDE, SODIUM LACTATE, POTASSIUM CHLORIDE, CALCIUM CHLORIDE 600; 310; 30; 20 MG/100ML; MG/100ML; MG/100ML; MG/100ML
INJECTION, SOLUTION INTRAVENOUS CONTINUOUS
Status: DISCONTINUED | OUTPATIENT
Start: 2023-01-19 | End: 2023-01-19 | Stop reason: HOSPADM

## 2023-01-19 RX ORDER — PROPOFOL 10 MG/ML
INJECTION, EMULSION INTRAVENOUS PRN
Status: DISCONTINUED | OUTPATIENT
Start: 2023-01-19 | End: 2023-01-19 | Stop reason: SDUPTHER

## 2023-01-19 RX ORDER — LIDOCAINE HYDROCHLORIDE 20 MG/ML
INJECTION, SOLUTION EPIDURAL; INFILTRATION; INTRACAUDAL; PERINEURAL PRN
Status: DISCONTINUED | OUTPATIENT
Start: 2023-01-19 | End: 2023-01-19 | Stop reason: SDUPTHER

## 2023-01-19 RX ORDER — LIDOCAINE HYDROCHLORIDE 10 MG/ML
1 INJECTION, SOLUTION EPIDURAL; INFILTRATION; INTRACAUDAL; PERINEURAL
Status: DISCONTINUED | OUTPATIENT
Start: 2023-01-19 | End: 2023-01-19 | Stop reason: HOSPADM

## 2023-01-19 RX ORDER — SODIUM CHLORIDE, SODIUM LACTATE, POTASSIUM CHLORIDE, CALCIUM CHLORIDE 600; 310; 30; 20 MG/100ML; MG/100ML; MG/100ML; MG/100ML
INJECTION, SOLUTION INTRAVENOUS CONTINUOUS PRN
Status: DISCONTINUED | OUTPATIENT
Start: 2023-01-19 | End: 2023-01-19 | Stop reason: SDUPTHER

## 2023-01-19 RX ADMIN — PROPOFOL 40 MG: 10 INJECTION, EMULSION INTRAVENOUS at 10:09

## 2023-01-19 RX ADMIN — SODIUM CHLORIDE, SODIUM LACTATE, POTASSIUM CHLORIDE, AND CALCIUM CHLORIDE: .6; .31; .03; .02 INJECTION, SOLUTION INTRAVENOUS at 10:03

## 2023-01-19 RX ADMIN — PROPOFOL 40 MG: 10 INJECTION, EMULSION INTRAVENOUS at 10:12

## 2023-01-19 RX ADMIN — LIDOCAINE HYDROCHLORIDE 60 MG: 20 INJECTION, SOLUTION EPIDURAL; INFILTRATION; INTRACAUDAL; PERINEURAL at 10:09

## 2023-01-19 RX ADMIN — PROPOFOL 40 MG: 10 INJECTION, EMULSION INTRAVENOUS at 10:14

## 2023-01-19 ASSESSMENT — ENCOUNTER SYMPTOMS: SHORTNESS OF BREATH: 1

## 2023-01-19 ASSESSMENT — PAIN - FUNCTIONAL ASSESSMENT: PAIN_FUNCTIONAL_ASSESSMENT: 0-10

## 2023-01-19 ASSESSMENT — PAIN SCALES - GENERAL
PAINLEVEL_OUTOF10: 0
PAINLEVEL_OUTOF10: 0

## 2023-01-19 NOTE — H&P
Gastroenterology Note             Pre-operative History and Physical    Patient: Alba Thao  : 1950  CSN: [id number]    History Obtained From:  patient and/or guardian. HISTORY OF PRESENT ILLNESS:    The patient is a 81 y.o. male  here for chronic dysphagia. Past Medical History:    Past Medical History:   Diagnosis Date    Aortic valve insufficiency     Arthritis     Atrial fibrillation ([address]     Cancer ([address] 2017    TONGUE head and neck IN REMISSION    Dysphonia     Encounter for imaging to screen for metal prior to MRI 2022    MRI conditional Medtronic Linq loop recorder model#[id number] Reveal LINQ implanted 19. Normal Mode. 1.5T or 3.0T. Download data prior to MRI. Follow all other Medtronic guidelines.  Pt currently follows     GERD (gastroesophageal reflux disease)     Hearing loss     Heart disease     Hyperlipidemia     Medical history reviewed with no changes     Obstructive apnea does not use CPAP    Oropharyngeal dysphagia     Refusal of blood transfusions as patient is [facility]     Thyroid disease      Past Surgical History:    Past Surgical History:   Procedure Laterality Date    ANKLE SURGERY      AORTIC VALVE REPLACEMENT  2015    Dr. Torsten Vasquez 25mm Mosaic Ultra porcine valve; right and left modified maze procedure with ligation of DELGADO with Atriclip    ATRIAL ABLATION SURGERY      BACK SURGERY      CSP    CARDIAC CATHETERIZATION      FRACTURE SURGERY      HERNIA REPAIR      KNEE SURGERY      Arthroscopy    LUMBAR SPINE SURGERY Right 10/13/2020    RIGHT LUMBAR4-LUMBAR5 MICRO HEMILAMINECTOMY AND DISCECTOMY (71083, [id number]) performed by Faviola Hernández MD at [facility]      collapsed lung due to broken ribs    MANDIBLE FRACTURE SURGERY      NECK SURGERY      Fusion    PAIN MANAGEMENT PROCEDURE Right 2020    RIGHT L4 AND L5 TRANSFORAMINAL EPIDURAL STEROID INJECTION WITH FLUOROSCOPY (10017,10795) performed by Reji Guerra MD at [facility]    PAIN MANAGEMENT PROCEDURE Right 07/08/2020    RIGHT L4 AND L5 TRANSFORAMINAL EPIDURAL STEROID INJECTION WITH FLUOROSCOPY (66862,26852) performed by Wendi Gonzalez MD at [facility]       Medications Prior to Admission:   No current facility-administered medications on file prior to encounter. Current Outpatient Medications on File Prior to Encounter   Medication Sig Dispense Refill    metoprolol tartrate (LOPRESSOR) 50 MG tablet Take 50 mg by mouth 2 times daily      tiotropium (SPIRIVA RESPIMAT) 2.5 MCG/ACT AERS inhaler Inhale 2 puffs into the lungs daily 1 each 3    vitamin B-12 (CYANOCOBALAMIN) 100 MCG tablet TAKE 1 TABLET BY MOUTH IN THE MORNING 30 tablet 11    aspirin 81 MG EC tablet Take 81 mg by mouth daily      mirtazapine (REMERON) 7.5 MG tablet Take 7.5 mg by mouth nightly HALF TAB      levothyroxine (SYNTHROID) 125 MCG tablet Take 1 tablet by mouth once daily 30 tablet 5    oxyCODONE-acetaminophen (PERCOCET)  MG per tablet Take 0.5 tablets by mouth every 4 hours as needed for Pain.      digoxin (LANOXIN) 125 MCG tablet Take 1 tablet by mouth once daily 30 tablet 5    clopidogrel (PLAVIX) 75 MG tablet Take 1 tablet by mouth daily 90 tablet 3    nitroGLYCERIN (NITROSTAT) 0.4 MG SL tablet Place 1 tablet under the tongue every 5 minutes as needed for Chest pain 25 tablet 3    pantoprazole (PROTONIX) 40 MG tablet TAKE 1 TABLET BY MOUTH TWICE A DAY BEFORE MEALS 180 tablet 3    [DISCONTINUED] furosemide (LASIX) 20 MG tablet t1 tab by mouth daily. Call cardiology if swelling in legs worsens or shortness of breath occurs.  90 tablet 0    Cholecalciferol 50 MCG (2000 UT) TABS Take 2,000 Units by mouth daily      rosuvastatin (CRESTOR) 40 MG tablet Take 1 tablet by mouth daily 90 tablet 3    [DISCONTINUED] dilTIAZem (CARDIZEM CD) 120 MG extended release capsule Take 1 capsule by mouth daily 90 capsule 3    [DISCONTINUED] lisinopril (PRINIVIL;ZESTRIL) 2.5 MG tablet Take 0.5 tablets by mouth daily 30 tablet 1        Allergies:  Naproxen and Hydrocodone      Social History:   Social History     Tobacco Use    Smoking status: Former     Packs/day: 1.00     Years: 40.00     Pack years: 40.00     Types: Cigarettes     Start date: 1975     Quit date: 2015     Years since quittin.9    Smokeless tobacco: Never    Tobacco comments:     Maintain cessation   Substance Use Topics    Alcohol use: No     Alcohol/week: 0.0 standard drinks     Comment: NA beer     Family History:   Family History   Problem Relation Age of Onset    Heart Disease Other     High Blood Pressure Neg Hx     High Cholesterol Neg Hx        PHYSICAL EXAM:      /86   Pulse 76   Temp 97.2 °F (36.2 °C) (Temporal)   Resp 16   Ht 5' 10.5\" (1.791 m)   Wt 150 lb (68 kg)   SpO2 96%   BMI 21.22 kg/m²  I        Heart:   within normal limits    Lungs:  CTA bilat anteriorly,  Normal effort    Abdomen:   soft, NT, ND      ASA Grade:  ASA 3 - Patient with moderate systemic disease with functional limitations    Mallampati Class: 2      ASSESSMENT AND PLAN:    1. Patient is a 81 y.o. male here for EGD  2. Procedure options, risks and benefits reviewed with patient. Patient expresses understanding and wishes to proceed. Lin MD,   [address]  2023    Please note that some or all of this record was generated using voice recognition software. If there are any questions about the content of this document, please contact the author as some errors in translation may have occurred.

## 2023-01-19 NOTE — DISCHARGE INSTRUCTIONS
ENDOSCOPY DISCHARGE INSTRUCTIONS:    Call the physician that did your procedure for any questions or concern:    [facility]: [phone]  DR. Jayant WOLFF        ACTIVITY:    There are potential side effects to the medications used for sedation and anesthesia during your procedure. These include:  Dizziness or light-headedness, confusion or memory loss, delayed reaction times, loss of coordination, nausea and vomiting. Because of your increased risk for injury, we ask that you observe the following precautions: For the next 24 hours,  DO NOT operate an automobile, bicycle, motorcycle, , power tools or large equipment of any kind. Do not drink alcohol, sign any legal documents or make any legal decisions for 24 hours. Do not bend your head over lower than your heart. DO sit on the side of bed/couch awhile before getting up. Plan on bedrest or quiet relaxation today. You may resume normal activities in 24 hours. DIET:    Your first meal today should be light, avoiding spicy and fatty foods. If you tolerate this first meal, then you may advance to your regular diet unless otherwise advised by your physician. NORMAL SYMPTOMS:  -Mild sore throat if youve had an EGD   -Gaseous discomfort    NOTIFY YOUR PHYSICIAN IF THESE SYMPTOMS OCCUR:  1. Fever (greater than 100)  5. Increased abdominal bloating  2. Severe pain    6. Excessive bleeding  3. Nausea and vomiting  7. Chest pain                                                                    4. Chills    8. Shortness of breath    ADDITIONAL INSTRUCTIONS:    Biopsy results:     Educational Information:          Please review these discharge instructions this evening or tomorrow for  information you may have forgotten. We want to thank you for choosing the [facility] as your health care provider. We always strive to provide you with excellent care while you are here.  You may receive a survey in the mail regarding your care. We would appreciate you taking a few minutes of your time to complete this survey.

## 2023-01-19 NOTE — ANESTHESIA POSTPROCEDURE EVALUATION
Department of Anesthesiology  Postprocedure Note    Patient: Kelsey Morrissey  MRN: [id number]  YOB: 1950  Date of evaluation: 1/19/2023      Procedure Summary     Date: 01/19/23 Room / Location: [facility] [place] [address] / [facility]    Anesthesia Start: 1003 Anesthesia Stop: 1085    Procedure: ESOPHAGOGASTRODUODENOSCOPY Diagnosis:       Dysphagia, unspecified type      (Dysphagia, unspecified type [R13.10])    Surgeons: Jj Martin MD Responsible Provider: Veronica Patel MD    Anesthesia Type: MAC ASA Status: 4          Anesthesia Type: No value filed.     Darrell Phase I:      Darrell Phase II: Darrell Score: 10      Anesthesia Post Evaluation    Patient location during evaluation: PACU  Level of consciousness: awake  Complications: no  Multimodal analgesia pain management approach

## 2023-01-19 NOTE — PROGRESS NOTES
Ambulatory Surgery/Procedure Discharge Note    Vitals:    01/19/23 1047   BP: 104/81   Pulse: 65   Resp: 16   Temp: 97.2 °F (36.2 °C)   SpO2: 98%       In: 500 [I.V.:500]  Out: -     Restroom use offered before discharge. Yes    Pain assessment:  none  Pain Level: 0    Denies nausea, c/o mild throat discomfort. Denies nausea tolerating fluids well. Patient discharged to home/self care.  Patient discharged via wheel chair by transporter to waiting family/S.O.       1/19/2023 11:16 AM

## 2023-01-19 NOTE — ANESTHESIA PRE PROCEDURE
Department of Anesthesiology  Preprocedure Note       Name:  Louisa Chance   Age:  81 y.o.  :  1950                                          MRN:  [id number]         Date:  2023      Surgeon: Jacobo Pan):  Monroe Hunter MD    Procedure:     Medications prior to admission:   Prior to Admission medications    Medication Sig Start Date End Date Taking? Authorizing Provider   metoprolol tartrate (LOPRESSOR) 50 MG tablet Take 50 mg by mouth 2 times daily    Historical Provider, MD   tiotropium (SPIRIVA RESPIMAT) 2.5 MCG/ACT AERS inhaler Inhale 2 puffs into the lungs daily 22   Chen Li MD   vitamin B-12 (CYANOCOBALAMIN) 100 MCG tablet TAKE 1 TABLET BY MOUTH IN THE MORNING 22  Marya Brown MD   aspirin 81 MG EC tablet Take 81 mg by mouth daily    Historical Provider, MD   mirtazapine (REMERON) 7.5 MG tablet Take 7.5 mg by mouth nightly HALF TAB 11/3/22   Vahe Mirza MD   levothyroxine (SYNTHROID) 125 MCG tablet Take 1 tablet by mouth once daily 10/31/22   Vahe Mirza MD   oxyCODONE-acetaminophen (PERCOCET)  MG per tablet Take 0.5 tablets by mouth every 4 hours as needed for Pain. Historical Provider, MD   digoxin (LANOXIN) 125 MCG tablet Take 1 tablet by mouth once daily 22   ISIDRA Kowalski - CNP   clopidogrel (PLAVIX) 75 MG tablet Take 1 tablet by mouth daily 9/10/22   Rj Amor MD   nitroGLYCERIN (NITROSTAT) 0.4 MG SL tablet Place 1 tablet under the tongue every 5 minutes as needed for Chest pain 9/10/22   Rj Amor MD   pantoprazole (PROTONIX) 40 MG tablet TAKE 1 TABLET BY MOUTH TWICE A DAY BEFORE MEALS 22   Vahe Mirza MD   furosemide (LASIX) 20 MG tablet t1 tab by mouth daily. Call cardiology if swelling in legs worsens or shortness of breath occurs.  22  Vahe Mirza MD   Cholecalciferol 50 MCG (2000 UT) TABS Take 2,000 Units by mouth daily    Historical Provider, MD   rosuvastatin (CRESTOR) 40 MG tablet Take 1 tablet by mouth daily 1/19/22   Nawaf Trevizo MD   dilTIAZem [facility] CD) 120 MG extended release capsule Take 1 capsule by mouth daily 1/19/22 9/10/22  Nawaf Trevizo MD   lisinopril (PRINIVIL;ZESTRIL) 2.5 MG tablet Take 0.5 tablets by mouth daily 3/22/21   Nasra Murphy, APRN - CNP       Current medications:    No current facility-administered medications for this visit. No current outpatient medications on file. Facility-Administered Medications Ordered in Other Visits   Medication Dose Route Frequency Provider Last Rate Last Admin    lidocaine PF 1 % injection 1 mL  1 mL IntraDERmal Once PRN Corazon Limerick, DO        lactated ringers infusion   IntraVENous Continuous Corazon Limerick, DO           Allergies:     Allergies   Allergen Reactions    Naproxen Swelling     Lips  Pt does not recognize this being an intolerance    Hydrocodone Itching       Problem List:    Patient Active Problem List   Diagnosis Code    PAF (paroxysmal atrial fibrillation) ([facility]) I48.0    Transient ischemic attack (TIA) G45.9    Chronic ischemic heart disease I25.9    Essential hypertension I10    Obstructive sleep apnea syndrome G47.33    Squamous cell carcinoma of head and neck ([facility]) C76.0    Chronic pain due to neoplasm G89.3    Refusal of blood transfusions as patient is Cheondoism Z53.1    S/P AVR Z95.2    Sensorineural hearing loss (SNHL) of both ears H90.3    Chronic obstructive pulmonary disease (HCC) J44.9    History of tobacco abuse Z87.891    Mixed hyperlipidemia E78.2    Osteoarthritis of cervical spine M47.812    Primary osteoarthritis of both knees M17.0    Metastatic squamous cell carcinoma [id number]    Cervical stenosis of spine M48.02    Encounter for loop recorder check Z45.09    Coronary artery disease of native heart with stable angina pectoris, unspecified vessel or lesion type ([facility]) I25.118    SOB (shortness of breath) on exertion R06.02  Mitral regurgitation I34.0    Tricuspid valve insufficiency I07.1    Intermittent chest pain R07.9    Anxiety F41.9    Acquired hypothyroidism E03.9    Progressive angina (HCC) I20.0    Hypertension I10    Hyperlipidemia E78.5    Sleep apnea G47.30    Radiculopathy, lumbar region M54.16    Palpitations R00.2    Accelerating angina (HCC) I20.0    CAD (coronary artery disease) I25.10    Chest pain R07.9    Secondary malignant neoplasm of unspecified site (CODE) (HCC) C79.9       Past Medical History:        Diagnosis Date    Aortic valve insufficiency     Arthritis     Atrial fibrillation (HCC)     Cancer ([facility] [address] 09/2017    TONGUE head and neck IN REMISSION    Dysphonia     Encounter for imaging to screen for metal prior to MRI 08/22/2022    MRI conditional [employer] Medical Linq loop recorder model#[id number] Reveal LINQ implanted 4/22/19. Normal Mode. 1.5T or 3.0T. Download data prior to MRI. Follow all other [employer] guidelines.  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 Eleanor Reynoso 25mm Mosaic Ultra porcine valve; right and left modified maze procedure with ligation of DELGADO with Atriclip    ATRIAL ABLATION SURGERY      BACK SURGERY      CSP    CARDIAC CATHETERIZATION      FRACTURE SURGERY      HERNIA REPAIR      KNEE SURGERY      Arthroscopy    LUMBAR SPINE SURGERY Right 10/13/2020    RIGHT LUMBAR4-LUMBAR5 MICRO HEMILAMINECTOMY AND DISCECTOMY ([id number], [id number]) performed by Christiano Schmidt MD at [address] [place] [address]      collapsed lung due to broken ribs    MANDIBLE FRACTURE SURGERY      NECK SURGERY      Fusion    PAIN MANAGEMENT PROCEDURE Right 2020    RIGHT L4 AND L5 TRANSFORAMINAL EPIDURAL STEROID INJECTION WITH FLUOROSCOPY (08721,[id number]) performed by Jocelin Heck MD at [address] Right 2020    RIGHT L4 AND L5 TRANSFORAMINAL EPIDURAL STEROID INJECTION WITH FLUOROSCOPY ([id number],[id number]) performed by Jocelin Heck MD at [address] [place] [address]         Social History:    Social History     Tobacco Use    Smoking status: Former     Packs/day: 1.00     Years: 40.00     Pack years: 40.00     Types: Cigarettes     Start date: 1975     Quit date: 2015     Years since quittin.9    Smokeless tobacco: Never    Tobacco comments:     Maintain cessation   Substance Use Topics    Alcohol use: No     Alcohol/week: 0.0 standard drinks     Comment: NA beer                                Counseling given: Not Answered  Tobacco comments: Maintain cessation      Vital Signs (Current): There were no vitals filed for this visit.                                            BP Readings from Last 3 Encounters:   23 114/86   22 100/70   22 100/62       NPO Status:                                                                                 BMI:   Wt Readings from Last 3 Encounters:   23 150 lb (68 kg)   22 168 lb (76.2 kg)   22 166 lb 8 oz (75.5 kg)     There is no height or weight on file to calculate BMI.    CBC:   Lab Results   Component Value Date/Time    WBC 5.0 2022 10:57 AM    RBC 4.35 2022 10:57 AM    HGB 13.9 2022 10:57 AM    HCT 39.8 2022 10:57 AM    MCV 91.5 2022 10:57 AM    RDW 13.3 2022 10:57 AM     2022 10:57 AM       CMP:   Lab Results   Component Value Date/Time     2022 10:57 AM    K 4.3 2022 10:57 AM    K 3.4 2022 02:18 AM     2022 10:57 AM    CO2 28 2022 10:57 AM    BUN 9 2022 10:57 AM CREATININE 0.5 11/08/2022 10:57 AM    GFRAA >60 09/13/2022 02:18 AM    GFRAA >60 12/26/2009 10:17 PM    AGRATIO 1.5 09/13/2022 02:18 AM    LABGLOM >60 11/08/2022 10:57 AM    GLUCOSE 86 11/08/2022 10:57 AM    PROT 6.3 09/13/2022 02:18 AM    CALCIUM 9.5 11/08/2022 10:57 AM    BILITOT 0.6 09/13/2022 02:18 AM    ALKPHOS 43 09/13/2022 02:18 AM    AST 14 09/13/2022 02:18 AM    ALT 9 09/13/2022 02:18 AM       POC Tests: No results for input(s): POCGLU, POCNA, POCK, POCCL, POCBUN, POCHEMO, POCHCT in the last 72 hours. Coags:   Lab Results   Component Value Date/Time    PROTIME 12.8 09/12/2022 06:40 PM    PROTIME 28.7 11/16/2017 01:40 PM    PROTIME NA 02/12/2015 12:13 PM    INR 0.97 09/12/2022 06:40 PM    APTT 29.5 03/04/2021 04:29 PM       HCG (If Applicable): No results found for: PREGTESTUR, PREGSERUM, HCG, HCGQUANT     ABGs:   Lab Results   Component Value Date/Time    PHART 7.323 01/28/2015 04:37 PM    PO2ART 148 01/28/2015 04:37 PM    UZQ5ZOM 44 01/28/2015 04:37 PM    RLS6VXC 22.9 01/28/2015 04:37 PM    BEART -3 01/28/2015 04:37 PM    U6JDSCFE 99 01/28/2015 04:37 PM        Type & Screen (If Applicable):  No results found for: LABABO, LABRH    Drug/Infectious Status (If Applicable):  No results found for: HIV, HEPCAB    COVID-19 Screening (If Applicable):   Lab Results   Component Value Date/Time    COVID19 Not Detected 09/23/2022 02:24 PM    COVID19 Negative 09/23/2022 02:05 PM         Anesthesia Evaluation  Patient summary reviewed and Nursing notes reviewed no history of anesthetic complications:   Airway: Mallampati: II  TM distance: <3 FB   Neck ROM: limited  Mouth opening: > = 3 FB   Dental:    (+) upper dentures and lower dentures  Comment: Discussed risks of leaving dentures in with patient. He states they are glued in and not coming out; wishes to proceed despite risks of damage or loss of dentures.      Pulmonary: breath sounds clear to auscultation  (+) COPD (denies inhaler or oxygen use):  shortness of breath:  sleep apnea: on noncompliant,                             Cardiovascular:  Exercise tolerance: good (>4 METS),   (+) hypertension:, valvular problems/murmurs (AVR porcine 2015):, CAD: non-obstructive, dysrhythmias (AF with RVR): atrial fibrillation, MARTINEZ:,         Rhythm: regular  Rate: normal                 ROS comment: Conclusions      Summary   -Normal left ventricle size, wall thickness, and systolic function with an   estimated ejection fraction of 60-65%. -No regional wall motion abnormalities are seen.   -Indeterminate diastolic function. -E/e'=7   -Moderately severe mitral regurgitation   -The bioprosthetic artificial aortic valve appears well seated   -There is moderate-to-severe tricuspid regurgitation with a RVSP estimation   of 30mmHg. Implanted monitor     Neuro/Psych:   (+) TIA,              ROS comment: Cervical fusion GI/Hepatic/Renal:   (+) GERD: well controlled,           Endo/Other:    (+) hypothyroidism, blood dyscrasia: anticoagulation therapy, arthritis:., malignancy/cancer (head and neck, s/p radiation). Abdominal:             Vascular: Other Findings:      Conclusions      Summary   -Normal left ventricle size, wall thickness, and systolic function with an   estimated ejection fraction of 55-60%. -No regional wall motion abnormalities are seen.   -Normal diastolic function. E/e\"=9.75.   -Global longitudinal strain -18.9% ( normal). -There is posterior mitral annular calcification.   -Moderate mitral regurgitation. ERO 0.21 cm2.   -Findings are stable when compared to previous study dated 8/18/2021.   -A bioprosthetic artificial aortic valve appears well seated with a maximum   velocity of 2.4m/s and a mean gradient of 12mmHg. -Moderate to severe tricuspid regurgitation.   -Estimated pulmonary artery systolic pressure is mildly elevated at 39 mmHg   assuming a right atrial pressure of 8 mmHg. -Right heart structures appear moderately dilated. -The ascending aorta is dilated measuring 4.2 cm at largest diameter. Signature      ------------------------------------------------------------------   Electronically signed by Olympia Essex, MD, [facility] (Interpreting   physician) on 08/24/2022 at 04:37 PM   ------------------------------------------------------------------         Anesthesia Plan      MAC     ASA 4       Induction: intravenous and rapid sequence. MIPS: Postoperative opioids intended, Prophylactic antiemetics administered and Postoperative trial extubation. Anesthetic plan and risks discussed with patient. Use of blood products discussed with whom did not consent to blood products. Special considerations: Mandaeism. Blood Products Consent Comment: Papers he brought photocopied to chart as well as our blood product refusal paper signed and attached to paper chart  Plan discussed with CRNA.                     Alex Ennis MD   1/19/2023

## 2023-01-19 NOTE — PROCEDURES
Endoscopy Note    Patient: Joan Mitchell  : 1950  CSN: [id number]    Procedure: Esophagogastroduodenoscopy     Date:  2023     Surgeon:  Christian MD     Referring Physician:  Anson Figueroa MD    Preoperative Diagnosis:  Dysphagia, unspecified type [R13.10]        Postoperative Diagnosis: Normal caliber esophagus with no evidence of esophageal strictures. No erosive esophagitis. The GE junction was located at 37 cm. No stricture noted at the GE junction. The Z-line was slightly irregular, no definite Croft's esophagus noted. Biopsies not obtained from the GE junction as patient was on Plavix. 3 cm sliding hiatal hernia otherwise normal stomach,  scarring at prior PEG placement site. Normal duodenum    Anesthesia:  MAC    EBL: minimal to none. Indications: This is a 81y.o. year old male who presents today with history of chronic dysphagia to solids and liquids. Symptoms described suggest oropharyngeal dysphagia. 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 Patient is on Plavix, did not stop Plavix prior to procedure. Patient reports that he is to have valve replacement surgery, he may need a CHIKIS prior to surgery, cardiology recommended EGD to rule out esophageal strictures prior to planned cardiac surgery. Description of Procedure:  Informed consent was obtained from the patient after explanation of indications, benefits and possible risks and complications of the procedure. The patient was then taken to the endoscopy suite, placed in the left lateral decubitus position and the above IV sedation was administrered. The Olympus video endoscope was passed through the hypopharynx into the esophagus. The scope was advanced all the way to the second portion of the duodenum. The GE junction was at approximately 37 cms.  The scope was slowly withdrawn and mucosa was carefully evaluated including a retroflex view of the proximal stomach. Findings and interventions are described below. The patient was decompressed and the scope was then withdrawn. Gastric or Duodenal ulcer present: No    The patient tolerated the procedure well and was taken to the post anesthesia care unit in good condition. There were no immediate complications. Impression:  -  Normal caliber esophagus with no evidence of esophageal strictures. No erosive esophagitis. The GE junction was located at 37 cm. No stricture noted at the GE junction. The Z-line was slightly irregular, no definite Croft's esophagus noted. Biopsies not obtained from the GE junction as patient was on Plavix. 3 cm sliding hiatal hernia otherwise normal stomach,  scarring at prior PEG placement site. Normal duodenum      Recommendations: -Continue acid suppression. , -Okay to proceed with planned cardiac surgery, ok for CHIKIS, no esophageal strictures noted. Boogie MD, Paul Maher    Please note that some or all of this record was generated using voice recognition software. If there are any questions about the content of this document, please contact the author as some errors in translation may have occurred.

## 2023-01-23 ENCOUNTER — HOSPITAL ENCOUNTER (OUTPATIENT)
Dept: CARDIAC CATH/INVASIVE PROCEDURES | Age: 73
Discharge: HOME | End: 2023-01-23
Attending: INTERNAL MEDICINE | Admitting: INTERNAL MEDICINE
Payer: MEDICARE

## 2023-01-23 ENCOUNTER — TELEPHONE (OUTPATIENT)
Dept: CARDIOLOGY CLINIC | Age: 73
End: 2023-01-23

## 2023-01-23 VITALS
DIASTOLIC BLOOD PRESSURE: 92 MMHG | OXYGEN SATURATION: 98 % | HEART RATE: 73 BPM | WEIGHT: 152 LBS | BODY MASS INDEX: 21.76 KG/M2 | SYSTOLIC BLOOD PRESSURE: 144 MMHG | HEIGHT: 70 IN | RESPIRATION RATE: 16 BRPM

## 2023-01-23 PROCEDURE — 33286 RMVL SUBQ CAR RHYTHM MNTR: CPT | Performed by: INTERNAL MEDICINE

## 2023-01-23 PROCEDURE — 33286 RMVL SUBQ CAR RHYTHM MNTR: CPT

## 2023-01-23 PROCEDURE — 33285 INSJ SUBQ CAR RHYTHM MNTR: CPT | Performed by: INTERNAL MEDICINE

## 2023-01-23 PROCEDURE — C1764 EVENT RECORDER, CARDIAC: HCPCS

## 2023-01-23 PROCEDURE — 33285 INSJ SUBQ CAR RHYTHM MNTR: CPT

## 2023-01-23 NOTE — DISCHARGE INSTRUCTIONS
LINQ INSERTION DISCHARGE INSTRUCTIONS        Remove outer dressing in 48 hours. Leave steri strips intact. Do not get the incision wet for one week. You may be sore, bruised and have a small amount of drainage around the incision site.     Please contact the office if you notice any signs of infection:  Redness / swelling at the incision site  Fever  Yellow drainage at the site  Pain       Phone: [phone]

## 2023-01-23 NOTE — PROCEDURES
[employer]     Electrophysiology Procedure Note       Date of Procedure: 1/23/2023  Patient's Name: Jose Kinsey  YOB: 1950   Medical Record Number: [id number]  Procedure Performed by: Maggie Isaacs MD    Procedures performed:  Loop recorder removal  Implantation of new loop recorder. Indication of the procedure: ILR battery depletion. Jose Kinsey is a 81 y.o. male with history of Afib/flutter s/p loop in the past. Device battery is depleted and he is here for implantation of ILR to monitor his arrhythmia longterm. Details of procedure:   Loop recorder removal:   The patient was brought to the electrophysiology laboratory in stable condition. The patient was in a fasting and non-sedated state. The risks, benefits and alternatives of the procedure were discussed with the patient The risks including, but not limited to, the risks of bleeding, infection, injury to surrounding structures, were discussed in detail. Patient opted to proceed with the device implantation. Written informed consent was signed and placed in the chart. The patient was prepped and draped in a sterile fashion. A timeout protocol was completed to identify the patient and the procedure being performed. Local anesthesia was used with no IV sedation. Chest was prepped and draped in the usual sterile fashion. After injection of 2% lidocaine, an incision was made over the previous scar and extended to the pocket using blunt dissection. The old loop recorder device was removed. Loop recorder implantation:   Using ILR insertion device, a small subcutaneous tunnel was created and the loop recorder was inserted under skin. Dermabond was used. The skin was covered with Steri-Strips. Device information:   The device is Reveal [facility]# [id number] [employer] Implant date: 1/23/2023    Plan:   The patient will have usual post-implant care.  Patient can be discharged home if remains stable with follow up in arrhythmia clinic.

## 2023-01-23 NOTE — H&P
[address]   Electrophysiology Follow up   Date: 1/23/2023  I had the privilege of visiting Jessica Lyles in the office. CC: I am not feeling well   HPI: Jessica Lyles is a 81 y.o. male with a complex past medical history of atrial fibrillation, CAD, CHRISTOPHER, aortic valve insufficiency, and oral squamous cell cancer. In 2015, he underwent AVR with porcine valve, right/left modified maze procedure with DELGADO ligation with Atriclip (1/28/15, Dr. Abimbola Medrano). He was on coumadin, but this was stopped due to bleeding from his mouth and PEG tube after radiation for his oral cancer. S/p ILR implant (4/22/19). He developed recurrent symptomatic AF. S/p RFCA with re-isolation of PV, roof line, complex atrial fractionated electogram, inferior-posterior line with PWI, CTI atrial flutter (7/15/20)    Had recurrent chest pain. [facility] with LAD stenosis and had PCI to LAD on 9/9/2022 (Dr. Marii Melara)     He also noted to have moderate to severe MR and severe TR. He was referred to [facility] and was considered very high risk for open heart surgery, due to re-do operation and also he declines blood transfusion on religous ground. He is going to cardiac rehab. Today he woke up and did not feel well. Went to cardiac rehab and found to be tachycardic. He was sent to cardiology office today to be seen urgently due to tachycardia. Patient states that on Saturday he did not feel well. He had some palpitation. Today he also feels some palpitation. Assessment and plan:     -Implantable Loop Recorder  S/p ILR insertion on 4/2019  The CIED was interrogated and programmed and I supervised and reviewed all the data. All findings and changes are in device interrogation sheat and reflect my personal interpretation and changes and is scanned to Epic    The battery is depleted. He has had recurrent atrial arrhythmia and we discussed removal of loop recorder versus removal and replacement for continuation of monitoring.   Opted for replacement of loop recorder.      - Persistent atrial fibrillation:    Duration of atrial fibrillation is not known. He presented to cardiac rehab today and found to be in atrial fibrillation. He does have history of paroxysmal atrial fibrillation however his AT/AF burden was 0.1% by his loop recorder interrogation last time. Unfortunately he has severe valvular disease including severe TR and moderate to severe MR and has seen CT surgery not a good candidate for redo surgery. Increased metoprolol to 25 mg twice daily       S/p MAZE and DELGADO ligation with Atriclip (2015)    S/p RFCA with re-isolation of PV, roof line, complex atrial fractionated electogram, inferior-posterior line with PWI, CTI atrial flutter (7/15/20)    Off oral AC due to successful DELGADO ligation verified by [facility]     -Aortic Insufficiency               S/p AVR porcine valve right/left modified maze procedure with DELGADO ligation with Atriclip (1/28/15, Dr. Emerald Guardado)    - Moderate to severe MR & moderate to severe TR:   He has seen cardiothoracic surgery at [facility] and was found to be high risk for redo operation.      -CAD  S/p LAD stent on 9/9/2022  Followed by Dr. Alexa Fisher    -CHRISTOPHER  Stable: Uses CPAP  Encourage to use CPAP to prevent long term effects of untreated CHRISTOPHER         Patient Active Problem List    Diagnosis Date Noted    S/P AVR     Secondary malignant neoplasm of unspecified site (CODE) [facility] 11/03/2022    Chest pain 09/12/2022    Accelerating angina ([address] 08/09/2022    CAD (coronary artery disease) 08/09/2022    Palpitations 08/08/2022    Anxiety 02/07/2022    Acquired hypothyroidism 02/07/2022    Radiculopathy, lumbar region 11/01/2021    Intermittent chest pain 03/04/2021    Coronary artery disease of native heart with stable angina pectoris, unspecified vessel or lesion type ([facility] 08/19/2020    SOB (shortness of breath) on exertion 08/19/2020    Mitral regurgitation 08/19/2020    Tricuspid valve insufficiency 08/19/2020 Encounter for loop recorder check 2020    Progressive angina ([facility] 2020    Hypertension 2020    Hyperlipidemia 2020    Sleep apnea 2020    Cervical stenosis of spine 2020    Metastatic squamous cell carcinoma 2020    Chronic obstructive pulmonary disease ([facility] 2019    Sensorineural hearing loss (SNHL) of both ears 2019    History of tobacco abuse 2018    Squamous cell carcinoma of head and neck ([facility] 2017    Chronic pain due to neoplasm 2017    Refusal of blood transfusions as patient is Episcopalian 2017    Osteoarthritis of cervical spine 2016    Primary osteoarthritis of both knees 2016    Obstructive sleep apnea syndrome     Transient ischemic attack (TIA)     Chronic ischemic heart disease     Essential hypertension     Mixed hyperlipidemia 2015    PAF (paroxysmal atrial fibrillation) ([facility] 2015     Diagnostic studies:     EC22  SR, first degree AV block       [facility] 3/8/2021  Dominance: Right       LM: luminal irregularities  LAD: at least moderate calcification of long, eccentric 80% ostial to proximal stenosis ; 30% ostial first diagonal   LCx: 30-40% distal into OM2   RCA: tortuous with luminal irregularities     Did not cross SAVR      Impression/Recommendations:  CTS consult to discuss Redo Sternotomy for LIMA-LAD and significant, primary MR     Limited echo 2021   -Limited echocardiogram was performed to evaluate mitral regurgitation.   -Normal left ventricle size and systolic function with an estimated ejection   fraction of 60%. -No regional wall motion abnormalities are seen. -The tips of the mitral leaflet appears to be slightly myxomatous and   minimal prolapse of posterior leaflet. -Moderate to severe anteriorly directed mitral regurgitation (appears   similar when compared to last echocardiogram dated 2020).  ERO=0.25   -A bioprosthetic artificial aortic valve appears well seated with a maximum   velocity of 2.4m/s and a mean gradient of 13mmHg. -Moderate to severe tricuspid regurgitation.   -Estimated pulmonary artery systolic pressure is mildly elevated at 38 mmHg   assuming a right atrial pressure of 8 mmHg. -The right ventricle is mildly enlarged.   -The right atrium is moderately dilated. Echo: 9/9/2020  Normal left ventricle size, wall thickness and systolic function with an estimated ejection fraction of 60%. No regional wall motion abnormalities are seen. E/e\"= 10. Moderate to severe mitral regurgitation. A bioprosthetic aortic valve appears well seated with a maximum gradient of   17 mmHg and a mean gradient of 11 mmHg. Aortic valve area of 1.97cm. No evidence of aortic valve regurgitation. The aortic root is mildly dilated. 3.7cm   The ascending aorta is mildly dilated. 4.2cm   The right ventricle is mildly enlarged but normal in function. Moderate to severe tricuspid regurgitation. PASP 34mmHg. The right atrium is mildly dilated. IVC size is normal (<2.1 cm) but collapses < 50% with respiration consistent   with elevated RA pressure (8 mmHg). Limited Echo: 7/15/2020   -Limited echocardiogram was performed to rule out pericardial effusion,   status post cardiac ablation today.   -Normal left ventricle size, wall thickness and systolic function with an   estimated ejection fraction of 55%.   -No regional wall motion abnormalities are seen.   -Abnormal (paradoxical) septal motion noted.   -No evidence of any pericardial effusion. Echo: 6/16/2020   -Normal left ventricle size, wall thickness, and systolic function with an   estimated ejection fraction of 60-65%. -No regional wall motion abnormalities are seen.   -Indeterminate diastolic function.    -E/e'=7   -Moderately severe mitral regurgitation   -The bioprosthetic artificial aortic valve appears well seated   -There is moderate-to-severe tricuspid regurgitation with a RVSP estimation of 30mmHg. Lexiscan: 8/26/2020  -There is a small fixed inferior apical defect that is likely bowel artifact    rather than scar. -LV function is normal with no regional abnormalities and EF=64%. -Low risk study. Cath: 2015  Non-obstructive CAD     MCOT: 9/2018                            1st degree HB, HT 68                                            PVC burden <1%             PAC burden 1%                                     VT 11 bts at 118        Cardiac CT: 6/20  No stenosis at the pulmonary vein origins. Clip is seen in the left atrial appendage,. No thrombus seen in this region. Mildly dilated aorta. .  Post valve replacement changes are noted       I independently reviewed the cardiac diagnostic studies, ECG and relevant imaging studies. Lab Results   Component Value Date    LVEF 58 08/24/2022    LVEFMODE Echo 01/28/2019     Lab Results   Component Value Date    TSH 10.50 (H) 08/09/2022         Physical Examination:    BP: 110/70 HR: 113 bpm      Wt Readings from Last 3 Encounters:   01/23/23 152 lb (68.9 kg)   01/19/23 150 lb (68 kg)   12/27/22 168 lb (76.2 kg)       Constitutional: Oriented. No distress. Head: Normocephalic and atraumatic. Mouth/Throat: Oropharynx is clear and moist.   Eyes: Conjunctivae normal. EOM are normal.   Neck: Neck supple. No JVD present. Cardiovascular: Tachycardic rate, Irregular rhythm, L9&H4.  + systolic murmur   Pulmonary/Chest: Bilateral respiratory sounds. No rhonchi. Abdominal: Soft. No tenderness. Musculoskeletal: No tenderness. No edema    Lymphadenopathy: Has no cervical adenopathy. Neurological: Alert and oriented. Follows command, No Gross deficit   Skin: Skin is warm, No rash noted. Psychiatric: Has a normal behavior     Review of System:  [x] Full ROS obtained and negative except as mentioned in HPI    Prior to Admission medications    Medication Sig Start Date End Date Taking?  Authorizing Provider metoprolol tartrate (LOPRESSOR) 50 MG tablet Take 50 mg by mouth 2 times daily    Historical Provider, MD   tiotropium (SPIRIVA RESPIMAT) 2.5 MCG/ACT AERS inhaler Inhale 2 puffs into the lungs daily 12/27/22   Veronica Pro MD   vitamin B-12 (CYANOCOBALAMIN) 100 MCG tablet TAKE 1 TABLET BY MOUTH IN THE MORNING 11/30/22 11/30/23  Sandra Lance MD   aspirin 81 MG EC tablet Take 81 mg by mouth daily    Historical Provider, MD   mirtazapine (REMERON) 7.5 MG tablet Take 7.5 mg by mouth nightly HALF TAB 11/3/22   Yara Alvarado MD   levothyroxine (SYNTHROID) 125 MCG tablet Take 1 tablet by mouth once daily 10/31/22   Yara Alvarado MD   oxyCODONE-acetaminophen (PERCOCET)  MG per tablet Take 0.5 tablets by mouth every 4 hours as needed for Pain. Historical Provider, MD   digoxin (LANOXIN) 125 MCG tablet Take 1 tablet by mouth once daily 9/19/22   ISIDRA Tanner - CNP   clopidogrel (PLAVIX) 75 MG tablet Take 1 tablet by mouth daily 9/10/22   Kati Vogt MD   nitroGLYCERIN (NITROSTAT) 0.4 MG SL tablet Place 1 tablet under the tongue every 5 minutes as needed for Chest pain 9/10/22   Kati Vogt MD   pantoprazole (PROTONIX) 40 MG tablet TAKE 1 TABLET BY MOUTH TWICE A DAY BEFORE MEALS 2/28/22   Yara Alvarado MD   furosemide (LASIX) 20 MG tablet t1 tab by mouth daily. Call cardiology if swelling in legs worsens or shortness of breath occurs.  2/18/22 8/11/22  Yara Alvarado MD   Cholecalciferol 50 MCG (2000 UT) TABS Take 2,000 Units by mouth daily    Historical Provider, MD   rosuvastatin (CRESTOR) 40 MG tablet Take 1 tablet by mouth daily 1/19/22   Fan Clancy MD   dilTIAZem (CARDIZEM CD) 120 MG extended release capsule Take 1 capsule by mouth daily 1/19/22 9/10/22  Fan Clancy MD   lisinopril (PRINIVIL;ZESTRIL) 2.5 MG tablet Take 0.5 tablets by mouth daily 3/22/21   Jade Burnett, APRN - CNP       Allergies   Allergen Reactions    Naproxen Swelling Lips  Pt does not recognize this being an intolerance    Hydrocodone Itching       Social History:  Reviewed. reports that he quit smoking about 8 years ago. His smoking use included cigarettes. He started smoking about 48 years ago. He has a 40.00 pack-year smoking history. He has never used smokeless tobacco. He reports that he does not drink alcohol and does not use drugs. Family History:  Reviewed. Reviewed. No family history of SCD. Relevant and available labs, and cardiovascular diagnostics reviewed. Reviewed. I independently reviewed relevant and available cardiac diagnostic tests ECG, CXR, Echo, Stress test, Device interrogation, Holter, CT scan. Complex medical condition with multiple medical problems affecting prognosis and outcome of EP interventions    All questions and concerns were addressed to the patient/family. Alternatives to my treatment were discussed. I have discussed the above stated plan and the patient verbalized understanding and agreed with the plan. NOTE: This report was transcribed using voice recognition software. Every effort was made to ensure accuracy, however, inadvertent computerized transcription errors may be present. Maggie Isaacs MD, MPH  [place] [address]   Office: [phone]  Fax: [phone]        H&P Update    I have reviewed the history and physical and examined the patient and updated with relevant changes. Consent: I have discussed with the patient and/or the patient representative the indication, alternatives, and the possible risks and/or complications of the planned procedure and the anesthesia methods. The patient and/or patient representative appear to understand and agree to proceed. Vitals:    01/23/23 0919   BP: (!) 144/92   Pulse: 73   Resp: 16   SpO2: 98%     Prior to Admission medications    Medication Sig Start Date End Date Taking?  Authorizing Provider   metoprolol tartrate (LOPRESSOR) 50 MG tablet Take 50 mg by mouth 2 times daily    Historical Provider, MD   tiotropium (SPIRIVA RESPIMAT) 2.5 MCG/ACT AERS inhaler Inhale 2 puffs into the lungs daily 12/27/22   Luis Carlos Castrejon MD   vitamin B-12 (CYANOCOBALAMIN) 100 MCG tablet TAKE 1 TABLET BY MOUTH IN THE MORNING 11/30/22 11/30/23  Nelida Mijares MD   aspirin 81 MG EC tablet Take 81 mg by mouth daily    Historical Provider, MD   mirtazapine (REMERON) 7.5 MG tablet Take 7.5 mg by mouth nightly HALF TAB 11/3/22   Lillie Ray MD   levothyroxine (SYNTHROID) 125 MCG tablet Take 1 tablet by mouth once daily 10/31/22   Lillie Ray MD   oxyCODONE-acetaminophen (PERCOCET)  MG per tablet Take 0.5 tablets by mouth every 4 hours as needed for Pain. Historical Provider, MD   digoxin (LANOXIN) 125 MCG tablet Take 1 tablet by mouth once daily 9/19/22   ISIDRA Fink CNP   clopidogrel (PLAVIX) 75 MG tablet Take 1 tablet by mouth daily 9/10/22   Anjel Mcfarland MD   nitroGLYCERIN (NITROSTAT) 0.4 MG SL tablet Place 1 tablet under the tongue every 5 minutes as needed for Chest pain 9/10/22   Anjel Mcfarland MD   pantoprazole (PROTONIX) 40 MG tablet TAKE 1 TABLET BY MOUTH TWICE A DAY BEFORE MEALS 2/28/22   Lillie Ray MD   furosemide (LASIX) 20 MG tablet t1 tab by mouth daily. Call cardiology if swelling in legs worsens or shortness of breath occurs.  2/18/22 8/11/22  Lillie Ray MD   Cholecalciferol 50 MCG (2000 UT) TABS Take 2,000 Units by mouth daily    Historical Provider, MD   rosuvastatin (CRESTOR) 40 MG tablet Take 1 tablet by mouth daily 1/19/22   Herbert Ocasio MD   dilTIAZem (CARDIZEM CD) 120 MG extended release capsule Take 1 capsule by mouth daily 1/19/22 9/10/22  Herbert Ocasio MD   lisinopril (PRINIVIL;ZESTRIL) 2.5 MG tablet Take 0.5 tablets by mouth daily 3/22/21   ISIDRA Martinez CNP     Past Medical History:   Diagnosis Date    Aortic valve insufficiency     Arthritis     Atrial fibrillation [facility]     [facility] 09/2017    TONGUE head and neck IN REMISSION    Dysphonia     Encounter for imaging to screen for metal prior to MRI 08/22/2022    MRI conditional Medtronic Linq loop recorder model#[id number] Reveal LINQ implanted 4/22/19. Normal Mode. 1.5T or 3.0T. Download data prior to MRI. Follow all other Medtronic guidelines.  Pt currently follows     GERD (gastroesophageal reflux disease)     Hearing loss     Heart disease     Hyperlipidemia     Medical history reviewed with no changes     Obstructive apnea does not use CPAP    Oropharyngeal dysphagia     Refusal of blood transfusions as patient is Taoist     Thyroid disease      Past Surgical History:   Procedure Laterality Date    ANKLE SURGERY      AORTIC VALVE REPLACEMENT  01/28/2015    Dr. Macdonald Dry 25mm Mosaic Ultra porcine valve; right and left modified maze procedure with ligation of DELGADO with Atriclip    ATRIAL ABLATION 1315 Merged with [facility]      Arthroscopy    LUMBAR SPINE SURGERY Right 10/13/2020    RIGHT LUMBAR4-LUMBAR5 MICRO HEMILAMINECTOMY AND DISCECTOMY ([id number], [id number]) performed by Jigna Montalvo MD at . [facility]      collapsed lung due to broken ribs    MANDIBLE FRACTURE SURGERY      NECK SURGERY      Fusion    PAIN MANAGEMENT PROCEDURE Right 06/22/2020    RIGHT L4 AND L5 TRANSFORAMINAL EPIDURAL STEROID INJECTION WITH FLUOROSCOPY ([id number]) performed by Michael Gomes MD at [address] Right 07/08/2020    RIGHT L4 AND L5 TRANSFORAMINAL EPIDURAL STEROID INJECTION WITH FLUOROSCOPY ([id number]) performed by Michael Gomes MD at [address] ENDOSCOPY N/A 1/19/2023    ESOPHAGOGASTRODUODENOSCOPY performed by Toni Plummer MD at [email or web address]   Allergen Reactions    Naproxen Swelling     Lips  Pt does not recognize this being an intolerance    Hydrocodone Itching       Documentation and Exam:   I have personally completed a history, physical exam & review of systems for this patient (see notes). No sedation is needed for this procedure. Only local anesthesia with lidocaine will be used.      Electronically signed by Stacy Woodward MD on 1/23/2023 at 9:42 AM

## 2023-01-23 NOTE — TELEPHONE ENCOUNTER
Pt calling because he had a loop implant today and wanted to know if it is ok to get an Echo( lay on his LT side) on Wednesday. I spoke with Luigi Wong. Both said that was fine. I relayed to pt. He verbalized understanding.

## 2023-01-26 ENCOUNTER — HOSPITAL ENCOUNTER (OUTPATIENT)
Dept: CARDIAC REHAB | Age: 73
Discharge: HOME | End: 2023-01-26

## 2023-01-30 ENCOUNTER — NURSE ONLY (OUTPATIENT)
Dept: CARDIOLOGY CLINIC | Age: 73
End: 2023-01-30

## 2023-01-30 NOTE — PROGRESS NOTES
Patient comes in for their 1 week wound check S/p mdtlinq explant/mdtlinq II Implant on 1/23/2023 with Dr. Dano Miller. Patients incision is healing nicely. Patient educated on wound care. All questions answered. Patient to call the office immediately with any signs on infection. Carelink Interrogation shows a Battery Status GOOD. Since Implant 4 symptom recordings that appear to be sinus with little ectopy. Since 1/29/2023 PVC burden 0.9%. Implanted for AF management. Patient remains metoprolol. Not on [facility] medications/had MAZE. Please see interrogation for more detail. Patient will followed in office as scheduled on 4/12/2023 with Dr. Dano Miller and we will continue to follow the Patient remotely.

## 2023-02-06 ENCOUNTER — NURSE ONLY (OUTPATIENT)
Dept: CARDIOLOGY CLINIC | Age: 73
End: 2023-02-06
Payer: MEDICARE

## 2023-02-06 DIAGNOSIS — Z45.09 ENCOUNTER FOR ELECTRONIC ANALYSIS OF REVEAL EVENT RECORDER: ICD-10-CM

## 2023-02-06 DIAGNOSIS — I48.0 PAF (PAROXYSMAL ATRIAL FIBRILLATION) (HCC): Primary | Chronic | ICD-10-CM

## 2023-02-06 PROCEDURE — 93298 REM INTERROG DEV EVAL SCRMS: CPT | Performed by: INTERNAL MEDICINE

## 2023-02-06 PROCEDURE — G2066 INTER DEVC REMOTE 30D: HCPCS | Performed by: INTERNAL MEDICINE

## 2023-02-06 NOTE — PROGRESS NOTES
We received a remote transmission from patient's monitor at home. Remote Linq report shows no arrhythmias. EP physician to review. We will continue to monitor remotely. Implanted for palpitations. End of 31-day monitoring period 2-16-23.

## 2023-02-07 ENCOUNTER — HOSPITAL ENCOUNTER (OUTPATIENT)
Dept: CARDIAC REHAB | Age: 73
Discharge: HOME | End: 2023-02-07

## 2023-02-07 DIAGNOSIS — E78.2 MIXED HYPERLIPIDEMIA: ICD-10-CM

## 2023-02-07 DIAGNOSIS — I25.10 CORONARY ARTERY DISEASE INVOLVING NATIVE CORONARY ARTERY OF NATIVE HEART WITHOUT ANGINA PECTORIS: ICD-10-CM

## 2023-02-07 PROCEDURE — 9900000065 HC CARDIAC REHAB PHASE 3 - 1 VISIT

## 2023-02-08 NOTE — TELEPHONE ENCOUNTER
Received refill request for rosuvastatin (CRESTOR) 40 MG tablet from [address]      Last OV: 12- RMM    Next OV: 4- RMM    Last Labs: 11-8-2022 Lipid    Last Filled:  1- [facility]

## 2023-02-09 ENCOUNTER — HOSPITAL ENCOUNTER (OUTPATIENT)
Dept: CARDIAC REHAB | Age: 73
Discharge: HOME | End: 2023-02-09

## 2023-02-09 PROCEDURE — 9900000065 HC CARDIAC REHAB PHASE 3 - 1 VISIT

## 2023-02-10 RX ORDER — METOPROLOL TARTRATE 50 MG/1
25 TABLET, FILM COATED ORAL 2 TIMES DAILY
Qty: 180 TABLET | Refills: 2 | Status: SHIPPED | OUTPATIENT
Start: 2023-02-10

## 2023-02-10 RX ORDER — CLOPIDOGREL BISULFATE 75 MG/1
75 TABLET ORAL DAILY
Qty: 90 TABLET | Refills: 3 | Status: SHIPPED | OUTPATIENT
Start: 2023-02-10

## 2023-02-10 NOTE — TELEPHONE ENCOUNTER
Medication Refill    Medication needing refilled:  metoprolol tartrate (LOPRESSOR) 50 MG - States he needs this for the weekend    clopidogrel (PLAVIX) 75 MG     Dosage of the medication:    How are you taking this medication (QD, BID, TID, QID, PRN):    30 or 90 day supply called in:    When will you run out of your medication:    Which Pharmacy are we sending the medication to?: [address] [facility]

## 2023-02-10 NOTE — TELEPHONE ENCOUNTER
Received refill request for metoprolol from pt to [address] : 12/14/22 rmm    Last EKG : 12/20/22      Last Refill : Historical     Next OV : 4/12/23 rmm

## 2023-02-13 RX ORDER — ROSUVASTATIN CALCIUM 40 MG/1
TABLET, COATED ORAL
Qty: 90 TABLET | Refills: 3 | Status: SHIPPED | OUTPATIENT
Start: 2023-02-13

## 2023-02-14 ENCOUNTER — HOSPITAL ENCOUNTER (OUTPATIENT)
Dept: CARDIAC REHAB | Age: 73
Discharge: HOME | End: 2023-02-14

## 2023-02-14 PROCEDURE — 9900000065 HC CARDIAC REHAB PHASE 3 - 1 VISIT

## 2023-02-16 ENCOUNTER — HOSPITAL ENCOUNTER (OUTPATIENT)
Dept: CARDIAC REHAB | Age: 73
Discharge: HOME | End: 2023-02-16

## 2023-02-16 PROCEDURE — 9900000065 HC CARDIAC REHAB PHASE 3 - 1 VISIT

## 2023-02-21 ENCOUNTER — HOSPITAL ENCOUNTER (OUTPATIENT)
Dept: CARDIAC REHAB | Age: 73
Discharge: HOME | End: 2023-02-21

## 2023-02-21 PROCEDURE — 9900000065 HC CARDIAC REHAB PHASE 3 - 1 VISIT

## 2023-02-23 ENCOUNTER — HOSPITAL ENCOUNTER (OUTPATIENT)
Dept: CARDIAC REHAB | Age: 73
Discharge: HOME | End: 2023-02-23

## 2023-02-23 PROCEDURE — 9900000065 HC CARDIAC REHAB PHASE 3 - 1 VISIT

## 2023-02-27 ENCOUNTER — TELEPHONE (OUTPATIENT)
Dept: FAMILY MEDICINE CLINIC | Age: 73
End: 2023-02-27

## 2023-02-27 DIAGNOSIS — G89.4 CHRONIC PAIN SYNDROME: ICD-10-CM

## 2023-02-27 RX ORDER — OXYCODONE HYDROCHLORIDE AND ACETAMINOPHEN 5; 325 MG/1; MG/1
1 TABLET ORAL 2 TIMES DAILY PRN
Qty: 60 TABLET | Refills: 0 | Status: SHIPPED | OUTPATIENT
Start: 2023-02-27 | End: 2023-03-29

## 2023-02-27 NOTE — TELEPHONE ENCOUNTER
3/1/2023  8:40 AM [address] [place]   Appointment Notes:    Chronic Pain Syndrome F/U & Medication Check: Oxycodone (UDS/Medication Agreement)

## 2023-02-27 NOTE — TELEPHONE ENCOUNTER
Medication:   Requested Prescriptions     Pending Prescriptions Disp Refills    oxyCODONE-acetaminophen (PERCOCET)  MG per tablet       Sig: Take 0.5 tablets by mouth every 4 hours as needed for Pain. Max Daily Amount: 3 tablets        Last Filled:  11/21/2022 #60 w/o RF     Patient Phone Number: [phone] (home)     Last appt: 11/3/2022 - LMOM advising patient to contact our office to schedule an appointment   Next appt: Visit date not found    Last OARRS:   RX Monitoring 12/30/2019   Periodic Controlled Substance Monitoring No signs of potential drug abuse or diversion identified.

## 2023-02-27 NOTE — TELEPHONE ENCOUNTER
As I was calling Mr. Abhishek Boone to get him scheduled for his medication check prior to next refill, he mentions increase in chest palpitations and reports the duration is lasting longer now. He reports that this is an ongoing concern of his but nobody can seem to figure out what is causing it. He has kept a log of these palpations and was advised to bring to upcoming appointment. Pt was requesting a 'personal' call back from Dr. Ramakrishna Miller. I advised patient of provider being gone for the day but I will send message to her, urgently. Patient is scheduled for Wednesday, 03/01/2023 but would like a call back to discuss palpitations.

## 2023-02-27 NOTE — TELEPHONE ENCOUNTER
Patient requesting refill on      oxyCODONE-acetaminophen (PERCOCET) 5-325 MG per tablet 60 tablet 0 11/21/2022 12/21/2022    Sig - Route: Take 1 tablet by mouth 2 times daily as needed for Pain for up to 30 days. Intended supply: 5 days.  Take lowest dose possible to manage pain - Oral

## 2023-02-28 ENCOUNTER — HOSPITAL ENCOUNTER (OUTPATIENT)
Dept: CARDIAC REHAB | Age: 73
Discharge: HOME | End: 2023-02-28

## 2023-02-28 PROCEDURE — 9900000065 HC CARDIAC REHAB PHASE 3 - 1 VISIT

## 2023-03-01 ENCOUNTER — OFFICE VISIT (OUTPATIENT)
Dept: FAMILY MEDICINE CLINIC | Age: 73
End: 2023-03-01
Payer: MEDICARE

## 2023-03-01 VITALS
WEIGHT: 166.4 LBS | DIASTOLIC BLOOD PRESSURE: 69 MMHG | HEART RATE: 67 BPM | SYSTOLIC BLOOD PRESSURE: 101 MMHG | OXYGEN SATURATION: 95 % | TEMPERATURE: 97.5 F | HEIGHT: 70 IN | BODY MASS INDEX: 23.82 KG/M2

## 2023-03-01 DIAGNOSIS — G62.9 NEUROPATHY: ICD-10-CM

## 2023-03-01 DIAGNOSIS — F41.9 ANXIETY: Primary | ICD-10-CM

## 2023-03-01 DIAGNOSIS — M54.16 RADICULOPATHY, LUMBAR REGION: ICD-10-CM

## 2023-03-01 DIAGNOSIS — Z51.81 ENCOUNTER FOR MEDICATION MONITORING: ICD-10-CM

## 2023-03-01 PROCEDURE — 3074F SYST BP LT 130 MM HG: CPT | Performed by: FAMILY MEDICINE

## 2023-03-01 PROCEDURE — 99214 OFFICE O/P EST MOD 30 MIN: CPT | Performed by: FAMILY MEDICINE

## 2023-03-01 PROCEDURE — 1123F ACP DISCUSS/DSCN MKR DOCD: CPT | Performed by: FAMILY MEDICINE

## 2023-03-01 PROCEDURE — 3078F DIAST BP <80 MM HG: CPT | Performed by: FAMILY MEDICINE

## 2023-03-01 RX ORDER — MIRTAZAPINE 15 MG/1
15 TABLET, FILM COATED ORAL NIGHTLY
COMMUNITY
Start: 2023-01-31 | End: 2023-03-01

## 2023-03-01 RX ORDER — MIRTAZAPINE 7.5 MG/1
7.5 TABLET, FILM COATED ORAL NIGHTLY
Qty: 90 TABLET | Refills: 4 | Status: SHIPPED | OUTPATIENT
Start: 2023-03-01

## 2023-03-01 RX ORDER — BUSPIRONE HYDROCHLORIDE 5 MG/1
5 TABLET ORAL 3 TIMES DAILY
Qty: 90 TABLET | Refills: 0 | Status: SHIPPED | OUTPATIENT
Start: 2023-03-01 | End: 2023-03-31

## 2023-03-01 RX ORDER — GABAPENTIN 100 MG/1
100 CAPSULE ORAL 3 TIMES DAILY
Qty: 180 CAPSULE | Refills: 1 | Status: SHIPPED | OUTPATIENT
Start: 2023-03-01 | End: 2023-08-28

## 2023-03-01 SDOH — ECONOMIC STABILITY: INCOME INSECURITY: HOW HARD IS IT FOR YOU TO PAY FOR THE VERY BASICS LIKE FOOD, HOUSING, MEDICAL CARE, AND HEATING?: NOT HARD AT ALL

## 2023-03-01 SDOH — ECONOMIC STABILITY: FOOD INSECURITY: WITHIN THE PAST 12 MONTHS, YOU WORRIED THAT YOUR FOOD WOULD RUN OUT BEFORE YOU GOT MONEY TO BUY MORE.: NEVER TRUE

## 2023-03-01 SDOH — ECONOMIC STABILITY: FOOD INSECURITY: WITHIN THE PAST 12 MONTHS, THE FOOD YOU BOUGHT JUST DIDN'T LAST AND YOU DIDN'T HAVE MONEY TO GET MORE.: NEVER TRUE

## 2023-03-01 ASSESSMENT — PATIENT HEALTH QUESTIONNAIRE - PHQ9
5. POOR APPETITE OR OVEREATING: 1
6. FEELING BAD ABOUT YOURSELF - OR THAT YOU ARE A FAILURE OR HAVE LET YOURSELF OR YOUR FAMILY DOWN: 0
SUM OF ALL RESPONSES TO PHQ QUESTIONS 1-9: 8
1. LITTLE INTEREST OR PLEASURE IN DOING THINGS: 3
4. FEELING TIRED OR HAVING LITTLE ENERGY: 1
SUM OF ALL RESPONSES TO PHQ QUESTIONS 1-9: 8
SUM OF ALL RESPONSES TO PHQ QUESTIONS 1-9: 8
7. TROUBLE CONCENTRATING ON THINGS, SUCH AS READING THE NEWSPAPER OR WATCHING TELEVISION: 1
8. MOVING OR SPEAKING SO SLOWLY THAT OTHER PEOPLE COULD HAVE NOTICED. OR THE OPPOSITE, BEING SO FIGETY OR RESTLESS THAT YOU HAVE BEEN MOVING AROUND A LOT MORE THAN USUAL: 0
9. THOUGHTS THAT YOU WOULD BE BETTER OFF DEAD, OR OF HURTING YOURSELF: 0
2. FEELING DOWN, DEPRESSED OR HOPELESS: 1
SUM OF ALL RESPONSES TO PHQ QUESTIONS 1-9: 8
3. TROUBLE FALLING OR STAYING ASLEEP: 1
10. IF YOU CHECKED OFF ANY PROBLEMS, HOW DIFFICULT HAVE THESE PROBLEMS MADE IT FOR YOU TO DO YOUR WORK, TAKE CARE OF THINGS AT HOME, OR GET ALONG WITH OTHER PEOPLE: 0
SUM OF ALL RESPONSES TO PHQ9 QUESTIONS 1 & 2: 4

## 2023-03-01 NOTE — LETTER
March 1, 2023       Meg Hernandez  [address]  [address] [facility] [address]      .

## 2023-03-01 NOTE — PROGRESS NOTES
Chief Complaint: Chronic Pain (F/U; reports doing well with current regimen of oxyCODONE-acetaminophen (PERCOCET) 5-325 MG per tablet; reports it is the only thing that helps subside pain), Chest Pain (intermittent chest pains/palpitations follow-up; reports change in frequency, as the pains occur more often and duration of time has increased to lasting over one hour now, for the past few months ), and Medication Check (oxyCODONE-acetaminophen (PERCOCET) 5-325 MG per tablet)       HPI:  Galileo Adams is a 81 y.o. male here with extensive cardiac history. He has history of atrial fibrillation, coronary artery disease, aortic valve insufficiency s/p porcine valve and not on coumadin due to risk of bleeding and had oral squamous cell carcinoma currently in remission. He had mitral valve repair recently 1/2023  And he has been following up with Dr. Rocky Rivera and Dr Shamika Alcantara. He had recent replacement of Loop recorder implanted. However he has been having contractions and palpitations. The cardiologist does not think it is associated with his heart. So they wanted to follow-up with the PCP. He has history of anxiety and depression. He has currently taking Remeron 7.5 mg daily all the EKGs have been normal  He feels the contractions or discomfort in his chest or like shooting type to dull achy pain. He has hypothyroidism  Currently his symptoms are stable     Also has chronic back pain due to lumbar disc degeneration at multiple levels. 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 Ramakrishna Blake. ROS:  Constitutional: Negative   Respiratory: Negative for cough, chest tightness, shortness of breath and wheezing. Cardiovascular: As mentioned above  Gastrointestinal: Negative for abdominal pain, blood in stool, constipation, diarrhea, nausea and vomiting.     Genitourinary: Negative for difficulty urinating, flank pain, frequency, hematuria and urgency. Musculoskeletal: Chronic lower back pain. Skin: Negative for color change, pallor, rash and wound. Neurological: Negative for dizziness, tremors, seizures, syncope, facial asymmetry, speech difficulty, weakness, light-headedness, numbness and headaches. Psychiatric/Behavioral: As mentioned above    Patient's problem list, medications, allergies, past medical, surgical, social and family histories were reviewed and updated as appropriate. Current Outpatient Medications   Medication Sig Dispense Refill    diphenhydrAMINE (SOMINEX) 25 MG tablet Take 1 tablet by mouth daily      gabapentin (NEURONTIN) 100 MG capsule Take 1 capsule by mouth 3 times daily for 180 days. Intended supply: 90 days 180 capsule 1    busPIRone (BUSPAR) 5 MG tablet Take 1 tablet by mouth 3 times daily 90 tablet 0    mirtazapine (REMERON) 7.5 MG tablet Take 1 tablet by mouth nightly 90 tablet 4    oxyCODONE-acetaminophen (PERCOCET) 5-325 MG per tablet Take 1 tablet by mouth 2 times daily as needed for Pain for up to 30 days. Intended supply: 5 days.  Take lowest dose possible to manage pain 60 tablet 0    rosuvastatin (CRESTOR) 40 MG tablet Take 1 tablet by mouth once daily 90 tablet 3    metoprolol tartrate (LOPRESSOR) 50 MG tablet Take 0.5 tablets by mouth 2 times daily 180 tablet 2    clopidogrel (PLAVIX) 75 MG tablet Take 1 tablet by mouth daily 90 tablet 3    tiotropium (SPIRIVA RESPIMAT) 2.5 MCG/ACT AERS inhaler Inhale 2 puffs into the lungs daily 1 each 3    vitamin B-12 (CYANOCOBALAMIN) 100 MCG tablet TAKE 1 TABLET BY MOUTH IN THE MORNING 30 tablet 11    aspirin 81 MG EC tablet Take 81 mg by mouth daily      levothyroxine (SYNTHROID) 125 MCG tablet Take 1 tablet by mouth once daily 30 tablet 5    digoxin (LANOXIN) 125 MCG tablet Take 1 tablet by mouth once daily 30 tablet 5    nitroGLYCERIN (NITROSTAT) 0.4 MG SL tablet Place 1 tablet under the tongue every 5 minutes as needed for Chest pain 25 tablet 3    pantoprazole (PROTONIX) 40 MG tablet TAKE 1 TABLET BY MOUTH TWICE A DAY BEFORE MEALS 180 tablet 3    Cholecalciferol 50 MCG (2000 UT) TABS Take 2,000 Units by mouth daily       No current facility-administered medications for this visit. Social History     Tobacco Use    Smoking status: Former     Packs/day: 1.00     Years: 40.00     Pack years: 40.00     Types: Cigarettes     Start date: 1975     Quit date: 2015     Years since quittin.1    Smokeless tobacco: Never    Tobacco comments:     Maintain cessation   Substance Use Topics    Alcohol use: No     Alcohol/week: 0.0 standard drinks     Comment: KATELYN reyes        Objective:     Vitals:    23 0856   BP: 101/69   Pulse: 67   Temp: 97.5 °F (36.4 °C)   TempSrc: Temporal   SpO2: 95%   Weight: 166 lb 6.4 oz (75.5 kg)   Height: 5' 10\" (1.778 m)     Body mass index is 23.88 kg/m². Wt Readings from Last 3 Encounters:   23 166 lb 6.4 oz (75.5 kg)   23 152 lb (68.9 kg)   23 150 lb (68 kg)     BP Readings from Last 3 Encounters:   23 101/69   23 (!) 144/92   23 104/81       Physical exam:  Constitutional: he is oriented to person, place, and time. he appears well-developed and well-nourished. No distress. .   Neck: Normal range of motion. No JVD present. No tracheal deviation present. No thyromegaly present. Cardiovascular: Normal rate, regular rhythm, normal heart sounds and intact distal pulses. No murmur heard. Pulmonary/Chest: Effort normal and breath sounds normal. No stridor. No respiratory distress. he has no wheezes. he has no rales. heexhibits no tenderness. Abdominal: Soft. Bowel sounds are normal. he exhibits no distension and no mass. There is no tenderness. There is no rebound and no guarding. Musculoskeletal: Normal range of motion. he exhibits no edema, tenderness or deformity. Neurological:he is alert and oriented to person, place, and time. he has gross neurological exam normal with normal strength and normal gait  Skin: Skin is warm and dry. No rash noted. he is not diaphoretic. No erythema. No pallor. Psychiatric: he has a normal mood and affect. his   behavior is normal.      Assessment/Plan:   1. Encounter for medication monitoring  - Drug Panel-PM-HI Res-UR Interp-A    2. Anxiety  Start him on- busPIRone (BUSPAR) 5 MG tablet; Take 1 tablet by mouth 3 times daily  Dispense: 90 tablet; Refill: 0  - mirtazapine (REMERON) 7.5 MG tablet; Take 1 tablet by mouth nightly  Dispense: 90 tablet; Refill: 4    3. Neuropathy  His pain in his chest wall could be due to neuropathy and we will give a trial  - gabapentin (NEURONTIN) 100 MG capsule; Take 1 capsule by mouth 3 times daily for 180 days. Intended supply: 90 days  Dispense: 180 capsule; Refill: 1    4.  Radiculopathy, lumbar region  Continue the Norco 5/325 mg 1 tablet once to twice a day    5 Hypothyroidism  Stable continue the same    Follow-up in 1 month  Nas Cotter MD  3/1/2023 4:41 PM

## 2023-03-01 NOTE — LETTER
CONTROLLED SUBSTANCE MEDICATION AGREEMENT     Patient Name: Konrad Rico  Patient YOB: 1950   I understand, that controlled substance medications may be used to help better manage my symptoms and to improve my ability to function at home, work and in social settings. However, I also understand that these medications do have risks, which have been discussed with me, including possible development of physical or psychological dependence. I understand that successful treatment requires mutual trust and honesty between me and my provider. I understand and agree that following this Medication Agreement is necessary in continuing my provider-patient relationship and the success of my treatment plan. Explanation from my Provider: Benefits and Goals of Controlled Substance Medications: There are two potential goals for your treatment: (1) decreased pain and suffering (2) improved daily life functions. There are many possible treatments for your chronic condition(s). Alternatives such as physical therapy, yoga, massage, home daily exercise, meditation, relaxation techniques, injections, chiropractic manipulations, surgery, cognitive therapy, hypnosis and many medications that are not habit-forming may be used. Use of controlled substance medications may be helpful, but they are unlikely to resolve all symptoms or restore all function. Explanation from my Provider: Risks of Controlled Substance Medications:  Opioid pain medications: These medications can lead to problems such as addiction/dependence, sedation, lightheadedness/dizziness, memory issues, falls, constipation, nausea, or vomiting. They may also impair the ability to drive or operate machinery. Additionally, these medications may lower testosterone levels, leading to loss of bone strength, stamina and sex drive.   They may cause problems with breathing, sleep apnea and reduced coughing, which is especially dangerous for patients with lung disease.  Overdose or dangerous interactions with alcohol and other medications may occur, leading to death.  Hyperalgesia may develop, which means patients receiving opioids for the treatment of pain may become more sensitive to certain painful stimuli, and in some cases, experience pain from ordinarily non-painful stimuli.  Women between the ages of 14-55 who could become pregnant should carefully weigh the risks and benefits of opioids with their physicians, as these medications increase the risk of pregnancy complications, including miscarriage,  delivery and stillbirth.  It is also possible for babies to be born addicted to opioids.  Opioid dependence withdrawal symptoms may include; feelings of uneasiness, increased pain, irritability, belly pain, diarrhea, sweats and goose-flesh.     Benzodiazepines and non-benzodiazepine sleep medications:  These medications can lead to problems such as addiction/dependence, sedation, fatigue, lightheadedness, dizziness, incoordination, falls, depression, hallucinations, and impaired judgment, memory and concentration.  The ability to drive and operate machinery may also be affected.  Abnormal sleep-related behaviors have been reported, including sleepwalking, driving, making telephone calls, eating, or having sex while not fully awake.  These medications can suppress breathing and worsen sleep apnea, particularly when combined with alcohol or other sedating medications, potentially leading to death. Dependence withdrawal symptoms may include tremors, anxiety, hallucinations and seizures.  Stimulants:  Common adverse effects include addiction/dependence, increased blood  pressure and heart rate, decreased appetite, nausea, involuntary weight loss, insomnia,                                                                                                                     Initials:_______   irritability, and headaches.  These risks may increase when these  medications are combined with other stimulants, such as caffeine pills or energy drinks, certain weight loss supplements and oral decongestants. Dependence withdrawal symptoms may include depressed mood, loss of interest, suicidal thoughts, anxiety, fatigue, appetite changes and agitation. Testosterone replacement therapy:  Potential side effects include increased risk of stroke and heart attack, blood clots, increased blood pressure, increased cholesterol, enlarged prostate, sleep apnea, irritability/aggression and other mood disorders, and decreased fertility. I agree and understand that I and my prescriber have the following rights and responsibilities regarding my treatment plan:     1. MY RIGHTS:  To be informed of my treatment and medication plan. To be an active participant in my health and wellbeing. 2. MY RESPONSIBILITY AND UNDERSTANDING FOR USE OF MEDICATIONS   I will take medications at the dose and frequency as directed. For my safety, I will not increase or change how I take my medications without the recommendation of my healthcare provider.  I will actively participate in any program recommended by my provider which may improve function, including social, physical, psychological programs.  I will not take my medications with alcohol or other drugs not prescribed to me. I understand that drinking alcohol with my medications increases the chances of side effects, including reduced breathing rate and could lead to personal injury when operating machinery.  I understand that if I have a history of substance use disorders, including alcohol or other illicit drugs, that I may be at increased risk of addiction to my medications.  I agree to notify my provider immediately if I should become pregnant so that my treatment plan can be adjusted.    I agree and understand that I shall only receive controlled substance medications from the prescriber that signed this agreement unless there is written agreement among other prescribers of controlled substances outlining the responsibility of the medications being prescribed.  I understand that the if the controlled medication is not helping to achieve goals, the dosage may be tapered and no longer prescribed. 3. MY RESPONSIBILITY FOR COMMUNICATION / PRESCRIPTION RENEWALS   I agree that all controlled substance medications that I take will be prescribed only by my provider. If another healthcare provider prescribes me medication in an emergency, I will notify my provider within seventy-two (72) hours.  I will arrange for refills at the prescribed interval ONLY during regular office hours. I will not ask for refills earlier than agreed, after-hours, on holidays or weekends. Refills may take up to 72 hours for processing and prescriptions to reach the pharmacy.  I will inform my other health care providers that I am taking these medications and of the existence of this [id number]. In the event of an emergency, I will provide the same information to the emergency department prescribers.  I will keep my provider updated on the pharmacy I am using for controlled medication prescription filling. Initials:_______  4. MY RESPONSIBILITY FOR PROTECTING MEDICATIONS   I will protect my prescriptions and medications. I understand that lost or misplaced prescriptions will not be replaced.  I will keep medications only for my own use and will not share them with others. I will keep all medications away from children.  I agree that if my medications are adjusted or discontinued, I will properly dispose of any remaining medications. I understand that I will be required to dispose of any remaining controlled medications as, directed by my prescriber, prior to being provided with any prescriptions for other controlled medications.   Medication drop box locations can be found at: [employer]    5. MY RESPONSIBILITY WITH ILLEGAL DRUGS    I will not use illegal or street drugs or another person's prescription medications not prescribed to me.  If there are identified addiction type symptoms, then referral to a program may be provided by my provider and I agree to follow through with this recommendation. 6. MY RESPONSIBILITY FOR COOPERATION WITH INVESTIGATIONS   I understand that my provider will comply with any applicable law and may discuss my use and/or possible misuse/abuse of controlled substances and alcohol, as appropriate, with any health care provider involved in my care, pharmacist, or legal authority.  I authorize my provider and pharmacy to cooperate fully with law enforcement agencies (as permitted by law) in the investigation of any possible misuse, sale, or other diversion of my controlled substances.  I agree to waive any applicable privilege or right of privacy or confidentiality with respect to these authorizations. 7. PROVIDERS RIGHT TO MONITOR FOR SAFETY: PRESCRIPTION MONITORING / DRUG TESTING   I consent to drug/toxicology screening and will submit to a drug screen upon my providers request to assure I am only taking the prescribed drugs for my safety monitoring. I understand that a drug screen is a laboratory test in which a sample of my urine, blood or saliva is checked to see what drugs I have been taking. This may entail an observed urine specimen, which means that a nurse or other health care provider may watch me provide urine, and I will cooperate if I am asked to provide an observed specimen.  I understand that my provider will check a copy of my State Prescription Monitoring Program () Report in order to safely prescribe medications.  Pill Counts: I consent to pill counts when requested.   I may be asked to bring all my prescribed controlled substance medications, in their original bottles, to all of my scheduled appointments. In addition, my provider may ask me to come to the practice at any time for a random pill count. 8. TERMINATION OF THIS AGREEMENT  For my safety, my prescriber has the right to stop prescribing controlled substance medications and may end this agreement. Initials:_______   Conditions that may result in termination of this agreement:  a. I do not show any improvement in pain, or my activity has not improved. b. I develop rapid tolerance or loss of improvement, as described in my treatment plan.  c. I develop significant side effects from the medication. d. My behavior is not consistent with the responsibilities outlined above, thereby causing safety concerns to continue prescribing controlled substance medications. e. I fail to follow the terms of this agreement. f. Other:____________________________       UNDERSTANDING THIS MEDICATION AGREEMENT:    I have read the above and have had all my questions answered. For chronic disease management, I know that my symptoms can be managed with many types of treatments. A chronic medication trial may be part of my treatment, but I must be an active participant in my care. Medication therapy is only one part of my symptom management plan. In some cases, there may be limited scientific evidence to support the chronic use of certain medications to improve symptoms and daily function. Furthermore, in certain circumstances, there may be scientific information that suggests that the use of chronic controlled substances may worsen my symptoms and increase my risk of unintentional death directly related to this medication therapy. I know that if my provider feels my risk from controlled medications is greater than my benefit, I will have my controlled substance medication(s) compassionately lowered or removed altogether.      I further agree to allow this office to contact my HIPAA contact if there are concerns about my safety and use of the controlled medications. I have agreed to use the prescribed controlled substance medications to me as instructed by my provider and as stated in this Medication Agreement. My initial on each page and my signature below shows that I have read each page and I have had the opportunity to ask questions with answers provided by my provider. Medication  oxyCODONE-acetaminophen (PERCOCET) 5-325 MG per tablet [5940]  oxyCODONE-acetaminophen (PERCOCET) 5-325 MG per tablet [[id number]]       Dose: 1 tablet Route: Oral Frequency: 2 TIMES DAILY PRN for Pain   Dispense Quantity: 60 tablet Refills: 0    Note to Pharmacy: Reduce doses taken as pain becomes manageable         Sig: Take 1 tablet by mouth 2 times daily as needed for Pain for up to 30 days. Intended supply: 5 days.  Take lowest dose possible to manage pain                          Diagnosis Association: Chronic pain syndrome (G89.4)   Original Order:  oxyCODONE-acetaminophen (PERCOCET) 5-325 MG per tablet [[id number]]   Providers    Authorizing Provider: Ratna Haro MD NPI: [id number]   Ordering User:  Ratna Haro [place] [id number] [phone]   [address] [place] [address],[address] [id number]   Phone:  [phone]  Fax:  [phone]         Patient Name (Printed):                          Stephen Tosha                                 Patient Signature:  ______________________   Date:     03/01/2023        Prescriber Name (Printed):      Gerogette Elizalde MD                   Prescriber Signature: _____________________  Date:     03/01/2023

## 2023-03-02 ENCOUNTER — HOSPITAL ENCOUNTER (OUTPATIENT)
Dept: CARDIAC REHAB | Age: 73
Discharge: HOME | End: 2023-03-02

## 2023-03-02 PROCEDURE — 9900000065 HC CARDIAC REHAB PHASE 3 - 1 VISIT

## 2023-03-03 ENCOUNTER — TELEPHONE (OUTPATIENT)
Dept: FAMILY MEDICINE CLINIC | Age: 73
End: 2023-03-03

## 2023-03-03 NOTE — TELEPHONE ENCOUNTER
I am not sure who told him as its not documented please get more details of whom he spoke.   I do not see any contraindications

## 2023-03-03 NOTE — TELEPHONE ENCOUNTER
Patient called with concerns regarding medication. ..    busPIRone (BUSPAR) 5 MG tablet 90 tablet 0 3/1/2023 3/31/2023    Sig - Route: Take 1 tablet by mouth 3 times daily - Oral      He discussed this medication with his cardiology office and the nurse practitioner told him that if he took this medication as instructed (three times daily), it could kill him. Patient is worried and would like this situation reviewed by his pcp.

## 2023-03-03 NOTE — TELEPHONE ENCOUNTER
Patient states this was told to him by his assigned nurse practitioner from [place], and is very concerned. I informed patient to check with the pharmacist to check for any contraindications, and to call the on call doctor if he had any further emergent concerns, so they could be addressed over the weekend.

## 2023-03-05 LAB
6-ACETYLMORPHINE: NOT DETECTED
7-AMINOCLONAZEPAM: NOT DETECTED
ALPHA-OH-ALPRAZOLAM: NOT DETECTED
ALPHA-OH-MIDAZOLAM, URINE: NOT DETECTED
ALPRAZOLAM: NOT DETECTED
AMPHETAMINE: NOT DETECTED
BARBITURATES: NOT DETECTED
BENZOYLECGONINE: NOT DETECTED
BUPRENORPHINE: NOT DETECTED
CARISOPRODOL: NOT DETECTED
CLONAZEPAM: NOT DETECTED
CODEINE: NOT DETECTED
CREATININE URINE: 26.8 MG/DL (ref 20–400)
DIAZEPAM: NOT DETECTED
DRUGS EXPECTED: NORMAL
EER PAIN MGT DRUG PANEL, HIGH RES/EMIT U: NORMAL
ETHYL GLUCURONIDE: NOT DETECTED
FENTANYL: NOT DETECTED
GABAPENTIN: NOT DETECTED
HYDROCODONE: NOT DETECTED
HYDROMORPHONE: NOT DETECTED
LORAZEPAM: NOT DETECTED
MARIJUANA METABOLITE: NOT DETECTED
MDA: NOT DETECTED
MDEA: NOT DETECTED
MDMA URINE: NOT DETECTED
MEPERIDINE: NOT DETECTED
METHADONE: NOT DETECTED
METHAMPHETAMINE: NOT DETECTED
METHYLPHENIDATE: NOT DETECTED
MIDAZOLAM: NOT DETECTED
MORPHINE: NOT DETECTED
NALOXONE: NOT DETECTED
NORBUPRENORPHINE, FREE: NOT DETECTED
NORDIAZEPAM: NOT DETECTED
NORFENTANYL: NOT DETECTED
NORHYDROCODONE, URINE: NOT DETECTED
NOROXYCODONE: PRESENT
NOROXYMORPHONE, URINE: NOT DETECTED
OXAZEPAM: NOT DETECTED
OXYCODONE: NOT DETECTED
OXYMORPHONE: PRESENT
PAIN MANAGEMENT DRUG PANEL: NORMAL
PAIN MANAGEMENT DRUG PANEL: NORMAL
PCP: NOT DETECTED
PHENTERMINE: NOT DETECTED
PREGABALIN: NOT DETECTED
TAPENTADOL, URINE: NOT DETECTED
TAPENTADOL-O-SULFATE, URINE: NOT DETECTED
TEMAZEPAM: NOT DETECTED
TRAMADOL: NOT DETECTED
ZOLPIDEM: NOT DETECTED

## 2023-03-07 ENCOUNTER — HOSPITAL ENCOUNTER (OUTPATIENT)
Dept: CARDIAC REHAB | Age: 73
Discharge: HOME | End: 2023-03-07

## 2023-03-07 PROCEDURE — 9900000065 HC CARDIAC REHAB PHASE 3 - 1 VISIT

## 2023-03-09 ENCOUNTER — HOSPITAL ENCOUNTER (OUTPATIENT)
Dept: CARDIAC REHAB | Age: 73
Discharge: HOME | End: 2023-03-09

## 2023-03-09 PROCEDURE — 9900000065 HC CARDIAC REHAB PHASE 3 - 1 VISIT

## 2023-03-13 ENCOUNTER — NURSE ONLY (OUTPATIENT)
Dept: CARDIOLOGY CLINIC | Age: 73
End: 2023-03-13

## 2023-03-13 DIAGNOSIS — I48.0 PAF (PAROXYSMAL ATRIAL FIBRILLATION) (HCC): Primary | Chronic | ICD-10-CM

## 2023-03-13 DIAGNOSIS — Z45.09 ENCOUNTER FOR ELECTRONIC ANALYSIS OF REVEAL EVENT RECORDER: ICD-10-CM

## 2023-03-13 NOTE — PROGRESS NOTES
We received a remote transmission from patient's monitor at home. Remote Linq report shows no arrhythmias. EP physician to review. We will continue to monitor remotely. Implanted for palpitations. End of 31-day monitoring period 3-13-23.

## 2023-03-14 ENCOUNTER — HOSPITAL ENCOUNTER (OUTPATIENT)
Dept: CARDIAC REHAB | Age: 73
Discharge: HOME | End: 2023-03-14

## 2023-03-14 PROCEDURE — 9900000065 HC CARDIAC REHAB PHASE 3 - 1 VISIT

## 2023-03-16 ENCOUNTER — HOSPITAL ENCOUNTER (OUTPATIENT)
Dept: CARDIAC REHAB | Age: 73
Discharge: HOME | End: 2023-03-16

## 2023-03-16 PROCEDURE — 9900000065 HC CARDIAC REHAB PHASE 3 - 1 VISIT

## 2023-03-20 RX ORDER — PANTOPRAZOLE SODIUM 40 MG/1
TABLET, DELAYED RELEASE ORAL
Qty: 180 TABLET | Refills: 3 | Status: SHIPPED | OUTPATIENT
Start: 2023-03-20

## 2023-03-20 NOTE — TELEPHONE ENCOUNTER
Medication:   Requested Prescriptions     Pending Prescriptions Disp Refills    pantoprazole (PROTONIX) 40 MG tablet [Pharmacy Med Name: Pantoprazole Sodium 40 MG Oral Tablet Delayed Release] 60 tablet 0     Sig: TAKE 1 TABLET BY MOUTH TWICE DAILY BEFORE MEAL(S)        Last Filled:  02/28/2022 #180 [id number]    Patient Phone Number: [phone] (home)     Last appt: 3/1/2023   Next appt: Visit date not found    Last OARRS:   RX Monitoring 12/30/2019   Periodic Controlled Substance Monitoring No signs of potential drug abuse or diversion identified.

## 2023-03-21 ENCOUNTER — HOSPITAL ENCOUNTER (OUTPATIENT)
Dept: CARDIAC REHAB | Age: 73
Discharge: HOME | End: 2023-03-21

## 2023-03-21 PROCEDURE — 9900000065 HC CARDIAC REHAB PHASE 3 - 1 VISIT

## 2023-03-23 ENCOUNTER — HOSPITAL ENCOUNTER (OUTPATIENT)
Dept: CARDIAC REHAB | Age: 73
Discharge: HOME | End: 2023-03-23

## 2023-03-23 PROCEDURE — 9900000065 HC CARDIAC REHAB PHASE 3 - 1 VISIT

## 2023-03-28 ENCOUNTER — HOSPITAL ENCOUNTER (OUTPATIENT)
Dept: CARDIAC REHAB | Age: 73
Discharge: HOME | End: 2023-03-28

## 2023-03-28 PROCEDURE — 9900000065 HC CARDIAC REHAB PHASE 3 - 1 VISIT

## 2023-03-30 ENCOUNTER — HOSPITAL ENCOUNTER (OUTPATIENT)
Dept: CARDIAC REHAB | Age: 73
Discharge: HOME | End: 2023-03-30

## 2023-03-30 PROCEDURE — 9900000065 HC CARDIAC REHAB PHASE 3 - 1 VISIT

## 2023-04-04 ENCOUNTER — HOSPITAL ENCOUNTER (OUTPATIENT)
Dept: CARDIAC REHAB | Age: 73
Discharge: HOME | End: 2023-04-04

## 2023-04-04 PROCEDURE — 9900000065 HC CARDIAC REHAB PHASE 3 - 1 VISIT

## 2023-04-06 ENCOUNTER — HOSPITAL ENCOUNTER (OUTPATIENT)
Dept: CARDIAC REHAB | Age: 73
Discharge: HOME | End: 2023-04-06

## 2023-04-06 PROCEDURE — 9900000065 HC CARDIAC REHAB PHASE 3 - 1 VISIT

## 2023-04-13 PROBLEM — M25.522 LEFT ELBOW PAIN: Status: ACTIVE | Noted: 2023-04-13

## 2023-04-17 ENCOUNTER — NURSE ONLY (OUTPATIENT)
Dept: CARDIOLOGY CLINIC | Age: 73
End: 2023-04-17

## 2023-04-17 DIAGNOSIS — I48.0 PAF (PAROXYSMAL ATRIAL FIBRILLATION) (HCC): Primary | Chronic | ICD-10-CM

## 2023-04-17 DIAGNOSIS — Z45.09 ENCOUNTER FOR ELECTRONIC ANALYSIS OF REVEAL EVENT RECORDER: ICD-10-CM

## 2023-04-17 NOTE — PROGRESS NOTES
Patient called stating that he spoke with you after his 1st sleep study and you told him there was no need to have a 2nd one done but he got a phone call to set up the 2nd night and he is wondering if he should have it done or not. We received a remote transmission from patient's monitor at home. Remote Linq report shows no arrhythmias. EP physician to review. We will continue to monitor remotely. Implanted for palpitations. End of 31-day monitoring period 4-17-23.

## 2023-04-18 ENCOUNTER — HOSPITAL ENCOUNTER (OUTPATIENT)
Dept: CARDIAC REHAB | Age: 73
Discharge: HOME | End: 2023-04-18

## 2023-04-18 PROCEDURE — 9900000065 HC CARDIAC REHAB PHASE 3 - 1 VISIT

## 2023-04-20 ENCOUNTER — HOSPITAL ENCOUNTER (OUTPATIENT)
Dept: CARDIAC REHAB | Age: 73
Discharge: HOME | End: 2023-04-20

## 2023-04-20 PROCEDURE — 9900000065 HC CARDIAC REHAB PHASE 3 - 1 VISIT

## 2023-04-22 DIAGNOSIS — Z51.81 ENCOUNTER FOR MEDICATION MONITORING: ICD-10-CM

## 2023-04-24 RX ORDER — BUSPIRONE HYDROCHLORIDE 5 MG/1
TABLET ORAL
Qty: 90 TABLET | Refills: 0 | Status: SHIPPED | OUTPATIENT
Start: 2023-04-24

## 2023-04-24 NOTE — TELEPHONE ENCOUNTER
Medication:   Requested Prescriptions     Pending Prescriptions Disp Refills    busPIRone (BUSPAR) 5 MG tablet [Pharmacy Med Name: busPIRone HCl 5 MG Oral Tablet] 90 tablet 0     Sig: TAKE 1 TABLET BY MOUTH THREE TIMES DAILY        Last Filled:  03/01/2023 #90 0rf    Patient Phone Number: [phone] (home)     Last appt: 3/1/2023   Next appt: Visit date not found    Last OARRS:   RX Monitoring 12/30/2019   Periodic Controlled Substance Monitoring No signs of potential drug abuse or diversion identified.

## 2023-04-25 ENCOUNTER — HOSPITAL ENCOUNTER (OUTPATIENT)
Dept: CARDIAC REHAB | Age: 73
Discharge: HOME | End: 2023-04-25

## 2023-04-25 PROCEDURE — 9900000065 HC CARDIAC REHAB PHASE 3 - 1 VISIT

## 2023-04-27 ENCOUNTER — HOSPITAL ENCOUNTER (OUTPATIENT)
Dept: CARDIAC REHAB | Age: 73
Discharge: HOME | End: 2023-04-27

## 2023-04-27 PROCEDURE — 9900000065 HC CARDIAC REHAB PHASE 3 - 1 VISIT

## 2023-04-28 ENCOUNTER — OFFICE VISIT (OUTPATIENT)
Dept: PULMONOLOGY | Age: 73
End: 2023-04-28
Payer: MEDICARE

## 2023-04-28 VITALS
SYSTOLIC BLOOD PRESSURE: 104 MMHG | BODY MASS INDEX: 24.05 KG/M2 | DIASTOLIC BLOOD PRESSURE: 60 MMHG | HEART RATE: 71 BPM | OXYGEN SATURATION: 98 % | HEIGHT: 70 IN | WEIGHT: 168 LBS

## 2023-04-28 DIAGNOSIS — R06.09 DOE (DYSPNEA ON EXERTION): Primary | ICD-10-CM

## 2023-04-28 PROCEDURE — 99213 OFFICE O/P EST LOW 20 MIN: CPT | Performed by: INTERNAL MEDICINE

## 2023-04-28 PROCEDURE — 3074F SYST BP LT 130 MM HG: CPT | Performed by: INTERNAL MEDICINE

## 2023-04-28 PROCEDURE — 1123F ACP DISCUSS/DSCN MKR DOCD: CPT | Performed by: INTERNAL MEDICINE

## 2023-04-28 PROCEDURE — 3078F DIAST BP <80 MM HG: CPT | Performed by: INTERNAL MEDICINE

## 2023-04-28 RX ORDER — SPIRONOLACTONE 25 MG/1
12.5 TABLET ORAL DAILY
COMMUNITY
Start: 2023-03-31

## 2023-04-28 ASSESSMENT — ENCOUNTER SYMPTOMS
APNEA: 0
ABDOMINAL PAIN: 0
DIARRHEA: 0
BACK PAIN: 0
SHORTNESS OF BREATH: 1
CHEST TIGHTNESS: 0
CHOKING: 0
STRIDOR: 0
WHEEZING: 0
ANAL BLEEDING: 0
VOICE CHANGE: 0
SORE THROAT: 0
ABDOMINAL DISTENTION: 0
RHINORRHEA: 0
BLOOD IN STOOL: 0
COUGH: 0
CONSTIPATION: 0
SINUS PRESSURE: 0

## 2023-04-28 NOTE — PROGRESS NOTES
Charles Martin    YOB: 1950     Date of Service:  4/28/2023     Chief Complaint   Patient presents with    Shortness of Breath         HPI this post mitral valve clip at [facility] on 1/6. Patient states that he has noticed some improvement in his dyspnea following the procedure, has not been using inhalers since then. States that he does not need to use the inhalers. Allergies   Allergen Reactions    Naproxen Swelling     Lips  Pt does not recognize this being an intolerance    Hydrocodone Itching     Outpatient Medications Marked as Taking for the 4/28/23 encounter (Office Visit) with Adelita Carter MD   Medication Sig Dispense Refill    spironolactone (ALDACTONE) 25 MG tablet Take 0.5 tablets by mouth daily      busPIRone (BUSPAR) 5 MG tablet TAKE 1 TABLET BY MOUTH THREE TIMES DAILY 90 tablet 0    pantoprazole (PROTONIX) 40 MG tablet TAKE 1 TABLET BY MOUTH TWICE DAILY BEFORE MEAL(S) 180 tablet 3    gabapentin (NEURONTIN) 100 MG capsule Take 1 capsule by mouth 3 times daily for 180 days.  Intended supply: 90 days 180 capsule 1    mirtazapine (REMERON) 7.5 MG tablet Take 1 tablet by mouth nightly 90 tablet 4    rosuvastatin (CRESTOR) 40 MG tablet Take 1 tablet by mouth once daily 90 tablet 3    metoprolol tartrate (LOPRESSOR) 50 MG tablet Take 0.5 tablets by mouth 2 times daily 180 tablet 2    clopidogrel (PLAVIX) 75 MG tablet Take 1 tablet by mouth daily 90 tablet 3    tiotropium (SPIRIVA RESPIMAT) 2.5 MCG/ACT AERS inhaler Inhale 2 puffs into the lungs daily 1 each 3    aspirin 81 MG EC tablet Take 1 tablet by mouth daily      levothyroxine (SYNTHROID) 125 MCG tablet Take 1 tablet by mouth once daily 30 tablet 5    nitroGLYCERIN (NITROSTAT) 0.4 MG SL tablet Place 1 tablet under the tongue every 5 minutes as needed for Chest pain 25 tablet 3    Cholecalciferol 50 MCG (2000 UT) TABS Take 1 tablet by mouth daily         Immunization History   Administered Date(s) Administered

## 2023-05-02 ENCOUNTER — HOSPITAL ENCOUNTER (OUTPATIENT)
Dept: CARDIAC REHAB | Age: 73
Discharge: HOME | End: 2023-05-02

## 2023-05-02 ENCOUNTER — OFFICE VISIT (OUTPATIENT)
Dept: ORTHOPEDIC SURGERY | Age: 73
End: 2023-05-02
Payer: MEDICARE

## 2023-05-02 VITALS — WEIGHT: 169.8 LBS | BODY MASS INDEX: 24.31 KG/M2 | RESPIRATION RATE: 16 BRPM | HEIGHT: 70 IN

## 2023-05-02 DIAGNOSIS — S46.912A ELBOW STRAIN, LEFT, INITIAL ENCOUNTER: Primary | ICD-10-CM

## 2023-05-02 PROCEDURE — 1123F ACP DISCUSS/DSCN MKR DOCD: CPT | Performed by: STUDENT IN AN ORGANIZED HEALTH CARE EDUCATION/TRAINING PROGRAM

## 2023-05-02 PROCEDURE — 99213 OFFICE O/P EST LOW 20 MIN: CPT | Performed by: STUDENT IN AN ORGANIZED HEALTH CARE EDUCATION/TRAINING PROGRAM

## 2023-05-02 PROCEDURE — 9900000065 HC CARDIAC REHAB PHASE 3 - 1 VISIT

## 2023-05-02 NOTE — PROGRESS NOTES
CHIEF COMPLAINT: Left elbow pain. History:   Mayi Bowden is a 72 y.o. right handed male self-referred for evaluation and treatment of left elbow pain. This is evaluated as a personal injury. Pain began 3-4 weeks ago. Pain is rated as a 2/10. There was not a history of injury. He is in cardiac therapy and was doing more intensity on the arm bike and noticed increased pain at the distal biceps region a few days after. Pain radiates through the forearm to the wrist. He has pain with extension and with use of the arm. There are no sleep disturbances. The patient has not had ortho PT. The patient has not had an injection. The patient has not tried NSAIDs. Patient's occupation is retired. Outside reports reviewed: none. Past Medical History:   Diagnosis Date    Aortic valve insufficiency     Arthritis     Atrial fibrillation ([address]     Cancer ([address] 09/2017    TONGUE head and neck IN REMISSION    Dysphonia     Encounter for imaging to screen for metal prior to MRI 08/22/2022    MRI conditional Medtronic Linq loop recorder model#[id number] Reveal LINQ implanted 4/22/19. Normal Mode. 1.5T or 3.0T. Download data prior to MRI. Follow all other Medtronic guidelines.  Pt currently follows     GERD (gastroesophageal reflux disease)     Hearing loss     Heart disease     Hyperlipidemia     Medical history reviewed with no changes     Obstructive apnea does not use CPAP    Oropharyngeal dysphagia     Refusal of blood transfusions as patient is Hinduism     Thyroid disease        Past Surgical History:   Procedure Laterality Date    ANKLE SURGERY      AORTIC VALVE REPLACEMENT  01/28/2015    Dr. Fanny Rodríguez 25mm Mosaic Ultra porcine valve; right and left modified maze procedure with ligation of DELGADO with Atriclip    [facility]      KNEE SURGERY      Arthroscopy    LUMBAR SPINE SURGERY Right 10/13/2020

## 2023-05-03 DIAGNOSIS — E03.9 ACQUIRED HYPOTHYROIDISM: ICD-10-CM

## 2023-05-05 RX ORDER — LEVOTHYROXINE SODIUM 0.12 MG/1
TABLET ORAL
Qty: 30 TABLET | Refills: 0 | Status: SHIPPED | OUTPATIENT
Start: 2023-05-05

## 2023-05-09 ENCOUNTER — HOSPITAL ENCOUNTER (OUTPATIENT)
Dept: CARDIAC REHAB | Age: 73
Discharge: HOME | End: 2023-05-09

## 2023-05-09 PROCEDURE — 9900000065 HC CARDIAC REHAB PHASE 3 - 1 VISIT

## 2023-05-10 ENCOUNTER — HOSPITAL ENCOUNTER (OUTPATIENT)
Dept: PHYSICAL THERAPY | Age: 73
Setting detail: THERAPIES SERIES
Discharge: HOME | End: 2023-05-10
Payer: MEDICARE

## 2023-05-10 PROCEDURE — 97530 THERAPEUTIC ACTIVITIES: CPT

## 2023-05-10 PROCEDURE — 97162 PT EVAL MOD COMPLEX 30 MIN: CPT

## 2023-05-10 PROCEDURE — 97110 THERAPEUTIC EXERCISES: CPT

## 2023-05-10 PROCEDURE — 97140 MANUAL THERAPY 1/> REGIONS: CPT

## 2023-05-10 NOTE — PLAN OF CARE
[address] [address], [address] [place] [address] Drive  Phone: [phone]   Fax: [phone]                                                     Physical Therapy Certification    Dear Sandra March, [address],    We had the pleasure of evaluating the following patient for physical therapy services at [address] [place] [address]. A summary of our findings can be found in the initial assessment below. This includes our plan of care. If you have any questions or concerns regarding these findings, please do not hesitate to contact me at the office phone number checked above. Thank you for the referral.       Physician Signature:_______________________________Date:__________________  By signing above (or electronic signature), therapists plan is approved by physician      Patient: Raffi Jimenez   : 1950   MRN: [id number]  Referring Physician: JEN Bartholomew Dr.      Evaluation Date: 5/10/2023      Medical Diagnosis Information:  Elbow strain, left, initial encounter [Z83.112O]   PT diagnosis: Decreased L UE Functional Weakness and limited pain-free ROM limiting ADLS/IADLS                                       Insurance information: PT Insurance Information: [facility] Medicare; $35.00 copay; Auth    Precautions/ Contra-indications: LShouldr OA,  Latex Allergy:  [x]NO      []YES  Preferred Language for Healthcare:   [x]English       []Other:    C-SSRS Triggered by Intake questionnaire (Past 2 wk assessment ):   [x] No, Questionnaire did not trigger screening.   [] Yes, Patient intake triggered C-SSRS Screening     [] Completed, no further action required. [] Completed, PCP notified via Epic    SUBJECTIVE: Patient stated complaint: Patient presents to clinic with pain in L elbow/forearm after sustaining strain with   when in cardiac rehab.   Had a mitral valve clip 2 months ago versus the replacement of the leaking

## 2023-05-10 NOTE — FLOWSHEET NOTE
[address] [place] [address] Jung Browning  Phone: [phone]   Fax: [phone]    Physical Therapy Daily Treatment Note    Date:  05/10/2023     Patient Name:  Javed Crane    :  1950  MRN: [id number]  Medical Diagnosis:  Elbow strain, left, initial encounter [M60.703I]  Treatment Diagnosis: Decreased L UE Functional Weakness and limited pain-free ROM limiting ADLS/IADLS  Insurance/Certification information:  PT Insurance Information: [employer] Medicare; $35.00 copay; Auth  Physician Information:  JEN Cuenca PA  Plan of care signed (Y/N): []  Yes [x]  No     Date of Patient follow up with Physician:      Progress Report: [x]  Yes Eval  []  No     Date Range for reporting period:  Beginnin/10/2023  Ending:     Progress report due (10 Rx/or 30 days whichever is less): visit #10 or       Recertification due (POC duration/ or 90 days whichever is less): visit #12-16 or      Visit # Insurance Allowable Auth required?  Date Range   Eval BCBS Medicare [x]  Yes  []  No TBD       Units approved Units used Date Range   TBD TBD TBD     Latex Allergy:  [x]NO      []YES  Preferred Language for Healthcare:   [x]English       []other:    Functional Scale:       Date assessed:  QDASH: raw score = 32; dysfunction = 47.73%  5/10/2023    Pain level:  5.5/10     SUBJECTIVE:  See eval    OBJECTIVE: See eval  Observation:   Test measurements:      RESTRICTIONS/PRECAUTIONS: L GH OA, cervical and lumbarfusions, mitral valave sx ~4 weeks ago, cardiac rehab, previous CVA, previous B Thumb fracti\ures    Exercises/Interventions:   Therapeutic Exercise (97711) Resistance / level Sets / Seconds  Reps Notes/Cues   See HEP below X10'                                                                            Therapeutic Activities (39109)       5/10/23 Pt was educated on PT POC, Diagnosis, Prognosis, pathomechanics as well as frequency and duration of scheduling future physical

## 2023-05-11 ENCOUNTER — HOSPITAL ENCOUNTER (OUTPATIENT)
Dept: CARDIAC REHAB | Age: 73
Discharge: HOME | End: 2023-05-11

## 2023-05-11 PROCEDURE — 9900000065 HC CARDIAC REHAB PHASE 3 - 1 VISIT

## 2023-05-16 ENCOUNTER — HOSPITAL ENCOUNTER (OUTPATIENT)
Dept: CARDIAC REHAB | Age: 73
Discharge: HOME | End: 2023-05-16

## 2023-05-16 PROCEDURE — 9900000065 HC CARDIAC REHAB PHASE 3 - 1 VISIT

## 2023-05-17 ENCOUNTER — HOSPITAL ENCOUNTER (OUTPATIENT)
Dept: PHYSICAL THERAPY | Age: 73
Setting detail: THERAPIES SERIES
Discharge: HOME | End: 2023-05-17
Payer: MEDICARE

## 2023-05-17 ENCOUNTER — OFFICE VISIT (OUTPATIENT)
Dept: FAMILY MEDICINE CLINIC | Age: 73
End: 2023-05-17
Payer: MEDICARE

## 2023-05-17 ENCOUNTER — HOSPITAL ENCOUNTER (OUTPATIENT)
Dept: GENERAL RADIOLOGY | Age: 73
Discharge: HOME | End: 2023-05-17
Payer: MEDICARE

## 2023-05-17 VITALS
TEMPERATURE: 97.1 F | SYSTOLIC BLOOD PRESSURE: 89 MMHG | OXYGEN SATURATION: 95 % | RESPIRATION RATE: 16 BRPM | BODY MASS INDEX: 23.99 KG/M2 | HEART RATE: 76 BPM | DIASTOLIC BLOOD PRESSURE: 63 MMHG | WEIGHT: 167.2 LBS

## 2023-05-17 DIAGNOSIS — G89.29 CHRONIC LEFT SHOULDER PAIN: Primary | ICD-10-CM

## 2023-05-17 DIAGNOSIS — M79.89 SWELLING OF LEFT HAND: ICD-10-CM

## 2023-05-17 DIAGNOSIS — M25.512 CHRONIC LEFT SHOULDER PAIN: Primary | ICD-10-CM

## 2023-05-17 PROCEDURE — 1123F ACP DISCUSS/DSCN MKR DOCD: CPT | Performed by: FAMILY MEDICINE

## 2023-05-17 PROCEDURE — 97140 MANUAL THERAPY 1/> REGIONS: CPT | Performed by: SPECIALIST/TECHNOLOGIST

## 2023-05-17 PROCEDURE — 3074F SYST BP LT 130 MM HG: CPT | Performed by: FAMILY MEDICINE

## 2023-05-17 PROCEDURE — 97110 THERAPEUTIC EXERCISES: CPT | Performed by: SPECIALIST/TECHNOLOGIST

## 2023-05-17 PROCEDURE — 3078F DIAST BP <80 MM HG: CPT | Performed by: FAMILY MEDICINE

## 2023-05-17 PROCEDURE — 99213 OFFICE O/P EST LOW 20 MIN: CPT | Performed by: FAMILY MEDICINE

## 2023-05-17 PROCEDURE — 73130 X-RAY EXAM OF HAND: CPT

## 2023-05-17 RX ORDER — TIZANIDINE 4 MG/1
4 TABLET ORAL 3 TIMES DAILY PRN
Qty: 60 TABLET | Refills: 0 | Status: SHIPPED | OUTPATIENT
Start: 2023-05-17 | End: 2023-06-16

## 2023-05-17 NOTE — PROGRESS NOTES
(75.8 kg)   05/02/23 169 lb 12.8 oz (77 kg)   04/28/23 168 lb (76.2 kg)     BP Readings from Last 3 Encounters:   05/17/23 89/63   04/28/23 104/60   03/01/23 101/69       Physical exam:  Constitutional: he is oriented to person, place, and time. he appears well-developed and well-nourished. No distress. Cardiovascular: Normal rate, regular rhythm, normal heart sounds and intact distal pulses. No murmur heard. Pulmonary/Chest: Effort normal and breath sounds normal. No stridor. No respiratory distress. he has no wheezes. he has no rales. heexhibits no tenderness. Abdominal: Soft. Bowel sounds are normal. he exhibits no distension and no mass. There is no tenderness. There is no rebound and no guarding. Musculoskeletal: Pain in his right deltoid region and elbow. He also has swelling and reduced range of motion in his left thumb. Neurological:he is alert and oriented to person, place, and time. he has gross neurological exam normal with normal strength and normal gait  Skin: Skin is warm and dry. No rash noted. he is not diaphoretic. No erythema. No pallor. Psychiatric: he has a normal mood and affect. his   behavior is normal.      Assessment/Plan:   1. Chronic left shoulder pain  - MRI SHOULDER LEFT WO CONTRAST; Future    2. Swelling of left hand  Reduce motion in his left thumb with increased swelling. Concern of fracture. - XR HAND LEFT (MIN 3 VIEWS);  Future           Kelley Rich MD  5/17/2023 4:13 PM

## 2023-05-18 ENCOUNTER — HOSPITAL ENCOUNTER (OUTPATIENT)
Dept: CARDIAC REHAB | Age: 73
Discharge: HOME | End: 2023-05-18

## 2023-05-18 PROCEDURE — 9900000065 HC CARDIAC REHAB PHASE 3 - 1 VISIT

## 2023-05-19 ENCOUNTER — HOSPITAL ENCOUNTER (OUTPATIENT)
Dept: PHYSICAL THERAPY | Age: 73
Setting detail: THERAPIES SERIES
Discharge: HOME | End: 2023-05-19
Payer: MEDICARE

## 2023-05-19 PROCEDURE — 97110 THERAPEUTIC EXERCISES: CPT | Performed by: SPECIALIST/TECHNOLOGIST

## 2023-05-19 PROCEDURE — 97140 MANUAL THERAPY 1/> REGIONS: CPT | Performed by: SPECIALIST/TECHNOLOGIST

## 2023-05-19 NOTE — FLOWSHEET NOTE
Good [] Fair  [] Poor    Patient Requires Follow-up: [x] Yes  [] No    Return to Play:    [x]  N/A     []  Stage 1: Intro to Strength   []  Stage 2: Dynamic Strength and Intro to Plyometrics   []  Stage 3: Advanced Plyometrics and Intro to Throwing   []  Stage 4: Sport specific Training/Return to Sport     []  Ready to Return to Play, [employer]   []  Not Ready for Return to Sports   Comments:      PLAN:  PT 2x / week for 6-8 weeks. Advised to monitor using his walking stick in his left hand due to increased vibration in his wrist/ thumb. [x] Continue per plan of care [] Alter current plan (see comments)  [] Plan of care initiated [] Hold pending MD visit [] Discharge    Electronically signed by: KARL Molina,[id number]      Note: If patient does not return for scheduled/ recommended follow up visits, this note will serve as a discharge from care along with most recent update on progress.

## 2023-05-22 ENCOUNTER — HOSPITAL ENCOUNTER (OUTPATIENT)
Dept: CT IMAGING | Age: 73
Discharge: HOME | End: 2023-05-22
Payer: MEDICARE

## 2023-05-22 ENCOUNTER — NURSE ONLY (OUTPATIENT)
Dept: CARDIOLOGY CLINIC | Age: 73
End: 2023-05-22

## 2023-05-22 DIAGNOSIS — Z45.09 ENCOUNTER FOR ELECTRONIC ANALYSIS OF REVEAL EVENT RECORDER: ICD-10-CM

## 2023-05-22 DIAGNOSIS — I48.0 PAF (PAROXYSMAL ATRIAL FIBRILLATION) (HCC): Primary | Chronic | ICD-10-CM

## 2023-05-22 DIAGNOSIS — Z87.891 PERSONAL HISTORY OF TOBACCO USE: ICD-10-CM

## 2023-05-22 PROCEDURE — 71271 CT THORAX LUNG CANCER SCR C-: CPT

## 2023-05-22 NOTE — PROGRESS NOTES
We received a remote transmission from patient's monitor at home. Remote Linq report shows no arrhythmias. EP physician to review. We will continue to monitor remotely. Implanted for palpitations. End of 31-day monitoring period 5-22-23.

## 2023-05-24 ENCOUNTER — HOSPITAL ENCOUNTER (OUTPATIENT)
Dept: PHYSICAL THERAPY | Age: 73
Setting detail: THERAPIES SERIES
Discharge: HOME | End: 2023-05-24
Payer: MEDICARE

## 2023-05-24 PROCEDURE — 97140 MANUAL THERAPY 1/> REGIONS: CPT | Performed by: SPECIALIST/TECHNOLOGIST

## 2023-05-24 PROCEDURE — 97110 THERAPEUTIC EXERCISES: CPT | Performed by: SPECIALIST/TECHNOLOGIST

## 2023-05-24 NOTE — FLOWSHEET NOTE
[address] [place] [address]  Phone: [phone]   Fax: [phone]    Physical Therapy Daily Treatment Note    Date:  2023     Patient Name:  Rogelio Maldonado    :  1950  MRN: [id number]  Medical Diagnosis:  Elbow strain, left, initial encounter [S45.367X]  Treatment Diagnosis: Decreased L UE Functional Weakness and limited pain-free ROM limiting ADLS/IADLS  Insurance/Certification information:  PT Insurance Information: [facility] [employer] Medicare; $35.00 copay; Auth  Physician Information:  JEN Anton PA  Plan of care signed (Y/N): []  Yes [x]  No     Date of Patient follow up with Physician:      Progress Report: [x]  Yes Eval  []  No     Date Range for reporting period:  Beginnin2023  Ending:     Progress report due (10 Rx/or 30 days whichever is less): visit #29 or       Recertification due (POC duration/ or 90 days whichever is less): visit #12-16 or      Visit # Insurance Allowable Auth required? Date Range   Eval  4 BCBS Medicare [x]  Yes  []  No TBD       Units approved Units used Date Range   TBD TBD TBD     Latex Allergy:  [x]NO      []YES  Preferred Language for Healthcare:   [x]English       []other:    Functional Scale:       Date assessed:  QDASH: raw score = 32; dysfunction = 47.73%  5/10/2023    Pain level:  3/10  / 5/10 has some forearm pain/ left thumb sensitivity  RT hand dominant  SUBJECTIVE: Pt. Reports that his left thumb is still painful  and admits to doing his HEP as directed. Not using ice for any swelling. Still doing some back exercises with his SB. Has had xrays/ MRI on his left wrist with no significant findings. OBJECTIVE:   Observation:    Left shoulder  moderate restriction in ROM, ~ 90. Visible swelling over left wrist/ thumb.    Test measurements:      RESTRICTIONS/PRECAUTIONS: L GH OA, cervical and lumbarfusions, mitral valave sx ~4 weeks ago, cardiac rehab, previous CVA, previous B Thumb

## 2023-05-25 ENCOUNTER — HOSPITAL ENCOUNTER (OUTPATIENT)
Dept: CARDIAC REHAB | Age: 73
Discharge: HOME | End: 2023-05-25

## 2023-05-25 PROCEDURE — 9900000065 HC CARDIAC REHAB PHASE 3 - 1 VISIT

## 2023-05-26 ENCOUNTER — HOSPITAL ENCOUNTER (OUTPATIENT)
Dept: PHYSICAL THERAPY | Age: 73
Setting detail: THERAPIES SERIES
Discharge: HOME | End: 2023-05-26
Payer: MEDICARE

## 2023-05-26 ENCOUNTER — TELEPHONE (OUTPATIENT)
Dept: ORTHOPEDIC SURGERY | Age: 73
End: 2023-05-26

## 2023-05-26 DIAGNOSIS — Z51.81 ENCOUNTER FOR MEDICATION MONITORING: ICD-10-CM

## 2023-05-26 PROCEDURE — 97110 THERAPEUTIC EXERCISES: CPT | Performed by: SPECIALIST/TECHNOLOGIST

## 2023-05-26 RX ORDER — BUSPIRONE HYDROCHLORIDE 5 MG/1
TABLET ORAL
Qty: 90 TABLET | Refills: 0 | Status: SHIPPED | OUTPATIENT
Start: 2023-05-26

## 2023-05-26 NOTE — FLOWSHEET NOTE
seated exercises. Pt. Had some increased discomfort today using the RED therabar with supination/ pronation ROM today decreased time as a result. Fitted pt with left wrist/ hand glove to decrease swelling and improve mobility in his left thumb   Pt tolerated progressions with no increase in pain. Pt will benefit from continued weakness in left arm/ elbow/ wrist but benefit with OT referral at this time if MD in agreement and will put PT on hold for now. Treatment/Activity Tolerance:  [x] Pt able to complete treatment [] Patient limited by fatique  [x] Patient limited by pain  [] Patient limited by other medical complications  [] Other:     Prognosis:  [x] Good [] Fair  [] Poor    Patient Requires Follow-up: [x] Yes  [] No    Return to Play:    [x]  N/A     []  Stage 1: Intro to Strength   []  Stage 2: Dynamic Strength and Intro to Plyometrics   []  Stage 3: Advanced Plyometrics and Intro to Throwing   []  Stage 4: Sport specific Training/Return to Sport     []  Ready to Return to Play, [employer]   []  Not Ready for Return to Sports   Comments:      PLAN:  PT 2x / week for 6-8 weeks. Pt needs to increase icing to left thumb/ wrist monitor ADL's. Glove will help swelling, plan is to obtain an OT referral due to arthritic left thumb/ wrist.   [x] Continue per plan of care [] Alter current plan (see comments)  [] Plan of care initiated [] Hold pending MD visit [] Discharge    Electronically signed by: Héctor Paz, [address] [place] [address], [address] [place], [place] [address]      Note: If patient does not return for scheduled/ recommended follow up visits, this note will serve as a discharge from care along with most recent update on progress.

## 2023-05-26 NOTE — TELEPHONE ENCOUNTER
Medication:   Requested Prescriptions     Pending Prescriptions Disp Refills    busPIRone (BUSPAR) 5 MG tablet [Pharmacy Med Name: busPIRone HCl 5 MG Oral Tablet] 90 tablet 0     Sig: TAKE 1 TABLET BY MOUTH THREE TIMES DAILY        Last Filled:  4/24/2023 90 tabs 0 refills     Patient Phone Number: [phone] (home)     Last appt: 5/17/2023   Next appt: Visit date not found    Last OARRS:   RX Monitoring 12/30/2019   Periodic Controlled Substance Monitoring No signs of potential drug abuse or diversion identified.

## 2023-05-30 ENCOUNTER — HOSPITAL ENCOUNTER (OUTPATIENT)
Dept: CARDIAC REHAB | Age: 73
Discharge: HOME | End: 2023-05-30

## 2023-05-30 DIAGNOSIS — S46.912A ELBOW STRAIN, LEFT, INITIAL ENCOUNTER: Primary | ICD-10-CM

## 2023-05-30 PROCEDURE — 9900000065 HC CARDIAC REHAB PHASE 3 - 1 VISIT

## 2023-05-31 ENCOUNTER — HOSPITAL ENCOUNTER (OUTPATIENT)
Dept: PHYSICAL THERAPY | Age: 73
Setting detail: THERAPIES SERIES
End: 2023-05-31
Payer: MEDICARE

## 2023-06-01 ENCOUNTER — HOSPITAL ENCOUNTER (OUTPATIENT)
Dept: OCCUPATIONAL THERAPY | Age: 73
Setting detail: THERAPIES SERIES
Discharge: HOME | End: 2023-06-01
Payer: MEDICARE

## 2023-06-01 PROCEDURE — 97140 MANUAL THERAPY 1/> REGIONS: CPT

## 2023-06-01 PROCEDURE — 97110 THERAPEUTIC EXERCISES: CPT

## 2023-06-01 PROCEDURE — 97166 OT EVAL MOD COMPLEX 45 MIN: CPT

## 2023-06-01 NOTE — PLAN OF CARE
Limitations:    []Reduced participation in functional mobility   []Reduced cognition   [x]Reduced participation in high level IADLs   []Reduced participation ADLs   []Reduced endurance  [x]Reduced fine motor control   [x]Reduced ROM   [x]Reduced sensation   [x]Reduced coordination  [x]Reduced strength   []Reduced balance   []Reduced posture   []Reduced safety awareness   []Reduced functional vision      Participation Restrictions  See cardiac hx   Prognosis/Rehab Potential:      []Excellent   [x]Good    [x]Fair   []Poor    Tolerance of evaluation/treatment:    []Excellent   [x]Good    []Fair   []Poor    Occupational Therapy Evaluation Complexity Justification   A Occupational Profile/Medical and Therapy History  [] no personal factors and/or comorbidities that impact the plan of care; brief history relating to presenting problem  [x]1-2 personal factors and/or comorbidities that impact the plan of care; additional review of physical, cognitive, or psychosocial performance  []3 personal factors and/or comorbidities that impact the plan of care; extensive review of physical cognitive, psychosocial performance    Patient Assessment:  [] a total of 1-3 performance deficits relating to physical, cognitive, psychosocial limitations/restrictions  [x] a total of 3-5 performance deficits relating to physical, cognitive, psychosocial limitations/restrictions  [] a total of 5 or more performance deficits relating to physical, cognitive, psychosocial limitations/restrictions    A clinical presentation with:  [] low complexity, limited amount of treatment options no assessment modification, no co-morbidities  [x] moderate analytical complexity, detailed assessments, minimal to moderate modification of assessments, may have co-morbidities  [] high analytic complexity, comprehensive assessments, multiple treatment options, significant modifications of assessment, co-morbidities affecting performance    Clinical decision making of

## 2023-06-01 NOTE — FLOWSHEET NOTE
[address] [facility] Occupational Therapy  [address] [place] [address]  Phone: [phone]   Fax: [phone]      Occupational Therapy Daily Treatment Note  Date:  2023    Patient: Brendan Ware   : 1950   MRN: [id number]  Referring Physician:          Medical Diagnosis Information:    Date of Injury:***  Date of Surgery:***                                      Insurance information:     Plan of care sent to provider:      []Faxed   []Co-signature    (attempts: 1[] 2[] 3[])       Progress Report: []  Yes  [x]  No     Date Range for reporting period:  Beginning: ***  Ending: ***    Progress report due (10 Rx/or 30 days whichever is less): visit #10 or *** (date)     Recertification due (POC duration/ or 90 days whichever is less): visit #*** or *** (date)     Visit # Insurance Allowable Auth required? Date Range   *** *** []  Yes  []  No ***       Units approved Units used Date Range   *** *** ***     Latex Allergy:  []No      []Yes  Pacemaker:  [] No       [] Yes     Preferred Language for Healthcare:   [x]English       []other:    Pain level:  ***/10     SUBJECTIVE:  See eval    Functional Disability Index:  Quick DASH: total score-  , disability score- %     OBJECTIVE: See eval      RESTRICTIONS/PRECAUTIONS:     Exercises/Interventions: Session initiated with assessment of pt status using subjective and objective measures noted in evaluation, followed by collaborative discussion regarding goals. Pt educated in HEP below, demonstrating correct form and verbalizing understanding of completion. Pt kinesio taped for thumb CMC and wrist support with pt education on purpose, wear, and removal if irritation occurs with pt reporting decreased pain with tape in place.        Therapeutic Exercises  Resistance / level Sets/sec Reps Notes                                                                                Neuromuscular Re-ed / Therapeutic Activities

## 2023-06-02 ENCOUNTER — HOSPITAL ENCOUNTER (OUTPATIENT)
Dept: PHYSICAL THERAPY | Age: 73
Setting detail: THERAPIES SERIES
Discharge: HOME | End: 2023-06-02

## 2023-06-06 ENCOUNTER — HOSPITAL ENCOUNTER (OUTPATIENT)
Dept: CARDIAC REHAB | Age: 73
Discharge: HOME | End: 2023-06-06
Payer: MEDICARE

## 2023-06-06 PROCEDURE — 9900000065 HC CARDIAC REHAB PHASE 3 - 1 VISIT

## 2023-06-07 ENCOUNTER — APPOINTMENT (OUTPATIENT)
Dept: PHYSICAL THERAPY | Age: 73
End: 2023-06-07
Payer: MEDICARE

## 2023-06-08 ENCOUNTER — HOSPITAL ENCOUNTER (OUTPATIENT)
Dept: CARDIAC REHAB | Age: 73
Discharge: HOME | End: 2023-06-08
Payer: MEDICARE

## 2023-06-08 PROCEDURE — 9900000065 HC CARDIAC REHAB PHASE 3 - 1 VISIT

## 2023-06-09 ENCOUNTER — APPOINTMENT (OUTPATIENT)
Dept: PHYSICAL THERAPY | Age: 73
End: 2023-06-09
Payer: MEDICARE

## 2023-06-15 ENCOUNTER — TELEPHONE (OUTPATIENT)
Dept: CARDIOLOGY CLINIC | Age: 73
End: 2023-06-15

## 2023-06-20 ENCOUNTER — HOSPITAL ENCOUNTER (OUTPATIENT)
Dept: CARDIAC REHAB | Age: 73
Discharge: HOME | End: 2023-06-20
Payer: MEDICARE

## 2023-06-20 PROCEDURE — 9900000065 HC CARDIAC REHAB PHASE 3 - 1 VISIT

## 2023-06-22 ENCOUNTER — APPOINTMENT (OUTPATIENT)
Dept: OCCUPATIONAL THERAPY | Age: 73
End: 2023-06-22
Payer: MEDICARE

## 2023-06-22 DIAGNOSIS — G89.4 CHRONIC PAIN SYNDROME: ICD-10-CM

## 2023-06-22 RX ORDER — OXYCODONE HYDROCHLORIDE AND ACETAMINOPHEN 5; 325 MG/1; MG/1
1 TABLET ORAL 2 TIMES DAILY PRN
Qty: 60 TABLET | Refills: 0 | Status: SHIPPED | OUTPATIENT
Start: 2023-06-22 | End: 2023-07-22

## 2023-06-22 NOTE — TELEPHONE ENCOUNTER
Medication:   Requested Prescriptions     Pending Prescriptions Disp Refills    oxyCODONE-acetaminophen (PERCOCET) 5-325 MG per tablet 60 tablet 0     Sig: Take 1 tablet by mouth 2 times daily as needed for Pain for up to 30 days. Intended supply: 5 days. Take lowest dose possible to manage pain        Last Filled:  02/27/2023 #60 0rf    Patient Phone Number: [phone] (home)     Last appt: 5/17/2023   Next appt: Visit date not found    Last OARRS:   RX Monitoring 12/30/2019   Periodic Controlled Substance Monitoring No signs of potential drug abuse or diversion identified.

## 2023-06-22 NOTE — TELEPHONE ENCOUNTER
oxyCODONE-acetaminophen (PERCOCET) 5-325 MG per tablet    Date: 2023 Department: [facility] Ordering/Authorizing: Jeffrey Sweet MD     Outpatient Medication Detail     Disp Refills Start End    oxyCODONE-acetaminophen (PERCOCET) 5-325 MG per tablet 60 tablet 0 2023 3/29/2023    Sig - Route: Take 1 tablet by mouth 2 times daily as needed for Pain for up to 30 days. Intended supply: 5 days.  Take lowest dose possible to manage pain - Oral    Sent to pharmacy as: oxyCODONE-Acetaminophen 5-325 MG Oral Tablet (Percocet)    Earliest Fill Date: 2023    Notes to Pharmacy: Reduce doses taken as pain becomes manageable    E-Prescribing Status: Receipt confirmed by pharmacy (2023  3:11 PM EST      Send to Meagan Polo

## 2023-06-23 ENCOUNTER — TELEPHONE (OUTPATIENT)
Dept: CARDIOLOGY CLINIC | Age: 73
End: 2023-06-23

## 2023-06-23 NOTE — TELEPHONE ENCOUNTER
Pt has a loop recorder and is a heart pt. Asking if it is ok to use a electric stimulator for his back that he would purchase off line. Also asking if loop recorder is MRI compatible.  Please call to advise

## 2023-06-26 ENCOUNTER — NURSE ONLY (OUTPATIENT)
Dept: CARDIOLOGY CLINIC | Age: 73
End: 2023-06-26
Payer: MEDICARE

## 2023-06-26 DIAGNOSIS — I48.0 PAF (PAROXYSMAL ATRIAL FIBRILLATION) (HCC): Primary | Chronic | ICD-10-CM

## 2023-06-26 DIAGNOSIS — Z45.09 ENCOUNTER FOR ELECTRONIC ANALYSIS OF REVEAL EVENT RECORDER: ICD-10-CM

## 2023-06-26 PROCEDURE — G2066 INTER DEVC REMOTE 30D: HCPCS | Performed by: INTERNAL MEDICINE

## 2023-06-26 PROCEDURE — 93298 REM INTERROG DEV EVAL SCRMS: CPT | Performed by: INTERNAL MEDICINE

## 2023-06-27 ENCOUNTER — HOSPITAL ENCOUNTER (OUTPATIENT)
Dept: OCCUPATIONAL THERAPY | Age: 73
Setting detail: THERAPIES SERIES
Discharge: HOME | End: 2023-06-27
Payer: MEDICARE

## 2023-06-27 ENCOUNTER — HOSPITAL ENCOUNTER (OUTPATIENT)
Dept: CARDIAC REHAB | Age: 73
Discharge: HOME | End: 2023-06-27
Payer: MEDICARE

## 2023-06-27 PROCEDURE — 97110 THERAPEUTIC EXERCISES: CPT

## 2023-06-27 PROCEDURE — 97168 OT RE-EVAL EST PLAN CARE: CPT

## 2023-06-27 PROCEDURE — 9900000065 HC CARDIAC REHAB PHASE 3 - 1 VISIT

## 2023-06-29 ENCOUNTER — APPOINTMENT (OUTPATIENT)
Dept: OCCUPATIONAL THERAPY | Age: 73
End: 2023-06-29
Payer: MEDICARE

## 2023-07-06 ENCOUNTER — OFFICE VISIT (OUTPATIENT)
Dept: ORTHOPEDIC SURGERY | Age: 73
End: 2023-07-06
Payer: MEDICARE

## 2023-07-06 VITALS — RESPIRATION RATE: 16 BRPM | BODY MASS INDEX: 23.91 KG/M2 | HEIGHT: 70 IN | WEIGHT: 167 LBS

## 2023-07-06 DIAGNOSIS — S46.912A ELBOW STRAIN, LEFT, INITIAL ENCOUNTER: Primary | ICD-10-CM

## 2023-07-06 PROCEDURE — 99213 OFFICE O/P EST LOW 20 MIN: CPT | Performed by: STUDENT IN AN ORGANIZED HEALTH CARE EDUCATION/TRAINING PROGRAM

## 2023-07-06 PROCEDURE — 1123F ACP DISCUSS/DSCN MKR DOCD: CPT | Performed by: STUDENT IN AN ORGANIZED HEALTH CARE EDUCATION/TRAINING PROGRAM

## 2023-07-06 NOTE — PROGRESS NOTES
true   Transportation Needs: Unknown    Lack of Transportation (Non-Medical): No   Housing Stability: Unknown    Unstable Housing in the Last Year: No       Current Outpatient Medications   Medication Sig Dispense Refill    oxyCODONE-acetaminophen (PERCOCET) 5-325 MG per tablet Take 1 tablet by mouth 2 times daily as needed for Pain for up to 30 days. Intended supply: 5 days. Take lowest dose possible to manage pain 60 tablet 0    mirtazapine (REMERON) 15 MG tablet Take 1 tablet by mouth nightly 30 tablet 0    busPIRone (BUSPAR) 5 MG tablet TAKE 1 TABLET BY MOUTH THREE TIMES DAILY 90 tablet 0    levothyroxine (SYNTHROID) 125 MCG tablet Take 1 tablet by mouth once daily 30 tablet 0    spironolactone (ALDACTONE) 25 MG tablet Take 0.5 tablets by mouth daily      pantoprazole (PROTONIX) 40 MG tablet TAKE 1 TABLET BY MOUTH TWICE DAILY BEFORE MEAL(S) 180 tablet 3    gabapentin (NEURONTIN) 100 MG capsule Take 1 capsule by mouth 3 times daily for 180 days. Intended supply: 90 days 180 capsule 1    mirtazapine (REMERON) 7.5 MG tablet Take 1 tablet by mouth nightly 90 tablet 4    rosuvastatin (CRESTOR) 40 MG tablet Take 1 tablet by mouth once daily 90 tablet 3    metoprolol tartrate (LOPRESSOR) 50 MG tablet Take 0.5 tablets by mouth 2 times daily 180 tablet 2    clopidogrel (PLAVIX) 75 MG tablet Take 1 tablet by mouth daily 90 tablet 3    tiotropium (SPIRIVA RESPIMAT) 2.5 MCG/ACT AERS inhaler Inhale 2 puffs into the lungs daily 1 each 3    aspirin 81 MG EC tablet Take 1 tablet by mouth daily      nitroGLYCERIN (NITROSTAT) 0.4 MG SL tablet Place 1 tablet under the tongue every 5 minutes as needed for Chest pain 25 tablet 3    Cholecalciferol 50 MCG (2000 UT) TABS Take 1 tablet by mouth daily       No current facility-administered medications for this visit.        Allergies   Allergen Reactions    Naproxen Swelling     Lips  Pt does not recognize this being an intolerance    Hydrocodone Itching       Review of Systems:  I have

## 2023-07-11 ENCOUNTER — HOSPITAL ENCOUNTER (OUTPATIENT)
Dept: CARDIAC REHAB | Age: 73
Discharge: HOME | End: 2023-07-11
Payer: MEDICARE

## 2023-07-11 PROCEDURE — 9900000065 HC CARDIAC REHAB PHASE 3 - 1 VISIT

## 2023-07-13 DIAGNOSIS — Z51.81 ENCOUNTER FOR MEDICATION MONITORING: ICD-10-CM

## 2023-07-13 RX ORDER — BUSPIRONE HYDROCHLORIDE 5 MG/1
TABLET ORAL
Qty: 90 TABLET | Refills: 0 | Status: SHIPPED | OUTPATIENT
Start: 2023-07-13

## 2023-07-18 ENCOUNTER — HOSPITAL ENCOUNTER (OUTPATIENT)
Dept: CARDIAC REHAB | Age: 73
Discharge: HOME | End: 2023-07-18
Payer: MEDICARE

## 2023-07-18 PROCEDURE — 9900000065 HC CARDIAC REHAB PHASE 3 - 1 VISIT

## 2023-07-25 ENCOUNTER — HOSPITAL ENCOUNTER (OUTPATIENT)
Dept: CARDIAC REHAB | Age: 73
Discharge: HOME | End: 2023-07-25

## 2023-07-25 PROCEDURE — 9900000065 HC CARDIAC REHAB PHASE 3 - 1 VISIT

## 2023-07-31 ENCOUNTER — NURSE ONLY (OUTPATIENT)
Dept: CARDIOLOGY CLINIC | Age: 73
End: 2023-07-31
Payer: MEDICARE

## 2023-07-31 DIAGNOSIS — Z45.09 ENCOUNTER FOR ELECTRONIC ANALYSIS OF REVEAL EVENT RECORDER: ICD-10-CM

## 2023-07-31 DIAGNOSIS — I48.0 PAF (PAROXYSMAL ATRIAL FIBRILLATION) (HCC): Primary | Chronic | ICD-10-CM

## 2023-07-31 PROCEDURE — 93298 REM INTERROG DEV EVAL SCRMS: CPT | Performed by: INTERNAL MEDICINE

## 2023-07-31 PROCEDURE — G2066 INTER DEVC REMOTE 30D: HCPCS | Performed by: INTERNAL MEDICINE

## 2023-07-31 NOTE — PROGRESS NOTES
We received a remote transmission from patient's monitor at home. Remote Linq report shows no arrhythmias. Symptom recording shows SR with ectopy. EP physician to review. We will continue to monitor remotely. Implanted for palpitations. End of 31-day monitoring period 7-31-23.

## 2023-08-01 ENCOUNTER — HOSPITAL ENCOUNTER (OUTPATIENT)
Dept: CARDIAC REHAB | Age: 73
Discharge: HOME | End: 2023-08-01

## 2023-08-01 PROCEDURE — 9900000065 HC CARDIAC REHAB PHASE 3 - 1 VISIT

## 2023-08-04 DIAGNOSIS — F41.9 ANXIETY: ICD-10-CM

## 2023-08-04 RX ORDER — MIRTAZAPINE 15 MG/1
15 TABLET, FILM COATED ORAL NIGHTLY
Qty: 30 TABLET | Refills: 0 | Status: SHIPPED | OUTPATIENT
Start: 2023-08-04

## 2023-08-04 NOTE — TELEPHONE ENCOUNTER
Medication:   Requested Prescriptions     Pending Prescriptions Disp Refills    mirtazapine (REMERON) 15 MG tablet [Pharmacy Med Name: Mirtazapine 15 MG Oral Tablet] 30 tablet 0     Sig: Take 1 tablet by mouth nightly        Last Filled:  06/14/2023 #30 0rf    Patient Phone Number: [phone] (home)     Last appt: 5/17/2023   Next appt: Visit date not found    Last OARRS:   RX Monitoring 12/30/2019   Periodic Controlled Substance Monitoring No signs of potential drug abuse or diversion identified.

## 2023-08-08 ENCOUNTER — HOSPITAL ENCOUNTER (OUTPATIENT)
Dept: CARDIAC REHAB | Age: 73
Discharge: HOME | End: 2023-08-08

## 2023-08-08 PROCEDURE — 9900000065 HC CARDIAC REHAB PHASE 3 - 1 VISIT

## 2023-08-10 DIAGNOSIS — G62.9 NEUROPATHY: ICD-10-CM

## 2023-08-10 RX ORDER — GABAPENTIN 100 MG/1
CAPSULE ORAL
Qty: 180 CAPSULE | Refills: 3 | Status: SHIPPED | OUTPATIENT
Start: 2023-08-10 | End: 2023-11-08

## 2023-08-10 NOTE — TELEPHONE ENCOUNTER
Medication:   Requested Prescriptions     Pending Prescriptions Disp Refills    gabapentin (NEURONTIN) 100 MG capsule [Pharmacy Med Name: Gabapentin 100 MG Oral Capsule] 180 capsule 0     Sig: TAKE 1 CAPSULE BY MOUTH THREE TIMES DAILY        Last Filled:  3/1/2023 180 tabs 3 refills     Patient Phone Number: [phone] (home)     Last appt: 5/17/2023   Next appt: Visit date not found    Last OARRS:   RX Monitoring 12/30/2019   Periodic Controlled Substance Monitoring No signs of potential drug abuse or diversion identified.

## 2023-08-15 ENCOUNTER — HOSPITAL ENCOUNTER (OUTPATIENT)
Dept: CARDIAC REHAB | Age: 73
Discharge: HOME | End: 2023-08-15

## 2023-08-15 PROCEDURE — 9900000065 HC CARDIAC REHAB PHASE 3 - 1 VISIT

## 2023-08-22 ENCOUNTER — HOSPITAL ENCOUNTER (OUTPATIENT)
Dept: CARDIAC REHAB | Age: 73
Discharge: HOME | End: 2023-08-22

## 2023-08-22 PROCEDURE — 9900000065 HC CARDIAC REHAB PHASE 3 - 1 VISIT

## 2023-08-29 ENCOUNTER — HOSPITAL ENCOUNTER (OUTPATIENT)
Dept: CARDIAC REHAB | Age: 73
Discharge: HOME | End: 2023-08-29
Payer: MEDICARE

## 2023-08-29 PROCEDURE — 9900000065 HC CARDIAC REHAB PHASE 3 - 1 VISIT

## 2023-09-06 PROCEDURE — G2066 INTER DEVC REMOTE 30D: HCPCS | Performed by: INTERNAL MEDICINE

## 2023-09-06 PROCEDURE — 93298 REM INTERROG DEV EVAL SCRMS: CPT | Performed by: INTERNAL MEDICINE

## 2023-09-11 DIAGNOSIS — Z51.81 ENCOUNTER FOR MEDICATION MONITORING: ICD-10-CM

## 2023-09-11 RX ORDER — CLOPIDOGREL BISULFATE 75 MG/1
75 TABLET ORAL DAILY
Qty: 90 TABLET | Refills: 0 | Status: SHIPPED | OUTPATIENT
Start: 2023-09-11

## 2023-09-11 RX ORDER — BUSPIRONE HYDROCHLORIDE 5 MG/1
TABLET ORAL
Qty: 90 TABLET | Refills: 0 | Status: SHIPPED | OUTPATIENT
Start: 2023-09-11

## 2023-09-11 NOTE — TELEPHONE ENCOUNTER
Medication:   Requested Prescriptions     Pending Prescriptions Disp Refills    busPIRone (BUSPAR) 5 MG tablet [Pharmacy Med Name: busPIRone HCl 5 MG Oral Tablet] 90 tablet 0     Sig: TAKE 1 TABLET BY MOUTH THREE TIMES DAILY        Last Filled:      Patient Phone Number: [phone] (home)     Last appt: 5/17/2023   Next appt: 9/21/2023    Last OARRS:       12/30/2019     4:14 PM   RX Monitoring   Periodic Controlled Substance Monitoring No signs of potential drug abuse or diversion identified.

## 2023-09-12 ENCOUNTER — HOSPITAL ENCOUNTER (OUTPATIENT)
Dept: CARDIAC REHAB | Age: 73
Discharge: HOME | End: 2023-09-12

## 2023-09-12 PROCEDURE — 9900000065 HC CARDIAC REHAB PHASE 3 - 1 VISIT

## 2023-09-21 ENCOUNTER — TELEPHONE (OUTPATIENT)
Dept: CARDIOLOGY CLINIC | Age: 73
End: 2023-09-21

## 2023-09-21 ENCOUNTER — OFFICE VISIT (OUTPATIENT)
Dept: FAMILY MEDICINE CLINIC | Age: 73
End: 2023-09-21
Payer: MEDICARE

## 2023-09-21 VITALS
HEART RATE: 67 BPM | OXYGEN SATURATION: 96 % | BODY MASS INDEX: 24.05 KG/M2 | DIASTOLIC BLOOD PRESSURE: 83 MMHG | TEMPERATURE: 98.2 F | WEIGHT: 168 LBS | SYSTOLIC BLOOD PRESSURE: 115 MMHG | HEIGHT: 70 IN

## 2023-09-21 DIAGNOSIS — J41.0 SIMPLE CHRONIC BRONCHITIS (HCC): ICD-10-CM

## 2023-09-21 DIAGNOSIS — I10 ESSENTIAL HYPERTENSION: ICD-10-CM

## 2023-09-21 DIAGNOSIS — I74.3 THROMBOSIS OF RIGHT FEMORAL ARTERY (HCC): ICD-10-CM

## 2023-09-21 DIAGNOSIS — E03.9 ACQUIRED HYPOTHYROIDISM: ICD-10-CM

## 2023-09-21 DIAGNOSIS — I36.1 NONRHEUMATIC TRICUSPID VALVE REGURGITATION: ICD-10-CM

## 2023-09-21 DIAGNOSIS — Z00.00 MEDICARE ANNUAL WELLNESS VISIT, SUBSEQUENT: Primary | ICD-10-CM

## 2023-09-21 DIAGNOSIS — E78.2 MIXED HYPERLIPIDEMIA: ICD-10-CM

## 2023-09-21 DIAGNOSIS — C79.9 SECONDARY MALIGNANT NEOPLASM OF UNSPECIFIED SITE (CODE) (HCC): ICD-10-CM

## 2023-09-21 DIAGNOSIS — I25.118 CORONARY ARTERY DISEASE OF NATIVE HEART WITH STABLE ANGINA PECTORIS, UNSPECIFIED VESSEL OR LESION TYPE (HCC): ICD-10-CM

## 2023-09-21 DIAGNOSIS — F41.9 ANXIETY: ICD-10-CM

## 2023-09-21 PROBLEM — I20.0 PROGRESSIVE ANGINA (HCC): Status: RESOLVED | Noted: 2020-07-22 | Resolved: 2023-09-21

## 2023-09-21 PROCEDURE — 3074F SYST BP LT 130 MM HG: CPT | Performed by: FAMILY MEDICINE

## 2023-09-21 PROCEDURE — 3079F DIAST BP 80-89 MM HG: CPT | Performed by: FAMILY MEDICINE

## 2023-09-21 PROCEDURE — 1123F ACP DISCUSS/DSCN MKR DOCD: CPT | Performed by: FAMILY MEDICINE

## 2023-09-21 PROCEDURE — G0439 PPPS, SUBSEQ VISIT: HCPCS | Performed by: FAMILY MEDICINE

## 2023-09-21 RX ORDER — METHOCARBAMOL 500 MG/1
500 TABLET, FILM COATED ORAL DAILY PRN
Qty: 30 TABLET | Refills: 0 | Status: SHIPPED | OUTPATIENT
Start: 2023-09-21 | End: 2023-10-21

## 2023-09-21 ASSESSMENT — PATIENT HEALTH QUESTIONNAIRE - PHQ9
SUM OF ALL RESPONSES TO PHQ QUESTIONS 1-9: 2
SUM OF ALL RESPONSES TO PHQ9 QUESTIONS 1 & 2: 2
2. FEELING DOWN, DEPRESSED OR HOPELESS: 1
SUM OF ALL RESPONSES TO PHQ QUESTIONS 1-9: 2
1. LITTLE INTEREST OR PLEASURE IN DOING THINGS: 1

## 2023-09-21 NOTE — PROGRESS NOTES
Medicare Annual Wellness Visit    Lorraine Hawk is here for Medicare AWV, Spasms (sees [facility] ), Anxiety, Hypertension, and Depression    Assessment & Plan   Medicare annual wellness visit, subsequent  Up to date with immunization  Essential hypertension  -     CBC with Auto Differential; Future  -     Basic Metabolic Panel; Future  Acquired hypothyroidism  -     TSH; Future  Anxiety  Stable   Mixed hyperlipidemia  -     Lipid Panel; Future  Nonrheumatic tricuspid valve regurgitation  Worsening and follow ing up with cardio    Secondary malignant neoplasm of unspecified site (  On remission    Simple chronic bronchitis (HCC)  Currently stable    Coronary artery disease of native heart with stable angina pectoris, unspecified vessel or lesion type [facility])  Currently having palpitation and following up with cardiologist    Recommendations for Preventive Services Due: see orders and patient instructions/AVS.  Recommended screening schedule for the next 5-10 years is provided to the patient in written form: see Patient Instructions/AVS.       Steve Mcleod is a 72 y.o. 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 He is still having palpitations and fatigue and his recent echo did show worsening of the and Tricuspid and Mitral valve regurgitation. He is following up with Dr Mikell Sandifer. He is not surgical candidate but planing for repair of the valves due to his ongoing symptoms. He has ongoing pain in his lower back. Has been taking Norco 10/325 as needed for pain. He does not exceed half a pill twice a day. He has been taking Remeron 7.5 mg for anxiety and its helping and also taking buspar. His mood is doing better being on Remeron.      He has history of COPD and currently not been taking

## 2023-09-21 NOTE — TELEPHONE ENCOUNTER
His irregular heart beat is from PVCs. He has had these on his prior loop checks, thus I doubt these are causing his symptoms, which are more likely related to his mitral valve leakage. No changes needed from EP standpoint. Will see if Dr. Nadia Marie has any input.     Tamie Oneal, ISIDRA-CNP

## 2023-09-21 NOTE — TELEPHONE ENCOUNTER
Pt calling . Stating that he has been having CP , SOB and fatigue. He said that he sent 2 remote checks last night. He was also told by PCP that he has irreg HB. He was also told by Dr Berlin Garsia the the valves that were repaired are sverly leaking again.

## 2023-09-21 NOTE — TELEPHONE ENCOUNTER
Remote transmission received shows PVCs. You can see interrogations in Murj. Sent to Dr. Opal Grider to review. Please advise.

## 2023-09-21 NOTE — TELEPHONE ENCOUNTER
I spoke with pt and relayed message per NPSR. Pt stated that he would like [facility] input. Per [facility], recommended that pt consult with Dr Brenna Puckett cause the s/s sound valve related. I told pt , he said  that he already has an appt in Nov. I advised that he toych base with them and maybe they will move up his appt.

## 2023-10-04 ENCOUNTER — TELEPHONE (OUTPATIENT)
Dept: CARDIOLOGY CLINIC | Age: 73
End: 2023-10-04

## 2023-10-04 NOTE — TELEPHONE ENCOUNTER
Pt was in ER with afib and would like someone to review the loop recorder and let him know what it shows. Please call pt to advise.

## 2023-10-06 ENCOUNTER — TELEPHONE (OUTPATIENT)
Dept: CARDIOLOGY CLINIC | Age: 73
End: 2023-10-06

## 2023-10-06 DIAGNOSIS — F41.9 ANXIETY: ICD-10-CM

## 2023-10-06 RX ORDER — MIRTAZAPINE 7.5 MG/1
7.5 TABLET, FILM COATED ORAL NIGHTLY
Qty: 90 TABLET | Refills: 3 | Status: SHIPPED | OUTPATIENT
Start: 2023-10-06

## 2023-10-06 NOTE — TELEPHONE ENCOUNTER
Medication:   Requested Prescriptions     Pending Prescriptions Disp Refills    mirtazapine (REMERON) 7.5 MG tablet 90 tablet 3     Sig: Take 1 tablet by mouth nightly        Last Filled:  08/04/2023 #30 0rf    Patient Phone Number: [phone] (home)     Last appt: 9/21/2023   Next appt: Visit date not found    Last OARRS:       12/30/2019     4:14 PM   RX Monitoring   Periodic Controlled Substance Monitoring No signs of potential drug abuse or diversion identified.        Patient is asking for 7.5mg, he gets the 15mg and cuts them in half

## 2023-10-06 NOTE — TELEPHONE ENCOUNTER
We received remote transmission from patient's monitor at home. Transmission shows normal sensing and pacing function. 2 symptom recordings noted from 9/19/2023 and 9/2023. They show ectopy and a short Tachy run. EP will review. See interrogation in carelink or Murj for more details.

## 2023-10-06 NOTE — TELEPHONE ENCOUNTER
Snow Perdomo is requesting pt's recent loop transmission. Please fax to [phone].     Requesting to receive by Monday 10/9/23

## 2023-10-06 NOTE — TELEPHONE ENCOUNTER
No aifb noted on device. Brief episode of tachy was noted but nothing to explain his chest pain.  Please let him know

## 2023-10-09 ENCOUNTER — TELEPHONE (OUTPATIENT)
Dept: CARDIOLOGY CLINIC | Age: 73
End: 2023-10-09

## 2023-10-09 NOTE — TELEPHONE ENCOUNTER
Pt called to scheduled an appt w/RMM, he's experiencing hard and fast heart beats. Per Pt he only wants to scheduled w/RMM. Please give date/time of appt, Pt has a loop recorder. Please advise.   Thank you

## 2023-10-11 PROCEDURE — 93298 REM INTERROG DEV EVAL SCRMS: CPT | Performed by: INTERNAL MEDICINE

## 2023-10-11 PROCEDURE — G2066 INTER DEVC REMOTE 30D: HCPCS | Performed by: INTERNAL MEDICINE

## 2023-10-13 DIAGNOSIS — Z51.81 ENCOUNTER FOR MEDICATION MONITORING: ICD-10-CM

## 2023-10-13 DIAGNOSIS — G89.4 CHRONIC PAIN SYNDROME: ICD-10-CM

## 2023-10-13 RX ORDER — OXYCODONE HYDROCHLORIDE AND ACETAMINOPHEN 5; 325 MG/1; MG/1
1 TABLET ORAL 2 TIMES DAILY PRN
Qty: 60 TABLET | Refills: 0 | Status: SHIPPED | OUTPATIENT
Start: 2023-10-13 | End: 2023-11-12

## 2023-10-13 RX ORDER — BUSPIRONE HYDROCHLORIDE 5 MG/1
TABLET ORAL
Qty: 90 TABLET | Refills: 0 | Status: SHIPPED | OUTPATIENT
Start: 2023-10-13

## 2023-10-13 NOTE — TELEPHONE ENCOUNTER
Medication:   Requested Prescriptions     Pending Prescriptions Disp Refills    busPIRone (BUSPAR) 5 MG tablet [Pharmacy Med Name: busPIRone HCl 5 MG Oral Tablet] 90 tablet 0     Sig: TAKE 1 TABLET BY MOUTH THREE TIMES DAILY        Last Filled:  09/11/2023 #90 0rf     Patient Phone Number: [phone] (home)     Last appt: 9/21/2023   Next appt: Visit date not found    Last OARRS:       12/30/2019     4:14 PM   RX Monitoring   Periodic Controlled Substance Monitoring No signs of potential drug abuse or diversion identified.

## 2023-10-13 NOTE — TELEPHONE ENCOUNTER
Patient requesting refill of. ..  oxyCODONE-acetaminophen (PERCOCET) 5-325 MG per tablet 60 tablet 0 6/22/2023 7/22/2023    Sig - Route: Take 1 tablet by mouth 2 times daily as needed for Pain for up to 30 days. Intended supply: 5 days. Take lowest dose possible to manage pain - Oral        Pharmacy. ..   [address] [place] [address] [address], [address] [place] [address] [phone] - F [phone]

## 2023-10-13 NOTE — TELEPHONE ENCOUNTER
Medication:   Requested Prescriptions     Pending Prescriptions Disp Refills    oxyCODONE-acetaminophen (PERCOCET) 5-325 MG per tablet 60 tablet 0     Sig: Take 1 tablet by mouth 2 times daily as needed for Pain for up to 30 days. Intended supply: 5 days. Take lowest dose possible to manage pain        Last Filled:  6/22/2023    Patient Phone Number: [phone] (home)     Last appt: 9/21/2023   Next appt: 10/13/2023    Last OARRS:       12/30/2019     4:14 PM   RX Monitoring   Periodic Controlled Substance Monitoring No signs of potential drug abuse or diversion identified.

## 2023-11-01 NOTE — PROGRESS NOTES
RN    Physician Attestation: I, Dr. Mahogany Lopez, confirm that the scribe's documentation has been prepared under my direction and personally reviewed by me in its entirety. I also confirm that the note above accurately reflects all work, treatment, procedures, and medical decision making performed by me. NOTE: This report was transcribed using voice recognition software. Every effort was made to ensure accuracy, however, inadvertent computerized transcription errors may be present.      Mahogany Lopez MD, MPH  [address] [place] [address]   Office: [phone]  Fax: [phone]

## 2023-11-03 DIAGNOSIS — E03.9 ACQUIRED HYPOTHYROIDISM: ICD-10-CM

## 2023-11-03 RX ORDER — LEVOTHYROXINE SODIUM 0.12 MG/1
TABLET ORAL
Qty: 90 TABLET | Refills: 1 | Status: SHIPPED | OUTPATIENT
Start: 2023-11-03

## 2023-11-03 NOTE — TELEPHONE ENCOUNTER
Medication:   Requested Prescriptions     Pending Prescriptions Disp Refills    levothyroxine (SYNTHROID) 125 MCG tablet [Pharmacy Med Name: Levothyroxine Sodium 125 MCG Oral Tablet] 30 tablet 0     Sig: Take 1 tablet by mouth once daily       Last Filled:  05/05/2023 #30 0rf    Patient Phone Number: [phone] (home)     Last appt: 9/21/2023   Next appt: Visit date not found    Last Thyroid:   Lab Results   Component Value Date/Time    TSH 10.50 08/09/2022 03:15 AM    T4FREE 2.1 08/09/2022 03:15 AM    K9GOJTK 2.12 05/21/2020 12:00 AM     Patient is out of this medication, please refill for patient     Patient would like a call from office once medication is sent in

## 2023-11-07 ENCOUNTER — OFFICE VISIT (OUTPATIENT)
Dept: CARDIOLOGY CLINIC | Age: 73
End: 2023-11-07
Payer: MEDICARE

## 2023-11-07 ENCOUNTER — NURSE ONLY (OUTPATIENT)
Dept: CARDIOLOGY CLINIC | Age: 73
End: 2023-11-07

## 2023-11-07 VITALS
DIASTOLIC BLOOD PRESSURE: 70 MMHG | BODY MASS INDEX: 24.05 KG/M2 | HEART RATE: 84 BPM | WEIGHT: 168 LBS | HEIGHT: 70 IN | OXYGEN SATURATION: 92 % | SYSTOLIC BLOOD PRESSURE: 102 MMHG

## 2023-11-07 DIAGNOSIS — Z45.09 ENCOUNTER FOR LOOP RECORDER CHECK: ICD-10-CM

## 2023-11-07 DIAGNOSIS — I36.1 NONRHEUMATIC TRICUSPID VALVE REGURGITATION: ICD-10-CM

## 2023-11-07 DIAGNOSIS — I10 ESSENTIAL HYPERTENSION: Primary | Chronic | ICD-10-CM

## 2023-11-07 DIAGNOSIS — I35.1 AORTIC VALVE INSUFFICIENCY, ETIOLOGY OF CARDIAC VALVE DISEASE UNSPECIFIED: ICD-10-CM

## 2023-11-07 DIAGNOSIS — I34.0 NONRHEUMATIC MITRAL VALVE REGURGITATION: ICD-10-CM

## 2023-11-07 DIAGNOSIS — I48.0 PAF (PAROXYSMAL ATRIAL FIBRILLATION) (HCC): Chronic | ICD-10-CM

## 2023-11-07 PROCEDURE — 99214 OFFICE O/P EST MOD 30 MIN: CPT | Performed by: INTERNAL MEDICINE

## 2023-11-07 PROCEDURE — 1123F ACP DISCUSS/DSCN MKR DOCD: CPT | Performed by: INTERNAL MEDICINE

## 2023-11-07 PROCEDURE — 3074F SYST BP LT 130 MM HG: CPT | Performed by: INTERNAL MEDICINE

## 2023-11-07 PROCEDURE — 3078F DIAST BP <80 MM HG: CPT | Performed by: INTERNAL MEDICINE

## 2023-11-07 PROCEDURE — 93000 ELECTROCARDIOGRAM COMPLETE: CPT | Performed by: INTERNAL MEDICINE

## 2023-11-07 RX ORDER — METOPROLOL SUCCINATE 50 MG/1
50 TABLET, EXTENDED RELEASE ORAL DAILY
Qty: 30 TABLET | Refills: 3 | Status: SHIPPED | OUTPATIENT
Start: 2023-11-07

## 2023-11-07 RX ORDER — FUROSEMIDE 20 MG/1
20 TABLET ORAL DAILY
COMMUNITY
Start: 2023-10-19

## 2023-11-07 RX ORDER — METHOCARBAMOL 500 MG/1
500 TABLET, FILM COATED ORAL 4 TIMES DAILY
COMMUNITY

## 2023-11-07 NOTE — PROGRESS NOTES
Patient comes in for their OV with Dr. Lyssa Swain today. S/p mdtlinq explant/mdtlinq II Implant on 1/23/2023 with Dr. Lyssa Swain. [facility] Interrogation shows a Battery Status GOOD. Since 10/6/2023 2 symptom recordings that appear to be sinus/ST/SVT with little ectopy. PVC burden 1.3%. Implanted for AF management. Patient remains metoprolol. Not on [facility] medications/had MAZE. Please see interrogation under media tab for more detail. We will continue to follow the Patient remotely.

## 2023-11-15 PROCEDURE — 93298 REM INTERROG DEV EVAL SCRMS: CPT | Performed by: INTERNAL MEDICINE

## 2023-11-15 PROCEDURE — G2066 INTER DEVC REMOTE 30D: HCPCS | Performed by: INTERNAL MEDICINE

## 2023-11-27 DIAGNOSIS — F41.9 ANXIETY: ICD-10-CM

## 2023-11-27 NOTE — TELEPHONE ENCOUNTER
Patient requesting refill of. .. Disp Refills Start End    mirtazapine (REMERON) 7.5 MG tablet 90 tablet 3 10/6/2023     Sig - Route: Take 1 tablet by mouth nightly - Oral      Patient has already tried contacting pharmacy as he should have had refills, however they were unable to fill. Pharmacy. .. [address] [phone] Eliazar Richardson [phone]     He'd like a call back once sent.

## 2023-11-28 RX ORDER — MIRTAZAPINE 7.5 MG/1
7.5 TABLET, FILM COATED ORAL NIGHTLY
Qty: 90 TABLET | Refills: 3 | Status: SHIPPED | OUTPATIENT
Start: 2023-11-28

## 2023-11-28 NOTE — TELEPHONE ENCOUNTER
Medication:   Requested Prescriptions     Pending Prescriptions Disp Refills    mirtazapine (REMERON) 7.5 MG tablet 90 tablet 3     Sig: Take 1 tablet by mouth nightly        Last Filled:  10/06/2023 #90 w/3 RF (PHARMACY STATES NO REFILLS AUTHORIZED SO REQUESTING NEW RX SENT PLEASE)     Patient Phone Number: [phone] (home)     Last appt: 9/21/2023   Next appt: Visit date not found    Last OARRS:       12/30/2019     4:14 PM   RX Monitoring   Periodic Controlled Substance Monitoring No signs of potential drug abuse or diversion identified.

## 2023-12-04 NOTE — TELEPHONE ENCOUNTER
Ostomy care. Good hydration- 2 lit of fluids per day. CBC, BMP AND MAGNESIUM in a week at rehab. Rehab physician to follow up on labs. Nutritional consult. Please advise

## 2023-12-07 DIAGNOSIS — Z51.81 ENCOUNTER FOR MEDICATION MONITORING: ICD-10-CM

## 2023-12-07 RX ORDER — BUSPIRONE HYDROCHLORIDE 5 MG/1
TABLET ORAL
Qty: 90 TABLET | Refills: 0 | Status: SHIPPED | OUTPATIENT
Start: 2023-12-07

## 2023-12-07 RX ORDER — CLOPIDOGREL BISULFATE 75 MG/1
75 TABLET ORAL DAILY
Qty: 90 TABLET | Refills: 1 | Status: SHIPPED | OUTPATIENT
Start: 2023-12-07

## 2023-12-07 NOTE — TELEPHONE ENCOUNTER
Received refill request for plavix from [address] [place] [address].     Last ov: 11/07/2023 RMM    Last Refill: 09/11/2023 #90 w/ 0 refills    Next appointment: 11/12/2024 RMM

## 2023-12-07 NOTE — TELEPHONE ENCOUNTER
Medication:   Requested Prescriptions     Pending Prescriptions Disp Refills    busPIRone (BUSPAR) 5 MG tablet [Pharmacy Med Name: busPIRone HCl 5 MG Oral Tablet] 90 tablet 0     Sig: TAKE 1 TABLET BY MOUTH THREE TIMES DAILY        Last Filled:  10/13/2023 #90 0rf    Patient Phone Number: [phone] (home)     Last appt: 9/21/2023   Next appt: Visit date not found    Last OARRS:       12/30/2019     4:14 PM   RX Monitoring   Periodic Controlled Substance Monitoring No signs of potential drug abuse or diversion identified.

## 2023-12-14 ENCOUNTER — OFFICE VISIT (OUTPATIENT)
Dept: FAMILY MEDICINE CLINIC | Age: 73
End: 2023-12-14
Payer: MEDICARE

## 2023-12-14 VITALS — HEART RATE: 75 BPM | WEIGHT: 167 LBS | OXYGEN SATURATION: 97 % | BODY MASS INDEX: 23.96 KG/M2

## 2023-12-14 DIAGNOSIS — N64.4 BREAST TENDERNESS IN MALE: ICD-10-CM

## 2023-12-14 DIAGNOSIS — G62.9 NEUROPATHY: Primary | ICD-10-CM

## 2023-12-14 DIAGNOSIS — E03.9 ACQUIRED HYPOTHYROIDISM: ICD-10-CM

## 2023-12-14 DIAGNOSIS — M50.30 DEGENERATIVE CERVICAL DISC: ICD-10-CM

## 2023-12-14 DIAGNOSIS — E78.2 MIXED HYPERLIPIDEMIA: ICD-10-CM

## 2023-12-14 DIAGNOSIS — I10 ESSENTIAL HYPERTENSION: ICD-10-CM

## 2023-12-14 DIAGNOSIS — F41.9 ANXIETY: ICD-10-CM

## 2023-12-14 DIAGNOSIS — I10 PRIMARY HYPERTENSION: ICD-10-CM

## 2023-12-14 LAB
BASOPHILS # BLD: 0.1 K/UL (ref 0–0.2)
BASOPHILS NFR BLD: 1.1 %
DEPRECATED RDW RBC AUTO: 13.7 % (ref 12.4–15.4)
EOSINOPHIL # BLD: 0.1 K/UL (ref 0–0.6)
EOSINOPHIL NFR BLD: 2 %
HCT VFR BLD AUTO: 42.6 % (ref 40.5–52.5)
HGB BLD-MCNC: 14.2 G/DL (ref 13.5–17.5)
LYMPHOCYTES # BLD: 1.2 K/UL (ref 1–5.1)
LYMPHOCYTES NFR BLD: 19.7 %
MCH RBC QN AUTO: 31.4 PG (ref 26–34)
MCHC RBC AUTO-ENTMCNC: 33.3 G/DL (ref 31–36)
MCV RBC AUTO: 94.5 FL (ref 80–100)
MONOCYTES # BLD: 0.6 K/UL (ref 0–1.3)
MONOCYTES NFR BLD: 9.3 %
NEUTROPHILS # BLD: 4.2 K/UL (ref 1.7–7.7)
NEUTROPHILS NFR BLD: 67.9 %
PLATELET # BLD AUTO: 226 K/UL (ref 135–450)
PMV BLD AUTO: 8.5 FL (ref 5–10.5)
RBC # BLD AUTO: 4.51 M/UL (ref 4.2–5.9)
WBC # BLD AUTO: 6.2 K/UL (ref 4–11)

## 2023-12-14 PROCEDURE — 1123F ACP DISCUSS/DSCN MKR DOCD: CPT | Performed by: FAMILY MEDICINE

## 2023-12-14 PROCEDURE — 99214 OFFICE O/P EST MOD 30 MIN: CPT | Performed by: FAMILY MEDICINE

## 2023-12-14 RX ORDER — TIZANIDINE 4 MG/1
4 TABLET ORAL 3 TIMES DAILY PRN
Qty: 30 TABLET | Refills: 0 | Status: SHIPPED | OUTPATIENT
Start: 2023-12-14

## 2023-12-14 RX ORDER — GABAPENTIN 100 MG/1
100 CAPSULE ORAL 3 TIMES DAILY
Qty: 270 CAPSULE | Refills: 0 | Status: SHIPPED | OUTPATIENT
Start: 2023-12-14 | End: 2024-03-13

## 2023-12-15 LAB
ANION GAP SERPL CALCULATED.3IONS-SCNC: 11 MMOL/L (ref 3–16)
BUN SERPL-MCNC: 12 MG/DL (ref 7–20)
CALCIUM SERPL-MCNC: 9.4 MG/DL (ref 8.3–10.6)
CHLORIDE SERPL-SCNC: 100 MMOL/L (ref 99–110)
CHOLEST SERPL-MCNC: 163 MG/DL (ref 0–199)
CO2 SERPL-SCNC: 30 MMOL/L (ref 21–32)
CREAT SERPL-MCNC: 0.7 MG/DL (ref 0.8–1.3)
GFR SERPLBLD CREATININE-BSD FMLA CKD-EPI: >60 ML/MIN/{1.73_M2}
GLUCOSE SERPL-MCNC: 91 MG/DL (ref 70–99)
HDLC SERPL-MCNC: 42 MG/DL (ref 40–60)
LDLC SERPL CALC-MCNC: 96 MG/DL
POTASSIUM SERPL-SCNC: 4.1 MMOL/L (ref 3.5–5.1)
SODIUM SERPL-SCNC: 141 MMOL/L (ref 136–145)
TRIGL SERPL-MCNC: 124 MG/DL (ref 0–150)
TSH SERPL DL<=0.005 MIU/L-ACNC: 7.27 UIU/ML (ref 0.27–4.2)
VLDLC SERPL CALC-MCNC: 25 MG/DL

## 2023-12-15 NOTE — PROGRESS NOTES
Chief Complaint: Jaw Pain (Now going down shoulders), Other (Right side nipple pain ), and Chest Pain (Still monitor on inside/)       HPI:  Rosemarie Penaloza is a 72 y.o. 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 He has been constipated. He has to strain a lot since couple of days. Tries to eat regular diet. He also has history of cervical disc degeneration and lumbar disc degeneration. Since couple of days has been experiencing cramping and spasms in his neck muscles. It is radiating to his right shoulder. Denies any injury. He had squamous cell carcinoma of the tongue for which he required radiotherapy. He has a lot of increased difficulty swallowing or chewing food. Now his pain in the neck muscles he attributes to scarring due to radiotherapy. Denies any symptoms of choking when eating food. His recent echo did show worsening of the and Tricuspid and Mitral valve regurgitation. He is not surgical candidate and has been medically managed. Currently he is on metoprolol extended release 50 mg daily spironolactoNE 12.5 mg daily     He has ongoing pain in his lower back. Has been taking Norco 10/325 as needed for pain. He does not exceed half a pill twice a day. He has been taking Remeron 7.5 mg for anxiety and its helping and also taking buspar. His mood is doing better being on Remeron. He has history of COPD and currently not been taking any inhalers. He has a history of hypothyroidism and recent TSH was normal  He has been taking Synthroid 125 mcg daily he has future     He has history of squamous cell carcinoma of the tongue s/p radiation and chemotherapy and follows up with Dr. Jl Frias.     Also complains of tenderness in his right breast.  Denies nipple

## 2023-12-24 PROCEDURE — 93298 REM INTERROG DEV EVAL SCRMS: CPT | Performed by: INTERNAL MEDICINE

## 2023-12-24 PROCEDURE — G2066 INTER DEVC REMOTE 30D: HCPCS | Performed by: INTERNAL MEDICINE

## 2024-01-04 NOTE — TELEPHONE ENCOUNTER
Medication:   Requested Prescriptions     Pending Prescriptions Disp Refills    tiZANidine (ZANAFLEX) 4 MG tablet [Pharmacy Med Name: tiZANidine HCl 4 MG Oral Tablet] 30 tablet 0     Sig: Take 1 tablet by mouth three times daily as needed for muscle spasm        Last Filled:  12/14/23    Patient Phone Number: [phone] (home)     Last appt: 12/14/2023   Next appt: Visit date not found    Last OARRS:       12/30/2019     4:14 PM   RX Monitoring   Periodic Controlled Substance Monitoring No signs of potential drug abuse or diversion identified.

## 2024-01-05 RX ORDER — TIZANIDINE 4 MG/1
4 TABLET ORAL 3 TIMES DAILY PRN
Qty: 30 TABLET | Refills: 0 | Status: SHIPPED | OUTPATIENT
Start: 2024-01-05

## 2024-01-09 ENCOUNTER — TELEPHONE (OUTPATIENT)
Dept: CARDIOLOGY CLINIC | Age: 74
End: 2024-01-09

## 2024-01-14 NOTE — TELEPHONE ENCOUNTER
TS-NP >>  fasting labs were ordered because of a refill request for Rosuvastatin 10 mg which was started in Feb 2021. Patient had lipids drawn in March 2021 (LDL- 103) less than 30 days after statin started. Will forward results to you as you saw him most recently on 4/19/21. Patient scheduled for echo 8/18/21. Has follow up appt with Jakob Spaulding NP 9/14/21. Patient does not have a follow up appointment with NP or Dr. Vinay De Leon. Please advise lipid results and on the timing of next follow up appointment with general cardiology. Clothing

## 2024-01-15 RX ORDER — TIZANIDINE 4 MG/1
4 TABLET ORAL 3 TIMES DAILY PRN
Qty: 30 TABLET | Refills: 0 | OUTPATIENT
Start: 2024-01-15

## 2024-01-15 RX ORDER — TIZANIDINE 4 MG/1
4 TABLET ORAL EVERY 8 HOURS PRN
Qty: 90 TABLET | Refills: 5 | Status: SHIPPED | OUTPATIENT
Start: 2024-01-15

## 2024-01-15 NOTE — TELEPHONE ENCOUNTER
Medication:   Requested Prescriptions     Pending Prescriptions Disp Refills    tiZANidine (ZANAFLEX) 4 MG tablet [Pharmacy Med Name: tiZANidine HCl 4 MG Oral Tablet] 30 tablet 0     Sig: Take 1 tablet by mouth three times daily as needed for muscle spasm        Last Filled:  01/05/2024 #30 0rf     Duplicate request

## 2024-01-15 NOTE — TELEPHONE ENCOUNTER
Pt has one pill left since he takes 3 a day and the quantity was 30 for a 10 day supply. He is needing more since his back is not healed.     Please advise and thank you.

## 2024-01-27 DIAGNOSIS — E78.2 MIXED HYPERLIPIDEMIA: ICD-10-CM

## 2024-01-27 DIAGNOSIS — I25.10 CORONARY ARTERY DISEASE INVOLVING NATIVE CORONARY ARTERY OF NATIVE HEART WITHOUT ANGINA PECTORIS: ICD-10-CM

## 2024-01-28 PROCEDURE — 93298 REM INTERROG DEV EVAL SCRMS: CPT | Performed by: INTERNAL MEDICINE

## 2024-01-29 DIAGNOSIS — Z51.81 ENCOUNTER FOR MEDICATION MONITORING: ICD-10-CM

## 2024-01-29 RX ORDER — ROSUVASTATIN CALCIUM 40 MG/1
40 TABLET, COATED ORAL DAILY
Qty: 90 TABLET | Refills: 3 | Status: SHIPPED | OUTPATIENT
Start: 2024-01-29

## 2024-01-29 RX ORDER — ROSUVASTATIN CALCIUM 40 MG/1
TABLET, COATED ORAL
Qty: 90 TABLET | Refills: 0 | Status: SHIPPED | OUTPATIENT
Start: 2024-01-29 | End: 2024-01-29

## 2024-01-29 NOTE — TELEPHONE ENCOUNTER
Last OV: 11/07/2023 Vish  Last Labs:lipid-12/14/2023   Next OV: 11/12/2024  Vish  Last Refill: Rosuvastatin-02/13/2023  Carl

## 2024-01-30 RX ORDER — BUSPIRONE HYDROCHLORIDE 5 MG/1
TABLET ORAL
Qty: 90 TABLET | Refills: 0 | Status: SHIPPED | OUTPATIENT
Start: 2024-01-30 | End: 2024-02-01

## 2024-01-30 NOTE — TELEPHONE ENCOUNTER
Medication:   Requested Prescriptions     Pending Prescriptions Disp Refills    busPIRone (BUSPAR) 5 MG tablet [Pharmacy Med Name: busPIRone HCl 5 MG Oral Tablet] 90 tablet 0     Sig: TAKE 1 TABLET BY MOUTH THREE TIMES DAILY        Last Filled:  medication was discontinued please send in if patient needs to take      Patient Phone Number: [phone] (home)     Last appt: 12/14/2023   Next appt: Visit date not found    Last OARRS:       12/30/2019     4:14 PM   RX Monitoring   Periodic Controlled Substance Monitoring No signs of potential drug abuse or diversion identified.

## 2024-02-01 ENCOUNTER — APPOINTMENT (OUTPATIENT)
Dept: GENERAL RADIOLOGY | Age: 74
DRG: 287 | End: 2024-02-01
Payer: MEDICARE

## 2024-02-01 ENCOUNTER — HOSPITAL ENCOUNTER (INPATIENT)
Age: 74
LOS: 3 days | Discharge: HOME | DRG: 287 | End: 2024-02-04
Attending: EMERGENCY MEDICINE | Admitting: STUDENT IN AN ORGANIZED HEALTH CARE EDUCATION/TRAINING PROGRAM
Payer: MEDICARE

## 2024-02-01 DIAGNOSIS — R07.9 CHEST PAIN, UNSPECIFIED TYPE: Primary | ICD-10-CM

## 2024-02-01 DIAGNOSIS — Z51.81 ENCOUNTER FOR MEDICATION MONITORING: ICD-10-CM

## 2024-02-01 LAB
ALBUMIN SERPL-MCNC: 3.9 G/DL (ref 3.4–5)
ALBUMIN/GLOB SERPL: 1.7 {RATIO} (ref 1.1–2.2)
ALP SERPL-CCNC: 42 U/L (ref 40–129)
ALT SERPL-CCNC: 17 U/L (ref 10–40)
ANION GAP SERPL CALCULATED.3IONS-SCNC: 9 MMOL/L (ref 3–16)
AST SERPL-CCNC: 22 U/L (ref 15–37)
BASOPHILS # BLD: 0.1 K/UL (ref 0–0.2)
BASOPHILS NFR BLD: 1 %
BILIRUB SERPL-MCNC: 0.6 MG/DL (ref 0–1)
BUN SERPL-MCNC: 13 MG/DL (ref 7–20)
CALCIUM SERPL-MCNC: 8.3 MG/DL (ref 8.3–10.6)
CHLORIDE SERPL-SCNC: 104 MMOL/L (ref 99–110)
CO2 SERPL-SCNC: 24 MMOL/L (ref 21–32)
CREAT SERPL-MCNC: 0.6 MG/DL (ref 0.8–1.3)
D DIMER: 0.54 UG/ML FEU (ref 0–0.6)
DEPRECATED RDW RBC AUTO: 13.6 % (ref 12.4–15.4)
EOSINOPHIL # BLD: 0.2 K/UL (ref 0–0.6)
EOSINOPHIL NFR BLD: 3.1 %
GFR SERPLBLD CREATININE-BSD FMLA CKD-EPI: >60 ML/MIN/{1.73_M2}
GLUCOSE SERPL-MCNC: 93 MG/DL (ref 70–99)
HCT VFR BLD AUTO: 36.4 % (ref 40.5–52.5)
HGB BLD-MCNC: 12.4 G/DL (ref 13.5–17.5)
LYMPHOCYTES # BLD: 1.3 K/UL (ref 1–5.1)
LYMPHOCYTES NFR BLD: 23.6 %
MAGNESIUM SERPL-MCNC: 2.1 MG/DL (ref 1.8–2.4)
MCH RBC QN AUTO: 32.3 PG (ref 26–34)
MCHC RBC AUTO-ENTMCNC: 34.1 G/DL (ref 31–36)
MCV RBC AUTO: 94.6 FL (ref 80–100)
MONOCYTES # BLD: 0.5 K/UL (ref 0–1.3)
MONOCYTES NFR BLD: 8.4 %
NEUTROPHILS # BLD: 3.6 K/UL (ref 1.7–7.7)
NEUTROPHILS NFR BLD: 63.9 %
PLATELET # BLD AUTO: 201 K/UL (ref 135–450)
PMV BLD AUTO: 7.1 FL (ref 5–10.5)
POTASSIUM SERPL-SCNC: 3.5 MMOL/L (ref 3.5–5.1)
PROT SERPL-MCNC: 6.2 G/DL (ref 6.4–8.2)
RBC # BLD AUTO: 3.85 M/UL (ref 4.2–5.9)
SODIUM SERPL-SCNC: 137 MMOL/L (ref 136–145)
TROPONIN, HIGH SENSITIVITY: 7 NG/L (ref 0–22)
WBC # BLD AUTO: 5.6 K/UL (ref 4–11)

## 2024-02-01 PROCEDURE — 84484 ASSAY OF TROPONIN QUANT: CPT

## 2024-02-01 PROCEDURE — 2580000003 HC RX 258: Performed by: NURSE PRACTITIONER

## 2024-02-01 PROCEDURE — 83735 ASSAY OF MAGNESIUM: CPT

## 2024-02-01 PROCEDURE — 71045 X-RAY EXAM CHEST 1 VIEW: CPT

## 2024-02-01 PROCEDURE — 99285 EMERGENCY DEPT VISIT HI MDM: CPT

## 2024-02-01 PROCEDURE — 85025 COMPLETE CBC W/AUTO DIFF WBC: CPT

## 2024-02-01 PROCEDURE — 6370000000 HC RX 637 (ALT 250 FOR IP): Performed by: NURSE PRACTITIONER

## 2024-02-01 PROCEDURE — 80053 COMPREHEN METABOLIC PANEL: CPT

## 2024-02-01 PROCEDURE — 85379 FIBRIN DEGRADATION QUANT: CPT

## 2024-02-01 PROCEDURE — 2060000000 HC ICU INTERMEDIATE R&B

## 2024-02-01 PROCEDURE — 93005 ELECTROCARDIOGRAM TRACING: CPT | Performed by: EMERGENCY MEDICINE

## 2024-02-01 RX ORDER — PANTOPRAZOLE SODIUM 40 MG/1
40 TABLET, DELAYED RELEASE ORAL
Status: DISCONTINUED | OUTPATIENT
Start: 2024-02-02 | End: 2024-02-04 | Stop reason: HOSPADM

## 2024-02-01 RX ORDER — ONDANSETRON 4 MG/1
4 TABLET, ORALLY DISINTEGRATING ORAL EVERY 8 HOURS PRN
Status: DISCONTINUED | OUTPATIENT
Start: 2024-02-01 | End: 2024-02-04 | Stop reason: HOSPADM

## 2024-02-01 RX ORDER — ACETAMINOPHEN 650 MG/1
650 SUPPOSITORY RECTAL EVERY 6 HOURS PRN
Status: DISCONTINUED | OUTPATIENT
Start: 2024-02-01 | End: 2024-02-04 | Stop reason: HOSPADM

## 2024-02-01 RX ORDER — ACETAMINOPHEN 325 MG/1
650 TABLET ORAL EVERY 6 HOURS PRN
Status: DISCONTINUED | OUTPATIENT
Start: 2024-02-01 | End: 2024-02-04 | Stop reason: HOSPADM

## 2024-02-01 RX ORDER — PANTOPRAZOLE SODIUM 40 MG/1
TABLET, DELAYED RELEASE ORAL
Qty: 180 TABLET | Refills: 0 | Status: SHIPPED | OUTPATIENT
Start: 2024-02-01

## 2024-02-01 RX ORDER — DOCUSATE SODIUM 100 MG/1
100 CAPSULE, LIQUID FILLED ORAL 2 TIMES DAILY
COMMUNITY

## 2024-02-01 RX ORDER — ONDANSETRON 2 MG/ML
4 INJECTION INTRAMUSCULAR; INTRAVENOUS EVERY 6 HOURS PRN
Status: DISCONTINUED | OUTPATIENT
Start: 2024-02-01 | End: 2024-02-04 | Stop reason: HOSPADM

## 2024-02-01 RX ORDER — BUSPIRONE HYDROCHLORIDE 5 MG/1
TABLET ORAL
Qty: 90 TABLET | Refills: 0 | Status: SHIPPED | OUTPATIENT
Start: 2024-02-01

## 2024-02-01 RX ORDER — OXYCODONE HYDROCHLORIDE AND ACETAMINOPHEN 5; 325 MG/1; MG/1
1 TABLET ORAL EVERY 12 HOURS PRN
COMMUNITY

## 2024-02-01 RX ORDER — DOCUSATE SODIUM 100 MG/1
100 CAPSULE, LIQUID FILLED ORAL 2 TIMES DAILY
Status: DISCONTINUED | OUTPATIENT
Start: 2024-02-01 | End: 2024-02-04 | Stop reason: HOSPADM

## 2024-02-01 RX ORDER — SODIUM CHLORIDE 9 MG/ML
INJECTION, SOLUTION INTRAVENOUS PRN
Status: DISCONTINUED | OUTPATIENT
Start: 2024-02-01 | End: 2024-02-04 | Stop reason: HOSPADM

## 2024-02-01 RX ORDER — MIRTAZAPINE 15 MG/1
7.5 TABLET, FILM COATED ORAL NIGHTLY
Status: DISCONTINUED | OUTPATIENT
Start: 2024-02-01 | End: 2024-02-04 | Stop reason: HOSPADM

## 2024-02-01 RX ORDER — LEVOTHYROXINE SODIUM 0.15 MG/1
150 TABLET ORAL
Status: DISCONTINUED | OUTPATIENT
Start: 2024-02-02 | End: 2024-02-04 | Stop reason: HOSPADM

## 2024-02-01 RX ORDER — BUSPIRONE HYDROCHLORIDE 5 MG/1
5 TABLET ORAL 2 TIMES DAILY
Status: DISCONTINUED | OUTPATIENT
Start: 2024-02-01 | End: 2024-02-04 | Stop reason: HOSPADM

## 2024-02-01 RX ORDER — METOPROLOL SUCCINATE 50 MG/1
50 TABLET, EXTENDED RELEASE ORAL DAILY
Status: DISCONTINUED | OUTPATIENT
Start: 2024-02-02 | End: 2024-02-03

## 2024-02-01 RX ORDER — MAGNESIUM SULFATE IN WATER 40 MG/ML
2000 INJECTION, SOLUTION INTRAVENOUS PRN
Status: DISCONTINUED | OUTPATIENT
Start: 2024-02-01 | End: 2024-02-04 | Stop reason: HOSPADM

## 2024-02-01 RX ORDER — SODIUM CHLORIDE 0.9 % (FLUSH) 0.9 %
5-40 SYRINGE (ML) INJECTION EVERY 12 HOURS SCHEDULED
Status: DISCONTINUED | OUTPATIENT
Start: 2024-02-01 | End: 2024-02-04 | Stop reason: HOSPADM

## 2024-02-01 RX ORDER — POTASSIUM CHLORIDE 7.45 MG/ML
10 INJECTION INTRAVENOUS PRN
Status: DISCONTINUED | OUTPATIENT
Start: 2024-02-01 | End: 2024-02-04 | Stop reason: HOSPADM

## 2024-02-01 RX ORDER — SODIUM CHLORIDE 0.9 % (FLUSH) 0.9 %
5-40 SYRINGE (ML) INJECTION PRN
Status: DISCONTINUED | OUTPATIENT
Start: 2024-02-01 | End: 2024-02-04 | Stop reason: HOSPADM

## 2024-02-01 RX ORDER — NITROGLYCERIN 0.4 MG/1
0.4 TABLET SUBLINGUAL EVERY 5 MIN PRN
Status: DISCONTINUED | OUTPATIENT
Start: 2024-02-01 | End: 2024-02-04 | Stop reason: HOSPADM

## 2024-02-01 RX ORDER — POTASSIUM CHLORIDE 20 MEQ/1
40 TABLET, EXTENDED RELEASE ORAL PRN
Status: DISCONTINUED | OUTPATIENT
Start: 2024-02-01 | End: 2024-02-04 | Stop reason: HOSPADM

## 2024-02-01 RX ORDER — CLOPIDOGREL BISULFATE 75 MG/1
75 TABLET ORAL DAILY
Status: DISCONTINUED | OUTPATIENT
Start: 2024-02-02 | End: 2024-02-04 | Stop reason: HOSPADM

## 2024-02-01 RX ORDER — ROSUVASTATIN CALCIUM 40 MG/1
40 TABLET, COATED ORAL DAILY
Status: DISCONTINUED | OUTPATIENT
Start: 2024-02-02 | End: 2024-02-04 | Stop reason: HOSPADM

## 2024-02-01 RX ORDER — ENOXAPARIN SODIUM 100 MG/ML
40 INJECTION SUBCUTANEOUS DAILY
Status: DISCONTINUED | OUTPATIENT
Start: 2024-02-02 | End: 2024-02-04 | Stop reason: HOSPADM

## 2024-02-01 RX ORDER — VITAMIN B COMPLEX
2000 TABLET ORAL DAILY
Status: DISCONTINUED | OUTPATIENT
Start: 2024-02-02 | End: 2024-02-04 | Stop reason: HOSPADM

## 2024-02-01 RX ORDER — POLYETHYLENE GLYCOL 3350 17 G/17G
17 POWDER, FOR SOLUTION ORAL DAILY PRN
Status: DISCONTINUED | OUTPATIENT
Start: 2024-02-01 | End: 2024-02-04 | Stop reason: HOSPADM

## 2024-02-01 RX ORDER — OXYCODONE HYDROCHLORIDE AND ACETAMINOPHEN 5; 325 MG/1; MG/1
1 TABLET ORAL EVERY 12 HOURS PRN
Status: DISCONTINUED | OUTPATIENT
Start: 2024-02-01 | End: 2024-02-04 | Stop reason: HOSPADM

## 2024-02-01 RX ORDER — GABAPENTIN 100 MG/1
100 CAPSULE ORAL 3 TIMES DAILY
Status: DISCONTINUED | OUTPATIENT
Start: 2024-02-01 | End: 2024-02-04 | Stop reason: HOSPADM

## 2024-02-01 RX ORDER — TIZANIDINE 4 MG/1
4 TABLET ORAL EVERY 8 HOURS PRN
Status: DISCONTINUED | OUTPATIENT
Start: 2024-02-01 | End: 2024-02-04 | Stop reason: HOSPADM

## 2024-02-01 RX ORDER — ASPIRIN 81 MG/1
81 TABLET ORAL DAILY
Status: DISCONTINUED | OUTPATIENT
Start: 2024-02-02 | End: 2024-02-04 | Stop reason: HOSPADM

## 2024-02-01 RX ADMIN — DOCUSATE SODIUM 100 MG: 100 CAPSULE, LIQUID FILLED ORAL at 23:01

## 2024-02-01 RX ADMIN — TIZANIDINE 4 MG: 4 TABLET ORAL at 22:59

## 2024-02-01 RX ADMIN — OXYCODONE AND ACETAMINOPHEN 1 TABLET: 5; 325 TABLET ORAL at 23:00

## 2024-02-01 RX ADMIN — BUSPIRONE HYDROCHLORIDE 5 MG: 5 TABLET ORAL at 22:59

## 2024-02-01 RX ADMIN — MIRTAZAPINE 7.5 MG: 15 TABLET, FILM COATED ORAL at 23:01

## 2024-02-01 RX ADMIN — SODIUM CHLORIDE, PRESERVATIVE FREE 10 ML: 5 INJECTION INTRAVENOUS at 23:03

## 2024-02-01 RX ADMIN — GABAPENTIN 100 MG: 100 CAPSULE ORAL at 23:02

## 2024-02-01 ASSESSMENT — PAIN DESCRIPTION - LOCATION: LOCATION: CHEST;BACK

## 2024-02-01 ASSESSMENT — PAIN SCALES - GENERAL
PAINLEVEL_OUTOF10: 5
PAINLEVEL_OUTOF10: 0

## 2024-02-01 ASSESSMENT — PAIN DESCRIPTION - DESCRIPTORS: DESCRIPTORS: ACHING

## 2024-02-01 ASSESSMENT — PAIN - FUNCTIONAL ASSESSMENT: PAIN_FUNCTIONAL_ASSESSMENT: ACTIVITIES ARE NOT PREVENTED

## 2024-02-01 ASSESSMENT — PAIN DESCRIPTION - ORIENTATION: ORIENTATION: LOWER;MID

## 2024-02-01 NOTE — TELEPHONE ENCOUNTER
Medication:   Requested Prescriptions     Pending Prescriptions Disp Refills    busPIRone (BUSPAR) 5 MG tablet [Pharmacy Med Name: busPIRone HCl 5 MG Oral Tablet] 90 tablet 0     Sig: TAKE 1 TABLET BY MOUTH THREE TIMES DAILY        Last Filled:  1/30/24    Patient Phone Number: [phone] (home)     Last appt: 12/14/2023   Next appt: 2/1/2024    Last OARRS:       12/30/2019     4:14 PM   RX Monitoring   Periodic Controlled Substance Monitoring No signs of potential drug abuse or diversion identified.

## 2024-02-01 NOTE — TELEPHONE ENCOUNTER
Medication:   Requested Prescriptions     Pending Prescriptions Disp Refills    pantoprazole (PROTONIX) 40 MG tablet [Pharmacy Med Name: Pantoprazole Sodium 40 MG Oral Tablet Delayed Release] 180 tablet 0     Sig: TAKE 1 TABLET BY MOUTH TWICE DAILY BEFORE MEAL(S)        Last Filled:  03/20/2023 #180 3rf     Patient Phone Number: [phone] (home)     Last appt: 12/14/2023   Next appt: Visit date not found    Last OARRS:       12/30/2019     4:14 PM   RX Monitoring   Periodic Controlled Substance Monitoring No signs of potential drug abuse or diversion identified.

## 2024-02-02 LAB
ANION GAP SERPL CALCULATED.3IONS-SCNC: 7 MMOL/L (ref 3–16)
BUN SERPL-MCNC: 16 MG/DL (ref 7–20)
CALCIUM SERPL-MCNC: 8.6 MG/DL (ref 8.3–10.6)
CHLORIDE SERPL-SCNC: 106 MMOL/L (ref 99–110)
CO2 SERPL-SCNC: 27 MMOL/L (ref 21–32)
CREAT SERPL-MCNC: 0.6 MG/DL (ref 0.8–1.3)
DEPRECATED RDW RBC AUTO: 13.5 % (ref 12.4–15.4)
EKG ATRIAL RATE: 55 BPM
EKG ATRIAL RATE: 67 BPM
EKG DIAGNOSIS: NORMAL
EKG DIAGNOSIS: NORMAL
EKG P AXIS: 63 DEGREES
EKG P AXIS: 96 DEGREES
EKG P-R INTERVAL: 246 MS
EKG P-R INTERVAL: 264 MS
EKG Q-T INTERVAL: 422 MS
EKG Q-T INTERVAL: 452 MS
EKG QRS DURATION: 76 MS
EKG QRS DURATION: 78 MS
EKG QTC CALCULATION (BAZETT): 432 MS
EKG QTC CALCULATION (BAZETT): 445 MS
EKG R AXIS: -11 DEGREES
EKG R AXIS: -15 DEGREES
EKG T AXIS: -6 DEGREES
EKG T AXIS: 9 DEGREES
EKG VENTRICULAR RATE: 55 BPM
EKG VENTRICULAR RATE: 67 BPM
GFR SERPLBLD CREATININE-BSD FMLA CKD-EPI: >60 ML/MIN/{1.73_M2}
GLUCOSE SERPL-MCNC: 96 MG/DL (ref 70–99)
HCT VFR BLD AUTO: 35.1 % (ref 40.5–52.5)
HGB BLD-MCNC: 12.1 G/DL (ref 13.5–17.5)
LEFT VENTRICULAR EJECTION FRACTION MODE: NORMAL
LV EF: 60 %
MCH RBC QN AUTO: 32.6 PG (ref 26–34)
MCHC RBC AUTO-ENTMCNC: 34.4 G/DL (ref 31–36)
MCV RBC AUTO: 94.9 FL (ref 80–100)
PLATELET # BLD AUTO: 184 K/UL (ref 135–450)
PMV BLD AUTO: 7.1 FL (ref 5–10.5)
POTASSIUM SERPL-SCNC: 3.9 MMOL/L (ref 3.5–5.1)
RBC # BLD AUTO: 3.7 M/UL (ref 4.2–5.9)
SODIUM SERPL-SCNC: 140 MMOL/L (ref 136–145)
T4 FREE SERPL-MCNC: 2.2 NG/DL (ref 0.9–1.8)
TROPONIN, HIGH SENSITIVITY: 7 NG/L (ref 0–22)
TSH SERPL DL<=0.005 MIU/L-ACNC: 7.87 UIU/ML (ref 0.27–4.2)
WBC # BLD AUTO: 4.2 K/UL (ref 4–11)

## 2024-02-02 PROCEDURE — 6370000000 HC RX 637 (ALT 250 FOR IP): Performed by: NURSE PRACTITIONER

## 2024-02-02 PROCEDURE — 93017 CV STRESS TEST TRACING ONLY: CPT | Performed by: INTERNAL MEDICINE

## 2024-02-02 PROCEDURE — 7100000010 HC PHASE II RECOVERY - FIRST 15 MIN

## 2024-02-02 PROCEDURE — 2580000003 HC RX 258: Performed by: NURSE PRACTITIONER

## 2024-02-02 PROCEDURE — B2111ZZ FLUOROSCOPY OF MULTIPLE CORONARY ARTERIES USING LOW OSMOLAR CONTRAST: ICD-10-PCS | Performed by: INTERNAL MEDICINE

## 2024-02-02 PROCEDURE — 93010 ELECTROCARDIOGRAM REPORT: CPT | Performed by: INTERNAL MEDICINE

## 2024-02-02 PROCEDURE — C1769 GUIDE WIRE: HCPCS

## 2024-02-02 PROCEDURE — 85027 COMPLETE CBC AUTOMATED: CPT

## 2024-02-02 PROCEDURE — 93325 DOPPLER ECHO COLOR FLOW MAPG: CPT | Performed by: INTERNAL MEDICINE

## 2024-02-02 PROCEDURE — 6360000004 HC RX CONTRAST MEDICATION: Performed by: NURSE PRACTITIONER

## 2024-02-02 PROCEDURE — 84484 ASSAY OF TROPONIN QUANT: CPT

## 2024-02-02 PROCEDURE — 99152 MOD SED SAME PHYS/QHP 5/>YRS: CPT

## 2024-02-02 PROCEDURE — C1894 INTRO/SHEATH, NON-LASER: HCPCS

## 2024-02-02 PROCEDURE — 99152 MOD SED SAME PHYS/QHP 5/>YRS: CPT | Performed by: INTERNAL MEDICINE

## 2024-02-02 PROCEDURE — 2580000003 HC RX 258: Performed by: INTERNAL MEDICINE

## 2024-02-02 PROCEDURE — 93458 L HRT ARTERY/VENTRICLE ANGIO: CPT

## 2024-02-02 PROCEDURE — 84439 ASSAY OF FREE THYROXINE: CPT

## 2024-02-02 PROCEDURE — 1200000000 HC SEMI PRIVATE

## 2024-02-02 PROCEDURE — 4A023N7 MEASUREMENT OF CARDIAC SAMPLING AND PRESSURE, LEFT HEART, PERCUTANEOUS APPROACH: ICD-10-PCS | Performed by: INTERNAL MEDICINE

## 2024-02-02 PROCEDURE — A9502 TC99M TETROFOSMIN: HCPCS | Performed by: INTERNAL MEDICINE

## 2024-02-02 PROCEDURE — C8929 TTE W OR WO FOL WCON,DOPPLER: HCPCS

## 2024-02-02 PROCEDURE — 36415 COLL VENOUS BLD VENIPUNCTURE: CPT

## 2024-02-02 PROCEDURE — 80048 BASIC METABOLIC PNL TOTAL CA: CPT

## 2024-02-02 PROCEDURE — 93312 ECHO TRANSESOPHAGEAL: CPT

## 2024-02-02 PROCEDURE — B2151ZZ FLUOROSCOPY OF LEFT HEART USING LOW OSMOLAR CONTRAST: ICD-10-PCS | Performed by: INTERNAL MEDICINE

## 2024-02-02 PROCEDURE — 93312 ECHO TRANSESOPHAGEAL: CPT | Performed by: INTERNAL MEDICINE

## 2024-02-02 PROCEDURE — 2709999900 HC NON-CHARGEABLE SUPPLY

## 2024-02-02 PROCEDURE — 93458 L HRT ARTERY/VENTRICLE ANGIO: CPT | Performed by: INTERNAL MEDICINE

## 2024-02-02 PROCEDURE — 93320 DOPPLER ECHO COMPLETE: CPT | Performed by: INTERNAL MEDICINE

## 2024-02-02 PROCEDURE — 2500000003 HC RX 250 WO HCPCS

## 2024-02-02 PROCEDURE — 93319 3D ECHO IMG CGEN CAR ANOMAL: CPT

## 2024-02-02 PROCEDURE — 99222 1ST HOSP IP/OBS MODERATE 55: CPT | Performed by: INTERNAL MEDICINE

## 2024-02-02 PROCEDURE — 3430000000 HC RX DIAGNOSTIC RADIOPHARMACEUTICAL: Performed by: INTERNAL MEDICINE

## 2024-02-02 PROCEDURE — 6360000002 HC RX W HCPCS

## 2024-02-02 PROCEDURE — 6360000004 HC RX CONTRAST MEDICATION: Performed by: INTERNAL MEDICINE

## 2024-02-02 PROCEDURE — 84443 ASSAY THYROID STIM HORMONE: CPT

## 2024-02-02 PROCEDURE — 93005 ELECTROCARDIOGRAM TRACING: CPT | Performed by: NURSE PRACTITIONER

## 2024-02-02 PROCEDURE — 78451 HT MUSCLE IMAGE SPECT SING: CPT | Performed by: INTERNAL MEDICINE

## 2024-02-02 RX ORDER — SODIUM CHLORIDE 0.9 % (FLUSH) 0.9 %
5-40 SYRINGE (ML) INJECTION PRN
Status: DISCONTINUED | OUTPATIENT
Start: 2024-02-02 | End: 2024-02-04 | Stop reason: HOSPADM

## 2024-02-02 RX ORDER — ACETAMINOPHEN 325 MG/1
650 TABLET ORAL EVERY 4 HOURS PRN
Status: DISCONTINUED | OUTPATIENT
Start: 2024-02-02 | End: 2024-02-04 | Stop reason: HOSPADM

## 2024-02-02 RX ORDER — SODIUM CHLORIDE 0.9 % (FLUSH) 0.9 %
5-40 SYRINGE (ML) INJECTION EVERY 12 HOURS SCHEDULED
Status: DISCONTINUED | OUTPATIENT
Start: 2024-02-02 | End: 2024-02-04 | Stop reason: HOSPADM

## 2024-02-02 RX ORDER — OXYCODONE HYDROCHLORIDE AND ACETAMINOPHEN 5; 325 MG/1; MG/1
1 TABLET ORAL EVERY 4 HOURS PRN
Status: DISCONTINUED | OUTPATIENT
Start: 2024-02-02 | End: 2024-02-04 | Stop reason: HOSPADM

## 2024-02-02 RX ORDER — SODIUM CHLORIDE 9 MG/ML
INJECTION, SOLUTION INTRAVENOUS PRN
Status: DISCONTINUED | OUTPATIENT
Start: 2024-02-02 | End: 2024-02-04 | Stop reason: HOSPADM

## 2024-02-02 RX ORDER — REGADENOSON 0.08 MG/ML
0.4 INJECTION, SOLUTION INTRAVENOUS
Status: DISCONTINUED | OUTPATIENT
Start: 2024-02-02 | End: 2024-02-04 | Stop reason: HOSPADM

## 2024-02-02 RX ORDER — ONDANSETRON 2 MG/ML
4 INJECTION INTRAMUSCULAR; INTRAVENOUS EVERY 6 HOURS PRN
Status: DISCONTINUED | OUTPATIENT
Start: 2024-02-02 | End: 2024-02-04 | Stop reason: HOSPADM

## 2024-02-02 RX ORDER — SODIUM CHLORIDE 9 MG/ML
INJECTION, SOLUTION INTRAVENOUS CONTINUOUS
Status: ACTIVE | OUTPATIENT
Start: 2024-02-02 | End: 2024-02-02

## 2024-02-02 RX ORDER — 0.9 % SODIUM CHLORIDE 0.9 %
500 INTRAVENOUS SOLUTION INTRAVENOUS ONCE
Status: COMPLETED | OUTPATIENT
Start: 2024-02-02 | End: 2024-02-02

## 2024-02-02 RX ORDER — OXYCODONE HYDROCHLORIDE AND ACETAMINOPHEN 5; 325 MG/1; MG/1
2 TABLET ORAL EVERY 4 HOURS PRN
Status: DISCONTINUED | OUTPATIENT
Start: 2024-02-02 | End: 2024-02-04 | Stop reason: HOSPADM

## 2024-02-02 RX ADMIN — ASPIRIN 81 MG: 81 TABLET, COATED ORAL at 08:10

## 2024-02-02 RX ADMIN — GABAPENTIN 100 MG: 100 CAPSULE ORAL at 08:11

## 2024-02-02 RX ADMIN — TIZANIDINE 4 MG: 4 TABLET ORAL at 08:11

## 2024-02-02 RX ADMIN — PANTOPRAZOLE SODIUM 40 MG: 40 TABLET, DELAYED RELEASE ORAL at 06:58

## 2024-02-02 RX ADMIN — OXYCODONE AND ACETAMINOPHEN 1 TABLET: 5; 325 TABLET ORAL at 20:40

## 2024-02-02 RX ADMIN — CHOLECALCIFEROL TAB 25 MCG (1000 UNIT) 2000 UNITS: 25 TAB at 08:11

## 2024-02-02 RX ADMIN — BUSPIRONE HYDROCHLORIDE 5 MG: 5 TABLET ORAL at 20:35

## 2024-02-02 RX ADMIN — BUSPIRONE HYDROCHLORIDE 5 MG: 5 TABLET ORAL at 08:10

## 2024-02-02 RX ADMIN — TIZANIDINE 4 MG: 4 TABLET ORAL at 23:57

## 2024-02-02 RX ADMIN — PERFLUTREN 1.5 ML: 6.52 INJECTION, SUSPENSION INTRAVENOUS at 11:45

## 2024-02-02 RX ADMIN — DOCUSATE SODIUM 100 MG: 100 CAPSULE, LIQUID FILLED ORAL at 08:11

## 2024-02-02 RX ADMIN — MIRTAZAPINE 7.5 MG: 15 TABLET, FILM COATED ORAL at 20:35

## 2024-02-02 RX ADMIN — CLOPIDOGREL BISULFATE 75 MG: 75 TABLET ORAL at 08:11

## 2024-02-02 RX ADMIN — OXYCODONE AND ACETAMINOPHEN 1 TABLET: 5; 325 TABLET ORAL at 09:32

## 2024-02-02 RX ADMIN — LEVOTHYROXINE SODIUM 150 MCG: 0.15 TABLET ORAL at 06:59

## 2024-02-02 RX ADMIN — IOPAMIDOL 70 ML: 755 INJECTION, SOLUTION INTRAVENOUS at 14:56

## 2024-02-02 RX ADMIN — DOCUSATE SODIUM 100 MG: 100 CAPSULE, LIQUID FILLED ORAL at 20:35

## 2024-02-02 RX ADMIN — SODIUM CHLORIDE, PRESERVATIVE FREE 10 ML: 5 INJECTION INTRAVENOUS at 08:11

## 2024-02-02 RX ADMIN — ROSUVASTATIN CALCIUM 40 MG: 40 TABLET, COATED ORAL at 08:11

## 2024-02-02 RX ADMIN — SODIUM CHLORIDE 500 ML: 9 INJECTION, SOLUTION INTRAVENOUS at 01:12

## 2024-02-02 RX ADMIN — SODIUM CHLORIDE, PRESERVATIVE FREE 10 ML: 5 INJECTION INTRAVENOUS at 20:37

## 2024-02-02 RX ADMIN — SODIUM CHLORIDE: 9 INJECTION, SOLUTION INTRAVENOUS at 18:22

## 2024-02-02 RX ADMIN — GABAPENTIN 100 MG: 100 CAPSULE ORAL at 20:35

## 2024-02-02 RX ADMIN — SODIUM CHLORIDE, PRESERVATIVE FREE 10 ML: 5 INJECTION INTRAVENOUS at 20:43

## 2024-02-02 RX ADMIN — TETROFOSMIN 10 MILLICURIE: 1.38 INJECTION, POWDER, LYOPHILIZED, FOR SOLUTION INTRAVENOUS at 10:39

## 2024-02-02 RX ADMIN — PANTOPRAZOLE SODIUM 40 MG: 40 TABLET, DELAYED RELEASE ORAL at 20:42

## 2024-02-02 ASSESSMENT — PAIN SCALES - GENERAL
PAINLEVEL_OUTOF10: 0
PAINLEVEL_OUTOF10: 3
PAINLEVEL_OUTOF10: 6
PAINLEVEL_OUTOF10: 6
PAINLEVEL_OUTOF10: 2
PAINLEVEL_OUTOF10: 2

## 2024-02-02 ASSESSMENT — PAIN - FUNCTIONAL ASSESSMENT: PAIN_FUNCTIONAL_ASSESSMENT: PREVENTS OR INTERFERES SOME ACTIVE ACTIVITIES AND ADLS

## 2024-02-02 ASSESSMENT — PAIN DESCRIPTION - ONSET: ONSET: ON-GOING

## 2024-02-02 ASSESSMENT — PAIN DESCRIPTION - ORIENTATION
ORIENTATION: LOWER

## 2024-02-02 ASSESSMENT — PAIN DESCRIPTION - LOCATION
LOCATION: BACK

## 2024-02-02 ASSESSMENT — PAIN DESCRIPTION - DIRECTION
RADIATING_TOWARDS: NECK
RADIATING_TOWARDS: NECK

## 2024-02-02 ASSESSMENT — PAIN DESCRIPTION - DESCRIPTORS
DESCRIPTORS: SHARP
DESCRIPTORS: ACHING;DISCOMFORT
DESCRIPTORS: SHARP

## 2024-02-02 ASSESSMENT — PAIN DESCRIPTION - PAIN TYPE
TYPE: CHRONIC PAIN

## 2024-02-02 ASSESSMENT — PAIN DESCRIPTION - FREQUENCY: FREQUENCY: CONTINUOUS

## 2024-02-02 NOTE — ED PROVIDER NOTES
Given 2/3/24 2031)   enoxaparin (LOVENOX) injection 40 mg (40 mg SubCUTAneous Given 2/3/24 0947)   regadenoson (LEXISCAN) injection 0.4 mg (0.4 mg IntraVENous Not Given 2/2/24 1536)   technetium tetrofosmin (Tc-MYOVIEW) injection 30 millicurie (30 millicuries IntraVENous Not Given 2/2/24 1537)   0.9 % sodium chloride infusion (0 mL/hr IntraVENous Stopped 2/2/24 2353)   sodium chloride flush 0.9 % injection 5-40 mL (10 mLs IntraVENous Given 2/3/24 2027)   sodium chloride flush 0.9 % injection 5-40 mL (has no administration in time range)   0.9 % sodium chloride infusion (has no administration in time range)   acetaminophen (TYLENOL) tablet 650 mg (has no administration in time range)   oxyCODONE-acetaminophen (PERCOCET) 5-325 MG per tablet 1 tablet (1 tablet Oral Given 2/3/24 2031)     Or   oxyCODONE-acetaminophen (PERCOCET) 5-325 MG per tablet 2 tablet ( Oral See Alternative 2/3/24 2031)   ondansetron (ZOFRAN) injection 4 mg (has no administration in time range)   metoprolol succinate (TOPROL XL) extended release tablet 25 mg (25 mg Oral Given 2/3/24 2027)   perflutren lipid microspheres (DEFINITY) injection 1.5 mL (1.5 mLs IntraVENous Given 2/2/24 1145)   sodium chloride 0.9 % bolus 500 mL (0 mLs IntraVENous Stopped 2/2/24 1102)   technetium tetrofosmin (Tc-MYOVIEW) injection 10 millicurie (10 millicuries IntraVENous Given 2/2/24 1039)   iopamidol (ISOVUE-370) 76 % injection 70 mL (70 mLs Other Given by Other 2/2/24 1571)     History from:  Patient  Limitations to history:  None  Chronic Conditions:  has a past medical history of Aortic valve insufficiency, Arthritis, Atrial fibrillation (HCC), Cancer (HCC), Dysphonia, Encounter for imaging to screen for metal prior to MRI, GERD (gastroesophageal reflux disease), Hearing loss, Heart disease, Hyperlipidemia, Medical history reviewed with no changes, Obstructive apnea, Oropharyngeal dysphagia, Refusal of blood transfusions as patient is [employer], and Thyroid

## 2024-02-02 NOTE — CONSULTS
[facility] [place]   CONSULTATION  [phone]      Chief Complaint   Patient presents with    Tachycardia     Pt brought to the hospital by [facility] from home due to having CP with palpitations, subsided upon arrival to the ER, 324 mg asa given by squad, no nitro given.             History of Present Illness:  Anjum Zaragoza is a 73 y.o. patient who presented to the hospital with complaints of chest pain and \"feeling lousy.\" Biggest complaint is numbness in his feet. Also with chest pressure, squeezing, tightness. Severe and intermittent. LAD PCI. Mitral clip. AVR, Afib s/p Ablation. I have been asked to provide consultation regarding further management and testing.      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    Surgical History:   has a past surgical history that includes Lung surgery; knee surgery; Neck surgery; hernia repair; Aortic valve replacement (01/28/2015); Cardiac catheterization; Tonsillectomy and adenoidectomy; Mandible fracture surgery; Ankle surgery; back surgery; Pain management procedure (Right, 06/22/2020); Pain management procedure (Right, 07/08/2020); Atrial ablation surgery; Lumbar spine surgery (Right, 10/13/2020); fracture surgery; and Upper gastrointestinal endoscopy (N/A, 1/19/2023).     Social History:   reports that he quit smoking about 9 years ago. His smoking use included cigarettes. He started smoking about 49 years ago. He has a 45.0 pack-year smoking history. He has never been exposed to tobacco smoke. He has never used smokeless tobacco. He reports current alcohol use.

## 2024-02-02 NOTE — PRE SEDATION
sodium chloride flush 0.9 % injection 5-40 mL  5-40 mL IntraVENous PRN Dapilah, Chayo, APRN - CNP        0.9 % sodium chloride infusion   IntraVENous PRN Dapilah, Chayo, APRN - CNP        potassium chloride (KLOR-CON M) extended release tablet 40 mEq  40 mEq Oral PRN Dapilah, Chayo, APRN - CNP        Or    potassium bicarb-citric acid (EFFER-K) effervescent tablet 40 mEq  40 mEq Oral PRN Dapilah, Chayo, APRN - CNP        Or    potassium chloride 10 mEq/100 mL IVPB (Peripheral Line)  10 mEq IntraVENous PRN Dapilah, Chayo, APRN - CNP        magnesium sulfate 2000 mg in 50 mL IVPB premix  2,000 mg IntraVENous PRN Dapilah, Chayo, APRN - CNP        ondansetron (ZOFRAN-ODT) disintegrating tablet 4 mg  4 mg Oral Q8H PRN Alanish, Chayo, APRN - CNP        Or    ondansetron (ZOFRAN) injection 4 mg  4 mg IntraVENous Q6H PRN Dapilah, Chayo, APRN - CNP        acetaminophen (TYLENOL) tablet 650 mg  650 mg Oral Q6H PRN Dapilah, Chayo, APRN - CNP        Or    acetaminophen (TYLENOL) suppository 650 mg  650 mg Rectal Q6H PRN Dapilah, Chayo, APRN - CNP        polyethylene glycol (GLYCOLAX) packet 17 g  17 g Oral Daily PRN Dapilah, Chayo, APRN - CNP        enoxaparin (LOVENOX) injection 40 mg  40 mg SubCUTAneous Daily Kalielashanelle, Chayo, APRN - CNP           Past Medical History:    Past Medical History:   Diagnosis Date    Aortic valve insufficiency     Arthritis     Atrial fibrillation (HCC)     Cancer (HCC) 09/2017    TONGUE head and neck IN REMISSION    Dysphonia     Encounter for imaging to screen for metal prior to MRI 08/22/2022    MRI conditional Medtronic Linq loop recorder model#LNQ11 Reveal LINQ implanted 4/22/19. Normal Mode. 1.5T or 3.0T. Download data prior to MRI. Follow all other [employer] guidelines. Pt currently follows     GERD (gastroesophageal reflux disease)     Hearing loss     Heart disease     Hyperlipidemia     Medical history reviewed with no changes     Obstructive apnea does

## 2024-02-02 NOTE — CARE COORDINATION
Discharge Planning:     (CM) reviewed the patient's chart to assess needs. Patient's Readmission Risk Score is 9% . Patient's medical insurance is [facility] [employer]. Patient's PCP is TAMY LEWIS    Pt discussed in IDR, no CM needs noted, RN reports PT. moves independently in room.      No needs anticipated, at this time. CM team to follow. Staff to inform CM if additional discharge needs arise.     Electronically signed by Nisha Garrett on 2/2/2024 at 11:44 AM

## 2024-02-02 NOTE — PLAN OF CARE
Problem: Discharge Planning  Goal: Discharge to home or other facility with appropriate resources  Outcome: Progressing  Flowsheets (Taken 2/1/2024 2230)  Discharge to home or other facility with appropriate resources: Identify barriers to discharge with patient and caregiver     Problem: ABCDS Injury Assessment  Goal: Absence of physical injury  Outcome: Progressing

## 2024-02-02 NOTE — H&P
includes chest x-ray and results as below    Drugs that require monitoring for toxicity include none and the method of monitoring was none        History from:     patient    History of Present Illness:     Chief Complaint: Chest pain  Anjum Zaragoza is a 73 y.o. male with pmh of PAF, valvular heart diseases, CAD with stents, of a cardiac loop recorder, PAF, obstructive sleep apnea who presents with chest pain.  The patient reports that while he was resting around 5:30 PM today, he felt a hard blow on his left chest wall and started experiencing severe palpitations.  He describes the below as pressure-like, nonradiating and rated at 9/10 at onset.  He reports some associated SOB.  He denies dizziness or lightheadedness.  No reported fever, chills, nausea, vomiting, diarrhea and constipation.  He does have an extensive cardiac history.  Patient states that this made him scared and he  called the squad.  He was given full dose aspirin via squad.  EKG and initial troponin in the ED nonacute.  Chest x-ray nonacute.  Normal D-dimer.  He is chest pain-free at time of this evaluation.  Review of Systems:        Pertinent positives and negatives discussed in HPI     Objective:   No intake or output data in the 24 hours ending 02/01/24 2104   Vitals:   Vitals:    02/01/24 1800   BP: 97/69   Pulse: 63   Resp: 16   Temp: 97.7 °F (36.5 °C)   TempSrc: Oral   SpO2: 92%   Weight: 72.6 kg (160 lb)   Height: 1.778 m (5' 10\")       Medications Prior to Admission     Prior to Admission medications    Medication Sig Start Date End Date Taking? Authorizing Provider   docusate sodium (COLACE) 100 MG capsule Take 1 capsule by mouth 2 times daily   Yes Soraya Valencia MD   oxyCODONE-acetaminophen (PERCOCET) 5-325 MG per tablet Take 1 tablet by mouth every 12 hours as needed for Pain (Back pain.). Max Daily Amount: 2 tablets   Yes Soraya Valencia MD   busPIRone (BUSPAR) 5 MG tablet TAKE 1 TABLET BY MOUTH THREE TIMES

## 2024-02-02 NOTE — ED NOTES
ED TO INPATIENT SBAR HANDOFF    Patient Name: Anjum Zaragoza   :  1950  73 y.o.   MRN:  [id number]  Preferred Name  : Anjum  ED Room #:  ED-0008/08  Family/Caregiver Present no   Restraints no   Sitter no   Sepsis Risk Score Sepsis Risk Score: 0.94    Situation  Code Status: Prior .    Allergies: Naproxen and Hydrocodone  Weight: Patient Vitals for the past 96 hrs (Last 3 readings):   Weight   24 1800 72.6 kg (160 lb)     Arrived from: home  Chief Complaint:   Chief Complaint   Patient presents with    Tachycardia     Pt brought to the hospital by [facility] from home due to having CP with palpitations, subsided upon arrival to the ER, 324 mg asa given by squad, no nitro given.      Hospital Problem/Diagnosis:  Principal Problem:    Chest pain, rule out acute myocardial infarction  Resolved Problems:    * No resolved hospital problems. *    Imaging:   XR CHEST PORTABLE   Final Result   No acute process.           Abnormal labs:   Abnormal Labs Reviewed   CBC WITH AUTO DIFFERENTIAL - Abnormal; Notable for the following components:       Result Value    RBC 3.85 (*)     Hemoglobin 12.4 (*)     Hematocrit 36.4 (*)     All other components within normal limits   COMPREHENSIVE METABOLIC PANEL W/ REFLEX TO MG FOR LOW K - Abnormal; Notable for the following components:    Creatinine 0.6 (*)     Total Protein 6.2 (*)     All other components within normal limits     Critical values: no     Abnormal Assessment Findings: N/A    Background  History:   Past Medical History:   Diagnosis Date    Aortic valve insufficiency     Arthritis     Atrial fibrillation (HCC)     Cancer (HCC) 2017    TONGUE head and neck IN REMISSION    Dysphonia     Encounter for imaging to screen for metal prior to MRI 2022    MRI conditional Medtronic Linq loop recorder model#[id number] Reveal LINQ implanted 19. Normal Mode. 1.5T or 3.0T. Download data prior to MRI. Follow all other Medtronic guidelines. Pt currently follows

## 2024-02-02 NOTE — PROCEDURES
Patient:  Anjum Zaragoza   :   1950    PROCEDURAL SUMMARY  ~Consent:   Obtained written and verbal consent      Risks/benefits explained in detail  ~Procedure:    Left Heart Catheterization  ~Medications:    Procedural sedation with minimal conscious sedation  ~Complications:   None  ~Blood Loss:    <10cc  ~Specimens:    None obtained  ~Pre-sedation re-evaluation: Performed immediately prior to procedure.  ~Performing Physician:          Jasson Mata MD    ~Assistants:       None    INDICATIONS:  Pre-Procedure Diagnosis:  New Onset Angina <= 2 months, Worsening Angina, Suspected CAD, and Valvular Disease - Mitral Regurgitation; Regurgitation Severity: Severe (4+)    Post-Procedure Diagnosis: same    PROCEDURES PERFORMED:   Left heart catheterization with    .    PROCEDURE DETAILS/TECHNIQUE:  Local anesthetic was given and access was obtained in the right Radial Artery using a micropuncture technique and a (5/6) Fr Slender Terumo Sheath was placed without difficulty. Catheters were advanced over a 0.35 wire under fluoroscopic guidance  Left coronary angiography was done using a 5 Fr Reji L 3.5 diagnostic catheter.  Right coronary angiography was done using a 5 Fr Reji R4 diagnostic catheter.  Left ventriculography was done using a 5 Fr pigtail catheter. At the end of the procedure a TR band device was used for hemostasis.     ANGIOGRAM/CORONARY ARTERIOGRAM:     Left main artery Bifurcates into the left anterior descending artery and left circumflex artery nml   Left anterior descending artery Gives rise to 2 diagonal arteries Mid stent patent, prox 10%   Diagonals  nml   Left circumflex artery Non-dominant vessel that gives rise to 2 obtuse marginal arteries nml   Obtuse Marginals  nml     Right coronary artery Dominant vessel that gives rise to the posterior descending artery and posterolateral branch nml   Posterior descending artery Posterior lateral branch  nml  nml       LEFT

## 2024-02-03 ENCOUNTER — APPOINTMENT (OUTPATIENT)
Dept: GENERAL RADIOLOGY | Age: 74
DRG: 287 | End: 2024-02-03
Payer: MEDICARE

## 2024-02-03 PROBLEM — I95.0 IDIOPATHIC HYPOTENSION: Status: ACTIVE | Noted: 2024-02-03

## 2024-02-03 LAB
ANION GAP SERPL CALCULATED.3IONS-SCNC: 9 MMOL/L (ref 3–16)
BASOPHILS # BLD: 0.1 K/UL (ref 0–0.2)
BASOPHILS NFR BLD: 0.5 %
BILIRUB UR QL STRIP.AUTO: NEGATIVE
BUN SERPL-MCNC: 12 MG/DL (ref 7–20)
CALCIUM SERPL-MCNC: 8.3 MG/DL (ref 8.3–10.6)
CHLORIDE SERPL-SCNC: 102 MMOL/L (ref 99–110)
CLARITY UR: CLEAR
CO2 SERPL-SCNC: 25 MMOL/L (ref 21–32)
COLOR UR: YELLOW
CREAT SERPL-MCNC: 0.6 MG/DL (ref 0.8–1.3)
DEPRECATED RDW RBC AUTO: 13.5 % (ref 12.4–15.4)
EOSINOPHIL # BLD: 0 K/UL (ref 0–0.6)
EOSINOPHIL NFR BLD: 0.1 %
GFR SERPLBLD CREATININE-BSD FMLA CKD-EPI: >60 ML/MIN/{1.73_M2}
GLUCOSE SERPL-MCNC: 136 MG/DL (ref 70–99)
GLUCOSE UR STRIP.AUTO-MCNC: NEGATIVE MG/DL
HCT VFR BLD AUTO: 36.4 % (ref 40.5–52.5)
HGB BLD-MCNC: 12.4 G/DL (ref 13.5–17.5)
HGB UR QL STRIP.AUTO: NEGATIVE
KETONES UR STRIP.AUTO-MCNC: NEGATIVE MG/DL
LEUKOCYTE ESTERASE UR QL STRIP.AUTO: NEGATIVE
LYMPHOCYTES # BLD: 0.9 K/UL (ref 1–5.1)
LYMPHOCYTES NFR BLD: 7.3 %
MCH RBC QN AUTO: 32 PG (ref 26–34)
MCHC RBC AUTO-ENTMCNC: 33.9 G/DL (ref 31–36)
MCV RBC AUTO: 94.4 FL (ref 80–100)
MONOCYTES # BLD: 0.7 K/UL (ref 0–1.3)
MONOCYTES NFR BLD: 6 %
NEUTROPHILS # BLD: 10.2 K/UL (ref 1.7–7.7)
NEUTROPHILS NFR BLD: 86.1 %
NITRITE UR QL STRIP.AUTO: NEGATIVE
PH UR STRIP.AUTO: 5.5 [PH] (ref 5–8)
PLATELET # BLD AUTO: 184 K/UL (ref 135–450)
PMV BLD AUTO: 7.2 FL (ref 5–10.5)
POTASSIUM SERPL-SCNC: 4 MMOL/L (ref 3.5–5.1)
PROCALCITONIN SERPL IA-MCNC: 0.27 NG/ML (ref 0–0.15)
PROT UR STRIP.AUTO-MCNC: NEGATIVE MG/DL
RBC # BLD AUTO: 3.86 M/UL (ref 4.2–5.9)
SODIUM SERPL-SCNC: 136 MMOL/L (ref 136–145)
SP GR UR STRIP.AUTO: 1.01 (ref 1–1.03)
UA COMPLETE W REFLEX CULTURE PNL UR: NORMAL
UA DIPSTICK W REFLEX MICRO PNL UR: NORMAL
URN SPEC COLLECT METH UR: NORMAL
UROBILINOGEN UR STRIP-ACNC: 0.2 E.U./DL
WBC # BLD AUTO: 11.8 K/UL (ref 4–11)

## 2024-02-03 PROCEDURE — 93005 ELECTROCARDIOGRAM TRACING: CPT | Performed by: NURSE PRACTITIONER

## 2024-02-03 PROCEDURE — 99232 SBSQ HOSP IP/OBS MODERATE 35: CPT | Performed by: NURSE PRACTITIONER

## 2024-02-03 PROCEDURE — 1200000000 HC SEMI PRIVATE

## 2024-02-03 PROCEDURE — 81003 URINALYSIS AUTO W/O SCOPE: CPT

## 2024-02-03 PROCEDURE — 85025 COMPLETE CBC W/AUTO DIFF WBC: CPT

## 2024-02-03 PROCEDURE — 6370000000 HC RX 637 (ALT 250 FOR IP): Performed by: INTERNAL MEDICINE

## 2024-02-03 PROCEDURE — 6370000000 HC RX 637 (ALT 250 FOR IP): Performed by: NURSE PRACTITIONER

## 2024-02-03 PROCEDURE — 6360000002 HC RX W HCPCS: Performed by: NURSE PRACTITIONER

## 2024-02-03 PROCEDURE — 80048 BASIC METABOLIC PNL TOTAL CA: CPT

## 2024-02-03 PROCEDURE — 36415 COLL VENOUS BLD VENIPUNCTURE: CPT

## 2024-02-03 PROCEDURE — 84145 PROCALCITONIN (PCT): CPT

## 2024-02-03 PROCEDURE — 71045 X-RAY EXAM CHEST 1 VIEW: CPT

## 2024-02-03 PROCEDURE — 2580000003 HC RX 258: Performed by: INTERNAL MEDICINE

## 2024-02-03 RX ORDER — METOPROLOL SUCCINATE 25 MG/1
25 TABLET, EXTENDED RELEASE ORAL 2 TIMES DAILY
Status: DISCONTINUED | OUTPATIENT
Start: 2024-02-03 | End: 2024-02-04 | Stop reason: HOSPADM

## 2024-02-03 RX ORDER — METOPROLOL SUCCINATE 25 MG/1
25 TABLET, EXTENDED RELEASE ORAL 2 TIMES DAILY
Qty: 30 TABLET | Refills: 1 | Status: SHIPPED | OUTPATIENT
Start: 2024-02-03

## 2024-02-03 RX ADMIN — DOCUSATE SODIUM 100 MG: 100 CAPSULE, LIQUID FILLED ORAL at 20:27

## 2024-02-03 RX ADMIN — SODIUM CHLORIDE, PRESERVATIVE FREE 10 ML: 5 INJECTION INTRAVENOUS at 09:47

## 2024-02-03 RX ADMIN — METOPROLOL SUCCINATE 25 MG: 25 TABLET, EXTENDED RELEASE ORAL at 12:04

## 2024-02-03 RX ADMIN — PANTOPRAZOLE SODIUM 40 MG: 40 TABLET, DELAYED RELEASE ORAL at 05:53

## 2024-02-03 RX ADMIN — GABAPENTIN 100 MG: 100 CAPSULE ORAL at 20:26

## 2024-02-03 RX ADMIN — ASPIRIN 81 MG: 81 TABLET, COATED ORAL at 09:47

## 2024-02-03 RX ADMIN — CLOPIDOGREL BISULFATE 75 MG: 75 TABLET ORAL at 09:47

## 2024-02-03 RX ADMIN — GABAPENTIN 100 MG: 100 CAPSULE ORAL at 16:24

## 2024-02-03 RX ADMIN — PANTOPRAZOLE SODIUM 40 MG: 40 TABLET, DELAYED RELEASE ORAL at 16:24

## 2024-02-03 RX ADMIN — BUSPIRONE HYDROCHLORIDE 5 MG: 5 TABLET ORAL at 20:26

## 2024-02-03 RX ADMIN — POLYETHYLENE GLYCOL 3350 17 G: 17 POWDER, FOR SOLUTION ORAL at 20:31

## 2024-02-03 RX ADMIN — SODIUM CHLORIDE, PRESERVATIVE FREE 10 ML: 5 INJECTION INTRAVENOUS at 20:27

## 2024-02-03 RX ADMIN — GABAPENTIN 100 MG: 100 CAPSULE ORAL at 09:47

## 2024-02-03 RX ADMIN — ROSUVASTATIN CALCIUM 40 MG: 40 TABLET, COATED ORAL at 09:46

## 2024-02-03 RX ADMIN — DOCUSATE SODIUM 100 MG: 100 CAPSULE, LIQUID FILLED ORAL at 09:47

## 2024-02-03 RX ADMIN — MIRTAZAPINE 7.5 MG: 15 TABLET, FILM COATED ORAL at 20:26

## 2024-02-03 RX ADMIN — LEVOTHYROXINE SODIUM 150 MCG: 0.15 TABLET ORAL at 05:54

## 2024-02-03 RX ADMIN — OXYCODONE AND ACETAMINOPHEN 1 TABLET: 5; 325 TABLET ORAL at 05:56

## 2024-02-03 RX ADMIN — METOPROLOL SUCCINATE 25 MG: 25 TABLET, EXTENDED RELEASE ORAL at 20:27

## 2024-02-03 RX ADMIN — ENOXAPARIN SODIUM 40 MG: 100 INJECTION SUBCUTANEOUS at 09:47

## 2024-02-03 RX ADMIN — TIZANIDINE 4 MG: 4 TABLET ORAL at 12:04

## 2024-02-03 RX ADMIN — CHOLECALCIFEROL TAB 25 MCG (1000 UNIT) 2000 UNITS: 25 TAB at 09:46

## 2024-02-03 RX ADMIN — OXYCODONE AND ACETAMINOPHEN 1 TABLET: 5; 325 TABLET ORAL at 20:31

## 2024-02-03 RX ADMIN — BUSPIRONE HYDROCHLORIDE 5 MG: 5 TABLET ORAL at 09:47

## 2024-02-03 ASSESSMENT — PAIN SCALES - GENERAL
PAINLEVEL_OUTOF10: 5
PAINLEVEL_OUTOF10: 5
PAINLEVEL_OUTOF10: 0
PAINLEVEL_OUTOF10: 5
PAINLEVEL_OUTOF10: 6
PAINLEVEL_OUTOF10: 0
PAINLEVEL_OUTOF10: 4
PAINLEVEL_OUTOF10: 0

## 2024-02-03 ASSESSMENT — PAIN DESCRIPTION - DESCRIPTORS
DESCRIPTORS: SHARP
DESCRIPTORS: ACHING
DESCRIPTORS: SHARP

## 2024-02-03 ASSESSMENT — PAIN DESCRIPTION - LOCATION
LOCATION: BACK
LOCATION: BACK;NECK

## 2024-02-03 ASSESSMENT — PAIN DESCRIPTION - ORIENTATION
ORIENTATION: LOWER

## 2024-02-03 ASSESSMENT — PAIN DESCRIPTION - PAIN TYPE
TYPE: CHRONIC PAIN

## 2024-02-03 ASSESSMENT — PAIN DESCRIPTION - DIRECTION
RADIATING_TOWARDS: NECK
RADIATING_TOWARDS: NECK

## 2024-02-03 NOTE — FLOWSHEET NOTE
Orthostatics performed with Rosey Sandy in room. Pt fel a little dizzy sitting to standing.         02/03/24 1156   Vital Signs   Blood Pressure Lying 104/72   Pulse Lying 81 PER MINUTE   Blood Pressure Sitting 116/86   Pulse Sitting 79 PER MINUTE   Blood Pressure Standing 124/95   Pulse Standing 77 PER MINUTE     Metoprolol order changed per Rosey Verbal (see MAR)

## 2024-02-03 NOTE — DISCHARGE INSTRUCTIONS
Follow up with your PCP within 2-3 days of discharge.Have PCP check CBC  Follow up with cardiology as instructed   Take all your medications as prescribed.    Radial Angiogram      Care of your puncture site:    You may shower  Inspect the site daily and gently clean using soap and water.  Dry thoroughly and apply a Band-Aid.    Normal Observations:  Soreness or tenderness which may last one week.  Mild oozing from the incision site.  Possible bruising that could last 2 weeks.    Activity:  You may resume driving   You may resume normal activity on Tuesday or after the wound heals.  Avoid lifting more than 10 pounds until Tuesday with affected arm.    Nutrition:  Cardiac diet      Call your cardiologist immediately if your condition worsens, for any other concerns, for a follow-up appointment or if you experience any of the following:  Significant bleeding that does not stop after 10 minutes of applying firm pressure on the puncture site.  Increased swelling of the wrist.  Unusual pain, numbness, or tingling of the wrist/arm.   Any signs of infection such as: redness, yellow drainage at the site, swelling or pain.

## 2024-02-04 VITALS
SYSTOLIC BLOOD PRESSURE: 132 MMHG | HEIGHT: 70 IN | OXYGEN SATURATION: 97 % | RESPIRATION RATE: 18 BRPM | DIASTOLIC BLOOD PRESSURE: 98 MMHG | HEART RATE: 82 BPM | TEMPERATURE: 98.6 F | WEIGHT: 167 LBS | BODY MASS INDEX: 23.91 KG/M2

## 2024-02-04 LAB
ANION GAP SERPL CALCULATED.3IONS-SCNC: 5 MMOL/L (ref 3–16)
BASOPHILS # BLD: 0.1 K/UL (ref 0–0.2)
BASOPHILS NFR BLD: 0.9 %
BUN SERPL-MCNC: 14 MG/DL (ref 7–20)
CALCIUM SERPL-MCNC: 8.5 MG/DL (ref 8.3–10.6)
CHLORIDE SERPL-SCNC: 107 MMOL/L (ref 99–110)
CO2 SERPL-SCNC: 27 MMOL/L (ref 21–32)
CREAT SERPL-MCNC: 0.7 MG/DL (ref 0.8–1.3)
DEPRECATED RDW RBC AUTO: 13.3 % (ref 12.4–15.4)
EKG ATRIAL RATE: 100 BPM
EKG DIAGNOSIS: NORMAL
EKG Q-T INTERVAL: 330 MS
EKG QRS DURATION: 80 MS
EKG QTC CALCULATION (BAZETT): 470 MS
EKG R AXIS: -14 DEGREES
EKG T AXIS: -8 DEGREES
EKG VENTRICULAR RATE: 122 BPM
EOSINOPHIL # BLD: 0.1 K/UL (ref 0–0.6)
EOSINOPHIL NFR BLD: 2.6 %
GFR SERPLBLD CREATININE-BSD FMLA CKD-EPI: >60 ML/MIN/{1.73_M2}
GLUCOSE SERPL-MCNC: 116 MG/DL (ref 70–99)
HCT VFR BLD AUTO: 35.4 % (ref 40.5–52.5)
HGB BLD-MCNC: 12.1 G/DL (ref 13.5–17.5)
LYMPHOCYTES # BLD: 1.3 K/UL (ref 1–5.1)
LYMPHOCYTES NFR BLD: 22.6 %
MCH RBC QN AUTO: 32.4 PG (ref 26–34)
MCHC RBC AUTO-ENTMCNC: 34.2 G/DL (ref 31–36)
MCV RBC AUTO: 94.7 FL (ref 80–100)
MONOCYTES # BLD: 0.6 K/UL (ref 0–1.3)
MONOCYTES NFR BLD: 10.1 %
NEUTROPHILS # BLD: 3.7 K/UL (ref 1.7–7.7)
NEUTROPHILS NFR BLD: 63.8 %
PLATELET # BLD AUTO: 168 K/UL (ref 135–450)
PMV BLD AUTO: 7.5 FL (ref 5–10.5)
POTASSIUM SERPL-SCNC: 4.1 MMOL/L (ref 3.5–5.1)
RBC # BLD AUTO: 3.73 M/UL (ref 4.2–5.9)
SODIUM SERPL-SCNC: 139 MMOL/L (ref 136–145)
WBC # BLD AUTO: 5.8 K/UL (ref 4–11)

## 2024-02-04 PROCEDURE — 93010 ELECTROCARDIOGRAM REPORT: CPT | Performed by: INTERNAL MEDICINE

## 2024-02-04 PROCEDURE — 6370000000 HC RX 637 (ALT 250 FOR IP): Performed by: INTERNAL MEDICINE

## 2024-02-04 PROCEDURE — 6370000000 HC RX 637 (ALT 250 FOR IP): Performed by: NURSE PRACTITIONER

## 2024-02-04 PROCEDURE — 6360000002 HC RX W HCPCS: Performed by: NURSE PRACTITIONER

## 2024-02-04 PROCEDURE — 36415 COLL VENOUS BLD VENIPUNCTURE: CPT

## 2024-02-04 PROCEDURE — 80048 BASIC METABOLIC PNL TOTAL CA: CPT

## 2024-02-04 PROCEDURE — 85025 COMPLETE CBC W/AUTO DIFF WBC: CPT

## 2024-02-04 PROCEDURE — 2580000003 HC RX 258: Performed by: INTERNAL MEDICINE

## 2024-02-04 RX ADMIN — ENOXAPARIN SODIUM 40 MG: 100 INJECTION SUBCUTANEOUS at 08:44

## 2024-02-04 RX ADMIN — LEVOTHYROXINE SODIUM 150 MCG: 0.15 TABLET ORAL at 06:05

## 2024-02-04 RX ADMIN — CHOLECALCIFEROL TAB 25 MCG (1000 UNIT) 2000 UNITS: 25 TAB at 08:41

## 2024-02-04 RX ADMIN — OXYCODONE AND ACETAMINOPHEN 1 TABLET: 5; 325 TABLET ORAL at 06:10

## 2024-02-04 RX ADMIN — ASPIRIN 81 MG: 81 TABLET, COATED ORAL at 08:41

## 2024-02-04 RX ADMIN — CLOPIDOGREL BISULFATE 75 MG: 75 TABLET ORAL at 08:44

## 2024-02-04 RX ADMIN — ROSUVASTATIN CALCIUM 40 MG: 40 TABLET, COATED ORAL at 08:41

## 2024-02-04 RX ADMIN — PANTOPRAZOLE SODIUM 40 MG: 40 TABLET, DELAYED RELEASE ORAL at 06:05

## 2024-02-04 RX ADMIN — METOPROLOL SUCCINATE 25 MG: 25 TABLET, EXTENDED RELEASE ORAL at 08:44

## 2024-02-04 RX ADMIN — GABAPENTIN 100 MG: 100 CAPSULE ORAL at 08:41

## 2024-02-04 RX ADMIN — SODIUM CHLORIDE, PRESERVATIVE FREE 10 ML: 5 INJECTION INTRAVENOUS at 08:41

## 2024-02-04 RX ADMIN — BUSPIRONE HYDROCHLORIDE 5 MG: 5 TABLET ORAL at 08:44

## 2024-02-04 RX ADMIN — DOCUSATE SODIUM 100 MG: 100 CAPSULE, LIQUID FILLED ORAL at 08:41

## 2024-02-04 ASSESSMENT — HEART SCORE: ECG: 1

## 2024-02-04 ASSESSMENT — PAIN SCALES - GENERAL
PAINLEVEL_OUTOF10: 1
PAINLEVEL_OUTOF10: 0
PAINLEVEL_OUTOF10: 5

## 2024-02-04 ASSESSMENT — PAIN DESCRIPTION - DESCRIPTORS: DESCRIPTORS: SHARP

## 2024-02-04 ASSESSMENT — PAIN DESCRIPTION - ORIENTATION
ORIENTATION: LOWER
ORIENTATION: LOWER

## 2024-02-04 ASSESSMENT — PAIN DESCRIPTION - LOCATION
LOCATION: BACK
LOCATION: BACK

## 2024-02-04 ASSESSMENT — PAIN DESCRIPTION - PAIN TYPE
TYPE: CHRONIC PAIN
TYPE: CHRONIC PAIN

## 2024-02-04 NOTE — DISCHARGE SUMMARY
Hospital Medicine Discharge Summary    Patient ID: Anjum Zaragoza      Patient's PCP: Trista Sutton MD    Admit Date: 2/1/2024     Discharge Date: 2/4/2024     Admitting Physician: Feliciano Barclay MD     Discharge Physician: MELANIE ARENAS MD    Hospital Course: This 73-year-old male with PMHx of A-fib, valvular heart disease, CAD; s/p PCI& stents, cardiac loop recorder, CHRISTOPHER who presented with chest pain.      Chest pain; likely atypical.  Improved  EKG on admission negative for acute pathology. Hs troponin -ve . Echo showed EF of 50 to 55%.  No RWMA noted. Cardiology consulted; underwent stress Myoview which was abnormal concerning for recurrent ischemia. Underwent cardiac cath which showed patent stents. LVEF of 60%.  No RWMA noted.   Patient was discharged home in stable condition and instructed to follow-up with cardiology    Hx of CAD s/p LAD stent on 9/9/22.  Continue medical management    Hypertension; resolved.  Beta-blocker dose reduced     Hx of A-fib; rate controlled.  Not on anticoagulation 2/2 hx of bleeding  s/p maze &  DELGADO clip on 1/15. S/p ablation 7/20.  S/p loop recorder     Hx of MR; s/p Mitzi Clip at [facility] on 1/2023.  CHIKIS on 2/2/2024 showed severe TR.      Hx of aortic stenosis; s/p AVR with porcine valve, MAZE, and DELGADO clip 1/2015 with Dr. Tinajero      Hx of CHRISTOPHER; on CPAP      Physical Exam Performed:     BP (!) 132/98   Pulse 82   Temp 98.6 °F (37 °C) (Temporal)   Resp 18   Ht 1.778 m (5' 10\")   Wt 75.8 kg (167 lb)   SpO2 97%   BMI 23.96 kg/m²     General appearance: No apparent distress, appears stated age and cooperative.  Eyes: Sclera clear. Pupils equal.  ENT: Moist oral mucosa. Trachea midline, no adenopathy.  Cardiovascular: Regular rhythm, normal S1, S2. No murmur.   Respiratory: Clear to auscultation bilaterally, no wheeze or crackles.   GI: Abdomen soft, no tenderness, not distended, normal bowel sounds  Musculoskeletal:  No cyanosis in digits.  No BLE edema

## 2024-02-04 NOTE — PROGRESS NOTES
HOSPITALISTS PROGRESS NOTE    2/2/2024 9:20 AM        Name: Anjum Zaragoza .              Admitted: 2/1/2024  Primary Care Provider: Trista Sutton MD (Tel: [phone])      Brief Course: This 73-year-old male with PMHx of A-fib, valvular heart disease, CAD; s/p PCI& stents, cardiac loop recorder, CHRISTOPHER who presented with chest pain      Interval history:   Pt seen and examined today   Overnight events noted and interval ancillary notes reviewed.  Pt endorsed chest pain but denied any fevers, chills SOB, dizziness, nausea vomiting or abdominal pain    Assessment & Plan:     Chest pain;  likely ACS  EKG on admission negative for acute pathology. Hs troponin -ve   Echo showed EF of 50 to 55%.  No RWMA noted.  Indeterminate diastolic function  Cardiology consulted; underwent stress Myoview which was abnormal concerning for recurrent ischemia  Plan for cardiac cath today.    Hx of CAD s/p LAD stent on 9/9/22.  Continue medical management    Hx of A-fib; rate controlled.  Not on anticoagulation 2/2 hx of bleeding  s/p maze &  DELGADO clip on 1/15. S/p ablation 7/20.  S/p loop recorder     Hx of MR; s/p Mitzi Clip at [facility] on 1/2023.  CHIKIS recommended per cardiology    Hx of aortic stenosis; s/p AVR with porcine valve, MAZE, and DELGADO clip 1/2015 with Dr. Tinajero     Hx of CHRISTOPHER; on CPAP     DVT PPX: Lovenox  Code:Full Code    Disposition: Once acute medical issues have resolved    Current Medications  regadenoson (LEXISCAN) injection 0.4 mg, ONCE PRN  aspirin EC tablet 81 mg, Daily  busPIRone (BUSPAR) tablet 5 mg, BID  Vitamin D (CHOLECALCIFEROL) tablet 2,000 Units, Daily  clopidogrel (PLAVIX) tablet 75 mg, Daily  docusate sodium (COLACE) capsule 100 mg, BID  gabapentin (NEURONTIN) capsule 100 mg, TID  levothyroxine (SYNTHROID) tablet 150 mcg, QAM AC  metoprolol succinate (TOPROL XL) extended release tablet 50 mg, Daily  mirtazapine (REMERON) tablet 7.5 mg, 
APRN notified by RN of hypotension, bradycardia  -was on diuretics PTA  -suspect component of volume depletion for hypotension, sleep apnea, medications  -will give small fluid bolus  -CPAP - may use hospital machine     Taina Crain APRN - NP   
CATH LAB PROCEDURE LOG - TRANSESOPHAGEAL ECHOCARDIOGRAM    PRE PROCEDURE    DATE: 2/2/2024 ARRIVAL TO CATH LAB: 3:04 PM    ID & ALLERGY BAND: On    CONSENT: Yes    NPO SINCE: Midnight    IV SITE: Patent in right arm.     Pt arrived to Cath Lab.   Plan of Care: Hemodynamics and cardiac rhythm will remain stable. Comfort level will be maintained. Respiratory function will remain adequate. Pt/family will verbalize understanding of the procedure. Procedure will be tolerated without complications. Patient will recover from procedure without complications.   ID armband on patient and identification verified.   Informed consent obtained.   Non invasive blood pressure cuff applied, monitoring initiated.   EKG pads and pulse oximeter applied, monitoring initiated.   Instructions given. Patient and / or family verbalize understanding.   H&P will be documented by physician in Epic.   Pt has been NPO since midnight.       TRANSESOPHAGEAL ECHOCARDIOGRAM    Timeout and fire safety completed.     TIMEOUT TIME: 3:04 PM    CORRECT PATIENT VERIFIED. MEMBERS OF THE SURGICAL TEAM/VISITORS INTRODUCED. ALLERGIES ANNOUNCED. CORRECT PROCEDURE VERIFIED. CORRECT PROCEDURAL SITE VERIFIED. CONSENTS VERIFIED. IMPLANT EQUIPMENT, ADDITIONAL SERVICES, SPECIAL REQUIREMENTS AVAILABLE. MEDICATIONS LABELED AND AVAILABLE. APPROPRIATE PRE MEDS HAVE BEEN ADMINISTERED.    FIRE SAFETY: ALCOHOL PREP SOLUTION HAD SUFFICIENT TIME TO DISSIPATE IF USED. FIRE SAFETY: SURGICAL SITE OR INCISION ABOVE THE XIPHOID. YES=1, NO=0. FIRE SAFETY: OPEN OXYGEN SOURCE. YES=1, NO=0. FIRE SAFETY: AVAILABLE IGNITION SOURCE. YES=1, NO=0. FIRE SAFETY: SCORE TOTAL = 1.     Viscous Lidocaine administered per verbal order : Yes 3:06 PM     Cetacaine spray administered per verbal order: Yes 3:07 PM     Procedural sedation administered per verbal order protocol via Dr. Orr      Medications verified by Malia Tripathi RN    3:11 PM - Versed 0.5 mg IV  3:15 PM - Versed 0.5 mg 
Data- discharge order received, pt verbalized agreement to discharge, disposition to previous residence, no needs for HHC/DME.     Action- discharge instructions prepared and given to pt, pt verbalized understanding. Medication information packet given r/t NEW and/or CHANGED prescriptions emphasizing name/purpose/side effects, pt verbalized understanding. Discharge instruction summary: Diet- cardiac, Activity- as gerardo, Primary Care Physician as follows: Trista Sutton -[phone] f/u appointment , immunizations reviewed, prescription medications filled [facility]. Inpatient surgical procedure precautions reviewed: post cath care.     1. WEIGHT: Admit Weight - Scale: 72.6 kg (160 lb) (02/01/24 1800)        Today  Weight - Scale: 75.8 kg (167 lb) (02/04/24 0409)       2. O2 SAT.: SpO2: 97 % (02/04/24 0833)    Response- Pt belongings gathered, IV removed. Disposition is home (no HHC/DME needs), transported with daughter, ambulated to lobby, no complications.    
PATIENT HISTORY    ECHO: DATE: 02/24/2021         EF: 60%  STRESS TEST PREFORMED:  Yes FINDINGS:  Stress Nuclear: Date:  02/02/2024  Result:  Positive: Unavailable     EF: 60%  EKG: Yes    ECG     Result: Normal  Pre CATH Rhythm: Sinus Bradycardia  HYPERTENSION: No  DYSLIPIDEMIA: Yes  FAMILY HX OF CAD: No  PRIOR MI: No  PRIOR PCI: Yes.  Most recent date: 09/09/2022  PRIOR CABG: No  CEREBROVASCULAR DX: Yes  PERIPHERAL ARTERIAL DISEASE: No  CHRONIC LUNG DISEASE: Yes  TOBACCO: Former.   Quit Date: 2015  DIABETIC: No  CARDIAC ARREST: {No  DIALYSIS: No  HEART FAILURE: Yes     NYHA Class: Class III     Newly Diagnosed: Yes     HF Type: Diastolic  FRAILTY SCORE: 4 VULNERABLE (while not dependent on others for IADLs, symptoms limit activities)  CARDIAC CTA PREFORMED:  No  [place] CORONARY CALCIUM SCORE:   Assessed: No  Prior Diagnostic Coronary Angioplasty Procedure:  No   
Patient instructed on Ren Protocol Stress Test Procedure including possible side effects and adverse reactions.  Verbalizes knowledge and understanding and denies having any questions.  
Per Rosey, this nurse left message with cardiology office to schedule follow up appointments: 2 weeks with Lucía or Marily, 4 weeks with URIEL  
Pharmacy Home Medication Reconciliation Note    A medication reconciliation has been completed for Anjum Zaragoza 1950    Pharmacy: [facility] [address] [place] [address]  Information provided by: patient    The patient's home medication list is as follows:  No current facility-administered medications on file prior to encounter.     Current Outpatient Medications on File Prior to Encounter   Medication Sig Dispense Refill    docusate sodium (COLACE) 100 MG capsule Take 1 capsule by mouth 2 times daily      oxyCODONE-acetaminophen (PERCOCET) 5-325 MG per tablet Take 1 tablet by mouth every 12 hours as needed for Pain (Back pain.). Max Daily Amount: 2 tablets      busPIRone (BUSPAR) 5 MG tablet TAKE 1 TABLET BY MOUTH THREE TIMES DAILY (Patient taking differently: Take 1 tablet by mouth 2 times daily) 90 tablet 0    pantoprazole (PROTONIX) 40 MG tablet TAKE 1 TABLET BY MOUTH TWICE DAILY BEFORE MEAL(S) (Patient taking differently: Take 1 tablet by mouth 2 times daily (before meals) TAKE 1 TABLET BY MOUTH TWICE DAILY BEFORE MEAL(S) (breakfast and dinner)) 180 tablet 0    [DISCONTINUED] busPIRone (BUSPAR) 5 MG tablet TAKE 1 TABLET BY MOUTH THREE TIMES DAILY 90 tablet 0    rosuvastatin (CRESTOR) 40 MG tablet Take 1 tablet by mouth daily 90 tablet 3    tiZANidine (ZANAFLEX) 4 MG tablet Take 1 tablet by mouth every 8 hours as needed (back pain) 90 tablet 5    levothyroxine (SYNTHROID) 150 MCG tablet Take 1 tablet by mouth daily 90 tablet 1    gabapentin (NEURONTIN) 100 MG capsule Take 1 capsule by mouth 3 times daily for 90 days. 270 capsule 0    clopidogrel (PLAVIX) 75 MG tablet Take 1 tablet by mouth once daily 90 tablet 1    mirtazapine (REMERON) 7.5 MG tablet Take 1 tablet by mouth nightly 90 tablet 3    furosemide (LASIX) 20 MG tablet Take 1 tablet by mouth daily      metoprolol succinate (TOPROL XL) 50 MG extended release tablet Take 1 tablet by mouth daily 30 tablet 3    spironolactone (ALDACTONE) 25 MG 
Pt stated that he does not want to be evaluated by pt/ot. He is an avid walker, walks 1-3 miles at a time several times a week    Dr. Chaparro placed discharge order and said that it was not contigent on cardiology s/o  
There was stress induced runs of wide complex tachycardia.  Stress test stopped.  Symptoms resolved with rest.  Dr. Mata here to see pt. Stated he will take him for cardiac cath today.  
Urine sent to lab    
VSS except low blood pressure on room air. Assessment complete (see Flowsheets). Pt resting in bed . Telemetry verified. Medications given (except metoprolol see MAR).  Table at bedside and call light within reach.  Will monitor     BP 81/57, 83/62 with HR 77,79.   Metoprolol and PRN Zanaflex(requested by pt) held. Dr. Chaparro informed via perfect serve.  
~1700 the pt's HR switch from NSR 70s to sinus tach 120s while he was sleeping. Pt said he could feel his heart racing. EKG performed; accelerated junctional rhythm. When this nurse was about to contact cardiology, pt reverted to NSR in 70s. Will monitor  
sodium chloride infusion, PRN  acetaminophen (TYLENOL) tablet 650 mg, Q4H PRN  oxyCODONE-acetaminophen (PERCOCET) 5-325 MG per tablet 1 tablet, Q4H PRN   Or  oxyCODONE-acetaminophen (PERCOCET) 5-325 MG per tablet 2 tablet, Q4H PRN  ondansetron (ZOFRAN) injection 4 mg, Q6H PRN  aspirin EC tablet 81 mg, Daily  busPIRone (BUSPAR) tablet 5 mg, BID  Vitamin D (CHOLECALCIFEROL) tablet 2,000 Units, Daily  clopidogrel (PLAVIX) tablet 75 mg, Daily  docusate sodium (COLACE) capsule 100 mg, BID  gabapentin (NEURONTIN) capsule 100 mg, TID  levothyroxine (SYNTHROID) tablet 150 mcg, QAM AC  metoprolol succinate (TOPROL XL) extended release tablet 50 mg, Daily  mirtazapine (REMERON) tablet 7.5 mg, Nightly  nitroGLYCERIN (NITROSTAT) SL tablet 0.4 mg, Q5 Min PRN  oxyCODONE-acetaminophen (PERCOCET) 5-325 MG per tablet 1 tablet, Q12H PRN  pantoprazole (PROTONIX) tablet 40 mg, BID AC  rosuvastatin (CRESTOR) tablet 40 mg, Daily  tiZANidine (ZANAFLEX) tablet 4 mg, Q8H PRN  sodium chloride flush 0.9 % injection 5-40 mL, 2 times per day  sodium chloride flush 0.9 % injection 5-40 mL, PRN  0.9 % sodium chloride infusion, PRN  potassium chloride (KLOR-CON M) extended release tablet 40 mEq, PRN   Or  potassium bicarb-citric acid (EFFER-K) effervescent tablet 40 mEq, PRN   Or  potassium chloride 10 mEq/100 mL IVPB (Peripheral Line), PRN  magnesium sulfate 2000 mg in 50 mL IVPB premix, PRN  ondansetron (ZOFRAN-ODT) disintegrating tablet 4 mg, Q8H PRN   Or  ondansetron (ZOFRAN) injection 4 mg, Q6H PRN  acetaminophen (TYLENOL) tablet 650 mg, Q6H PRN   Or  acetaminophen (TYLENOL) suppository 650 mg, Q6H PRN  polyethylene glycol (GLYCOLAX) packet 17 g, Daily PRN  enoxaparin (LOVENOX) injection 40 mg, Daily        Objective:  BP (!) 81/57   Pulse 69   Temp 98.5 °F (36.9 °C) (Temporal)   Resp 18   Ht 1.778 m (5' 10\")   Wt 75 kg (165 lb 4.8 oz)   SpO2 93%   BMI 23.72 kg/m²     Intake/Output Summary (Last 24 hours) at 2/3/2024 1005  Last data 
1.778 m (5' 10\")   Wt 75 kg (165 lb 4.8 oz)   SpO2 96%   BMI 23.72 kg/m²     Physical Exam:  General Appearance:  Non-obese/Well Nourished  Respiratory:  Resp Auscultation: Normal breath sounds without dullness  Cardiovascular:  Auscultation: Regular rate and rhythm, normal S1S2, +murmur  Palpation: Normal    Pedal Pulses: 2+ and equal   Abdomen:  Soft, NT, ND, + bs  Extremities:  No Cyanosis or Clubbing  Extremities: negative  Neurological/Psychiatric:  Oriented to time, place, and person  Non-anxious    Patient Active Problem List:     PAF (paroxysmal atrial fibrillation) (HCC)     Transient ischemic attack (TIA)     Chronic ischemic heart disease     Essential hypertension     Obstructive sleep apnea syndrome     Squamous 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)     Chronic pain due to neoplasm     Refusal of blood transfusions as patient is Caodaism     S/P AVR     Sensorineural hearing loss (SNHL) of both ears     Chronic obstructive pulmonary disease (HCC)     History of tobacco abuse     Mixed hyperlipidemia     Osteoarthritis of cervical spine     Primary osteoarthritis of both knees     Metastatic squamous cell carcinoma     Cervical stenosis of spine     Encounter for loop recorder check     Coronary artery disease of native heart with stable angina pectoris, unspecified vessel or lesion type (HCC)     SOB (shortness of breath) on exertion     Severe mitral regurgitation     Severe tricuspid regurgitation     Intermittent chest pain     Anxiety     Acquired hypothyroidism     Hypertension     Hyperlipidemia     Sleep apnea     Radiculopathy, lumbar region     Palpitations     Accelerating angina (HCC)     CAD (coronary artery disease)     Chest pain     Secondary malignant neoplasm of unspecified site (CODE) (HCC)     Left elbow pain     Aortic valve insufficiency     Chest pain, rule out acute myocardial infarction        Assessment/Plan:     CAD:   ~Status: CP improving  ~Meds:   Statin

## 2024-02-04 NOTE — CARE COORDINATION
02/04/24 1113   IMM Letter   IMM Letter given to Patient/Family/Significant other/Guardian/POA/by:  Milli explained the IMM letter to the patient. The patient was in agreement with discharge and signed the IMM letter.   IMM Letter date given: 02/04/24   IMM Letter time given: 1111           Electronically signed by VANDANA Gordon on 2/4/2024 at 11:13 AM

## 2024-02-05 ENCOUNTER — CARE COORDINATION (OUTPATIENT)
Dept: CASE MANAGEMENT | Age: 74
End: 2024-02-05

## 2024-02-05 ENCOUNTER — TELEPHONE (OUTPATIENT)
Dept: CARDIOLOGY CLINIC | Age: 74
End: 2024-02-05

## 2024-02-05 DIAGNOSIS — R07.9 CHEST PAIN, UNSPECIFIED TYPE: Primary | ICD-10-CM

## 2024-02-05 PROCEDURE — 1111F DSCHRG MED/CURRENT MED MERGE: CPT | Performed by: FAMILY MEDICINE

## 2024-02-05 NOTE — TELEPHONE ENCOUNTER
PT called back and refused NPSR f/u message. PT states he doesn't want anymore f/u  messages or advice from NPSR because PT stated  \"Last time I got hurt dealing with [place].\"

## 2024-02-05 NOTE — TELEPHONE ENCOUNTER
CHIKIS showed: There is severe tricuspid regurgitation at the central aspect of the septal and posterior leaflets with systolic flow reversal in the hepatic vein    Is this most likely the cause of his symptoms ? Any recommendations?

## 2024-02-05 NOTE — CARE COORDINATION
site of care based on symptoms and resources available to patient including: PCP  Urgent care clinics  When to call 911. The patient agrees to contact the PCP office for questions related to their healthcare.     Advance Care Planning:   Does patient have an Advance Directive: reviewed and current.    Medication reconciliation was performed with patient, who verbalizes understanding of administration of home medications. Medications reviewed, 1111F entered: yes    Was patient discharged with a pulse oximeter? no    Non-face-to-face services provided:  Obtained and reviewed discharge summary and/or continuity of care documents    Offered patient enrollment in the Remote Patient Monitoring (RPM) program for in-home monitoring:  Will offer on follow up call HTN COPD .    Care Transitions 24 Hour Call    Do you have a copy of your discharge instructions?: Yes  Do you have all of your prescriptions and are they filled?: Yes  Have you been contacted by a [facility] Pharmacist?: No  Have you scheduled your follow up appointment?: Yes  How are you going to get to your appointment?: Car - family or friend to transport  Patient DME: Straight cane  Do you have support at home?: Alone  Do you feel like you have everything you need to keep you well at home?: Yes  Are you an active caregiver in your home?: No  Care Transitions Interventions         Discussed follow-up appointments. If no appointment was previously scheduled, appointment scheduling offered: Yes.   Is follow up appointment scheduled within 7 days of discharge? Yes.    Follow Up  Future Appointments   Date Time Provider Department Center   2/7/2024  1:00 PM Trista Sutton MD MMA [facility] [place] - [facility]   11/12/2024  8:30 AM SCHEDULE, [place] DEVICE CHECK  Cardio [facility]   11/12/2024  8:30 AM Vish Holguin MD  Cardio [facility]       Care Transition Nurse provided contact information.  Plan for follow-up call in 5-7 days based on severity of symptoms and risk factors.  Plan for

## 2024-02-05 NOTE — TELEPHONE ENCOUNTER
Would reduce to 12.5 mg BID. Hold if SBP <100 prior to taking    His symptoms are likely related to his severe MR/TR

## 2024-02-05 NOTE — TELEPHONE ENCOUNTER
Pt called he is requesting a call back from [facility] to discuss issues that he is having, pt can be reached at [phone]

## 2024-02-05 NOTE — TELEPHONE ENCOUNTER
Called and spoke to Anjum. Relayed message below per NPSR on decreased BB and hold parameters. He v/u. He asked that I let [facility] know. Will forward.

## 2024-02-05 NOTE — TELEPHONE ENCOUNTER
Pt called stating they the BP is acting up just before medications BP 84/56 HR 68, after medications BP 87/64 HR 70.  Pt stated they feel little SOB, and very tired.    Pt Metoprolol was reduce to 1 tablet daily while in the hospital.    Pls advise thank you     Call Back Number [phone]

## 2024-02-07 ENCOUNTER — OFFICE VISIT (OUTPATIENT)
Dept: FAMILY MEDICINE CLINIC | Age: 74
End: 2024-02-07

## 2024-02-07 VITALS
WEIGHT: 169.2 LBS | HEIGHT: 70 IN | TEMPERATURE: 97.1 F | OXYGEN SATURATION: 94 % | DIASTOLIC BLOOD PRESSURE: 75 MMHG | HEART RATE: 74 BPM | BODY MASS INDEX: 24.22 KG/M2 | SYSTOLIC BLOOD PRESSURE: 111 MMHG

## 2024-02-07 DIAGNOSIS — E03.9 ACQUIRED HYPOTHYROIDISM: ICD-10-CM

## 2024-02-07 DIAGNOSIS — I10 PRIMARY HYPERTENSION: ICD-10-CM

## 2024-02-07 DIAGNOSIS — F41.9 ANXIETY: ICD-10-CM

## 2024-02-07 DIAGNOSIS — I34.0 MITRAL VALVE INSUFFICIENCY, UNSPECIFIED ETIOLOGY: Primary | ICD-10-CM

## 2024-02-07 DIAGNOSIS — Z09 HOSPITAL DISCHARGE FOLLOW-UP: ICD-10-CM

## 2024-02-07 RX ORDER — PANTOPRAZOLE SODIUM 40 MG/1
40 TABLET, DELAYED RELEASE ORAL
Qty: 180 TABLET | Refills: 0 | Status: SHIPPED | OUTPATIENT
Start: 2024-02-07 | End: 2024-05-07

## 2024-02-07 RX ORDER — LEVOTHYROXINE SODIUM 175 UG/1
175 TABLET ORAL DAILY
Qty: 30 TABLET | Refills: 0 | Status: SHIPPED | OUTPATIENT
Start: 2024-02-07 | End: 2024-03-08

## 2024-02-07 ASSESSMENT — PATIENT HEALTH QUESTIONNAIRE - PHQ9
SUM OF ALL RESPONSES TO PHQ QUESTIONS 1-9: 2
SUM OF ALL RESPONSES TO PHQ9 QUESTIONS 1 & 2: 2
SUM OF ALL RESPONSES TO PHQ QUESTIONS 1-9: 2
SUM OF ALL RESPONSES TO PHQ QUESTIONS 1-9: 2
1. LITTLE INTEREST OR PLEASURE IN DOING THINGS: 1
SUM OF ALL RESPONSES TO PHQ QUESTIONS 1-9: 2
2. FEELING DOWN, DEPRESSED OR HOPELESS: 1

## 2024-02-08 ENCOUNTER — TELEPHONE (OUTPATIENT)
Dept: FAMILY MEDICINE CLINIC | Age: 74
End: 2024-02-08

## 2024-02-08 ENCOUNTER — TELEPHONE (OUTPATIENT)
Dept: CARDIOLOGY CLINIC | Age: 74
End: 2024-02-08

## 2024-02-08 NOTE — TELEPHONE ENCOUNTER
Patient would like to know if provider would like for him to continue taking VITAMIN D.     Please advise.

## 2024-02-08 NOTE — TELEPHONE ENCOUNTER
Pt states he was recently at [facility] and would like [facility] to review hosp notes.     Also states David Dr Cotter wants to continue with valve correction. Any questions please call pt.

## 2024-02-08 NOTE — PROGRESS NOTES
Aortic valve insufficiency    Chest pain, rule out acute myocardial infarction    Idiopathic hypotension       Medications listed as ordered at the time of discharge from hospital     Medication List            Accurate as of February 7, 2024 11:59 PM. If you have any questions, ask your nurse or doctor.                CHANGE how you take these medications      busPIRone 5 MG tablet  Commonly known as: BUSPAR  TAKE 1 TABLET BY MOUTH THREE TIMES DAILY  What changed: when to take this     levothyroxine 175 MCG tablet  Commonly known as: SYNTHROID  Take 1 tablet by mouth daily  What changed:   medication strength  how much to take  Changed by: Trista Sutton MD            CONTINUE taking these medications      aspirin 81 MG EC tablet     Cholecalciferol 50 MCG (2000 UT) Tabs     clopidogrel 75 MG tablet  Commonly known as: PLAVIX  Take 1 tablet by mouth once daily     docusate sodium 100 MG capsule  Commonly known as: COLACE     gabapentin 100 MG capsule  Commonly known as: NEURONTIN  Take 1 capsule by mouth 3 times daily for 90 days.     metoprolol succinate 25 MG extended release tablet  Commonly known as: TOPROL XL  Take 1 tablet by mouth in the morning and at bedtime     mirtazapine 7.5 MG tablet  Commonly known as: REMERON  Take 1 tablet by mouth nightly     nitroGLYCERIN 0.4 MG SL tablet  Commonly known as: Nitrostat  Place 1 tablet under the tongue every 5 minutes as needed for Chest pain     oxyCODONE-acetaminophen 5-325 MG per tablet  Commonly known as: PERCOCET     pantoprazole 40 MG tablet  Commonly known as: PROTONIX  Take 1 tablet by mouth 2 times daily (before meals) TAKE 1 TABLET BY MOUTH TWICE DAILY BEFORE MEAL(S) (breakfast and dinner)     rosuvastatin 40 MG tablet  Commonly known as: CRESTOR  Take 1 tablet by mouth daily     tiZANidine 4 MG tablet  Commonly known as: ZANAFLEX  Take 1 tablet by mouth every 8 hours as needed (back pain)               Where to Get Your Medications        These

## 2024-02-12 ENCOUNTER — CARE COORDINATION (OUTPATIENT)
Dept: CASE MANAGEMENT | Age: 74
End: 2024-02-12

## 2024-02-12 NOTE — CARE COORDINATION
Yes.    Follow Up  Future Appointments   Date Time Provider Department Center   11/12/2024  8:30 AM SCHEDULE, [place] DEVICE CHECK FF Cardio [facility]   11/12/2024  8:30 AM Vish Holguin MD FF Cardio [facility]     [facility] reviewed medical action plan with patient and discussed any barriers to care and/or understanding of plan of care after discharge. Discussed appropriate site of care based on symptoms and resources available to patient including: PCP  Specialist  Urgent care clinics  When to call [facility]. The patient agrees to contact the PCP office for questions related to their healthcare.   Advance Care Planning   The patient has the following advanced directives on file:  Advance Directives       Power of  Living Will ACP-Advance Directive ACP-Power of     Not on File Filed on 02/08/24 Filed Filed            The patient has appointed the following active healthcare agents:    Primary Decision Maker: Beth Zaragoza - [place] - [phone]    Secondary Decision Maker: Darlene Smith - Other - [phone]      Patients top risk factors for readmission: medical condition-.  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

## 2024-02-14 ENCOUNTER — CARE COORDINATION (OUTPATIENT)
Dept: CARE COORDINATION | Age: 74
End: 2024-02-14

## 2024-02-14 NOTE — CARE COORDINATION
reviewed discharge summary and/or continuity of care documents, Education of patient/family/caregiver/guardian to support self-management- , and Assessment and support for treatment adherence and medication management-      Care Transitions Subsequent and Final Call    Subsequent and Final Calls  Care Transitions Interventions  Other Interventions:             Care Transition Nurse provided contact information for future needs. Plan for follow-up call in 5-7 days based on severity of symptoms and risk factors.  Plan for next call: symptom management-   self management-     Fani Dickerson

## 2024-02-21 ENCOUNTER — CARE COORDINATION (OUTPATIENT)
Dept: CASE MANAGEMENT | Age: 74
End: 2024-02-21

## 2024-02-21 NOTE — CARE COORDINATION
Care Transitions Follow Up Call    Patient Current Location:  Home: [address]  [place] [address]    Care Transition Nurse contacted the patient by telephone to follow up after admission.  Verified name and  with patient as identifiers.    Patient: Anjum Zaragoza  Patient : 1950   MRN: [id number]  Reason for Admission: chest pain  Discharge Date: 24 RARS: Readmission Risk Score: 8.1      Needs to be reviewed by the provider   Additional needs identified to be addressed with provider: Yes  Pt would like Dr. Henry to be aware of his hospitalization and questioned why Dr. Henry was not consulted and pt saw another cardiologist Dr. Mata. Pt would like to see Dr. Henry after his valve procedure in March. CTN encouraged pt to call the office for f/u.               Method of communication with provider: chart routing.      Pt doing well, awaiting valve surgery at [facility]. Pt did have many questions about his stay and who saw him and why Dr. Henry did not see pt. CTN explained when a consult is requested, the cardiologist on call will see pt. He said he called Dr. Henry's office when he was DCd and didn't hear back. CTN reviewed the TC call and let him know Dr. Henry was aware of admission and to continue with  David and Dr. Cotter.     Pt stated he feels his HR and blood pressure are high and all over the place. CTN asked for readings. /61, 112/70 and HR 91. CTN explained the BP readings were good, pt was surprised. Pt denies SOB, still has some dizziness, no falls.     Addressed changes since last contact:  none  Discussed follow-up appointments. If no appointment was previously scheduled, appointment scheduling offered: No.   Is follow up appointment scheduled within 7 days of discharge? Yes.    Follow Up  Future Appointments   Date Time Provider Department Center   2024  8:30 AM SCHEDULE, [place] DEVICE CHECK  Cardio [facility]   2024  8:30 AM Vish Holguin MD

## 2024-02-22 NOTE — TELEPHONE ENCOUNTER
During patients admission, Dr Henry was not in the hospital to be able to see him. He was made aware of the admission during that time, however, he was at [facility].

## 2024-02-28 ENCOUNTER — CARE COORDINATION (OUTPATIENT)
Dept: CASE MANAGEMENT | Age: 74
End: 2024-02-28

## 2024-02-28 NOTE — CARE COORDINATION
Care Transitions Follow Up Call    Patient Current Location:  Home: [address]  [place] [place] [facility] [address]    Care Transition Nurse contacted the patient by telephone.  Verified name and  with patient as identifiers.    Patient: Anjum Zaragoza  Patient : 1950   MRN: [id number]  Reason for Admission: chest pain  Discharge Date: 24 RARS: Readmission Risk Score: 8.1      Needs to be reviewed by the provider   Additional needs identified to be addressed with provider: No  none             Method of communication with provider: none.    Patient reports that he is doing \"OK\".  He reports \"short of breath and have chest pain at some point most every day\".  He reports that this has been going on for quite some time and is scheduled for a mitral & tricuspid valve repair at [facility] 3/12/24.  He will have pre-op testing 3/7/24.  He reports that, at times, his heart begins to race.  Currently, he denies any SOB, chest pain, cough, fever, palpitations.  Patient reports dark, foul smelling urine.  CTN inquired about how long this had been occurring, he replied \"since my hospitalization, they tested my urine everyday\".  He reports that he is drinking plenty of fluids and denies any burning, pain, and/or difficulty urinating.  CT encouraged him to let PCP know if this continues.  CTN explained that this could be due to dehydration, an infection, or even one of his medications.  He verbalized understanding. CTN will route  message to PCP.  Patient is taking gabapentin & Norco for back pain and is checking vital signs regularly.  He is reporting BP in the low 100's/70's.  He will be having pre-op work up next week.  He denies any questions or concerns at this time.  CTN will continue with outreach follow up calls.      Follow Up  Future Appointments   Date Time Provider Department Center   2024  8:30 AM SCHEDULE, [place] DEVICE CHECK  Cardio [facility]   2024  8:30 AM Vish Holguin MD FF Cardio [facility]

## 2024-02-29 ENCOUNTER — OFFICE VISIT (OUTPATIENT)
Dept: FAMILY MEDICINE CLINIC | Age: 74
End: 2024-02-29
Payer: MEDICARE

## 2024-02-29 VITALS
HEIGHT: 70 IN | SYSTOLIC BLOOD PRESSURE: 121 MMHG | BODY MASS INDEX: 23.45 KG/M2 | OXYGEN SATURATION: 91 % | HEART RATE: 80 BPM | WEIGHT: 163.8 LBS | DIASTOLIC BLOOD PRESSURE: 86 MMHG | TEMPERATURE: 97.9 F

## 2024-02-29 DIAGNOSIS — I10 ESSENTIAL HYPERTENSION: Chronic | ICD-10-CM

## 2024-02-29 DIAGNOSIS — F41.9 ANXIETY: ICD-10-CM

## 2024-02-29 DIAGNOSIS — E03.9 ACQUIRED HYPOTHYROIDISM: ICD-10-CM

## 2024-02-29 DIAGNOSIS — R82.90 BAD ODOR OF URINE: ICD-10-CM

## 2024-02-29 DIAGNOSIS — Z01.818 ENCOUNTER FOR PREOPERATIVE ASSESSMENT: Primary | ICD-10-CM

## 2024-02-29 LAB
BASOPHILS # BLD: 0 K/UL (ref 0–0.2)
BASOPHILS NFR BLD: 0.7 %
BILIRUBIN, POC: NEGATIVE
BLOOD URINE, POC: NEGATIVE
CLARITY, POC: NORMAL
COLOR, POC: NORMAL
DEPRECATED RDW RBC AUTO: 13.4 % (ref 12.4–15.4)
EOSINOPHIL # BLD: 0.1 K/UL (ref 0–0.6)
EOSINOPHIL NFR BLD: 1.4 %
GLUCOSE URINE, POC: NEGATIVE
HCT VFR BLD AUTO: 40.1 % (ref 40.5–52.5)
HGB BLD-MCNC: 13.8 G/DL (ref 13.5–17.5)
KETONES, POC: NEGATIVE
LEUKOCYTE EST, POC: NEGATIVE
LYMPHOCYTES # BLD: 1.4 K/UL (ref 1–5.1)
LYMPHOCYTES NFR BLD: 24 %
MCH RBC QN AUTO: 32.1 PG (ref 26–34)
MCHC RBC AUTO-ENTMCNC: 34.3 G/DL (ref 31–36)
MCV RBC AUTO: 93.7 FL (ref 80–100)
MONOCYTES # BLD: 0.6 K/UL (ref 0–1.3)
MONOCYTES NFR BLD: 9.3 %
NEUTROPHILS # BLD: 3.9 K/UL (ref 1.7–7.7)
NEUTROPHILS NFR BLD: 64.6 %
NITRITE, POC: NEGATIVE
PH, POC: 7
PLATELET # BLD AUTO: 189 K/UL (ref 135–450)
PMV BLD AUTO: 8.6 FL (ref 5–10.5)
PROTEIN, POC: NORMAL
RBC # BLD AUTO: 4.28 M/UL (ref 4.2–5.9)
SPECIFIC GRAVITY, POC: 1.02
UROBILINOGEN, POC: 0.2
WBC # BLD AUTO: 6 K/UL (ref 4–11)

## 2024-02-29 PROCEDURE — 81002 URINALYSIS NONAUTO W/O SCOPE: CPT | Performed by: FAMILY MEDICINE

## 2024-02-29 PROCEDURE — 99214 OFFICE O/P EST MOD 30 MIN: CPT | Performed by: FAMILY MEDICINE

## 2024-02-29 PROCEDURE — 3074F SYST BP LT 130 MM HG: CPT | Performed by: FAMILY MEDICINE

## 2024-02-29 PROCEDURE — 1123F ACP DISCUSS/DSCN MKR DOCD: CPT | Performed by: FAMILY MEDICINE

## 2024-02-29 PROCEDURE — 3079F DIAST BP 80-89 MM HG: CPT | Performed by: FAMILY MEDICINE

## 2024-02-29 RX ORDER — SPIRONOLACTONE 25 MG/1
0.5 TABLET ORAL DAILY
COMMUNITY

## 2024-02-29 RX ORDER — TIZANIDINE 4 MG/1
4 TABLET ORAL EVERY 8 HOURS PRN
Qty: 90 TABLET | Refills: 5 | Status: SHIPPED | OUTPATIENT
Start: 2024-02-29

## 2024-02-29 RX ORDER — OXYCODONE HYDROCHLORIDE AND ACETAMINOPHEN 5; 325 MG/1; MG/1
1 TABLET ORAL EVERY 12 HOURS PRN
Qty: 30 TABLET | Refills: 0 | Status: SHIPPED | OUTPATIENT
Start: 2024-02-29 | End: 2024-03-30

## 2024-02-29 NOTE — PROGRESS NOTES
Chief Complaint: Pre-op Exam (Transcatheter Edge to Edge Mitral Valve Repair using Transesophageal Echocardiogram (x2) on 3/12/2024 by surgeon Dr. Mika Bianchi at [facility] )       HPI:  Anjum Zaragoza is a 73 y.o. 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    He is scheduled for repeat mitral wall clipping due to severe regurgitation.  He is scheduled ON 3/12/2024 with Dr Renaldo bianchi.    He complained about strong odor of the urine.  He denies any dysuria.  However currently his repeat urine sample was negative for any signs of infection.    Currently denies having any symptoms of active infection.    No known allergies for anesthesia.    He has history of hypothyroidism currently his dose was changed to 150 mcg and his tsh was normal.  And doing good.    Since discharge no known chest pain.    He has ongoing pain in his lower back.  Has been taking Norco 10/325 as needed for pain.  He does not exceed half a pill twice a day.  Gabapentin is helping his lower back pain and chest wall pain which happens intermittently.     He has been taking Remeron 7.5 mg for anxiety and its helping and also taking buspar.  His mood is doing better being on Remeron.     He has history of COPD and currently not been taking any inhalers.       ROS:  Constitutional: Negative for appetite change, fatigue, fever and unexpected weight change.   HENT: Negative  Respiratory: Negative for cough, chest tightness, shortness of breath and wheezing.    Cardiovascular: Negative for chest pain, palpitations and leg swelling.   Gastrointestinal: Negative for abdominal pain, blood in stool,

## 2024-03-01 ENCOUNTER — TELEPHONE (OUTPATIENT)
Dept: FAMILY MEDICINE CLINIC | Age: 74
End: 2024-03-01

## 2024-03-01 LAB
ANION GAP SERPL CALCULATED.3IONS-SCNC: 9 MMOL/L (ref 3–16)
BUN SERPL-MCNC: 11 MG/DL (ref 7–20)
CALCIUM SERPL-MCNC: 9.3 MG/DL (ref 8.3–10.6)
CHLORIDE SERPL-SCNC: 103 MMOL/L (ref 99–110)
CO2 SERPL-SCNC: 28 MMOL/L (ref 21–32)
CREAT SERPL-MCNC: 0.6 MG/DL (ref 0.8–1.3)
GFR SERPLBLD CREATININE-BSD FMLA CKD-EPI: >60 ML/MIN/{1.73_M2}
GLUCOSE SERPL-MCNC: 93 MG/DL (ref 70–99)
POTASSIUM SERPL-SCNC: 3.9 MMOL/L (ref 3.5–5.1)
SODIUM SERPL-SCNC: 140 MMOL/L (ref 136–145)
TSH SERPL DL<=0.005 MIU/L-ACNC: 2.22 UIU/ML (ref 0.27–4.2)

## 2024-03-01 NOTE — TELEPHONE ENCOUNTER
Patient called requesting the name of the 4 medications provider is requesting he stop taking prior to cardiac procedure on 3/12/24. He apologizes as he lost the paper this was written on after his appointment yesterday.

## 2024-03-03 PROCEDURE — 93298 REM INTERROG DEV EVAL SCRMS: CPT | Performed by: INTERNAL MEDICINE

## 2024-03-04 ENCOUNTER — CARE COORDINATION (OUTPATIENT)
Dept: CASE MANAGEMENT | Age: 74
End: 2024-03-04

## 2024-03-05 ENCOUNTER — CARE COORDINATION (OUTPATIENT)
Dept: CASE MANAGEMENT | Age: 74
End: 2024-03-05

## 2024-03-05 NOTE — CARE COORDINATION
Care Transitions Follow Up Call    Patient Current Location:  Home: [address]  [place] [facility] [place] [address]    Care Transition Nurse contacted the patient by telephone.  Verified name and  with patient as identifiers.    Patient: Anjum Zaragoza  Patient : 1950   MRN: [id number]  Reason for Admission: Chest pain  Discharge Date: 24 RARS: Readmission Risk Score: 8.1      Needs to be reviewed by the provider   Additional needs identified to be addressed with provider: No  none             Method of communication with provider: none.    Patient reports that he is doing \"well\".  He denies SOB, chest pain, palpitations  He is scheduled for a mitral & tricuspid valve repair at [facility] 3/12/24.  He will have pre-op testing 3/7/24 and had some questions regarding holding medications the day of the procedure.  CTN referred him to the surgeon at [facility], he confirmed that he has the phone number and he will contact the office.  Patient had a UA and it was normal, no indication of infection.  He denies any questions or concerns at this time.  CTN will make additional outreach following patient's 3/12/24 procedure.    Follow Up  Future Appointments   Date Time Provider Department Center   2024  8:30 AM SCHEDULE, [place] DEVICE CHECK FF Cardio [facility]   2024  8:30 AM Vish Holguin MD FF Cardio [facility]     External follow up appointment(s):     Care Transition Nurse reviewed red flags with patient and discussed any barriers to care and/or understanding of plan of care after discharge. Discussed appropriate site of care based on symptoms and resources available to patient including: Urgent care clinics  When to call 911. The patient agrees to contact the PCP office for questions related to their healthcare.       Offered patient enrollment in the Remote Patient Monitoring (RPM) program for in-home monitoring: Patient declined.24     Care Transitions Subsequent and Final Call    Subsequent and Final

## 2024-03-06 RX ORDER — LEVOTHYROXINE SODIUM 175 UG/1
175 TABLET ORAL DAILY
Qty: 30 TABLET | Refills: 0 | Status: SHIPPED | OUTPATIENT
Start: 2024-03-06

## 2024-03-06 NOTE — TELEPHONE ENCOUNTER
Medication:   Requested Prescriptions     Pending Prescriptions Disp Refills    levothyroxine (SYNTHROID) 175 MCG tablet [Pharmacy Med Name: Levothyroxine Sodium 175 MCG Oral Tablet] 30 tablet 0     Sig: Take 1 tablet by mouth once daily       Last Filled:  02/07/2024 #30 0rf    Patient Phone Number: [phone] (home)     Last appt: 2/29/2024   Next appt: Visit date not found    Last Lipid:   Lab Results   Component Value Date/Time    CHOL 163 12/14/2023 10:56 AM    TRIG 124 12/14/2023 10:56 AM    HDL 42 12/14/2023 10:56 AM    HDL 52 12/27/2009 06:38 AM    LDLCALC 96 12/14/2023 10:56 AM

## 2024-03-06 NOTE — TELEPHONE ENCOUNTER
LMOM advising patient to HOLD on  PLAVIX & ASPIRIN, SEVEN days PRIOR to procedure & resume post op and advised contact our office w/any questions or concerns.

## 2024-03-08 RX ORDER — METOPROLOL SUCCINATE 25 MG/1
25 TABLET, EXTENDED RELEASE ORAL 2 TIMES DAILY
Qty: 90 TABLET | Refills: 1 | Status: SHIPPED | OUTPATIENT
Start: 2024-03-08

## 2024-03-08 NOTE — TELEPHONE ENCOUNTER
Last OV: 11/07/2023 Vish  Last Labs:EKG-02/04/2024  Aprn Vika  Next OV:11/12/2024  Vish  Last Refill: Metoprolol-02/03/2023  Molly Chaparro

## 2024-03-08 NOTE — TELEPHONE ENCOUNTER
Medication Refill    Medication needing refilled:  metoprolol succinate (TOPROL XL)   Dosage of the medication:  25 MG extended release tablet   How are you taking this medication (QD, BID, TID, QID, PRN):   Take 1 tablet by mouth in the morning and at bedtime   30 or 90 day supply called in:  90 day refill  When will you run out of your medication:  Pt is out  Which Pharmacy are we sending the medication to?:  [facility] [address] [place] [address] [phone] -  [phone]

## 2024-03-08 NOTE — TELEPHONE ENCOUNTER
Need to verify with NPSR, looks like per telephone call, he may have made adjustment to this medication.

## 2024-03-08 NOTE — TELEPHONE ENCOUNTER
Pt is at the pharmacy waiting for script to be filled, he is totally out and very nervious to be without it tonight.    Please advise.

## 2024-03-13 ENCOUNTER — CARE COORDINATION (OUTPATIENT)
Dept: CASE MANAGEMENT | Age: 74
End: 2024-03-13

## 2024-03-25 DIAGNOSIS — Z51.81 ENCOUNTER FOR MEDICATION MONITORING: ICD-10-CM

## 2024-03-26 ENCOUNTER — OFFICE VISIT (OUTPATIENT)
Dept: FAMILY MEDICINE CLINIC | Age: 74
End: 2024-03-26
Payer: MEDICARE

## 2024-03-26 VITALS
SYSTOLIC BLOOD PRESSURE: 128 MMHG | WEIGHT: 168 LBS | BODY MASS INDEX: 23.52 KG/M2 | OXYGEN SATURATION: 92 % | HEIGHT: 71 IN | HEART RATE: 87 BPM | DIASTOLIC BLOOD PRESSURE: 87 MMHG

## 2024-03-26 DIAGNOSIS — F41.9 ANXIETY: ICD-10-CM

## 2024-03-26 DIAGNOSIS — T80.89XA BRUISING AT INJECTION SITE: Primary | ICD-10-CM

## 2024-03-26 DIAGNOSIS — E03.9 ACQUIRED HYPOTHYROIDISM: ICD-10-CM

## 2024-03-26 DIAGNOSIS — I10 ESSENTIAL HYPERTENSION: ICD-10-CM

## 2024-03-26 PROCEDURE — 3079F DIAST BP 80-89 MM HG: CPT | Performed by: FAMILY MEDICINE

## 2024-03-26 PROCEDURE — 3074F SYST BP LT 130 MM HG: CPT | Performed by: FAMILY MEDICINE

## 2024-03-26 PROCEDURE — 99213 OFFICE O/P EST LOW 20 MIN: CPT | Performed by: FAMILY MEDICINE

## 2024-03-26 PROCEDURE — 1123F ACP DISCUSS/DSCN MKR DOCD: CPT | Performed by: FAMILY MEDICINE

## 2024-03-26 RX ORDER — BUSPIRONE HYDROCHLORIDE 5 MG/1
5 TABLET ORAL 2 TIMES DAILY
Qty: 60 TABLET | Refills: 3 | Status: SHIPPED | OUTPATIENT
Start: 2024-03-26

## 2024-03-26 SDOH — ECONOMIC STABILITY: FOOD INSECURITY: WITHIN THE PAST 12 MONTHS, YOU WORRIED THAT YOUR FOOD WOULD RUN OUT BEFORE YOU GOT MONEY TO BUY MORE.: NEVER TRUE

## 2024-03-26 SDOH — ECONOMIC STABILITY: FOOD INSECURITY: WITHIN THE PAST 12 MONTHS, THE FOOD YOU BOUGHT JUST DIDN'T LAST AND YOU DIDN'T HAVE MONEY TO GET MORE.: NEVER TRUE

## 2024-03-26 SDOH — ECONOMIC STABILITY: INCOME INSECURITY: HOW HARD IS IT FOR YOU TO PAY FOR THE VERY BASICS LIKE FOOD, HOUSING, MEDICAL CARE, AND HEATING?: NOT VERY HARD

## 2024-03-26 NOTE — PROGRESS NOTES
tablet Take 1 tablet by mouth every 12 hours as needed for Pain (Back pain.) for up to 30 days. Max Daily Amount: 2 tablets 30 tablet 0    tiZANidine (ZANAFLEX) 4 MG tablet Take 1 tablet by mouth every 8 hours as needed (back pain) 90 tablet 5    spironolactone (ALDACTONE) 25 MG tablet Take 0.5 tablets by mouth daily      pantoprazole (PROTONIX) 40 MG tablet Take 1 tablet by mouth 2 times daily (before meals) TAKE 1 TABLET BY MOUTH TWICE DAILY BEFORE MEAL(S) (breakfast and dinner) 180 tablet 0    docusate sodium (COLACE) 100 MG capsule Take 1 capsule by mouth 2 times daily      rosuvastatin (CRESTOR) 40 MG tablet Take 1 tablet by mouth daily 90 tablet 3    clopidogrel (PLAVIX) 75 MG tablet Take 1 tablet by mouth once daily 90 tablet 1    mirtazapine (REMERON) 7.5 MG tablet Take 1 tablet by mouth nightly 90 tablet 3    aspirin 81 MG EC tablet Take 1 tablet by mouth daily      nitroGLYCERIN (NITROSTAT) 0.4 MG SL tablet Place 1 tablet under the tongue every 5 minutes as needed for Chest pain 25 tablet 3    Cholecalciferol 50 MCG ( UT) TABS Take 1 tablet by mouth daily      busPIRone (BUSPAR) 5 MG tablet Take 1 tablet by mouth 2 times daily 60 tablet 3    gabapentin (NEURONTIN) 100 MG capsule Take 1 capsule by mouth 3 times daily for 90 days. 270 capsule 0     No current facility-administered medications for this visit.       Social History     Tobacco Use    Smoking status: Former     Current packs/day: 0.00     Average packs/day: 1 pack/day for 45.0 years (45.0 ttl pk-yrs)     Types: Cigarettes     Start date: 1975     Quit date: 2015     Years since quittin.1     Passive exposure: Never    Smokeless tobacco: Never    Tobacco comments:     Maintain cessation   Substance Use Topics    Alcohol use: Yes     Comment: nightly        Objective:     Vitals:    24 1401   BP: 128/87   Pulse: 87   SpO2: 92%   Weight: 76.2 kg (168 lb)   Height: 1.791 m (5' 10.5\")     Body mass index is 23.76 kg/m².     Wt

## 2024-03-26 NOTE — TELEPHONE ENCOUNTER
Medication:   Requested Prescriptions     Pending Prescriptions Disp Refills    busPIRone (BUSPAR) 5 MG tablet [Pharmacy Med Name: busPIRone HCl 5 MG Oral Tablet] 90 tablet 0     Sig: TAKE 1 TABLET BY MOUTH THREE TIMES DAILY        Last Filled:  02/01/2024 #90 0rf     Patient Phone Number: [phone] (home)     Last appt: 2/29/2024   Next appt: 3/26/2024    Last OARRS:       12/30/2019     4:14 PM   RX Monitoring   Periodic Controlled Substance Monitoring No signs of potential drug abuse or diversion identified.

## 2024-04-03 ENCOUNTER — TELEPHONE (OUTPATIENT)
Dept: CARDIOLOGY CLINIC | Age: 74
End: 2024-04-03

## 2024-04-03 RX ORDER — LEVOTHYROXINE SODIUM 175 UG/1
175 TABLET ORAL DAILY
Qty: 30 TABLET | Refills: 12 | Status: SHIPPED | OUTPATIENT
Start: 2024-04-03

## 2024-04-03 NOTE — TELEPHONE ENCOUNTER
Called and spoke to pt. He said last night around 8pm while in a zoom meeting his heart started racing really bad, he had cp, he couldn't keep his head up, had blurry vision when trying to stand up his knees locked. This morning when he woke up he had jaw pain. He has little sob and no dizziness.  Please advise   Thank you

## 2024-04-03 NOTE — TELEPHONE ENCOUNTER
Received transmission. No new episodes since 3/30/24 except 1 symptom recorded event on 3/31/24 which appears to be NSR with an occasional PVC. Current EGM appears to be NSR with PVC. In the past 92 days HR histogram shows HR has went as low as in the 40's unsure of when and how long but doesn't appear to be often.

## 2024-04-03 NOTE — TELEPHONE ENCOUNTER
Medication:   Requested Prescriptions     Pending Prescriptions Disp Refills    levothyroxine (SYNTHROID) 175 MCG tablet [Pharmacy Med Name: Levothyroxine Sodium 175 MCG Oral Tablet] 30 tablet 0     Sig: Take 1 tablet by mouth once daily     Last Filled:  3/6/24    Last appt: 3/26/2024   Next appt: 6/10/2024    Last Thyroid:   Lab Results   Component Value Date/Time    TSH 7.27 12/14/2023 10:56 AM    T4FREE 2.2 02/02/2024 05:34 AM    V9LBSXS 2.12 05/21/2020 12:00 AM

## 2024-04-03 NOTE — TELEPHONE ENCOUNTER
Please let the patient know that his HR was normal at the time of the event. Is he continuing to have symptoms?

## 2024-04-03 NOTE — TELEPHONE ENCOUNTER
Pt calling amadou GUAMAN know that last night around 8pm while in a zoom meting his heart started racing really bad, he couldn't keep his head up, had blurry vision when trying to stand up his knees locked   Please call pt and advise   Thank you

## 2024-04-03 NOTE — TELEPHONE ENCOUNTER
Pt called back stating they called earlier about problem they call in this morning  pt thought he may of had stroke yesterday evening, pt would like to know what the monitor showed.    Pls advise thank you

## 2024-04-04 ENCOUNTER — TELEPHONE (OUTPATIENT)
Dept: FAMILY MEDICINE CLINIC | Age: 74
End: 2024-04-04

## 2024-04-04 DIAGNOSIS — Z51.81 ENCOUNTER FOR MEDICATION MONITORING: ICD-10-CM

## 2024-04-04 RX ORDER — BUSPIRONE HYDROCHLORIDE 5 MG/1
5 TABLET ORAL 3 TIMES DAILY
Qty: 90 TABLET | Refills: 3 | Status: SHIPPED | OUTPATIENT
Start: 2024-04-04

## 2024-04-04 NOTE — TELEPHONE ENCOUNTER
Patient called regarding script...   Disp Refills Start End    busPIRone (BUSPAR) 5 MG tablet 60 tablet 3 3/26/2024 --    Sig - Route: Take 1 tablet by mouth 2 times daily - Oral        Stated that this medication was sent to the pharmacy but it was only to take twice daily, however patient usually takes this medication three times daily.     He'd like to know if this was intentional or an error.     Please advise.

## 2024-04-04 NOTE — TELEPHONE ENCOUNTER
I spoke with pt. He stated that he was giving 2 new water pills by Dr Cotter. He thinks that the water pills may be what caused the issue.He was was referred to Dr Mancia for a valve repair but surgery was stopped because - he said that we had given DR Cotter the wrong information on which valve needed surgery , so no surgery was per formed. He was advised to reach out to Dr Mancia to let them know. Pt also wants to have an appointment with [facility] because jacinto hasn't been seen by [facility] in quite some time. All available are locked , can you provide a spot for this pt/

## 2024-04-05 NOTE — TELEPHONE ENCOUNTER
Patient has been informed and all questions have been answered. Patient demonstrates understanding (with no additional questions). No further action is needed with this encounter; thank you!

## 2024-04-07 PROCEDURE — 93298 REM INTERROG DEV EVAL SCRMS: CPT | Performed by: INTERNAL MEDICINE

## 2024-04-08 NOTE — TELEPHONE ENCOUNTER
4/23 @ 1 :45 he can be fit in with [facility] in [place]. There are openings at  if he is willing to be seen in that office.

## 2024-04-20 NOTE — PROGRESS NOTES
[facility]    2024    Anjum Zaragoza (:  1950) is a 74 y.o. male is here for follow up and management of CAD and modifiable risk factors.      Referring Provider: Trista Sutton MD    HISTORY: Mr. Anjum Zaragoza has a history of aortic insufficiency (s/p AVR tissue 2015), CAD (nonobstructive by [facility] in ), PAF s/p MAZE and DELGADO ligation with Atriclip (2015), moderate MR, and CHRISTOPHER. 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 Gama), TIA, transient global amnesia.     He was on Coumadin but it was stopped due to bleeding from his mouth and PEG tube. 2018 (2 week) cardiac monitor showed brief SVT and WCT; no a fib. ILR was placed 2019. He developed recurrent A fib and had RFA with re- isolation of PV, roof line, complex atrial fractionated electrogram, inferior-posterior line with PWI, CTI atrial flutter with Dr Vish Holguin 7/15/20.   Echo 2020 showed worsening mitral regurgitation, now moderate to severe.    3/4/21 hospitalization for chest pain and shortness of breath.  3/8/21 LHC showed at least moderate calcification of long, eccentric 80% ostial to prox LAD stenosis.  He was seen by Dr. Tinajero for consideration of LIMA to LAD and significant primary MR.  Patient was thought he was high risk for redo cardiac surgery; recommended aggressive treatment of reflux and monitoring symptoms.      Today, he states that he feels better than he has in a long time. He does feel his heart rate speed up with exertion, rolling in bed. He denies any chest discomfort, shortness of breath. He does report that in the morning he has dark urine, he also mentions that he feels blood is sometimes running down his leg, he thinks it is from a small hole in his skin. He feels palpitations, however ILR shows no arrhythmia's.     REVIEW OF SYSTEMS:  A complete review of systems has been reviewed and updated today and is negative except as noted

## 2024-04-22 NOTE — PATIENT INSTRUCTIONS
Referral to urology   Start Leqvio - your will come in to our infusion center for the injection  Dr Henry will reach out to Dr Cotter to discuss who you will be following

## 2024-04-23 ENCOUNTER — OFFICE VISIT (OUTPATIENT)
Dept: CARDIOLOGY CLINIC | Age: 74
End: 2024-04-23

## 2024-04-23 VITALS
HEART RATE: 88 BPM | BODY MASS INDEX: 23.44 KG/M2 | WEIGHT: 167.4 LBS | OXYGEN SATURATION: 96 % | HEIGHT: 71 IN | DIASTOLIC BLOOD PRESSURE: 80 MMHG | SYSTOLIC BLOOD PRESSURE: 114 MMHG

## 2024-04-23 DIAGNOSIS — I10 ESSENTIAL HYPERTENSION: Chronic | ICD-10-CM

## 2024-04-23 DIAGNOSIS — E78.2 MIXED HYPERLIPIDEMIA: ICD-10-CM

## 2024-04-23 DIAGNOSIS — R31.9 HEMATURIA, UNSPECIFIED TYPE: ICD-10-CM

## 2024-04-23 DIAGNOSIS — I48.0 PAF (PAROXYSMAL ATRIAL FIBRILLATION) (HCC): Primary | Chronic | ICD-10-CM

## 2024-04-23 DIAGNOSIS — I25.10 CORONARY ARTERY DISEASE INVOLVING NATIVE CORONARY ARTERY OF NATIVE HEART, UNSPECIFIED WHETHER ANGINA PRESENT: ICD-10-CM

## 2024-04-23 DIAGNOSIS — I07.1 SEVERE TRICUSPID REGURGITATION: ICD-10-CM

## 2024-04-23 DIAGNOSIS — I34.0 SEVERE MITRAL REGURGITATION: ICD-10-CM

## 2024-04-23 DIAGNOSIS — R00.2 PALPITATIONS: ICD-10-CM

## 2024-04-23 DIAGNOSIS — Z95.2 S/P AVR: ICD-10-CM

## 2024-04-25 ENCOUNTER — HOSPITAL ENCOUNTER (OUTPATIENT)
Age: 74
Discharge: HOME | End: 2024-04-25
Payer: MEDICARE

## 2024-04-25 DIAGNOSIS — E78.2 MIXED HYPERLIPIDEMIA: ICD-10-CM

## 2024-04-25 DIAGNOSIS — E78.2 MIXED HYPERLIPIDEMIA: Primary | ICD-10-CM

## 2024-04-25 LAB
ALBUMIN SERPL-MCNC: 4.3 G/DL (ref 3.4–5)
ALP SERPL-CCNC: 49 U/L (ref 40–129)
ALT SERPL-CCNC: 10 U/L (ref 10–40)
AST SERPL-CCNC: 20 U/L (ref 15–37)
BILIRUB DIRECT SERPL-MCNC: <0.2 MG/DL (ref 0–0.3)
BILIRUB INDIRECT SERPL-MCNC: ABNORMAL MG/DL (ref 0–1)
BILIRUB SERPL-MCNC: 1.3 MG/DL (ref 0–1)
CHOLEST SERPL-MCNC: 155 MG/DL (ref 0–199)
HDLC SERPL-MCNC: 49 MG/DL (ref 40–60)
LDL CHOLESTEROL CALCULATED: 86 MG/DL
PROT SERPL-MCNC: 7 G/DL (ref 6.4–8.2)
TRIGL SERPL-MCNC: 100 MG/DL (ref 0–150)
VLDLC SERPL CALC-MCNC: 20 MG/DL

## 2024-04-25 PROCEDURE — 80061 LIPID PANEL: CPT

## 2024-04-25 PROCEDURE — 36415 COLL VENOUS BLD VENIPUNCTURE: CPT

## 2024-04-25 PROCEDURE — 80076 HEPATIC FUNCTION PANEL: CPT

## 2024-04-29 ENCOUNTER — TELEPHONE (OUTPATIENT)
Dept: CASE MANAGEMENT | Age: 74
End: 2024-04-29

## 2024-04-29 NOTE — TELEPHONE ENCOUNTER
Patient is due for their annual lung screen 5/22/24  For your convenience, I have pended the order for the scan.  If you do not agree with the need for the test, please cancel the order and let me know.      Patient mailed reminder letter to schedule.    Thank you,  Zohreh Kapadia  Lung Navigator  [address] [address] [phone]

## 2024-05-01 ENCOUNTER — CLINICAL DOCUMENTATION (OUTPATIENT)
Dept: ONCOLOGY | Age: 74
End: 2024-05-01

## 2024-05-01 ENCOUNTER — CLINICAL DOCUMENTATION (OUTPATIENT)
Facility: HOSPITAL | Age: 74
End: 2024-05-01

## 2024-05-01 NOTE — PROGRESS NOTES
Patient Assistance    Met with: Patient Call     Navigator Type: Infusion  Documentation Type: New Patient  Contact Type: Telephone  Navigation Status: Copay Enrollment  Status of Patient Insurance Coverage: Patient has active coverage          Additional notes: [facility] Enrollment Approval     Drug Name: OTHER  Other Drug Name: Leqvio  Form of PAP Assistance: [employer] Assistance

## 2024-05-08 ENCOUNTER — HOSPITAL ENCOUNTER (OUTPATIENT)
Dept: ONCOLOGY | Age: 74
Setting detail: INFUSION SERIES
Discharge: HOME | End: 2024-05-08
Payer: MEDICARE

## 2024-05-08 VITALS
DIASTOLIC BLOOD PRESSURE: 86 MMHG | HEART RATE: 82 BPM | RESPIRATION RATE: 16 BRPM | TEMPERATURE: 97.2 F | SYSTOLIC BLOOD PRESSURE: 112 MMHG

## 2024-05-08 DIAGNOSIS — E78.2 MIXED HYPERLIPIDEMIA: Primary | ICD-10-CM

## 2024-05-08 PROCEDURE — 6360000002 HC RX W HCPCS: Performed by: INTERNAL MEDICINE

## 2024-05-08 PROCEDURE — 99211 OFF/OP EST MAY X REQ PHY/QHP: CPT

## 2024-05-08 PROCEDURE — 96372 THER/PROPH/DIAG INJ SC/IM: CPT

## 2024-05-08 RX ADMIN — INCLISIRAN 284 MG: 284 INJECTION, SOLUTION SUBCUTANEOUS at 08:42

## 2024-05-08 NOTE — PROGRESS NOTES
Pt ambulatory infusion center here for initial LEQVIO injection. Pt stayed for 30 mins after injection. No s/s of a reaction. Pt given AVS Summary with more information on the injection given and side effects to look out for. Pt to return in 3 months for next injection.    TRANSFER - IN REPORT: 
 
Verbal report received from HIMA Ruffin RN(name) on Christopher Gil  being received from [employer]) for routine post - op Report consisted of patients Situation, Background, Assessment and  
Recommendations(SBAR). Information from the following report(s) SBAR, Kardex, [facility] and Recent Results was reviewed with the receiving nurse. Opportunity for questions and clarification was provided. Assessment completed upon patients arrival to unit and care assumed.

## 2024-05-12 PROCEDURE — 93298 REM INTERROG DEV EVAL SCRMS: CPT | Performed by: INTERNAL MEDICINE

## 2024-05-14 NOTE — TELEPHONE ENCOUNTER
I believe that if he goes to the emergency room, they will simply tell him to keep his appointment for tomorrow morning    If there is fever, tender lymph glands, or some other sign of an acute infection,. ... then he should probably go  If not. .......  take something for pain, gargle any mouthwash and keep the appointment for tomorrow Breath sounds clear and equal bilaterally.

## 2024-05-28 ENCOUNTER — OFFICE VISIT (OUTPATIENT)
Dept: FAMILY MEDICINE CLINIC | Age: 74
End: 2024-05-28
Payer: MEDICARE

## 2024-05-28 ENCOUNTER — TELEPHONE (OUTPATIENT)
Dept: FAMILY MEDICINE CLINIC | Age: 74
End: 2024-05-28

## 2024-05-28 VITALS
OXYGEN SATURATION: 95 % | WEIGHT: 158.8 LBS | SYSTOLIC BLOOD PRESSURE: 113 MMHG | TEMPERATURE: 98.1 F | DIASTOLIC BLOOD PRESSURE: 77 MMHG | HEART RATE: 72 BPM | BODY MASS INDEX: 22.23 KG/M2 | HEIGHT: 71 IN

## 2024-05-28 DIAGNOSIS — M54.2 NECK PAIN: Primary | ICD-10-CM

## 2024-05-28 DIAGNOSIS — J41.0 SIMPLE CHRONIC BRONCHITIS (HCC): ICD-10-CM

## 2024-05-28 DIAGNOSIS — E03.9 ACQUIRED HYPOTHYROIDISM: ICD-10-CM

## 2024-05-28 DIAGNOSIS — I25.118 CORONARY ARTERY DISEASE OF NATIVE HEART WITH STABLE ANGINA PECTORIS, UNSPECIFIED VESSEL OR LESION TYPE (HCC): ICD-10-CM

## 2024-05-28 DIAGNOSIS — Z12.5 SCREENING FOR PROSTATE CANCER: ICD-10-CM

## 2024-05-28 DIAGNOSIS — E78.2 MIXED HYPERLIPIDEMIA: ICD-10-CM

## 2024-05-28 PROCEDURE — 3078F DIAST BP <80 MM HG: CPT | Performed by: FAMILY MEDICINE

## 2024-05-28 PROCEDURE — 99214 OFFICE O/P EST MOD 30 MIN: CPT | Performed by: FAMILY MEDICINE

## 2024-05-28 PROCEDURE — 3074F SYST BP LT 130 MM HG: CPT | Performed by: FAMILY MEDICINE

## 2024-05-28 PROCEDURE — 1123F ACP DISCUSS/DSCN MKR DOCD: CPT | Performed by: FAMILY MEDICINE

## 2024-05-28 RX ORDER — TAMSULOSIN HYDROCHLORIDE 0.4 MG/1
0.4 CAPSULE ORAL NIGHTLY
COMMUNITY
Start: 2024-05-02

## 2024-05-28 RX ORDER — METHOCARBAMOL 750 MG/1
750 TABLET, FILM COATED ORAL 3 TIMES DAILY
Qty: 30 TABLET | Refills: 0 | Status: SHIPPED | OUTPATIENT
Start: 2024-05-28 | End: 2024-06-07

## 2024-05-28 RX ORDER — LIDOCAINE 50 MG/G
1 PATCH TOPICAL DAILY
Qty: 30 PATCH | Refills: 0 | Status: SHIPPED | OUTPATIENT
Start: 2024-05-28 | End: 2024-06-27

## 2024-05-28 RX ORDER — TORSEMIDE 10 MG/1
10 TABLET ORAL DAILY
COMMUNITY
Start: 2024-04-01

## 2024-05-28 RX ORDER — EPLERENONE 25 MG/1
25 TABLET, FILM COATED ORAL DAILY
COMMUNITY
Start: 2024-04-01 | End: 2024-09-28

## 2024-05-28 RX ORDER — METHYLPREDNISOLONE 4 MG/1
TABLET ORAL
Qty: 21 TABLET | Refills: 0 | Status: SHIPPED | OUTPATIENT
Start: 2024-05-28 | End: 2024-06-03

## 2024-05-28 NOTE — TELEPHONE ENCOUNTER
Patient called requesting a same day appointment with provider.    Symptoms: pulled muscle in neck after falling asleep on recliner, very painful and exacerbated by already existing back and neck issues    Has been taking muscle relaxers that provider has previously prescribed and they have only provided minimal relief.    No appointments available at time of search that met patient needs.     Please advise.

## 2024-05-29 ENCOUNTER — TELEPHONE (OUTPATIENT)
Dept: ADMINISTRATIVE | Age: 74
End: 2024-05-29

## 2024-05-29 DIAGNOSIS — M54.2 NECK PAIN: Primary | ICD-10-CM

## 2024-05-29 LAB
ALBUMIN SERPL-MCNC: 4.3 G/DL (ref 3.4–5)
ALP SERPL-CCNC: 70 U/L (ref 40–129)
ALT SERPL-CCNC: 9 U/L (ref 10–40)
AST SERPL-CCNC: 16 U/L (ref 15–37)
BILIRUB DIRECT SERPL-MCNC: <0.2 MG/DL (ref 0–0.3)
BILIRUB INDIRECT SERPL-MCNC: ABNORMAL MG/DL (ref 0–1)
BILIRUB SERPL-MCNC: 1 MG/DL (ref 0–1)
CHOLEST SERPL-MCNC: 93 MG/DL (ref 0–199)
HDLC SERPL-MCNC: 54 MG/DL (ref 40–60)
LDLC SERPL CALC-MCNC: 22 MG/DL
PROT SERPL-MCNC: 7 G/DL (ref 6.4–8.2)
TRIGL SERPL-MCNC: 85 MG/DL (ref 0–150)
VLDLC SERPL CALC-MCNC: 17 MG/DL

## 2024-05-29 NOTE — PROGRESS NOTES
Chief Complaint: Muscle Pain (right neck stiffness with painful ROM, intermittently for years d/t cervical injury 36+ years ago; reports pulled muscle a few days ago; limited ability to move neck/head)       HPI:  Anjum Zaragoza is a 74 y.o. male here with c/o pain in his neck ongoing since 2 days.  He has history of cervical disc degeneration.  He slept in his recliner following which he has been having stiffness and he is not able to move his neck.  He did take muscle relaxants and it did not help him much.  Denies any tingling or numbness in his hands.    He has history of coronary artery disease s/p 2 stents, paroxysmal atrial fibrillation, aortic valve insufficiency underwent replacement and had mitral valve clip placed for mitral regurgitation, squamous cell carcinoma of his tongue currently on remission, chronic lower back due to lumbar disc degeneration, hypothyroidism, anxiety .    He has history of hypothyroidism currently his dose was changed to 150 mcg and his tsh was normal.  And doing good.      Has been taking Norco 10/325 as needed for pain mostly for his lower back..  He does not exceed half a pill twice a day.  Gabapentin is helping his lower back pain and chest wall pain which happens intermittently.     He has been taking Remeron 7.5 mg for anxiety and its helping and also taking buspar.  His mood is doing better being on Remeron.     He has history of COPD and currently not been taking any inhalers.  Denies any concerns    He has history of coronary artery disease with mitral valve regurgitation, paroxysmal atrial fibrillation.  He follows up with Dr. Henry and recently got Leqvio infusion. He is also on Plavix 75 mg daily  Metoprolol extended release 25 mg daily, spironolactone 25 mg daily and Crestor 40 mg daily.    He recently had blood in the urine and was seen urologist who diagnosed him having prostate cancer.  He is not sure.    He had squamous cell carcinoma of his tongue in the past

## 2024-05-29 NOTE — TELEPHONE ENCOUNTER
Submitted PA for Lidocaine 5% patches   Via CMM (Key: [id number])  STATUS: NOT SENT    Please provide diagnosis and diagnosis code to submit PA. Will also need to have office visit completed and submitted back to PA pool    Thank you

## 2024-05-30 ENCOUNTER — TELEPHONE (OUTPATIENT)
Dept: FAMILY MEDICINE CLINIC | Age: 74
End: 2024-05-30

## 2024-05-30 DIAGNOSIS — Z12.5 SCREENING FOR PROSTATE CANCER: ICD-10-CM

## 2024-05-30 DIAGNOSIS — E03.9 ACQUIRED HYPOTHYROIDISM: Primary | ICD-10-CM

## 2024-05-30 DIAGNOSIS — R82.90 BAD ODOR OF URINE: ICD-10-CM

## 2024-05-30 DIAGNOSIS — Z87.898 HISTORY OF ELEVATED PSA: ICD-10-CM

## 2024-05-30 DIAGNOSIS — E03.9 ACQUIRED HYPOTHYROIDISM: ICD-10-CM

## 2024-05-30 LAB
PSA SERPL DL<=0.01 NG/ML-MCNC: 11.41 NG/ML (ref 0–4)
TSH SERPL DL<=0.005 MIU/L-ACNC: 2.56 UIU/ML (ref 0.27–4.2)

## 2024-05-30 NOTE — TELEPHONE ENCOUNTER
----- Message from Trista Sutton MD sent at 5/29/2024  3:24 PM EDT -----  The labs has not done tsh or psa from yesterday  Can they add the test    Dr Sutton

## 2024-05-30 NOTE — TELEPHONE ENCOUNTER
LMOM & sent MyChart message advising patient of Lidocaine 5% patches prior authorization DENIAL

## 2024-06-03 DIAGNOSIS — G62.9 NEUROPATHY: ICD-10-CM

## 2024-06-03 RX ORDER — GABAPENTIN 100 MG/1
100 CAPSULE ORAL 3 TIMES DAILY
Qty: 180 CAPSULE | Refills: 4 | Status: SHIPPED | OUTPATIENT
Start: 2024-06-03 | End: 2024-09-01

## 2024-06-03 RX ORDER — CLOPIDOGREL BISULFATE 75 MG/1
75 TABLET ORAL DAILY
Qty: 90 TABLET | Refills: 0 | Status: SHIPPED | OUTPATIENT
Start: 2024-06-03

## 2024-06-03 NOTE — TELEPHONE ENCOUNTER
Medication:   Requested Prescriptions     Pending Prescriptions Disp Refills    gabapentin (NEURONTIN) 100 MG capsule [Pharmacy Med Name: Gabapentin 100 MG Oral Capsule] 180 capsule 0     Sig: Take 1 capsule by mouth 3 times daily.        Last Filled:  12/14/23    Patient Phone Number: [phone] (home)     Last appt: 5/28/2024   Next appt: 6/10/2024    Last OARRS:       12/30/2019     4:14 PM   RX Monitoring   Periodic Controlled Substance Monitoring No signs of potential drug abuse or diversion identified.

## 2024-06-03 NOTE — TELEPHONE ENCOUNTER
Received refill request for clopidogrel from [facility].    Last ov: 11/07/2023 [facility]    Last Refill: 12/07/2023 #90 w/ 1 refill    Next appointment: 11/12/2024 [facility]

## 2024-06-04 RX ORDER — METOPROLOL SUCCINATE 25 MG/1
25 TABLET, EXTENDED RELEASE ORAL 2 TIMES DAILY
Qty: 180 TABLET | Refills: 3 | Status: SHIPPED | OUTPATIENT
Start: 2024-06-04

## 2024-06-04 NOTE — TELEPHONE ENCOUNTER
Received refill request for  metoprolol succinate (TOPROL XL) 25 MG extended release tablet  from [facility] pharmacy.     Last OV: 2024 [facility]    Next OV: 2024 [facility]    Last EK2024 NPKV    Last Filled: 3-8-2024 [facility]

## 2024-06-10 ENCOUNTER — TELEPHONE (OUTPATIENT)
Dept: FAMILY MEDICINE CLINIC | Age: 74
End: 2024-06-10

## 2024-06-10 ENCOUNTER — OFFICE VISIT (OUTPATIENT)
Dept: FAMILY MEDICINE CLINIC | Age: 74
End: 2024-06-10
Payer: MEDICARE

## 2024-06-10 VITALS
DIASTOLIC BLOOD PRESSURE: 64 MMHG | HEIGHT: 71 IN | HEART RATE: 77 BPM | WEIGHT: 160 LBS | SYSTOLIC BLOOD PRESSURE: 108 MMHG | OXYGEN SATURATION: 98 % | BODY MASS INDEX: 22.4 KG/M2

## 2024-06-10 DIAGNOSIS — Z00.00 ENCOUNTER FOR ANNUAL WELLNESS VISIT (AWV) IN MEDICARE PATIENT: ICD-10-CM

## 2024-06-10 DIAGNOSIS — I10 ESSENTIAL HYPERTENSION: ICD-10-CM

## 2024-06-10 DIAGNOSIS — R97.20 ELEVATED PSA: ICD-10-CM

## 2024-06-10 DIAGNOSIS — M54.2 NECK PAIN: ICD-10-CM

## 2024-06-10 DIAGNOSIS — Z00.00 MEDICARE ANNUAL WELLNESS VISIT, SUBSEQUENT: Primary | ICD-10-CM

## 2024-06-10 PROBLEM — C79.9 SECONDARY MALIGNANT NEOPLASM OF UNSPECIFIED SITE (CODE) (HCC): Status: RESOLVED | Noted: 2022-11-03 | Resolved: 2024-06-10

## 2024-06-10 PROCEDURE — 3074F SYST BP LT 130 MM HG: CPT | Performed by: FAMILY MEDICINE

## 2024-06-10 PROCEDURE — 1123F ACP DISCUSS/DSCN MKR DOCD: CPT | Performed by: FAMILY MEDICINE

## 2024-06-10 PROCEDURE — 3078F DIAST BP <80 MM HG: CPT | Performed by: FAMILY MEDICINE

## 2024-06-10 PROCEDURE — G0439 PPPS, SUBSEQ VISIT: HCPCS | Performed by: FAMILY MEDICINE

## 2024-06-10 RX ORDER — EPLERENONE 25 MG/1
25 TABLET, FILM COATED ORAL DAILY
Qty: 30 TABLET | Refills: 5 | Status: CANCELLED | OUTPATIENT
Start: 2024-06-10 | End: 2024-12-07

## 2024-06-10 RX ORDER — METOPROLOL SUCCINATE 50 MG/1
50 TABLET, EXTENDED RELEASE ORAL 2 TIMES DAILY
Qty: 60 TABLET | Refills: 2 | COMMUNITY
Start: 2024-06-10

## 2024-06-10 RX ORDER — TAMSULOSIN HYDROCHLORIDE 0.4 MG/1
0.4 CAPSULE ORAL NIGHTLY
Qty: 30 CAPSULE | Refills: 0 | Status: CANCELLED | OUTPATIENT
Start: 2024-06-10

## 2024-06-10 ASSESSMENT — LIFESTYLE VARIABLES
HOW OFTEN DURING THE LAST YEAR HAVE YOU NEEDED AN ALCOHOLIC DRINK FIRST THING IN THE MORNING TO GET YOURSELF GOING AFTER A NIGHT OF HEAVY DRINKING: NEVER
HAVE YOU OR SOMEONE ELSE BEEN INJURED AS A RESULT OF YOUR DRINKING: NO
HOW OFTEN DO YOU HAVE A DRINK CONTAINING ALCOHOL: 4 OR MORE TIMES A WEEK
HOW OFTEN DURING THE LAST YEAR HAVE YOU FAILED TO DO WHAT WAS NORMALLY EXPECTED FROM YOU BECAUSE OF DRINKING: NEVER
HOW OFTEN DURING THE LAST YEAR HAVE YOU BEEN UNABLE TO REMEMBER WHAT HAPPENED THE NIGHT BEFORE BECAUSE YOU HAD BEEN DRINKING: NEVER
HOW MANY STANDARD DRINKS CONTAINING ALCOHOL DO YOU HAVE ON A TYPICAL DAY: 1 OR 2
HAS A RELATIVE, FRIEND, DOCTOR, OR ANOTHER HEALTH PROFESSIONAL EXPRESSED CONCERN ABOUT YOUR DRINKING OR SUGGESTED YOU CUT DOWN: NO
HOW OFTEN DURING THE LAST YEAR HAVE YOU HAD A FEELING OF GUILT OR REMORSE AFTER DRINKING: NEVER
HOW OFTEN DURING THE LAST YEAR HAVE YOU FOUND THAT YOU WERE NOT ABLE TO STOP DRINKING ONCE YOU HAD STARTED: NEVER

## 2024-06-10 ASSESSMENT — PATIENT HEALTH QUESTIONNAIRE - PHQ9
2. FEELING DOWN, DEPRESSED OR HOPELESS: NOT AT ALL
SUM OF ALL RESPONSES TO PHQ QUESTIONS 1-9: 1
SUM OF ALL RESPONSES TO PHQ9 QUESTIONS 1 & 2: 1
SUM OF ALL RESPONSES TO PHQ QUESTIONS 1-9: 1
1. LITTLE INTEREST OR PLEASURE IN DOING THINGS: SEVERAL DAYS

## 2024-06-10 NOTE — PROGRESS NOTES
atraumatic  Neck: supple and non-tender without mass  Pulmonary/Chest: clear to auscultation bilaterally- no wheezes  Cardiovascular: normal rate, regular rhythm, normal S1 and S2  Abdomen: soft, non-tender, non-distended, normal bowel sounds  Musculoskeletal: normal range of motion, no joint swelling, deformity or tenderness      Allergies   Allergen Reactions    Naproxen Swelling     Lips  Pt does not recognize this being an intolerance    Hydrocodone Itching     Prior to Visit Medications    Medication Sig Taking? Authorizing Provider   metoprolol succinate (TOPROL XL) 50 MG extended release tablet Take 1 tablet by mouth in the morning and at bedtime Yes Trista Sutton MD   clopidogrel (PLAVIX) 75 MG tablet Take 1 tablet by mouth once daily Yes Cody Ulloa APRN - CNP   gabapentin (NEURONTIN) 100 MG capsule Take 1 capsule by mouth 3 times daily for 90 days. Yes Trista Sutton MD   tamsulosin (FLOMAX) 0.4 MG capsule Take 1 capsule by mouth nightly Yes Soraya Valencia MD   lidocaine (LIDODERM) 5 % Place 1 patch onto the skin daily 12 hours on, 12 hours off. Yes Trista Sutton MD   levothyroxine (SYNTHROID) 175 MCG tablet Take 1 tablet by mouth once daily Yes Trista Sutton MD   pantoprazole (PROTONIX) 40 MG tablet Take 1 tablet by mouth 2 times daily (before meals) TAKE 1 TABLET BY MOUTH TWICE DAILY BEFORE MEAL(S) (breakfast and dinner) Yes Trista Sutton MD   docusate sodium (COLACE) 100 MG capsule Take 1 capsule by mouth 2 times daily Yes Soraya Valencia MD   mirtazapine (REMERON) 7.5 MG tablet Take 1 tablet by mouth nightly Yes Trista Sutton MD   aspirin 81 MG EC tablet Take 1 tablet by mouth daily Yes Soraya Valencia MD   nitroGLYCERIN (NITROSTAT) 0.4 MG SL tablet Place 1 tablet under the tongue every 5 minutes as needed for Chest pain Yes Alistair Swift MD   Cholecalciferol 50 MCG (2000 UT) TABS Take 1 tablet by mouth daily Yes Soraya Valencia MD

## 2024-06-10 NOTE — TELEPHONE ENCOUNTER
Pt said he is unable to schedule anything first without going through some paperwork and the provider?

## 2024-06-10 NOTE — TELEPHONE ENCOUNTER
Pt no longer takes this medication. Please let the provider know.     eplerenone (INSPRA) 25 MG tablet [[id number]]    Order Details    Dose: 25 mg Route: Oral Frequency: DAILY   Dispense Quantity: -- Refills: --            torsemide (DEMADEX) 10 MG tablet [[id number]]  DISCONTINUED    Order Details    Dose: 10 mg Route: Oral Frequency: DAILY   Dispense Quantity: -- Refills: --

## 2024-06-10 NOTE — TELEPHONE ENCOUNTER
Medication:   Requested Prescriptions     Pending Prescriptions Disp Refills    eplerenone (INSPRA) 25 MG tablet 30 tablet 5     Sig: Take 1 tablet by mouth daily    tamsulosin (FLOMAX) 0.4 MG capsule 30 capsule 0     Sig: Take 1 capsule by mouth nightly        Last Filled:      Patient Phone Number: [phone] (home)     Last appt: 6/10/2024   Next appt: Visit date not found    Last OARRS:       12/30/2019     4:14 PM   RX Monitoring   Periodic Controlled Substance Monitoring No signs of potential drug abuse or diversion identified.

## 2024-06-11 ENCOUNTER — TELEPHONE (OUTPATIENT)
Dept: CARDIOLOGY CLINIC | Age: 74
End: 2024-06-11

## 2024-06-11 NOTE — TELEPHONE ENCOUNTER
Pt states he was told by someone in our office that we would be able to get the cost of his infusions (needed)  down to almost nothing. Pt had first infusion about 3 weeks ago and received a bill for over $800. Pt states he can't afford that and wouldn't have done that if he known and will not be able to do anymore at that cost. Please call pt to discuss.

## 2024-06-11 NOTE — TELEPHONE ENCOUNTER
Fabricio Lea, I am wondering if  you can help me out with this patient. He has apparently received a bill for his first injection of Leqvio, do you know if this is accurate? I see that he called in prior to his injection but I am not sure if he received an answer on his co pay. Any info you can give would be great. If you have questions for me, call the office [phone] and ask for me.

## 2024-06-13 NOTE — TELEPHONE ENCOUNTER
Please call patient to let him know that if the claim that was submitted by the infusion center comes back and won't cover the cost to call and let us know. They believe it should be covered.

## 2024-06-21 DIAGNOSIS — R82.90 BAD ODOR OF URINE: ICD-10-CM

## 2024-06-21 RX ORDER — OXYCODONE HYDROCHLORIDE AND ACETAMINOPHEN 5; 325 MG/1; MG/1
1 TABLET ORAL EVERY 12 HOURS PRN
Qty: 30 TABLET | Refills: 0 | Status: SHIPPED | OUTPATIENT
Start: 2024-06-21 | End: 2024-07-21

## 2024-06-21 NOTE — TELEPHONE ENCOUNTER
Medication:   Requested Prescriptions     Pending Prescriptions Disp Refills    oxyCODONE-acetaminophen (PERCOCET) 5-325 MG per tablet 30 tablet 0     Sig: Take 1 tablet by mouth every 12 hours as needed for Pain (Back pain.) for up to 30 days. Max Daily Amount: 2 tablets        Last Filled:  2/29/2024    Patient Phone Number: [phone] (home)     Last appt: 6/10/2024   Next appt: Visit date not found    Last OARRS:       12/30/2019     4:14 PM   RX Monitoring   Periodic Controlled Substance Monitoring No signs of potential drug abuse or diversion identified.

## 2024-06-21 NOTE — TELEPHONE ENCOUNTER
Patient requesting refill of...oxyCODONE-acetaminophen (PERCOCET) 5-325 MG per tablet [[id number]]  ENDED    Order Details  Dose: 1 tablet Route: Oral Frequency: EVERY 12 HOURS PRN for Pain, Back pain.   Dispense Quantity: 30 tablet Refills: 0    Note to Pharmacy: Reduce doses taken as pain becomes manageable       Last visit date: 6/10/2024    Pharmacy...[facility] [address] [place] [address] [phone] -  [phone]

## 2024-06-25 ENCOUNTER — TELEPHONE (OUTPATIENT)
Dept: FAMILY MEDICINE CLINIC | Age: 74
End: 2024-06-25

## 2024-06-25 ENCOUNTER — TELEPHONE (OUTPATIENT)
Dept: CASE MANAGEMENT | Age: 74
End: 2024-06-25

## 2024-06-25 DIAGNOSIS — R82.90 BAD ODOR OF URINE: ICD-10-CM

## 2024-06-25 DIAGNOSIS — Z87.891 HISTORY OF TOBACCO ABUSE: Primary | ICD-10-CM

## 2024-06-25 NOTE — TELEPHONE ENCOUNTER
Disp Refills Start End    oxyCODONE-acetaminophen (PERCOCET) 5-325 MG per tablet 30 tablet 0 6/21/2024 7/21/2024    Sig - Route: Take 1 tablet by mouth every 12 hours as needed for Pain (Back pain.) for up to 30 days. Max Daily Amount: 2 tablets - Oral    Sent to pharmacy as: oxyCODONE-Acetaminophen 5-325 MG Oral Tablet (PERCOCET)    Earliest Fill Date: 6/21/2024    Notes to Pharmacy: Reduce doses taken as pain becomes manageable    E-Prescribing Status: Receipt confirmed by pharmacy (6/21/2024  8:16 PM EDT)        Pharmacy  [facility] [address] [place] [address] -  [phone] -  [phone]       PATIENT STATES HE JUST PICKED UP HIS PRESCRIPTION TODAY.     No further action is needed at this time; thank you!

## 2024-06-25 NOTE — TELEPHONE ENCOUNTER
Laura Sutton, Trista BECERRA MD,    Patient wass due for their annual lung screen on 5/22/24.  For your convenience, I have pended the order for the scan.  If you do not agree with the need for the test, please cancel the order and let me know.      Patient will be mailed reminder letter to schedule.      Thank you,     Zohreh Kapadia  Lung Navigator  [facility]  [phone]    Future Appointments   Date Time Provider Department Center   8/8/2024  8:30 [facility]   11/12/2024  8:30 AM SCHEDULE, [place] DEVICE CHECK [facility]   11/12/2024  8:30 AM Vish Holguin MD [facility] Cardio [facility]       Last PCP Appt: 6/10/24     Lung Screen Criteria  Age 50-80  Current smoker or quit within the last 15 years  Has => 20 pack year history.

## 2024-06-25 NOTE — TELEPHONE ENCOUNTER
Pt stated that he needs his pain medication refilled but the pharmacy told him that the Dr needs to call it in 1st. Oxycodone 5-325 MG. [facility] in [place], [place] on [facility].

## 2024-07-09 NOTE — TELEPHONE ENCOUNTER
Medication:   Requested Prescriptions     Pending Prescriptions Disp Refills    busPIRone (BUSPAR) 5 MG tablet [Pharmacy Med Name: busPIRone HCl 5 MG Oral Tablet] 90 tablet 0     Sig: TAKE 1 TABLET BY MOUTH THREE TIMES DAILY        Last Filled:      Patient Phone Number: [phone] (home)     Last appt: 5/17/2023   Next appt: Visit date not found    Last OARRS:   RX Monitoring 12/30/2019   Periodic Controlled Substance Monitoring No signs of potential drug abuse or diversion identified. Patient with possible COPD exacerbation  Allergic to all steroids except decadron which she said she can take with IV benadryl  Continue decadron and benadryl  Scheduled duonebs  Wean from bipap as tolerated  Continuous pulse ox

## 2024-07-21 PROCEDURE — 93298 REM INTERROG DEV EVAL SCRMS: CPT | Performed by: INTERNAL MEDICINE

## 2024-07-29 ENCOUNTER — HOSPITAL ENCOUNTER (OUTPATIENT)
Age: 74
Discharge: HOME | End: 2024-07-29
Payer: MEDICARE

## 2024-07-29 LAB
BUN SERPL-MCNC: 12 MG/DL (ref 7–20)
CREAT SERPL-MCNC: 0.6 MG/DL (ref 0.8–1.3)
GFR SERPLBLD CREATININE-BSD FMLA CKD-EPI: >90 ML/MIN/{1.73_M2}

## 2024-07-29 PROCEDURE — 36415 COLL VENOUS BLD VENIPUNCTURE: CPT

## 2024-07-29 PROCEDURE — 84520 ASSAY OF UREA NITROGEN: CPT

## 2024-07-29 PROCEDURE — 82565 ASSAY OF CREATININE: CPT

## 2024-08-02 ENCOUNTER — TELEPHONE (OUTPATIENT)
Dept: FAMILY MEDICINE CLINIC | Age: 74
End: 2024-08-02

## 2024-08-02 NOTE — TELEPHONE ENCOUNTER
Pt wanted to let us know that he might have been exposed to Covid. With his surgery on Monday advised him to call urology and see what they recommend. Also referred him to the [facility] clinic in [facility] since we had no openings.

## 2024-08-05 RX ORDER — PANTOPRAZOLE SODIUM 40 MG/1
TABLET, DELAYED RELEASE ORAL
Qty: 180 TABLET | Refills: 0 | Status: SHIPPED | OUTPATIENT
Start: 2024-08-05

## 2024-08-05 NOTE — TELEPHONE ENCOUNTER
Medication:   Requested Prescriptions     Pending Prescriptions Disp Refills    pantoprazole (PROTONIX) 40 MG tablet [Pharmacy Med Name: Pantoprazole Sodium 40 MG Oral Tablet Delayed Release] 180 tablet 0     Sig: TAKE 1 TABLET BY MOUTH TWICE DAILY BEFORE MEAL(S)        Last Filled:  02/07/2024 #180 0rf     Patient Phone Number: [phone] (home)     Last appt: 6/10/2024   Next appt: Visit date not found    Last OARRS:       12/30/2019     4:14 PM   RX Monitoring   Periodic Controlled Substance Monitoring No signs of potential drug abuse or diversion identified.

## 2024-08-08 ENCOUNTER — HOSPITAL ENCOUNTER (OUTPATIENT)
Dept: ONCOLOGY | Age: 74
Setting detail: INFUSION SERIES
Discharge: HOME | End: 2024-08-08
Payer: MEDICARE

## 2024-08-08 VITALS
RESPIRATION RATE: 16 BRPM | DIASTOLIC BLOOD PRESSURE: 75 MMHG | HEART RATE: 84 BPM | SYSTOLIC BLOOD PRESSURE: 104 MMHG | TEMPERATURE: 97.1 F

## 2024-08-08 DIAGNOSIS — E78.2 MIXED HYPERLIPIDEMIA: Primary | ICD-10-CM

## 2024-08-08 PROCEDURE — 99211 OFF/OP EST MAY X REQ PHY/QHP: CPT

## 2024-08-08 PROCEDURE — 96372 THER/PROPH/DIAG INJ SC/IM: CPT

## 2024-08-08 PROCEDURE — 6360000002 HC RX W HCPCS: Performed by: INTERNAL MEDICINE

## 2024-08-08 RX ADMIN — INCLISIRAN 284 MG: 284 INJECTION, SOLUTION SUBCUTANEOUS at 08:34

## 2024-08-08 NOTE — PROGRESS NOTES
Pt ambulatory infusion center here for 2nd LEQVIO injection. No s/s of a reaction. Pt to return in 6 months for next injection. .

## 2024-08-16 ENCOUNTER — TELEPHONE (OUTPATIENT)
Dept: FAMILY MEDICINE CLINIC | Age: 74
End: 2024-08-16

## 2024-08-16 NOTE — TELEPHONE ENCOUNTER
Pt is scheduled for Monday but just tested positive for covid,. He wants to know if there is something he can take over the weekend to mitigate the symptoms.     Please advise and thank you.

## 2024-08-16 NOTE — TELEPHONE ENCOUNTER
Patient reports bad headache, fever, full body aches, hands and knees aching, some SOB, and wet cough. Asking if something could be called in. As the office is closed, asking if Dr Sutton has any guidance to return his call     Informed patient if any worsening to go to ER should he have any trouble breathing.

## 2024-08-18 DIAGNOSIS — Z51.81 ENCOUNTER FOR MEDICATION MONITORING: ICD-10-CM

## 2024-08-18 DIAGNOSIS — F41.9 ANXIETY: ICD-10-CM

## 2024-08-19 DIAGNOSIS — U07.1 COVID-19: Primary | ICD-10-CM

## 2024-08-19 PROBLEM — C02.9 SQUAMOUS CELL CANCER OF TONGUE (HCC): Status: ACTIVE | Noted: 2024-08-19

## 2024-08-19 PROBLEM — C02.9 SQUAMOUS CELL CANCER OF TONGUE (HCC): Status: RESOLVED | Noted: 2024-08-19 | Resolved: 2024-08-19

## 2024-08-19 RX ORDER — NIRMATRELVIR AND RITONAVIR 150-100 MG
KIT ORAL
Qty: 20 TABLET | Refills: 0 | Status: SHIPPED | OUTPATIENT
Start: 2024-08-19 | End: 2024-08-24

## 2024-08-19 NOTE — TELEPHONE ENCOUNTER
I called him today as did not see the message on Friday. However left VM and if he is not feeling better I will see him asap. If he is feeling better please cancel the VV

## 2024-08-19 NOTE — PROGRESS NOTES
2024    TELEHEALTH EVALUATION:     HPI:    Anjum Zaragoza (:  1950) has requested telephone evaluation concern(s): COVID    He has history of coronary artery disease s/p 2 stents, paroxysmal atrial fibrillation, aortic valve insufficiency underwent replacement and had mitral valve clip placed for mitral regurgitation, squamous cell carcinoma of his tongue currently on remission, chronic lower back due to lumbar disc degeneration, hypothyroidism, anxiety and positive for COVID 2 days ago.  He has been feeling extremely tired, short of breath.  However denies dropping oxygen saturation.  He has been checking his blood pressure which has been stable but usually runs at lower end.    He has been having fever and headaches.    Review of Systems:  Gen: As mentioned above  HEENT:  Denies cold symptoms, sore throat.  CV:  Denies chest pain or tightness, palpitations.  Pulm: As mentioned above  Abd:  Denies abdominal pain, change in bowel habits.    Prior to Visit Medications    Medication Sig Taking? Authorizing Provider   pantoprazole (PROTONIX) 40 MG tablet TAKE 1 TABLET BY MOUTH TWICE DAILY BEFORE MEAL(S) Yes Trista Sutton MD   metoprolol succinate (TOPROL XL) 50 MG extended release tablet Take 1 tablet by mouth in the morning and at bedtime Yes Trista Sutton MD   gabapentin (NEURONTIN) 100 MG capsule Take 1 capsule by mouth 3 times daily for 90 days. Yes Trista Sutton MD   tamsulosin (FLOMAX) 0.4 MG capsule Take 1 capsule by mouth nightly Yes Soraya Valencia MD   busPIRone (BUSPAR) 5 MG tablet Take 1 tablet by mouth 3 times daily Yes Trista Sutton MD   levothyroxine (SYNTHROID) 175 MCG tablet Take 1 tablet by mouth once daily Yes Trista Sutton MD   docusate sodium (COLACE) 100 MG capsule Take 1 capsule by mouth 2 times daily Yes Soraya Valencia MD   rosuvastatin (CRESTOR) 40 MG tablet Take 1 tablet by mouth daily Yes Codey Henry MD   mirtazapine (REMERON) 7.5

## 2024-08-20 RX ORDER — BUSPIRONE HYDROCHLORIDE 5 MG/1
5 TABLET ORAL 3 TIMES DAILY
Qty: 90 TABLET | Refills: 0 | Status: SHIPPED | OUTPATIENT
Start: 2024-08-20

## 2024-08-20 RX ORDER — MIRTAZAPINE 7.5 MG/1
7.5 TABLET, FILM COATED ORAL NIGHTLY
Qty: 90 TABLET | Refills: 0 | Status: SHIPPED | OUTPATIENT
Start: 2024-08-20

## 2024-08-20 NOTE — TELEPHONE ENCOUNTER
Medication:   Requested Prescriptions     Pending Prescriptions Disp Refills    busPIRone (BUSPAR) 5 MG tablet [Pharmacy Med Name: busPIRone HCl 5 MG Oral Tablet] 90 tablet 0     Sig: TAKE 1 TABLET BY MOUTH THREE TIMES DAILY    mirtazapine (REMERON) 7.5 MG tablet [Pharmacy Med Name: Mirtazapine 7.5 MG Oral Tablet] 90 tablet 0     Sig: Take 1 tablet by mouth nightly        Last Filled:  04/04/2024 #90 3rf     Patient Phone Number: [phone] (home)     Last appt: 6/10/2024   Next appt: Visit date not found    Last OARRS:       12/30/2019     4:14 PM   RX Monitoring   Periodic Controlled Substance Monitoring No signs of potential drug abuse or diversion identified.

## 2024-08-25 PROCEDURE — 93298 REM INTERROG DEV EVAL SCRMS: CPT | Performed by: INTERNAL MEDICINE

## 2024-08-27 DIAGNOSIS — R82.90 BAD ODOR OF URINE: ICD-10-CM

## 2024-08-27 RX ORDER — CLOPIDOGREL BISULFATE 75 MG/1
75 TABLET ORAL DAILY
Qty: 90 TABLET | Refills: 0 | Status: SHIPPED | OUTPATIENT
Start: 2024-08-27

## 2024-08-27 RX ORDER — OXYCODONE AND ACETAMINOPHEN 5; 325 MG/1; MG/1
1 TABLET ORAL EVERY 12 HOURS PRN
Qty: 30 TABLET | Refills: 0 | Status: SHIPPED | OUTPATIENT
Start: 2024-08-27 | End: 2024-09-26

## 2024-08-27 NOTE — TELEPHONE ENCOUNTER
Received refill request for  clopidogrel (PLAVIX) 75 MG tablet  from [facility] pharmacy.     Last OV: 4/23/24    Next OV: 11/12/24    Last Labs:     Last Filled: 6/3/24

## 2024-08-27 NOTE — TELEPHONE ENCOUNTER
Medication:   Requested Prescriptions     Pending Prescriptions Disp Refills    oxyCODONE-acetaminophen (PERCOCET) 5-325 MG per tablet 30 tablet 0     Sig: Take 1 tablet by mouth every 12 hours as needed for Pain (Back pain.) for up to 30 days. Max Daily Amount: 2 tablets        Last Filled:      Patient Phone Number: [phone] (home)     Last appt: 6/10/2024   Next appt: Visit date not found    Last OARRS:       12/30/2019     4:14 PM   RX Monitoring   Periodic Controlled Substance Monitoring No signs of potential drug abuse or diversion identified.

## 2024-09-01 ENCOUNTER — APPOINTMENT (OUTPATIENT)
Dept: CT IMAGING | Age: 74
End: 2024-09-01
Payer: MEDICARE

## 2024-09-01 ENCOUNTER — HOSPITAL ENCOUNTER (INPATIENT)
Age: 74
LOS: 3 days | Discharge: HOME HEALTH | End: 2024-09-05
Attending: EMERGENCY MEDICINE | Admitting: INTERNAL MEDICINE
Payer: MEDICARE

## 2024-09-01 ENCOUNTER — APPOINTMENT (OUTPATIENT)
Dept: GENERAL RADIOLOGY | Age: 74
End: 2024-09-01
Payer: MEDICARE

## 2024-09-01 DIAGNOSIS — M62.81 ACUTE LEFT-SIDED MUSCLE WEAKNESS: Primary | ICD-10-CM

## 2024-09-01 DIAGNOSIS — R07.9 NONSPECIFIC CHEST PAIN: ICD-10-CM

## 2024-09-01 DIAGNOSIS — M48.02 CERVICAL STENOSIS OF SPINE: ICD-10-CM

## 2024-09-01 DIAGNOSIS — I63.239 CAROTID ARTERY STENOSIS WITH CEREBRAL INFARCTION (HCC): ICD-10-CM

## 2024-09-01 DIAGNOSIS — R05.2 SUBACUTE COUGH: ICD-10-CM

## 2024-09-01 DIAGNOSIS — I65.21 STENOSIS OF RIGHT CAROTID ARTERY: ICD-10-CM

## 2024-09-01 LAB
ALBUMIN SERPL-MCNC: 4.3 G/DL (ref 3.4–5)
ALBUMIN/GLOB SERPL: 1.4 {RATIO} (ref 1.1–2.2)
ALP SERPL-CCNC: 67 U/L (ref 40–129)
ALT SERPL-CCNC: 14 U/L (ref 10–40)
ANION GAP SERPL CALCULATED.3IONS-SCNC: 13 MMOL/L (ref 3–16)
AST SERPL-CCNC: 26 U/L (ref 15–37)
BASOPHILS # BLD: 0.1 K/UL (ref 0–0.2)
BASOPHILS NFR BLD: 1.3 %
BILIRUB SERPL-MCNC: 0.9 MG/DL (ref 0–1)
BUN SERPL-MCNC: 10 MG/DL (ref 7–20)
CALCIUM SERPL-MCNC: 9.2 MG/DL (ref 8.3–10.6)
CHLORIDE SERPL-SCNC: 102 MMOL/L (ref 99–110)
CK SERPL-CCNC: 93 U/L (ref 39–308)
CO2 SERPL-SCNC: 25 MMOL/L (ref 21–32)
CREAT SERPL-MCNC: 0.5 MG/DL (ref 0.8–1.3)
DEPRECATED RDW RBC AUTO: 13.3 % (ref 12.4–15.4)
EOSINOPHIL # BLD: 0.2 K/UL (ref 0–0.6)
EOSINOPHIL NFR BLD: 3 %
GFR SERPLBLD CREATININE-BSD FMLA CKD-EPI: >90 ML/MIN/{1.73_M2}
GLUCOSE SERPL-MCNC: 120 MG/DL (ref 70–99)
HCT VFR BLD AUTO: 40.7 % (ref 40.5–52.5)
HGB BLD-MCNC: 14.1 G/DL (ref 13.5–17.5)
INR PPP: 0.96 (ref 0.85–1.15)
LYMPHOCYTES # BLD: 1.3 K/UL (ref 1–5.1)
LYMPHOCYTES NFR BLD: 24.6 %
MCH RBC QN AUTO: 32.6 PG (ref 26–34)
MCHC RBC AUTO-ENTMCNC: 34.5 G/DL (ref 31–36)
MCV RBC AUTO: 94.3 FL (ref 80–100)
MONOCYTES # BLD: 0.4 K/UL (ref 0–1.3)
MONOCYTES NFR BLD: 7.2 %
NEUTROPHILS # BLD: 3.4 K/UL (ref 1.7–7.7)
NEUTROPHILS NFR BLD: 63.9 %
PLATELET # BLD AUTO: 242 K/UL (ref 135–450)
PMV BLD AUTO: 7.1 FL (ref 5–10.5)
POTASSIUM SERPL-SCNC: 4.2 MMOL/L (ref 3.5–5.1)
PROT SERPL-MCNC: 7.3 G/DL (ref 6.4–8.2)
PROTHROMBIN TIME: 13 SEC (ref 11.9–14.9)
RBC # BLD AUTO: 4.31 M/UL (ref 4.2–5.9)
SODIUM SERPL-SCNC: 140 MMOL/L (ref 136–145)
TROPONIN, HIGH SENSITIVITY: <6 NG/L (ref 0–22)
TROPONIN, HIGH SENSITIVITY: <6 NG/L (ref 0–22)
WBC # BLD AUTO: 5.4 K/UL (ref 4–11)

## 2024-09-01 PROCEDURE — 6360000004 HC RX CONTRAST MEDICATION: Performed by: EMERGENCY MEDICINE

## 2024-09-01 PROCEDURE — 6370000000 HC RX 637 (ALT 250 FOR IP): Performed by: EMERGENCY MEDICINE

## 2024-09-01 PROCEDURE — 82550 ASSAY OF CK (CPK): CPT

## 2024-09-01 PROCEDURE — 71045 X-RAY EXAM CHEST 1 VIEW: CPT

## 2024-09-01 PROCEDURE — 70450 CT HEAD/BRAIN W/O DYE: CPT

## 2024-09-01 PROCEDURE — 87636 SARSCOV2 & INF A&B AMP PRB: CPT

## 2024-09-01 PROCEDURE — 85610 PROTHROMBIN TIME: CPT

## 2024-09-01 PROCEDURE — 84484 ASSAY OF TROPONIN QUANT: CPT

## 2024-09-01 PROCEDURE — 99285 EMERGENCY DEPT VISIT HI MDM: CPT

## 2024-09-01 PROCEDURE — 80053 COMPREHEN METABOLIC PANEL: CPT

## 2024-09-01 PROCEDURE — 85025 COMPLETE CBC W/AUTO DIFF WBC: CPT

## 2024-09-01 PROCEDURE — 70496 CT ANGIOGRAPHY HEAD: CPT

## 2024-09-01 PROCEDURE — 93005 ELECTROCARDIOGRAM TRACING: CPT | Performed by: EMERGENCY MEDICINE

## 2024-09-01 RX ORDER — IOPAMIDOL 755 MG/ML
75 INJECTION, SOLUTION INTRAVASCULAR
Status: COMPLETED | OUTPATIENT
Start: 2024-09-01 | End: 2024-09-01

## 2024-09-01 RX ORDER — ACETAMINOPHEN 500 MG
1000 TABLET ORAL ONCE
Status: DISCONTINUED | OUTPATIENT
Start: 2024-09-01 | End: 2024-09-02

## 2024-09-01 RX ORDER — ASPIRIN 81 MG/1
243 TABLET, CHEWABLE ORAL ONCE
Status: COMPLETED | OUTPATIENT
Start: 2024-09-01 | End: 2024-09-01

## 2024-09-01 RX ORDER — BENZONATATE 100 MG/1
100 CAPSULE ORAL ONCE
Status: COMPLETED | OUTPATIENT
Start: 2024-09-01 | End: 2024-09-01

## 2024-09-01 RX ADMIN — IOPAMIDOL 75 ML: 755 INJECTION, SOLUTION INTRAVENOUS at 22:02

## 2024-09-01 RX ADMIN — BENZONATATE 100 MG: 100 CAPSULE ORAL at 22:56

## 2024-09-01 RX ADMIN — ASPIRIN 243 MG: 81 TABLET, CHEWABLE ORAL at 22:56

## 2024-09-01 ASSESSMENT — PAIN - FUNCTIONAL ASSESSMENT: PAIN_FUNCTIONAL_ASSESSMENT: 0-10

## 2024-09-01 ASSESSMENT — LIFESTYLE VARIABLES
HOW MANY STANDARD DRINKS CONTAINING ALCOHOL DO YOU HAVE ON A TYPICAL DAY: 1 OR 2
HOW OFTEN DO YOU HAVE A DRINK CONTAINING ALCOHOL: 4 OR MORE TIMES A WEEK

## 2024-09-01 ASSESSMENT — PAIN SCALES - GENERAL: PAINLEVEL_OUTOF10: 4

## 2024-09-02 PROBLEM — F39 MOOD DISORDER (HCC): Status: ACTIVE | Noted: 2024-09-02

## 2024-09-02 PROBLEM — G45.9 TIA (TRANSIENT ISCHEMIC ATTACK): Status: ACTIVE | Noted: 2024-09-02

## 2024-09-02 LAB
FLUAV RNA RESP QL NAA+PROBE: NOT DETECTED
FLUBV RNA RESP QL NAA+PROBE: NOT DETECTED
SARS-COV-2 RNA RESP QL NAA+PROBE: DETECTED
TROPONIN, HIGH SENSITIVITY: <6 NG/L (ref 0–22)

## 2024-09-02 PROCEDURE — 6370000000 HC RX 637 (ALT 250 FOR IP): Performed by: STUDENT IN AN ORGANIZED HEALTH CARE EDUCATION/TRAINING PROGRAM

## 2024-09-02 PROCEDURE — 84484 ASSAY OF TROPONIN QUANT: CPT

## 2024-09-02 PROCEDURE — 2580000003 HC RX 258: Performed by: INTERNAL MEDICINE

## 2024-09-02 PROCEDURE — 6370000000 HC RX 637 (ALT 250 FOR IP): Performed by: NURSE PRACTITIONER

## 2024-09-02 PROCEDURE — 99223 1ST HOSP IP/OBS HIGH 75: CPT | Performed by: PSYCHIATRY & NEUROLOGY

## 2024-09-02 PROCEDURE — 36415 COLL VENOUS BLD VENIPUNCTURE: CPT

## 2024-09-02 PROCEDURE — 2000000000 HC ICU R&B

## 2024-09-02 PROCEDURE — 6360000002 HC RX W HCPCS: Performed by: INTERNAL MEDICINE

## 2024-09-02 RX ORDER — SODIUM CHLORIDE 0.9 % (FLUSH) 0.9 %
5-40 SYRINGE (ML) INJECTION EVERY 12 HOURS SCHEDULED
Status: DISCONTINUED | OUTPATIENT
Start: 2024-09-02 | End: 2024-09-05 | Stop reason: HOSPADM

## 2024-09-02 RX ORDER — GABAPENTIN 100 MG/1
100 CAPSULE ORAL 3 TIMES DAILY
COMMUNITY

## 2024-09-02 RX ORDER — SODIUM CHLORIDE 9 MG/ML
INJECTION, SOLUTION INTRAVENOUS PRN
Status: DISCONTINUED | OUTPATIENT
Start: 2024-09-02 | End: 2024-09-05 | Stop reason: HOSPADM

## 2024-09-02 RX ORDER — BUSPIRONE HYDROCHLORIDE 5 MG/1
5 TABLET ORAL 3 TIMES DAILY
Status: DISCONTINUED | OUTPATIENT
Start: 2024-09-02 | End: 2024-09-05 | Stop reason: HOSPADM

## 2024-09-02 RX ORDER — SODIUM CHLORIDE 0.9 % (FLUSH) 0.9 %
5-40 SYRINGE (ML) INJECTION PRN
Status: DISCONTINUED | OUTPATIENT
Start: 2024-09-02 | End: 2024-09-05 | Stop reason: HOSPADM

## 2024-09-02 RX ORDER — GABAPENTIN 100 MG/1
100 CAPSULE ORAL 3 TIMES DAILY
Status: DISCONTINUED | OUTPATIENT
Start: 2024-09-02 | End: 2024-09-05 | Stop reason: HOSPADM

## 2024-09-02 RX ORDER — POLYETHYLENE GLYCOL 3350 17 G/17G
17 POWDER, FOR SOLUTION ORAL DAILY PRN
Status: DISCONTINUED | OUTPATIENT
Start: 2024-09-02 | End: 2024-09-05 | Stop reason: HOSPADM

## 2024-09-02 RX ORDER — ENOXAPARIN SODIUM 100 MG/ML
40 INJECTION SUBCUTANEOUS DAILY
Status: DISCONTINUED | OUTPATIENT
Start: 2024-09-02 | End: 2024-09-05 | Stop reason: HOSPADM

## 2024-09-02 RX ORDER — ROSUVASTATIN CALCIUM 40 MG/1
40 TABLET, COATED ORAL DAILY
Status: DISCONTINUED | OUTPATIENT
Start: 2024-09-02 | End: 2024-09-05 | Stop reason: HOSPADM

## 2024-09-02 RX ORDER — TAMSULOSIN HYDROCHLORIDE 0.4 MG/1
0.4 CAPSULE ORAL NIGHTLY
Status: DISCONTINUED | OUTPATIENT
Start: 2024-09-02 | End: 2024-09-05 | Stop reason: HOSPADM

## 2024-09-02 RX ORDER — ONDANSETRON 2 MG/ML
4 INJECTION INTRAMUSCULAR; INTRAVENOUS EVERY 6 HOURS PRN
Status: DISCONTINUED | OUTPATIENT
Start: 2024-09-02 | End: 2024-09-05 | Stop reason: HOSPADM

## 2024-09-02 RX ORDER — ASPIRIN 81 MG/1
81 TABLET ORAL DAILY
Status: DISCONTINUED | OUTPATIENT
Start: 2024-09-02 | End: 2024-09-05 | Stop reason: HOSPADM

## 2024-09-02 RX ORDER — ONDANSETRON 4 MG/1
4 TABLET, ORALLY DISINTEGRATING ORAL EVERY 8 HOURS PRN
Status: DISCONTINUED | OUTPATIENT
Start: 2024-09-02 | End: 2024-09-05 | Stop reason: HOSPADM

## 2024-09-02 RX ORDER — CLOPIDOGREL BISULFATE 75 MG/1
75 TABLET ORAL DAILY
Status: DISCONTINUED | OUTPATIENT
Start: 2024-09-02 | End: 2024-09-05 | Stop reason: HOSPADM

## 2024-09-02 RX ORDER — PANTOPRAZOLE SODIUM 40 MG/1
40 TABLET, DELAYED RELEASE ORAL
Status: DISCONTINUED | OUTPATIENT
Start: 2024-09-02 | End: 2024-09-05 | Stop reason: HOSPADM

## 2024-09-02 RX ORDER — MIRTAZAPINE 15 MG/1
7.5 TABLET, FILM COATED ORAL NIGHTLY
Status: DISCONTINUED | OUTPATIENT
Start: 2024-09-02 | End: 2024-09-05 | Stop reason: HOSPADM

## 2024-09-02 RX ORDER — OXYCODONE HYDROCHLORIDE 5 MG/1
5 TABLET ORAL ONCE
Status: COMPLETED | OUTPATIENT
Start: 2024-09-02 | End: 2024-09-02

## 2024-09-02 RX ORDER — OXYCODONE HYDROCHLORIDE 5 MG/1
5 TABLET ORAL EVERY 6 HOURS PRN
Status: COMPLETED | OUTPATIENT
Start: 2024-09-02 | End: 2024-09-03

## 2024-09-02 RX ADMIN — OXYCODONE HYDROCHLORIDE 5 MG: 5 TABLET ORAL at 01:44

## 2024-09-02 RX ADMIN — LEVOTHYROXINE SODIUM 175 MCG: 0.15 TABLET ORAL at 09:16

## 2024-09-02 RX ADMIN — DICLOFENAC SODIUM 4 G: 10 GEL TOPICAL at 21:14

## 2024-09-02 RX ADMIN — CLOPIDOGREL BISULFATE 75 MG: 75 TABLET ORAL at 09:11

## 2024-09-02 RX ADMIN — SODIUM CHLORIDE, PRESERVATIVE FREE 10 ML: 5 INJECTION INTRAVENOUS at 09:14

## 2024-09-02 RX ADMIN — GABAPENTIN 100 MG: 100 CAPSULE ORAL at 20:16

## 2024-09-02 RX ADMIN — TAMSULOSIN HYDROCHLORIDE 0.4 MG: 0.4 CAPSULE ORAL at 20:17

## 2024-09-02 RX ADMIN — MIRTAZAPINE 7.5 MG: 15 TABLET, FILM COATED ORAL at 20:17

## 2024-09-02 RX ADMIN — SODIUM CHLORIDE, PRESERVATIVE FREE 10 ML: 5 INJECTION INTRAVENOUS at 20:27

## 2024-09-02 RX ADMIN — ASPIRIN 81 MG: 81 TABLET, COATED ORAL at 09:11

## 2024-09-02 RX ADMIN — BUSPIRONE HYDROCHLORIDE 5 MG: 5 TABLET ORAL at 09:10

## 2024-09-02 RX ADMIN — GABAPENTIN 100 MG: 100 CAPSULE ORAL at 09:10

## 2024-09-02 RX ADMIN — ROSUVASTATIN 40 MG: 40 TABLET, FILM COATED ORAL at 09:11

## 2024-09-02 RX ADMIN — OXYCODONE HYDROCHLORIDE 5 MG: 5 TABLET ORAL at 21:13

## 2024-09-02 RX ADMIN — GABAPENTIN 100 MG: 100 CAPSULE ORAL at 15:44

## 2024-09-02 RX ADMIN — BUSPIRONE HYDROCHLORIDE 5 MG: 5 TABLET ORAL at 15:44

## 2024-09-02 RX ADMIN — BUSPIRONE HYDROCHLORIDE 5 MG: 5 TABLET ORAL at 20:16

## 2024-09-02 RX ADMIN — PANTOPRAZOLE SODIUM 40 MG: 40 TABLET, DELAYED RELEASE ORAL at 09:16

## 2024-09-02 RX ADMIN — PANTOPRAZOLE SODIUM 40 MG: 40 TABLET, DELAYED RELEASE ORAL at 15:44

## 2024-09-02 ASSESSMENT — PAIN SCALES - GENERAL
PAINLEVEL_OUTOF10: 0
PAINLEVEL_OUTOF10: 5
PAINLEVEL_OUTOF10: 7
PAINLEVEL_OUTOF10: 0
PAINLEVEL_OUTOF10: 0

## 2024-09-02 ASSESSMENT — PAIN DESCRIPTION - LOCATION: LOCATION: BACK

## 2024-09-02 ASSESSMENT — PAIN SCALES - WONG BAKER: WONGBAKER_NUMERICALRESPONSE: NO HURT

## 2024-09-02 ASSESSMENT — PAIN - FUNCTIONAL ASSESSMENT: PAIN_FUNCTIONAL_ASSESSMENT: NONE - DENIES PAIN

## 2024-09-02 NOTE — PLAN OF CARE
Problem: Discharge Planning  Goal: Discharge to home or other facility with appropriate resources  9/2/2024 1126 by Tracey Pack RN  Outcome: Progressing  9/2/2024 0429 by Asad Scott RN  Outcome: Progressing     Problem: Neurosensory - Adult  Goal: Achieves stable or improved neurological status  9/2/2024 1126 by Tracey Pack RN  Outcome: Progressing  9/2/2024 0429 by Asad Scott RN  Outcome: Progressing  Goal: Achieves maximal functionality and self care  9/2/2024 1126 by Tracey Pack RN  Outcome: Progressing  9/2/2024 0429 by Asad Scott RN  Outcome: Progressing     Problem: Respiratory - Adult  Goal: Achieves optimal ventilation and oxygenation  9/2/2024 1126 by Tracey Pack RN  Outcome: Progressing  9/2/2024 0429 by Asad Scott RN  Outcome: Progressing     Problem: Musculoskeletal - Adult  Goal: Return mobility to safest level of function  9/2/2024 1126 by Tracey Pack RN  Outcome: Progressing  9/2/2024 0429 by Asad Scott RN  Outcome: Progressing  Goal: Maintain proper alignment of affected body part  9/2/2024 1126 by Tracey Pack RN  Outcome: Progressing  9/2/2024 0429 by Asad Scott RN  Outcome: Progressing  Goal: Return ADL status to a safe level of function  9/2/2024 1126 by Tracey Pack RN  Outcome: Progressing  9/2/2024 0429 by Asad Scott RN  Outcome: Progressing     Problem: Infection - Adult  Goal: Absence of infection at discharge  9/2/2024 1126 by Tracey Pack RN  Outcome: Progressing  9/2/2024 0429 by Asad Scott RN  Outcome: Progressing  Goal: Absence of infection during hospitalization  9/2/2024 1126 by Tracey Pack RN  Outcome: Progressing  9/2/2024 0429 by Asad Scott RN  Outcome: Progressing  Goal: Absence of fever/infection during anticipated neutropenic period  9/2/2024 1126 by Tracey Pack RN  Outcome: Progressing  9/2/2024 0429 by Asad Scott RN  Outcome: Progressing     Problem: Safety - Adult  Goal: Free from fall injury  Outcome: Progressing

## 2024-09-02 NOTE — PROGRESS NOTES
Patient seen earlier today by my colleague. Full progress note to follow tomorrow.     In brief, patient here for TIA..      ASSESSMENT:    Active Hospital Problems    Diagnosis     CAD (coronary artery disease) [I25.10]      Priority: Medium    TIA (transient ischemic attack) [G45.9]     Mood disorder (HCC) [F39]     Hyperlipidemia [E78.5]     Chronic obstructive pulmonary disease (HCC) [J44.9]     Essential hypertension [I10]     PAF (paroxysmal atrial fibrillation) ([facility]) [I48.0]          PLAN:    TIA  -CT head nonacute  -CTA head and neck nonacute  -MRI head ordered  -Continue ASA and statin   -SLP evaluation  -Daily labs; replace electrolytes as needed  -TIA/CVA order set ordered  -Neurology consulted          Fady Kraus DO  9/2/2024  1:31 PM

## 2024-09-02 NOTE — PROGRESS NOTES
Pt admitted to ICU, room 3906. Alert and oriented. Able to follow commands. Vitals stable. NIHSS 0. Able to move in the room independently. Passed swallow screen, regular diet started per order. Pt requesting for his regular home medications and wants his prn pain meds. Hospitalist made aware. Received one dose of oxycodone. Pt home medication pill box sent to pharmacy to store. All safety measures stay active. Care ongoing.

## 2024-09-02 NOTE — CONSULTS
In patient Neurology consult        [facility] Neurology      MD Anjum Leyva  1950    Date of Service: 9/2/2024    Referring Physician: Fady Kraus DO      Reason for the consult and CC: Acute left-sided weakness and possible stroke    HPI:   The patient is a 74 y.o.  years old male with history of A-fib, hypertension and hyperlipidemia who was admitted to the hospital yesterday with above concern.  Symptoms started 2 days ago.  Description left-sided weakness more on the leg.  Degree was severe and persistent.  No chest pain, dysphagia or dysarthria.  No other relieving or aggravating factors or triggers.  He was diagnosed with COVID-19 in the emergency room and was admitted to the ICU.  Initial imaging showed no LVO or acute stroke.  Today is about the same.       Constitutional:   Vitals:    09/02/24 0405 09/02/24 0500 09/02/24 0600 09/02/24 0700   BP: 116/76 116/81 113/86 (!) 117/90   Pulse: 76 73 72 70   Resp: 19 17 17 18   Temp: 96.8 °F (36 °C)      TempSrc: Temporal      SpO2: 96% 93% 92% 96%   Weight: 72.9 kg (160 lb 11.5 oz)      Height: 1.791 m (5' 10.5\")            I personally reviewed and updated social history, past medical history, medications, allergy, surgical history, and family history as documented in the patient's electronic health records.       ROS: 10-14 ROS reviewed with the patient/nurse/family which were unremarkable except mentioned in H&P.    General appearance:  Normal development and appear in no acute distress.   Mental Status:   Oriented to person, place, problem, and time.    Memory: Good immediate recall.  Intact remote memory  Normal attention span and concentration.  Language: intact naming, repeating and fluency   Good fund of Knowledge. Aware of current events and vocabulary   Cranial Nerves:   II: Visual fields: Full. Pupils: equal, round, reactive to light, bilaterally  III,IV,VI: Extra Ocular Movements are intact. No nystagmus  V: Facial     K 4.2 09/01/2024 09:30 PM     09/01/2024 09:30 PM    CO2 25 09/01/2024 09:30 PM    BUN 10 09/01/2024 09:30 PM    CREATININE 0.5 09/01/2024 09:30 PM    GFRAA >60 09/13/2022 02:18 AM    GFRAA >60 12/26/2009 10:17 PM    LABGLOM >90 09/01/2024 09:30 PM    LABGLOM >60 02/29/2024 02:54 PM    GLUCOSE 120 09/01/2024 09:30 PM    PHOS 3.3 03/05/2021 07:08 AM    MG 2.10 02/01/2024 06:45 PM    CALCIUM 9.2 09/01/2024 09:30 PM     Lab Results   Component Value Date/Time    WBC 5.4 09/01/2024 09:30 PM    RBC 4.31 09/01/2024 09:30 PM    HGB 14.1 09/01/2024 09:30 PM    HCT 40.7 09/01/2024 09:30 PM    MCV 94.3 09/01/2024 09:30 PM    RDW 13.3 09/01/2024 09:30 PM     09/01/2024 09:30 PM     Lab Results   Component Value Date    INR 0.96 09/01/2024    PROTIME 13.0 09/01/2024         Impression:  Acute left-sided weakness, possible new ischemic stroke  COVID-19  Hypertension  Hyperlipidemia  A-fib  Right ICA stenosis, moderate, symptomatic.    Recommendation:  Droplet isolation  Respiratory support  MRI of the brain  He is currently on aspirin and Plavix  Vascular consultation depending on MRI result if showed right hemispheric CVA  Statin  Continue home blood pressure medications but avoid blood pressure below 140 systolic  DVT and GI prophylaxis  A1c  Lipid panel  Neurochecks  PT OT  Speech  Echo  Will follow after above recommendation      Thank you for referring such patient. If you have any questions regarding my consult note, please don't hesitate to call me.     Tata Kwon MD  [phone]    This dictation was generated by voice recognition computer software. Although all attempts are made to edit the dictation for accuracy, there may be errors in the  transcription that are not intended

## 2024-09-02 NOTE — PROGRESS NOTES
How does patient ambulate?   []Low Fall Risk (ambulates by themselves without support)  [x]Stand by assist w/ Cane  []Contact Guard   []Front wheel walker  []Wheelchair   []Steady  []Bed bound  []History of Lower Extremity Amputation  []Unknown, did not assess in the emergency department   How does patient take pills?  [x]Whole with Water  []Crushed in applesauce  []Crushed in pudding  []Other  []Unknown no oral medications were given in the ED  Is patient alert?   [x]Alert  []Drowsy but responds to voice  []Doesn't respond to voice but responds to painful stimuli  []Unresponsive  Is patient oriented?   [x]To person  [x]To place  [x]To time  [x]To situation  []Confused  []Agitated  [x]Follows commands  If patient is disoriented or from a Skill Nursing Facility has family been notified of admission?   []Yes   []No  X Patient from Home  Patient belongings?   [x]Cell phone  [x]Wallet   []Dentures  [x]Clothing  X Hearing Aids  Any specific patient or family belongings/needs/dynamics?   N/A  Miscellaneous comments/pending orders?  All ED orders completed.      If there are any additional questions please reach out to the Emergency Department.

## 2024-09-02 NOTE — ED PROVIDER NOTES
radiologist.  I reviewed all radiology images and reports as well from this evaluation.    Labs  Results for orders placed or performed during the hospital encounter of 09/01/24   CBC with Auto Differential   Result Value Ref Range    WBC 5.4 4.0 - 11.0 K/uL    RBC 4.31 4.20 - 5.90 M/uL    Hemoglobin 14.1 13.5 - 17.5 g/dL    Hematocrit 40.7 40.5 - 52.5 %    MCV 94.3 80.0 - 100.0 fL    MCH 32.6 26.0 - 34.0 pg    MCHC 34.5 31.0 - 36.0 g/dL    RDW 13.3 12.4 - 15.4 %    Platelets 242 135 - 450 K/uL    MPV 7.1 5.0 - 10.5 fL    Neutrophils % 63.9 %    Lymphocytes % 24.6 %    Monocytes % 7.2 %    Eosinophils % 3.0 %    Basophils % 1.3 %    Neutrophils Absolute 3.4 1.7 - 7.7 K/uL    Lymphocytes Absolute 1.3 1.0 - 5.1 K/uL    Monocytes Absolute 0.4 0.0 - 1.3 K/uL    Eosinophils Absolute 0.2 0.0 - 0.6 K/uL    Basophils Absolute 0.1 0.0 - 0.2 K/uL   CMP w/ Reflex to MG   Result Value Ref Range    Sodium 140 136 - 145 mmol/L    Potassium reflex Magnesium 4.2 3.5 - 5.1 mmol/L    Chloride 102 99 - 110 mmol/L    CO2 25 21 - 32 mmol/L    Anion Gap 13 3 - 16    Glucose 120 (H) 70 - 99 mg/dL    BUN 10 7 - 20 mg/dL    Creatinine 0.5 (L) 0.8 - 1.3 mg/dL    Est, Glom Filt Rate >90 >60    Calcium 9.2 8.3 - 10.6 mg/dL    Total Protein 7.3 6.4 - 8.2 g/dL    Albumin 4.3 3.4 - 5.0 g/dL    Albumin/Globulin Ratio 1.4 1.1 - 2.2    Total Bilirubin 0.9 0.0 - 1.0 mg/dL    Alkaline Phosphatase 67 40 - 129 U/L    ALT 14 10 - 40 U/L    AST 26 15 - 37 U/L   Protime-INR   Result Value Ref Range    Protime 13.0 11.9 - 14.9 sec    INR 0.96 0.85 - 1.15   Troponin   Result Value Ref Range    Troponin, High Sensitivity <6 0 - 22 ng/L   CK   Result Value Ref Range    Total CK 93 39 - 308 U/L   EKG 12 Lead   Result Value Ref Range    Ventricular Rate 87 BPM    Atrial Rate 87 BPM    P-R Interval 204 ms    QRS Duration 82 ms    Q-T Interval 374 ms    QTc Calculation (Bazett) 450 ms    P Axis 73 degrees    R Axis -5 degrees    T Axis 47 degrees    Diagnosis

## 2024-09-02 NOTE — PROGRESS NOTES
MRI checklist completed , faxed down to MRI - laced paper copy on chart- care ongoing       Patient stated he needs valium when time for MRI - care ongoing

## 2024-09-02 NOTE — H&P
[facility] Medicine History & Physical      Patient Name: Anjum Zaragoza    : 1950    PCP: Trista Sutton MD    Date of Service:  Patient seen and examined on 24     Chief Complaint: Left arm weakness    History Of Present Illness:    Anjum Zaragoza is a 74 y.o. male with a PMH of hypertension, hyperlipidemia, A-fib, GERD, CAD, hypothyroidism who presented to ED with complaint of left arm weakness    Patient endorsed increased activity yesterday evening with associated lightheadedness concerns for vision disturbance and left arm weakness.  Of note patient has had ongoing cough for the last few weeks was tested positive for COVID.    Blood pressure 130/95 pulse of 89 temperature 97.4 respiration 16 saturating 95% on room air  BMP stable glucose of 120 total CK of 93, troponin 6/6.  LFT stable.  CBC stable.  Coag panel stable  Chest x-ray shows no evidence of acute pulmonary process.  CT head shows no acute infarct, CTA head and neck shows no large vessel occlusion.  66 stenosis of the proximal right cervical ICA.  In the ED patient received Tylenol 1 g aspirin 243 mg and Tessalon 100 mg.    Past Medical History:    Patient has a past medical history of Aortic valve insufficiency, Arthritis, Atrial fibrillation (HCC), Cancer (HCC), Dysphonia, Encounter for imaging to screen for metal prior to MRI, GERD (gastroesophageal reflux disease), Hearing loss, Heart disease, Hyperlipidemia, Medical history reviewed with no changes, Obstructive apnea, Oropharyngeal dysphagia, Refusal of blood transfusions as patient is Roman Catholic, and Thyroid disease.    Past Surgical History:    Patient has a past surgical history that includes Lung surgery; knee surgery; Neck surgery; hernia repair; Aortic valve replacement (2015); Cardiac catheterization; Tonsillectomy and adenoidectomy; Mandible fracture surgery; Ankle surgery; back surgery; Pain management procedure (Right, 2020); Pain

## 2024-09-02 NOTE — PLAN OF CARE
Problem: Discharge Planning  Goal: Discharge to home or other facility with appropriate resources  Outcome: Progressing     Problem: Neurosensory - Adult  Goal: Achieves stable or improved neurological status  Outcome: Progressing     Problem: Neurosensory - Adult  Goal: Achieves maximal functionality and self care  Outcome: Progressing     Problem: Respiratory - Adult  Goal: Achieves optimal ventilation and oxygenation  Outcome: Progressing     Problem: Musculoskeletal - Adult  Goal: Return mobility to safest level of function  Outcome: Progressing     Problem: Musculoskeletal - Adult  Goal: Maintain proper alignment of affected body part  Outcome: Progressing     Problem: Musculoskeletal - Adult  Goal: Return ADL status to a safe level of function  Outcome: Progressing     Problem: Infection - Adult  Goal: Absence of infection at discharge  Outcome: Progressing     Problem: Infection - Adult  Goal: Absence of infection during hospitalization  Outcome: Progressing

## 2024-09-02 NOTE — PROGRESS NOTES
Introduce self to patient. Patient oriented to room and ED throughput process.  Safety measures with ED bed locked in lowest position and call light in reach.  Patient educated on all orders, including any medications.  Patient educated on chief complaint/symptoms. Patient encouraged to ask questions regarding care, medications or treatment plan.  Patient aware of how to reach staff with questions/concerns.    Repeat Trop obtained & sent to lab. Call light & bedside table within reach. Bed alarm engaged. No expressed needs at this time.

## 2024-09-03 ENCOUNTER — APPOINTMENT (OUTPATIENT)
Dept: MRI IMAGING | Age: 74
End: 2024-09-03
Payer: MEDICARE

## 2024-09-03 PROBLEM — M62.81 ACUTE LEFT-SIDED MUSCLE WEAKNESS: Status: ACTIVE | Noted: 2024-09-03

## 2024-09-03 LAB
ALBUMIN SERPL-MCNC: 3.6 G/DL (ref 3.4–5)
ANION GAP SERPL CALCULATED.3IONS-SCNC: 11 MMOL/L (ref 3–16)
BASOPHILS # BLD: 0 K/UL (ref 0–0.2)
BASOPHILS NFR BLD: 0.8 %
BUN SERPL-MCNC: 11 MG/DL (ref 7–20)
CALCIUM SERPL-MCNC: 8.4 MG/DL (ref 8.3–10.6)
CHLORIDE SERPL-SCNC: 106 MMOL/L (ref 99–110)
CHOLEST SERPL-MCNC: 76 MG/DL (ref 0–199)
CO2 SERPL-SCNC: 23 MMOL/L (ref 21–32)
CREAT SERPL-MCNC: 0.6 MG/DL (ref 0.8–1.3)
DEPRECATED RDW RBC AUTO: 13.7 % (ref 12.4–15.4)
EOSINOPHIL # BLD: 0.1 K/UL (ref 0–0.6)
EOSINOPHIL NFR BLD: 2.8 %
EST. AVERAGE GLUCOSE BLD GHB EST-MCNC: 108.3 MG/DL
GFR SERPLBLD CREATININE-BSD FMLA CKD-EPI: >90 ML/MIN/{1.73_M2}
GLUCOSE SERPL-MCNC: 99 MG/DL (ref 70–99)
HBA1C MFR BLD: 5.4 %
HCT VFR BLD AUTO: 36.4 % (ref 40.5–52.5)
HDLC SERPL-MCNC: 40 MG/DL (ref 40–60)
HGB BLD-MCNC: 12.5 G/DL (ref 13.5–17.5)
LDLC SERPL CALC-MCNC: 13 MG/DL
LYMPHOCYTES # BLD: 1.1 K/UL (ref 1–5.1)
LYMPHOCYTES NFR BLD: 21.6 %
MCH RBC QN AUTO: 32.7 PG (ref 26–34)
MCHC RBC AUTO-ENTMCNC: 34.4 G/DL (ref 31–36)
MCV RBC AUTO: 95.1 FL (ref 80–100)
MONOCYTES # BLD: 0.6 K/UL (ref 0–1.3)
MONOCYTES NFR BLD: 11.3 %
NEUTROPHILS # BLD: 3.1 K/UL (ref 1.7–7.7)
NEUTROPHILS NFR BLD: 63.5 %
PHOSPHATE SERPL-MCNC: 3.4 MG/DL (ref 2.5–4.9)
PLATELET # BLD AUTO: 195 K/UL (ref 135–450)
PMV BLD AUTO: 7 FL (ref 5–10.5)
POTASSIUM SERPL-SCNC: 4 MMOL/L (ref 3.5–5.1)
RBC # BLD AUTO: 3.83 M/UL (ref 4.2–5.9)
SODIUM SERPL-SCNC: 140 MMOL/L (ref 136–145)
TRIGL SERPL-MCNC: 114 MG/DL (ref 0–150)
VLDLC SERPL CALC-MCNC: 23 MG/DL
WBC # BLD AUTO: 4.9 K/UL (ref 4–11)

## 2024-09-03 PROCEDURE — 36415 COLL VENOUS BLD VENIPUNCTURE: CPT

## 2024-09-03 PROCEDURE — 2060000000 HC ICU INTERMEDIATE R&B

## 2024-09-03 PROCEDURE — 97535 SELF CARE MNGMENT TRAINING: CPT

## 2024-09-03 PROCEDURE — 2580000003 HC RX 258: Performed by: INTERNAL MEDICINE

## 2024-09-03 PROCEDURE — 6370000000 HC RX 637 (ALT 250 FOR IP): Performed by: STUDENT IN AN ORGANIZED HEALTH CARE EDUCATION/TRAINING PROGRAM

## 2024-09-03 PROCEDURE — 83036 HEMOGLOBIN GLYCOSYLATED A1C: CPT

## 2024-09-03 PROCEDURE — 97530 THERAPEUTIC ACTIVITIES: CPT

## 2024-09-03 PROCEDURE — 6370000000 HC RX 637 (ALT 250 FOR IP): Performed by: NURSE PRACTITIONER

## 2024-09-03 PROCEDURE — 6370000000 HC RX 637 (ALT 250 FOR IP): Performed by: INTERNAL MEDICINE

## 2024-09-03 PROCEDURE — 80069 RENAL FUNCTION PANEL: CPT

## 2024-09-03 PROCEDURE — 97116 GAIT TRAINING THERAPY: CPT

## 2024-09-03 PROCEDURE — 97162 PT EVAL MOD COMPLEX 30 MIN: CPT

## 2024-09-03 PROCEDURE — 99233 SBSQ HOSP IP/OBS HIGH 50: CPT | Performed by: PSYCHIATRY & NEUROLOGY

## 2024-09-03 PROCEDURE — 6370000000 HC RX 637 (ALT 250 FOR IP): Performed by: FAMILY MEDICINE

## 2024-09-03 PROCEDURE — 80061 LIPID PANEL: CPT

## 2024-09-03 PROCEDURE — 97166 OT EVAL MOD COMPLEX 45 MIN: CPT

## 2024-09-03 PROCEDURE — 70551 MRI BRAIN STEM W/O DYE: CPT

## 2024-09-03 PROCEDURE — 85025 COMPLETE CBC W/AUTO DIFF WBC: CPT

## 2024-09-03 PROCEDURE — APPNB30 APP NON BILLABLE TIME 0-30 MINS

## 2024-09-03 RX ORDER — OXYCODONE AND ACETAMINOPHEN 5; 325 MG/1; MG/1
1 TABLET ORAL EVERY 12 HOURS PRN
Status: DISCONTINUED | OUTPATIENT
Start: 2024-09-03 | End: 2024-09-05 | Stop reason: HOSPADM

## 2024-09-03 RX ORDER — DIAZEPAM 2 MG
2 TABLET ORAL ONCE
Status: COMPLETED | OUTPATIENT
Start: 2024-09-03 | End: 2024-09-03

## 2024-09-03 RX ADMIN — ROSUVASTATIN 40 MG: 40 TABLET, FILM COATED ORAL at 09:42

## 2024-09-03 RX ADMIN — PANTOPRAZOLE SODIUM 40 MG: 40 TABLET, DELAYED RELEASE ORAL at 09:42

## 2024-09-03 RX ADMIN — OXYCODONE HYDROCHLORIDE 5 MG: 5 TABLET ORAL at 12:27

## 2024-09-03 RX ADMIN — BUSPIRONE HYDROCHLORIDE 5 MG: 5 TABLET ORAL at 14:21

## 2024-09-03 RX ADMIN — GABAPENTIN 100 MG: 100 CAPSULE ORAL at 14:21

## 2024-09-03 RX ADMIN — SODIUM CHLORIDE, PRESERVATIVE FREE 10 ML: 5 INJECTION INTRAVENOUS at 09:44

## 2024-09-03 RX ADMIN — DICLOFENAC SODIUM 4 G: 10 GEL TOPICAL at 21:00

## 2024-09-03 RX ADMIN — GABAPENTIN 100 MG: 100 CAPSULE ORAL at 09:42

## 2024-09-03 RX ADMIN — ASPIRIN 81 MG: 81 TABLET, COATED ORAL at 09:42

## 2024-09-03 RX ADMIN — MIRTAZAPINE 7.5 MG: 15 TABLET, FILM COATED ORAL at 20:59

## 2024-09-03 RX ADMIN — CLOPIDOGREL BISULFATE 75 MG: 75 TABLET ORAL at 09:42

## 2024-09-03 RX ADMIN — BUSPIRONE HYDROCHLORIDE 5 MG: 5 TABLET ORAL at 09:42

## 2024-09-03 RX ADMIN — TAMSULOSIN HYDROCHLORIDE 0.4 MG: 0.4 CAPSULE ORAL at 20:59

## 2024-09-03 RX ADMIN — SODIUM CHLORIDE, PRESERVATIVE FREE 10 ML: 5 INJECTION INTRAVENOUS at 20:59

## 2024-09-03 RX ADMIN — BUSPIRONE HYDROCHLORIDE 5 MG: 5 TABLET ORAL at 20:59

## 2024-09-03 RX ADMIN — DIAZEPAM 2 MG: 2 TABLET ORAL at 16:27

## 2024-09-03 RX ADMIN — DICLOFENAC SODIUM 4 G: 10 GEL TOPICAL at 09:45

## 2024-09-03 RX ADMIN — LEVOTHYROXINE SODIUM 175 MCG: 0.15 TABLET ORAL at 09:41

## 2024-09-03 RX ADMIN — OXYCODONE HYDROCHLORIDE AND ACETAMINOPHEN 1 TABLET: 5; 325 TABLET ORAL at 20:58

## 2024-09-03 RX ADMIN — PANTOPRAZOLE SODIUM 40 MG: 40 TABLET, DELAYED RELEASE ORAL at 16:27

## 2024-09-03 RX ADMIN — GABAPENTIN 100 MG: 100 CAPSULE ORAL at 20:59

## 2024-09-03 ASSESSMENT — PAIN SCALES - GENERAL
PAINLEVEL_OUTOF10: 5
PAINLEVEL_OUTOF10: 0
PAINLEVEL_OUTOF10: 8
PAINLEVEL_OUTOF10: 0
PAINLEVEL_OUTOF10: 8
PAINLEVEL_OUTOF10: 7
PAINLEVEL_OUTOF10: 5
PAINLEVEL_OUTOF10: 0
PAINLEVEL_OUTOF10: 5

## 2024-09-03 ASSESSMENT — PAIN DESCRIPTION - LOCATION
LOCATION: BACK
LOCATION: HAND
LOCATION: BACK;HAND

## 2024-09-03 ASSESSMENT — PAIN DESCRIPTION - DESCRIPTORS
DESCRIPTORS: ACHING;SHOOTING
DESCRIPTORS: ACHING
DESCRIPTORS: ACHING;STABBING

## 2024-09-03 ASSESSMENT — PAIN DESCRIPTION - ORIENTATION
ORIENTATION: LOWER;MID
ORIENTATION: LEFT

## 2024-09-03 NOTE — PROGRESS NOTES
Anjum Zaragoza  Neurology Follow-up  [facility] Neurology    Date of Service: 9/3/2024    Subjective:   CC: Follow up today regarding: Acute left-sided paresthesia    Events noted. Chart and lab reviewed.  No new symptoms today.  The same intermittent left upper extremity paresthesia.  No respiratory distress, severe headache or neck pain or weakness.  Awaiting MRI.      ROS : A 10-12 system review obtained and updated today and is unremarkable except as mentioned  in my interval history.     Past medical history, social history, medication and family history reviewed.       Objective:  Exam:   Constitutional:   Vitals:    09/03/24 0839 09/03/24 0948 09/03/24 0950 09/03/24 0952   BP:  (!) 148/131 102/82 102/82   Pulse: 97 (!) 102 (!) 101 100   Resp:   16    Temp:   96.9 °F (36.1 °C)    TempSrc:   Temporal    SpO2:   94% 93%   Weight:       Height:         General appearance:  Normal development and appear in no acute distress.   Mental Status:   Oriented to person, place, problem, and time.    Memory: Good immediate recall.  Intact remote memory  Normal attention span and concentration.  Language: intact naming, repeating and fluency   Good fund of Knowledge.   Cranial Nerves:   II:   Pupils: equal, round, reactive to light  III,IV,VI: Extra Ocular Movements are intact. No nystagmus  V: Facial sensation is intact  VII: Facial strength and movements: intact and symmetric  XII: Tongue movements are normal  Musculoskeletal: 5/5 in all 4 extremities.   Tone: Normal tone.   Reflexes: Symmetric 2+ in both arms and legs.  Coordination: no pronator drift, no dysmetria with FNF  Sensation: normal.  Gait/Posture: steady gait    MDM:      A. Problems (any 1)    High:    [x] Acute/Chronic Illness/injury posing threat to life or bodily function:    [] Severe exacerbation of chronic illness:      Moderate:    []     1 or more chronic illness with exacerbation, progression or side effect of treatment or  []     2 or more  with the patient  I reviewed blood testing and other test results and discussed results with the patient      Impression:  Acute left-sided paresthesia possible TIA/CVA.  Will consider MRI of the C-spine if MRI brain is negative  Right ICA moderate stenosis, so far could be symptomatic  Recent COVID-19  A-fib  Hypertension  Hyperlipidemia      Recommendation  MRI brain  Consider MRI of the C-spine if MRI brain showed no acute stroke  Continue aspirin and Plavix  DVT and GI prophylaxis  Droplet isolation respiratory support  Statin  Gabapentin the same dose 100 Mg x 3  Blood pressure monitor for now  Vascular consultation according to results with MRI brain  Consideration for secondary stroke prevention with anticoagulation will be discussed after MRI  Will follow           Tata Kwon MD   [phone]      This dictation was generated by voice recognition computer software. Although all attempts are made to edit the dictation for accuracy, there may be errors in the transcription that are not intended.

## 2024-09-03 NOTE — PLAN OF CARE
Problem: Discharge Planning  Goal: Discharge to home or other facility with appropriate resources  9/2/2024 2244 by Jorge Arroyo RN  Outcome: Progressing  Flowsheets (Taken 9/2/2024 2000)  Discharge to home or other facility with appropriate resources:   Identify barriers to discharge with patient and caregiver   Arrange for needed discharge resources and transportation as appropriate   Identify discharge learning needs (meds, wound care, etc)  9/2/2024 1126 by Tracey Pack RN  Outcome: Progressing     Problem: Neurosensory - Adult  Goal: Achieves stable or improved neurological status  9/2/2024 2244 by Jorge Arroyo RN  Outcome: Progressing  Flowsheets (Taken 9/2/2024 2000)  Achieves stable or improved neurological status:   Assess for and report changes in neurological status   Initiate measures to prevent increased intracranial pressure   Maintain blood pressure and fluid volume within ordered parameters to optimize cerebral perfusion and minimize risk of hemorrhage   Monitor temperature, glucose, and sodium. Initiate appropriate interventions as ordered  9/2/2024 1126 by Tracey Pack RN  Outcome: Progressing  Goal: Achieves maximal functionality and self care  9/2/2024 2244 by Jorge Arroyo RN  Outcome: Progressing  Flowsheets (Taken 9/2/2024 2000)  Achieves maximal functionality and self care: Monitor swallowing and airway patency with patient fatigue and changes in neurological status  9/2/2024 1126 by Tracey Pack RN  Outcome: Progressing     Problem: Respiratory - Adult  Goal: Achieves optimal ventilation and oxygenation  9/2/2024 2244 by Jorge Arroyo RN  Outcome: Progressing  Flowsheets (Taken 9/2/2024 2000)  Achieves optimal ventilation and oxygenation:   Assess for changes in respiratory status   Assess for changes in mentation and behavior   Position to facilitate oxygenation and minimize respiratory effort  9/2/2024 1126 by Tracey Pack, RN  Outcome: Progressing     Problem: Musculoskeletal -

## 2024-09-03 NOTE — CARE COORDINATION
Discharge Planning Note:    Chart reviewed and it appears that patient has minimal needs for discharge at this time. Risk Score 10 %     Primary Care Physician is Trista Sutton MD   Primary insurance is [employer]    PT/RAJ luisito pending  MRI brain pending  ARU consult requested    Please notify case management if any discharge needs are identified.      Case management will continue to follow progress and update discharge plan as needed.

## 2024-09-03 NOTE — PROGRESS NOTES
[facility] - Inpatient Rehabilitation Department   Phone: [phone]    Occupational Therapy    [x] Initial Evaluation            [] Daily Treatment Note         [] Discharge Summary      Patient: Anjum Zaragoza   : 1950   MRN: [id number]   Date of Service:  9/3/2024    Admitting Diagnosis:  TIA (transient ischemic attack)  Current Admission Summary: 74 y.o. male with pertinent past medical history of cardiomyopathy, CAD, hypertension, mitral regurgitation who was brought in by self for left arm weakness. Patient states yesterday evening around 1700 he was helping residents by pushing wheelchairs when he noticed he felt lightheaded, he sat down and felt as though his vision went white and he developed weakness in his left arm.   Past Medical History:  has a past medical history of Aortic valve insufficiency, Arthritis, Atrial fibrillation (HCC), Cancer (HCC), Dysphonia, Encounter for imaging to screen for metal prior to MRI, GERD (gastroesophageal reflux disease), Hearing loss, Heart disease, Hyperlipidemia, Medical history reviewed with no changes, Obstructive apnea, Oropharyngeal dysphagia, Refusal of blood transfusions as patient is Gnosticist, and Thyroid disease.  Past Surgical History:  has a past surgical history that includes Lung surgery; knee surgery; Neck surgery; hernia repair; Aortic valve replacement (2015); Cardiac catheterization; Tonsillectomy and adenoidectomy; Mandible fracture surgery; Ankle surgery; back surgery; Pain management procedure (Right, 2020); Pain management procedure (Right, 2020); Atrial ablation surgery; Lumbar spine surgery (Right, 10/13/2020); fracture surgery; and Upper gastrointestinal endoscopy (N/A, 2023).    Discharge Recommendations: Anjum Zaragoza scored a 21/24 on the AM-PAC ADL Inpatient form. Current research shows that an AM-PAC score of 18 or greater is typically associated with a discharge to the patient's home

## 2024-09-03 NOTE — PROGRESS NOTES
independent  Scooting: modified independent  Comments:  Transfers  Sit to stand transfer: modified independent  Stand to sit transfer: modified independent  Bed to chair transfer: modified independent  Toilet transfer: modified independent  Comments:  Difficulty tying pants d/t left hand weakness/pain.  Ambulation  Surface:level surface  Assistive Device: no device  Assistance: Independent  Distance: 540'  Gait Mechanics: reciprocal, steady  Comments:    Stair Mobility  Stair mobility not completed on this date.  Comments:  Patient has no stairs at home.  Balance  Static Sitting Balance: good: independent with functional balance in unsupported position  Dynamic Sitting Balance: good: independent with functional balance in unsupported position  Static Standing Balance: good: independent with functional balance in unsupported position  Dynamic Standing Balance: good: independent with functional balance in unsupported position  Comments:    Other Therapeutic Interventions:  HEP for LUE for shoulder flexion, elbow extension, and hand open/closing    Functional Outcomes  AM-PAC Inpatient Mobility Raw Score : 24              Cognition  WFL  Orientation:    A&O x 4    Education  Barriers To Learning: hearing  Patient Education: patient educated on goals, PT role and benefits, plan of care, general safety, functional mobility training, transfer training, discharge recommendations  Learning Assessment:  Pt verbalized and demonstrates understanding    Assessment  Activity Tolerance: Functionally independent with use of cane in terms of mobility.  Severely limited in ADL tasks d/t LUE weakness.  Impairments Requiring Therapeutic Intervention: none - eval with same day discharge  Prognosis: good without need for therapy intervention  Clinical Assessment: Patient presenting at independent level for completion of required mobility tasks for return to home.  Eval with d/c at this time.  Recommend outpatient therapy at d/c.

## 2024-09-04 ENCOUNTER — APPOINTMENT (OUTPATIENT)
Dept: VASCULAR LAB | Age: 74
End: 2024-09-04
Payer: MEDICARE

## 2024-09-04 ENCOUNTER — APPOINTMENT (OUTPATIENT)
Dept: MRI IMAGING | Age: 74
End: 2024-09-04
Payer: MEDICARE

## 2024-09-04 PROBLEM — I63.231 ARTERIAL ISCHEMIC STROKE, ICA, RIGHT, ACUTE (HCC): Status: ACTIVE | Noted: 2024-09-04

## 2024-09-04 PROBLEM — U07.1 COVID: Status: ACTIVE | Noted: 2024-09-04

## 2024-09-04 PROBLEM — I63.239 CAROTID ARTERY STENOSIS WITH CEREBRAL INFARCTION (HCC): Status: ACTIVE | Noted: 2024-09-04

## 2024-09-04 LAB
ALBUMIN SERPL-MCNC: 3.6 G/DL (ref 3.4–5)
ANION GAP SERPL CALCULATED.3IONS-SCNC: 11 MMOL/L (ref 3–16)
BASOPHILS # BLD: 0 K/UL (ref 0–0.2)
BASOPHILS NFR BLD: 0.9 %
BUN SERPL-MCNC: 8 MG/DL (ref 7–20)
CALCIUM SERPL-MCNC: 8.7 MG/DL (ref 8.3–10.6)
CHLORIDE SERPL-SCNC: 104 MMOL/L (ref 99–110)
CO2 SERPL-SCNC: 23 MMOL/L (ref 21–32)
CREAT SERPL-MCNC: 0.6 MG/DL (ref 0.8–1.3)
DEPRECATED RDW RBC AUTO: 13.7 % (ref 12.4–15.4)
EKG ATRIAL RATE: 87 BPM
EKG DIAGNOSIS: NORMAL
EKG P AXIS: 73 DEGREES
EKG P-R INTERVAL: 204 MS
EKG Q-T INTERVAL: 374 MS
EKG QRS DURATION: 82 MS
EKG QTC CALCULATION (BAZETT): 450 MS
EKG R AXIS: -5 DEGREES
EKG T AXIS: 47 DEGREES
EKG VENTRICULAR RATE: 87 BPM
EOSINOPHIL # BLD: 0.1 K/UL (ref 0–0.6)
EOSINOPHIL NFR BLD: 2.6 %
GFR SERPLBLD CREATININE-BSD FMLA CKD-EPI: >90 ML/MIN/{1.73_M2}
GLUCOSE SERPL-MCNC: 95 MG/DL (ref 70–99)
HCT VFR BLD AUTO: 39.1 % (ref 40.5–52.5)
HGB BLD-MCNC: 13.1 G/DL (ref 13.5–17.5)
LYMPHOCYTES # BLD: 1.2 K/UL (ref 1–5.1)
LYMPHOCYTES NFR BLD: 24.2 %
MCH RBC QN AUTO: 31.8 PG (ref 26–34)
MCHC RBC AUTO-ENTMCNC: 33.5 G/DL (ref 31–36)
MCV RBC AUTO: 95.1 FL (ref 80–100)
MONOCYTES # BLD: 0.5 K/UL (ref 0–1.3)
MONOCYTES NFR BLD: 10.9 %
NEUTROPHILS # BLD: 3 K/UL (ref 1.7–7.7)
NEUTROPHILS NFR BLD: 61.4 %
PHOSPHATE SERPL-MCNC: 3.6 MG/DL (ref 2.5–4.9)
PLATELET # BLD AUTO: 193 K/UL (ref 135–450)
PMV BLD AUTO: 7.4 FL (ref 5–10.5)
POTASSIUM SERPL-SCNC: 3.9 MMOL/L (ref 3.5–5.1)
RBC # BLD AUTO: 4.11 M/UL (ref 4.2–5.9)
SODIUM SERPL-SCNC: 138 MMOL/L (ref 136–145)
WBC # BLD AUTO: 4.8 K/UL (ref 4–11)

## 2024-09-04 PROCEDURE — APPNB30 APP NON BILLABLE TIME 0-30 MINS

## 2024-09-04 PROCEDURE — 6370000000 HC RX 637 (ALT 250 FOR IP): Performed by: INTERNAL MEDICINE

## 2024-09-04 PROCEDURE — 72141 MRI NECK SPINE W/O DYE: CPT

## 2024-09-04 PROCEDURE — 6370000000 HC RX 637 (ALT 250 FOR IP): Performed by: STUDENT IN AN ORGANIZED HEALTH CARE EDUCATION/TRAINING PROGRAM

## 2024-09-04 PROCEDURE — 6360000002 HC RX W HCPCS: Performed by: INTERNAL MEDICINE

## 2024-09-04 PROCEDURE — 93880 EXTRACRANIAL BILAT STUDY: CPT

## 2024-09-04 PROCEDURE — 93010 ELECTROCARDIOGRAM REPORT: CPT | Performed by: INTERNAL MEDICINE

## 2024-09-04 PROCEDURE — 80069 RENAL FUNCTION PANEL: CPT

## 2024-09-04 PROCEDURE — APPSS30 APP SPLIT SHARED TIME 16-30 MINUTES

## 2024-09-04 PROCEDURE — 6370000000 HC RX 637 (ALT 250 FOR IP): Performed by: FAMILY MEDICINE

## 2024-09-04 PROCEDURE — APPNB30 APP NON BILLABLE TIME 0-30 MINS: Performed by: NURSE PRACTITIONER

## 2024-09-04 PROCEDURE — 36415 COLL VENOUS BLD VENIPUNCTURE: CPT

## 2024-09-04 PROCEDURE — APPSS60 APP SPLIT SHARED TIME 46-60 MINUTES: Performed by: NURSE PRACTITIONER

## 2024-09-04 PROCEDURE — 85025 COMPLETE CBC W/AUTO DIFF WBC: CPT

## 2024-09-04 PROCEDURE — 2580000003 HC RX 258: Performed by: INTERNAL MEDICINE

## 2024-09-04 PROCEDURE — 2060000000 HC ICU INTERMEDIATE R&B

## 2024-09-04 PROCEDURE — 99232 SBSQ HOSP IP/OBS MODERATE 35: CPT | Performed by: PSYCHIATRY & NEUROLOGY

## 2024-09-04 RX ORDER — DIAZEPAM 2 MG
2 TABLET ORAL
Status: COMPLETED | OUTPATIENT
Start: 2024-09-04 | End: 2024-09-04

## 2024-09-04 RX ADMIN — CLOPIDOGREL BISULFATE 75 MG: 75 TABLET ORAL at 07:47

## 2024-09-04 RX ADMIN — BUSPIRONE HYDROCHLORIDE 5 MG: 5 TABLET ORAL at 14:10

## 2024-09-04 RX ADMIN — OXYCODONE HYDROCHLORIDE AND ACETAMINOPHEN 1 TABLET: 5; 325 TABLET ORAL at 10:36

## 2024-09-04 RX ADMIN — ROSUVASTATIN 40 MG: 40 TABLET, FILM COATED ORAL at 07:47

## 2024-09-04 RX ADMIN — SODIUM CHLORIDE, PRESERVATIVE FREE 10 ML: 5 INJECTION INTRAVENOUS at 21:58

## 2024-09-04 RX ADMIN — GABAPENTIN 100 MG: 100 CAPSULE ORAL at 07:47

## 2024-09-04 RX ADMIN — BUSPIRONE HYDROCHLORIDE 5 MG: 5 TABLET ORAL at 07:47

## 2024-09-04 RX ADMIN — DIAZEPAM 2 MG: 2 TABLET ORAL at 18:03

## 2024-09-04 RX ADMIN — TAMSULOSIN HYDROCHLORIDE 0.4 MG: 0.4 CAPSULE ORAL at 21:58

## 2024-09-04 RX ADMIN — GABAPENTIN 100 MG: 100 CAPSULE ORAL at 14:10

## 2024-09-04 RX ADMIN — GABAPENTIN 100 MG: 100 CAPSULE ORAL at 21:58

## 2024-09-04 RX ADMIN — DICLOFENAC SODIUM 4 G: 10 GEL TOPICAL at 09:00

## 2024-09-04 RX ADMIN — DICLOFENAC SODIUM 4 G: 10 GEL TOPICAL at 05:19

## 2024-09-04 RX ADMIN — ASPIRIN 81 MG: 81 TABLET, COATED ORAL at 07:47

## 2024-09-04 RX ADMIN — OXYCODONE HYDROCHLORIDE AND ACETAMINOPHEN 1 TABLET: 5; 325 TABLET ORAL at 22:38

## 2024-09-04 RX ADMIN — SODIUM CHLORIDE, PRESERVATIVE FREE 10 ML: 5 INJECTION INTRAVENOUS at 07:47

## 2024-09-04 RX ADMIN — BUSPIRONE HYDROCHLORIDE 5 MG: 5 TABLET ORAL at 21:58

## 2024-09-04 RX ADMIN — PANTOPRAZOLE SODIUM 40 MG: 40 TABLET, DELAYED RELEASE ORAL at 05:20

## 2024-09-04 RX ADMIN — MIRTAZAPINE 7.5 MG: 15 TABLET, FILM COATED ORAL at 21:58

## 2024-09-04 RX ADMIN — PANTOPRAZOLE SODIUM 40 MG: 40 TABLET, DELAYED RELEASE ORAL at 17:27

## 2024-09-04 RX ADMIN — LEVOTHYROXINE SODIUM 175 MCG: 0.15 TABLET ORAL at 05:20

## 2024-09-04 ASSESSMENT — PAIN DESCRIPTION - ORIENTATION
ORIENTATION: LEFT
ORIENTATION: MID;LEFT

## 2024-09-04 ASSESSMENT — PAIN DESCRIPTION - LOCATION
LOCATION: HAND
LOCATION: BACK;HAND
LOCATION: BACK;HAND

## 2024-09-04 ASSESSMENT — PAIN SCALES - GENERAL
PAINLEVEL_OUTOF10: 5
PAINLEVEL_OUTOF10: 5
PAINLEVEL_OUTOF10: 7
PAINLEVEL_OUTOF10: 4
PAINLEVEL_OUTOF10: 10
PAINLEVEL_OUTOF10: 8
PAINLEVEL_OUTOF10: 5
PAINLEVEL_OUTOF10: 7
PAINLEVEL_OUTOF10: 5

## 2024-09-04 ASSESSMENT — PAIN DESCRIPTION - DESCRIPTORS
DESCRIPTORS: ACHING
DESCRIPTORS: ACHING;SHOOTING;STABBING
DESCRIPTORS: ACHING

## 2024-09-04 NOTE — CONSULTS
4/22/19. Normal Mode. 1.5T or 3.0T. Download data prior to MRI. Follow all other [employer] guidelines. Pt currently follows     GERD (gastroesophageal reflux disease)     Hearing loss     Heart disease     Hyperlipidemia     Medical history reviewed with no changes     Obstructive apnea does not use CPAP    Oropharyngeal dysphagia     Refusal of blood transfusions as patient is Advent     Thyroid disease        Past Surgical History:   Procedure Laterality Date    ANKLE SURGERY      AORTIC VALVE REPLACEMENT  01/28/2015    Dr. Tinajero 25mm Mosaic Ultra porcine valve; right and left modified maze procedure with ligation of DELGADO with Atriclip    ATRIAL ABLATION SURGERY      BACK SURGERY      CSP    CARDIAC CATHETERIZATION      FRACTURE SURGERY      HERNIA REPAIR      KNEE SURGERY      Arthroscopy    LUMBAR SPINE SURGERY Right 10/13/2020    RIGHT LUMBAR4-LUMBAR5 MICRO HEMILAMINECTOMY AND DISCECTOMY (95663, [id number]) performed by Luis Langley MD at [facility] OR    LUNG SURGERY      collapsed lung due to broken ribs    MANDIBLE FRACTURE SURGERY      NECK SURGERY      Fusion    PAIN MANAGEMENT PROCEDURE Right 06/22/2020    RIGHT L4 AND L5 TRANSFORAMINAL EPIDURAL STEROID INJECTION WITH FLUOROSCOPY ([id number]) performed by Elana Young MD at [facility] SIC    PAIN MANAGEMENT PROCEDURE Right 07/08/2020    RIGHT L4 AND L5 TRANSFORAMINAL EPIDURAL STEROID INJECTION WITH FLUOROSCOPY ([id number]) performed by Elana Young MD at [facility] SIC    TONSILLECTOMY AND ADENOIDECTOMY      UPPER GASTROINTESTINAL ENDOSCOPY N/A 1/19/2023    ESOPHAGOGASTRODUODENOSCOPY performed by Tiffany Hernandez MD at [facility] ENDOSCOPY       Allergies   Allergen Reactions    Naproxen Swelling     Lips  Pt does not recognize this being an intolerance    Hydrocodone Itching       Social History     Socioeconomic History    Marital status:      Spouse name: Not on file    Number of children: 1    Years of education: Not on file    Highest  therapy.  Will get carotid duplex to further evaluate and have as a baseline for continued surveillance.   It is unlikely his neurological symptoms are related to his right ICA stenosis.   Patient would not be candidate for CEA given his history of neck radiation and therefore would need carotid stent placement if needed in the future.  Will follow up after testing complete with further recommendations.      Plan discussed with Dr. Linares.    Thank you for the consultation.     Patient educated on plan of care and disease process.  All questions answered.        Electronically signed by ISIDRA Nunez CNP on 9/4/2024 at 2:51 PM

## 2024-09-04 NOTE — PROGRESS NOTES
Patient awaiting MRI, MRI notified,valium ordered on-call to MRI. MRI checklist completed on 9/3 for previous MRI.

## 2024-09-04 NOTE — PROGRESS NOTES
Anjum Zaragoza  Neurology Follow-up  [facility] Neurology    Date of Service: 9/4/2024    Subjective:   CC: Follow up today regarding: Acute left-sided paresthesia    Events noted. Chart and lab reviewed.  Patient seen this morning, he reports no new symptoms.  We discussed MRI brain results which showed no acute stroke.  He reports same intermittent upper extremity paresthesia.  Other review of systems unremarkable      ROS : A 10-12 system review obtained and updated today and is unremarkable except as mentioned  in my interval history.     Past medical history, social history, medication and family history reviewed.       Objective:  Exam:   Constitutional:   Vitals:    09/04/24 0748 09/04/24 0803 09/04/24 1036 09/04/24 1204   BP: 107/80   97/76   Pulse: 78 74  95   Resp: 16  16 16   Temp: 97.3 °F (36.3 °C)   (!) 96.7 °F (35.9 °C)   TempSrc: Temporal   Temporal   SpO2: 98%   97%   Weight:       Height:         General appearance:  Normal development and appear in no acute distress.   Mental Status:   Oriented to person, place, problem, and time.    Memory: Good immediate recall.  Intact remote memory  Normal attention span and concentration.  Language: intact naming, repeating and fluency   Good fund of Knowledge.   Cranial Nerves:   II:   Pupils: equal, round, reactive to light  III,IV,VI: Extra Ocular Movements are intact. No nystagmus  V: Facial sensation is intact  VII: Facial strength and movements: intact and symmetric  XII: Tongue movements are normal  Musculoskeletal: 5/5 in all 4 extremities.   Tone: Normal tone.   Reflexes: Symmetric 2+ in both arms and legs.  Coordination: no pronator drift, no dysmetria with FNF  Sensation: normal.  Gait/Posture: steady gait    MDM:      A. Problems (any 1)    High:    [x] Acute/Chronic Illness/injury posing threat to life or bodily function:    [] Severe exacerbation of chronic illness:      Moderate:    []     1 or more chronic illness with exacerbation,

## 2024-09-04 NOTE — PROGRESS NOTES
Occupational Therapy  Anjum Zaragoza    OT tx attempted. Pt currently pending C-spine MRI. Will hold at this time and follow up for assessment as medically appropriate to participate.    Thank you,  Renaldo Chaves, OT

## 2024-09-04 NOTE — CARE COORDINATION
Case Management Assessment  Initial Evaluation    Date/Time of Evaluation: 9/4/2024 1:36 PM  Assessment Completed by: Leonora Lin RN    If patient is discharged prior to next notation, then this note serves as note for discharge by case management.    Patient Name: Anjum Zaragoza                   YOB: 1950  Diagnosis: TIA (transient ischemic attack) [G45.9]  Acute left-sided muscle weakness [M62.81]  Stenosis of right carotid artery [I65.21]  Subacute cough [R05.2]  Nonspecific chest pain [R07.9]                   Date / Time: 9/1/2024  9:04 PM    Patient Admission Status: Inpatient   Readmission Risk (Low < 19, Mod (19-27), High > 27): Readmission Risk Score: 10.3    Current PCP: Trista Sutton MD  PCP verified by CM? Yes    Chart Reviewed: Yes      History Provided by: Patient, Medical Record  Patient Orientation: Alert and Oriented, Person, Place, Situation    Patient Cognition: Alert    Hospitalization in the last 30 days (Readmission):  No    If yes, Readmission Assessment in  Navigator will be completed.    Advance Directives:      Code Status: Full Code   Patient's Primary Decision Maker is: Legal Next of Kin    Primary Decision Maker: Beth Zaragoza - Child - [phone]    Secondary Decision Maker: Darlene Smith - Other - [phone]    Discharge Planning:    Patient lives with: (P) Alone Type of Home: Apartment (Sr. Living Apartment)  Primary Care Giver: Self  Patient Support Systems include: Children, Family Members   Current Financial resources: Medicare  Current community resources: None  Current services prior to admission: (P) None, Durable Medical Equipment            Current DME: (P) Other (Comment) (Standard walker, crutches, walking stick)            Type of Home Care services:  (P) None    ADLS  Prior functional level: Assistance with the following:, Mobility (uses a walking stick as needed)  Current functional level: Assistance with the following:, Mobility    PT AM-PAC: 24

## 2024-09-04 NOTE — PROGRESS NOTES
Patient seen earlier today by my colleague. Full progress note to follow tomorrow.     In brief, patient here for TIA..      ASSESSMENT:    Active Hospital Problems    Diagnosis     CAD (coronary artery disease) [I25.10]      Priority: Medium    Acute left-sided muscle weakness [M62.81]     TIA (transient ischemic attack) [G45.9]     Mood disorder (HCC) [F39]     Hyperlipidemia [E78.5]     Chronic obstructive pulmonary disease (HCC) [J44.9]     Essential hypertension [I10]     PAF (paroxysmal atrial fibrillation) ([facility]) [I48.0]          PLAN:    TIA  -CT head nonacute  -CTA head and neck nonacute  -does show 66% Stenosis of ANTONIO  -MRI head ordered and pending   -Continue ASA and statin   -SLP evaluation  -Daily labs; replace electrolytes as needed  -TIA/CVA order set ordered  -Neurology consulted        PAF  - consider AC  -will depend on result of MRI    CHADSVASC score  - is 3 recs would be for AC.  - however, Pt is a Faith and will not accept blood, therefore may want to reconsider being on blood thinner.

## 2024-09-04 NOTE — CARE COORDINATION
Pt has received information and a pamphlet regarding [facility] Transitional Care Program that will follow up with pt after discharge.  Pt has agreed to this service and [facility] will be contacting patient or patient's family to schedule an after discharge follow up visit at the patient's home.  Pt denied questions at this time and was receptive to this discharge planning intervention. Pamphlet left with patient/family.

## 2024-09-04 NOTE — PROGRESS NOTES
Physician Progress Note      PATIENT:               FRANK TAYLOR  CSN #:                  [id number]  :                       1950  ADMIT DATE:       2024 9:04 PM  DISCH DATE:  RESPONDING  PROVIDER #:        West Calderon MD          QUERY TEXT:    Dear Dr. Calderon,  Pt admitted with TIA. Pt noted to have Paroxysmal A-fib and findings of \"66%   stenosis of the proximal right cervical ICA.\" . If possible, please document   in progress notes and discharge summary if you are evaluating and /or treating   any of the following:    The medical record reflects the following:  Risk Factors: 74 yrs old, Hx HLD, CAD, Cardiomyopathy, HTN, previous TIA,   A-fib noted \"not on anticoagulant\", current Covid infection  Clinical Indicators:  ED L sided weakness, blurry vision, cough, CT head-   no acute, CTA-66% stenosis of the proximal right cervical ICA. Admit for TIA.   9/3 MRI- no acute, PCP notes \"CHADSVASC score- is 3\"  Treatment: Neuro consult, CT, CTA, MRI, cont ASA, Plavix, PT/OT, Speech, Echo,   statin  Thank you,  Esperanza Magana RN, CDS  [email or web address]  Options provided:  -- Embolic TIA due to cerebral embolism  -- TIA due to stenosis of the proximal right cervical ICA without infarction  -- Other - I will add my own diagnosis  -- Disagree - Not applicable / Not valid  -- Disagree - Clinically unable to determine / Unknown  -- Refer to Clinical Documentation Reviewer    PROVIDER RESPONSE TEXT:    Weakness of the left side is not a TIA but appears to be due to a cervical   myelopathy.    Query created by: Esperanza Hart on 2024 8:57 AM      Electronically signed by:  West Calderon MD 2024 11:00 AM

## 2024-09-05 VITALS
HEIGHT: 71 IN | RESPIRATION RATE: 20 BRPM | DIASTOLIC BLOOD PRESSURE: 96 MMHG | BODY MASS INDEX: 22.59 KG/M2 | TEMPERATURE: 97 F | SYSTOLIC BLOOD PRESSURE: 109 MMHG | OXYGEN SATURATION: 97 % | HEART RATE: 109 BPM | WEIGHT: 161.38 LBS

## 2024-09-05 DIAGNOSIS — I65.23 ATHEROSCLEROSIS OF BOTH CAROTID ARTERIES: Primary | ICD-10-CM

## 2024-09-05 LAB
ALBUMIN SERPL-MCNC: 3.7 G/DL (ref 3.4–5)
ANION GAP SERPL CALCULATED.3IONS-SCNC: 11 MMOL/L (ref 3–16)
BASOPHILS # BLD: 0 K/UL (ref 0–0.2)
BASOPHILS NFR BLD: 1 %
BUN SERPL-MCNC: 10 MG/DL (ref 7–20)
CALCIUM SERPL-MCNC: 8.5 MG/DL (ref 8.3–10.6)
CHLORIDE SERPL-SCNC: 105 MMOL/L (ref 99–110)
CO2 SERPL-SCNC: 22 MMOL/L (ref 21–32)
CREAT SERPL-MCNC: <0.5 MG/DL (ref 0.8–1.3)
DEPRECATED RDW RBC AUTO: 13.3 % (ref 12.4–15.4)
EOSINOPHIL # BLD: 0.1 K/UL (ref 0–0.6)
EOSINOPHIL NFR BLD: 2.6 %
GFR SERPLBLD CREATININE-BSD FMLA CKD-EPI: >90 ML/MIN/{1.73_M2}
GLUCOSE SERPL-MCNC: 98 MG/DL (ref 70–99)
HCT VFR BLD AUTO: 39.2 % (ref 40.5–52.5)
HGB BLD-MCNC: 13.1 G/DL (ref 13.5–17.5)
LYMPHOCYTES # BLD: 1.1 K/UL (ref 1–5.1)
LYMPHOCYTES NFR BLD: 24.1 %
MCH RBC QN AUTO: 32.1 PG (ref 26–34)
MCHC RBC AUTO-ENTMCNC: 33.5 G/DL (ref 31–36)
MCV RBC AUTO: 95.8 FL (ref 80–100)
MONOCYTES # BLD: 0.5 K/UL (ref 0–1.3)
MONOCYTES NFR BLD: 10.5 %
NEUTROPHILS # BLD: 2.9 K/UL (ref 1.7–7.7)
NEUTROPHILS NFR BLD: 61.8 %
PHOSPHATE SERPL-MCNC: 3.1 MG/DL (ref 2.5–4.9)
PLATELET # BLD AUTO: 196 K/UL (ref 135–450)
PMV BLD AUTO: 7.2 FL (ref 5–10.5)
POTASSIUM SERPL-SCNC: 3.9 MMOL/L (ref 3.5–5.1)
RBC # BLD AUTO: 4.09 M/UL (ref 4.2–5.9)
SODIUM SERPL-SCNC: 138 MMOL/L (ref 136–145)
WBC # BLD AUTO: 4.7 K/UL (ref 4–11)

## 2024-09-05 PROCEDURE — 2580000003 HC RX 258: Performed by: INTERNAL MEDICINE

## 2024-09-05 PROCEDURE — APPNB30 APP NON BILLABLE TIME 0-30 MINS: Performed by: NURSE PRACTITIONER

## 2024-09-05 PROCEDURE — 36415 COLL VENOUS BLD VENIPUNCTURE: CPT

## 2024-09-05 PROCEDURE — APPSS30 APP SPLIT SHARED TIME 16-30 MINUTES: Performed by: NURSE PRACTITIONER

## 2024-09-05 PROCEDURE — 85025 COMPLETE CBC W/AUTO DIFF WBC: CPT

## 2024-09-05 PROCEDURE — 6360000002 HC RX W HCPCS: Performed by: INTERNAL MEDICINE

## 2024-09-05 PROCEDURE — 6370000000 HC RX 637 (ALT 250 FOR IP): Performed by: STUDENT IN AN ORGANIZED HEALTH CARE EDUCATION/TRAINING PROGRAM

## 2024-09-05 PROCEDURE — 6370000000 HC RX 637 (ALT 250 FOR IP): Performed by: FAMILY MEDICINE

## 2024-09-05 PROCEDURE — 80069 RENAL FUNCTION PANEL: CPT

## 2024-09-05 PROCEDURE — 99232 SBSQ HOSP IP/OBS MODERATE 35: CPT | Performed by: PSYCHIATRY & NEUROLOGY

## 2024-09-05 PROCEDURE — 99231 SBSQ HOSP IP/OBS SF/LOW 25: CPT | Performed by: SURGERY

## 2024-09-05 RX ADMIN — LEVOTHYROXINE SODIUM 175 MCG: 0.15 TABLET ORAL at 05:09

## 2024-09-05 RX ADMIN — ENOXAPARIN SODIUM 40 MG: 100 INJECTION SUBCUTANEOUS at 09:15

## 2024-09-05 RX ADMIN — SODIUM CHLORIDE, PRESERVATIVE FREE 10 ML: 5 INJECTION INTRAVENOUS at 09:16

## 2024-09-05 RX ADMIN — OXYCODONE HYDROCHLORIDE AND ACETAMINOPHEN 1 TABLET: 5; 325 TABLET ORAL at 09:26

## 2024-09-05 RX ADMIN — CLOPIDOGREL BISULFATE 75 MG: 75 TABLET ORAL at 09:15

## 2024-09-05 RX ADMIN — ASPIRIN 81 MG: 81 TABLET, COATED ORAL at 09:15

## 2024-09-05 RX ADMIN — BUSPIRONE HYDROCHLORIDE 5 MG: 5 TABLET ORAL at 09:15

## 2024-09-05 RX ADMIN — PANTOPRAZOLE SODIUM 40 MG: 40 TABLET, DELAYED RELEASE ORAL at 17:05

## 2024-09-05 RX ADMIN — BUSPIRONE HYDROCHLORIDE 5 MG: 5 TABLET ORAL at 14:59

## 2024-09-05 RX ADMIN — PANTOPRAZOLE SODIUM 40 MG: 40 TABLET, DELAYED RELEASE ORAL at 05:09

## 2024-09-05 RX ADMIN — ROSUVASTATIN 40 MG: 40 TABLET, FILM COATED ORAL at 09:15

## 2024-09-05 RX ADMIN — GABAPENTIN 100 MG: 100 CAPSULE ORAL at 14:59

## 2024-09-05 RX ADMIN — GABAPENTIN 100 MG: 100 CAPSULE ORAL at 09:15

## 2024-09-05 ASSESSMENT — PAIN DESCRIPTION - LOCATION
LOCATION: BACK
LOCATION: HAND;BACK

## 2024-09-05 ASSESSMENT — PAIN SCALES - GENERAL
PAINLEVEL_OUTOF10: 3
PAINLEVEL_OUTOF10: 7
PAINLEVEL_OUTOF10: 0
PAINLEVEL_OUTOF10: 2
PAINLEVEL_OUTOF10: 6
PAINLEVEL_OUTOF10: 3

## 2024-09-05 ASSESSMENT — PAIN DESCRIPTION - PAIN TYPE: TYPE: CHRONIC PAIN

## 2024-09-05 ASSESSMENT — PAIN DESCRIPTION - DESCRIPTORS: DESCRIPTORS: ACHING

## 2024-09-05 ASSESSMENT — PAIN DESCRIPTION - FREQUENCY: FREQUENCY: CONTINUOUS

## 2024-09-05 ASSESSMENT — PAIN DESCRIPTION - ORIENTATION: ORIENTATION: MID

## 2024-09-05 NOTE — DISCHARGE INSTRUCTIONS
Learning About Transient Ischemic Attack (TIA)  What is a TIA?  A transient ischemic attack (TIA) means that the blood flow to a part of the brain is blocked for a short time. A TIA causes the same symptoms as a stroke. But unlike a stroke, a TIA does not cause lasting brain damage. A TIA is a sign that a stroke may happen in the future.  During a TIA, the blood supply to part of the brain is reduced or blocked. This may be caused by a blood clot in a blood vessel. When blood flow is blocked, the brain cells in that area are affected within seconds. This causes symptoms in parts of the body controlled by those brain cells. Symptoms can last for at least a few minutes. When the blood flow returns, the symptoms go away.  What happens after a TIA?  A TIA doesn't cause lasting problems. But it is a serious warning sign of a possible stroke in the future. You can do a lot to lower your chance of having a stroke.  You and your doctor can work together to decide how to lower your risk of stroke. Medicines and a heart-healthy lifestyle can help.  What are the symptoms?  Symptoms of a TIA are the same as symptoms of a stroke. But symptoms of a TIA don't last very long. They may go away in a few minutes.  If you have any of these symptoms, call 911 or other emergency services right away.    Symptoms include:  Sudden numbness, tingling, weakness, or loss of movement in your face, arm, or leg, especially on only one side of your body.  Sudden vision changes.  Sudden trouble speaking.  Sudden confusion or trouble understanding simple statements.  Sudden problems with walking or balance.  Ask your family, friends, and coworkers to learn the signs of a TIA and stroke. They may notice these signs before you do. Make sure they know to call 911 if these signs appear.  How is a TIA treated?  If you've had a TIA, you need to see a doctor right away. After a TIA, you are at risk for a stroke. So you may stay in the hospital. You

## 2024-09-05 NOTE — CONSULTS
Neurosurgery consult note    The patient is a very pleasant 74-year-old man who presents for evaluation of the acute onset of left upper extremity and some left lower extremity weakness concerning for possible stroke.  The patient reports the symptoms started 2 days before presenting to the hospital.  In the emergency room.  The patient was diagnosed with COVID and admitted for further workup.  The patient was worked up with neurology and vascular surgery due to concern for right internal carotid artery stenosis.  The patient also had an MRI of the cervical spine that demonstrated evidence of cervical stenosis, primarily at the C3-4 level that is moderate to severe in nature.  Of note, the patient is also on aspirin, Plavix.  The patient's son and onset of symptoms is not classic for cervical myelopathy that is more progressive in nature.he still would likely benefit from a decompression surgery at C3-4 but given the fact the patient is on aspirin, Plavix, he would need these medications help for any elective spine surgery.her the chart.  The patient's symptoms of also slightly improved since admission.  Given this, I have recommended outpatient follow-up with me upon discharge to discuss possible surgical intervention if the patient can obtain clearance for stopping his aspirin and Plavix.    Mick Olivares MD  [facility]  [phone]

## 2024-09-05 NOTE — PROGRESS NOTES
Vascular Progress Note    9/5/2024 8:14 AM    Chief complaint / Reason for visit : carotid stenosis     Subjective:  Patient resting in bed.  He reports he is doing okay and slept well last night.  Still has some residual left sided weakness and decreased hand grasp strength on the left hand.  He denies any recurrent vision issues.  He is right handed.  VSS, afebrile.     Vital Signs: /78   Pulse 75   Temp 97.2 °F (36.2 °C)   Resp 16   Ht 1.791 m (5' 10.5\")   Wt 73.2 kg (161 lb 6 oz)   SpO2 97%   BMI 22.85 kg/m²      I/O:    Intake/Output Summary (Last 24 hours) at 9/5/2024 0814  Last data filed at 9/5/2024 0510  Gross per 24 hour   Intake 1320 ml   Output --   Net 1320 ml       Physical Exam:   General: no apparent distress, appears stated age  Neuro: AAOx4, clear speech, tongue midline, left hand grasp weak, muscle strength on left 4+/5  Chest/Lungs: no accessory muscle use  Cardiac:  regular rate and rhythm  Vascular:  palpable radial and femoral pulses bilaterally   Extremities: warm and well perfused, no signs of cyanosis or ischemia, no significant edema, bilateral upper and lower extremity motorsensory intact    Labs:   Lab Results   Component Value Date/Time     09/05/2024 04:46 AM    K 3.9 09/05/2024 04:46 AM    K 4.2 09/01/2024 09:30 PM     09/05/2024 04:46 AM    CO2 22 09/05/2024 04:46 AM    BUN 10 09/05/2024 04:46 AM    CREATININE <0.5 09/05/2024 04:46 AM    GFRAA >60 09/13/2022 02:18 AM    GFRAA >60 12/26/2009 10:17 PM    LABGLOM >90 09/05/2024 04:46 AM    LABGLOM >60 02/29/2024 02:54 PM    GLUCOSE 98 09/05/2024 04:46 AM    PHOS 3.1 09/05/2024 04:46 AM    MG 2.10 02/01/2024 06:45 PM    CALCIUM 8.5 09/05/2024 04:46 AM     Lab Results   Component Value Date/Time    WBC 4.7 09/05/2024 04:46 AM    RBC 4.09 09/05/2024 04:46 AM    HGB 13.1 09/05/2024 04:46 AM    HCT 39.2 09/05/2024 04:46 AM    MCV 95.8 09/05/2024 04:46 AM    RDW 13.3 09/05/2024 04:46 AM     09/05/2024 04:46 AM      Lab Results   Component Value Date    INR 0.96 09/01/2024    PROTIME 13.0 09/01/2024        Imaging:    MRI cervical spine 9/4/24:  IMPRESSION:  Severe multilevel spondylosis of the cervical spine most notably at C3-C4  with moderate to severe spinal canal narrowing and cord abutment without  abnormal cord signal.     Multilevel severe bilateral neural foraminal narrowings as detailed in the  findings.    Carotid duplex 9/4/24:  Interpretation Summary    Moderate (60-69%) stenosis in the mid right internal carotid artery.  Heterogeneous and irregular plaque in the right internal carotid artery.    Mild (<50%) stenosis in the left internal carotid artery.    Normal antegrade flow involving the right vertebral artery.    Normal antegrade flow involving the left vertebral artery.    MRI brain 9/3/24:  IMPRESSION:  Cerebral atrophy.  Moderate chronic small vessel ischemic changes.  No areas  of restricted diffusion to suggest an acute ischemic event.     Small lipoma of the tectal plate.     CTA head/neck 9/3/24:  IMPRESSION:  No CT evidence of an acute infarct.     No large vessel occlusion detected within the head or neck.     66% stenosis of the proximal right cervical ICA.     Carotid duplex 1/27/2015:  Conclusions      Summary      1. The right internal carotid artery appears to have a 1-15% diameter   reducing stenosis based on velocity criteria.   2. The left internal carotid artery appears to have a 1-15% diameter   reducing stenosis based on velocity criteria.   3. Normal antegrade flow noted in vertebral arteries bilaterally.    Scheduled Meds:    sodium chloride flush  5-40 mL IntraVENous 2 times per day    enoxaparin  40 mg SubCUTAneous Daily    gabapentin  100 mg Oral TID    levothyroxine  175 mcg Oral QAM AC    mirtazapine  7.5 mg Oral Nightly    pantoprazole  40 mg Oral BID AC    rosuvastatin  40 mg Oral Daily    tamsulosin  0.4 mg Oral Nightly    aspirin  81 mg Oral Daily    busPIRone  5 mg Oral TID

## 2024-09-05 NOTE — DISCHARGE SUMMARY
Hospital Medicine Discharge Summary    Name:  Anjum Zaragoza  Gender: male  : 1950  74 y.o.  MRN: [id number]    PCP: Trista Sutton MD     Date of Admission:  2024  9:04 PM  Discharge Date: 2024    Admitting Physician: Chaparro Sage MD  Discharge Physician: West Calderon MD    Communication to PCP  -***      Discharge Diagnoses:       Active Hospital Problems    Diagnosis     CAD (coronary artery disease) [I25.10]      Priority: Medium    Arterial ischemic stroke, ICA, right, acute (HCC) [I63.231]     Carotid artery stenosis with cerebral infarction (HCC) [I63.239]     COVID [U07.1]     Acute left-sided muscle weakness [M62.81]     TIA (transient ischemic attack) [G45.9]     Mood disorder (HCC) [F39]     Hyperlipidemia [E78.5]     HTN (hypertension), benign [I10]     Chronic obstructive pulmonary disease (HCC) [J44.9]     Essential hypertension [I10]     PAF (paroxysmal atrial fibrillation) ([facility]) [I48.0]        The patient was seen and examined on day of discharge and this discharge summary is in conjunction with any daily progress note from day of discharge.    Hospital Course:  Anjum Zaragoza is a 74 y.o. year old male who presented to [facility] on 2024  9:04 PM.      ***      On the last day of hospital stay, ***.  The patient expressed appropriate understanding of and agreement with the discharge recommendations, medications, and plan.      Physical Exam Performed:     BP (!) 109/96   Pulse 87   Temp (!) 96.7 °F (35.9 °C) (Temporal)   Resp 16   Ht 1.791 m (5' 10.5\")   Wt 73.2 kg (161 lb 6 oz)   SpO2 98%   BMI 22.85 kg/m²       General appearance:  No apparent distress, appears stated age and cooperative. ***  HEENT:  Normal cephalic, atraumatic without obvious deformity. Pupils equal, round, and reactive to light.  Extra ocular muscles intact. Conjunctivae/corneas clear.  Neck: Supple, with full range of motion. No jugular venous distention. Trachea  times daily.      clopidogrel (PLAVIX) 75 MG tablet Take 1 tablet by mouth once daily  Qty: 90 tablet, Refills: 0      oxyCODONE-acetaminophen (PERCOCET) 5-325 MG per tablet Take 1 tablet by mouth every 12 hours as needed for Pain (Back pain.) for up to 30 days. Max Daily Amount: 2 tablets  Qty: 30 tablet, Refills: 0    Comments: Reduce doses taken as pain becomes manageable  Associated Diagnoses: Bad odor of urine      busPIRone (BUSPAR) 5 MG tablet TAKE 1 TABLET BY MOUTH THREE TIMES DAILY  Qty: 90 tablet, Refills: 0    Associated Diagnoses: Encounter for medication monitoring      mirtazapine (REMERON) 7.5 MG tablet Take 1 tablet by mouth nightly  Qty: 90 tablet, Refills: 0    Associated Diagnoses: Anxiety      pantoprazole (PROTONIX) 40 MG tablet TAKE 1 TABLET BY MOUTH TWICE DAILY BEFORE MEAL(S)  Qty: 180 tablet, Refills: 0      metoprolol succinate (TOPROL XL) 50 MG extended release tablet Take 1 tablet by mouth in the morning and at bedtime  Qty: 60 tablet, Refills: 2      tamsulosin (FLOMAX) 0.4 MG capsule Take 1 capsule by mouth nightly      levothyroxine (SYNTHROID) 175 MCG tablet Take 1 tablet by mouth once daily  Qty: 30 tablet, Refills: 12      docusate sodium (COLACE) 100 MG capsule Take 1 capsule by mouth 2 times daily      rosuvastatin (CRESTOR) 40 MG tablet Take 1 tablet by mouth daily  Qty: 90 tablet, Refills: 3    Associated Diagnoses: Coronary artery disease involving native coronary artery of native heart without angina pectoris; Mixed hyperlipidemia      aspirin 81 MG EC tablet Take 1 tablet by mouth daily      nitroGLYCERIN (NITROSTAT) 0.4 MG SL tablet Place 1 tablet under the tongue every 5 minutes as needed for Chest pain  Qty: 25 tablet, Refills: 3             Total time spent on discharge was *** minutes in the examination, evaluation, counseling and review of medications and discharge plan.      Signed:    West Calderon MD   9/5/2024  5:49 PM

## 2024-09-05 NOTE — PROGRESS NOTES
Patient  voiced understanding of discharge instructions.Outpatient home care referral made by .Medications picked up from pharmacy.Patient discharged home with family.

## 2024-09-05 NOTE — PROGRESS NOTES
Anjum Zaragoza  Neurology Follow-up  [facility] Neurology    Date of Service: 9/5/2024    Subjective:   CC: Follow up today regarding: Acute left-sided paresthesia    Events noted. Chart and lab reviewed.  Patient seen this morning he is resting in bed.   We discussed MRI C-spine results which showed multilevel degenerative changes and moderate to severe spinal canal narrowing and cord abutment at C3-C4.  Also seen by vascular surgery for right ICA stenosis.  Carotid Doppler results are pending.He offers no new complaints.  Same left upper extremity paresthesia, neck and shoulder pain.  Review of systems unremarkable      ROS : A 10-12 system review obtained and updated today and is unremarkable except as mentioned  in my interval history.     Past medical history, social history, medication and family history reviewed.       Objective:  Exam:   Constitutional:   Vitals:    09/04/24 2300 09/04/24 2308 09/05/24 0510 09/05/24 0600   BP:   104/78    Pulse: 73 78 75    Resp:  16 16    Temp:   97.2 °F (36.2 °C)    TempSrc:       SpO2:   97%    Weight:    73.2 kg (161 lb 6 oz)   Height:         General appearance:  Normal development and appear in no acute distress.   Mental Status:   Oriented to person, place, problem, and time.    Memory: Good immediate recall.  Intact remote memory  Normal attention span and concentration.  Language: intact naming, repeating and fluency   Good fund of Knowledge.   Cranial Nerves:   II:   Pupils: equal, round, reactive to light  III,IV,VI: Extra Ocular Movements are intact. No nystagmus  V: Facial sensation is intact  VII: Facial strength and movements: intact and symmetric  XII: Tongue movements are normal  Musculoskeletal: 5/5 in all extremities except left upper extremity, 4/5 with Limited range of motion left upper extremity due to neck and shoulder pain.  Tone: Normal tone.   Reflexes: Symmetric 2+ in both arms and legs.  Coordination: no pronator drift, no dysmetria with  cord stenosis at multiple level  On examination: He is awake alert x 3  Fluent  No current abnormalities  The same mild left upper extremity weakness  2+ DTRs in the arms and 2 in the legs  Same numbness in the left upper extremity.    Impression:  Cervical spinal stenosis, moderate to severe  Recent COVID-19  History of A-fib  Hypertension  Hyperlipidemia    Plan:  Spinal cord precaution  Neurosurgery to review MRI of his C-spine  DVT and GI prophylaxis  COVID precautions  Droplet isolation  Aspirin and Plavix  Continue with gabapentin  the same dose  Neurochecks  Blood pressure monitor and diary  Nothing to add from neurology at this point  Please call for questions  Will follow PRN      Electronically signed by Tata Gutierrez MD on 9/5/24 at 2:18 PM EDT           This dictation was generated by voice recognition computer software. Although all attempts are made to edit the dictation for accuracy, there may be errors in the transcription that are not intended.

## 2024-09-05 NOTE — DISCHARGE INSTR - ACTIVITY
Resume activity as tolerated  Home health care will call you to determine home care needs   [facility]    Vincenzo Wyman, Tiffany, Daina, Delphine, Alex, Deandra      [phone]    CHIEF COMPLAINT: Patient is a 89y old  Male who presents with a chief complaint of weight loss (02 Jul 2022 06:18)      Follow Up: cardiogenic shock, resp failure    Interim history: Unable to provide a history on the basis of a poor mental status.  Events noted. increasing pressor req, remains on bipap    MEDICATIONS  (STANDING):  albuterol/ipratropium for Nebulization 3 milliLiter(s) Nebulizer every 6 hours  aMIOdarone    Tablet 200 milliGRAM(s) Oral <User Schedule>  apixaban 2.5 milliGRAM(s) Oral every 12 hours  cefepime   IVPB      cefepime   IVPB 2000 milliGRAM(s) IV Intermittent once  chlorhexidine 2% Cloths 1 Application(s) Topical <User Schedule>  dextrose 5%. 1000 milliLiter(s) (25 mL/Hr) IV Continuous <Continuous>  digoxin     Tablet 125 MICROGram(s) Oral daily  midodrine 15 milliGRAM(s) Oral every 8 hours  pantoprazole   Suspension 40 milliGRAM(s) Oral daily  phenylephrine    Infusion 0.1 MICROgram(s)/kG/Min (2.31 mL/Hr) IV Continuous <Continuous>  phenylephrine    Infusion 8 MICROgram(s)/kG/Min (86 mL/Hr) IV Continuous <Continuous>  pyridostigmine 30 milliGRAM(s) Oral every 8 hours  sodium chloride 3%  Inhalation 4 milliLiter(s) Inhalation every 6 hours  vancomycin  IVPB 1000 milliGRAM(s) IV Intermittent once    MEDICATIONS  (PRN):  acetaminophen     Tablet .. 650 milliGRAM(s) Oral every 6 hours PRN Temp greater or equal to 38C (100.4F), Mild Pain (1 - 3)      REVIEW OF SYSTEMS: unable to provide  Vital Signs Last 24 Hrs  T(C): 36.3 (02 Jul 2022 05:00), Max: 36.4 (01 Jul 2022 12:42)  T(F): 97.3 (02 Jul 2022 05:00), Max: 97.5 (01 Jul 2022 12:42)  HR: 100 (02 Jul 2022 08:35) (98 - 142)  BP: 109/68 (02 Jul 2022 07:00) (56/38 - 172/77)  BP(mean): 86 (02 Jul 2022 07:00) (43 - 111)  RR: 43 (02 Jul 2022 07:30) (29 - 75)  SpO2: 85% (02 Jul 2022 08:35) (75% - 98%)    I&O's Summary    01 Jul 2022 07:01  -  02 Jul 2022 07:00  --------------------------------------------------------  IN: 2498.7 mL / OUT: 275 mL / NET: 2223.7 mL        Telemetry past 24h: af controlled    PHYSICAL EXAM:    Constitutional: skeletal, NAD   HEENT:  bipap, sclerae anicteric, conjunctivae clear, no oral cyanosis.  Pulmonary: Non-labored, breath sounds are clear ant, No wheezing, rales or rhonchi  Cardiovascular: Regular, S1 and S2.  No murmur.  No rubs, gallops or clicks  Gastrointestinal: Bowel Sounds present, soft, nontender.   Lymph: No peripheral edema.   Neurological: unable to evaluate, poor mental status  Skin: No rashes.  Psych:  Mood & affect not evaluable    LABS: All Labs Reviewed:                        12.7   14.40 )-----------( 156      ( 02 Jul 2022 05:37 )             40.4                         11.8   10.04 )-----------( 158      ( 01 Jul 2022 06:35 )             36.9                         13.1   10.10 )-----------( 158      ( 30 Jun 2022 16:25 )             40.8     02 Jul 2022 05:37    138    |  103    |  80     ----------------------------<  99     4.5     |  19     |  2.90   01 Jul 2022 06:35    134    |  97     |  70     ----------------------------<  252    3.0     |  29     |  1.80   30 Jun 2022 16:25    137    |  103    |  65     ----------------------------<  102    3.7     |  24     |  2.10     Ca    8.7        02 Jul 2022 05:37  Ca    8.1        01 Jul 2022 06:35  Ca    8.6        30 Jun 2022 16:25  Phos  4.2       02 Jul 2022 05:37  Phos  3.3       01 Jul 2022 06:35  Phos  4.1       30 Jun 2022 16:25  Mg     2.3       02 Jul 2022 05:37  Mg     2.2       01 Jul 2022 06:35  Mg     2.2       30 Jun 2022 16:25    TPro  6.7    /  Alb  2.0    /  TBili  2.5    /  DBili  x      /  AST  125    /  ALT  62     /  AlkPhos  93     02 Jul 2022 05:37  TPro  6.6    /  Alb  2.1    /  TBili  2.0    /  DBili  x      /  AST  35     /  ALT  26     /  AlkPhos  94     01 Jul 2022 06:35  TPro  6.9    /  Alb  2.2    /  TBili  2.0    /  DBili  x      /  AST  34     /  ALT  27     /  AlkPhos  96     30 Jun 2022 16:25    PT/INR - ( 02 Jul 2022 05:37 )   PT: 35.0 sec;   INR: 2.96 ratio         PTT - ( 02 Jul 2022 05:37 )  PTT:35.5 sec      Blood Culture:         RADIOLOGY:    EKG:    Echo:

## 2024-09-05 NOTE — DISCHARGE INSTR - DIET
Good nutrition is important when healing from an illness, injury, or surgery.  Follow any nutrition recommendations given to you during your hospital stay.   If you were given an oral nutrition supplement while in the hospital, continue to take this supplement at home.  You can take it with meals, in-between meals, and/or before bedtime. These supplements can be purchased at most local grocery stores, pharmacies, and chain [employer]-stores.   If you have any questions about your diet or nutrition, call the hospital and ask for the dietitian.  Resume diet as tolerated

## 2024-09-05 NOTE — CARE COORDINATION
ERMIAS informed from RN that patient was discharging tonight and in need of outpatient OT therapy at the [facility].  SW faxed a copy of pt's facesheet and order to the [facility] informing to contact patient directly to schedule his therapy sessions.    Electronically signed by VANDANA De Jesus, DEBRA on 9/5/2024 at 6:35 PM

## 2024-09-05 NOTE — DISCHARGE INSTR - COC
{P DME ADLs:115238414}  Feeding  {P DME ADLs:039362734}  Med Admin  {P DME ADLs:842560426}  Med Delivery   { DARRYL MED Delivery:904041601}    Wound Care Documentation and Therapy:  Incision 10/13/20 Back Lower;Medial (Active)   Number of days: 1423        Elimination:  Continence:   Bowel: {YES / NO:}  Bladder: {YES / NO:}  Urinary Catheter: {Urinary Catheter:880777874}   Colostomy/Ileostomy/Ileal Conduit: {YES / NO:}       Date of Last BM: ***    Intake/Output Summary (Last 24 hours) at 2024 1714  Last data filed at 2024 1630  Gross per 24 hour   Intake 960 ml   Output --   Net 960 ml     I/O last 3 completed shifts:  In: 1560 [P.O.:1560]  Out: -     Safety Concerns:     { DARRYL Safety Concerns:916950824}    Impairments/Disabilities:      {[facility] Impairments/Disabilities:432779999}    Nutrition Therapy:  Current Nutrition Therapy:   { DARRYL Diet List:818160982}    Routes of Feeding: {[facility] DME Other Feedings:976452540}  Liquids: {Slp liquid thickness:74118}  Daily Fluid Restriction: {[facility] DME Yes amt example:129802944}  Last Modified Barium Swallow with Video (Video Swallowing Test): {Done Not Done Date:}    Treatments at the Time of Hospital Discharge:   Respiratory Treatments: ***  Oxygen Therapy:  {Therapy; copd oxygen:32259}  Ventilator:    {[facility] Vent List:527858019}    Rehab Therapies: {THERAPEUTIC INTERVENTION:8606853089}  Weight Bearing Status/Restrictions: {[facility] Weight Bearin}  Other Medical Equipment (for information only, NOT a DME order):  {EQUIPMENT:892125523}  Other Treatments: ***    Patient's personal belongings (please select all that are sent with patient):  {[facility] DME Belongings:267612801}    RN SIGNATURE:  {Esignature:499952732}    CASE MANAGEMENT/SOCIAL WORK SECTION    Inpatient Status Date: ***    Readmission Risk Assessment Score:  Readmission Risk              Risk of Unplanned Readmission:  16           Discharging to Facility/ Agency   Name:    Address:  Phone:  Fax:    Dialysis Facility (if applicable)   Name:  Address:  Dialysis Schedule:  Phone:  Fax:    / signature: {Esignature:442779675}    PHYSICIAN SECTION    Prognosis: {Prognosis:0958310330}    Condition at Discharge: { Patient Condition:748037321}    Rehab Potential (if transferring to Rehab): {Prognosis:4382929300}    Recommended Labs or Other Treatments After Discharge: ***    Physician Certification: I certify the above information and transfer of Anjum Zaragoza  is necessary for the continuing treatment of the diagnosis listed and that he requires {Admit to Appropriate Level of Care:96494} for {GREATER/LESS:732350287} 30 days.     Update Admission H&P: {[facility] DME Changes in HandP:365291103}    PHYSICIAN SIGNATURE:  {Esignature:278576851}

## 2024-09-06 ENCOUNTER — CARE COORDINATION (OUTPATIENT)
Dept: CASE MANAGEMENT | Age: 74
End: 2024-09-06

## 2024-09-06 ENCOUNTER — TELEPHONE (OUTPATIENT)
Dept: CARDIOLOGY CLINIC | Age: 74
End: 2024-09-06

## 2024-09-06 DIAGNOSIS — U07.1 COVID: Primary | ICD-10-CM

## 2024-09-06 LAB
ECHO BSA: 1.9 M2
VAS LEFT CCA DIST EDV: 34.6 CM/S
VAS LEFT CCA DIST PSV: 88.9 CM/S
VAS LEFT CCA MID EDV: 31.3 CM/S
VAS LEFT CCA MID PSV: 90.5 CM/S
VAS LEFT CCA PROX EDV: 17.2 CM/S
VAS LEFT CCA PROX PSV: 50.1 CM/S
VAS LEFT ECA EDV: 18 CM/S
VAS LEFT ECA PSV: 65.4 CM/S
VAS LEFT ICA DIST EDV: 28.2 CM/S
VAS LEFT ICA DIST PSV: 79.9 CM/S
VAS LEFT ICA MID EDV: 60.4 CM/S
VAS LEFT ICA MID PSV: 128 CM/S
VAS LEFT ICA PROX EDV: 28.1 CM/S
VAS LEFT ICA PROX PSV: 55.8 CM/S
VAS LEFT SUBCLAVIAN PROX PSV: 69.7 CM/S
VAS LEFT VERTEBRAL EDV: 16.3 CM/S
VAS LEFT VERTEBRAL PSV: 35.7 CM/S
VAS RIGHT CCA DIST EDV: 18 CM/S
VAS RIGHT CCA DIST PSV: 63.5 CM/S
VAS RIGHT CCA MID EDV: 22.8 CM/S
VAS RIGHT CCA MID PSV: 65.9 CM/S
VAS RIGHT CCA PROX EDV: 14.5 CM/S
VAS RIGHT CCA PROX PSV: 54.9 CM/S
VAS RIGHT ECA EDV: 15.7 CM/S
VAS RIGHT ECA PSV: 63.5 CM/S
VAS RIGHT ICA DIST EDV: 37.6 CM/S
VAS RIGHT ICA DIST PSV: 91.2 CM/S
VAS RIGHT ICA MID EDV: 79.3 CM/S
VAS RIGHT ICA MID PSV: 182 CM/S
VAS RIGHT ICA PROX EDV: 25.1 CM/S
VAS RIGHT ICA PROX PSV: 48.2 CM/S
VAS RIGHT SUBCLAVIAN PROX PSV: 46.6 CM/S
VAS RIGHT VERTEBRAL EDV: 10.6 CM/S
VAS RIGHT VERTEBRAL PSV: 26.4 CM/S

## 2024-09-06 PROCEDURE — 93880 EXTRACRANIAL BILAT STUDY: CPT | Performed by: INTERNAL MEDICINE

## 2024-09-06 PROCEDURE — 1111F DSCHRG MED/CURRENT MED MERGE: CPT | Performed by: FAMILY MEDICINE

## 2024-09-06 NOTE — CARE COORDINATION
Care Transitions Note    Initial Call - Call within 2 business days of discharge: Yes    Patient Current Location:  Home: [address]  [place] [address]    Care Transition Nurse contacted the patient by telephone to perform post hospital discharge assessment, verified name and  as identifiers. Provided introduction to self, and explanation of the Care Transition Nurse role.     Patient: Anjum Zaragoza    Patient : 1950   MRN: [id number]    Reason for Admission:   Discharge Date: 24  RURS: Readmission Risk Score: 9.1      Last Discharge Facility       Date Complaint Diagnosis Description Type Department Provider    24 Extremity Weakness Acute left-sided muscle weakness ... ED to Hosp-Admission (Discharged) (ADMITTED) [facility] [place] MD; Carlos John...            Was this an external facility discharge? No    Additional needs identified to be addressed with provider   No needs identified             Method of communication with provider: none.    Patients top risk factors for readmission: medical condition-COVID, left arm weakness, TIA, PAF    Interventions to address risk factors:   Review of patient management of conditions/medications: as above    Care Summary Note: Pt states doing pretty good, left arm is still weak. Denies lightheadedness. This nurse assisted in scheduling a hospital f/u appt.  Agreed to more CTC f/u calls.      Care Transition Nurse reviewed discharge instructions with patient. The patient was given an opportunity to ask questions; all questions answered at this time.. The patient verbalized understanding.   Were discharge instructions available to patient? Yes.   Reviewed appropriate site of care based on symptoms and resources available to patient including: PCP  Specialist  When to call 911. The patient agrees to contact the primary care provider and/or specialist office for questions related to their healthcare.      Advance Care Planning:   Does

## 2024-09-09 ENCOUNTER — OFFICE VISIT (OUTPATIENT)
Dept: FAMILY MEDICINE CLINIC | Age: 74
End: 2024-09-09

## 2024-09-09 VITALS
OXYGEN SATURATION: 96 % | WEIGHT: 162 LBS | HEIGHT: 71 IN | DIASTOLIC BLOOD PRESSURE: 84 MMHG | SYSTOLIC BLOOD PRESSURE: 112 MMHG | BODY MASS INDEX: 22.68 KG/M2 | HEART RATE: 76 BPM

## 2024-09-09 DIAGNOSIS — M48.02 CERVICAL STENOSIS OF SPINE: ICD-10-CM

## 2024-09-09 DIAGNOSIS — Z09 HOSPITAL DISCHARGE FOLLOW-UP: Primary | ICD-10-CM

## 2024-09-09 DIAGNOSIS — I65.21 STENOSIS OF RIGHT CAROTID ARTERY: ICD-10-CM

## 2024-09-09 DIAGNOSIS — R97.20 ELEVATED PSA: ICD-10-CM

## 2024-09-09 RX ORDER — LIDOCAINE 50 MG/G
1 PATCH TOPICAL DAILY
Qty: 90 PATCH | Refills: 0 | Status: SHIPPED | OUTPATIENT
Start: 2024-09-09 | End: 2024-12-08

## 2024-09-10 ENCOUNTER — TELEPHONE (OUTPATIENT)
Dept: FAMILY MEDICINE CLINIC | Age: 74
End: 2024-09-10

## 2024-09-13 ENCOUNTER — CARE COORDINATION (OUTPATIENT)
Dept: CASE MANAGEMENT | Age: 74
End: 2024-09-13

## 2024-09-20 ENCOUNTER — CARE COORDINATION (OUTPATIENT)
Dept: CASE MANAGEMENT | Age: 74
End: 2024-09-20

## 2024-09-20 DIAGNOSIS — Z51.81 ENCOUNTER FOR MEDICATION MONITORING: ICD-10-CM

## 2024-09-23 RX ORDER — BUSPIRONE HYDROCHLORIDE 5 MG/1
5 TABLET ORAL 3 TIMES DAILY
Qty: 90 TABLET | Refills: 0 | Status: SHIPPED | OUTPATIENT
Start: 2024-09-23

## 2024-09-24 ENCOUNTER — APPOINTMENT (OUTPATIENT)
Dept: CT IMAGING | Age: 74
End: 2024-09-24
Payer: MEDICARE

## 2024-09-24 ENCOUNTER — HOSPITAL ENCOUNTER (OUTPATIENT)
Age: 74
Setting detail: OBSERVATION
Discharge: HOME | End: 2024-09-26
Attending: INTERNAL MEDICINE | Admitting: INTERNAL MEDICINE
Payer: MEDICARE

## 2024-09-24 ENCOUNTER — TELEPHONE (OUTPATIENT)
Dept: FAMILY MEDICINE CLINIC | Age: 74
End: 2024-09-24

## 2024-09-24 DIAGNOSIS — K62.5 RECTAL BLEEDING: Primary | ICD-10-CM

## 2024-09-24 LAB
ANION GAP SERPL CALCULATED.3IONS-SCNC: 10 MMOL/L (ref 3–16)
APTT BLD: 27.2 SEC (ref 22.1–36.4)
BASOPHILS # BLD: 0.1 K/UL (ref 0–0.2)
BASOPHILS NFR BLD: 1.2 %
BUN SERPL-MCNC: 9 MG/DL (ref 7–20)
CALCIUM SERPL-MCNC: 9.2 MG/DL (ref 8.3–10.6)
CHLORIDE SERPL-SCNC: 103 MMOL/L (ref 99–110)
CO2 SERPL-SCNC: 26 MMOL/L (ref 21–32)
CREAT SERPL-MCNC: <0.5 MG/DL (ref 0.8–1.3)
DEPRECATED RDW RBC AUTO: 14.1 % (ref 12.4–15.4)
EOSINOPHIL # BLD: 0.1 K/UL (ref 0–0.6)
EOSINOPHIL NFR BLD: 2.5 %
GFR SERPLBLD CREATININE-BSD FMLA CKD-EPI: >90 ML/MIN/{1.73_M2}
GLUCOSE SERPL-MCNC: 116 MG/DL (ref 70–99)
HCT VFR BLD AUTO: 41 % (ref 40.5–52.5)
HEMOCCULT STL QL: ABNORMAL
HGB BLD-MCNC: 14.2 G/DL (ref 13.5–17.5)
INR PPP: 1 (ref 0.85–1.15)
LYMPHOCYTES # BLD: 1.5 K/UL (ref 1–5.1)
LYMPHOCYTES NFR BLD: 26.6 %
MCH RBC QN AUTO: 33 PG (ref 26–34)
MCHC RBC AUTO-ENTMCNC: 34.6 G/DL (ref 31–36)
MCV RBC AUTO: 95.2 FL (ref 80–100)
MONOCYTES # BLD: 0.6 K/UL (ref 0–1.3)
MONOCYTES NFR BLD: 10.6 %
NEUTROPHILS # BLD: 3.2 K/UL (ref 1.7–7.7)
NEUTROPHILS NFR BLD: 59.1 %
PLATELET # BLD AUTO: 222 K/UL (ref 135–450)
PMV BLD AUTO: 7.4 FL (ref 5–10.5)
POTASSIUM SERPL-SCNC: 5 MMOL/L (ref 3.5–5.1)
PROTHROMBIN TIME: 13.4 SEC (ref 11.9–14.9)
RBC # BLD AUTO: 4.31 M/UL (ref 4.2–5.9)
SODIUM SERPL-SCNC: 139 MMOL/L (ref 136–145)
WBC # BLD AUTO: 5.5 K/UL (ref 4–11)

## 2024-09-24 PROCEDURE — 6360000004 HC RX CONTRAST MEDICATION: Performed by: INTERNAL MEDICINE

## 2024-09-24 PROCEDURE — 93005 ELECTROCARDIOGRAM TRACING: CPT | Performed by: INTERNAL MEDICINE

## 2024-09-24 PROCEDURE — 99285 EMERGENCY DEPT VISIT HI MDM: CPT

## 2024-09-24 PROCEDURE — G0378 HOSPITAL OBSERVATION PER HR: HCPCS

## 2024-09-24 PROCEDURE — 80048 BASIC METABOLIC PNL TOTAL CA: CPT

## 2024-09-24 PROCEDURE — 74174 CTA ABD&PLVS W/CONTRAST: CPT

## 2024-09-24 PROCEDURE — 1200000000 HC SEMI PRIVATE

## 2024-09-24 PROCEDURE — 82270 OCCULT BLOOD FECES: CPT

## 2024-09-24 PROCEDURE — 96361 HYDRATE IV INFUSION ADD-ON: CPT

## 2024-09-24 PROCEDURE — 2580000003 HC RX 258: Performed by: INTERNAL MEDICINE

## 2024-09-24 PROCEDURE — 85730 THROMBOPLASTIN TIME PARTIAL: CPT

## 2024-09-24 PROCEDURE — 85025 COMPLETE CBC W/AUTO DIFF WBC: CPT

## 2024-09-24 PROCEDURE — 96360 HYDRATION IV INFUSION INIT: CPT

## 2024-09-24 PROCEDURE — 85610 PROTHROMBIN TIME: CPT

## 2024-09-24 RX ORDER — SODIUM CHLORIDE 0.9 % (FLUSH) 0.9 %
5-40 SYRINGE (ML) INJECTION EVERY 12 HOURS SCHEDULED
Status: DISCONTINUED | OUTPATIENT
Start: 2024-09-24 | End: 2024-09-26 | Stop reason: HOSPADM

## 2024-09-24 RX ORDER — 0.9 % SODIUM CHLORIDE 0.9 %
500 INTRAVENOUS SOLUTION INTRAVENOUS ONCE
Status: COMPLETED | OUTPATIENT
Start: 2024-09-24 | End: 2024-09-24

## 2024-09-24 RX ORDER — PEG-3350, SODIUM SULFATE, SODIUM CHLORIDE, POTASSIUM CHLORIDE, SODIUM ASCORBATE AND ASCORBIC ACID 7.5-2.691G
100 KIT ORAL ONCE
Status: COMPLETED | OUTPATIENT
Start: 2024-09-24 | End: 2024-09-25

## 2024-09-24 RX ORDER — SODIUM CHLORIDE 0.9 % (FLUSH) 0.9 %
5-40 SYRINGE (ML) INJECTION PRN
Status: DISCONTINUED | OUTPATIENT
Start: 2024-09-24 | End: 2024-09-26 | Stop reason: HOSPADM

## 2024-09-24 RX ORDER — PEG-3350, SODIUM SULFATE, SODIUM CHLORIDE, POTASSIUM CHLORIDE, SODIUM ASCORBATE AND ASCORBIC ACID 7.5-2.691G
100 KIT ORAL ONCE
Status: COMPLETED | OUTPATIENT
Start: 2024-09-23 | End: 2024-09-25

## 2024-09-24 RX ORDER — IOPAMIDOL 755 MG/ML
75 INJECTION, SOLUTION INTRAVASCULAR
Status: COMPLETED | OUTPATIENT
Start: 2024-09-24 | End: 2024-09-24

## 2024-09-24 RX ADMIN — SODIUM CHLORIDE 500 ML: 9 INJECTION, SOLUTION INTRAVENOUS at 18:02

## 2024-09-24 RX ADMIN — IOPAMIDOL 75 ML: 755 INJECTION, SOLUTION INTRAVENOUS at 18:46

## 2024-09-24 RX ADMIN — SODIUM CHLORIDE, PRESERVATIVE FREE 10 ML: 5 INJECTION INTRAVENOUS at 23:59

## 2024-09-25 ENCOUNTER — ANESTHESIA EVENT (OUTPATIENT)
Dept: ENDOSCOPY | Age: 74
End: 2024-09-25
Payer: MEDICARE

## 2024-09-25 ENCOUNTER — ANESTHESIA (OUTPATIENT)
Dept: ENDOSCOPY | Age: 74
End: 2024-09-25
Payer: MEDICARE

## 2024-09-25 LAB
ANION GAP SERPL CALCULATED.3IONS-SCNC: 7 MMOL/L (ref 3–16)
APTT BLD: 26.8 SEC (ref 22.1–36.4)
BUN SERPL-MCNC: 7 MG/DL (ref 7–20)
CALCIUM SERPL-MCNC: 8.7 MG/DL (ref 8.3–10.6)
CHLORIDE SERPL-SCNC: 111 MMOL/L (ref 99–110)
CO2 SERPL-SCNC: 25 MMOL/L (ref 21–32)
CREAT SERPL-MCNC: <0.5 MG/DL (ref 0.8–1.3)
EKG DIAGNOSIS: NORMAL
EKG Q-T INTERVAL: 338 MS
EKG QRS DURATION: 78 MS
EKG QTC CALCULATION (BAZETT): 477 MS
EKG R AXIS: -15 DEGREES
EKG T AXIS: 33 DEGREES
EKG VENTRICULAR RATE: 120 BPM
GFR SERPLBLD CREATININE-BSD FMLA CKD-EPI: >90 ML/MIN/{1.73_M2}
GLUCOSE SERPL-MCNC: 103 MG/DL (ref 70–99)
HCT VFR BLD AUTO: 37.7 % (ref 40.5–52.5)
HCT VFR BLD AUTO: 37.7 % (ref 40.5–52.5)
HCT VFR BLD AUTO: 38.1 % (ref 40.5–52.5)
HGB BLD-MCNC: 12.8 G/DL (ref 13.5–17.5)
HGB BLD-MCNC: 12.8 G/DL (ref 13.5–17.5)
HGB BLD-MCNC: 13 G/DL (ref 13.5–17.5)
INR PPP: 1.08 (ref 0.85–1.15)
IRON SATN MFR SERPL: 33 % (ref 20–50)
IRON SERPL-MCNC: 112 UG/DL (ref 59–158)
POTASSIUM SERPL-SCNC: 4.6 MMOL/L (ref 3.5–5.1)
PROTHROMBIN TIME: 14.2 SEC (ref 11.9–14.9)
SODIUM SERPL-SCNC: 143 MMOL/L (ref 136–145)
TIBC SERPL-MCNC: 335 UG/DL (ref 260–445)

## 2024-09-25 PROCEDURE — 6370000000 HC RX 637 (ALT 250 FOR IP): Performed by: INTERNAL MEDICINE

## 2024-09-25 PROCEDURE — 6370000000 HC RX 637 (ALT 250 FOR IP): Performed by: STUDENT IN AN ORGANIZED HEALTH CARE EDUCATION/TRAINING PROGRAM

## 2024-09-25 PROCEDURE — 88305 TISSUE EXAM BY PATHOLOGIST: CPT

## 2024-09-25 PROCEDURE — 2709999900 HC NON-CHARGEABLE SUPPLY: Performed by: INTERNAL MEDICINE

## 2024-09-25 PROCEDURE — 85018 HEMOGLOBIN: CPT

## 2024-09-25 PROCEDURE — 7100000001 HC PACU RECOVERY - ADDTL 15 MIN: Performed by: INTERNAL MEDICINE

## 2024-09-25 PROCEDURE — 85730 THROMBOPLASTIN TIME PARTIAL: CPT

## 2024-09-25 PROCEDURE — 85610 PROTHROMBIN TIME: CPT

## 2024-09-25 PROCEDURE — 93010 ELECTROCARDIOGRAM REPORT: CPT | Performed by: INTERNAL MEDICINE

## 2024-09-25 PROCEDURE — G0378 HOSPITAL OBSERVATION PER HR: HCPCS

## 2024-09-25 PROCEDURE — 3700000000 HC ANESTHESIA ATTENDED CARE: Performed by: INTERNAL MEDICINE

## 2024-09-25 PROCEDURE — 85014 HEMATOCRIT: CPT

## 2024-09-25 PROCEDURE — 3700000001 HC ADD 15 MINUTES (ANESTHESIA): Performed by: INTERNAL MEDICINE

## 2024-09-25 PROCEDURE — 83550 IRON BINDING TEST: CPT

## 2024-09-25 PROCEDURE — 6370000000 HC RX 637 (ALT 250 FOR IP): Performed by: NURSE PRACTITIONER

## 2024-09-25 PROCEDURE — 2500000003 HC RX 250 WO HCPCS: Performed by: NURSE ANESTHETIST, CERTIFIED REGISTERED

## 2024-09-25 PROCEDURE — 80048 BASIC METABOLIC PNL TOTAL CA: CPT

## 2024-09-25 PROCEDURE — 88342 IMHCHEM/IMCYTCHM 1ST ANTB: CPT

## 2024-09-25 PROCEDURE — 2580000003 HC RX 258: Performed by: STUDENT IN AN ORGANIZED HEALTH CARE EDUCATION/TRAINING PROGRAM

## 2024-09-25 PROCEDURE — 83540 ASSAY OF IRON: CPT

## 2024-09-25 PROCEDURE — 94760 N-INVAS EAR/PLS OXIMETRY 1: CPT

## 2024-09-25 PROCEDURE — 3609012400 HC EGD TRANSORAL BIOPSY SINGLE/MULTIPLE: Performed by: INTERNAL MEDICINE

## 2024-09-25 PROCEDURE — 7100000000 HC PACU RECOVERY - FIRST 15 MIN: Performed by: INTERNAL MEDICINE

## 2024-09-25 PROCEDURE — 6360000002 HC RX W HCPCS: Performed by: NURSE ANESTHETIST, CERTIFIED REGISTERED

## 2024-09-25 PROCEDURE — 36415 COLL VENOUS BLD VENIPUNCTURE: CPT

## 2024-09-25 PROCEDURE — 1200000000 HC SEMI PRIVATE

## 2024-09-25 PROCEDURE — 3609027000 HC COLONOSCOPY: Performed by: INTERNAL MEDICINE

## 2024-09-25 RX ORDER — OXYCODONE AND ACETAMINOPHEN 5; 325 MG/1; MG/1
1 TABLET ORAL
Status: COMPLETED | OUTPATIENT
Start: 2024-09-25 | End: 2024-09-25

## 2024-09-25 RX ORDER — TAMSULOSIN HYDROCHLORIDE 0.4 MG/1
0.4 CAPSULE ORAL NIGHTLY
Status: DISCONTINUED | OUTPATIENT
Start: 2024-09-25 | End: 2024-09-26 | Stop reason: HOSPADM

## 2024-09-25 RX ORDER — BUSPIRONE HYDROCHLORIDE 5 MG/1
5 TABLET ORAL 3 TIMES DAILY
Status: DISCONTINUED | OUTPATIENT
Start: 2024-09-25 | End: 2024-09-26 | Stop reason: HOSPADM

## 2024-09-25 RX ORDER — SODIUM CHLORIDE 0.9 % (FLUSH) 0.9 %
5-40 SYRINGE (ML) INJECTION EVERY 12 HOURS SCHEDULED
Status: DISCONTINUED | OUTPATIENT
Start: 2024-09-25 | End: 2024-09-26 | Stop reason: HOSPADM

## 2024-09-25 RX ORDER — GLYCOPYRROLATE 0.2 MG/ML
INJECTION INTRAMUSCULAR; INTRAVENOUS
Status: DISCONTINUED | OUTPATIENT
Start: 2024-09-25 | End: 2024-09-25 | Stop reason: SDUPTHER

## 2024-09-25 RX ORDER — ONDANSETRON 2 MG/ML
4 INJECTION INTRAMUSCULAR; INTRAVENOUS EVERY 6 HOURS PRN
Status: DISCONTINUED | OUTPATIENT
Start: 2024-09-25 | End: 2024-09-26 | Stop reason: HOSPADM

## 2024-09-25 RX ORDER — ACETAMINOPHEN 650 MG/1
650 SUPPOSITORY RECTAL EVERY 6 HOURS PRN
Status: DISCONTINUED | OUTPATIENT
Start: 2024-09-25 | End: 2024-09-26 | Stop reason: HOSPADM

## 2024-09-25 RX ORDER — SODIUM CHLORIDE 9 MG/ML
INJECTION, SOLUTION INTRAVENOUS PRN
Status: DISCONTINUED | OUTPATIENT
Start: 2024-09-25 | End: 2024-09-26 | Stop reason: HOSPADM

## 2024-09-25 RX ORDER — POLYETHYLENE GLYCOL 3350 17 G/17G
17 POWDER, FOR SOLUTION ORAL DAILY
Status: DISCONTINUED | OUTPATIENT
Start: 2024-09-26 | End: 2024-09-26 | Stop reason: HOSPADM

## 2024-09-25 RX ORDER — SODIUM CHLORIDE 9 MG/ML
25 INJECTION, SOLUTION INTRAVENOUS PRN
Status: DISCONTINUED | OUTPATIENT
Start: 2024-09-25 | End: 2024-09-26 | Stop reason: HOSPADM

## 2024-09-25 RX ORDER — SODIUM CHLORIDE 0.9 % (FLUSH) 0.9 %
5-40 SYRINGE (ML) INJECTION PRN
Status: DISCONTINUED | OUTPATIENT
Start: 2024-09-25 | End: 2024-09-26 | Stop reason: HOSPADM

## 2024-09-25 RX ORDER — ASPIRIN 81 MG/1
81 TABLET ORAL DAILY
Status: DISCONTINUED | OUTPATIENT
Start: 2024-09-25 | End: 2024-09-26 | Stop reason: HOSPADM

## 2024-09-25 RX ORDER — LIDOCAINE HYDROCHLORIDE 20 MG/ML
INJECTION, SOLUTION EPIDURAL; INFILTRATION; INTRACAUDAL; PERINEURAL
Status: DISCONTINUED | OUTPATIENT
Start: 2024-09-25 | End: 2024-09-25 | Stop reason: SDUPTHER

## 2024-09-25 RX ORDER — ONDANSETRON 4 MG/1
4 TABLET, ORALLY DISINTEGRATING ORAL EVERY 8 HOURS PRN
Status: DISCONTINUED | OUTPATIENT
Start: 2024-09-25 | End: 2024-09-26 | Stop reason: HOSPADM

## 2024-09-25 RX ORDER — ROSUVASTATIN CALCIUM 40 MG/1
40 TABLET, COATED ORAL DAILY
Status: DISCONTINUED | OUTPATIENT
Start: 2024-09-25 | End: 2024-09-26 | Stop reason: HOSPADM

## 2024-09-25 RX ORDER — METOPROLOL SUCCINATE 25 MG/1
25 TABLET, EXTENDED RELEASE ORAL 2 TIMES DAILY
Status: DISCONTINUED | OUTPATIENT
Start: 2024-09-25 | End: 2024-09-26

## 2024-09-25 RX ORDER — PROPOFOL 10 MG/ML
INJECTION, EMULSION INTRAVENOUS
Status: DISCONTINUED | OUTPATIENT
Start: 2024-09-25 | End: 2024-09-25 | Stop reason: SDUPTHER

## 2024-09-25 RX ORDER — MIRTAZAPINE 15 MG/1
7.5 TABLET, FILM COATED ORAL NIGHTLY
Status: DISCONTINUED | OUTPATIENT
Start: 2024-09-25 | End: 2024-09-26 | Stop reason: HOSPADM

## 2024-09-25 RX ORDER — CLOPIDOGREL BISULFATE 75 MG/1
75 TABLET ORAL DAILY
Status: DISCONTINUED | OUTPATIENT
Start: 2024-09-25 | End: 2024-09-26 | Stop reason: HOSPADM

## 2024-09-25 RX ORDER — GABAPENTIN 100 MG/1
100 CAPSULE ORAL 3 TIMES DAILY
Status: DISCONTINUED | OUTPATIENT
Start: 2024-09-25 | End: 2024-09-26 | Stop reason: HOSPADM

## 2024-09-25 RX ORDER — SODIUM CHLORIDE 9 MG/ML
INJECTION, SOLUTION INTRAVENOUS CONTINUOUS
Status: ACTIVE | OUTPATIENT
Start: 2024-09-25 | End: 2024-09-25

## 2024-09-25 RX ORDER — PANTOPRAZOLE SODIUM 40 MG/1
40 TABLET, DELAYED RELEASE ORAL
Status: DISCONTINUED | OUTPATIENT
Start: 2024-09-25 | End: 2024-09-26 | Stop reason: HOSPADM

## 2024-09-25 RX ORDER — OXYCODONE AND ACETAMINOPHEN 5; 325 MG/1; MG/1
1 TABLET ORAL 2 TIMES DAILY PRN
Status: DISCONTINUED | OUTPATIENT
Start: 2024-09-25 | End: 2024-09-26 | Stop reason: HOSPADM

## 2024-09-25 RX ORDER — ACETAMINOPHEN 325 MG/1
650 TABLET ORAL EVERY 6 HOURS PRN
Status: DISCONTINUED | OUTPATIENT
Start: 2024-09-25 | End: 2024-09-26 | Stop reason: HOSPADM

## 2024-09-25 RX ADMIN — PEG-3350, SODIUM SULFATE, SODIUM CHLORIDE, POTASSIUM CHLORIDE, SODIUM ASCORBATE AND ASCORBIC ACID 100 G: KIT at 00:00

## 2024-09-25 RX ADMIN — PROPOFOL 80 MG: 10 INJECTION, EMULSION INTRAVENOUS at 12:51

## 2024-09-25 RX ADMIN — PANTOPRAZOLE SODIUM 40 MG: 40 TABLET, DELAYED RELEASE ORAL at 15:05

## 2024-09-25 RX ADMIN — GABAPENTIN 100 MG: 100 CAPSULE ORAL at 14:57

## 2024-09-25 RX ADMIN — OXYCODONE HYDROCHLORIDE AND ACETAMINOPHEN 1 TABLET: 5; 325 TABLET ORAL at 14:55

## 2024-09-25 RX ADMIN — PEG-3350, SODIUM SULFATE, SODIUM CHLORIDE, POTASSIUM CHLORIDE, SODIUM ASCORBATE AND ASCORBIC ACID 100 G: KIT at 00:58

## 2024-09-25 RX ADMIN — ASPIRIN 81 MG: 81 TABLET, COATED ORAL at 14:57

## 2024-09-25 RX ADMIN — SODIUM CHLORIDE: 9 INJECTION, SOLUTION INTRAVENOUS at 00:47

## 2024-09-25 RX ADMIN — CLOPIDOGREL BISULFATE 75 MG: 75 TABLET ORAL at 14:57

## 2024-09-25 RX ADMIN — SODIUM CHLORIDE, PRESERVATIVE FREE 10 ML: 5 INJECTION INTRAVENOUS at 20:40

## 2024-09-25 RX ADMIN — PROPOFOL 150 MCG/KG/MIN: 10 INJECTION, EMULSION INTRAVENOUS at 12:55

## 2024-09-25 RX ADMIN — GABAPENTIN 100 MG: 100 CAPSULE ORAL at 20:40

## 2024-09-25 RX ADMIN — BUSPIRONE HYDROCHLORIDE 5 MG: 5 TABLET ORAL at 20:40

## 2024-09-25 RX ADMIN — LIDOCAINE HYDROCHLORIDE 60 MG: 20 INJECTION, SOLUTION EPIDURAL; INFILTRATION; INTRACAUDAL; PERINEURAL at 12:53

## 2024-09-25 RX ADMIN — BUSPIRONE HYDROCHLORIDE 5 MG: 5 TABLET ORAL at 14:57

## 2024-09-25 RX ADMIN — GLYCOPYRROLATE 0.2 MG: 0.2 INJECTION, SOLUTION INTRAMUSCULAR; INTRAVENOUS at 12:51

## 2024-09-25 RX ADMIN — MIRTAZAPINE 7.5 MG: 15 TABLET, FILM COATED ORAL at 20:39

## 2024-09-25 RX ADMIN — SODIUM CHLORIDE, PRESERVATIVE FREE 10 ML: 5 INJECTION INTRAVENOUS at 14:57

## 2024-09-25 RX ADMIN — METOPROLOL SUCCINATE 25 MG: 25 TABLET, EXTENDED RELEASE ORAL at 20:40

## 2024-09-25 RX ADMIN — METOPROLOL SUCCINATE 25 MG: 25 TABLET, EXTENDED RELEASE ORAL at 14:55

## 2024-09-25 RX ADMIN — OXYCODONE HYDROCHLORIDE AND ACETAMINOPHEN 1 TABLET: 5; 325 TABLET ORAL at 00:46

## 2024-09-25 RX ADMIN — TAMSULOSIN HYDROCHLORIDE 0.4 MG: 0.4 CAPSULE ORAL at 20:40

## 2024-09-25 RX ADMIN — METOPROLOL SUCCINATE 25 MG: 25 TABLET, EXTENDED RELEASE ORAL at 00:46

## 2024-09-25 ASSESSMENT — PAIN SCALES - WONG BAKER
WONGBAKER_NUMERICALRESPONSE: NO HURT

## 2024-09-25 ASSESSMENT — PAIN DESCRIPTION - LOCATION
LOCATION: BACK

## 2024-09-25 ASSESSMENT — PAIN DESCRIPTION - ORIENTATION
ORIENTATION: MID
ORIENTATION: LOWER

## 2024-09-25 ASSESSMENT — PAIN SCALES - GENERAL
PAINLEVEL_OUTOF10: 0
PAINLEVEL_OUTOF10: 0
PAINLEVEL_OUTOF10: 5
PAINLEVEL_OUTOF10: 5
PAINLEVEL_OUTOF10: 6
PAINLEVEL_OUTOF10: 5
PAINLEVEL_OUTOF10: 0

## 2024-09-25 ASSESSMENT — PAIN DESCRIPTION - DESCRIPTORS
DESCRIPTORS: ACHING
DESCRIPTORS: ACHING;DISCOMFORT

## 2024-09-25 ASSESSMENT — PAIN - FUNCTIONAL ASSESSMENT
PAIN_FUNCTIONAL_ASSESSMENT: 0-10
PAIN_FUNCTIONAL_ASSESSMENT: ACTIVITIES ARE NOT PREVENTED

## 2024-09-25 ASSESSMENT — PAIN DESCRIPTION - PAIN TYPE: TYPE: CHRONIC PAIN

## 2024-09-26 ENCOUNTER — TELEPHONE (OUTPATIENT)
Dept: CARDIOLOGY CLINIC | Age: 74
End: 2024-09-26

## 2024-09-26 VITALS
SYSTOLIC BLOOD PRESSURE: 82 MMHG | RESPIRATION RATE: 16 BRPM | TEMPERATURE: 97.4 F | DIASTOLIC BLOOD PRESSURE: 64 MMHG | OXYGEN SATURATION: 95 % | BODY MASS INDEX: 22.69 KG/M2 | HEIGHT: 71 IN | HEART RATE: 71 BPM | WEIGHT: 162.04 LBS

## 2024-09-26 LAB
ANION GAP SERPL CALCULATED.3IONS-SCNC: 11 MMOL/L (ref 3–16)
BUN SERPL-MCNC: 9 MG/DL (ref 7–20)
CALCIUM SERPL-MCNC: 8.6 MG/DL (ref 8.3–10.6)
CHLORIDE SERPL-SCNC: 106 MMOL/L (ref 99–110)
CO2 SERPL-SCNC: 21 MMOL/L (ref 21–32)
CREAT SERPL-MCNC: <0.5 MG/DL (ref 0.8–1.3)
GFR SERPLBLD CREATININE-BSD FMLA CKD-EPI: >90 ML/MIN/{1.73_M2}
GLUCOSE SERPL-MCNC: 96 MG/DL (ref 70–99)
HCT VFR BLD AUTO: 40.2 % (ref 40.5–52.5)
HGB BLD-MCNC: 13.3 G/DL (ref 13.5–17.5)
POTASSIUM SERPL-SCNC: 3.8 MMOL/L (ref 3.5–5.1)
SODIUM SERPL-SCNC: 138 MMOL/L (ref 136–145)

## 2024-09-26 PROCEDURE — 85014 HEMATOCRIT: CPT

## 2024-09-26 PROCEDURE — 2580000003 HC RX 258: Performed by: STUDENT IN AN ORGANIZED HEALTH CARE EDUCATION/TRAINING PROGRAM

## 2024-09-26 PROCEDURE — 80048 BASIC METABOLIC PNL TOTAL CA: CPT

## 2024-09-26 PROCEDURE — G0378 HOSPITAL OBSERVATION PER HR: HCPCS

## 2024-09-26 PROCEDURE — 6370000000 HC RX 637 (ALT 250 FOR IP): Performed by: STUDENT IN AN ORGANIZED HEALTH CARE EDUCATION/TRAINING PROGRAM

## 2024-09-26 PROCEDURE — 6370000000 HC RX 637 (ALT 250 FOR IP): Performed by: NURSE PRACTITIONER

## 2024-09-26 PROCEDURE — 36415 COLL VENOUS BLD VENIPUNCTURE: CPT

## 2024-09-26 PROCEDURE — 85018 HEMOGLOBIN: CPT

## 2024-09-26 RX ORDER — METOPROLOL SUCCINATE 25 MG/1
12.5 TABLET, EXTENDED RELEASE ORAL 2 TIMES DAILY
Status: DISCONTINUED | OUTPATIENT
Start: 2024-09-26 | End: 2024-09-26

## 2024-09-26 RX ORDER — METOPROLOL SUCCINATE 50 MG/1
25 TABLET, EXTENDED RELEASE ORAL 2 TIMES DAILY
Qty: 60 TABLET | Refills: 2 | Status: SHIPPED
Start: 2024-09-26

## 2024-09-26 RX ORDER — METOPROLOL SUCCINATE 25 MG/1
25 TABLET, EXTENDED RELEASE ORAL 2 TIMES DAILY
Status: DISCONTINUED | OUTPATIENT
Start: 2024-09-26 | End: 2024-09-26 | Stop reason: HOSPADM

## 2024-09-26 RX ADMIN — ROSUVASTATIN 40 MG: 40 TABLET, FILM COATED ORAL at 08:22

## 2024-09-26 RX ADMIN — CLOPIDOGREL BISULFATE 75 MG: 75 TABLET ORAL at 08:22

## 2024-09-26 RX ADMIN — ASPIRIN 81 MG: 81 TABLET, COATED ORAL at 08:22

## 2024-09-26 RX ADMIN — PANTOPRAZOLE SODIUM 40 MG: 40 TABLET, DELAYED RELEASE ORAL at 06:26

## 2024-09-26 RX ADMIN — GABAPENTIN 100 MG: 100 CAPSULE ORAL at 08:22

## 2024-09-26 RX ADMIN — SODIUM CHLORIDE, PRESERVATIVE FREE 10 ML: 5 INJECTION INTRAVENOUS at 08:23

## 2024-09-26 RX ADMIN — BUSPIRONE HYDROCHLORIDE 5 MG: 5 TABLET ORAL at 08:22

## 2024-09-26 RX ADMIN — OXYCODONE HYDROCHLORIDE AND ACETAMINOPHEN 1 TABLET: 5; 325 TABLET ORAL at 06:28

## 2024-09-26 RX ADMIN — LEVOTHYROXINE SODIUM 175 MCG: 0.12 TABLET ORAL at 06:26

## 2024-09-26 RX ADMIN — POLYETHYLENE GLYCOL 3350 17 G: 17 POWDER, FOR SOLUTION ORAL at 08:24

## 2024-09-26 ASSESSMENT — PAIN DESCRIPTION - DESCRIPTORS: DESCRIPTORS: ACHING

## 2024-09-26 ASSESSMENT — PAIN SCALES - GENERAL: PAINLEVEL_OUTOF10: 8

## 2024-09-26 ASSESSMENT — PAIN DESCRIPTION - LOCATION: LOCATION: BACK

## 2024-09-26 ASSESSMENT — PAIN SCALES - WONG BAKER: WONGBAKER_NUMERICALRESPONSE: NO HURT

## 2024-09-26 ASSESSMENT — PAIN DESCRIPTION - ORIENTATION: ORIENTATION: LOWER

## 2024-09-26 ASSESSMENT — PAIN - FUNCTIONAL ASSESSMENT: PAIN_FUNCTIONAL_ASSESSMENT: ACTIVITIES ARE NOT PREVENTED

## 2024-09-27 ENCOUNTER — TELEPHONE (OUTPATIENT)
Dept: FAMILY MEDICINE CLINIC | Age: 74
End: 2024-09-27

## 2024-09-27 ENCOUNTER — CARE COORDINATION (OUTPATIENT)
Dept: CASE MANAGEMENT | Age: 74
End: 2024-09-27

## 2024-09-27 DIAGNOSIS — K62.5 RECTAL BLEEDING: Primary | ICD-10-CM

## 2024-09-27 PROCEDURE — 1111F DSCHRG MED/CURRENT MED MERGE: CPT | Performed by: FAMILY MEDICINE

## 2024-09-29 PROCEDURE — 93298 REM INTERROG DEV EVAL SCRMS: CPT | Performed by: INTERNAL MEDICINE

## 2024-09-30 ENCOUNTER — OFFICE VISIT (OUTPATIENT)
Dept: FAMILY MEDICINE CLINIC | Age: 74
End: 2024-09-30

## 2024-09-30 VITALS
OXYGEN SATURATION: 95 % | DIASTOLIC BLOOD PRESSURE: 74 MMHG | BODY MASS INDEX: 22.99 KG/M2 | HEART RATE: 81 BPM | SYSTOLIC BLOOD PRESSURE: 112 MMHG | TEMPERATURE: 97.3 F | HEIGHT: 71 IN | WEIGHT: 164.2 LBS

## 2024-09-30 DIAGNOSIS — Z09 HOSPITAL DISCHARGE FOLLOW-UP: ICD-10-CM

## 2024-09-30 DIAGNOSIS — R97.20 ELEVATED PSA: ICD-10-CM

## 2024-09-30 DIAGNOSIS — K62.5 RECTAL BLEEDING: Primary | ICD-10-CM

## 2024-09-30 DIAGNOSIS — M48.02 CERVICAL STENOSIS OF SPINE: ICD-10-CM

## 2024-10-01 ENCOUNTER — HOSPITAL ENCOUNTER (OUTPATIENT)
Age: 74
Discharge: HOME | End: 2024-10-01
Payer: MEDICARE

## 2024-10-01 DIAGNOSIS — K62.5 RECTAL BLEEDING: ICD-10-CM

## 2024-10-01 LAB
BASOPHILS # BLD: 0 K/UL (ref 0–0.2)
BASOPHILS NFR BLD: 0.6 %
DEPRECATED RDW RBC AUTO: 13.7 % (ref 12.4–15.4)
EOSINOPHIL # BLD: 0.1 K/UL (ref 0–0.6)
EOSINOPHIL NFR BLD: 2.1 %
HCT VFR BLD AUTO: 39.8 % (ref 40.5–52.5)
HGB BLD-MCNC: 13.9 G/DL (ref 13.5–17.5)
LYMPHOCYTES # BLD: 1.3 K/UL (ref 1–5.1)
LYMPHOCYTES NFR BLD: 26.1 %
MCH RBC QN AUTO: 32.9 PG (ref 26–34)
MCHC RBC AUTO-ENTMCNC: 34.9 G/DL (ref 31–36)
MCV RBC AUTO: 94.1 FL (ref 80–100)
MONOCYTES # BLD: 0.6 K/UL (ref 0–1.3)
MONOCYTES NFR BLD: 12.5 %
NEUTROPHILS # BLD: 2.9 K/UL (ref 1.7–7.7)
NEUTROPHILS NFR BLD: 58.7 %
PLATELET # BLD AUTO: 207 K/UL (ref 135–450)
PMV BLD AUTO: 8.1 FL (ref 5–10.5)
RBC # BLD AUTO: 4.23 M/UL (ref 4.2–5.9)
WBC # BLD AUTO: 5 K/UL (ref 4–11)

## 2024-10-01 PROCEDURE — 36415 COLL VENOUS BLD VENIPUNCTURE: CPT

## 2024-10-01 PROCEDURE — 85025 COMPLETE CBC W/AUTO DIFF WBC: CPT

## 2024-10-02 ENCOUNTER — TELEPHONE (OUTPATIENT)
Dept: FAMILY MEDICINE CLINIC | Age: 74
End: 2024-10-02

## 2024-10-02 NOTE — TELEPHONE ENCOUNTER
Kaitlin is wanting to know if home health care will be approved for this patient.     Please advise as clarity is needed on home health care.

## 2024-10-02 NOTE — TELEPHONE ENCOUNTER
Please advise, thank you!     Circulatory  PAF (paroxysmal atrial fibrillation) (HCC)  Transient ischemic attack (TIA)  Chronic ischemic heart disease  Essential hypertension  Palpitations  Accelerating angina (HCC)  CAD (coronary artery disease)  Coronary artery disease of native heart with stable angina pectoris, unspecified vessel or lesion type (HCC)  Severe mitral regurgitation  Severe tricuspid regurgitation  HTN (hypertension), benign  Aortic valve insufficiency  Idiopathic hypotension  TIA (transient ischemic attack)  Arterial ischemic stroke, ICA, right, acute (HCC)  Carotid artery stenosis with cerebral infarction (HCC)  Digestive  Rectal bleeding  Endocrine  Acquired hypothyroidism  Nervous and Auditory  Sensorineural hearing loss (SNHL) of both ears  Radiculopathy, lumbar region  Musculoskeletal and Integument  Osteoarthritis of cervical spine  Primary osteoarthritis of both knees  Respiratory  Obstructive sleep apnea syndrome  Chronic obstructive pulmonary disease (HCC)  Sleep apnea  Other  S/P AVR  Chest pain  Squamous cell carcinoma of head and neck  Chronic pain due to neoplasm  Refusal of blood transfusions as patient is Sabianist  History of tobacco abuse  Mixed hyperlipidemia  Metastatic squamous cell carcinoma  Cervical stenosis of spine  Encounter for loop recorder check  SOB (shortness of breath) on exertion  Intermittent chest pain  Anxiety  Hyperlipidemia  Left elbow pain  Chest pain, rule out acute myocardial infarction  Mood disorder (HCC)  Acute left-sided muscle weakness  COVID

## 2024-10-02 NOTE — PROGRESS NOTES
CRESTOR  Take 1 tablet by mouth daily     tamsulosin 0.4 MG capsule  Commonly known as: FLOMAX                Medications marked \"taking\" at this time  Outpatient Medications Marked as Taking for the 9/30/24 encounter (Office Visit) with Trista Sutton MD   Medication Sig Dispense Refill    metoprolol succinate (TOPROL XL) 50 MG extended release tablet Take 0.5 tablets by mouth in the morning and at bedtime HOLD if blood pressure <100/60 60 tablet 2    busPIRone (BUSPAR) 5 MG tablet TAKE 1 TABLET BY MOUTH THREE TIMES DAILY 90 tablet 0    lidocaine (LIDODERM) 5 % Place 1 patch onto the skin daily 12 hours on, 12 hours off. 90 patch 0    gabapentin (NEURONTIN) 100 MG capsule Take 1 capsule by mouth 3 times daily.      clopidogrel (PLAVIX) 75 MG tablet Take 1 tablet by mouth once daily 90 tablet 0    mirtazapine (REMERON) 7.5 MG tablet Take 1 tablet by mouth nightly 90 tablet 0    pantoprazole (PROTONIX) 40 MG tablet TAKE 1 TABLET BY MOUTH TWICE DAILY BEFORE MEAL(S) 180 tablet 0    tamsulosin (FLOMAX) 0.4 MG capsule Take 1 capsule by mouth nightly      levothyroxine (SYNTHROID) 175 MCG tablet Take 1 tablet by mouth once daily 30 tablet 12    rosuvastatin (CRESTOR) 40 MG tablet Take 1 tablet by mouth daily 90 tablet 3    aspirin 81 MG EC tablet Take 1 tablet by mouth daily      nitroGLYCERIN (NITROSTAT) 0.4 MG SL tablet Place 1 tablet under the tongue every 5 minutes as needed for Chest pain 25 tablet 3        Medications patient taking as of now reconciled against medications ordered at time of hospital discharge: Yes    Ros: negative apart from those mentioned above    Objective:    /74   Pulse 81   Temp 97.3 °F (36.3 °C) (Temporal)   Ht 1.791 m (5' 10.5\")   Wt 74.5 kg (164 lb 3.2 oz)   SpO2 95%   BMI 23.23 kg/m²   General Appearance: alert and oriented to person, place and time  Skin: warm and dry, no rash or erythema  Head: normocephalic and atraumatic  Neck: supple and non-tender without mass, no

## 2024-10-04 ENCOUNTER — CARE COORDINATION (OUTPATIENT)
Dept: CASE MANAGEMENT | Age: 74
End: 2024-10-04

## 2024-10-04 NOTE — CARE COORDINATION
Care Transitions Note    Follow Up Call     Patient Current Location:  Home: [address]  [place] [address]    Care Transition Nurse contacted the patient by telephone. Verified name and  as identifiers.    Additional needs identified to be addressed with provider   No needs identified                 Method of communication with provider: none.    Care Summary Note: Patient reports that he is doing well, no active rectal bleeding or abdominal pain.  He is concerned about PT/OT, says they are not coming.  CTN called [facility], spoke with Ninfa  and confirmed that PT/OT will be visiting on Monday.  Patient denies any questions or concerns and has a cardiology follow up next week.  CTN will continue with outreach follow up calls.    Plan of care updates since last contact:  Education: .  Review of patient management of conditions/medications: .       Advance Care Planning:   Does patient have an Advance Directive: reviewed during previous call, see note. .    Medication Review:  No changes since last call.     Remote Patient Monitoring:  Offered patient enrollment in the Remote Patient Monitoring (RPM) program for in-home monitoring: Patient declined.24.  Will offer again on future call HTN, COPD.    Assessments:  Care Transitions Subsequent and Final Call    Subsequent and Final Calls  Do you have any ongoing symptoms?: No  Have your medications changed?: No  Do you have any questions related to your medications?: No  Do you currently have any active services?: No  Are you currently active with any services?: Outpatient/Community Services  Do you have any needs or concerns that I can assist you with?: No  Identified Barriers: None  Care Transitions Interventions  Other Interventions:              Follow Up Appointment:   Reviewed upcoming appointment(s).  Future Appointments         Provider Specialty Dept Phone    10/7/2024 10:15 AM Flower Marc APRN - CNP Cardiology [phone]    2024

## 2024-10-07 ENCOUNTER — TELEPHONE (OUTPATIENT)
Dept: VASCULAR SURGERY | Age: 74
End: 2024-10-07

## 2024-10-07 ENCOUNTER — TELEPHONE (OUTPATIENT)
Dept: CARDIOLOGY CLINIC | Age: 74
End: 2024-10-07

## 2024-10-07 ENCOUNTER — OFFICE VISIT (OUTPATIENT)
Dept: CARDIOLOGY CLINIC | Age: 74
End: 2024-10-07
Payer: MEDICARE

## 2024-10-07 VITALS
DIASTOLIC BLOOD PRESSURE: 82 MMHG | OXYGEN SATURATION: 97 % | BODY MASS INDEX: 23.19 KG/M2 | HEIGHT: 70 IN | HEART RATE: 77 BPM | WEIGHT: 162 LBS | SYSTOLIC BLOOD PRESSURE: 134 MMHG

## 2024-10-07 DIAGNOSIS — I25.10 CORONARY ARTERY DISEASE INVOLVING NATIVE CORONARY ARTERY OF NATIVE HEART WITHOUT ANGINA PECTORIS: Primary | ICD-10-CM

## 2024-10-07 DIAGNOSIS — I35.1 AORTIC VALVE INSUFFICIENCY, ETIOLOGY OF CARDIAC VALVE DISEASE UNSPECIFIED: ICD-10-CM

## 2024-10-07 DIAGNOSIS — I48.0 PAF (PAROXYSMAL ATRIAL FIBRILLATION) (HCC): ICD-10-CM

## 2024-10-07 DIAGNOSIS — I34.0 NONRHEUMATIC MITRAL VALVE REGURGITATION: ICD-10-CM

## 2024-10-07 PROCEDURE — 3079F DIAST BP 80-89 MM HG: CPT | Performed by: NURSE PRACTITIONER

## 2024-10-07 PROCEDURE — 1123F ACP DISCUSS/DSCN MKR DOCD: CPT | Performed by: NURSE PRACTITIONER

## 2024-10-07 PROCEDURE — 3075F SYST BP GE 130 - 139MM HG: CPT | Performed by: NURSE PRACTITIONER

## 2024-10-07 PROCEDURE — 99214 OFFICE O/P EST MOD 30 MIN: CPT | Performed by: NURSE PRACTITIONER

## 2024-10-07 RX ORDER — OXYCODONE AND ACETAMINOPHEN 5; 325 MG/1; MG/1
1 TABLET ORAL EVERY 4 HOURS PRN
COMMUNITY

## 2024-10-07 NOTE — TELEPHONE ENCOUNTER
Pt called with an update regarding his blood thinner medications. Per pt, he was advised by Dr. Linares while in the hospital to check with his cardiologist regarding if he can stop taking his blood thinner medications prior to any procedures with Dr. Linarse. Per pt, he was informed by his cardiologist today that he can stop all of them except for the baby aspirin. If there are any questions, please call pt back at [phone].      Please advise.

## 2024-10-07 NOTE — TELEPHONE ENCOUNTER
Pt wants to clarify that is is supposed to stop taking his blood thinners but keep taking the low dose of baby aspirin. Pt states it was talked about at the appt today but he didn't see those directions on his AVS given to him. Please advise pt.

## 2024-10-07 NOTE — PROGRESS NOTES
[facility]     Outpatient Follow Up Note    Anjum Zaragoza is 74 y.o. male who presents today with a history of  aortic insuff s/p AVR with MAZE & DELGADO clip '15; atrial flutter ablation July '20; mitral regurg (s/p mitral clip Jan '23), tricuspid regurg; CM/EF 30% (by cardiac MRI), CAD s/p PTCA LAD Sept '22, HTN, PAF s/p ablation '20 and hyperlipidemia.   His other hx includes: TIA, throat cancer    Interval hx: 9/24 - 9/26/24  Anjum Zaragoza is a 74 y.o. male with a past medical history of hypertension, hyperlipidemia, A-fib s/p ablation, GERD, CAD LAD PCI 9/2022, AI s/p AVR with porcine valve, MAZE, and DELGADO clip 1/2015, MR s/p mitral clip, hypothyroidism      ~ presents with Rectal bleeding. S/p EGD 9/25 with hiatal hernia and gastritis and  colonoscopy 9/25 with small internal hemorrhoids diverticulosis no evidence of active bleeding, continue PPI, OK to resume DAPT per GI.  Plavix and ASA resumed 9/25.  Hgb remains stable, no further bleeding, no other symptoms.  Needs to follow up as OP with cardiology to discuss indication for DAPT.      Assessment and Plan:  Rectal bleed  - Patient presented with chief complaint of rectal bleed.  - In the ED, patient was tachycardic and normotensive.  - Hemoglobin within normal limit.  - CT of abdomen pelvis was done and showed No other acute abnormalities in the abdomen or pelvis.   - Hgb 14->13->12.8 and stable   - Tolerating regular diet  - GI consult; S/p EGD 9/25 with hiatal hernia and gastritis and  colonoscopy 9/25 with small internal hemorrhoids diverticulosis no evidence of active bleeding  - Continue PPI, OK to resume DAPT per GI  - Resumed Plavix and ASA 9/25     PAF              - SR on telemetry               - Off oral AC due to successful DELGADO ligation verified by CHIKIS     Hypertension/hyperlipidemia              - Continue Toprol 25 mg daily, BP have been soft, this is his baseline, he takes 50 mg bid at home but he hold at least half of his doses for

## 2024-10-07 NOTE — TELEPHONE ENCOUNTER
I spoke with pt. He states that Dr Linares did not start the Palvix, he said that [facility] put him on it a long time ago. He just need a clearance for him to hold.  I saw a card cx letter , but not sure whom it was for. He states he has another upcoming surgery wit a DR Olivares at [place].

## 2024-10-09 NOTE — TELEPHONE ENCOUNTER
[facility] and would like call back.    PLS advise

## 2024-10-11 ENCOUNTER — CARE COORDINATION (OUTPATIENT)
Dept: CASE MANAGEMENT | Age: 74
End: 2024-10-11

## 2024-10-11 NOTE — CARE COORDINATION
Care Transitions Note    Follow Up Call     Patient Current Location:  Home: [facility] [address]  [facility] [address]    Care Transition Nurse contacted the patient by telephone. Verified name and  as identifiers.    Additional needs identified to be addressed with provider   No needs identified                 Method of communication with provider: none.    Care Summary Note: Patient reports that he is doing well, denies any questions or concerns today.  He denies any bleeding and/or abdominal pain and reports that [facility] has been out this week.  CTN will continue with outreach follow up calls.    Plan of care updates since last contact:  Education: .  Review of patient management of conditions/medications: .       Advance Care Planning:   Does patient have an Advance Directive: reviewed during previous call, see note. .    Medication Review:  No changes since last call.     Remote Patient Monitoring:  Offered patient enrollment in the Remote Patient Monitoring (RPM) program for in-home monitoring: Patient declined.24.  Will offer again on future call HTN, COPD.     Assessments:  Care Transitions Subsequent and Final Call    Subsequent and Final Calls  Do you have any ongoing symptoms?: No  Have your medications changed?: No  Do you have any questions related to your medications?: No  Do you currently have any active services?: No  Are you currently active with any services?: Outpatient/Community Services  Do you have any needs or concerns that I can assist you with?: No  Identified Barriers: None  Care Transitions Interventions  Other Interventions:              Follow Up Appointment:   Reviewed upcoming appointment(s).  Future Appointments         Provider Specialty Dept Phone    2024 8:30 AM DIONICIO, [place] DEVICE CHECK Cardiology [phone]    2024 8:30 AM Vish Holguin MD Cardiology [phone]    2025 8:30 AM ROOM 1 - ONC [place] [facility] [phone]    2025 2:00 PM

## 2024-10-14 RX ORDER — GABAPENTIN 100 MG/1
100 CAPSULE ORAL 3 TIMES DAILY
Qty: 180 CAPSULE | Refills: 0 | Status: SHIPPED | OUTPATIENT
Start: 2024-10-14 | End: 2024-11-14

## 2024-10-14 NOTE — TELEPHONE ENCOUNTER
Medication:   Requested Prescriptions     Pending Prescriptions Disp Refills    gabapentin (NEURONTIN) 100 MG capsule [Pharmacy Med Name: Gabapentin 100 MG Oral Capsule] 180 capsule 0     Sig: Take 1 capsule by mouth 3 times daily for 31 days.        Last Filled:      Patient Phone Number: [phone] (home)     Last appt: 9/30/2024   Next appt: Visit date not found    Last OARRS:       12/30/2019     4:14 PM   RX Monitoring   Periodic Controlled Substance Monitoring No signs of potential drug abuse or diversion identified.

## 2024-10-18 ENCOUNTER — CARE COORDINATION (OUTPATIENT)
Dept: CASE MANAGEMENT | Age: 74
End: 2024-10-18

## 2024-10-18 NOTE — CARE COORDINATION
Care Transitions Note    Follow Up Call     Patient: Anjum Zaragoza                                   Patient : 1950   MRN: [id number]                             Reason for Admission: rectal bleeding  Discharge Date: 24       RURS: Readmission Risk Score: 16    Attempted to reach patient for transitions of care follow up.  Unable to reach patient.      Outreach Attempts:   HIPAA compliant voicemail left for patient.     Care Summary Note: Follow up outreach call attempt, no answer.  CTN left VM with contact information and request for return call.  CTN will continue with outreach call attempts.      Follow Up Appointment:   Future Appointments         Provider Specialty Dept Phone    2024 8:30 AM SCHEDULE, [place] DEVICE CHECK Cardiology [phone]    2024 8:30 AM Vish Holguin MD Cardiology [phone]    2025 8:30 AM ROOM 1 - ONC [place] [facility] [phone]    2025 2:00 PM Codey Henry MD Cardiology [phone]    3/4/2025 8:15 AM Gustabo Linares II, MD Vascular Surgery [phone]    2025 9:00 AM Trista Sutton MD Family Medicine [phone]            Plan for follow-up call in 2-5 days based on severity of symptoms and risk factors. Plan for next call: symptom management-.  self management-.  follow-up appointment-.    NIKKI ALLAN RN

## 2024-10-21 ENCOUNTER — HOSPITAL ENCOUNTER (OUTPATIENT)
Dept: CT IMAGING | Age: 74
Discharge: HOME | End: 2024-10-21
Payer: MEDICARE

## 2024-10-21 DIAGNOSIS — M48.02 CERVICAL SPINAL STENOSIS: ICD-10-CM

## 2024-10-21 PROCEDURE — 72125 CT NECK SPINE W/O DYE: CPT

## 2024-10-22 DIAGNOSIS — M54.50 CHRONIC MIDLINE LOW BACK PAIN WITHOUT SCIATICA: Primary | ICD-10-CM

## 2024-10-22 DIAGNOSIS — G89.29 CHRONIC MIDLINE LOW BACK PAIN WITHOUT SCIATICA: Primary | ICD-10-CM

## 2024-10-22 NOTE — TELEPHONE ENCOUNTER
Patient requesting refill of...oxyCODONE-acetaminophen (PERCOCET) 5-325 MG per tablet [[id number]]  ENDED    Order Details    Dose: 1 tablet Route: Oral Frequency: EVERY 12 HOURS PRN for Pain, Back pain.   Dispense Quantity: 30 tablet Refills: 0    Note to Pharmacy: Reduce doses taken as pain becomes manageable         Sig: Take 1 tablet by mouth every 12 hours as needed for Pain (Back pain.) for up to 30 days. Max Daily Amount: 2 tablets             Last visit date: 9/30/2024    Pharmacy...[facility] [address] [place] [address] [phone] -  [phone]

## 2024-10-23 RX ORDER — OXYCODONE AND ACETAMINOPHEN 5; 325 MG/1; MG/1
1 TABLET ORAL EVERY 4 HOURS PRN
Qty: 180 TABLET | Refills: 0 | Status: SHIPPED | OUTPATIENT
Start: 2024-10-23 | End: 2024-11-22

## 2024-10-23 NOTE — TELEPHONE ENCOUNTER
Medication:   Requested Prescriptions     Pending Prescriptions Disp Refills    oxyCODONE-acetaminophen (PERCOCET) 5-325 MG per tablet 180 tablet 0     Sig: Take 1 tablet by mouth every 4 hours as needed for Pain for up to 30 days. Max Daily Amount: 6 tablets        Last Filled:      Patient Phone Number: [phone] (home)     Last appt: 9/30/2024   Next appt: Visit date not found    Last OARRS:       12/30/2019     4:14 PM   RX Monitoring   Periodic Controlled Substance Monitoring No signs of potential drug abuse or diversion identified.

## 2024-10-24 ENCOUNTER — CARE COORDINATION (OUTPATIENT)
Dept: CASE MANAGEMENT | Age: 74
End: 2024-10-24

## 2024-10-24 NOTE — CARE COORDINATION
Care Transitions Note    Follow Up Call     Patient Current Location:  Home: [address]  [place] [address]    Care Transition Nurse contacted the patient by telephone. Verified name and  as identifiers.    Additional needs identified to be addressed with provider   No needs identified                 Method of communication with provider: none.    Care Summary Note: Final outreach call:  Patient reports that he is doing very well.  He has followed up at [place] and has a surgical procedure scheduled.  He will also be seeing oncology to determine treatment plan.  He denies any questions or concerns at this time.  CTN will close program & remain available.    Plan of care updates since last contact:  Education: .  Review of patient management of conditions/medications: .       Advance Care Planning:   Does patient have an Advance Directive: reviewed during previous call, see note. .    Medication Review:  No changes since last call.     Remote Patient Monitoring:  Offered patient enrollment in the Remote Patient Monitoring (RPM) program for in-home monitoring: declined.    Assessments:  Care Transitions Subsequent and Final Call    Subsequent and Final Calls  Do you have any ongoing symptoms?: No  Have your medications changed?: No  Do you have any questions related to your medications?: No  Do you currently have any active services?: No  Are you currently active with any services?: Outpatient/Community Services  Do you have any needs or concerns that I can assist you with?: No  Identified Barriers: None  Care Transitions Interventions  Other Interventions:              Follow Up Appointment:     Future Appointments         Provider Specialty Dept Phone    2024 8:30 AM DIONICIO, [place] DEVICE CHECK Cardiology [phone]    2024 8:30 AM Vish Holguin MD Cardiology [phone]    2025 8:30 AM ROOM 1 - ONC [place] [facility] [phone]    2025 2:00 PM Codey Henry MD

## 2024-10-26 DIAGNOSIS — Z51.81 ENCOUNTER FOR MEDICATION MONITORING: ICD-10-CM

## 2024-10-28 RX ORDER — BUSPIRONE HYDROCHLORIDE 5 MG/1
5 TABLET ORAL 3 TIMES DAILY
Qty: 90 TABLET | Refills: 0 | Status: SHIPPED | OUTPATIENT
Start: 2024-10-28

## 2024-10-28 NOTE — TELEPHONE ENCOUNTER
Medication:   Requested Prescriptions     Pending Prescriptions Disp Refills    busPIRone (BUSPAR) 5 MG tablet [Pharmacy Med Name: busPIRone HCl 5 MG Oral Tablet] 90 tablet 0     Sig: TAKE 1 TABLET BY MOUTH THREE TIMES DAILY        Last Filled:  09/23/2024 #90 0rf     Patient Phone Number: [phone] (home)     Last appt: 9/30/2024   Next appt: Visit date not found    Last OARRS:       12/30/2019     4:14 PM   RX Monitoring   Periodic Controlled Substance Monitoring No signs of potential drug abuse or diversion identified.

## 2024-10-31 ENCOUNTER — TELEPHONE (OUTPATIENT)
Dept: CARDIOLOGY CLINIC | Age: 74
End: 2024-10-31

## 2024-10-31 NOTE — TELEPHONE ENCOUNTER
CARDIAC CLEARANCE     What type of procedure are you having?  Prostate biopsy     Which physician is performing your procedure?   Urology not sure doctor name    When is your procedure scheduled for?  11/12/24    Where are you having this procedure?   [facility]     Are you taking Blood Thinners? Plavix , baby aspirin   If so what? (Name/dose/frequesncy)     Does the surgeon want you to stop your blood thinner?  If so for how long? Hold plavix  5 days prior, pt can continue aspirin    Phone Number and Contact Name for Physicians office: Zohreh Pack [phone] pre admission testing     Fax number to send information: [phone]

## 2024-11-05 RX ORDER — PANTOPRAZOLE SODIUM 40 MG/1
TABLET, DELAYED RELEASE ORAL
Qty: 180 TABLET | Refills: 0 | Status: SHIPPED | OUTPATIENT
Start: 2024-11-05

## 2024-11-05 NOTE — TELEPHONE ENCOUNTER
Vilma with [facility] asking if clearance can be faxed today. Pt's biopsy is 11/12/24.    Fax [phone]     Any questions Vilma can be reached at [phone]

## 2024-11-05 NOTE — TELEPHONE ENCOUNTER
Medication:   Requested Prescriptions     Pending Prescriptions Disp Refills    pantoprazole (PROTONIX) 40 MG tablet [Pharmacy Med Name: Pantoprazole Sodium 40 MG Oral Tablet Delayed Release] 180 tablet 0     Sig: TAKE 1 TABLET BY MOUTH TWICE DAILY BEFORE MEAL(S)     Last Filled:  8/5/24    Last appt: 9/30/2024   Next appt: Visit date not found    Last OARRS:       12/30/2019     4:14 PM   RX Monitoring   Periodic Controlled Substance Monitoring No signs of potential drug abuse or diversion identified.

## 2024-11-06 NOTE — TELEPHONE ENCOUNTER
Letter printed awaiting [facility] to sign. Will fax 11/8 once he signs.   Pt will be OK to hold plavix for 5 days, continue 81 mg aspirin.

## 2024-11-06 NOTE — TELEPHONE ENCOUNTER
Spoke to [facility] and advised that [facility] does not return until 11/08 and that if Pt is cleared that would only allow 4 day hold until 11/12 procedure for plavix and to confirm surgeon would not want rescheduled. Advised to let us know if they need anything additional until [facility] returns. Urology office v/u.

## 2024-11-06 NOTE — TELEPHONE ENCOUNTER
Please inform Vilma at  that [facility] has been out of the office and will be unable to complete cardiac risk assessment prior to 11/8.

## 2024-11-08 NOTE — TELEPHONE ENCOUNTER
Letter signed and faxed. Please let patient know that this was done. He is OK to hold plavix for 5 days, continue 81 mg aspirin

## 2024-11-15 RX ORDER — PANTOPRAZOLE SODIUM 40 MG/1
TABLET, DELAYED RELEASE ORAL
Qty: 180 TABLET | Refills: 0 | Status: SHIPPED | OUTPATIENT
Start: 2024-11-15

## 2024-11-15 NOTE — TELEPHONE ENCOUNTER
Medication:   Requested Prescriptions     Pending Prescriptions Disp Refills    pantoprazole (PROTONIX) 40 MG tablet [Pharmacy Med Name: Pantoprazole Sodium 40 MG Oral Tablet Delayed Release] 180 tablet 0     Sig: TAKE 1 TABLET BY MOUTH TWICE DAILY BEFORE MEAL(S)        Last Filled:  11/05/2024 #180 0rf    Patient Phone Number: [phone] (home)     Last appt: 9/30/2024   Next appt: Visit date not found    Last OARRS:       12/30/2019     4:14 PM   RX Monitoring   Periodic Controlled Substance Monitoring No signs of potential drug abuse or diversion identified.

## 2024-11-24 DIAGNOSIS — Z51.81 ENCOUNTER FOR MEDICATION MONITORING: ICD-10-CM

## 2024-11-25 RX ORDER — BUSPIRONE HYDROCHLORIDE 5 MG/1
5 TABLET ORAL 3 TIMES DAILY
Qty: 90 TABLET | Refills: 0 | Status: SHIPPED | OUTPATIENT
Start: 2024-11-25

## 2024-11-25 NOTE — TELEPHONE ENCOUNTER
Medication:   Requested Prescriptions     Pending Prescriptions Disp Refills    busPIRone (BUSPAR) 5 MG tablet [Pharmacy Med Name: busPIRone HCl 5 MG Oral Tablet] 90 tablet 0     Sig: TAKE 1 TABLET BY MOUTH THREE TIMES DAILY        Last Filled:  10/28/2024 #90 0rf     Patient Phone Number: [phone] (home)     Last appt: 9/30/2024   Next appt: Visit date not found    Last OARRS:       12/30/2019     4:14 PM   RX Monitoring   Periodic Controlled Substance Monitoring No signs of potential drug abuse or diversion identified.

## 2024-11-26 ENCOUNTER — TELEPHONE (OUTPATIENT)
Dept: FAMILY MEDICINE CLINIC | Age: 74
End: 2024-11-26

## 2024-11-26 NOTE — TELEPHONE ENCOUNTER
Pt states he missed 3 calls from us yesterday but I am not able to find anything in his chart. Can someone help with why he was called 3x from our office?

## 2024-12-02 RX ORDER — CLOPIDOGREL BISULFATE 75 MG/1
75 TABLET ORAL DAILY
Qty: 90 TABLET | Refills: 3 | Status: SHIPPED | OUTPATIENT
Start: 2024-12-02

## 2024-12-02 NOTE — TELEPHONE ENCOUNTER
Received refill request for : clopidogrel (PLAVIX) 75 MG tablet  from [facility] pharmacy.     Last OV: 10/7/2024 NPTS    Next OV: 2/5/2025 [facility]    Last Labs: 10/1/2024 CBC    Last Filled: 8/27/2024 NPSR

## 2024-12-08 PROCEDURE — 93298 REM INTERROG DEV EVAL SCRMS: CPT | Performed by: INTERNAL MEDICINE

## 2024-12-09 ENCOUNTER — TELEPHONE (OUTPATIENT)
Dept: CARDIOLOGY CLINIC | Age: 74
End: 2024-12-09

## 2024-12-09 NOTE — TELEPHONE ENCOUNTER
I spoke with pt and relayed message per AHRN. Pt stated that he was wanting them to do a prostatectomy. I advise that he have the surgeon send any forms if they are needed.

## 2024-12-09 NOTE — TELEPHONE ENCOUNTER
Pt called to inform the office that he just found out that he has prostate cancer.  Pt is wanting to know if he could get  the surgery to have it removed and if they didn't get it all than he would go into radiation.  Please call to discuss.  Thank you

## 2024-12-09 NOTE — TELEPHONE ENCOUNTER
[facility] just cleared for the cysto, we can obtain a cardiac risk assessment for him for surgery as well if they need a new one. He will not need an office visit prior as long as it is within a year.

## 2024-12-18 RX ORDER — GABAPENTIN 100 MG/1
100 CAPSULE ORAL 3 TIMES DAILY
Qty: 180 CAPSULE | Refills: 0 | Status: SHIPPED | OUTPATIENT
Start: 2024-12-18 | End: 2025-02-16

## 2024-12-18 NOTE — TELEPHONE ENCOUNTER
Medication:   Requested Prescriptions     Pending Prescriptions Disp Refills    gabapentin (NEURONTIN) 100 MG capsule [Pharmacy Med Name: Gabapentin 100 MG Oral Capsule] 180 capsule 0     Sig: Take 1 capsule by mouth 3 times daily.     Last Filled:  10/14/24    Last appt: 9/30/2024   Next appt: Visit date not found    Last OARRS:       12/30/2019     4:14 PM   RX Monitoring   Periodic Controlled Substance Monitoring No signs of potential drug abuse or diversion identified.

## 2024-12-31 DIAGNOSIS — Z51.81 ENCOUNTER FOR MEDICATION MONITORING: ICD-10-CM

## 2025-01-02 RX ORDER — BUSPIRONE HYDROCHLORIDE 5 MG/1
5 TABLET ORAL 3 TIMES DAILY
Qty: 90 TABLET | Refills: 0 | Status: SHIPPED | OUTPATIENT
Start: 2025-01-02

## 2025-01-02 NOTE — TELEPHONE ENCOUNTER
Medication:   Requested Prescriptions     Pending Prescriptions Disp Refills    busPIRone (BUSPAR) 5 MG tablet [Pharmacy Med Name: busPIRone HCl 5 MG Oral Tablet] 90 tablet 0     Sig: TAKE 1 TABLET BY MOUTH THREE TIMES DAILY        Last Filled:  11/25/2024 #90 0rf     Patient Phone Number: [phone] (home)     Last appt: 9/30/2024   Next appt: 6/25/2025    Last OARRS:       12/30/2019     4:14 PM   RX Monitoring   Periodic Controlled Substance Monitoring No signs of potential drug abuse or diversion identified.

## 2025-01-27 NOTE — FLOWSHEET NOTE
Orthostatic vitals obtained by attending APRN. Resulted as follows. 08/11/22 0815   Vital Signs   Orthostatic B/P and Pulse?  Yes   Blood Pressure Sitting 107/73   Pulse Sitting 80 PER MINUTE   Blood Pressure Standing 121/83   Pulse Standing 88 PER MINUTE 27-Jan-2025 17:33

## 2025-01-31 ENCOUNTER — TELEPHONE (OUTPATIENT)
Dept: CARDIOLOGY CLINIC | Age: 75
End: 2025-01-31

## 2025-01-31 DIAGNOSIS — E78.2 MIXED HYPERLIPIDEMIA: Primary | ICD-10-CM

## 2025-01-31 NOTE — TELEPHONE ENCOUNTER
[facility] states pt has injection for leqvio 2/5/25 and insurance needs updated authorization. Pt needs an updated LIPID panel for auth. Infusion center is asking for an order to be placed in epic soon and to call pt so he can get that drawn quickly so they don't have to cancel injection.

## 2025-02-03 DIAGNOSIS — E78.2 MIXED HYPERLIPIDEMIA: ICD-10-CM

## 2025-02-03 LAB
ALBUMIN SERPL-MCNC: 4.3 G/DL (ref 3.4–5)
ALP SERPL-CCNC: 65 U/L (ref 40–129)
ALT SERPL-CCNC: 19 U/L (ref 10–40)
AST SERPL-CCNC: 31 U/L (ref 15–37)
BILIRUB DIRECT SERPL-MCNC: 0.4 MG/DL (ref 0–0.3)
BILIRUB INDIRECT SERPL-MCNC: 0.5 MG/DL (ref 0–1)
BILIRUB SERPL-MCNC: 0.9 MG/DL (ref 0–1)
CHOLEST SERPL-MCNC: 106 MG/DL (ref 0–199)
HDLC SERPL-MCNC: 44 MG/DL (ref 40–60)
LDLC SERPL CALC-MCNC: 40 MG/DL
PROT SERPL-MCNC: 6.8 G/DL (ref 6.4–8.2)
TRIGL SERPL-MCNC: 110 MG/DL (ref 0–150)
VLDLC SERPL CALC-MCNC: 22 MG/DL

## 2025-02-04 ENCOUNTER — TELEPHONE (OUTPATIENT)
Dept: FAMILY MEDICINE CLINIC | Age: 75
End: 2025-02-04

## 2025-02-04 RX ORDER — SENNA AND DOCUSATE SODIUM 50; 8.6 MG/1; MG/1
2 TABLET, FILM COATED ORAL 2 TIMES DAILY
Qty: 120 TABLET | Refills: 0 | Status: SHIPPED | OUTPATIENT
Start: 2025-02-04 | End: 2025-03-06

## 2025-02-04 NOTE — TELEPHONE ENCOUNTER
Pt had his prostate removed on 1/21 and patient is having trouble with his bowels as they are not moving. Pills and Miralax are not working, please advise.

## 2025-02-05 ENCOUNTER — OFFICE VISIT (OUTPATIENT)
Dept: CARDIOLOGY CLINIC | Age: 75
End: 2025-02-05
Payer: MEDICARE

## 2025-02-05 ENCOUNTER — TELEPHONE (OUTPATIENT)
Dept: CARDIOLOGY CLINIC | Age: 75
End: 2025-02-05

## 2025-02-05 VITALS
BODY MASS INDEX: 23.91 KG/M2 | WEIGHT: 167 LBS | HEIGHT: 70 IN | DIASTOLIC BLOOD PRESSURE: 64 MMHG | SYSTOLIC BLOOD PRESSURE: 102 MMHG | HEART RATE: 77 BPM | OXYGEN SATURATION: 98 %

## 2025-02-05 DIAGNOSIS — I35.1 NONRHEUMATIC AORTIC VALVE INSUFFICIENCY: ICD-10-CM

## 2025-02-05 DIAGNOSIS — I25.10 CORONARY ARTERY DISEASE INVOLVING NATIVE CORONARY ARTERY OF NATIVE HEART WITHOUT ANGINA PECTORIS: Primary | ICD-10-CM

## 2025-02-05 DIAGNOSIS — I34.0 NONRHEUMATIC MITRAL VALVE REGURGITATION: ICD-10-CM

## 2025-02-05 DIAGNOSIS — I48.0 PAF (PAROXYSMAL ATRIAL FIBRILLATION) (HCC): ICD-10-CM

## 2025-02-05 PROCEDURE — 1159F MED LIST DOCD IN RCRD: CPT | Performed by: NURSE PRACTITIONER

## 2025-02-05 PROCEDURE — 3074F SYST BP LT 130 MM HG: CPT | Performed by: NURSE PRACTITIONER

## 2025-02-05 PROCEDURE — 1160F RVW MEDS BY RX/DR IN RCRD: CPT | Performed by: NURSE PRACTITIONER

## 2025-02-05 PROCEDURE — 99214 OFFICE O/P EST MOD 30 MIN: CPT | Performed by: NURSE PRACTITIONER

## 2025-02-05 PROCEDURE — 1123F ACP DISCUSS/DSCN MKR DOCD: CPT | Performed by: NURSE PRACTITIONER

## 2025-02-05 PROCEDURE — 3078F DIAST BP <80 MM HG: CPT | Performed by: NURSE PRACTITIONER

## 2025-02-05 RX ORDER — OXYCODONE HYDROCHLORIDE AND ACETAMINOPHEN 325; 5 MG/5ML; MG/5ML
SOLUTION ORAL EVERY 4 HOURS PRN
COMMUNITY

## 2025-02-05 NOTE — TELEPHONE ENCOUNTER
Pt called stating they were calling to let NPTS to know that they are taking Metoprolol 25 MG BID.    Pls advise

## 2025-02-05 NOTE — PATIENT INSTRUCTIONS
Call us back with the mg of your metoprolol tablet : is it a 50 mg tablet or 25 mg tablet    Echocardiogram in March : reassess your heart's valves    Appt in 4 months

## 2025-02-05 NOTE — PROGRESS NOTES
[facility] [place]     Outpatient Follow Up Note    Anjum Zaragoza is 74 y.o. male who presents today with a history of  aortic insuff s/p AVR with MAZE & DELGADO clip '15; atrial flutter ablation July '20; mitral regurg (s/p mitral clip Jan '23), tricuspid regurg; CM/EF 30% (by cardiac MRI), CAD s/p PTCA LAD Sept '22, HTN, PAF s/p ablation '20 and hyperlipidemia.   His other hx includes: TIA, throat cancer    Interval hx: Jan '25  Prostate Cancer and prostatectomy completed 1/21/2025 at [facility]. Discharged home and continues to have abd/pelvic pressure pain. States tenorio continues to drain bloody     CHIEF COMPLAINT / HPI:  Follow Up secondary to CAD / AF / AS              - Off oral AC due to successful DELGADO ligation verified by [facility]    Subjective:     He has a little SOB that he experiences off/on all day. Talking on the phone or with his neighbors, he'll lose his breath. 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 Its nothing new. Walking on level ground doesn't bother him. He walks with a walking stick that takes the load off him. He denies orthopnea/PND.     He has swelling in his Lt leg and foot. He attributes it in part to the catheter anchored to his left leg. He also has sacroiliac nerve that causes discomfort. From his ribs down on his left side still gets numb. A decompression surgery was recommended (neuro treating).     He denies significant chest pain.  The patients weight is up to 160.4# despite not having an appetite. The patient is experiencing palpitations every once in awhile but not as much as he used to.   He's held metoprolol 3-4 x since discharge when his BP is < 100. It usually runs in the high 90s/60    He has sleep apnea but does not use a CPAP    These symptoms are stable since the last OV in regards to cardiac status. He denies recurrence with bleeding   With regard to medication therapy the patient has been compliant with prescribed regimen. They have tolerated therapy to date.     Past 
4 = No assist / stand by assistance

## 2025-02-06 RX ORDER — METOPROLOL SUCCINATE 25 MG/1
25 TABLET, EXTENDED RELEASE ORAL 2 TIMES DAILY
Qty: 60 TABLET | Refills: 5 | Status: SHIPPED | OUTPATIENT
Start: 2025-02-06

## 2025-02-10 DIAGNOSIS — E78.2 MIXED HYPERLIPIDEMIA: ICD-10-CM

## 2025-02-10 DIAGNOSIS — I25.10 CORONARY ARTERY DISEASE INVOLVING NATIVE CORONARY ARTERY OF NATIVE HEART WITHOUT ANGINA PECTORIS: ICD-10-CM

## 2025-02-10 RX ORDER — PANTOPRAZOLE SODIUM 40 MG/1
TABLET, DELAYED RELEASE ORAL
Qty: 180 TABLET | Refills: 0 | Status: SHIPPED | OUTPATIENT
Start: 2025-02-10

## 2025-02-10 RX ORDER — ROSUVASTATIN CALCIUM 40 MG/1
40 TABLET, COATED ORAL DAILY
Qty: 90 TABLET | Refills: 0 | Status: SHIPPED | OUTPATIENT
Start: 2025-02-10

## 2025-02-10 NOTE — TELEPHONE ENCOUNTER
Last ov:25 NPTS  Next ov:25 NPTS  Last EK24  Last labs:25  Last filled:   Disp Refills Start End    rosuvastatin (CRESTOR) 40 MG tablet 90 tablet 3 2024 --    Sig - Route: Take 1 tablet by mouth daily - Oral    Sent to pharmacy as: Rosuvastatin Calcium 40 MG Oral Tablet (CRESTOR)    Cosign for Ordering: Accepted by Codey Henry MD on 2024  1:41 PM    E-Prescribing Status: Receipt confirmed by pharmacy (2024 11:27 AM EST)

## 2025-02-10 NOTE — TELEPHONE ENCOUNTER
Medication:   Requested Prescriptions     Pending Prescriptions Disp Refills    pantoprazole (PROTONIX) 40 MG tablet [Pharmacy Med Name: Pantoprazole Sodium 40 MG Oral Tablet Delayed Release] 180 tablet 0     Sig: TAKE 1 TABLET BY MOUTH TWICE DAILY BEFORE MEAL(S)        Last Filled:  11/15/2024 #180 0rf     Patient Phone Number: [phone] (home)     Last appt: 9/30/2024   Next appt: 6/25/2025    Last OARRS:       12/30/2019     4:14 PM   RX Monitoring   Periodic Controlled Substance Monitoring No signs of potential drug abuse or diversion identified.

## 2025-02-11 RX ORDER — LEVOTHYROXINE SODIUM 175 UG/1
175 TABLET ORAL DAILY
Qty: 30 TABLET | Refills: 0 | Status: SHIPPED | OUTPATIENT
Start: 2025-02-11

## 2025-02-11 NOTE — TELEPHONE ENCOUNTER
Medication:   Requested Prescriptions     Pending Prescriptions Disp Refills    levothyroxine (SYNTHROID) 175 MCG tablet [Pharmacy Med Name: Levothyroxine Sodium 175 MCG Oral Tablet] 30 tablet 0     Sig: Take 1 tablet by mouth once daily       Last Filled:  04/03/2024 #30 12rf     Patient Phone Number: [phone] (home)     Last appt: 9/30/2024   Next appt: 6/25/2025    Last Thyroid:   Lab Results   Component Value Date/Time    TSH 2.56 05/28/2024 02:21 PM    T4FREE 2.2 02/02/2024 05:34 AM

## 2025-02-12 ENCOUNTER — HOSPITAL ENCOUNTER (OUTPATIENT)
Dept: ONCOLOGY | Age: 75
Setting detail: INFUSION SERIES
Discharge: HOME | End: 2025-02-12
Payer: MEDICARE

## 2025-02-12 VITALS
TEMPERATURE: 97.7 F | RESPIRATION RATE: 16 BRPM | HEART RATE: 79 BPM | SYSTOLIC BLOOD PRESSURE: 124 MMHG | DIASTOLIC BLOOD PRESSURE: 91 MMHG

## 2025-02-12 DIAGNOSIS — E78.2 MIXED HYPERLIPIDEMIA: Primary | ICD-10-CM

## 2025-02-12 PROCEDURE — 99211 OFF/OP EST MAY X REQ PHY/QHP: CPT

## 2025-02-12 PROCEDURE — 6360000002 HC RX W HCPCS: Performed by: INTERNAL MEDICINE

## 2025-02-12 PROCEDURE — 96372 THER/PROPH/DIAG INJ SC/IM: CPT

## 2025-02-12 RX ADMIN — INCLISIRAN 284 MG: 284 INJECTION, SOLUTION SUBCUTANEOUS at 08:39

## 2025-02-12 NOTE — PROGRESS NOTES
Patient to infusion center for leqvio injection.  Patient tolerated injection well.  Patient denied need for AVS.  Patient to return in 6 months for next injection.  Message sent to Dr Henry for new orders and to notify lab work was drawn on 2/3/2025.  Patient discharged ambulatory to home.

## 2025-02-16 PROCEDURE — 93298 REM INTERROG DEV EVAL SCRMS: CPT | Performed by: INTERNAL MEDICINE

## 2025-02-18 ENCOUNTER — TELEPHONE (OUTPATIENT)
Dept: CASE MANAGEMENT | Age: 75
End: 2025-02-18

## 2025-02-18 DIAGNOSIS — M47.22 OSTEOARTHRITIS OF SPINE WITH RADICULOPATHY, CERVICAL REGION: Primary | ICD-10-CM

## 2025-02-18 RX ORDER — GABAPENTIN 100 MG/1
CAPSULE ORAL
Qty: 180 CAPSULE | Refills: 0 | Status: SHIPPED | OUTPATIENT
Start: 2025-02-18 | End: 2025-05-19

## 2025-02-18 NOTE — TELEPHONE ENCOUNTER
Medication:   Requested Prescriptions     Pending Prescriptions Disp Refills    gabapentin (NEURONTIN) 100 MG capsule [Pharmacy Med Name: Gabapentin 100 MG Oral Capsule] 180 capsule 0     Sig: TAKE 1 CAPSULE BY MOUTH THREE TIMES DAILY FOR  60  DAYS        Last Filled:  12/18/2024    Patient Phone Number: [phone] (home)     Last appt: 9/30/2024   Next appt: 6/25/2025    Last OARRS:       12/30/2019     4:14 PM   RX Monitoring   Periodic Controlled Substance Monitoring No signs of potential drug abuse or diversion identified.

## 2025-02-18 NOTE — TELEPHONE ENCOUNTER
Pharmacy called and needs a dx code for this medication.     Please call them with requested information.    [phone] - Amanda

## 2025-02-18 NOTE — TELEPHONE ENCOUNTER
Patient no longer meets lung screening criteria due to history of cancer and is receiving palliative care.    Patient removed from the lung screening program. Closed recommendation.

## 2025-02-26 NOTE — PROGRESS NOTES
[facility]   Vascular Surgery Followup    Referring Provider:  Trista Sutton MD     No chief complaint on file.       History of Present Illness:  74-year-old male presents today for outpatient follow-up visit after being seen in initial consultation while hospitalized September 5, 2024.  He had undergone a CT angio of the neck revealing a 60% stenosis of his right internal carotid artery and was felt asymptomatic from this perspective.  He was noted on presentation to have left-sided weakness and decreased hand grasp strength.  MRI revealed multilevel severe bilateral neuroforaminal narrowing with no acute cerebral event.  Patient's associate medical history is significant for A-fib, hypertension, hyperlipidemia, aortic valve replacement 2015 as well as head neck cancer status post radiation.  Carotid duplex today shows 50 to 69% right ICA stenosis and less than 50% left ICA stenosis.  He continues to struggle with significant neck pain.  Denies TIA or stroke.    Past Medical History:   has a past medical history of Aortic valve insufficiency, Arthritis, Atrial fibrillation (HCC), Cancer (HCC), Dysphonia, Encounter for imaging to screen for metal prior to MRI, GERD (gastroesophageal reflux disease), Hearing loss, Heart disease, Hyperlipidemia, Medical history reviewed with no changes, Obstructive apnea, Oropharyngeal dysphagia, Refusal of blood transfusions as patient is [employer], and Thyroid disease.    Surgical History:   has a past surgical history that includes Lung surgery; knee surgery; Neck surgery; hernia repair; Aortic valve replacement (01/28/2015); Cardiac catheterization; Tonsillectomy and adenoidectomy; Mandible fracture surgery; Ankle surgery; back surgery; Pain management procedure (Right, 06/22/2020); Pain management procedure (Right, 07/08/2020); Atrial ablation surgery; Lumbar spine surgery (Right, 10/13/2020); fracture surgery; Upper gastrointestinal endoscopy (N/A,

## 2025-03-04 ENCOUNTER — OFFICE VISIT (OUTPATIENT)
Dept: VASCULAR SURGERY | Age: 75
End: 2025-03-04
Payer: MEDICARE

## 2025-03-04 VITALS
SYSTOLIC BLOOD PRESSURE: 110 MMHG | DIASTOLIC BLOOD PRESSURE: 60 MMHG | HEIGHT: 71 IN | WEIGHT: 162 LBS | BODY MASS INDEX: 22.68 KG/M2

## 2025-03-04 DIAGNOSIS — I65.23 CAROTID ATHEROSCLEROSIS, BILATERAL: Primary | ICD-10-CM

## 2025-03-04 PROCEDURE — 1123F ACP DISCUSS/DSCN MKR DOCD: CPT | Performed by: SURGERY

## 2025-03-04 PROCEDURE — 3074F SYST BP LT 130 MM HG: CPT | Performed by: SURGERY

## 2025-03-04 PROCEDURE — 1159F MED LIST DOCD IN RCRD: CPT | Performed by: SURGERY

## 2025-03-04 PROCEDURE — 3078F DIAST BP <80 MM HG: CPT | Performed by: SURGERY

## 2025-03-04 PROCEDURE — 99213 OFFICE O/P EST LOW 20 MIN: CPT | Performed by: SURGERY

## 2025-03-04 RX ORDER — DIAZEPAM 5 MG/1
TABLET ORAL
COMMUNITY
Start: 2025-01-16

## 2025-03-07 ENCOUNTER — TELEPHONE (OUTPATIENT)
Dept: CARDIOLOGY CLINIC | Age: 75
End: 2025-03-07

## 2025-03-07 RX ORDER — SODIUM PHOSPHATE, DIBASIC, UNSPECIFIED FORM AND SODIUM PHOSPHATE, MONOBASIC, UNSPECIFIED FORM 7; 19 G/118ML; G/118ML
ENEMA RECTAL
Qty: 120 TABLET | Refills: 0 | Status: SHIPPED | OUTPATIENT
Start: 2025-03-07

## 2025-03-07 NOTE — TELEPHONE ENCOUNTER
Medication:   Requested Prescriptions     Pending Prescriptions Disp Refills    EQ STOOL SOFTENER/LAXATIVE 8.6-50 MG per tablet [Pharmacy Med Name: EQ Stool Softener/Laxative 8.6-50 MG Oral Tablet] 120 tablet 0     Sig: TAKE 2 TABLETS BY MOUTH IN THE MORNING AND 2 AT BEDTIME        Last Filled:      Patient Phone Number: [phone] (home)     Last appt: 9/30/2024   Next appt: 6/25/2025    Last OARRS:       12/30/2019     4:14 PM   RX Monitoring   Periodic Controlled Substance Monitoring No signs of potential drug abuse or diversion identified.

## 2025-03-07 NOTE — TELEPHONE ENCOUNTER
----- Message from ISIDRA Triana CNP sent at 3/6/2025  1:39 PM EST -----  Mitral valve regurg : stable    Progression with tricuspid regurg: now mod-severe; does he have sleep apnea?     Recheck in one year

## 2025-03-07 NOTE — TELEPHONE ENCOUNTER
FYI   Patient states he does gasp for air sometimes during his sleep and he does take naps during the day.         I spoke with pt and relayed Echo results per NPTS. Pt verbalized understanding.

## 2025-03-11 DIAGNOSIS — F41.9 ANXIETY: ICD-10-CM

## 2025-03-11 RX ORDER — MIRTAZAPINE 7.5 MG/1
7.5 TABLET, FILM COATED ORAL NIGHTLY
Qty: 90 TABLET | Refills: 0 | Status: SHIPPED | OUTPATIENT
Start: 2025-03-11

## 2025-03-11 NOTE — TELEPHONE ENCOUNTER
Medication:   Requested Prescriptions     Pending Prescriptions Disp Refills    mirtazapine (REMERON) 7.5 MG tablet [Pharmacy Med Name: Mirtazapine 7.5 MG Oral Tablet] 90 tablet 0     Sig: Take 1 tablet by mouth nightly        Last Filled:  08/20/2024 #90 0rf     Patient Phone Number: [phone] (home)     Last appt: 9/30/2024   Next appt: 6/25/2025    Last OARRS:       12/30/2019     4:14 PM   RX Monitoring   Periodic Controlled Substance Monitoring No signs of potential drug abuse or diversion identified.

## 2025-03-23 PROCEDURE — 93298 REM INTERROG DEV EVAL SCRMS: CPT | Performed by: INTERNAL MEDICINE

## 2025-03-25 ENCOUNTER — TELEPHONE (OUTPATIENT)
Dept: FAMILY MEDICINE CLINIC | Age: 75
End: 2025-03-25

## 2025-03-25 NOTE — TELEPHONE ENCOUNTER
Pts ENT retired a few years ago and he is wanting a new referral.    Pt is having some outstanding issues with his previous cancer dx and wants someone to take a look at his throat.    Pt is calling to see if Dr. Dukes is in his network

## 2025-03-31 RX ORDER — LIDOCAINE 50 MG/G
1 PATCH TOPICAL EVERY 24 HOURS
COMMUNITY

## 2025-03-31 RX ORDER — TORSEMIDE 10 MG/1
TABLET ORAL
COMMUNITY

## 2025-03-31 RX ORDER — METHOCARBAMOL 500 MG/1
TABLET, FILM COATED ORAL
COMMUNITY
Start: 2025-01-22

## 2025-04-01 ENCOUNTER — OFFICE VISIT (OUTPATIENT)
Dept: FAMILY MEDICINE CLINIC | Age: 75
End: 2025-04-01
Payer: MEDICARE

## 2025-04-01 VITALS
BODY MASS INDEX: 23.02 KG/M2 | OXYGEN SATURATION: 96 % | WEIGHT: 160.8 LBS | SYSTOLIC BLOOD PRESSURE: 114 MMHG | TEMPERATURE: 98.2 F | HEIGHT: 70 IN | DIASTOLIC BLOOD PRESSURE: 79 MMHG | HEART RATE: 85 BPM

## 2025-04-01 DIAGNOSIS — E03.9 ACQUIRED HYPOTHYROIDISM: ICD-10-CM

## 2025-04-01 DIAGNOSIS — Z00.00 MEDICARE ANNUAL WELLNESS VISIT, SUBSEQUENT: ICD-10-CM

## 2025-04-01 DIAGNOSIS — E78.2 MIXED HYPERLIPIDEMIA: ICD-10-CM

## 2025-04-01 DIAGNOSIS — Z51.81 ENCOUNTER FOR MEDICATION MONITORING: ICD-10-CM

## 2025-04-01 DIAGNOSIS — N39.45 CONTINUOUS LEAKAGE OF URINE: ICD-10-CM

## 2025-04-01 DIAGNOSIS — R53.83 FATIGUE, UNSPECIFIED TYPE: ICD-10-CM

## 2025-04-01 DIAGNOSIS — Z00.00 ENCOUNTER FOR ANNUAL WELLNESS VISIT (AWV) IN MEDICARE PATIENT: Primary | ICD-10-CM

## 2025-04-01 LAB
ALBUMIN SERPL-MCNC: 4.5 G/DL (ref 3.4–5)
ALBUMIN/GLOB SERPL: 1.9 {RATIO} (ref 1.1–2.2)
ALP SERPL-CCNC: 69 U/L (ref 40–129)
ALT SERPL-CCNC: 20 U/L (ref 10–40)
ANION GAP SERPL CALCULATED.3IONS-SCNC: 10 MMOL/L (ref 3–16)
AST SERPL-CCNC: 27 U/L (ref 15–37)
BASOPHILS # BLD: 0 K/UL (ref 0–0.2)
BASOPHILS NFR BLD: 0.8 %
BILIRUB SERPL-MCNC: 0.9 MG/DL (ref 0–1)
BUN SERPL-MCNC: 6 MG/DL (ref 7–20)
CALCIUM SERPL-MCNC: 9.5 MG/DL (ref 8.3–10.6)
CHLORIDE SERPL-SCNC: 101 MMOL/L (ref 99–110)
CK SERPL-CCNC: 68 U/L (ref 39–308)
CO2 SERPL-SCNC: 30 MMOL/L (ref 21–32)
CREAT SERPL-MCNC: 0.6 MG/DL (ref 0.8–1.3)
DEPRECATED RDW RBC AUTO: 14.1 % (ref 12.4–15.4)
EOSINOPHIL # BLD: 0.1 K/UL (ref 0–0.6)
EOSINOPHIL NFR BLD: 1.2 %
GFR SERPLBLD CREATININE-BSD FMLA CKD-EPI: >90 ML/MIN/{1.73_M2}
GLUCOSE SERPL-MCNC: 87 MG/DL (ref 70–99)
HCT VFR BLD AUTO: 44 % (ref 40.5–52.5)
HGB BLD-MCNC: 14.7 G/DL (ref 13.5–17.5)
LYMPHOCYTES # BLD: 1 K/UL (ref 1–5.1)
LYMPHOCYTES NFR BLD: 19.5 %
MAGNESIUM SERPL-MCNC: 2.31 MG/DL (ref 1.8–2.4)
MCH RBC QN AUTO: 32.6 PG (ref 26–34)
MCHC RBC AUTO-ENTMCNC: 33.6 G/DL (ref 31–36)
MCV RBC AUTO: 97.2 FL (ref 80–100)
MONOCYTES # BLD: 0.5 K/UL (ref 0–1.3)
MONOCYTES NFR BLD: 10.2 %
NEUTROPHILS # BLD: 3.5 K/UL (ref 1.7–7.7)
NEUTROPHILS NFR BLD: 68.3 %
PLATELET # BLD AUTO: 231 K/UL (ref 135–450)
PMV BLD AUTO: 8 FL (ref 5–10.5)
POTASSIUM SERPL-SCNC: 4.1 MMOL/L (ref 3.5–5.1)
PROT SERPL-MCNC: 6.9 G/DL (ref 6.4–8.2)
RBC # BLD AUTO: 4.52 M/UL (ref 4.2–5.9)
SODIUM SERPL-SCNC: 141 MMOL/L (ref 136–145)
TSH SERPL DL<=0.005 MIU/L-ACNC: 3.06 UIU/ML (ref 0.27–4.2)
WBC # BLD AUTO: 5.2 K/UL (ref 4–11)

## 2025-04-01 PROCEDURE — 1123F ACP DISCUSS/DSCN MKR DOCD: CPT | Performed by: FAMILY MEDICINE

## 2025-04-01 PROCEDURE — 99214 OFFICE O/P EST MOD 30 MIN: CPT | Performed by: FAMILY MEDICINE

## 2025-04-01 PROCEDURE — 3074F SYST BP LT 130 MM HG: CPT | Performed by: FAMILY MEDICINE

## 2025-04-01 PROCEDURE — G0439 PPPS, SUBSEQ VISIT: HCPCS | Performed by: FAMILY MEDICINE

## 2025-04-01 PROCEDURE — G2211 COMPLEX E/M VISIT ADD ON: HCPCS | Performed by: FAMILY MEDICINE

## 2025-04-01 PROCEDURE — 3078F DIAST BP <80 MM HG: CPT | Performed by: FAMILY MEDICINE

## 2025-04-01 RX ORDER — BUSPIRONE HYDROCHLORIDE 5 MG/1
5 TABLET ORAL 2 TIMES DAILY
Qty: 60 TABLET | Refills: 0 | Status: SHIPPED | OUTPATIENT
Start: 2025-04-01

## 2025-04-01 RX ORDER — SENNA AND DOCUSATE SODIUM 50; 8.6 MG/1; MG/1
1 TABLET, FILM COATED ORAL DAILY
COMMUNITY
Start: 2025-04-01

## 2025-04-01 RX ORDER — POLYETHYLENE GLYCOL 3350 17 G/17G
17 POWDER, FOR SOLUTION ORAL DAILY
Qty: 1530 G | Refills: 1 | Status: SHIPPED | OUTPATIENT
Start: 2025-04-01 | End: 2025-05-01

## 2025-04-01 ASSESSMENT — PATIENT HEALTH QUESTIONNAIRE - PHQ9
2. FEELING DOWN, DEPRESSED OR HOPELESS: NOT AT ALL
SUM OF ALL RESPONSES TO PHQ QUESTIONS 1-9: 0
1. LITTLE INTEREST OR PLEASURE IN DOING THINGS: NOT AT ALL
SUM OF ALL RESPONSES TO PHQ QUESTIONS 1-9: 0

## 2025-04-01 NOTE — TELEPHONE ENCOUNTER
Medication:   Requested Prescriptions     Pending Prescriptions Disp Refills    busPIRone (BUSPAR) 5 MG tablet [Pharmacy Med Name: busPIRone HCl 5 MG Oral Tablet] 60 tablet 0     Sig: Take 1 tablet by mouth twice daily        Last Filled:  01/02/2025 #90 0rf    Patient Phone Number: [phone] (home)     Last appt: 4/1/2025   Next appt: 5/1/2025    Last OARRS:       12/30/2019     4:14 PM   RX Monitoring   Periodic Controlled Substance Monitoring No signs of potential drug abuse or diversion identified.

## 2025-04-01 NOTE — PROGRESS NOTES
Medicare Annual Wellness Visit    Anjum Zaragoza is here for Medicare AWV, Referral - General (ENT referral request ), Post-op Problem (ROBOTIC ASSISTED PROSTATECTOMY, LYMPH NODE DISSECTION AND URETHROPEXIS 1/21/2025; left AMA next day- reports post-op pain since Janurary- pain located in bilat upper legs & abdomen ), Hypertension, and Anxiety    Assessment & Plan   Encounter for annual wellness visit (AWV) in Medicare patient  Advised to get the blood work    Acquired hypothyroidism  Advised to get the blood work    urinary incontinence  S/p due to prostatectomy   -     Diapers & Supplies MISC; 3 times daily Starting Tue 4/1/2025, Until Mon 6/30/2025, For 90 days, Disp-180 each, R-3, Print    Fatigue, unspecified type  Could be due to deconditioning versus hypothyroidism versus electrolyte imbalance versus statins.  Advised to stop statins  Advised to get blood work.  Advised to walk and increase his physical activity.    Hyperlipidemia  Advised to discontinue atorvastatin. Currently getting Leqvio.       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    Prostate cancer with s/p prostatectomy: Still having urinary incontinence.  And has been wearing diaper and is not very happy with that.  He has no control over his bladder.  At times he has not been able to control his bowel.  Sometimes he is constipated and sometimes he has loose stools.  He has been taking Colace every day.    He complains of generalized weakness.  Having no energy to walk.  It is partly because of the weather he has been indoors.     He has

## 2025-04-02 ENCOUNTER — RESULTS FOLLOW-UP (OUTPATIENT)
Dept: FAMILY MEDICINE CLINIC | Age: 75
End: 2025-04-02

## 2025-04-02 ENCOUNTER — TELEPHONE (OUTPATIENT)
Dept: FAMILY MEDICINE CLINIC | Age: 75
End: 2025-04-02

## 2025-04-02 RX ORDER — TRIAMCINOLONE ACETONIDE 1 MG/G
CREAM TOPICAL
Qty: 45 G | Refills: 2 | Status: SHIPPED | OUTPATIENT
Start: 2025-04-02

## 2025-04-02 NOTE — TELEPHONE ENCOUNTER
Pt was supposed to have a face cream sent over to the pharmacy but I do not see anything in his chart. There is nothing in the notes or on AWV that mentions the name.    Please advise

## 2025-04-02 NOTE — TELEPHONE ENCOUNTER
Please advise; thank you!    Patient Phone Number: [phone] (home)     Last appt: 4/1/2025   Next appt: 5/1/2025

## 2025-04-03 PROBLEM — J44.9 CHRONIC OBSTRUCTIVE PULMONARY DISEASE (HCC): Status: RESOLVED | Noted: 2019-02-12 | Resolved: 2025-04-03

## 2025-04-03 NOTE — PATIENT INSTRUCTIONS

## 2025-04-17 ENCOUNTER — TELEPHONE (OUTPATIENT)
Dept: FAMILY MEDICINE CLINIC | Age: 75
End: 2025-04-17

## 2025-04-17 NOTE — TELEPHONE ENCOUNTER
Please ask him if his BP is low to take 1/2 pill of metoprolol bid. But advise to check his HR too.    Also buspar ok to tid

## 2025-04-17 NOTE — TELEPHONE ENCOUNTER
Pt called in wanting clarification on two medications and their dosage to make sure everything is correct because of the changes.     metoprolol succinate (TOPROL XL) 25 MG extended release tablet [[id number]]    Order Details  Dose: 25 mg Route: Oral Frequency: 2 times daily   Dispense Quantity: 60 tablet Refills: 5          Sig: Take 1 tablet by mouth in the morning and at bedtime     Pt used to take 1/2 pill daily and has been for a long time. Wants to make sure 1 tablet in the am and pm is correct.    ____________________________________    busPIRone (BUSPAR) 5 MG tablet [[id number]]    Order Details    Dose: 5 mg Route: Oral Frequency: 2 TIMES DAILY   Dispense Quantity: 60 tablet Refills: 0          Sig: Take 1 tablet by mouth twice daily           Pts AVS says 3x daily and it used to be 2x daily. He is taking the 3x daily and wants confirmation this correct.    Please call the patient as he wants to speak to someone in clinical

## 2025-04-17 NOTE — TELEPHONE ENCOUNTER
Buspar is twice a day and metoprolol xl 25 mg 1/2 once a day     Patient has been taking the Buspar TID and Metoprolol 25mg BID  BP has been under 100.  Please advise how patient should take those medications

## 2025-04-23 DIAGNOSIS — Z51.81 ENCOUNTER FOR MEDICATION MONITORING: ICD-10-CM

## 2025-04-23 DIAGNOSIS — M47.22 OSTEOARTHRITIS OF SPINE WITH RADICULOPATHY, CERVICAL REGION: ICD-10-CM

## 2025-04-23 RX ORDER — GABAPENTIN 100 MG/1
100 CAPSULE ORAL 3 TIMES DAILY
Qty: 180 CAPSULE | Refills: 0 | Status: SHIPPED | OUTPATIENT
Start: 2025-04-23 | End: 2025-06-23

## 2025-04-23 RX ORDER — BUSPIRONE HYDROCHLORIDE 5 MG/1
5 TABLET ORAL 2 TIMES DAILY
Qty: 60 TABLET | Refills: 0 | Status: SHIPPED | OUTPATIENT
Start: 2025-04-23 | End: 2025-04-23

## 2025-04-23 RX ORDER — BUSPIRONE HYDROCHLORIDE 5 MG/1
5 TABLET ORAL 3 TIMES DAILY
Qty: 270 TABLET | Refills: 0 | Status: SHIPPED | OUTPATIENT
Start: 2025-04-23 | End: 2025-07-22

## 2025-04-23 NOTE — TELEPHONE ENCOUNTER
Medication:   Requested Prescriptions     Pending Prescriptions Disp Refills    busPIRone (BUSPAR) 5 MG tablet [Pharmacy Med Name: busPIRone HCl 5 MG Oral Tablet] 60 tablet 0     Sig: Take 1 tablet by mouth twice daily    gabapentin (NEURONTIN) 100 MG capsule [Pharmacy Med Name: Gabapentin 100 MG Oral Capsule] 180 capsule 0     Sig: Take 1 capsule by mouth 3 times daily for 61 days.        Last Filled:  Buspar 04/01/2025 #60 0rf  Gabapentin 02/18/2025 #180 0rf     Patient Phone Number: [phone] (home)     Last appt: 4/1/2025   Next appt: 5/1/2025    Last OARRS:       12/30/2019     4:14 PM   RX Monitoring   Periodic Controlled Substance Monitoring No signs of potential drug abuse or diversion identified.

## 2025-04-29 PROCEDURE — 93298 REM INTERROG DEV EVAL SCRMS: CPT | Performed by: INTERNAL MEDICINE

## 2025-05-01 ENCOUNTER — APPOINTMENT (OUTPATIENT)
Dept: GENERAL RADIOLOGY | Age: 75
DRG: 310 | End: 2025-05-01
Payer: MEDICARE

## 2025-05-01 ENCOUNTER — OFFICE VISIT (OUTPATIENT)
Dept: FAMILY MEDICINE CLINIC | Age: 75
End: 2025-05-01
Payer: MEDICARE

## 2025-05-01 ENCOUNTER — HOSPITAL ENCOUNTER (INPATIENT)
Age: 75
LOS: 1 days | Discharge: HOME HEALTH | DRG: 310 | End: 2025-05-02
Attending: EMERGENCY MEDICINE | Admitting: INTERNAL MEDICINE
Payer: MEDICARE

## 2025-05-01 VITALS
HEIGHT: 70 IN | WEIGHT: 162 LBS | OXYGEN SATURATION: 91 % | TEMPERATURE: 98.8 F | HEART RATE: 133 BPM | DIASTOLIC BLOOD PRESSURE: 81 MMHG | SYSTOLIC BLOOD PRESSURE: 120 MMHG | BODY MASS INDEX: 23.19 KG/M2

## 2025-05-01 DIAGNOSIS — R00.2 PALPITATIONS: Primary | ICD-10-CM

## 2025-05-01 DIAGNOSIS — R00.2 PALPITATIONS: ICD-10-CM

## 2025-05-01 DIAGNOSIS — R06.02 SHORTNESS OF BREATH: ICD-10-CM

## 2025-05-01 DIAGNOSIS — I48.92 ATRIAL FLUTTER, UNSPECIFIED TYPE (HCC): Primary | ICD-10-CM

## 2025-05-01 DIAGNOSIS — R07.9 ACUTE CHEST PAIN: ICD-10-CM

## 2025-05-01 PROBLEM — I48.91 FLUTTER-FIBRILLATION (HCC): Status: ACTIVE | Noted: 2025-05-01

## 2025-05-01 LAB
ALBUMIN SERPL-MCNC: 4.2 G/DL (ref 3.4–5)
ALBUMIN/GLOB SERPL: 1.5 {RATIO} (ref 1.1–2.2)
ALP SERPL-CCNC: 65 U/L (ref 40–129)
ALT SERPL-CCNC: 8 U/L (ref 10–40)
ANION GAP SERPL CALCULATED.3IONS-SCNC: 11 MMOL/L (ref 3–16)
APTT BLD: 24.6 SEC (ref 22.1–36.4)
AST SERPL-CCNC: 20 U/L (ref 15–37)
BASOPHILS # BLD: 0 K/UL (ref 0–0.2)
BASOPHILS NFR BLD: 0.8 %
BILIRUB SERPL-MCNC: 0.7 MG/DL (ref 0–1)
BILIRUB UR QL STRIP.AUTO: NEGATIVE
BUN SERPL-MCNC: 10 MG/DL (ref 7–20)
CALCIUM SERPL-MCNC: 9 MG/DL (ref 8.3–10.6)
CHLORIDE SERPL-SCNC: 103 MMOL/L (ref 99–110)
CLARITY UR: CLEAR
CO2 SERPL-SCNC: 25 MMOL/L (ref 21–32)
COLOR UR: YELLOW
CREAT SERPL-MCNC: 0.6 MG/DL (ref 0.8–1.3)
DEPRECATED RDW RBC AUTO: 13.9 % (ref 12.4–15.4)
EOSINOPHIL # BLD: 0.1 K/UL (ref 0–0.6)
EOSINOPHIL NFR BLD: 1.1 %
GFR SERPLBLD CREATININE-BSD FMLA CKD-EPI: >90 ML/MIN/{1.73_M2}
GLUCOSE SERPL-MCNC: 100 MG/DL (ref 70–99)
GLUCOSE UR STRIP.AUTO-MCNC: NEGATIVE MG/DL
HCT VFR BLD AUTO: 43 % (ref 40.5–52.5)
HGB BLD-MCNC: 14.6 G/DL (ref 13.5–17.5)
HGB UR QL STRIP.AUTO: NEGATIVE
INR PPP: 0.98 (ref 0.85–1.15)
KETONES UR STRIP.AUTO-MCNC: ABNORMAL MG/DL
LEUKOCYTE ESTERASE UR QL STRIP.AUTO: NEGATIVE
LYMPHOCYTES # BLD: 1.3 K/UL (ref 1–5.1)
LYMPHOCYTES NFR BLD: 25.8 %
MAGNESIUM SERPL-MCNC: 2.25 MG/DL (ref 1.8–2.4)
MCH RBC QN AUTO: 31.4 PG (ref 26–34)
MCHC RBC AUTO-ENTMCNC: 34 G/DL (ref 31–36)
MCV RBC AUTO: 92.5 FL (ref 80–100)
MONOCYTES # BLD: 0.5 K/UL (ref 0–1.3)
MONOCYTES NFR BLD: 9.9 %
NEUTROPHILS # BLD: 3.1 K/UL (ref 1.7–7.7)
NEUTROPHILS NFR BLD: 62.4 %
NITRITE UR QL STRIP.AUTO: NEGATIVE
NT-PROBNP SERPL-MCNC: 665 PG/ML (ref 0–124)
PH UR STRIP.AUTO: 5.5 [PH] (ref 5–8)
PLATELET # BLD AUTO: 228 K/UL (ref 135–450)
PMV BLD AUTO: 7.1 FL (ref 5–10.5)
POTASSIUM SERPL-SCNC: 3.9 MMOL/L (ref 3.5–5.1)
PROT SERPL-MCNC: 7 G/DL (ref 6.4–8.2)
PROT UR STRIP.AUTO-MCNC: NEGATIVE MG/DL
PROTHROMBIN TIME: 13.2 SEC (ref 11.9–14.9)
RBC # BLD AUTO: 4.65 M/UL (ref 4.2–5.9)
SODIUM SERPL-SCNC: 139 MMOL/L (ref 136–145)
SP GR UR STRIP.AUTO: 1.01 (ref 1–1.03)
TROPONIN, HIGH SENSITIVITY: <6 NG/L (ref 0–22)
TROPONIN, HIGH SENSITIVITY: <6 NG/L (ref 0–22)
UA COMPLETE W REFLEX CULTURE PNL UR: ABNORMAL
UA DIPSTICK W REFLEX MICRO PNL UR: ABNORMAL
URN SPEC COLLECT METH UR: ABNORMAL
UROBILINOGEN UR STRIP-ACNC: 0.2 E.U./DL
WBC # BLD AUTO: 4.9 K/UL (ref 4–11)

## 2025-05-01 PROCEDURE — G0378 HOSPITAL OBSERVATION PER HR: HCPCS

## 2025-05-01 PROCEDURE — 6370000000 HC RX 637 (ALT 250 FOR IP)

## 2025-05-01 PROCEDURE — 6370000000 HC RX 637 (ALT 250 FOR IP): Performed by: HOSPITALIST

## 2025-05-01 PROCEDURE — 99213 OFFICE O/P EST LOW 20 MIN: CPT | Performed by: FAMILY MEDICINE

## 2025-05-01 PROCEDURE — 84484 ASSAY OF TROPONIN QUANT: CPT

## 2025-05-01 PROCEDURE — 2500000003 HC RX 250 WO HCPCS: Performed by: EMERGENCY MEDICINE

## 2025-05-01 PROCEDURE — 71045 X-RAY EXAM CHEST 1 VIEW: CPT

## 2025-05-01 PROCEDURE — 6370000000 HC RX 637 (ALT 250 FOR IP): Performed by: NURSE PRACTITIONER

## 2025-05-01 PROCEDURE — 2060000000 HC ICU INTERMEDIATE R&B

## 2025-05-01 PROCEDURE — 85730 THROMBOPLASTIN TIME PARTIAL: CPT

## 2025-05-01 PROCEDURE — 1160F RVW MEDS BY RX/DR IN RCRD: CPT | Performed by: FAMILY MEDICINE

## 2025-05-01 PROCEDURE — 3079F DIAST BP 80-89 MM HG: CPT | Performed by: FAMILY MEDICINE

## 2025-05-01 PROCEDURE — 85025 COMPLETE CBC W/AUTO DIFF WBC: CPT

## 2025-05-01 PROCEDURE — 81003 URINALYSIS AUTO W/O SCOPE: CPT

## 2025-05-01 PROCEDURE — 93005 ELECTROCARDIOGRAM TRACING: CPT

## 2025-05-01 PROCEDURE — 1123F ACP DISCUSS/DSCN MKR DOCD: CPT | Performed by: FAMILY MEDICINE

## 2025-05-01 PROCEDURE — 83880 ASSAY OF NATRIURETIC PEPTIDE: CPT

## 2025-05-01 PROCEDURE — 96365 THER/PROPH/DIAG IV INF INIT: CPT

## 2025-05-01 PROCEDURE — 99285 EMERGENCY DEPT VISIT HI MDM: CPT

## 2025-05-01 PROCEDURE — 85610 PROTHROMBIN TIME: CPT

## 2025-05-01 PROCEDURE — 3074F SYST BP LT 130 MM HG: CPT | Performed by: FAMILY MEDICINE

## 2025-05-01 PROCEDURE — 93000 ELECTROCARDIOGRAM COMPLETE: CPT | Performed by: FAMILY MEDICINE

## 2025-05-01 PROCEDURE — 96375 TX/PRO/DX INJ NEW DRUG ADDON: CPT

## 2025-05-01 PROCEDURE — 1159F MED LIST DOCD IN RCRD: CPT | Performed by: FAMILY MEDICINE

## 2025-05-01 PROCEDURE — 83735 ASSAY OF MAGNESIUM: CPT

## 2025-05-01 PROCEDURE — 93005 ELECTROCARDIOGRAM TRACING: CPT | Performed by: EMERGENCY MEDICINE

## 2025-05-01 PROCEDURE — 80053 COMPREHEN METABOLIC PANEL: CPT

## 2025-05-01 PROCEDURE — 99223 1ST HOSP IP/OBS HIGH 75: CPT | Performed by: INTERNAL MEDICINE

## 2025-05-01 RX ORDER — TAMSULOSIN HYDROCHLORIDE 0.4 MG/1
0.4 CAPSULE ORAL NIGHTLY
Status: DISCONTINUED | OUTPATIENT
Start: 2025-05-01 | End: 2025-05-02 | Stop reason: HOSPADM

## 2025-05-01 RX ORDER — ENOXAPARIN SODIUM 100 MG/ML
40 INJECTION SUBCUTANEOUS DAILY
Status: DISCONTINUED | OUTPATIENT
Start: 2025-05-02 | End: 2025-05-02 | Stop reason: HOSPADM

## 2025-05-01 RX ORDER — ONDANSETRON 4 MG/1
4 TABLET, ORALLY DISINTEGRATING ORAL EVERY 8 HOURS PRN
Status: DISCONTINUED | OUTPATIENT
Start: 2025-05-01 | End: 2025-05-02 | Stop reason: HOSPADM

## 2025-05-01 RX ORDER — METOPROLOL SUCCINATE 25 MG/1
25 TABLET, EXTENDED RELEASE ORAL 2 TIMES DAILY
Status: DISCONTINUED | OUTPATIENT
Start: 2025-05-01 | End: 2025-05-02 | Stop reason: HOSPADM

## 2025-05-01 RX ORDER — METOPROLOL TARTRATE 1 MG/ML
5 INJECTION, SOLUTION INTRAVENOUS EVERY 6 HOURS
Status: DISCONTINUED | OUTPATIENT
Start: 2025-05-01 | End: 2025-05-01 | Stop reason: HOSPADM

## 2025-05-01 RX ORDER — SODIUM CHLORIDE 0.9 % (FLUSH) 0.9 %
5-40 SYRINGE (ML) INJECTION PRN
Status: DISCONTINUED | OUTPATIENT
Start: 2025-05-01 | End: 2025-05-02 | Stop reason: HOSPADM

## 2025-05-01 RX ORDER — AMIODARONE HYDROCHLORIDE 200 MG/1
200 TABLET ORAL 2 TIMES DAILY
Status: DISCONTINUED | OUTPATIENT
Start: 2025-05-01 | End: 2025-05-02 | Stop reason: HOSPADM

## 2025-05-01 RX ORDER — MAGNESIUM SULFATE IN WATER 40 MG/ML
2000 INJECTION, SOLUTION INTRAVENOUS PRN
Status: DISCONTINUED | OUTPATIENT
Start: 2025-05-01 | End: 2025-05-02 | Stop reason: HOSPADM

## 2025-05-01 RX ORDER — OXYCODONE AND ACETAMINOPHEN 5; 325 MG/1; MG/1
1 TABLET ORAL EVERY 4 HOURS PRN
COMMUNITY

## 2025-05-01 RX ORDER — CLOPIDOGREL BISULFATE 75 MG/1
75 TABLET ORAL DAILY
Status: DISCONTINUED | OUTPATIENT
Start: 2025-05-02 | End: 2025-05-02 | Stop reason: HOSPADM

## 2025-05-01 RX ORDER — SODIUM CHLORIDE 0.9 % (FLUSH) 0.9 %
5-40 SYRINGE (ML) INJECTION EVERY 12 HOURS SCHEDULED
Status: DISCONTINUED | OUTPATIENT
Start: 2025-05-01 | End: 2025-05-02 | Stop reason: HOSPADM

## 2025-05-01 RX ORDER — OXYCODONE HYDROCHLORIDE AND ACETAMINOPHEN 325; 5 MG/5ML; MG/5ML
5 SOLUTION ORAL EVERY 4 HOURS PRN
Status: DISCONTINUED | OUTPATIENT
Start: 2025-05-01 | End: 2025-05-01

## 2025-05-01 RX ORDER — PANTOPRAZOLE SODIUM 40 MG/1
40 TABLET, DELAYED RELEASE ORAL
Status: DISCONTINUED | OUTPATIENT
Start: 2025-05-02 | End: 2025-05-02 | Stop reason: HOSPADM

## 2025-05-01 RX ORDER — GABAPENTIN 100 MG/1
100 CAPSULE ORAL 3 TIMES DAILY
Status: DISCONTINUED | OUTPATIENT
Start: 2025-05-01 | End: 2025-05-02 | Stop reason: HOSPADM

## 2025-05-01 RX ORDER — ASPIRIN 81 MG/1
81 TABLET ORAL DAILY
Status: DISCONTINUED | OUTPATIENT
Start: 2025-05-02 | End: 2025-05-02 | Stop reason: HOSPADM

## 2025-05-01 RX ORDER — ACETAMINOPHEN 325 MG/1
650 TABLET ORAL EVERY 6 HOURS PRN
Status: DISCONTINUED | OUTPATIENT
Start: 2025-05-01 | End: 2025-05-02 | Stop reason: HOSPADM

## 2025-05-01 RX ORDER — SODIUM CHLORIDE 9 MG/ML
INJECTION, SOLUTION INTRAVENOUS PRN
Status: DISCONTINUED | OUTPATIENT
Start: 2025-05-01 | End: 2025-05-02 | Stop reason: HOSPADM

## 2025-05-01 RX ORDER — OXYCODONE AND ACETAMINOPHEN 5; 325 MG/1; MG/1
1 TABLET ORAL EVERY 6 HOURS PRN
Refills: 0 | Status: DISCONTINUED | OUTPATIENT
Start: 2025-05-01 | End: 2025-05-02 | Stop reason: HOSPADM

## 2025-05-01 RX ORDER — POTASSIUM CHLORIDE 7.45 MG/ML
10 INJECTION INTRAVENOUS PRN
Status: DISCONTINUED | OUTPATIENT
Start: 2025-05-01 | End: 2025-05-02 | Stop reason: HOSPADM

## 2025-05-01 RX ORDER — MIRTAZAPINE 15 MG/1
7.5 TABLET, FILM COATED ORAL NIGHTLY
Status: DISCONTINUED | OUTPATIENT
Start: 2025-05-01 | End: 2025-05-02 | Stop reason: HOSPADM

## 2025-05-01 RX ORDER — BUSPIRONE HYDROCHLORIDE 5 MG/1
5 TABLET ORAL 3 TIMES DAILY
Status: DISCONTINUED | OUTPATIENT
Start: 2025-05-01 | End: 2025-05-02 | Stop reason: HOSPADM

## 2025-05-01 RX ORDER — LIDOCAINE 4 G/G
1 PATCH TOPICAL DAILY PRN
Status: DISCONTINUED | OUTPATIENT
Start: 2025-05-01 | End: 2025-05-02 | Stop reason: HOSPADM

## 2025-05-01 RX ORDER — POLYETHYLENE GLYCOL 3350 17 G/17G
17 POWDER, FOR SOLUTION ORAL DAILY PRN
Status: DISCONTINUED | OUTPATIENT
Start: 2025-05-01 | End: 2025-05-02 | Stop reason: HOSPADM

## 2025-05-01 RX ORDER — POTASSIUM CHLORIDE 1500 MG/1
40 TABLET, EXTENDED RELEASE ORAL PRN
Status: DISCONTINUED | OUTPATIENT
Start: 2025-05-01 | End: 2025-05-02 | Stop reason: HOSPADM

## 2025-05-01 RX ORDER — ONDANSETRON 2 MG/ML
4 INJECTION INTRAMUSCULAR; INTRAVENOUS EVERY 6 HOURS PRN
Status: DISCONTINUED | OUTPATIENT
Start: 2025-05-01 | End: 2025-05-02 | Stop reason: HOSPADM

## 2025-05-01 RX ORDER — DILTIAZEM HCL IN NACL,ISO-OSM 125 MG/125
2.5-15 PLASTIC BAG, INJECTION (ML) INTRAVENOUS CONTINUOUS
Status: DISCONTINUED | OUTPATIENT
Start: 2025-05-01 | End: 2025-05-02

## 2025-05-01 RX ORDER — DILTIAZEM HYDROCHLORIDE 5 MG/ML
10 INJECTION INTRAVENOUS ONCE
Status: COMPLETED | OUTPATIENT
Start: 2025-05-01 | End: 2025-05-01

## 2025-05-01 RX ORDER — ACETAMINOPHEN 650 MG/1
650 SUPPOSITORY RECTAL EVERY 6 HOURS PRN
Status: DISCONTINUED | OUTPATIENT
Start: 2025-05-01 | End: 2025-05-02 | Stop reason: HOSPADM

## 2025-05-01 RX ADMIN — METOPROLOL TARTRATE 5 MG: 5 INJECTION INTRAVENOUS at 13:20

## 2025-05-01 RX ADMIN — OXYCODONE HYDROCHLORIDE AND ACETAMINOPHEN 1 TABLET: 5; 325 TABLET ORAL at 20:44

## 2025-05-01 RX ADMIN — DILTIAZEM HYDROCHLORIDE 10 MG: 5 INJECTION, SOLUTION INTRAVENOUS at 13:20

## 2025-05-01 RX ADMIN — AMIODARONE HYDROCHLORIDE 200 MG: 200 TABLET ORAL at 19:44

## 2025-05-01 RX ADMIN — MIRTAZAPINE 7.5 MG: 15 TABLET, FILM COATED ORAL at 19:44

## 2025-05-01 RX ADMIN — GABAPENTIN 100 MG: 100 CAPSULE ORAL at 19:44

## 2025-05-01 RX ADMIN — Medication 5 MG/HR: at 13:25

## 2025-05-01 RX ADMIN — BUSPIRONE HYDROCHLORIDE 5 MG: 5 TABLET ORAL at 19:44

## 2025-05-01 RX ADMIN — METOPROLOL SUCCINATE 25 MG: 25 TABLET, EXTENDED RELEASE ORAL at 19:44

## 2025-05-01 RX ADMIN — TAMSULOSIN HYDROCHLORIDE 0.4 MG: 0.4 CAPSULE ORAL at 19:44

## 2025-05-01 ASSESSMENT — LIFESTYLE VARIABLES
HOW OFTEN DO YOU HAVE A DRINK CONTAINING ALCOHOL: MONTHLY OR LESS
HOW MANY STANDARD DRINKS CONTAINING ALCOHOL DO YOU HAVE ON A TYPICAL DAY: 1 OR 2

## 2025-05-01 ASSESSMENT — PAIN DESCRIPTION - FREQUENCY: FREQUENCY: CONTINUOUS

## 2025-05-01 ASSESSMENT — PAIN DESCRIPTION - ONSET: ONSET: ON-GOING

## 2025-05-01 ASSESSMENT — PAIN DESCRIPTION - LOCATION: LOCATION: BACK

## 2025-05-01 ASSESSMENT — PAIN SCALES - GENERAL: PAINLEVEL_OUTOF10: 8

## 2025-05-01 ASSESSMENT — PAIN DESCRIPTION - PAIN TYPE: TYPE: CHRONIC PAIN

## 2025-05-01 ASSESSMENT — PAIN DESCRIPTION - ORIENTATION: ORIENTATION: LOWER

## 2025-05-01 ASSESSMENT — ENCOUNTER SYMPTOMS
VOMITING: 0
ABDOMINAL PAIN: 0
NAUSEA: 0
COUGH: 0
SHORTNESS OF BREATH: 1

## 2025-05-01 ASSESSMENT — PAIN DESCRIPTION - DESCRIPTORS: DESCRIPTORS: ACHING

## 2025-05-01 ASSESSMENT — PAIN - FUNCTIONAL ASSESSMENT
PAIN_FUNCTIONAL_ASSESSMENT: PREVENTS OR INTERFERES SOME ACTIVE ACTIVITIES AND ADLS
PAIN_FUNCTIONAL_ASSESSMENT: NONE - DENIES PAIN

## 2025-05-01 NOTE — CONSULTS
[facility]   Electrophysiology   Date: 5/1/2025  Reason for Consultation: Atrial fibrillation   Consult Requesting Physician: Vi Gunn MD     Chief Complaint   Patient presents with    Chest Pain     PT arrives via self from home C/O chest pain and abnormal heart beat. PT states he went to primary care this morning and they told him he was in a-fib and to come to ED. PT denies any current chest pain.        CC: Chest pain   HPI: Anjum Zaragoza is a 75 y.o. male with h/o atrial fibrillation, CAD (s/p PCI LAD 2022), CHRISTOPHER, aortic valve insufficiency (s/p AVR with L/R modified MAZE and Atriclip 1/2015 [facility], DELGADO ligation verified on CHIKIS), and oral squamous cell cancer, carotid stenosis, hypothyroidism, COPD, mitral regurgitation (s/p Mitral clip 1/2023 [facility]), severe tricuspid regurgitation, prostate cancer (s/p prostatectomy 1/25/2025)     S/p ILR implant (4/22/19). He developed recurrent symptomatic AF.      S/p RFCA with re-isolation of PV, roof line, complex atrial fractionated electogram, inferior-posterior line with PWI, CTI atrial flutter (7/15/20)     01/23/2023 - loop recorder removed and replaced.      Seen last in EP office 11/2023 and had 0% AF burden on loop. He was not considered ablation candidate d/t severe TR     Pt presented to ED from PCP office with chest pain and elevated HR. On arrival, EKG showed SVT vs AF with RVR with rate in 130s. He was given 5 mg bolus of metoprolol and started on Cardizem drip. HR has now gone down into 60s     He states he has been taking only a half tablet of Toprol (12.5 mg dose) daily. Telephone encounter from 4/17 with PCP states he was taking it this way. He states his HR woke him up last night around 3AM.     Assessment:   Atrial fibrillation with RVR   CAD s/p PCI LAD  Aortic insufficiency s/p porcine AVR  Severe MR s/p mitral clip  Severe TR  CHRISTOPHER with CPAP use     Plan:   Patient has presented to the hospital with symptomatic atrial

## 2025-05-01 NOTE — H&P
HOSPITALISTS HISTORY AND PHYSICAL    5/1/2025 2:41 PM    Patient Information:  FRANK ZARAGOZA is a 75 y.o. male [id number]  PCP:  Trista Sutton MD (Tel: None )    Chief complaint:    Chief Complaint   Patient presents with    Chest Pain     PT arrives via self from home C/O chest pain and abnormal heart beat. PT states he went to primary care this morning and they told him he was in a-fib and to come to ED. PT denies any current chest pain.         History of Present Illness:  Frank Zaragoza is a 75 y.o. male who presented with presents to ER with complaints of chest pain and abnormal heart rate.  Patient Cipro was seen by primary care doctor this morning was told that he was in A-fib and told to come to ER.  Patient has been having on and off chest pain.  With palpitation.  Denies any fevers chills does have a remote history of atrial fibrillation years ago.  Currently on aspirin Plavix f nothing that makes it better or worse.  Follows Dr. Henry and Dr. Desai last ablation was about 4 years ago  REVIEW OF SYSTEMS:   Constitutional: Negative for fever,chills or night sweats  ENT: Negative for rhinorrhea, epistaxis, hoarseness, sore throat.  Respiratory: Negative for shortness of breath,wheezing  Cardiovascular: Negative for chest pain, palpitations   Gastrointestinal: Negative for nausea, vomiting, diarrhea  Genitourinary: Negative for polyuria, dysuria   Hematologic/Lymphatic: Negative for bleeding tendency, easy bruising  Musculoskeletal: Negative for myalgias and arthralgias  Neurologic: Negative for confusion,dysarthria.  Skin: Negative for itching,rash, good capillary refill.   Psychiatric: Negative for depression,anxiety, agitation.  Endocrine: Negative for polydipsia,polyuria,heat /cold intolerance.    Past Medical History:   has a past medical history of  (HCC)  Resolved Problems:    * No resolved hospital problems. *        Assessment/Plan:   A-fib with RVR.  With flutter.  Symptomatic with chest pain.  Patient has been initiated on Cardizem drip.  Monitor closely.  Will admit for further evaluation echocardiogram.  Cardiology consult.    Acute chest pain from a flutter.  EKG noted.  Monitor closely further workup for chest pain.            Vi Gunn MD    5/1/2025 2:41 PM

## 2025-05-01 NOTE — PROGRESS NOTES
Patient admitted to room 3384 from ED.  Patient oriented to room, call light, bed rails, phone, lights and bathroom. Patient instructed about prescribed diet, how to use call light, and television. Telemetry box 3384 in place, patient aware of placement and reason. Bed locked, in lowest position, side rails up 2/4, call light within reach. Patient encouraged to call out with any needs.

## 2025-05-01 NOTE — PROGRESS NOTES
Chief Complaint: 1 Month Follow-Up (Fatigue; urinary incontinence) and Palpitations       HPI:  Anjum Zaragoza is a 75 y.o. male here  for 1 month follow-up on his fatigue.    His energy levels improved and has been doing well.  But since last night has been experiencing palpitations and feels his heart is racing.  Denies any shortness of breath or chest pain but he can feel his palpitations.    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     He has 66% stenosis of Proximal right cervical ICA. He is currently taking Plavix 75 mg and Leqvio infusions. The leqvio infusion are expensive for him  He was on lipitor 80 mg daily and was causing aches and myalgia and hence was stopped.    Coronary artery disease with mitral valve regurgitation, paroxysmal atrial fibrillation.  Metoprolol extended release 25 mg bid and he has been taking it.  He has recurrent mitral valve regurgitation.  Currently planning no surgery.    Hypothyroidism currently his dose was changed to 150 mcg and his tsh was normal.       He has been taking Remeron 7.5 mg for anxiety and its helping and also taking buspar 5 mg 3 times a day.     He has history of COPD and currently not been taking any inhalers.  Denies any concerns    ROS:  Constitutional: complaints of palpations since last night  HENT: Negative  Respiratory: Negative   Cardiovascular: As mentioned above  Gastrointestinal: Negative   Genitourinary: Negative   Musculoskeletal: Negative   Patient's problem list, medications, allergies, past medical, surgical, social and family histories were reviewed and updated as appropriate.

## 2025-05-01 NOTE — ED PROVIDER NOTES
[facility] EMERGENCY DEPARTMENT  EMERGENCY DEPARTMENT ENCOUNTER        Pt Name: Anjum Zaragoza  MRN: [id number]  Birthdate 1950  Date of evaluation: 5/1/2025  Provider: Darinel Arango PA-C  PCP: Trista Sutton MD  Note Started: 12:50 PM EDT 5/1/25       I have seen and evaluated this patient with my supervising physician Stevan Germain MD.      CHIEF COMPLAINT       Chief Complaint   Patient presents with    Chest Pain     PT arrives via self from home C/O chest pain and abnormal heart beat. PT states he went to primary care this morning and they told him he was in a-fib and to come to ED. PT denies any current chest pain.        HISTORY OF PRESENT ILLNESS: 1 or more Elements     History From: patient  Limitations to history : None    Anjum Zaragoza is a 75 y.o. male who presents to the emergency department with complaints of palpitations starting at 12:30 AM today.  He associates this with shortness of breath, fatigue and some chest discomfort.  He saw his PCP and they told him that he was likely in atrial fibrillation again.  He does have history of this.  His last ablation was around 4 years ago.  Dr. Henry and Dr. Vish Holguin are his cardiologists.    Nursing Notes were all reviewed and agreed with or any disagreements were addressed in the HPI.    REVIEW OF SYSTEMS :      Review of Systems   Constitutional:  Positive for fatigue. Negative for chills and fever.   HENT: Negative.     Eyes:  Negative for visual disturbance.   Respiratory:  Positive for shortness of breath. Negative for cough.    Cardiovascular:  Positive for chest pain and palpitations. Negative for leg swelling.   Gastrointestinal:  Negative for abdominal pain, nausea and vomiting.   Musculoskeletal: Negative.    Skin: Negative.    Neurological: Negative.    All other systems reviewed and are negative.      Positives and Pertinent negatives as per HPI.     SURGICAL HISTORY     Past Surgical History:

## 2025-05-01 NOTE — ED NOTES
Patient Name: Anjum Zaragoza  : 1950 75 y.o.  MRN: [id number]  ED Room #: ED-0030/30     Chief complaint:   Chief Complaint   Patient presents with    Chest Pain     PT arrives via self from home C/O chest pain and abnormal heart beat. PT states he went to primary care this morning and they told him he was in a-fib and to come to ED. PT denies any current chest pain.      Hospital Problem/Diagnosis:   Hospital Problems           Last Modified POA    * (Principal) Flutter-fibrillation (HCC) 2025 Yes         O2 Flow Rate:    (if applicable)  Cardiac Rhythm:   (if applicable)  Active LDA's:   Peripheral IV 25 Right Antecubital (Active)   Site Assessment Clean, dry & intact 25 1232            How does patient ambulate? Low Fall Risk (Ambulates by themselves without support    2. How does patient take pills? Unknown, no oral medications were given in the Emergency Department    3. Is patient alert? Alert    4. Is patient oriented? To Person, To Place, To Time, and To Situation    5.   Patient arrived from:  home  Facility Name: ___________________________________________    6. If patient is disoriented or from a Skill Nursing Facility has family been notified of admission? No    7. Patient belongings?     Disposition of belongings? Kept with Patient     8. Any specific patient or family belongings/needs/dynamics?   a.     9. Miscellaneous comments/pending orders?  a.       If there are any additional questions please reach out to the Emergency Department.

## 2025-05-01 NOTE — PROGRESS NOTES
Pharmacy Home Medication Reconciliation Note    A medication reconciliation has been completed for Anjum Zaragoza 1950    Pharmacy: [facility] [address] [place] [address]  Information provided by: patient    The patient's home medication list is as follows:  No current facility-administered medications on file prior to encounter.     Current Outpatient Medications on File Prior to Encounter   Medication Sig Dispense Refill    oxyCODONE-acetaminophen (PERCOCET) 5-325 MG per tablet Take 1 tablet by mouth every 4 hours as needed for Pain. Max Daily Amount: 6 tablets      vitamin D (CHOLECALCIFEROL) 25 MCG (1000 UT) TABS tablet Take 1 tablet by mouth Twice a Week      gabapentin (NEURONTIN) 100 MG capsule Take 1 capsule by mouth 3 times daily for 61 days. 180 capsule 0    busPIRone (BUSPAR) 5 MG tablet Take 1 tablet by mouth 3 times daily 270 tablet 0    triamcinolone (KENALOG) 0.1 % cream Apply topically 2 times daily. 45 g 2    polyethylene glycol (GLYCOLAX) 17 GM/SCOOP powder Take 17 g by mouth daily (Patient taking differently: Take 17 g by mouth every evening) 1530 g 1    diclofenac sodium (VOLTAREN) 1 % GEL Apply 2 g topically 4 times daily as needed for Pain      lidocaine (LIDODERM) 5 % Place 1 patch onto the skin every 24 hours      torsemide (DEMADEX) 10 MG tablet Take 1 tablet by mouth daily      mirtazapine (REMERON) 7.5 MG tablet Take 1 tablet by mouth nightly 90 tablet 0    levothyroxine (SYNTHROID) 175 MCG tablet Take 1 tablet by mouth once daily 30 tablet 0    pantoprazole (PROTONIX) 40 MG tablet TAKE 1 TABLET BY MOUTH TWICE DAILY BEFORE MEAL(S) (Patient taking differently: Take 1 tablet by mouth daily) 180 tablet 0    metoprolol succinate (TOPROL XL) 25 MG extended release tablet Take 1 tablet by mouth in the morning and at bedtime (Patient taking differently: Take 0.5 tablets by mouth in the morning and at bedtime) 60 tablet 5    clopidogrel (PLAVIX) 75 MG tablet Take 1 tablet by mouth once  daily 90 tablet 3    tiZANidine (ZANAFLEX) 4 MG tablet Take 1 tablet by mouth every 6 hours as needed      tamsulosin (FLOMAX) 0.4 MG capsule Take 1 capsule by mouth nightly      aspirin 81 MG EC tablet Take 1 tablet by mouth daily      nitroGLYCERIN (NITROSTAT) 0.4 MG SL tablet Place 1 tablet under the tongue every 5 minutes as needed for Chest pain 25 tablet 3    sennosides-docusate sodium (SENOKOT-S) 8.6-50 MG tablet Take 1 tablet by mouth daily (Patient not taking: Reported on 5/1/2025)      [DISCONTINUED] Diapers & Supplies MISC 1 each by Does not apply route in the morning, at noon, and at bedtime 180 each 3    [DISCONTINUED] methocarbamol (ROBAXIN) 500 MG tablet       diazePAM (VALIUM) 5 MG tablet TAKE 1 TABLET BY MOUTH 1 HOUR PRIOR TO PROCEDURE. MAY TAKE AN ADDITIONAL TABLET AT THE TIME OF TEST/PROCEDURE IF NEEDED. MUST HAVE A  (Patient not taking: Reported on 5/1/2025)      oxyCODONE-acetaminophen (ROXICET) 5-325 MG/5ML solution Take by mouth every 4 hours as needed for Pain. (Patient not taking: Reported on 5/1/2025)      [DISCONTINUED] Inclisiran Sodium (LEQVIO SC) Inject into the skin      [DISCONTINUED] Polyethylene Glycol 3350 (MIRALAX PO) Take by mouth in the morning and at bedtime      [DISCONTINUED] lisinopril (PRINIVIL;ZESTRIL) 2.5 MG tablet TAKE 1/2 TABLET BY MOUTH EVERY DAY 45 tablet 1    [DISCONTINUED] dilTIAZem (CARDIZEM CD) 120 MG extended release capsule Take 1 capsule by mouth daily 90 capsule 3       Patient is no longer taking diazepam or Senokot-S.    Of note, patient took all AM meds prior to ED arrival.    Timing of last doses updated.    Thank you,  Zohrhe Slater, CPhT

## 2025-05-02 VITALS
SYSTOLIC BLOOD PRESSURE: 103 MMHG | BODY MASS INDEX: 22.07 KG/M2 | WEIGHT: 154.2 LBS | OXYGEN SATURATION: 96 % | DIASTOLIC BLOOD PRESSURE: 75 MMHG | HEIGHT: 70 IN | HEART RATE: 73 BPM | RESPIRATION RATE: 16 BRPM | TEMPERATURE: 97.3 F

## 2025-05-02 DIAGNOSIS — G47.33 OBSTRUCTIVE SLEEP APNEA SYNDROME: Primary | Chronic | ICD-10-CM

## 2025-05-02 LAB
ANION GAP SERPL CALCULATED.3IONS-SCNC: 8 MMOL/L (ref 3–16)
BASOPHILS # BLD: 0 K/UL (ref 0–0.2)
BASOPHILS NFR BLD: 0.9 %
BUN SERPL-MCNC: 8 MG/DL (ref 7–20)
CALCIUM SERPL-MCNC: 8.7 MG/DL (ref 8.3–10.6)
CHLORIDE SERPL-SCNC: 104 MMOL/L (ref 99–110)
CO2 SERPL-SCNC: 27 MMOL/L (ref 21–32)
CREAT SERPL-MCNC: 0.6 MG/DL (ref 0.8–1.3)
DEPRECATED RDW RBC AUTO: 13.5 % (ref 12.4–15.4)
EKG ATRIAL RATE: 131 BPM
EKG DIAGNOSIS: NORMAL
EKG DIAGNOSIS: NORMAL
EKG P-R INTERVAL: 112 MS
EKG Q-T INTERVAL: 290 MS
EKG Q-T INTERVAL: 356 MS
EKG QRS DURATION: 66 MS
EKG QRS DURATION: 78 MS
EKG QTC CALCULATION (BAZETT): 428 MS
EKG QTC CALCULATION (BAZETT): 437 MS
EKG R AXIS: -17 DEGREES
EKG R AXIS: -20 DEGREES
EKG T AXIS: -16 DEGREES
EKG T AXIS: -7 DEGREES
EKG VENTRICULAR RATE: 131 BPM
EKG VENTRICULAR RATE: 91 BPM
EOSINOPHIL # BLD: 0.1 K/UL (ref 0–0.6)
EOSINOPHIL NFR BLD: 3 %
GFR SERPLBLD CREATININE-BSD FMLA CKD-EPI: >90 ML/MIN/{1.73_M2}
GLUCOSE SERPL-MCNC: 99 MG/DL (ref 70–99)
HCT VFR BLD AUTO: 41.8 % (ref 40.5–52.5)
HGB BLD-MCNC: 14.1 G/DL (ref 13.5–17.5)
LYMPHOCYTES # BLD: 1.1 K/UL (ref 1–5.1)
LYMPHOCYTES NFR BLD: 23.6 %
MCH RBC QN AUTO: 31.4 PG (ref 26–34)
MCHC RBC AUTO-ENTMCNC: 33.6 G/DL (ref 31–36)
MCV RBC AUTO: 93.4 FL (ref 80–100)
MONOCYTES # BLD: 0.6 K/UL (ref 0–1.3)
MONOCYTES NFR BLD: 11.8 %
NEUTROPHILS # BLD: 3 K/UL (ref 1.7–7.7)
NEUTROPHILS NFR BLD: 60.7 %
PLATELET # BLD AUTO: 206 K/UL (ref 135–450)
PMV BLD AUTO: 6.9 FL (ref 5–10.5)
POTASSIUM SERPL-SCNC: 3.8 MMOL/L (ref 3.5–5.1)
RBC # BLD AUTO: 4.48 M/UL (ref 4.2–5.9)
SODIUM SERPL-SCNC: 139 MMOL/L (ref 136–145)
WBC # BLD AUTO: 4.9 K/UL (ref 4–11)

## 2025-05-02 PROCEDURE — 6370000000 HC RX 637 (ALT 250 FOR IP)

## 2025-05-02 PROCEDURE — 85025 COMPLETE CBC W/AUTO DIFF WBC: CPT

## 2025-05-02 PROCEDURE — 2500000003 HC RX 250 WO HCPCS: Performed by: HOSPITALIST

## 2025-05-02 PROCEDURE — 80048 BASIC METABOLIC PNL TOTAL CA: CPT

## 2025-05-02 PROCEDURE — 6370000000 HC RX 637 (ALT 250 FOR IP): Performed by: HOSPITALIST

## 2025-05-02 PROCEDURE — 99232 SBSQ HOSP IP/OBS MODERATE 35: CPT | Performed by: NURSE PRACTITIONER

## 2025-05-02 PROCEDURE — 6370000000 HC RX 637 (ALT 250 FOR IP): Performed by: NURSE PRACTITIONER

## 2025-05-02 PROCEDURE — 36415 COLL VENOUS BLD VENIPUNCTURE: CPT

## 2025-05-02 PROCEDURE — 93010 ELECTROCARDIOGRAM REPORT: CPT | Performed by: INTERNAL MEDICINE

## 2025-05-02 PROCEDURE — 93005 ELECTROCARDIOGRAM TRACING: CPT | Performed by: HOSPITALIST

## 2025-05-02 PROCEDURE — G0378 HOSPITAL OBSERVATION PER HR: HCPCS

## 2025-05-02 RX ORDER — AMIODARONE HYDROCHLORIDE 200 MG/1
200 TABLET ORAL DAILY
Qty: 30 TABLET | Refills: 1 | Status: SHIPPED | OUTPATIENT
Start: 2025-05-02

## 2025-05-02 RX ORDER — PANTOPRAZOLE SODIUM 40 MG/1
40 TABLET, DELAYED RELEASE ORAL DAILY
COMMUNITY
Start: 2025-05-02

## 2025-05-02 RX ORDER — POLYETHYLENE GLYCOL 3350 17 G/17G
17 POWDER, FOR SOLUTION ORAL EVERY EVENING
COMMUNITY
Start: 2025-05-02

## 2025-05-02 RX ADMIN — CLOPIDOGREL BISULFATE 75 MG: 75 TABLET, FILM COATED ORAL at 10:30

## 2025-05-02 RX ADMIN — LEVOTHYROXINE SODIUM 175 MCG: 0.15 TABLET ORAL at 10:39

## 2025-05-02 RX ADMIN — BUSPIRONE HYDROCHLORIDE 5 MG: 5 TABLET ORAL at 10:31

## 2025-05-02 RX ADMIN — OXYCODONE HYDROCHLORIDE AND ACETAMINOPHEN 1 TABLET: 5; 325 TABLET ORAL at 10:31

## 2025-05-02 RX ADMIN — SODIUM CHLORIDE, PRESERVATIVE FREE 10 ML: 5 INJECTION INTRAVENOUS at 10:27

## 2025-05-02 RX ADMIN — PANTOPRAZOLE SODIUM 40 MG: 40 TABLET, DELAYED RELEASE ORAL at 10:29

## 2025-05-02 RX ADMIN — GABAPENTIN 100 MG: 100 CAPSULE ORAL at 10:31

## 2025-05-02 RX ADMIN — ASPIRIN 81 MG: 81 TABLET, COATED ORAL at 10:30

## 2025-05-02 RX ADMIN — AMIODARONE HYDROCHLORIDE 200 MG: 200 TABLET ORAL at 10:29

## 2025-05-02 RX ADMIN — MELATONIN TAB 3 MG 6 MG: 3 TAB at 01:39

## 2025-05-02 ASSESSMENT — PAIN SCALES - GENERAL: PAINLEVEL_OUTOF10: 0

## 2025-05-02 NOTE — PROGRESS NOTES
CLINICAL PHARMACY NOTE: MEDS TO BEDS    Total # of Prescriptions Filled: 1   The following medications were delivered to the patient:  Amiodarone hcl 200mg tabs    Additional Documentation: Patient picked up=signed  Aicha Naranjols Pharmacy tech

## 2025-05-02 NOTE — DISCHARGE SUMMARY
[facility] HOSPITALISTS DISCHARGE SUMMARY    Patient Demographics    Patient. Anjum Zaragoza  Date of Birth. 1950  MRN. [id number]     Primary care provider. Trista Sutton MD  (Tel: None)    Admit date: 5/1/2025    Discharge date (blank if same as Note Date):   Note Date: 5/2/2025     Reason for Hospitalization.   Chief Complaint   Patient presents with    Chest Pain     PT arrives via self from home C/O chest pain and abnormal heart beat. PT states he went to primary care this morning and they told him he was in a-fib and to come to ED. PT denies any current chest pain.          Significant Findings.   Principal Problem:    Flutter-fibrillation (HCC)  Resolved Problems:    * No resolved hospital problems. *       Problems and results from this hospitalization that need follow up.  PAF. Started on amiodarone. Follow up with EP in 2-3 months.    Significant test results and incidental findings.  XR CHEST PORTABLE   Final Result   No acute cardiopulmonary process.           Invasive procedures and treatments.   None     Problem-based Hospital Course.   76 yo male, hx CAD/PCI, PAF. He  presented to ER with palpitations associated with shortness of breath and chest discomfort, described as fluttering sensation. Was in atrial fib with RVR on presentation, started on diltiazem drip. Troponin WNL. Patient is s/p DELGADO ligation with Atriclip and MAZE and does not need AC. Patient was started on po amiodarone per EP. Plan was for cardioversion but he spontaneously converted back into sinus rhythm. He has been ambulatory in room, denies shortness of breath, chest discomfort, palpitations, lightheadedness. Eager for DC home. Reviewed with cardiology, no further testing planned this admission. Reviewed DC plans, follow up and medications. Expresses understanding. Questions answered.      Consults.  IP CONSULT TO HOSPITALIST  IP

## 2025-05-02 NOTE — CARE COORDINATION
Discharge Planning Note:    Chart reviewed and it appears that patient has minimal needs for discharge at this time. Risk Score 12 %     Primary Care Physician is Trista Sutton MD    Primary insurance is [place] [employer] medicare    Please notify case management if any discharge needs are identified.      Case management will continue to follow progress and update discharge plan as needed.    Electronically signed by Lenin Hair on 5/2/2025 at 8:59 AM

## 2025-05-02 NOTE — DISCHARGE INSTRUCTIONS
Your information:  Name: Anjum Zaragoza  : 1950    Your Discharge Instructions    What to do after you leave the hospital:    Read, review and familiarize yourself with the information provided below and in a separate packet on Afib, Cardioversion,Stroke    Diet: General    Recommended activity: As Tolerated  Avoid strenuous activity until instructed by your physician to resume; balance rest with periods of light to normal activity.     If you experience any of the following: Unusual or inadequately controlled pain; unusual transient shortness of breath; recurrent or persistent nausea, heartburn, palpitations or lightheadedness; increased swelling; increased fatigue; fever >100; please follow up with @PCP@ [unfilled] or go to the Emergency Room.      Home Health/ Outpatient Services: n/a      Information obtained by:  By signing below, I understand and acknowledge receipt of the instructions indicated above, and I understand that if any problems occur once I leave the hospital I am to contact [email or web address].

## 2025-05-02 NOTE — PROGRESS NOTES
Pt appears to have converted to sinus rhythm with 1st degree block at 0515. Orders for EKG placed to confirm.

## 2025-05-02 NOTE — PROGRESS NOTES
[facility]   EP Progress Note     Date: 5/2/2025  Admit Date: 5/1/2025     Reason for consultation: AF    Chief Complaint:   Chief Complaint   Patient presents with    Chest Pain     PT arrives via self from home C/O chest pain and abnormal heart beat. PT states he went to primary care this morning and they told him he was in a-fib and to come to ED. PT denies any current chest pain.        History of Present Illness: History obtained from patient and medical record.     Anjum Zaragoza is a 75 y.o. male with a past medical history of prostate/throat cancer. atrial fibrillation, CAD (s/p PCI LAD 2022), CHRISTOPHER, aortic valve insufficiency (s/p AVR with L/R modified MAZE and Atriclip 1/2015 [facility], DELGADO ligation verified on [facility]), and oral squamous cell cancer, carotid stenosis, hypothyroidism, COPD, mitral regurgitation (s/p Mitral clip 1/2023 [facility]), severe tricuspid regurgitation, prostate cancer (s/p prostatectomy 1/25/2025)      S/p RFCA with re-isolation of PV, roof line, complex atrial fractionated electogram, inferior-posterior line with PWI, CTI atrial flutter (7/15/20)    Pt presented to ED from PCP office with chest pain and elevated HR. On arrival, EKG showed SVT vs AF with RVR with rate in 130s. He was given 5 mg bolus of metoprolol and started on Cardizem drip. HR has now gone down into 60s     He states he has been taking only a half tablet of Toprol (12.5 mg dose) daily. Telephone encounter from 4/17 with PCP states he was taking it this way. He states his HR woke him up last night around 3AM.    Interval Hx: Today, he is being seen for follow up. He converted back to sinus rhythm this morning. He is feeling better. He is not wearing his CPAP at home. He is having issues affording his Repatha.     Patient seen and examined. Clinical notes reviewed. Telemetry reviewed.  No new complaints today. No major events overnight.   Denies having chest pain, palpitations, shortness of breath, orthopnea/PND,      GERD (gastroesophageal reflux disease)     Hearing loss     Heart disease     Hyperlipidemia     Medical history reviewed with no changes     Obstructive apnea does not use CPAP    Oropharyngeal dysphagia     Refusal of blood transfusions as patient is [employer]     Thyroid disease         Past Surgical History:    has a past surgical history that includes Lung surgery; knee surgery; Neck surgery; hernia repair; Aortic valve replacement (01/28/2015); Cardiac catheterization; Tonsillectomy and adenoidectomy; Mandible fracture surgery; Ankle surgery; back surgery; Pain management procedure (Right, 06/22/2020); Pain management procedure (Right, 07/08/2020); Atrial ablation surgery; Lumbar spine surgery (Right, 10/13/2020); fracture surgery; Upper gastrointestinal endoscopy (N/A, 01/19/2023); Upper gastrointestinal endoscopy (N/A, 09/25/2024); Colonoscopy (N/A, 09/25/2024); and Prostate surgery (01/31/2025).     Social History:  Reviewed.  reports that he quit smoking about 10 years ago. His smoking use included cigarettes. He started smoking about 50 years ago. He has a 45 pack-year smoking history. He has never been exposed to tobacco smoke. He has never used smokeless tobacco. He reports current alcohol use. He reports that he does not use drugs.     Family History:  Reviewed. family history includes Heart Disease in his father and another family member; High Blood Pressure in his father; High Cholesterol in his father; Hypertension in his father.     Review of Systems:  Constitutional: Negative for fever, night sweats, chills, weight changes, or weakness  Skin: Negative for rash, dry skin, pruritus, bruising, bleeding, blood clots, or changes in skin pigment  HEENT: Negative for vision changes, ringing in the ears, sore throat, dysphagia, or swollen lymph nodes  Respiratory: Reviewed in HPI  Cardiovascular: Reviewed in HPI  Gastrointestinal: Negative for abdominal pain, N/V/D, constipation, or

## 2025-05-03 LAB
EKG ATRIAL RATE: 74 BPM
EKG DIAGNOSIS: NORMAL
EKG P AXIS: 62 DEGREES
EKG P-R INTERVAL: 214 MS
EKG Q-T INTERVAL: 404 MS
EKG QRS DURATION: 82 MS
EKG QTC CALCULATION (BAZETT): 448 MS
EKG R AXIS: -9 DEGREES
EKG T AXIS: 13 DEGREES
EKG VENTRICULAR RATE: 74 BPM

## 2025-05-03 PROCEDURE — 93010 ELECTROCARDIOGRAM REPORT: CPT | Performed by: INTERNAL MEDICINE

## 2025-05-05 ENCOUNTER — CARE COORDINATION (OUTPATIENT)
Dept: CASE MANAGEMENT | Age: 75
End: 2025-05-05

## 2025-05-05 ENCOUNTER — TELEPHONE (OUTPATIENT)
Dept: FAMILY MEDICINE CLINIC | Age: 75
End: 2025-05-05

## 2025-05-05 NOTE — CARE COORDINATION
Care Transitions Note    Initial Call - Call within 2 business days of discharge: Yes    Attempted to reach patient for transitions of care follow up. Unable to reach patient.    Outreach Attempts:   HIPAA compliant voicemail left for patient.     Patient: Anjum Zaragoza    Patient : 1950   MRN: [id number]    Reason for Admission:   Discharge Date: 25  RURS: Readmission Risk Score: 11.4    Last Discharge Facility       Date Complaint Diagnosis Description Type Department Provider    25 Chest Pain Atrial flutter, unspecified type (HCC) ... ED to Hosp-Admission (Discharged) (ADMITTED) [facility] 3T Valerie Pollard MD; Polica...            Was this an external facility discharge? No    Follow Up Appointment:   Patient has hospital follow up appointment scheduled greater than 14 days after discharge;  .    Future Appointments         Provider Specialty Dept Phone    2025 9:30 AM Flower Marc APRN - CNP Cardiology [phone]    2025 9:00 AM Trista Sutton MD Family Medicine [phone]    2025 10:00 AM SCHEDULE, [place] DEVICE CHECK Cardiology [phone]    2025 10:15 AM Vish Holguin MD Cardiology [phone]    2025 8:30 AM ROOM 1 - ONC [place] Oncology [phone]    2025 8:45 AM  VASCULAR Vascular Surgery [phone]    2025 9:30 AM Navdeep Shankar APRN - NP Vascular Surgery [phone]            Plan for follow-up on next business day.      James Elliott RN

## 2025-05-05 NOTE — TELEPHONE ENCOUNTER
----- Message from Dr. Trista Sutton MD sent at 5/5/2025 11:17 AM EDT -----  He needs hospital follow up

## 2025-05-05 NOTE — TELEPHONE ENCOUNTER
Care Transitions Initial Follow Up Call    Outreach made within 2 business days of discharge: Yes    Patient: Anjum Zaragoza Patient : 1950   MRN: [id number]  Reason for Admission: Flutter-fibrillation   Discharge Date: 25       Spoke with: Anjum Zaragoza     Discharge department/facility: [facility]     TCM Interactive Patient Contact:  Was patient able to fill all prescriptions: Yes  Was patient instructed to bring all medications to the follow-up visit: Yes  Is patient taking all medications as directed in the discharge summary? Yes  Does patient understand their discharge instructions: Yes  Does patient have questions or concerns that need addressed prior to 7-14 day follow up office visit: no    Additional needs identified to be addressed with provider  No needs identified               Scheduled appointment with PCP within 7-14 days    Follow Up  Future Appointments   Date Time Provider Department Center   2025  1:00 PM Trista Sutton MD MMA [facility] ECC DEP   2025  9:30 AM Flower Marc APRN - CNP FF Cardio [facility]   2025  9:00 AM Trista Sutton MD MMA [facility] ECC DEP   2025 10:00 AM SCHEDULE, [place] DEVICE CHECK FF Cardio MMA   2025 10:15 AM Vish Holguin MD FF Cardio [facility]   2025  8:30 AM ROOM 1 - ONC [place] [facility] ONC [facility]   2025  8:45 AM FF VASCULAR FF VASC/ENDO [facility]   2025  9:30 AM Navdeep Shankar APRN - NP FF VASC/ENDO MMA       Manju Golden MA

## 2025-05-06 ENCOUNTER — OFFICE VISIT (OUTPATIENT)
Dept: FAMILY MEDICINE CLINIC | Age: 75
End: 2025-05-06

## 2025-05-06 ENCOUNTER — CARE COORDINATION (OUTPATIENT)
Dept: CASE MANAGEMENT | Age: 75
End: 2025-05-06

## 2025-05-06 VITALS
DIASTOLIC BLOOD PRESSURE: 78 MMHG | SYSTOLIC BLOOD PRESSURE: 110 MMHG | OXYGEN SATURATION: 96 % | BODY MASS INDEX: 22.85 KG/M2 | WEIGHT: 159.6 LBS | TEMPERATURE: 97.2 F | HEIGHT: 70 IN | HEART RATE: 76 BPM

## 2025-05-06 DIAGNOSIS — I48.0 PAF (PAROXYSMAL ATRIAL FIBRILLATION) (HCC): Chronic | ICD-10-CM

## 2025-05-06 DIAGNOSIS — E78.2 MIXED HYPERLIPIDEMIA: ICD-10-CM

## 2025-05-06 DIAGNOSIS — Z09 HOSPITAL DISCHARGE FOLLOW-UP: Primary | ICD-10-CM

## 2025-05-06 DIAGNOSIS — I10 ESSENTIAL HYPERTENSION: Chronic | ICD-10-CM

## 2025-05-06 NOTE — CARE COORDINATION
Care Transitions Note    Initial Call - Call within 2 business days of discharge: Yes    Attempted to reach patient for transitions of care follow up. Unable to reach patient.    Outreach Attempts:   HIPAA compliant voicemail left for patient.     Patient: Anjum Zaragoza    Patient : 1950   MRN: [id number]    Reason for Admission:   Discharge Date: 25  RURS: Readmission Risk Score: 11.4    Last Discharge Facility       Date Complaint Diagnosis Description Type Department Provider    25 Chest Pain Atrial flutter, unspecified type (HCC) ... ED to Hosp-Admission (Discharged) (ADMITTED) [facility] 3T Valerie Pollard MD; [place]...            Was this an external facility discharge? No    Follow Up Appointment:   Patient has hospital follow up appointment scheduled within 7 days of discharge.    Future Appointments         Provider Specialty Dept Phone    2025 1:00 PM Trista Sutton MD Family Medicine [phone]    2025 9:30 AM Flower Marc APRN - CNP Cardiology [phone]    2025 9:00 AM Trista Sutton MD Family Medicine [phone]    2025 10:00 AM SCHEDULE, [place] DEVICE CHECK Cardiology [phone]    2025 10:15 AM Vish Holguin MD Cardiology [phone]    2025 8:30 AM ROOM 1 - ONC [place] Oncology [phone]    2025 8:45 AM FF VASCULAR Vascular Surgery [phone]    2025 9:30 AM Navdeep Shankar APRN - NP Vascular Surgery [phone]            No further follow-up call indicated     James Elliott RN

## 2025-05-08 RX ORDER — METOPROLOL SUCCINATE 25 MG/1
25 TABLET, EXTENDED RELEASE ORAL NIGHTLY
COMMUNITY
Start: 2025-05-08 | End: 2025-05-10 | Stop reason: SDUPTHER

## 2025-05-08 NOTE — PROGRESS NOTES
Post-Discharge Transitional Care  Follow Up      Anjum Zaragoza   YOB: 1950    Date of Office Visit:  5/6/2025  Date of Hospital Admission: 5/1/25  Date of Hospital Discharge: 5/2/25  Risk of hospital readmission (high >=14%. Medium >=10%) :Readmission Risk Score: 11.4      Care management risk score Rising risk (score 2-5) and Complex Care (Scores >=6): No Risk Score On File     Non face to face  following discharge, date last encounter closed (first attempt may have been earlier): 05/06/2025    Call initiated 2 business days of discharge: Yes    ASSESSMENT/PLAN:   Hospital discharge follow-up  Reviewed all the records.  Call Dr. Desai's office for the follow-up appointment.     PAF (paroxysmal atrial fibrillation) (HCC)  Currently on amiodarone 200 mg daily.    On Plavix.    Essential hypertension  Currently stable continue metoprolol extended release 25 mg daily    Mixed hyperlipidemia  Getting the infusions of Leqvio  He wanted to get back on statins.    Medical Decision Making: high complexity    On this date 5/6/2025 I have spent 45 minutes reviewing previous notes, test results and face to face with the patient discussing the diagnosis and importance of compliance with the treatment plan as well as documenting on the day of the visit.       Subjective:   HPI:  Follow up of Hospital problems/diagnosis(es): 75-year-old male with history of coronary artery disease s/p 2 stents, paroxysmal atrial fibrillation, aortic valve insufficiency underwent replacement and had mitral valve clip placed for mitral regurgitation, squamous cell carcinoma of his tongue currently on remission, chronic lower back due to lumbar disc degeneration, hypothyroidism, anxiety and prostate cancer s/p prostatectomy went to ER for palpitations and was found to be in AF and was started on cardizem drip and discharged on amiodarone.    Currently he is on amiodarone 200 mg daily, metoprolol extended release 25 mg daily.  He was

## 2025-05-09 NOTE — ED NOTES
In addition to the advanced practice provider, I personally saw Anjum Zaragoza and performed a substantive portion of the visit including all aspects of the medical decision making. I made/approved the management plan and take responsibility for the patient management    Briefly, this is a 75 y.o. male here for to the ER for evaluation of positive palpitations atrial flutter, paroxysmal atrial fibrillation, positive chest pain and palpitations and shortness of breath.    On exam, irregular, tachycardic    EKG  EKG was reviewed by emergency department physician in the absence of a cardiologist  Atrial flutter, nonspecific ST segment      Screenings  NIH Stroke Scale  NIH Stroke Scale Assessed: NoGlasgow Coma Scale  Eye Opening: Spontaneous  Best Verbal Response: Oriented  Best Motor Response: Obeys commands  [place] Coma Scale Score: 15      I    MDM  Patient presents ER for evaluation of positive chest pain dyspnea, with underlying atrial dysrhythmias.  Patient will undergo cardiovascular rule out need for anticoagulation dysrhythmias antidysrhythmic's and further management per the admitting and consulting physician    I Dr. Germain am the primary clinician of record.        Patient Referrals:  Trista Sutton MD  [address]  [address]  [place] [facility] [address]  [phone]    Schedule an appointment as soon as possible for a visit in 1 week(s)  For Hospital Follow-up      Discharge Medications:  Discharge Medication List as of 5/2/2025  3:33 PM        START taking these medications    Details   amiodarone (CORDARONE) 200 MG tablet Take 1 tablet by mouth daily, Disp-30 tablet, R-1Normal             FINAL IMPRESSION  1. Atrial flutter, unspecified type (HCC)    2. Acute chest pain    3. Palpitations    4. Shortness of breath        Blood pressure 103/75, pulse 73, temperature 97.3 °F (36.3 °C), temperature source Oral, resp. rate 16, height 1.778 m (5' 10\"), weight 69.9 kg (154 lb 3.2 oz),

## 2025-05-10 ENCOUNTER — TELEPHONE (OUTPATIENT)
Dept: CARDIOLOGY CLINIC | Age: 75
End: 2025-05-10

## 2025-05-10 RX ORDER — METOPROLOL SUCCINATE 25 MG/1
37.5 TABLET, EXTENDED RELEASE ORAL DAILY
Qty: 30 TABLET | Refills: 1
Start: 2025-05-10

## 2025-05-10 NOTE — TELEPHONE ENCOUNTER
Patient called in to on call line. He is having c/o chest pain and neck/jaw pain. This is not new for him. His neck/jaw pain has been ongoing for years as well as chest discomfort.     He was recently in the hospital for Afib rvr and started on amiodarone.     Called patient: HR 78 /95. He did confirm he is taking Toprol 25 mg at night. Per [facility], add another 1/2 tablet of Toprol in the morning.     Will call back Monday to see how he is feeling.

## 2025-05-12 ENCOUNTER — TELEPHONE (OUTPATIENT)
Dept: FAMILY MEDICINE CLINIC | Age: 75
End: 2025-05-12

## 2025-05-12 DIAGNOSIS — I34.0 SEVERE MITRAL REGURGITATION: Primary | ICD-10-CM

## 2025-05-12 NOTE — TELEPHONE ENCOUNTER
Pt called in with a list of heart surgeons that he would like the Dr to look at and see which one they prefer him to go to.     [phone]    [facility]    [phone]    [facility]     [phone]    [facility]            [phone]    Please advise

## 2025-05-16 NOTE — TELEPHONE ENCOUNTER
Mouna spoke with him earlier, he felt better when we adjusted meds last weekend, then he went back to his normal dose. Mouna spoke with him and he will go back to the 1/2 dose metoprolol AM and full dose PM. There is another encounter with details. thanks

## 2025-05-22 ENCOUNTER — TELEPHONE (OUTPATIENT)
Dept: CARDIOTHORACIC SURGERY | Age: 75
End: 2025-05-22

## 2025-05-22 NOTE — PROGRESS NOTES
[facility]   New Patient Clinic Note     5/22/2025 6:12 PM  Surgeon:  Devendra Tolbert for : SEVERE MITRAL REGURG    Chief complaint :  Subjective:  Mr. Zaragoza is a 74 yo gentleman with a history of MR/TR. He underwent AVR in 2015. He was seen at [facility] in 2022 for CAD and was found to be a high risk operative candidate due to his heart history and was offered other options at that time.  Since then he had a Mitraclip placed in 1/2023. He was seen by EP earlier this month for longstanding hx of Atrial fibrillation. He then presented to the ER with c/o palpitations, Atrial Fibrillation, chest pain and shortness of breath. He is being seen for his severe Mitral Regurgitation.      Review of Systems  lower back pain at times difficulty with his posture secondary to his back.  Shortness of breath and at times some dyspnea on exertion.  lower extremity swelling at times however not severe.  prior aortic valve replacement with a tissue valve by Dr. Tinajero 12 years ago.  previous MitraClip at [facility] 2 to 3 years ago.  He denies stroke.  prior history of throat cancer.    Past Medical History:        Diagnosis Date    Aortic valve insufficiency     Arthritis     Atrial fibrillation (HCC)     Cancer (HCC) 09/2017    TONGUE head and neck IN REMISSION    Dysphonia     Encounter for imaging to screen for metal prior to MRI 08/22/2022    MRI conditional Medtronic Linq loop recorder model#[id number] Reveal LINQ implanted 4/22/19. Normal Mode. 1.5T or 3.0T. Download data prior to MRI. Follow all other Medtronic guidelines. Pt currently follows     GERD (gastroesophageal reflux disease)     Hearing loss     Heart disease     Hyperlipidemia     Medical history reviewed with no changes     Obstructive apnea does not use CPAP    Oropharyngeal dysphagia     Refusal of blood transfusions as patient is [employer]     Thyroid disease        Past Surgical History:      
        Vital Signs:  There were no vitals taken for this visit.     Exam:   Physical Exam      Chest X-Ray:  Xray Result (most recent):  XR CHEST PORTABLE 05/01/2025    Narrative  EXAMINATION:  ONE XRAY VIEW OF THE CHEST    5/1/2025 1:10 pm    COMPARISON:  09/01/2024    HISTORY:  ORDERING SYSTEM PROVIDED HISTORY: SOB  TECHNOLOGIST PROVIDED HISTORY:  Reason for exam:->SOB  Reason for Exam: SOB    FINDINGS:  The lungs appear clear.  Heart is unremarkable.  Patient is status post  sternotomy.  There is a left atrial appendage clip in place.  There is a clip  overlying the mitral valve.  Loop recorder overlies the heart.  Bony  structures unremarkable.  There is no change from prior examination.    Impression  No acute cardiopulmonary process.        CT Scan: CT Result (most recent):  CT CERVICAL SPINE WO CONTRAST 10/21/2024    Narrative  EXAMINATION:  CT OF THE CERVICAL SPINE WITHOUT CONTRAST 10/21/2024 3:39 pm    TECHNIQUE:  CT of the cervical spine was performed without the administration of  intravenous contrast. Multiplanar reformatted images are provided for review.  Automated exposure control, iterative reconstruction, and/or weight based  adjustment of the mA/kV was utilized to reduce the radiation dose to as low  as reasonably achievable.    COMPARISON:  CTA brain and neck 09/01/2024.  Cervical spine MRI 09/04/2024.  CT lung  cancer screen 05/22/2023.    HISTORY:  ORDERING SYSTEM PROVIDED HISTORY: Cervical spinal stenosis  TECHNOLOGIST PROVIDED HISTORY:  Reason for Exam: Cervical spinal stenosis    FINDINGS:  Vertebral Body Height: No acute fracture.  Unchanged chronic mild anterior  vertebral body height loss at C5..    Alignment: 4 mm degenerative retrolisthesis C3 on C4.  2 mm compensatory  anterolisthesis C2 on C3..    Disc spaces: Multilevel severe disc height loss spanning C3-C4 through C6-C7.  Mild C2-C3 disc height loss.  Multilevel endplate osteophytes.    Posterior Elements: No fracture.  Multilevel

## 2025-05-22 NOTE — TELEPHONE ENCOUNTER
LM for patient regarding confirmation of appointment with Dr. Stevan Ramsay on 5/23/25 at 2:00 pm.   No

## 2025-05-23 ENCOUNTER — OFFICE VISIT (OUTPATIENT)
Age: 75
End: 2025-05-23
Payer: MEDICARE

## 2025-05-23 VITALS
BODY MASS INDEX: 21.9 KG/M2 | WEIGHT: 153 LBS | HEIGHT: 70 IN | OXYGEN SATURATION: 97 % | SYSTOLIC BLOOD PRESSURE: 102 MMHG | DIASTOLIC BLOOD PRESSURE: 70 MMHG | TEMPERATURE: 97.9 F | HEART RATE: 74 BPM

## 2025-05-23 DIAGNOSIS — I48.0 PAROXYSMAL ATRIAL FIBRILLATION (HCC): Primary | ICD-10-CM

## 2025-05-23 DIAGNOSIS — I34.0 NONRHEUMATIC MITRAL VALVE REGURGITATION: ICD-10-CM

## 2025-05-23 PROCEDURE — 1123F ACP DISCUSS/DSCN MKR DOCD: CPT | Performed by: THORACIC SURGERY (CARDIOTHORACIC VASCULAR SURGERY)

## 2025-05-23 PROCEDURE — 3078F DIAST BP <80 MM HG: CPT | Performed by: THORACIC SURGERY (CARDIOTHORACIC VASCULAR SURGERY)

## 2025-05-23 PROCEDURE — 3074F SYST BP LT 130 MM HG: CPT | Performed by: THORACIC SURGERY (CARDIOTHORACIC VASCULAR SURGERY)

## 2025-05-23 PROCEDURE — 1159F MED LIST DOCD IN RCRD: CPT | Performed by: THORACIC SURGERY (CARDIOTHORACIC VASCULAR SURGERY)

## 2025-05-23 PROCEDURE — 99204 OFFICE O/P NEW MOD 45 MIN: CPT | Performed by: THORACIC SURGERY (CARDIOTHORACIC VASCULAR SURGERY)

## 2025-05-27 ENCOUNTER — CLINICAL DOCUMENTATION (OUTPATIENT)
Facility: HOSPITAL | Age: 75
End: 2025-05-27

## 2025-05-27 NOTE — PROGRESS NOTES
Patient Assistance    Met with:     Navigator Type: Infusion  Documentation Type: Assistance Review  Contact Type: Telephone  Status of Patient Insurance Coverage: Patient has active coverage  Form of PAP Assistance: [employer] Assistance  Patient Assistance Sponsor Name: [employer]          Additional notes:     Other Drug Name: Leqvio  Form of PAP Assistance: Copay / Foundation (Approved)

## 2025-05-28 RX ORDER — PANTOPRAZOLE SODIUM 40 MG/1
TABLET, DELAYED RELEASE ORAL
Qty: 180 TABLET | Refills: 0 | Status: SHIPPED | OUTPATIENT
Start: 2025-05-28

## 2025-05-28 RX ORDER — AMIODARONE HYDROCHLORIDE 200 MG/1
200 TABLET ORAL DAILY
Qty: 30 TABLET | Refills: 1 | Status: SHIPPED | OUTPATIENT
Start: 2025-05-28

## 2025-05-28 NOTE — TELEPHONE ENCOUNTER
Medication:   Requested Prescriptions     Pending Prescriptions Disp Refills    pantoprazole (PROTONIX) 40 MG tablet [Pharmacy Med Name: Pantoprazole Sodium 40 MG Oral Tablet Delayed Release] 180 tablet 0     Sig: TAKE 1 TABLET BY MOUTH TWICE DAILY BEFORE MEAL(S)        Last Filled:  5/2/2025    Patient Phone Number: [phone] (home)     Last appt: 5/6/2025   Next appt: 6/25/2025    Last OARRS:       12/30/2019     4:14 PM   RX Monitoring   Periodic Controlled Substance Monitoring No signs of potential drug abuse or diversion identified.

## 2025-05-28 NOTE — TELEPHONE ENCOUNTER
Medication Refill    Medication needing refilled:  amiodarone (CORDARONE)     Dosage of the medication:  200 MG tablet     How are you taking this medication (QD, BID, TID, QID, PRN):  Take 1 tablet by mouth daily     30 or 90 day supply called in:  90    When will you run out of your medication:    Which Pharmacy are we sending the medication to?:  [facility] [address] [place] [address] [phone] -  [phone]

## 2025-06-05 ENCOUNTER — OFFICE VISIT (OUTPATIENT)
Dept: CARDIOLOGY CLINIC | Age: 75
End: 2025-06-05
Payer: MEDICARE

## 2025-06-05 ENCOUNTER — TELEPHONE (OUTPATIENT)
Dept: CARDIOLOGY CLINIC | Age: 75
End: 2025-06-05

## 2025-06-05 VITALS
SYSTOLIC BLOOD PRESSURE: 114 MMHG | BODY MASS INDEX: 22.9 KG/M2 | OXYGEN SATURATION: 98 % | HEIGHT: 70 IN | HEART RATE: 70 BPM | DIASTOLIC BLOOD PRESSURE: 62 MMHG | WEIGHT: 160 LBS

## 2025-06-05 DIAGNOSIS — I10 ESSENTIAL HYPERTENSION: ICD-10-CM

## 2025-06-05 DIAGNOSIS — E78.2 MIXED HYPERLIPIDEMIA: ICD-10-CM

## 2025-06-05 DIAGNOSIS — I35.1 NONRHEUMATIC AORTIC VALVE INSUFFICIENCY: ICD-10-CM

## 2025-06-05 DIAGNOSIS — I48.0 PAF (PAROXYSMAL ATRIAL FIBRILLATION) (HCC): Primary | ICD-10-CM

## 2025-06-05 DIAGNOSIS — I34.0 NONRHEUMATIC MITRAL VALVE REGURGITATION: ICD-10-CM

## 2025-06-05 PROCEDURE — 1123F ACP DISCUSS/DSCN MKR DOCD: CPT | Performed by: NURSE PRACTITIONER

## 2025-06-05 PROCEDURE — 99214 OFFICE O/P EST MOD 30 MIN: CPT | Performed by: NURSE PRACTITIONER

## 2025-06-05 PROCEDURE — 1160F RVW MEDS BY RX/DR IN RCRD: CPT | Performed by: NURSE PRACTITIONER

## 2025-06-05 PROCEDURE — 3078F DIAST BP <80 MM HG: CPT | Performed by: NURSE PRACTITIONER

## 2025-06-05 PROCEDURE — 1159F MED LIST DOCD IN RCRD: CPT | Performed by: NURSE PRACTITIONER

## 2025-06-05 PROCEDURE — 3074F SYST BP LT 130 MM HG: CPT | Performed by: NURSE PRACTITIONER

## 2025-06-05 NOTE — PROGRESS NOTES
[facility]     Outpatient Follow Up Note    CHIEF COMPLAINT / HPI: Hospital Follow Up secondary to AF    Hospital record has been reviewed  Hospital Course progressed as follows per discharge summary: 5/1 - 5/2/25    PT arrives via self from home C/O chest pain and abnormal heart beat. PT states he went to primary care this morning and they told him he was in a-fib and to come to ED. PT denies any current chest pain.      76 yo male, hx CAD/PCI, PAF. He  presented to ER with palpitations associated with shortness of breath and chest discomfort, described as fluttering sensation. Was in atrial fib with RVR on presentation, started on diltiazem drip. Troponin WNL. Patient is s/p DELGADO ligation with Atriclip and MAZE and does not need AC. ~Patient was started on po amiodarone per EP. Plan was for cardioversion but he spontaneously converted back into sinus rhythm.   He has been ambulatory in room, denies shortness of breath, chest discomfort, palpitations, lightheadedness. Eager for DC home. Reviewed with cardiology, no further testing planned this admission. Reviewed DC plans, follow up and medications.       Anjum Zaragoza is 75 y.o. male who presents today for a routine follow up after a recent hospitalization related to the above mentioned issues. He recalls waking with his chest pounding and shaking; felt like a crank shaft.   Subjective:   Since the time of discharge, the patient admits their symptoms have improved.  He's had a little chest discomfort. Nothing brings it on.  It doesn't last long. He's not walking much since getting home as instructed.     He has numbness in his feet and they shake. It started about 3 months ago. He'd gone to [facility] and planned a decompression surgery. Surgery was cancelled and he now gets injections / lidocaine patch.      He has a little SOB, ie talking too much and walking around the building (senior community), he gets a little swelling in his legs noticed by 
developed  NEUROLOGIC:  Awake and orientated to person, place and time.  PSYCH: Calm affect.  SKIN: Warm and dry.  HEENT: Sclera non-icteric, normocephalic, neck supple, no elevation of JVP, normal carotid pulses with no bruits and thyroid normal size.  LUNGS:  No increased work of breathing and clear to auscultation, no crackles or wheezing  CARDIOVASCULAR:  Regular rate and rhythm with murmurs, gallops, rubs, or abnormal heart sounds, normal PMI.The apical impulses not displaced  JVP less than 8 cm H2O  Heart tones are crisp and normal  Cervical veins are not engorged  The carotid upstroke is normal in amplitude and contour without delay or bruit  JVP is not elevated  ABDOMEN:  Normal bowel sounds, non-distended and non-tender to palpation  EXT: No edema, no calf tenderness. Pulses are present bilaterally.    DATA:    Lab Results   Component Value Date    ALT 8 (L) 05/01/2025    AST 20 05/01/2025    ALKPHOS 65 05/01/2025    BILITOT 0.7 05/01/2025     Lab Results   Component Value Date    CREATININE 0.6 (L) 05/02/2025    BUN 8 05/02/2025     05/02/2025    K 3.8 05/02/2025     05/02/2025    CO2 27 05/02/2025     Lab Results   Component Value Date    TSH 2.56 05/28/2024     Lab Results   Component Value Date    WBC 4.9 05/02/2025    HGB 14.1 05/02/2025    HCT 41.8 05/02/2025    MCV 93.4 05/02/2025     05/02/2025     No components found for: \"CHLPL\"  Lab Results   Component Value Date    TRIG 110 02/03/2025    TRIG 114 09/03/2024    TRIG 85 05/28/2024     Lab Results   Component Value Date    HDL 44 02/03/2025    HDL 40 09/03/2024    HDL 54 05/28/2024       Radiology Review:  Pertinent images / reports were reviewed as a part of this visit and reveals the following:    Carotid duplex 9/4/24:  Interpretation Summary    Moderate (60-69%) stenosis in the mid right internal carotid artery.  Heterogeneous and irregular plaque in the right internal carotid artery.    Mild (<50%) stenosis in the left

## 2025-06-05 NOTE — PATIENT INSTRUCTIONS
Tomorrow, take metoprolol in the morning and amiodarone before bedtime    Take tonight's metoprolol with your last meal    Keep appt with Dr. Wahl in Aug

## 2025-06-05 NOTE — TELEPHONE ENCOUNTER
Lvm for Pt to call to get scheduled w/[facility] on 11/14 at 10am for 30 mins - if pt needs a sooner appt would need to schedule at [facility] - Per abebe Pretty

## 2025-06-05 NOTE — TELEPHONE ENCOUNTER
The patient was seen today by NPTS and she would like him to return in 4-5 mos with [facility]. The patient wants to come to the [place] location. Please advise. Thank you

## 2025-06-06 DIAGNOSIS — F41.9 ANXIETY: ICD-10-CM

## 2025-06-06 RX ORDER — LEVOTHYROXINE SODIUM 175 UG/1
175 TABLET ORAL DAILY
Qty: 90 TABLET | Refills: 0 | Status: SHIPPED | OUTPATIENT
Start: 2025-06-06

## 2025-06-06 RX ORDER — MIRTAZAPINE 7.5 MG/1
7.5 TABLET, FILM COATED ORAL NIGHTLY
Qty: 90 TABLET | Refills: 0 | Status: SHIPPED | OUTPATIENT
Start: 2025-06-06

## 2025-06-06 NOTE — TELEPHONE ENCOUNTER
Medication:   Requested Prescriptions     Pending Prescriptions Disp Refills    levothyroxine (SYNTHROID) 175 MCG tablet [Pharmacy Med Name: Levothyroxine Sodium 175 MCG Oral Tablet] 90 tablet 0     Sig: Take 1 tablet by mouth once daily    mirtazapine (REMERON) 7.5 MG tablet [Pharmacy Med Name: Mirtazapine 7.5 MG Oral Tablet] 90 tablet 0     Sig: Take 1 tablet by mouth nightly        Last Filled:  2/11/2025    Patient Phone Number: [phone] (home)     Last appt: 5/6/2025   Next appt: 6/25/2025    Last OARRS:       12/30/2019     4:14 PM   RX Monitoring   Periodic Controlled Substance Monitoring No signs of potential drug abuse or diversion identified.

## 2025-06-11 ENCOUNTER — TELEPHONE (OUTPATIENT)
Dept: FAMILY MEDICINE CLINIC | Age: 75
End: 2025-06-11

## 2025-06-11 NOTE — TELEPHONE ENCOUNTER
Pt said he needs help with his sleep apnea. His cardiac surgeon said that it would help with his afib. Pt stated he had a machine before but couldn't wear the masks and got rid of it.     Please advise

## 2025-06-12 NOTE — TELEPHONE ENCOUNTER
I believe cardiology referred to:     Referred By: Cody Ulloa APRN - CNP NPI: [id number]   Referral Reason: Specialty Services Required         Referred To: [facility]  [address], [address]  [place] [id number]    [facility]  [address]  [place] [address] Loc/POS:                Phone: [phone] Phone:     Fax: [phone] Fax:        Please can you confirm?

## 2025-06-16 DIAGNOSIS — F41.9 ANXIETY: ICD-10-CM

## 2025-06-16 RX ORDER — MIRTAZAPINE 7.5 MG/1
7.5 TABLET, FILM COATED ORAL NIGHTLY
Qty: 90 TABLET | Refills: 0 | OUTPATIENT
Start: 2025-06-16

## 2025-06-16 NOTE — TELEPHONE ENCOUNTER
Medication:   Requested Prescriptions     Pending Prescriptions Disp Refills    mirtazapine (REMERON) 7.5 MG tablet [Pharmacy Med Name: Mirtazapine 7.5 MG Oral Tablet] 90 tablet 0     Sig: Take 1 tablet by mouth nightly        Last Filled:  06/06/2025 #90 0rf     Duplicate request     Patient Phone Number: [phone] (home)     Last appt: 5/6/2025   Next appt: 6/25/2025    Last OARRS:       12/30/2019     4:14 PM   RX Monitoring   Periodic Controlled Substance Monitoring No signs of potential drug abuse or diversion identified.

## 2025-06-19 ENCOUNTER — TELEPHONE (OUTPATIENT)
Age: 75
End: 2025-06-19

## 2025-06-19 NOTE — TELEPHONE ENCOUNTER
Seeking clarification if pt were going to have valve surgery. Per Dr. Wahl, pt is not a Con[facility] candidate and that his valve status would be be managed by Dr. Orr. LMOR with information and request for call back. Await a return call.

## 2025-06-22 DIAGNOSIS — M47.22 OSTEOARTHRITIS OF SPINE WITH RADICULOPATHY, CERVICAL REGION: ICD-10-CM

## 2025-06-22 DIAGNOSIS — E78.2 MIXED HYPERLIPIDEMIA: ICD-10-CM

## 2025-06-22 DIAGNOSIS — I25.10 CORONARY ARTERY DISEASE INVOLVING NATIVE CORONARY ARTERY OF NATIVE HEART WITHOUT ANGINA PECTORIS: ICD-10-CM

## 2025-06-23 RX ORDER — GABAPENTIN 100 MG/1
100 CAPSULE ORAL 3 TIMES DAILY
Qty: 180 CAPSULE | Refills: 0 | Status: SHIPPED | OUTPATIENT
Start: 2025-06-23 | End: 2025-09-21

## 2025-06-23 RX ORDER — ROSUVASTATIN CALCIUM 40 MG/1
40 TABLET, COATED ORAL DAILY
Qty: 90 TABLET | Refills: 0 | OUTPATIENT
Start: 2025-06-23

## 2025-06-23 NOTE — TELEPHONE ENCOUNTER
The original prescription was discontinued on 2/10/2025 by Maria De Jesus Graham RN.      Complete

## 2025-06-23 NOTE — TELEPHONE ENCOUNTER
Medication:   Requested Prescriptions     Pending Prescriptions Disp Refills    gabapentin (NEURONTIN) 100 MG capsule [Pharmacy Med Name: Gabapentin 100 MG Oral Capsule] 270 capsule 0     Sig: Take 1 capsule by mouth 3 times daily for 90 days.        Last Filled:  4/23/2025 180 capsule, Refills: 0 ordered     Patient Phone Number: [phone] (home)     Last appt: 5/6/2025   Next appt: 6/25/2025    Last OARRS:       12/30/2019     4:14 PM   RX Monitoring   Periodic Controlled Substance Monitoring No signs of potential drug abuse or diversion identified.

## 2025-06-25 ENCOUNTER — OFFICE VISIT (OUTPATIENT)
Dept: FAMILY MEDICINE CLINIC | Age: 75
End: 2025-06-25
Payer: MEDICARE

## 2025-06-25 VITALS
HEIGHT: 70 IN | DIASTOLIC BLOOD PRESSURE: 64 MMHG | WEIGHT: 161 LBS | OXYGEN SATURATION: 96 % | BODY MASS INDEX: 23.05 KG/M2 | SYSTOLIC BLOOD PRESSURE: 124 MMHG | HEART RATE: 81 BPM

## 2025-06-25 DIAGNOSIS — I10 ESSENTIAL HYPERTENSION: Chronic | ICD-10-CM

## 2025-06-25 DIAGNOSIS — Z00.00 ENCOUNTER FOR ANNUAL WELLNESS VISIT (AWV) IN MEDICARE PATIENT: ICD-10-CM

## 2025-06-25 DIAGNOSIS — M54.16 RADICULOPATHY, LUMBAR REGION: ICD-10-CM

## 2025-06-25 DIAGNOSIS — E78.2 MIXED HYPERLIPIDEMIA: ICD-10-CM

## 2025-06-25 DIAGNOSIS — E03.9 ACQUIRED HYPOTHYROIDISM: ICD-10-CM

## 2025-06-25 DIAGNOSIS — Z00.00 MEDICARE ANNUAL WELLNESS VISIT, SUBSEQUENT: Primary | ICD-10-CM

## 2025-06-25 PROCEDURE — 1159F MED LIST DOCD IN RCRD: CPT | Performed by: FAMILY MEDICINE

## 2025-06-25 PROCEDURE — 1123F ACP DISCUSS/DSCN MKR DOCD: CPT | Performed by: FAMILY MEDICINE

## 2025-06-25 PROCEDURE — G2211 COMPLEX E/M VISIT ADD ON: HCPCS | Performed by: FAMILY MEDICINE

## 2025-06-25 PROCEDURE — 3074F SYST BP LT 130 MM HG: CPT | Performed by: FAMILY MEDICINE

## 2025-06-25 PROCEDURE — 1160F RVW MEDS BY RX/DR IN RCRD: CPT | Performed by: FAMILY MEDICINE

## 2025-06-25 PROCEDURE — 3078F DIAST BP <80 MM HG: CPT | Performed by: FAMILY MEDICINE

## 2025-06-25 PROCEDURE — G0439 PPPS, SUBSEQ VISIT: HCPCS | Performed by: FAMILY MEDICINE

## 2025-06-25 PROCEDURE — 99213 OFFICE O/P EST LOW 20 MIN: CPT | Performed by: FAMILY MEDICINE

## 2025-06-25 RX ORDER — METOPROLOL SUCCINATE 25 MG/1
25 TABLET, EXTENDED RELEASE ORAL DAILY
COMMUNITY
Start: 2025-06-25

## 2025-06-25 RX ORDER — ROSUVASTATIN CALCIUM 40 MG/1
40 TABLET, COATED ORAL DAILY
COMMUNITY
Start: 2025-06-25 | End: 2025-06-26 | Stop reason: SDUPTHER

## 2025-06-25 ASSESSMENT — LIFESTYLE VARIABLES
HOW OFTEN DO YOU HAVE A DRINK CONTAINING ALCOHOL: 4 OR MORE TIMES A WEEK
HOW MANY STANDARD DRINKS CONTAINING ALCOHOL DO YOU HAVE ON A TYPICAL DAY: 1 OR 2

## 2025-06-25 ASSESSMENT — PATIENT HEALTH QUESTIONNAIRE - PHQ9
1. LITTLE INTEREST OR PLEASURE IN DOING THINGS: SEVERAL DAYS
SUM OF ALL RESPONSES TO PHQ QUESTIONS 1-9: 1
SUM OF ALL RESPONSES TO PHQ QUESTIONS 1-9: 1
2. FEELING DOWN, DEPRESSED OR HOPELESS: NOT AT ALL
SUM OF ALL RESPONSES TO PHQ QUESTIONS 1-9: 1
SUM OF ALL RESPONSES TO PHQ QUESTIONS 1-9: 1

## 2025-06-25 NOTE — PROGRESS NOTES
Medicare Annual Wellness Visit    Anjum Zaragoza is here for Medicare AWV    Assessment & Plan   Encounter for annual wellness visit (AWV) in Medicare patient  Up-to-date with immunization.  Stable labs.  Mixed hyperlipidemia  Advised to start Lipitor 40 mg daily.  Unable to tolerate 80 mg  Essential hypertension  Currently stable continue the metoprolol extended release 20 mg daily.    Acquired hypothyroidism  TSH stable continue the Synthroid 175 mcg daily    Radiculopathy, lumbar region  On Norco as needed.            Subjective   75-year-old male with history of coronary artery disease s/p 2 stents, paroxysmal atrial fibrillation, aortic valve insufficiency underwent replacement and had mitral valve clip placed for mitral regurgitation, squamous cell carcinoma of his tongue currently on remission, chronic lower back due to lumbar disc degeneration, hypothyroidism, anxiety and prostate cancer s/p prostatectomy coming for annual wellness visit.       Currently he is on amiodarone 200 mg daily, metoprolol extended release 25 mg daily.  Currently he is on sinus rhythm.       He has history of 66% stenosis of right cervical internal carotid artery and currently denies any symptoms.  He has restarted Lipitor 40 mg daily.  Unable to tolerate 80 mg.     Prostate cancer with s/p prostatectomy: Still having urinary incontinence.  Currently wearing the pads.     He has history of significant narrowing at C3-C4 and disc bulging and lumbar disc degeneration.  Still has pain and takes intermittent Norco.  Also takes gabapentin which helps.      Hypothyroidism currently his dose was changed to 150 mcg and his tsh was normal.       He has been taking Remeron 7.5 mg for anxiety and its helping and also taking buspar 5 mg 3 times a day.     He has history of COPD and currently not been taking any inhalers.  Denies any concerns     He had squamous cell carcinoma of his tongue in the past and followed up with Dr Gama and currently 
patient instructions/AVS.  Recommended screening schedule for the next 5-10 years is provided to the patient in written form: see Patient Instructions/AVS.     Reviewed and updated this visit:  Allergies  Meds  Sexual Hx

## 2025-06-25 NOTE — PATIENT INSTRUCTIONS
These techniques combine exercise and meditation. You may need some training at first to learn them.  Do something you enjoy. For example, listen to music or go to a movie. Practice your hobby or do volunteer work.  Meditate. This can help you relax, because you are not worrying about what happened before or what may happen in the future.  Do guided imagery. Imagine yourself in any setting that helps you feel calm. You can use online videos, books, or a teacher to guide you.  Do breathing exercises. For example:  From a standing position, bend forward from the waist with your knees slightly bent. Let your arms dangle close to the floor.  Breathe in slowly and deeply as you return to a standing position. Roll up slowly and lift your head last.  Hold your breath for just a few seconds in the standing position.  Breathe out slowly and bend forward from the waist.  Let your feelings out. Talk, laugh, cry, and express anger when you need to. Talking with supportive friends or family, a counselor, or a zaida leader about your feelings is a healthy way to relieve stress. Avoid discussing your feelings with people who make you feel worse.  Write. It may help to write about things that are bothering you. This helps you find out how much stress you feel and what is causing it. When you know this, you can find better ways to cope.  What can you do to prevent stress?  You might try some of these things to help prevent stress:  Manage your time. This helps you find time to do the things you want and need to do.  Get enough sleep. Your body recovers from the stresses of the day while you are sleeping.  Get support. Your family, friends, and community can make a difference in how you experience stress.  Limit your news feed. Avoid or limit time on social media or news that may make you feel stressed.  Do something active. Exercise or activity can help reduce stress. Walking is a great way to get started.  Where can you learn

## 2025-06-26 ENCOUNTER — TELEPHONE (OUTPATIENT)
Dept: FAMILY MEDICINE CLINIC | Age: 75
End: 2025-06-26

## 2025-06-26 RX ORDER — ROSUVASTATIN CALCIUM 40 MG/1
40 TABLET, COATED ORAL DAILY
Qty: 30 TABLET | Refills: 5 | Status: SHIPPED | OUTPATIENT
Start: 2025-06-26

## 2025-06-26 RX ORDER — ROSUVASTATIN CALCIUM 40 MG/1
40 TABLET, COATED ORAL DAILY
Qty: 90 TABLET | Refills: 0 | OUTPATIENT
Start: 2025-06-26

## 2025-06-26 NOTE — TELEPHONE ENCOUNTER
Medication:   Requested Prescriptions     Pending Prescriptions Disp Refills    rosuvastatin (CRESTOR) 40 MG tablet 30 tablet      Sig: Take 1 tablet by mouth daily        Last Filled:  6/25/2025 unknown      Patient Phone Number: [phone] (home)     Last appt: 6/25/2025   Next appt: Visit date not found    Last OARRS:       12/30/2019     4:14 PM   RX Monitoring   Periodic Controlled Substance Monitoring No signs of potential drug abuse or diversion identified.

## 2025-06-26 NOTE — TELEPHONE ENCOUNTER
Original script was not received by pharmacy. Please resend script with quantity, refills.       rosuvastatin (CRESTOR) 40 MG tablet [[id number]]    Order Details    Dose: 40 mg Route: Oral Frequency: DAILY   Dispense Quantity: -- Refills: --          Sig: Take 1 tablet by mouth daily             Pharmacy...[facility] [address] [place] [address] -  [phone] -  [phone]

## 2025-07-08 PROCEDURE — 93298 REM INTERROG DEV EVAL SCRMS: CPT | Performed by: INTERNAL MEDICINE

## 2025-07-14 RX ORDER — PANTOPRAZOLE SODIUM 40 MG/1
TABLET, DELAYED RELEASE ORAL
Qty: 180 TABLET | Refills: 0 | Status: SHIPPED | OUTPATIENT
Start: 2025-07-14

## 2025-07-14 NOTE — TELEPHONE ENCOUNTER
Medication:   Requested Prescriptions     Pending Prescriptions Disp Refills    pantoprazole (PROTONIX) 40 MG tablet [Pharmacy Med Name: Pantoprazole Sodium 40 MG Oral Tablet Delayed Release] 180 tablet 0     Sig: TAKE 1 TABLET BY MOUTH TWICE DAILY BEFORE MEAL(S)        Last Filled:  05/28/2025 #180 0rf     Patient Phone Number: [phone] (home)     Last appt: 6/25/2025   Next appt: Visit date not found    Last OARRS:       12/30/2019     4:14 PM   RX Monitoring   Periodic Controlled Substance Monitoring No signs of potential drug abuse or diversion identified.

## 2025-07-18 DIAGNOSIS — Z51.81 ENCOUNTER FOR MEDICATION MONITORING: ICD-10-CM

## 2025-07-18 RX ORDER — BUSPIRONE HYDROCHLORIDE 5 MG/1
5 TABLET ORAL 3 TIMES DAILY
Qty: 270 TABLET | Refills: 0 | Status: SHIPPED | OUTPATIENT
Start: 2025-07-18

## 2025-07-18 NOTE — TELEPHONE ENCOUNTER
Medication:   Requested Prescriptions     Pending Prescriptions Disp Refills    busPIRone (BUSPAR) 5 MG tablet [Pharmacy Med Name: busPIRone HCl 5 MG Oral Tablet] 270 tablet 0     Sig: TAKE 1 TABLET BY MOUTH THREE TIMES DAILY        Last Filled:  4/23/2025 270 tabs 0 refills     Patient Phone Number: [phone] (home)     Last appt: 6/25/2025   Next appt: Visit date not found    Last OARRS:       12/30/2019     4:14 PM   RX Monitoring   Periodic Controlled Substance Monitoring No signs of potential drug abuse or diversion identified.

## 2025-08-07 ENCOUNTER — CLINICAL SUPPORT (OUTPATIENT)
Dept: CARDIOLOGY CLINIC | Age: 75
End: 2025-08-07

## 2025-08-07 ENCOUNTER — OFFICE VISIT (OUTPATIENT)
Dept: CARDIOLOGY CLINIC | Age: 75
End: 2025-08-07
Payer: MEDICARE

## 2025-08-07 VITALS
HEART RATE: 67 BPM | WEIGHT: 165 LBS | HEIGHT: 70 IN | DIASTOLIC BLOOD PRESSURE: 80 MMHG | BODY MASS INDEX: 23.62 KG/M2 | SYSTOLIC BLOOD PRESSURE: 120 MMHG

## 2025-08-07 DIAGNOSIS — I07.1 SEVERE TRICUSPID REGURGITATION: ICD-10-CM

## 2025-08-07 DIAGNOSIS — I35.1 AORTIC VALVE INSUFFICIENCY, ETIOLOGY OF CARDIAC VALVE DISEASE UNSPECIFIED: Primary | ICD-10-CM

## 2025-08-07 DIAGNOSIS — I25.10 CORONARY ARTERY DISEASE INVOLVING NATIVE CORONARY ARTERY OF NATIVE HEART, UNSPECIFIED WHETHER ANGINA PRESENT: ICD-10-CM

## 2025-08-07 DIAGNOSIS — I10 ESSENTIAL HYPERTENSION: Chronic | ICD-10-CM

## 2025-08-07 DIAGNOSIS — I48.0 PAF (PAROXYSMAL ATRIAL FIBRILLATION) (HCC): Chronic | ICD-10-CM

## 2025-08-07 DIAGNOSIS — I34.0 SEVERE MITRAL REGURGITATION: ICD-10-CM

## 2025-08-07 PROCEDURE — 93000 ELECTROCARDIOGRAM COMPLETE: CPT | Performed by: INTERNAL MEDICINE

## 2025-08-07 PROCEDURE — 3074F SYST BP LT 130 MM HG: CPT | Performed by: INTERNAL MEDICINE

## 2025-08-07 PROCEDURE — 1123F ACP DISCUSS/DSCN MKR DOCD: CPT | Performed by: INTERNAL MEDICINE

## 2025-08-07 PROCEDURE — 3079F DIAST BP 80-89 MM HG: CPT | Performed by: INTERNAL MEDICINE

## 2025-08-07 PROCEDURE — 99214 OFFICE O/P EST MOD 30 MIN: CPT | Performed by: INTERNAL MEDICINE

## 2025-08-07 PROCEDURE — 1159F MED LIST DOCD IN RCRD: CPT | Performed by: INTERNAL MEDICINE

## 2025-08-07 RX ORDER — AMIODARONE HYDROCHLORIDE 200 MG/1
100 TABLET ORAL DAILY
Qty: 30 TABLET | Refills: 1 | Status: SHIPPED | OUTPATIENT
Start: 2025-08-07

## 2025-08-21 ENCOUNTER — OFFICE VISIT (OUTPATIENT)
Dept: PULMONOLOGY | Age: 75
End: 2025-08-21
Payer: MEDICARE

## 2025-08-21 VITALS
WEIGHT: 161.8 LBS | OXYGEN SATURATION: 95 % | HEIGHT: 71 IN | HEART RATE: 51 BPM | SYSTOLIC BLOOD PRESSURE: 116 MMHG | BODY MASS INDEX: 22.65 KG/M2 | DIASTOLIC BLOOD PRESSURE: 72 MMHG

## 2025-08-21 DIAGNOSIS — I10 ESSENTIAL HYPERTENSION: Chronic | ICD-10-CM

## 2025-08-21 DIAGNOSIS — I48.0 PAF (PAROXYSMAL ATRIAL FIBRILLATION) (HCC): Chronic | ICD-10-CM

## 2025-08-21 DIAGNOSIS — G47.33 OSA (OBSTRUCTIVE SLEEP APNEA): Primary | ICD-10-CM

## 2025-08-21 PROCEDURE — 99204 OFFICE O/P NEW MOD 45 MIN: CPT | Performed by: STUDENT IN AN ORGANIZED HEALTH CARE EDUCATION/TRAINING PROGRAM

## 2025-08-21 PROCEDURE — 3078F DIAST BP <80 MM HG: CPT | Performed by: STUDENT IN AN ORGANIZED HEALTH CARE EDUCATION/TRAINING PROGRAM

## 2025-08-21 PROCEDURE — 1123F ACP DISCUSS/DSCN MKR DOCD: CPT | Performed by: STUDENT IN AN ORGANIZED HEALTH CARE EDUCATION/TRAINING PROGRAM

## 2025-08-21 PROCEDURE — 3074F SYST BP LT 130 MM HG: CPT | Performed by: STUDENT IN AN ORGANIZED HEALTH CARE EDUCATION/TRAINING PROGRAM

## 2025-08-21 PROCEDURE — 1159F MED LIST DOCD IN RCRD: CPT | Performed by: STUDENT IN AN ORGANIZED HEALTH CARE EDUCATION/TRAINING PROGRAM

## 2025-08-21 ASSESSMENT — SLEEP AND FATIGUE QUESTIONNAIRES
HOW LIKELY ARE YOU TO NOD OFF OR FALL ASLEEP WHILE SITTING AND READING: MODERATE CHANCE OF DOZING
HOW LIKELY ARE YOU TO NOD OFF OR FALL ASLEEP WHILE WATCHING TV: MODERATE CHANCE OF DOZING
HOW LIKELY ARE YOU TO NOD OFF OR FALL ASLEEP WHEN YOU ARE A PASSENGER IN A CAR FOR AN HOUR WITHOUT A BREAK: WOULD NEVER DOZE
HOW LIKELY ARE YOU TO NOD OFF OR FALL ASLEEP WHILE SITTING INACTIVE IN A PUBLIC PLACE: WOULD NEVER DOZE
HOW LIKELY ARE YOU TO NOD OFF OR FALL ASLEEP WHILE SITTING AND TALKING TO SOMEONE: WOULD NEVER DOZE
ESS TOTAL SCORE: 7
HOW LIKELY ARE YOU TO NOD OFF OR FALL ASLEEP WHILE SITTING QUIETLY AFTER LUNCH WITHOUT ALCOHOL: WOULD NEVER DOZE
HOW LIKELY ARE YOU TO NOD OFF OR FALL ASLEEP IN A CAR, WHILE STOPPED FOR A FEW MINUTES IN TRAFFIC: WOULD NEVER DOZE
HOW LIKELY ARE YOU TO NOD OFF OR FALL ASLEEP WHILE LYING DOWN TO REST IN THE AFTERNOON WHEN CIRCUMSTANCES PERMIT: HIGH CHANCE OF DOZING

## 2025-08-22 ENCOUNTER — OFFICE VISIT (OUTPATIENT)
Dept: CARDIOLOGY CLINIC | Age: 75
End: 2025-08-22
Payer: MEDICARE

## 2025-08-22 VITALS
SYSTOLIC BLOOD PRESSURE: 104 MMHG | OXYGEN SATURATION: 97 % | BODY MASS INDEX: 23.04 KG/M2 | HEART RATE: 55 BPM | WEIGHT: 164.6 LBS | DIASTOLIC BLOOD PRESSURE: 68 MMHG | HEIGHT: 71 IN

## 2025-08-22 DIAGNOSIS — I25.83 CORONARY ARTERY DISEASE DUE TO LIPID RICH PLAQUE: ICD-10-CM

## 2025-08-22 DIAGNOSIS — I25.10 CORONARY ARTERY DISEASE DUE TO LIPID RICH PLAQUE: ICD-10-CM

## 2025-08-22 DIAGNOSIS — I07.1 SEVERE TRICUSPID REGURGITATION: ICD-10-CM

## 2025-08-22 DIAGNOSIS — I25.9 CHRONIC ISCHEMIC HEART DISEASE: Chronic | ICD-10-CM

## 2025-08-22 DIAGNOSIS — I10 HTN (HYPERTENSION), BENIGN: ICD-10-CM

## 2025-08-22 DIAGNOSIS — I35.1 AORTIC VALVE INSUFFICIENCY, ETIOLOGY OF CARDIAC VALVE DISEASE UNSPECIFIED: ICD-10-CM

## 2025-08-22 DIAGNOSIS — I34.0 SEVERE MITRAL REGURGITATION: ICD-10-CM

## 2025-08-22 DIAGNOSIS — I48.0 PAF (PAROXYSMAL ATRIAL FIBRILLATION) (HCC): Primary | Chronic | ICD-10-CM

## 2025-08-22 PROCEDURE — 1123F ACP DISCUSS/DSCN MKR DOCD: CPT | Performed by: INTERNAL MEDICINE

## 2025-08-22 PROCEDURE — 3074F SYST BP LT 130 MM HG: CPT | Performed by: INTERNAL MEDICINE

## 2025-08-22 PROCEDURE — 99215 OFFICE O/P EST HI 40 MIN: CPT | Performed by: INTERNAL MEDICINE

## 2025-08-22 PROCEDURE — 3078F DIAST BP <80 MM HG: CPT | Performed by: INTERNAL MEDICINE

## 2025-08-22 PROCEDURE — 1159F MED LIST DOCD IN RCRD: CPT | Performed by: INTERNAL MEDICINE

## 2025-08-22 PROCEDURE — G2211 COMPLEX E/M VISIT ADD ON: HCPCS | Performed by: INTERNAL MEDICINE

## 2025-08-25 DIAGNOSIS — M47.22 OSTEOARTHRITIS OF SPINE WITH RADICULOPATHY, CERVICAL REGION: ICD-10-CM

## 2025-08-25 RX ORDER — GABAPENTIN 100 MG/1
100 CAPSULE ORAL 3 TIMES DAILY
Qty: 270 CAPSULE | Refills: 0 | Status: SHIPPED | OUTPATIENT
Start: 2025-08-25 | End: 2025-11-23

## 2025-08-28 ENCOUNTER — TELEPHONE (OUTPATIENT)
Dept: PHARMACY | Facility: CLINIC | Age: 75
End: 2025-08-28

## (undated) DEVICE — MERCY FAIRFIELD TURNOVER KIT: Brand: MEDLINE INDUSTRIES, INC.

## (undated) DEVICE — SOLUTION IV IRRIG POUR BRL 0.9% SODIUM CHL 2F7124

## (undated) DEVICE — LOTION PREP REMV 5OZ IODO CLR TINC OF BENZ DURAPREP

## (undated) DEVICE — SET EXTN L7IN PRIMING VOL 0.5ML PRSS RATE STD BOR 1 REM

## (undated) DEVICE — STERILE POLYISOPRENE POWDER-FREE SURGICAL GLOVES: Brand: PROTEXIS

## (undated) DEVICE — UNIVERSAL BLOCK TRAY: Brand: AVANOS*

## (undated) DEVICE — SOLUTION IRRIG 500ML STRL H2O NONPYROGENIC

## (undated) DEVICE — ENDOSCOPIC KIT 6X3/16 FT COLON W/ 1.1 OZ 2 GWN W/O BRSH

## (undated) DEVICE — PROTECTOR EYE PT SELF ADH NS OPT GRD LF

## (undated) DEVICE — SYRINGE MED 3ML CLR PLAS STD N CTRL LUERLOCK TIP DISP

## (undated) DEVICE — CANNULA SAMP CO2 AD GRN 7FT CO2 AND 7FT O2 TBNG UNIV CONN

## (undated) DEVICE — Device: Brand: JELCO

## (undated) DEVICE — SUTURE VCRL SZ 0 L18IN ABSRB UD L36MM CT-1 1/2 CIR J840D

## (undated) DEVICE — DISPOSABLE OR TOWEL: Brand: CARDINAL HEALTH

## (undated) DEVICE — 3.0MM NEURO (MATCH HEAD)

## (undated) DEVICE — MEDIA CONTRAST RX ISOVUE-300 61% 30ML VIALS

## (undated) DEVICE — SPONGES GAUZE X-RAY 4X4 16PLY

## (undated) DEVICE — ARM CRADLE: Brand: DEVON

## (undated) DEVICE — DRAPE C ARM UNIV W41XL74IN CLR PLAS XR VELC CLSR POLY STRP

## (undated) DEVICE — GAUZE,SPONGE,4"X4",8PLY,STRL,LF,10/TRAY: Brand: MEDLINE

## (undated) DEVICE — SURGICAL PROCEDURE PACK NEURO DRP CUST

## (undated) DEVICE — SINGLE USE AIR/WATER, SUCTION AND BIOPSY VALVES SET: Brand: ORCAPOD™

## (undated) DEVICE — PEN: MARKING STD 100/CS: Brand: MEDICAL ACTION INDUSTRIES

## (undated) DEVICE — POSITIONER HD SFT TCH BERRY FOAM DEVON

## (undated) DEVICE — DRAPE MICSCP W132XL406CM LENS DIA68MM W VARI LENS2 FOR LEICA

## (undated) DEVICE — SURE SET SINGLE BASIN-LF: Brand: MEDLINE INDUSTRIES, INC.

## (undated) DEVICE — FORCEPS BX L240CM WRK CHN 2.8MM STD CAP W/ NDL MIC MESH

## (undated) DEVICE — NEEDLE SPNL 22GA L3.5IN BLK HUB S STL REG WALL FIT STYL W/

## (undated) DEVICE — CHLORAPREP 26ML ORANGE

## (undated) DEVICE — BLANKET WRM W29.9XL79.1IN UP BODY FORC AIR MISTRAL-AIR

## (undated) DEVICE — STERILE LATEX POWDER-FREE SURGICAL GLOVESWITH NITRILE AND EMOLLIENT COATINGS: Brand: PROTEXIS

## (undated) DEVICE — SYMMETRY SHARP KERRISON® RONGEUR TIPS, THIN FOOTPLATE, 3 MM TIP, SINGLE USE, (3/BX): Brand: SYMMETRY SHARP KERRISON

## (undated) DEVICE — DRAPE,LAP,CHOLE,W/TROUGHS,STERILE: Brand: MEDLINE

## (undated) DEVICE — APPLICATOR PREP 26ML 0.7% IOD POVACRYLEX 74% ISO ALC ST

## (undated) DEVICE — RONGEUR SURG KERRISON 2 MM SHRP THN FTPLT DISP

## (undated) DEVICE — SUTURE VCRL SZ 2-0 L18IN ABSRB UD CT-1 L36MM 1/2 CIR J839D

## (undated) DEVICE — MOUTHPIECE ENDOSCP L CTRL OPN AND SIDE PORTS DISP

## (undated) DEVICE — SUTURE MCRYL SZ 4-0 L27IN ABSRB UD L19MM PS-2 1/2 CIR PRIM Y426H

## (undated) DEVICE — BW-412T DISP COMBO CLEANING BRUSH: Brand: SINGLE USE COMBINATION CLEANING BRUSH

## (undated) DEVICE — AIR/WATER CLEANING ADAPTER FOR OLYMPUS® GI ENDOSCOPE: Brand: BULLDOG®

## (undated) DEVICE — 3M™ STERI-STRIP™ REINFORCED ADHESIVE SKIN CLOSURES, R1547, 1/2 IN X 4 IN (12 MM X 100 MM), 6 STRIPS/ENVELOPE: Brand: 3M™ STERI-STRIP™

## (undated) DEVICE — STANDARD HYPODERMIC NEEDLE,POLYPROPYLENE HUB: Brand: MONOJECT

## (undated) DEVICE — TOWEL,OR,DSP,ST,BLUE,STD,4/PK,20PK/CS: Brand: MEDLINE

## (undated) DEVICE — MASTISOL ADHESIVE LIQ 2/3ML

## (undated) DEVICE — SOLUTION IV IRRIG WATER 1000ML POUR BRL 2F7114

## (undated) DEVICE — TRAY PREP DRY W/ PREM GLV 2 APPL 6 SPNG 2 UNDPD 1 OVERWRAP